# Patient Record
Sex: MALE | Race: WHITE | NOT HISPANIC OR LATINO | Employment: UNEMPLOYED | URBAN - METROPOLITAN AREA
[De-identification: names, ages, dates, MRNs, and addresses within clinical notes are randomized per-mention and may not be internally consistent; named-entity substitution may affect disease eponyms.]

---

## 2017-02-12 ENCOUNTER — GENERIC CONVERSION - ENCOUNTER (OUTPATIENT)
Dept: OTHER | Facility: OTHER | Age: 57
End: 2017-02-12

## 2017-02-27 ENCOUNTER — ALLSCRIPTS OFFICE VISIT (OUTPATIENT)
Dept: OTHER | Facility: OTHER | Age: 57
End: 2017-02-27

## 2017-03-06 ENCOUNTER — ALLSCRIPTS OFFICE VISIT (OUTPATIENT)
Dept: OTHER | Facility: OTHER | Age: 57
End: 2017-03-06

## 2017-03-13 ENCOUNTER — ALLSCRIPTS OFFICE VISIT (OUTPATIENT)
Dept: OTHER | Facility: OTHER | Age: 57
End: 2017-03-13

## 2017-03-27 ENCOUNTER — TRANSCRIBE ORDERS (OUTPATIENT)
Dept: ADMINISTRATIVE | Facility: HOSPITAL | Age: 57
End: 2017-03-27

## 2017-03-27 ENCOUNTER — ALLSCRIPTS OFFICE VISIT (OUTPATIENT)
Dept: OTHER | Facility: OTHER | Age: 57
End: 2017-03-27

## 2017-03-27 DIAGNOSIS — I25.10 ATHEROSCLEROTIC HEART DISEASE OF NATIVE CORONARY ARTERY WITHOUT ANGINA PECTORIS: ICD-10-CM

## 2017-03-27 DIAGNOSIS — R09.89 OTHER SPECIFIED SYMPTOMS AND SIGNS INVOLVING THE CIRCULATORY AND RESPIRATORY SYSTEMS: ICD-10-CM

## 2017-03-27 DIAGNOSIS — I50.9 HEART FAILURE (HCC): ICD-10-CM

## 2017-03-27 DIAGNOSIS — I50.9 HEART FAILURE, UNSPECIFIED (HCC): Primary | ICD-10-CM

## 2017-03-27 DIAGNOSIS — I77.9 DISORDER OF ARTERY OR ARTERIOLE (HCC): ICD-10-CM

## 2017-03-27 DIAGNOSIS — Z95.2 HEART VALVE REPLACED BY TRANSPLANT: ICD-10-CM

## 2017-03-27 DIAGNOSIS — I25.10 ASHD (ARTERIOSCLEROTIC HEART DISEASE): ICD-10-CM

## 2017-03-27 DIAGNOSIS — Z95.2 PRESENCE OF PROSTHETIC HEART VALVE: ICD-10-CM

## 2017-03-29 ENCOUNTER — ALLSCRIPTS OFFICE VISIT (OUTPATIENT)
Dept: OTHER | Facility: OTHER | Age: 57
End: 2017-03-29

## 2017-03-30 ENCOUNTER — GENERIC CONVERSION - ENCOUNTER (OUTPATIENT)
Dept: OTHER | Facility: OTHER | Age: 57
End: 2017-03-30

## 2017-04-02 ENCOUNTER — APPOINTMENT (EMERGENCY)
Dept: CT IMAGING | Facility: HOSPITAL | Age: 57
DRG: 242 | End: 2017-04-02
Payer: COMMERCIAL

## 2017-04-02 ENCOUNTER — APPOINTMENT (EMERGENCY)
Dept: RADIOLOGY | Facility: HOSPITAL | Age: 57
DRG: 242 | End: 2017-04-02
Payer: COMMERCIAL

## 2017-04-02 ENCOUNTER — HOSPITAL ENCOUNTER (INPATIENT)
Facility: HOSPITAL | Age: 57
LOS: 3 days | Discharge: LEFT BEFORE DISCHARGE | DRG: 242 | End: 2017-04-05
Attending: EMERGENCY MEDICINE | Admitting: INTERNAL MEDICINE
Payer: COMMERCIAL

## 2017-04-02 ENCOUNTER — HOSPITAL ENCOUNTER (EMERGENCY)
Facility: HOSPITAL | Age: 57
Discharge: HOME | DRG: 242 | End: 2017-04-02
Attending: EMERGENCY MEDICINE
Payer: COMMERCIAL

## 2017-04-02 VITALS
RESPIRATION RATE: 16 BRPM | WEIGHT: 189 LBS | OXYGEN SATURATION: 99 % | BODY MASS INDEX: 23.5 KG/M2 | HEART RATE: 89 BPM | HEIGHT: 75 IN | SYSTOLIC BLOOD PRESSURE: 154 MMHG | TEMPERATURE: 96.9 F | DIASTOLIC BLOOD PRESSURE: 94 MMHG

## 2017-04-02 DIAGNOSIS — K52.9 COLITIS: Primary | ICD-10-CM

## 2017-04-02 DIAGNOSIS — F41.9 ANXIETY: ICD-10-CM

## 2017-04-02 DIAGNOSIS — A41.9 SEPSIS, UNSPECIFIED: ICD-10-CM

## 2017-04-02 PROBLEM — E11.9 DIABETES MELLITUS (HCC): Chronic | Status: ACTIVE | Noted: 2017-04-02

## 2017-04-02 PROBLEM — K44.9 HIATAL HERNIA: Chronic | Status: ACTIVE | Noted: 2017-04-02

## 2017-04-02 PROBLEM — F31.9 BIPOLAR 1 DISORDER (HCC): Chronic | Status: ACTIVE | Noted: 2017-04-02

## 2017-04-02 PROBLEM — I10 HYPERTENSION: Chronic | Status: ACTIVE | Noted: 2017-04-02

## 2017-04-02 LAB
ALBUMIN SERPL BCP-MCNC: 3.6 G/DL (ref 3.5–5)
ALBUMIN SERPL BCP-MCNC: 3.9 G/DL (ref 3.5–5)
ALP SERPL-CCNC: 154 U/L (ref 46–116)
ALP SERPL-CCNC: 166 U/L (ref 46–116)
ALT SERPL W P-5'-P-CCNC: 19 U/L (ref 12–78)
ALT SERPL W P-5'-P-CCNC: 23 U/L (ref 12–78)
AMPHETAMINES SERPL QL SCN: NEGATIVE
ANION GAP SERPL CALCULATED.3IONS-SCNC: 10 MMOL/L (ref 4–13)
ANION GAP SERPL CALCULATED.3IONS-SCNC: 11 MMOL/L (ref 4–13)
APTT PPP: 29 SECONDS (ref 24–36)
AST SERPL W P-5'-P-CCNC: 19 U/L (ref 5–45)
AST SERPL W P-5'-P-CCNC: 22 U/L (ref 5–45)
BARBITURATES UR QL: NEGATIVE
BASOPHILS # BLD MANUAL: 0 THOUSAND/UL (ref 0–0.1)
BASOPHILS # BLD MANUAL: 0 THOUSAND/UL (ref 0–0.1)
BASOPHILS NFR MAR MANUAL: 0 % (ref 0–1)
BASOPHILS NFR MAR MANUAL: 0 % (ref 0–1)
BENZODIAZ UR QL: NEGATIVE
BILIRUB SERPL-MCNC: 0.9 MG/DL (ref 0.2–1)
BILIRUB SERPL-MCNC: 1.4 MG/DL (ref 0.2–1)
BUN SERPL-MCNC: 19 MG/DL (ref 5–25)
BUN SERPL-MCNC: 21 MG/DL (ref 5–25)
CALCIUM SERPL-MCNC: 8.7 MG/DL (ref 8.3–10.1)
CALCIUM SERPL-MCNC: 9.4 MG/DL (ref 8.3–10.1)
CHLORIDE SERPL-SCNC: 102 MMOL/L (ref 100–108)
CHLORIDE SERPL-SCNC: 103 MMOL/L (ref 100–108)
CO2 SERPL-SCNC: 27 MMOL/L (ref 21–32)
CO2 SERPL-SCNC: 28 MMOL/L (ref 21–32)
COCAINE UR QL: NEGATIVE
CREAT SERPL-MCNC: 1.1 MG/DL (ref 0.6–1.3)
CREAT SERPL-MCNC: 1.19 MG/DL (ref 0.6–1.3)
EOSINOPHIL # BLD MANUAL: 0 THOUSAND/UL (ref 0–0.4)
EOSINOPHIL # BLD MANUAL: 0 THOUSAND/UL (ref 0–0.4)
EOSINOPHIL NFR BLD MANUAL: 0 % (ref 0–6)
EOSINOPHIL NFR BLD MANUAL: 0 % (ref 0–6)
ERYTHROCYTE [DISTWIDTH] IN BLOOD BY AUTOMATED COUNT: 14.6 % (ref 11.6–15.1)
ERYTHROCYTE [DISTWIDTH] IN BLOOD BY AUTOMATED COUNT: 14.7 % (ref 11.6–15.1)
ETHANOL SERPL-MCNC: <3 MG/DL (ref 0–3)
GFR SERPL CREATININE-BSD FRML MDRD: >60 ML/MIN/1.73SQ M
GFR SERPL CREATININE-BSD FRML MDRD: >60 ML/MIN/1.73SQ M
GLUCOSE SERPL-MCNC: 243 MG/DL (ref 65–140)
GLUCOSE SERPL-MCNC: 253 MG/DL (ref 65–140)
GLUCOSE SERPL-MCNC: 296 MG/DL (ref 65–140)
HCT VFR BLD AUTO: 38 % (ref 36.5–49.3)
HCT VFR BLD AUTO: 41.1 % (ref 36.5–49.3)
HGB BLD-MCNC: 12.5 G/DL (ref 12–17)
HGB BLD-MCNC: 13.5 G/DL (ref 12–17)
INR PPP: 1.4 (ref 0.86–1.16)
LACTATE SERPL-SCNC: 1.9 MMOL/L (ref 0.5–2)
LACTATE SERPL-SCNC: 2.7 MMOL/L (ref 0.5–2)
LACTATE SERPL-SCNC: 3.5 MMOL/L (ref 0.5–2)
LIPASE SERPL-CCNC: 85 U/L (ref 73–393)
LYMPHOCYTES # BLD AUTO: 0.74 THOUSAND/UL (ref 0.6–4.47)
LYMPHOCYTES # BLD AUTO: 0.96 THOUSAND/UL (ref 0.6–4.47)
LYMPHOCYTES # BLD AUTO: 4 % (ref 14–44)
LYMPHOCYTES # BLD AUTO: 5 % (ref 14–44)
MAGNESIUM SERPL-MCNC: 1.8 MG/DL (ref 1.6–2.6)
MCH RBC QN AUTO: 29.8 PG (ref 26.8–34.3)
MCH RBC QN AUTO: 30 PG (ref 26.8–34.3)
MCHC RBC AUTO-ENTMCNC: 32.8 G/DL (ref 31.4–37.4)
MCHC RBC AUTO-ENTMCNC: 32.9 G/DL (ref 31.4–37.4)
MCV RBC AUTO: 91 FL (ref 82–98)
MCV RBC AUTO: 91 FL (ref 82–98)
METHADONE UR QL: NEGATIVE
MONOCYTES # BLD AUTO: 0.3 THOUSAND/UL (ref 0–1.22)
MONOCYTES # BLD AUTO: 1.68 THOUSAND/UL (ref 0–1.22)
MONOCYTES NFR BLD: 2 % (ref 4–12)
MONOCYTES NFR BLD: 7 % (ref 4–12)
NEUTROPHILS # BLD MANUAL: 13.84 THOUSAND/UL (ref 1.85–7.62)
NEUTROPHILS # BLD MANUAL: 21.37 THOUSAND/UL (ref 1.85–7.62)
NEUTS BAND NFR BLD MANUAL: 1 % (ref 0–8)
NEUTS SEG NFR BLD AUTO: 88 % (ref 43–75)
NEUTS SEG NFR BLD AUTO: 93 % (ref 43–75)
OPIATES UR QL SCN: NEGATIVE
PCP UR QL: NEGATIVE
PLATELET # BLD AUTO: 201 THOUSANDS/UL (ref 149–390)
PLATELET # BLD AUTO: 224 THOUSANDS/UL (ref 149–390)
PLATELET BLD QL SMEAR: ADEQUATE
PLATELET BLD QL SMEAR: ADEQUATE
PMV BLD AUTO: 12.5 FL (ref 8.9–12.7)
PMV BLD AUTO: 12.7 FL (ref 8.9–12.7)
POTASSIUM SERPL-SCNC: 3.6 MMOL/L (ref 3.5–5.3)
POTASSIUM SERPL-SCNC: 4.4 MMOL/L (ref 3.5–5.3)
PROT SERPL-MCNC: 7.4 G/DL (ref 6.4–8.2)
PROT SERPL-MCNC: 8 G/DL (ref 6.4–8.2)
PROTHROMBIN TIME: 16.9 SECONDS (ref 12–14.3)
RBC # BLD AUTO: 4.16 MILLION/UL (ref 3.88–5.62)
RBC # BLD AUTO: 4.53 MILLION/UL (ref 3.88–5.62)
RBC MORPH BLD: NORMAL
RBC MORPH BLD: NORMAL
SODIUM SERPL-SCNC: 140 MMOL/L (ref 136–145)
SODIUM SERPL-SCNC: 141 MMOL/L (ref 136–145)
THC UR QL: POSITIVE
TOTAL CELLS COUNTED SPEC: 100
TOTAL CELLS COUNTED SPEC: 100
TROPONIN I SERPL-MCNC: <0.02 NG/ML
TROPONIN I SERPL-MCNC: <0.02 NG/ML
WBC # BLD AUTO: 14.88 THOUSAND/UL (ref 4.31–10.16)
WBC # BLD AUTO: 24.01 THOUSAND/UL (ref 4.31–10.16)

## 2017-04-02 PROCEDURE — 85610 PROTHROMBIN TIME: CPT | Performed by: EMERGENCY MEDICINE

## 2017-04-02 PROCEDURE — 36415 COLL VENOUS BLD VENIPUNCTURE: CPT | Performed by: EMERGENCY MEDICINE

## 2017-04-02 PROCEDURE — 85027 COMPLETE CBC AUTOMATED: CPT | Performed by: EMERGENCY MEDICINE

## 2017-04-02 PROCEDURE — 82948 REAGENT STRIP/BLOOD GLUCOSE: CPT

## 2017-04-02 PROCEDURE — 85730 THROMBOPLASTIN TIME PARTIAL: CPT | Performed by: EMERGENCY MEDICINE

## 2017-04-02 PROCEDURE — 80320 DRUG SCREEN QUANTALCOHOLS: CPT | Performed by: EMERGENCY MEDICINE

## 2017-04-02 PROCEDURE — 96375 TX/PRO/DX INJ NEW DRUG ADDON: CPT

## 2017-04-02 PROCEDURE — 74177 CT ABD & PELVIS W/CONTRAST: CPT

## 2017-04-02 PROCEDURE — 83605 ASSAY OF LACTIC ACID: CPT | Performed by: EMERGENCY MEDICINE

## 2017-04-02 PROCEDURE — 96374 THER/PROPH/DIAG INJ IV PUSH: CPT

## 2017-04-02 PROCEDURE — 83735 ASSAY OF MAGNESIUM: CPT | Performed by: EMERGENCY MEDICINE

## 2017-04-02 PROCEDURE — 99285 EMERGENCY DEPT VISIT HI MDM: CPT

## 2017-04-02 PROCEDURE — 96376 TX/PRO/DX INJ SAME DRUG ADON: CPT

## 2017-04-02 PROCEDURE — 96361 HYDRATE IV INFUSION ADD-ON: CPT

## 2017-04-02 PROCEDURE — 80053 COMPREHEN METABOLIC PANEL: CPT | Performed by: EMERGENCY MEDICINE

## 2017-04-02 PROCEDURE — 87040 BLOOD CULTURE FOR BACTERIA: CPT | Performed by: EMERGENCY MEDICINE

## 2017-04-02 PROCEDURE — 96360 HYDRATION IV INFUSION INIT: CPT

## 2017-04-02 PROCEDURE — 84484 ASSAY OF TROPONIN QUANT: CPT | Performed by: EMERGENCY MEDICINE

## 2017-04-02 PROCEDURE — 85007 BL SMEAR W/DIFF WBC COUNT: CPT | Performed by: EMERGENCY MEDICINE

## 2017-04-02 PROCEDURE — 94762 N-INVAS EAR/PLS OXIMTRY CONT: CPT

## 2017-04-02 PROCEDURE — 93005 ELECTROCARDIOGRAM TRACING: CPT | Performed by: EMERGENCY MEDICINE

## 2017-04-02 PROCEDURE — 83690 ASSAY OF LIPASE: CPT | Performed by: EMERGENCY MEDICINE

## 2017-04-02 PROCEDURE — 80307 DRUG TEST PRSMV CHEM ANLYZR: CPT | Performed by: EMERGENCY MEDICINE

## 2017-04-02 PROCEDURE — 71010 HB CHEST X-RAY 1 VIEW FRONTAL (PORTABLE): CPT

## 2017-04-02 RX ORDER — PIPERACILLIN SODIUM, TAZOBACTAM SODIUM 3; .375 G/15ML; G/15ML
INJECTION, POWDER, LYOPHILIZED, FOR SOLUTION INTRAVENOUS
Status: COMPLETED
Start: 2017-04-02 | End: 2017-04-03

## 2017-04-02 RX ORDER — ONDANSETRON 2 MG/ML
4 INJECTION INTRAMUSCULAR; INTRAVENOUS ONCE
Status: COMPLETED | OUTPATIENT
Start: 2017-04-02 | End: 2017-04-02

## 2017-04-02 RX ORDER — FAMOTIDINE 20 MG/1
20 TABLET, FILM COATED ORAL 2 TIMES DAILY
Status: DISCONTINUED | OUTPATIENT
Start: 2017-04-02 | End: 2017-04-05 | Stop reason: HOSPADM

## 2017-04-02 RX ORDER — ONDANSETRON 2 MG/ML
INJECTION INTRAMUSCULAR; INTRAVENOUS
Status: DISCONTINUED
Start: 2017-04-02 | End: 2017-04-02 | Stop reason: HOSPADM

## 2017-04-02 RX ORDER — HEPARIN SODIUM 5000 [USP'U]/ML
5000 INJECTION, SOLUTION INTRAVENOUS; SUBCUTANEOUS EVERY 8 HOURS SCHEDULED
Status: DISCONTINUED | OUTPATIENT
Start: 2017-04-02 | End: 2017-04-04

## 2017-04-02 RX ORDER — LISINOPRIL 20 MG/1
20 TABLET ORAL DAILY
Status: ON HOLD | COMMUNITY
End: 2017-09-06

## 2017-04-02 RX ORDER — WARFARIN SODIUM 5 MG/1
5 TABLET ORAL
COMMUNITY
End: 2017-04-05 | Stop reason: HOSPADM

## 2017-04-02 RX ORDER — QUETIAPINE FUMARATE 200 MG/1
400 TABLET, FILM COATED ORAL DAILY
COMMUNITY
End: 2017-05-28 | Stop reason: HOSPADM

## 2017-04-02 RX ORDER — CIPROFLOXACIN 500 MG/1
500 TABLET, FILM COATED ORAL DAILY
Qty: 9 TABLET | Refills: 0 | Status: SHIPPED | OUTPATIENT
Start: 2017-04-03 | End: 2017-04-05 | Stop reason: HOSPADM

## 2017-04-02 RX ORDER — LORAZEPAM 2 MG/ML
1 INJECTION INTRAMUSCULAR ONCE
Status: DISCONTINUED | OUTPATIENT
Start: 2017-04-02 | End: 2017-04-02

## 2017-04-02 RX ORDER — ONDANSETRON 4 MG/1
4 TABLET, ORALLY DISINTEGRATING ORAL EVERY 8 HOURS PRN
COMMUNITY
End: 2017-04-22 | Stop reason: HOSPADM

## 2017-04-02 RX ORDER — ONDANSETRON 2 MG/ML
4 INJECTION INTRAMUSCULAR; INTRAVENOUS EVERY 6 HOURS PRN
Status: DISCONTINUED | OUTPATIENT
Start: 2017-04-02 | End: 2017-04-05 | Stop reason: HOSPADM

## 2017-04-02 RX ORDER — QUETIAPINE FUMARATE 300 MG/1
600 TABLET, FILM COATED ORAL
Status: ON HOLD | COMMUNITY
End: 2017-05-28

## 2017-04-02 RX ORDER — CIPROFLOXACIN 500 MG/1
500 TABLET, FILM COATED ORAL ONCE
Qty: 9 TABLET | Refills: 0 | Status: SHIPPED | OUTPATIENT
Start: 2017-04-03 | End: 2017-04-02

## 2017-04-02 RX ORDER — LORAZEPAM 2 MG/ML
2 INJECTION INTRAMUSCULAR ONCE
Status: COMPLETED | OUTPATIENT
Start: 2017-04-02 | End: 2017-04-02

## 2017-04-02 RX ORDER — TORSEMIDE 20 MG/1
40 TABLET ORAL DAILY
Status: ON HOLD | COMMUNITY
End: 2017-05-28

## 2017-04-02 RX ORDER — DEXTROSE, SODIUM CHLORIDE, AND POTASSIUM CHLORIDE 5; .45; .15 G/100ML; G/100ML; G/100ML
125 INJECTION INTRAVENOUS CONTINUOUS
Status: DISCONTINUED | OUTPATIENT
Start: 2017-04-03 | End: 2017-04-04

## 2017-04-02 RX ORDER — LORAZEPAM 2 MG/ML
1 INJECTION INTRAMUSCULAR ONCE
Status: COMPLETED | OUTPATIENT
Start: 2017-04-02 | End: 2017-04-02

## 2017-04-02 RX ORDER — INSULIN GLARGINE 100 [IU]/ML
10 INJECTION, SOLUTION SUBCUTANEOUS
COMMUNITY
End: 2017-05-28 | Stop reason: HOSPADM

## 2017-04-02 RX ORDER — LORAZEPAM 2 MG/ML
INJECTION INTRAMUSCULAR
Status: DISCONTINUED
Start: 2017-04-02 | End: 2017-04-02 | Stop reason: HOSPADM

## 2017-04-02 RX ORDER — CIPROFLOXACIN 250 MG/1
500 TABLET, FILM COATED ORAL ONCE
Status: COMPLETED | OUTPATIENT
Start: 2017-04-02 | End: 2017-04-02

## 2017-04-02 RX ORDER — LORAZEPAM 1 MG/1
1 TABLET ORAL 2 TIMES DAILY PRN
COMMUNITY
End: 2017-09-06 | Stop reason: HOSPADM

## 2017-04-02 RX ORDER — POTASSIUM CHLORIDE 20 MEQ/1
20 TABLET, EXTENDED RELEASE ORAL DAILY
Status: ON HOLD | COMMUNITY
End: 2017-08-09 | Stop reason: ALTCHOICE

## 2017-04-02 RX ORDER — NICOTINE 21 MG/24HR
1 PATCH, TRANSDERMAL 24 HOURS TRANSDERMAL DAILY
Status: DISCONTINUED | OUTPATIENT
Start: 2017-04-03 | End: 2017-04-05 | Stop reason: HOSPADM

## 2017-04-02 RX ADMIN — HYDROMORPHONE HYDROCHLORIDE 1 MG: 1 INJECTION, SOLUTION INTRAMUSCULAR; INTRAVENOUS; SUBCUTANEOUS at 23:06

## 2017-04-02 RX ADMIN — CIPROFLOXACIN HYDROCHLORIDE 500 MG: 250 TABLET, FILM COATED ORAL at 12:00

## 2017-04-02 RX ADMIN — SODIUM CHLORIDE 500 ML: 0.9 INJECTION, SOLUTION INTRAVENOUS at 10:07

## 2017-04-02 RX ADMIN — ONDANSETRON 4 MG: 2 INJECTION INTRAMUSCULAR; INTRAVENOUS at 20:14

## 2017-04-02 RX ADMIN — LORAZEPAM 2 MG: 2 INJECTION, SOLUTION INTRAMUSCULAR; INTRAVENOUS at 09:35

## 2017-04-02 RX ADMIN — SODIUM CHLORIDE 1000 ML: 0.9 INJECTION, SOLUTION INTRAVENOUS at 20:11

## 2017-04-02 RX ADMIN — IOHEXOL 100 ML: 350 INJECTION, SOLUTION INTRAVENOUS at 09:34

## 2017-04-02 RX ADMIN — SODIUM CHLORIDE 1000 ML: 0.9 INJECTION, SOLUTION INTRAVENOUS at 08:05

## 2017-04-02 RX ADMIN — LORAZEPAM 1 MG: 2 INJECTION, SOLUTION INTRAMUSCULAR; INTRAVENOUS at 07:50

## 2017-04-02 RX ADMIN — SODIUM CHLORIDE 1000 ML: 900 INJECTION, SOLUTION INTRAVENOUS at 07:00

## 2017-04-02 RX ADMIN — SODIUM CHLORIDE 1000 ML: 0.9 INJECTION, SOLUTION INTRAVENOUS at 21:56

## 2017-04-02 RX ADMIN — ONDANSETRON 4 MG: 2 INJECTION INTRAMUSCULAR; INTRAVENOUS at 07:49

## 2017-04-02 RX ADMIN — LORAZEPAM 2 MG: 2 INJECTION, SOLUTION INTRAMUSCULAR; INTRAVENOUS at 20:13

## 2017-04-02 RX ADMIN — PIPERACILLIN AND TAZOBACTAM 3.38 G: 3; .375 INJECTION, POWDER, LYOPHILIZED, FOR SOLUTION INTRAVENOUS; PARENTERAL at 22:03

## 2017-04-02 RX ADMIN — LORAZEPAM 1 MG: 2 INJECTION, SOLUTION INTRAMUSCULAR; INTRAVENOUS at 22:02

## 2017-04-03 LAB
ALBUMIN SERPL BCP-MCNC: 3.3 G/DL (ref 3.5–5)
ALP SERPL-CCNC: 140 U/L (ref 46–116)
ALT SERPL W P-5'-P-CCNC: 21 U/L (ref 12–78)
ANION GAP SERPL CALCULATED.3IONS-SCNC: 9 MMOL/L (ref 4–13)
AST SERPL W P-5'-P-CCNC: 20 U/L (ref 5–45)
ATRIAL RATE: 94 BPM
ATRIAL RATE: 98 BPM
BILIRUB SERPL-MCNC: 1 MG/DL (ref 0.2–1)
BUN SERPL-MCNC: 17 MG/DL (ref 5–25)
CALCIUM SERPL-MCNC: 8.1 MG/DL (ref 8.3–10.1)
CHLORIDE SERPL-SCNC: 104 MMOL/L (ref 100–108)
CO2 SERPL-SCNC: 28 MMOL/L (ref 21–32)
CREAT SERPL-MCNC: 1.13 MG/DL (ref 0.6–1.3)
ERYTHROCYTE [DISTWIDTH] IN BLOOD BY AUTOMATED COUNT: 14.9 % (ref 11.6–15.1)
EST. AVERAGE GLUCOSE BLD GHB EST-MCNC: 206 MG/DL
GFR SERPL CREATININE-BSD FRML MDRD: >60 ML/MIN/1.73SQ M
GLUCOSE SERPL-MCNC: 168 MG/DL (ref 65–140)
GLUCOSE SERPL-MCNC: 182 MG/DL (ref 65–140)
GLUCOSE SERPL-MCNC: 203 MG/DL (ref 65–140)
GLUCOSE SERPL-MCNC: 250 MG/DL (ref 65–140)
GLUCOSE SERPL-MCNC: 278 MG/DL (ref 65–140)
GLUCOSE SERPL-MCNC: 327 MG/DL (ref 65–140)
HBA1C MFR BLD: 8.8 % (ref 4.2–6.3)
HCT VFR BLD AUTO: 34.8 % (ref 36.5–49.3)
HGB BLD-MCNC: 11.4 G/DL (ref 12–17)
LACTATE SERPL-SCNC: 1.1 MMOL/L (ref 0.5–2)
LACTATE SERPL-SCNC: 2.5 MMOL/L (ref 0.5–2)
MAGNESIUM SERPL-MCNC: 1.9 MG/DL (ref 1.6–2.6)
MCH RBC QN AUTO: 30.5 PG (ref 26.8–34.3)
MCHC RBC AUTO-ENTMCNC: 32.8 G/DL (ref 31.4–37.4)
MCV RBC AUTO: 93 FL (ref 82–98)
P AXIS: 64 DEGREES
P AXIS: 72 DEGREES
PLATELET # BLD AUTO: 157 THOUSANDS/UL (ref 149–390)
PMV BLD AUTO: 12.5 FL (ref 8.9–12.7)
POTASSIUM SERPL-SCNC: 3.6 MMOL/L (ref 3.5–5.3)
PR INTERVAL: 194 MS
PR INTERVAL: 194 MS
PROT SERPL-MCNC: 6.9 G/DL (ref 6.4–8.2)
QRS AXIS: 85 DEGREES
QRS AXIS: 86 DEGREES
QRSD INTERVAL: 114 MS
QRSD INTERVAL: 118 MS
QT INTERVAL: 372 MS
QT INTERVAL: 372 MS
QTC INTERVAL: 465 MS
QTC INTERVAL: 474 MS
RBC # BLD AUTO: 3.74 MILLION/UL (ref 3.88–5.62)
SODIUM SERPL-SCNC: 141 MMOL/L (ref 136–145)
T WAVE AXIS: -70 DEGREES
T WAVE AXIS: 241 DEGREES
VENTRICULAR RATE: 94 BPM
VENTRICULAR RATE: 98 BPM
WBC # BLD AUTO: 22.08 THOUSAND/UL (ref 4.31–10.16)

## 2017-04-03 PROCEDURE — 94762 N-INVAS EAR/PLS OXIMTRY CONT: CPT

## 2017-04-03 PROCEDURE — 83735 ASSAY OF MAGNESIUM: CPT | Performed by: INTERNAL MEDICINE

## 2017-04-03 PROCEDURE — 80053 COMPREHEN METABOLIC PANEL: CPT | Performed by: INTERNAL MEDICINE

## 2017-04-03 PROCEDURE — 83605 ASSAY OF LACTIC ACID: CPT | Performed by: NURSE PRACTITIONER

## 2017-04-03 PROCEDURE — 83605 ASSAY OF LACTIC ACID: CPT | Performed by: INTERNAL MEDICINE

## 2017-04-03 PROCEDURE — 83036 HEMOGLOBIN GLYCOSYLATED A1C: CPT | Performed by: INTERNAL MEDICINE

## 2017-04-03 PROCEDURE — 82948 REAGENT STRIP/BLOOD GLUCOSE: CPT

## 2017-04-03 PROCEDURE — 85027 COMPLETE CBC AUTOMATED: CPT | Performed by: INTERNAL MEDICINE

## 2017-04-03 RX ORDER — WARFARIN SODIUM 5 MG/1
5 TABLET ORAL
Status: DISCONTINUED | OUTPATIENT
Start: 2017-04-03 | End: 2017-04-04

## 2017-04-03 RX ORDER — PIPERACILLIN SODIUM, TAZOBACTAM SODIUM 3; .375 G/15ML; G/15ML
INJECTION, POWDER, LYOPHILIZED, FOR SOLUTION INTRAVENOUS
Status: COMPLETED
Start: 2017-04-03 | End: 2017-04-03

## 2017-04-03 RX ORDER — QUETIAPINE FUMARATE 300 MG/1
600 TABLET, FILM COATED ORAL
Status: DISCONTINUED | OUTPATIENT
Start: 2017-04-03 | End: 2017-04-05 | Stop reason: HOSPADM

## 2017-04-03 RX ORDER — QUETIAPINE FUMARATE 200 MG/1
400 TABLET, FILM COATED ORAL DAILY
Status: DISCONTINUED | OUTPATIENT
Start: 2017-04-03 | End: 2017-04-05 | Stop reason: HOSPADM

## 2017-04-03 RX ORDER — LISINOPRIL 20 MG/1
20 TABLET ORAL DAILY
Status: DISCONTINUED | OUTPATIENT
Start: 2017-04-03 | End: 2017-04-05 | Stop reason: HOSPADM

## 2017-04-03 RX ORDER — WARFARIN SODIUM 5 MG/1
5 TABLET ORAL
Status: DISCONTINUED | OUTPATIENT
Start: 2017-04-03 | End: 2017-04-03

## 2017-04-03 RX ADMIN — METRONIDAZOLE 500 MG: 500 INJECTION, SOLUTION INTRAVENOUS at 00:06

## 2017-04-03 RX ADMIN — SODIUM CHLORIDE 1000 ML: 0.9 INJECTION, SOLUTION INTRAVENOUS at 03:25

## 2017-04-03 RX ADMIN — QUETIAPINE FUMARATE 400 MG: 200 TABLET ORAL at 08:05

## 2017-04-03 RX ADMIN — DEXTROSE, SODIUM CHLORIDE, AND POTASSIUM CHLORIDE 125 ML/HR: 5; .45; .15 INJECTION INTRAVENOUS at 11:00

## 2017-04-03 RX ADMIN — FAMOTIDINE 20 MG: 10 INJECTION, SOLUTION INTRAVENOUS at 08:06

## 2017-04-03 RX ADMIN — PIPERACILLIN SODIUM,TAZOBACTAM SODIUM 3.38 G: 3; .375 INJECTION, POWDER, FOR SOLUTION INTRAVENOUS at 21:01

## 2017-04-03 RX ADMIN — METRONIDAZOLE 500 MG: 500 INJECTION, SOLUTION INTRAVENOUS at 07:45

## 2017-04-03 RX ADMIN — FAMOTIDINE 20 MG: 10 INJECTION, SOLUTION INTRAVENOUS at 17:18

## 2017-04-03 RX ADMIN — INSULIN LISPRO 1 UNITS: 100 INJECTION, SOLUTION INTRAVENOUS; SUBCUTANEOUS at 16:46

## 2017-04-03 RX ADMIN — WARFARIN SODIUM 5 MG: 5 TABLET ORAL at 17:18

## 2017-04-03 RX ADMIN — HEPARIN SODIUM 5000 UNITS: 5000 INJECTION, SOLUTION INTRAVENOUS; SUBCUTANEOUS at 05:41

## 2017-04-03 RX ADMIN — LISINOPRIL 20 MG: 20 TABLET ORAL at 08:48

## 2017-04-03 RX ADMIN — NICOTINE 1 PATCH: 21 PATCH, EXTENDED RELEASE TRANSDERMAL at 08:06

## 2017-04-03 RX ADMIN — PIPERACILLIN SODIUM,TAZOBACTAM SODIUM 3.38 G: 3; .375 INJECTION, POWDER, FOR SOLUTION INTRAVENOUS at 10:17

## 2017-04-03 RX ADMIN — SODIUM CHLORIDE 1000 ML: 0.9 INJECTION, SOLUTION INTRAVENOUS at 00:07

## 2017-04-03 RX ADMIN — QUETIAPINE FUMARATE 600 MG: 300 TABLET, FILM COATED ORAL at 21:01

## 2017-04-03 RX ADMIN — FAMOTIDINE 20 MG: 10 INJECTION, SOLUTION INTRAVENOUS at 00:06

## 2017-04-03 RX ADMIN — PIPERACILLIN SODIUM,TAZOBACTAM SODIUM 3375 MG: 3; .375 INJECTION, POWDER, FOR SOLUTION INTRAVENOUS at 03:25

## 2017-04-03 RX ADMIN — INSULIN LISPRO 5 UNITS: 100 INJECTION, SOLUTION INTRAVENOUS; SUBCUTANEOUS at 21:05

## 2017-04-03 RX ADMIN — PIPERACILLIN AND TAZOBACTAM: 3; .375 INJECTION, POWDER, LYOPHILIZED, FOR SOLUTION INTRAVENOUS; PARENTERAL at 00:49

## 2017-04-03 RX ADMIN — HEPARIN SODIUM 5000 UNITS: 5000 INJECTION, SOLUTION INTRAVENOUS; SUBCUTANEOUS at 00:06

## 2017-04-03 RX ADMIN — DEXTROSE, SODIUM CHLORIDE, AND POTASSIUM CHLORIDE 125 ML/HR: 5; .45; .15 INJECTION INTRAVENOUS at 00:06

## 2017-04-03 RX ADMIN — HEPARIN SODIUM 5000 UNITS: 5000 INJECTION, SOLUTION INTRAVENOUS; SUBCUTANEOUS at 21:01

## 2017-04-03 RX ADMIN — PIPERACILLIN SODIUM,TAZOBACTAM SODIUM 3.38 G: 3; .375 INJECTION, POWDER, FOR SOLUTION INTRAVENOUS at 16:45

## 2017-04-03 RX ADMIN — INSULIN LISPRO 4 UNITS: 100 INJECTION, SOLUTION INTRAVENOUS; SUBCUTANEOUS at 00:22

## 2017-04-03 RX ADMIN — HEPARIN SODIUM 5000 UNITS: 5000 INJECTION, SOLUTION INTRAVENOUS; SUBCUTANEOUS at 14:00

## 2017-04-03 RX ADMIN — QUETIAPINE FUMARATE 600 MG: 300 TABLET, FILM COATED ORAL at 00:49

## 2017-04-03 RX ADMIN — INSULIN LISPRO 3 UNITS: 100 INJECTION, SOLUTION INTRAVENOUS; SUBCUTANEOUS at 11:01

## 2017-04-03 RX ADMIN — INSULIN LISPRO 2 UNITS: 100 INJECTION, SOLUTION INTRAVENOUS; SUBCUTANEOUS at 07:49

## 2017-04-03 RX ADMIN — DEXTROSE, SODIUM CHLORIDE, AND POTASSIUM CHLORIDE 125 ML/HR: 5; .45; .15 INJECTION INTRAVENOUS at 21:01

## 2017-04-04 LAB
ALBUMIN SERPL BCP-MCNC: 2.6 G/DL (ref 3.5–5)
ALP SERPL-CCNC: 109 U/L (ref 46–116)
ALT SERPL W P-5'-P-CCNC: 16 U/L (ref 12–78)
ANION GAP SERPL CALCULATED.3IONS-SCNC: 7 MMOL/L (ref 4–13)
AST SERPL W P-5'-P-CCNC: 14 U/L (ref 5–45)
BASOPHILS # BLD AUTO: 0.02 THOUSANDS/ΜL (ref 0–0.1)
BASOPHILS NFR BLD AUTO: 0 % (ref 0–1)
BILIRUB SERPL-MCNC: 0.5 MG/DL (ref 0.2–1)
BUN SERPL-MCNC: 8 MG/DL (ref 5–25)
CALCIUM SERPL-MCNC: 7.8 MG/DL (ref 8.3–10.1)
CHLORIDE SERPL-SCNC: 108 MMOL/L (ref 100–108)
CO2 SERPL-SCNC: 26 MMOL/L (ref 21–32)
CREAT SERPL-MCNC: 0.92 MG/DL (ref 0.6–1.3)
EOSINOPHIL # BLD AUTO: 0.17 THOUSAND/ΜL (ref 0–0.61)
EOSINOPHIL NFR BLD AUTO: 2 % (ref 0–6)
ERYTHROCYTE [DISTWIDTH] IN BLOOD BY AUTOMATED COUNT: 14.7 % (ref 11.6–15.1)
GFR SERPL CREATININE-BSD FRML MDRD: >60 ML/MIN/1.73SQ M
GLUCOSE SERPL-MCNC: 145 MG/DL (ref 65–140)
GLUCOSE SERPL-MCNC: 167 MG/DL (ref 65–140)
GLUCOSE SERPL-MCNC: 191 MG/DL (ref 65–140)
GLUCOSE SERPL-MCNC: 213 MG/DL (ref 65–140)
GLUCOSE SERPL-MCNC: 318 MG/DL (ref 65–140)
HCT VFR BLD AUTO: 36.1 % (ref 36.5–49.3)
HGB BLD-MCNC: 11.6 G/DL (ref 12–17)
INR PPP: 1.59 (ref 0.86–1.16)
LYMPHOCYTES # BLD AUTO: 1.36 THOUSANDS/ΜL (ref 0.6–4.47)
LYMPHOCYTES NFR BLD AUTO: 18 % (ref 14–44)
MCH RBC QN AUTO: 29.7 PG (ref 26.8–34.3)
MCHC RBC AUTO-ENTMCNC: 32.1 G/DL (ref 31.4–37.4)
MCV RBC AUTO: 93 FL (ref 82–98)
MONOCYTES # BLD AUTO: 0.66 THOUSAND/ΜL (ref 0.17–1.22)
MONOCYTES NFR BLD AUTO: 9 % (ref 4–12)
NEUTROPHILS # BLD AUTO: 5.4 THOUSANDS/ΜL (ref 1.85–7.62)
NEUTS SEG NFR BLD AUTO: 71 % (ref 43–75)
PLATELET # BLD AUTO: 139 THOUSANDS/UL (ref 149–390)
PMV BLD AUTO: 12.5 FL (ref 8.9–12.7)
POTASSIUM SERPL-SCNC: 3.4 MMOL/L (ref 3.5–5.3)
PROT SERPL-MCNC: 5.8 G/DL (ref 6.4–8.2)
PROTHROMBIN TIME: 18.6 SECONDS (ref 12–14.3)
RBC # BLD AUTO: 3.9 MILLION/UL (ref 3.88–5.62)
SODIUM SERPL-SCNC: 141 MMOL/L (ref 136–145)
WBC # BLD AUTO: 7.61 THOUSAND/UL (ref 4.31–10.16)

## 2017-04-04 PROCEDURE — 94760 N-INVAS EAR/PLS OXIMETRY 1: CPT

## 2017-04-04 PROCEDURE — 80053 COMPREHEN METABOLIC PANEL: CPT | Performed by: FAMILY MEDICINE

## 2017-04-04 PROCEDURE — 85610 PROTHROMBIN TIME: CPT | Performed by: FAMILY MEDICINE

## 2017-04-04 PROCEDURE — 82948 REAGENT STRIP/BLOOD GLUCOSE: CPT

## 2017-04-04 PROCEDURE — 85025 COMPLETE CBC W/AUTO DIFF WBC: CPT | Performed by: FAMILY MEDICINE

## 2017-04-04 RX ORDER — WARFARIN SODIUM 7.5 MG/1
7.5 TABLET ORAL
Status: DISCONTINUED | OUTPATIENT
Start: 2017-04-04 | End: 2017-04-05 | Stop reason: HOSPADM

## 2017-04-04 RX ORDER — POTASSIUM CHLORIDE 20 MEQ/1
20 TABLET, EXTENDED RELEASE ORAL
Status: COMPLETED | OUTPATIENT
Start: 2017-04-04 | End: 2017-04-04

## 2017-04-04 RX ADMIN — LISINOPRIL 20 MG: 20 TABLET ORAL at 08:55

## 2017-04-04 RX ADMIN — POTASSIUM CHLORIDE 20 MEQ: 1500 TABLET, EXTENDED RELEASE ORAL at 11:31

## 2017-04-04 RX ADMIN — NICOTINE 1 PATCH: 21 PATCH, EXTENDED RELEASE TRANSDERMAL at 08:57

## 2017-04-04 RX ADMIN — FAMOTIDINE 20 MG: 20 TABLET ORAL at 17:04

## 2017-04-04 RX ADMIN — PIPERACILLIN SODIUM,TAZOBACTAM SODIUM 3.38 G: 3; .375 INJECTION, POWDER, FOR SOLUTION INTRAVENOUS at 04:08

## 2017-04-04 RX ADMIN — QUETIAPINE FUMARATE 600 MG: 300 TABLET, FILM COATED ORAL at 21:36

## 2017-04-04 RX ADMIN — FAMOTIDINE 20 MG: 20 TABLET ORAL at 08:55

## 2017-04-04 RX ADMIN — QUETIAPINE FUMARATE 400 MG: 200 TABLET ORAL at 08:55

## 2017-04-04 RX ADMIN — HEPARIN SODIUM 5000 UNITS: 5000 INJECTION, SOLUTION INTRAVENOUS; SUBCUTANEOUS at 05:11

## 2017-04-04 RX ADMIN — INSULIN LISPRO 2 UNITS: 100 INJECTION, SOLUTION INTRAVENOUS; SUBCUTANEOUS at 08:56

## 2017-04-04 RX ADMIN — INSULIN LISPRO 5 UNITS: 100 INJECTION, SOLUTION INTRAVENOUS; SUBCUTANEOUS at 11:19

## 2017-04-04 RX ADMIN — PIPERACILLIN SODIUM,TAZOBACTAM SODIUM 3.38 G: 3; .375 INJECTION, POWDER, FOR SOLUTION INTRAVENOUS at 16:56

## 2017-04-04 RX ADMIN — PIPERACILLIN SODIUM,TAZOBACTAM SODIUM 3.38 G: 3; .375 INJECTION, POWDER, FOR SOLUTION INTRAVENOUS at 09:00

## 2017-04-04 RX ADMIN — ENOXAPARIN SODIUM 90 MG: 100 INJECTION SUBCUTANEOUS at 21:36

## 2017-04-04 RX ADMIN — POTASSIUM CHLORIDE 20 MEQ: 1500 TABLET, EXTENDED RELEASE ORAL at 16:56

## 2017-04-04 RX ADMIN — WARFARIN SODIUM 7.5 MG: 7.5 TABLET ORAL at 17:04

## 2017-04-04 RX ADMIN — ENOXAPARIN SODIUM 90 MG: 100 INJECTION SUBCUTANEOUS at 08:55

## 2017-04-04 RX ADMIN — INSULIN LISPRO 2 UNITS: 100 INJECTION, SOLUTION INTRAVENOUS; SUBCUTANEOUS at 21:37

## 2017-04-04 RX ADMIN — PIPERACILLIN SODIUM,TAZOBACTAM SODIUM 3.38 G: 3; .375 INJECTION, POWDER, FOR SOLUTION INTRAVENOUS at 21:36

## 2017-04-04 RX ADMIN — DEXTROSE, SODIUM CHLORIDE, AND POTASSIUM CHLORIDE 125 ML/HR: 5; .45; .15 INJECTION INTRAVENOUS at 04:08

## 2017-04-04 RX ADMIN — POTASSIUM CHLORIDE 20 MEQ: 1500 TABLET, EXTENDED RELEASE ORAL at 08:55

## 2017-04-05 VITALS
TEMPERATURE: 97.7 F | HEIGHT: 75 IN | WEIGHT: 188.71 LBS | HEART RATE: 75 BPM | RESPIRATION RATE: 18 BRPM | SYSTOLIC BLOOD PRESSURE: 134 MMHG | BODY MASS INDEX: 23.46 KG/M2 | DIASTOLIC BLOOD PRESSURE: 66 MMHG | OXYGEN SATURATION: 98 %

## 2017-04-05 LAB
ANION GAP SERPL CALCULATED.3IONS-SCNC: 11 MMOL/L (ref 4–13)
BASOPHILS # BLD AUTO: 0.03 THOUSANDS/ΜL (ref 0–0.1)
BASOPHILS NFR BLD AUTO: 0 % (ref 0–1)
BUN SERPL-MCNC: 5 MG/DL (ref 5–25)
CALCIUM SERPL-MCNC: 8.2 MG/DL (ref 8.3–10.1)
CHLORIDE SERPL-SCNC: 108 MMOL/L (ref 100–108)
CO2 SERPL-SCNC: 24 MMOL/L (ref 21–32)
CREAT SERPL-MCNC: 0.93 MG/DL (ref 0.6–1.3)
EOSINOPHIL # BLD AUTO: 0.22 THOUSAND/ΜL (ref 0–0.61)
EOSINOPHIL NFR BLD AUTO: 3 % (ref 0–6)
ERYTHROCYTE [DISTWIDTH] IN BLOOD BY AUTOMATED COUNT: 14.7 % (ref 11.6–15.1)
GFR SERPL CREATININE-BSD FRML MDRD: >60 ML/MIN/1.73SQ M
GLUCOSE SERPL-MCNC: 132 MG/DL (ref 65–140)
GLUCOSE SERPL-MCNC: 149 MG/DL (ref 65–140)
HCT VFR BLD AUTO: 36.5 % (ref 36.5–49.3)
HGB BLD-MCNC: 11.7 G/DL (ref 12–17)
INR PPP: 1.91 (ref 0.86–1.16)
LYMPHOCYTES # BLD AUTO: 1.79 THOUSANDS/ΜL (ref 0.6–4.47)
LYMPHOCYTES NFR BLD AUTO: 27 % (ref 14–44)
MCH RBC QN AUTO: 29.9 PG (ref 26.8–34.3)
MCHC RBC AUTO-ENTMCNC: 32.1 G/DL (ref 31.4–37.4)
MCV RBC AUTO: 93 FL (ref 82–98)
MONOCYTES # BLD AUTO: 0.62 THOUSAND/ΜL (ref 0.17–1.22)
MONOCYTES NFR BLD AUTO: 9 % (ref 4–12)
NEUTROPHILS # BLD AUTO: 4.02 THOUSANDS/ΜL (ref 1.85–7.62)
NEUTS SEG NFR BLD AUTO: 61 % (ref 43–75)
PLATELET # BLD AUTO: 142 THOUSANDS/UL (ref 149–390)
PMV BLD AUTO: 13.2 FL (ref 8.9–12.7)
POTASSIUM SERPL-SCNC: 3.4 MMOL/L (ref 3.5–5.3)
PROTHROMBIN TIME: 21.4 SECONDS (ref 12–14.3)
RBC # BLD AUTO: 3.91 MILLION/UL (ref 3.88–5.62)
SODIUM SERPL-SCNC: 143 MMOL/L (ref 136–145)
WBC # BLD AUTO: 6.68 THOUSAND/UL (ref 4.31–10.16)

## 2017-04-05 PROCEDURE — 82948 REAGENT STRIP/BLOOD GLUCOSE: CPT

## 2017-04-05 PROCEDURE — 80048 BASIC METABOLIC PNL TOTAL CA: CPT | Performed by: FAMILY MEDICINE

## 2017-04-05 PROCEDURE — 85610 PROTHROMBIN TIME: CPT | Performed by: FAMILY MEDICINE

## 2017-04-05 PROCEDURE — 85025 COMPLETE CBC W/AUTO DIFF WBC: CPT | Performed by: FAMILY MEDICINE

## 2017-04-05 PROCEDURE — 94760 N-INVAS EAR/PLS OXIMETRY 1: CPT

## 2017-04-05 RX ORDER — WARFARIN SODIUM 7.5 MG/1
7.5 TABLET ORAL
Qty: 30 TABLET | Refills: 0 | Status: ON HOLD | OUTPATIENT
Start: 2017-04-05 | End: 2017-04-22

## 2017-04-05 RX ORDER — POTASSIUM CHLORIDE 20 MEQ/1
40 TABLET, EXTENDED RELEASE ORAL ONCE
Status: DISCONTINUED | OUTPATIENT
Start: 2017-04-05 | End: 2017-04-05 | Stop reason: HOSPADM

## 2017-04-05 RX ORDER — FAMOTIDINE 20 MG/1
20 TABLET, FILM COATED ORAL 2 TIMES DAILY
Qty: 60 TABLET | Refills: 0 | Status: SHIPPED | OUTPATIENT
Start: 2017-04-05 | End: 2017-04-05 | Stop reason: HOSPADM

## 2017-04-05 RX ORDER — PANTOPRAZOLE SODIUM 40 MG/1
40 TABLET, DELAYED RELEASE ORAL DAILY
Qty: 30 TABLET | Refills: 0 | Status: SHIPPED | OUTPATIENT
Start: 2017-04-05 | End: 2017-04-22 | Stop reason: HOSPADM

## 2017-04-05 RX ORDER — LEVOFLOXACIN 500 MG/1
500 TABLET, FILM COATED ORAL DAILY
Qty: 7 TABLET | Refills: 0 | Status: SHIPPED | OUTPATIENT
Start: 2017-04-05 | End: 2017-04-12

## 2017-04-05 RX ADMIN — ENOXAPARIN SODIUM 90 MG: 100 INJECTION SUBCUTANEOUS at 08:40

## 2017-04-05 RX ADMIN — LISINOPRIL 20 MG: 20 TABLET ORAL at 08:40

## 2017-04-05 RX ADMIN — FAMOTIDINE 20 MG: 20 TABLET ORAL at 08:40

## 2017-04-05 RX ADMIN — QUETIAPINE FUMARATE 400 MG: 200 TABLET ORAL at 08:40

## 2017-04-05 RX ADMIN — NICOTINE 1 PATCH: 21 PATCH, EXTENDED RELEASE TRANSDERMAL at 08:40

## 2017-04-05 RX ADMIN — PIPERACILLIN SODIUM,TAZOBACTAM SODIUM 3.38 G: 3; .375 INJECTION, POWDER, FOR SOLUTION INTRAVENOUS at 04:37

## 2017-04-07 LAB
BACTERIA BLD CULT: NORMAL
BACTERIA BLD CULT: NORMAL

## 2017-04-10 ENCOUNTER — HOSPITAL ENCOUNTER (OUTPATIENT)
Dept: NON INVASIVE DIAGNOSTICS | Facility: HOSPITAL | Age: 57
Discharge: HOME | End: 2017-04-10
Payer: COMMERCIAL

## 2017-04-10 ENCOUNTER — ALLSCRIPTS OFFICE VISIT (OUTPATIENT)
Dept: OTHER | Facility: OTHER | Age: 57
End: 2017-04-10

## 2017-04-10 DIAGNOSIS — I77.9 DISORDER OF ARTERY OR ARTERIOLE (HCC): ICD-10-CM

## 2017-04-10 DIAGNOSIS — R09.89 OTHER SPECIFIED SYMPTOMS AND SIGNS INVOLVING THE CIRCULATORY AND RESPIRATORY SYSTEMS: ICD-10-CM

## 2017-04-10 DIAGNOSIS — I25.10 ATHEROSCLEROTIC HEART DISEASE OF NATIVE CORONARY ARTERY WITHOUT ANGINA PECTORIS: ICD-10-CM

## 2017-04-10 PROCEDURE — 93923 UPR/LXTR ART STDY 3+ LVLS: CPT

## 2017-04-10 PROCEDURE — 93880 EXTRACRANIAL BILAT STUDY: CPT

## 2017-04-10 PROCEDURE — 93925 LOWER EXTREMITY STUDY: CPT

## 2017-04-17 ENCOUNTER — HOSPITAL ENCOUNTER (OUTPATIENT)
Dept: NON INVASIVE DIAGNOSTICS | Facility: CLINIC | Age: 57
Discharge: HOME | End: 2017-04-17
Payer: COMMERCIAL

## 2017-04-17 DIAGNOSIS — Z95.2 HEART VALVE REPLACED BY TRANSPLANT: ICD-10-CM

## 2017-04-17 DIAGNOSIS — I25.10 ASHD (ARTERIOSCLEROTIC HEART DISEASE): ICD-10-CM

## 2017-04-17 DIAGNOSIS — I50.9 HEART FAILURE, UNSPECIFIED (HCC): ICD-10-CM

## 2017-04-17 PROCEDURE — 93306 TTE W/DOPPLER COMPLETE: CPT

## 2017-04-18 ENCOUNTER — HOSPITAL ENCOUNTER (INPATIENT)
Facility: HOSPITAL | Age: 57
LOS: 4 days | Discharge: LEFT BEFORE DISCHARGE | DRG: 243 | End: 2017-04-22
Attending: EMERGENCY MEDICINE | Admitting: INTERNAL MEDICINE
Payer: COMMERCIAL

## 2017-04-18 ENCOUNTER — APPOINTMENT (INPATIENT)
Dept: RADIOLOGY | Facility: HOSPITAL | Age: 57
DRG: 243 | End: 2017-04-18
Payer: COMMERCIAL

## 2017-04-18 DIAGNOSIS — K92.2 UPPER GI BLEED: Primary | ICD-10-CM

## 2017-04-18 DIAGNOSIS — K52.9 COLITIS: ICD-10-CM

## 2017-04-18 DIAGNOSIS — I20.0 UNSTABLE ANGINA (HCC): ICD-10-CM

## 2017-04-18 PROBLEM — I50.22 CHRONIC SYSTOLIC CONGESTIVE HEART FAILURE (HCC): Chronic | Status: ACTIVE | Noted: 2017-04-18

## 2017-04-18 LAB
ABO GROUP BLD: NORMAL
ALBUMIN SERPL BCP-MCNC: 3.6 G/DL (ref 3.5–5)
ALP SERPL-CCNC: 160 U/L (ref 46–116)
ALT SERPL W P-5'-P-CCNC: 15 U/L (ref 12–78)
ANION GAP BLD CALC-SCNC: 21 MMOL/L (ref 4–13)
ANION GAP SERPL CALCULATED.3IONS-SCNC: 13 MMOL/L (ref 4–13)
APTT PPP: 27 SECONDS (ref 24–36)
AST SERPL W P-5'-P-CCNC: 48 U/L (ref 5–45)
BASOPHILS # BLD MANUAL: 0 THOUSAND/UL (ref 0–0.1)
BASOPHILS NFR MAR MANUAL: 0 % (ref 0–1)
BILIRUB SERPL-MCNC: 1 MG/DL (ref 0.2–1)
BLD GP AB SCN SERPL QL: POSITIVE
BUN BLD-MCNC: 22 MG/DL (ref 5–25)
BUN SERPL-MCNC: 22 MG/DL (ref 5–25)
CA-I BLD-SCNC: 1.04 MMOL/L (ref 1.12–1.32)
CALCIUM SERPL-MCNC: 9.6 MG/DL (ref 8.3–10.1)
CHLORIDE BLD-SCNC: 104 MMOL/L (ref 100–108)
CHLORIDE SERPL-SCNC: 99 MMOL/L (ref 100–108)
CO2 SERPL-SCNC: 18 MMOL/L (ref 21–32)
CREAT BLD-MCNC: 0.9 MG/DL (ref 0.6–1.3)
CREAT SERPL-MCNC: 1.1 MG/DL (ref 0.6–1.3)
EOSINOPHIL # BLD MANUAL: 0 THOUSAND/UL (ref 0–0.4)
EOSINOPHIL NFR BLD MANUAL: 0 % (ref 0–6)
ERYTHROCYTE [DISTWIDTH] IN BLOOD BY AUTOMATED COUNT: 14.3 % (ref 11.6–15.1)
GFR SERPL CREATININE-BSD FRML MDRD: >60 ML/MIN/1.73SQ M
GFR SERPL CREATININE-BSD FRML MDRD: >60 ML/MIN/1.73SQ M
GLUCOSE SERPL-MCNC: 270 MG/DL (ref 65–140)
GLUCOSE SERPL-MCNC: 273 MG/DL (ref 65–140)
GLUCOSE SERPL-MCNC: 282 MG/DL (ref 65–140)
HCT VFR BLD AUTO: 50.9 % (ref 36.5–49.3)
HCT VFR BLD CALC: 43 % (ref 36.5–49.3)
HGB BLD-MCNC: 13.1 G/DL (ref 12–17)
HGB BLD-MCNC: 16.8 G/DL (ref 12–17)
HGB BLDA-MCNC: 14.6 G/DL (ref 12–17)
INR PPP: 1.3 (ref 0.86–1.16)
LYMPHOCYTES # BLD AUTO: 1.17 THOUSAND/UL (ref 0.6–4.47)
LYMPHOCYTES # BLD AUTO: 5 % (ref 14–44)
MCH RBC QN AUTO: 30.2 PG (ref 26.8–34.3)
MCHC RBC AUTO-ENTMCNC: 33 G/DL (ref 31.4–37.4)
MCV RBC AUTO: 91 FL (ref 82–98)
MONOCYTES # BLD AUTO: 1.4 THOUSAND/UL (ref 0–1.22)
MONOCYTES NFR BLD: 6 % (ref 4–12)
NEUTROPHILS # BLD MANUAL: 20.82 THOUSAND/UL (ref 1.85–7.62)
NEUTS BAND NFR BLD MANUAL: 5 % (ref 0–8)
NEUTS SEG NFR BLD AUTO: 84 % (ref 43–75)
PCO2 BLD: 20 MMOL/L (ref 21–32)
PLATELET # BLD AUTO: 218 THOUSANDS/UL (ref 149–390)
PLATELET BLD QL SMEAR: ADEQUATE
PMV BLD AUTO: 12.1 FL (ref 8.9–12.7)
POTASSIUM BLD-SCNC: 4.9 MMOL/L (ref 3.5–5.3)
POTASSIUM SERPL-SCNC: 5.3 MMOL/L (ref 3.5–5.3)
PROT SERPL-MCNC: 8.9 G/DL (ref 6.4–8.2)
PROTHROMBIN TIME: 16 SECONDS (ref 12–14.3)
RBC # BLD AUTO: 5.57 MILLION/UL (ref 3.88–5.62)
RBC MORPH BLD: NORMAL
RH BLD: POSITIVE
SODIUM BLD-SCNC: 138 MMOL/L (ref 136–145)
SODIUM SERPL-SCNC: 130 MMOL/L (ref 136–145)
SPECIMEN EXPIRATION DATE: NORMAL
SPECIMEN SOURCE: ABNORMAL
TOTAL CELLS COUNTED SPEC: 100
WBC # BLD AUTO: 23.39 THOUSAND/UL (ref 4.31–10.16)

## 2017-04-18 PROCEDURE — 85730 THROMBOPLASTIN TIME PARTIAL: CPT | Performed by: EMERGENCY MEDICINE

## 2017-04-18 PROCEDURE — 96375 TX/PRO/DX INJ NEW DRUG ADDON: CPT

## 2017-04-18 PROCEDURE — 85014 HEMATOCRIT: CPT

## 2017-04-18 PROCEDURE — 36415 COLL VENOUS BLD VENIPUNCTURE: CPT | Performed by: EMERGENCY MEDICINE

## 2017-04-18 PROCEDURE — 99285 EMERGENCY DEPT VISIT HI MDM: CPT

## 2017-04-18 PROCEDURE — 96372 THER/PROPH/DIAG INJ SC/IM: CPT

## 2017-04-18 PROCEDURE — 86850 RBC ANTIBODY SCREEN: CPT | Performed by: EMERGENCY MEDICINE

## 2017-04-18 PROCEDURE — 85027 COMPLETE CBC AUTOMATED: CPT | Performed by: EMERGENCY MEDICINE

## 2017-04-18 PROCEDURE — C9113 INJ PANTOPRAZOLE SODIUM, VIA: HCPCS | Performed by: EMERGENCY MEDICINE

## 2017-04-18 PROCEDURE — 80047 BASIC METABLC PNL IONIZED CA: CPT

## 2017-04-18 PROCEDURE — 85007 BL SMEAR W/DIFF WBC COUNT: CPT | Performed by: EMERGENCY MEDICINE

## 2017-04-18 PROCEDURE — C9132 KCENTRA, PER I.U.: HCPCS | Performed by: EMERGENCY MEDICINE

## 2017-04-18 PROCEDURE — 93005 ELECTROCARDIOGRAM TRACING: CPT | Performed by: EMERGENCY MEDICINE

## 2017-04-18 PROCEDURE — 82948 REAGENT STRIP/BLOOD GLUCOSE: CPT

## 2017-04-18 PROCEDURE — 85018 HEMOGLOBIN: CPT | Performed by: NURSE PRACTITIONER

## 2017-04-18 PROCEDURE — 86921 COMPATIBILITY TEST INCUBATE: CPT

## 2017-04-18 PROCEDURE — 86900 BLOOD TYPING SEROLOGIC ABO: CPT | Performed by: EMERGENCY MEDICINE

## 2017-04-18 PROCEDURE — 71010 HB CHEST X-RAY 1 VIEW FRONTAL (PORTABLE): CPT

## 2017-04-18 PROCEDURE — 96365 THER/PROPH/DIAG IV INF INIT: CPT

## 2017-04-18 PROCEDURE — 86922 COMPATIBILITY TEST ANTIGLOB: CPT

## 2017-04-18 PROCEDURE — 85610 PROTHROMBIN TIME: CPT | Performed by: EMERGENCY MEDICINE

## 2017-04-18 PROCEDURE — 86870 RBC ANTIBODY IDENTIFICATION: CPT | Performed by: INTERNAL MEDICINE

## 2017-04-18 PROCEDURE — 80053 COMPREHEN METABOLIC PANEL: CPT | Performed by: EMERGENCY MEDICINE

## 2017-04-18 PROCEDURE — 86901 BLOOD TYPING SEROLOGIC RH(D): CPT | Performed by: EMERGENCY MEDICINE

## 2017-04-18 PROCEDURE — 96361 HYDRATE IV INFUSION ADD-ON: CPT

## 2017-04-18 RX ORDER — PANTOPRAZOLE SODIUM 40 MG/1
40 INJECTION, POWDER, FOR SOLUTION INTRAVENOUS ONCE
Status: COMPLETED | OUTPATIENT
Start: 2017-04-18 | End: 2017-04-18

## 2017-04-18 RX ORDER — LORAZEPAM 2 MG/ML
1 INJECTION INTRAMUSCULAR ONCE
Status: DISCONTINUED | OUTPATIENT
Start: 2017-04-18 | End: 2017-04-18

## 2017-04-18 RX ORDER — LISINOPRIL 20 MG/1
20 TABLET ORAL DAILY
Status: DISCONTINUED | OUTPATIENT
Start: 2017-04-19 | End: 2017-04-18

## 2017-04-18 RX ORDER — QUETIAPINE FUMARATE 300 MG/1
600 TABLET, FILM COATED ORAL
Status: DISCONTINUED | OUTPATIENT
Start: 2017-04-18 | End: 2017-04-22 | Stop reason: HOSPADM

## 2017-04-18 RX ORDER — POTASSIUM CHLORIDE 20 MEQ/1
20 TABLET, EXTENDED RELEASE ORAL DAILY
Status: DISCONTINUED | OUTPATIENT
Start: 2017-04-19 | End: 2017-04-18

## 2017-04-18 RX ORDER — QUETIAPINE FUMARATE 200 MG/1
400 TABLET, FILM COATED ORAL DAILY
Status: DISCONTINUED | OUTPATIENT
Start: 2017-04-19 | End: 2017-04-22 | Stop reason: HOSPADM

## 2017-04-18 RX ORDER — SODIUM CHLORIDE 9 MG/ML
100 INJECTION, SOLUTION INTRAVENOUS CONTINUOUS
Status: DISCONTINUED | OUTPATIENT
Start: 2017-04-18 | End: 2017-04-20

## 2017-04-18 RX ORDER — ONDANSETRON 2 MG/ML
4 INJECTION INTRAMUSCULAR; INTRAVENOUS ONCE
Status: COMPLETED | OUTPATIENT
Start: 2017-04-18 | End: 2017-04-18

## 2017-04-18 RX ORDER — LORAZEPAM 2 MG/ML
0.5 INJECTION INTRAMUSCULAR EVERY 4 HOURS PRN
Status: DISCONTINUED | OUTPATIENT
Start: 2017-04-18 | End: 2017-04-22

## 2017-04-18 RX ORDER — LORAZEPAM 1 MG/1
1 TABLET ORAL 2 TIMES DAILY PRN
Status: DISCONTINUED | OUTPATIENT
Start: 2017-04-18 | End: 2017-04-18

## 2017-04-18 RX ORDER — NICOTINE 21 MG/24HR
1 PATCH, TRANSDERMAL 24 HOURS TRANSDERMAL DAILY
Status: DISCONTINUED | OUTPATIENT
Start: 2017-04-18 | End: 2017-04-22 | Stop reason: HOSPADM

## 2017-04-18 RX ORDER — LORAZEPAM 2 MG/ML
1 INJECTION INTRAMUSCULAR ONCE
Status: COMPLETED | OUTPATIENT
Start: 2017-04-18 | End: 2017-04-18

## 2017-04-18 RX ORDER — TORSEMIDE 20 MG/1
40 TABLET ORAL DAILY
Status: DISCONTINUED | OUTPATIENT
Start: 2017-04-19 | End: 2017-04-18

## 2017-04-18 RX ORDER — ONDANSETRON 2 MG/ML
4 INJECTION INTRAMUSCULAR; INTRAVENOUS EVERY 6 HOURS PRN
Status: DISCONTINUED | OUTPATIENT
Start: 2017-04-18 | End: 2017-04-22 | Stop reason: HOSPADM

## 2017-04-18 RX ADMIN — ONDANSETRON 4 MG: 2 INJECTION INTRAMUSCULAR; INTRAVENOUS at 19:08

## 2017-04-18 RX ADMIN — Medication 1 MG: at 19:25

## 2017-04-18 RX ADMIN — PROTHROMBIN, COAGULATION FACTOR VII HUMAN, COAGULATION FACTOR IX HUMAN, COAGULATION FACTOR X HUMAN, PROTEIN C, PROTEIN S HUMAN, AND WATER 3850 UNITS: KIT at 17:44

## 2017-04-18 RX ADMIN — PHYTONADIONE 10 MG: 10 INJECTION, EMULSION INTRAMUSCULAR; INTRAVENOUS; SUBCUTANEOUS at 16:58

## 2017-04-18 RX ADMIN — LORAZEPAM 1 MG: 2 INJECTION, SOLUTION INTRAMUSCULAR; INTRAVENOUS at 15:47

## 2017-04-18 RX ADMIN — SODIUM CHLORIDE 1000 ML: 0.9 INJECTION, SOLUTION INTRAVENOUS at 16:35

## 2017-04-18 RX ADMIN — SODIUM CHLORIDE 100 ML/HR: 0.9 INJECTION, SOLUTION INTRAVENOUS at 22:00

## 2017-04-18 RX ADMIN — ONDANSETRON 4 MG: 2 INJECTION INTRAMUSCULAR; INTRAVENOUS at 15:47

## 2017-04-18 RX ADMIN — SODIUM CHLORIDE 8 MG/HR: 9 INJECTION, SOLUTION INTRAVENOUS at 18:42

## 2017-04-18 RX ADMIN — LORAZEPAM 1 MG: 2 INJECTION, SOLUTION INTRAMUSCULAR; INTRAVENOUS at 19:09

## 2017-04-18 RX ADMIN — HYDROMORPHONE HYDROCHLORIDE 1 MG: 1 INJECTION, SOLUTION INTRAMUSCULAR; INTRAVENOUS; SUBCUTANEOUS at 19:25

## 2017-04-18 RX ADMIN — PANTOPRAZOLE SODIUM 40 MG: 40 INJECTION, POWDER, FOR SOLUTION INTRAVENOUS at 16:58

## 2017-04-18 RX ADMIN — SODIUM CHLORIDE 1000 ML: 0.9 INJECTION, SOLUTION INTRAVENOUS at 18:49

## 2017-04-18 NOTE — IP AVS SNAPSHOT
[address] [facility]  [place]  Philomena Valadez [facility] [address]  [phone]                    After Visit Summary for:             Mishel Fatima   61 yrs Male (1960)    MRN:  [id number]           About your hospitalization     You were admitted on:  April 18, 2017 You last received care in the:  [address] [place] [address] Intensive Care Unit    You were discharged on:  April 22, 2017 Unit phone number:  [phone]         Medications     It is important that you maintain an up-to-date list of medications (prescribed and over-the-counter, as well as vitamins and mineral supplements) that you are taking  Bring your list of current medications whenever you seek treatment at a hospital or other healthcare setting  If you have any questions or concerns, contact your primary care physician's office             Your Medications      STOP taking these medications     ondansetron 4 mg disintegrating tablet   Commonly known as:  ZOFRAN-ODT           pantoprazole 40 mg tablet   Commonly known as:  PROTONIX             TAKE these medications     insulin glargine 100 units/mL subcutaneous injection   Inject 10 Units under the skin daily at bedtime   Last time this was given:  10 Units on 4/21/2017  9:28 PM   Refills:  0   Commonly known as:  LANTUS           lisinopril 20 mg tablet   Take 20 mg by mouth daily   Refills:  0   Commonly known as:  ZESTRIL           LORazepam 1 mg tablet   Take 1 mg by mouth 2 (two) times a day as needed for anxiety   Last time this was given:  0 5 mg on 4/22/2017  9:22 AM   Refills:  0   Commonly known as:  ATIVAN           metoprolol tartrate 25 mg tablet   Take 25 mg by mouth daily   Last time this was given:  25 mg on 4/22/2017  8:30 AM   Refills:  0   Commonly known as:  LOPRESSOR           omeprazole 40 MG capsule   Take 1 capsule by mouth daily   Refills:  1   Commonly known as:  PriLOSEC           potassium chloride 20 mEq tablet   Take 20 mEq by mouth daily   Refills:  0   Commonly known as:  TANISHA-DUR,KLOR-CON           QUEtiapine 200 mg tablet   Take 400 mg by mouth daily   Last time this was given:  400 mg on 4/22/2017  8:30 AM   Refills:  0   Commonly known as:  SEROquel           QUEtiapine 300 mg tablet   Take 600 mg by mouth daily at bedtime   Last time this was given:  400 mg on 4/22/2017  8:30 AM   Refills:  0   Commonly known as:  SEROquel           torsemide 20 mg tablet   Take 40 mg by mouth daily   Last time this was given:  20 mg on 4/22/2017  8:30 AM   Refills:  0   Commonly known as:  DEMADEX           warfarin 5 mg tablet   Take 5 mg by mouth daily   Last time this was given:  7 5 mg on 4/21/2017  5:29 PM   Refills:  0   Commonly known as:  COUMADIN                Where to Get Your Medications      You can get these medications from any pharmacy     Bring a paper prescription for each of these medications     omeprazole 40 MG capsule                  Your Medications      Your Medication List           Morning Noon Evening Bedtime As Needed    insulin glargine 100 units/mL subcutaneous injection   Inject 10 Units under the skin daily at bedtime   Last time this was given:  10 Units on 4/21/2017  9:28 PM   Commonly known as:  LANTUS                                lisinopril 20 mg tablet   Take 20 mg by mouth daily   Commonly known as:  ZESTRIL                                LORazepam 1 mg tablet   Take 1 mg by mouth 2 (two) times a day as needed for anxiety   Last time this was given:  0 5 mg on 4/22/2017  9:22 AM   Commonly known as:  ATIVAN                                metoprolol tartrate 25 mg tablet   Take 25 mg by mouth daily   Last time this was given:  25 mg on 4/22/2017  8:30 AM   Commonly known as:  LOPRESSOR                                omeprazole 40 MG capsule   Take 1 capsule by mouth daily   Commonly known as:  PriLOSEC                                potassium chloride 20 mEq tablet   Take 20 mEq by mouth daily   Commonly known as:  K-ELIECER,KLOR-CON                                * QUEtiapine 200 mg tablet   Take 400 mg by mouth daily   Last time this was given:  400 mg on 4/22/2017  8:30 AM   Commonly known as:  SEROquel                                * QUEtiapine 300 mg tablet   Take 600 mg by mouth daily at bedtime   Last time this was given:  400 mg on 4/22/2017  8:30 AM   Commonly known as:  SEROquel                                torsemide 20 mg tablet   Take 40 mg by mouth daily   Last time this was given:  20 mg on 4/22/2017  8:30 AM   Commonly known as:  DEMADEX                                warfarin 5 mg tablet   Take 5 mg by mouth daily   Last time this was given:  7 5 mg on 4/21/2017  5:29 PM   Commonly known as:  COUMADIN                                * Notice: This list has 2 medication(s) that are the same as other medications prescribed for you  Read the directions carefully, and ask your doctor or other care provider to review them with you  Follow-ups for After Discharge        Follow-up Information     Follow up with Stefany Alvarez [address] [place] [address]/Hospice   Contact information:    [address] [place] [address]  [phone]          Follow up with Nichelle Santiago DO Follow up in 1 week(s)  Specialty:  Family Medicine    Contact information:    [facility] [address]  [phone]          Follow up with Humberto Floyd DO       Specialty:  Gastroenterology    Why:  1-2 weeks    Contact information:    [place]  [address] [place] [address] Drive [id number] [phone]        Pending Labs     Order Current Status    Tissue Exam In process      Your Allergies  Date Reviewed: 4/20/2017    Allergen Reactions    Aspirin Other (See Comments)         Morphine Not Noted         Omeprazole Diarrhea      POLST/Adv Directive          Most Recent Value    Healthcare Agent Name  [address] wife          Instructions for After Discharge           Summary of Your Hospitalization        Reason for Hospitalization     Your primary diagnosis was:  Erosive Esophagitis    Your diagnoses also included:  Colitis, Bipolar 1 Disorder, Hypertension, Diabetes Mellitus, Cad (Coronary Artery Disease), Pacemaker, Chronic Systolic Congestive Heart Failure      Chief Complaint     Vomiting Blood      Attending/Consulting Providers     Provider Service Role Specialty Primary office phone    Tin Vargas MD Gastroenterology Consulting Physician  Gastroenterology [phone]    Silvia Aaron MD Surgery-General Consulting Physician  General Surgery [phone]    Malik Kwong MD Cardiology Consulting Physician  Cardiology [phone]    Tin Vargas MD Gastroenterology Surgeon  Gastroenterology [phone]      Surgery Information     ID Date/Time Status All Surgeons All Procedures Location          [id number] 4/20/2017 1400 Posted Tin Vargas MD ESOPHAGOGASTRODUODENOSCOPY (EGD) [facility] MAIN OR        Last Resulted Labs     Date/Time Component Value Reference Range Units Lab Status    04/21/17 0508 WBC 7 90 4 31 - 10 16 Thousand/uL Thousand/uL Final result    04/22/17 0610 HGB 12 8 12 0 - 17 0 g/dL g/dL Final result    04/22/17 0610 HCT 37 7 36 5 - 49 3 % % Final result    04/21/17 0508  149 - 390 Thousands/uL Thousands/uL Final result    04/18/17 1814 BANDSPCT 5 0 - 8 % % Final result    04/20/17 0540  136 - 145 mmol/L mmol/L Final result    04/20/17 0540 K 3 4 3 5 - 5 3 mmol/L mmol/L Final result    04/20/17 0540  100 - 108 mmol/L mmol/L Final result    04/20/17 0540 CO2 26 21 - 32 mmol/L mmol/L Final result    04/20/17 0540 BUN 13 5 - 25 mg/dL mg/dL Final result    04/20/17 0540 CREATININE 1 01 0 60 - 1 30 mg/dL mg/dL Final result    04/20/17 0540 GLUCOSE 153 65 - 140 mg/dL mg/dL Final result    04/18/17 1717 ALKPHOS 160 46 - 116 U/L U/L Final result    04/18/17 1717 ALT 15 12 - 78 U/L U/L Final result    04/18/17 1717 AST 48 5 - 45 U/L U/L Final result 04/18/17 1717 ALBUMIN 3 6 3 5 - 5 0 g/dL g/dL Final result    04/18/17 1717 BILITOT 1 00 0 20 - 1 00 mg/dL mg/dL Final result    04/02/17 2046 LIPASE 85 73 - 393 u/L u/L Final result    04/03/17 0236 HGBA1C 8 8 4 2 - 6 3 % % Final result    04/19/17 1738 TROPONINI 0 03 <0 04 ng/mL ng/mL Final result    04/22/17 0610 INR 1 12 0 86 - 1 16 - Final result      [facility]     To access this document and other health information online, please visit www  Slice to login or create a patient portal account  For questions about any pending test results, you may contact the ordering physician or your primary care provider  Discharge Weight          Most Recent Value    Weight  82 kg (180 lb 12 4 oz) filed at 04/21/2017 0963      Information on Heart Failure     You have been diagnosed with - or have a history of - heart failure during your hospitalization  Review to the following to reduce your risk of heart failure:  Discharge Medications  Taking heart failure medications as prescribed is one of the most vital aspects of managing heart failure  It is important to know the names of your medications, how they work, how much to take, and when to take them  You should take your medications at the same time every day  Do not stop your prescribed medications or begin taking over-the-counter or herbal medications without first speaking with your physician  Activity Level  Exercise will help control your weight, blood pressure, and stress  Controlling these things will help keep your heart healthy  Try to do activities that raise your heart rate  Aim for at least 2½ hours of moderate exercise a week  One way to do this is to be active at least 10 minutes 3 times a day, 5 days a week  Talk to your doctor before starting an exercise program   Diet  Lower your cholesterol  If you have high cholesterol, follow your doctor's advice for lowering it   Eating a heart-healthy diet-such as the TLC diet -exercising, and quitting smoking will help keep your cholesterol low  Weight Monitoring  Through proper diet and moderate exercise, patients should maintain a weight determined by their physician  For heart failure patients, daily weight monitoring is crucial  Your record should be shared with your health care provider at your appointments  Call your doctor if you experience a weight gain of 2-3 pounds or more per day over a 2-day period or 5 pounds in 1 week  Symptoms  Common heart failure symptoms include:  Chest pain  Fatigue and weakness  Rapid or irregular heartbeat  Shortness of breath when you exert yourself or when you lie down  Reduced ability to exercise  Persistent cough or wheezing with white or pink blood-tinged phlegm  Swelling in your abdomen, legs, ankles and feet  Difficulty concentrating or decreased alertness  If any of these symptoms suddenly become worse or you develop a new sign or symptom, it may mean that existing heart failure is getting worse or not responding to treatment  Contact your doctor promptly  Information on Diabetes     You have been diagnosed with - or have a history of  diabetes during your hospitalization  Review the following to help you manage your diabetes  Discharge Medications:  Taking your medications as prescribed is one of the most important aspects of maintaining your blood sugar in the target range established by your physician  It is important to know the names of your medication, how they work, how much to take, and when to take them  Do not stop your prescribed medications or begin taking over-the counter or herbal medications without first speaking with your physician  Hypoglycemia (Low Blood Sugar)  Too little glucose (sugar) in your blood is called hypoglycemia or low blood sugar  Diabetes itself does not cause low blood sugar  But some of the treatments for diabetes, such as pills or insulin, may increase your risk for it    In severe cases, low blood sugar may cause you to lose consciousness or have a seizure  · Low blood sugar is typically defined as a level below 70 mg/dL, (below 60 mg/dL if pregnant)   Signs of low blood sugar (you may have one or more of these symptoms): shakiness or dizziness; cold/clammy skin or sweating; hunger;  headache; nervousness; hard/heavy heartbeat; weakness; confusion/irritability; blurred vision   Always treat low blood sugar right away, but do not overeat   What you should do if blood sugar too low:    First, check your blood sugar  If it is not possible to check your blood sugar, treat for low blood sugar anyway  ? If blood sugar is below 70 mg/dL (60 mg/dL if pregnant) OR  If unable to check blood sugar level and you have signs of low blood sugar,  eat or drink 15 to 20 grams of fast-acting sugar  This may be 3-4 glucose tablets or 4 oz  (half a cup) fruit juice or 4 oz  (half a cup) regular (non-diet) soda or 8 oz  (one cup) fat free milk, or 1 tablespoon of honey  Do not take more than this, or your blood sugar may go too high  ? Wait 15 minutes  Then recheck your blood sugar if you can   ? If your blood sugar is still too low, repeat the steps above and check your blood sugar again  If your blood sugar still has not returned to your target range, contact your health care provider or seek emergency care  ? Once your blood sugar returns to target range, eat a snack or meal    Preventing low blood sugar  ? Follow diabetic diet  Do not skip meals or snacks  ? Take your medications at the prescribed times  ? Always carry a source of fast-acting sugar and a snack when you are away from home  ? Seek further medical help if you have repeated  low or high blood sugars    Keep/call your physician for follow-up appointments for the diabetes  Diabetes Education Classes:    You are encouraged to learn how to best manage your diabetes by attending classes at one of the following locations:  [facility] ([phone])  [facility] ([phone])  [address] [facility] ([phone])          Warfarin Education     Warfarin (By mouth)   Warfarin (WAR-far-in)  Prevents and treats blood clots  May lower the risk of serious complications after a heart attack  This medicine is a blood thinner  Brand Name(s): Coumadin, Jantoven   There may be other brand names for this medicine  When This Medicine Should Not Be Used: This medicine is not right for everyone  Do not use it if you had an allergic reaction to warfarin, if you are pregnant, or if you have health problems that could cause bleeding  How to Use This Medicine:   Tablet  · Take your medicine as directed  Your dose may need to be changed several times to find what works best for you  · This medicine should come with a Medication Guide  Ask your pharmacist for a copy if you do not have one  · Missed dose: Take a dose as soon as you remember  If it is almost time for your next dose, wait until then and take a regular dose  Do not take extra medicine to make up for a missed dose  · Store the medicine in a closed container at room temperature, away from heat, moisture, and direct light  Drugs and Foods to Avoid:   Ask your doctor or pharmacist before using any other medicine, including over-the-counter medicines, vitamins, and herbal products  · Many medicines and foods can affect how warfarin works and may affect the PT/INR test results   Tell your doctor before you start or stop any medicine, especially the following:   ¨ Ginkgo, echinacea, goldenseal, or Damaris's wort  ¨ Another blood thinner, including apixaban, argatroban, bivalirudin, cilostazol, clopidogrel, dabigatran, desirudin, dipyridamole, heparin, lepirudin, prasugrel, rivaroxaban, ticlopidine  ¨ Medicine to treat depression or anxiety, including citalopram, desvenlafaxine, duloxetine, escitalopram, fluoxetine, fluvoxamine, milnacipran, paroxetine, sertraline, venlafaxine, vilazodone  ¨ Medicine to treat an infection  ¨ NSAID pain or arthritis medicine, including aspirin, celecoxib, diclofenac, diflunisal, fenoprofen, ibuprofen, indomethacin, ketoprofen, ketorolac, mefenamic acid, naproxen, oxaprozin, piroxicam, sulindac  Check labels for over-the-counter medicines to find out if they contain an NSAID  ¨ Steroid medicine, including dexamethasone, hydrocortisone, methylprednisolone, prednisolone, prednisone  · Warfarin works best if you eat about the same amount of vitamin K every day  Foods high in vitamin K include asparagus, broccoli, brussels sprouts, cabbage, green leafy vegetables, plums, rhubarb, and canola oil  Talk to your doctor before you make changes to your normal diet  · Do not eat grapefruit or drink grapefruit juice while you are using this medicine  Warnings While Using This Medicine:   · It is not safe to take this medicine during pregnancy  It could harm an unborn baby  Tell your doctor right away if you become pregnant  Use an effective form of birth control to keep from getting pregnant during treatment and for at least 1 month after your last dose  · Tell your doctor if you are breastfeeding, or if you have kidney disease, liver disease, diabetes, heart disease, heart failure, high blood pressure, an infection, a stomach ulcer, or protein C deficiency  Also tell your doctor if you had recent surgery or an injury, or a history of stroke, anemia, severe bleeding or bruising, or problems caused by heparin use  · This medicine may cause the following problems:   ¨ Bleeding, which may be life-threatening  ¨ Gangrene (skin or tissue damage)  ¨ Calciphylaxis or calcium uremic arteriolopathy  ¨ Purple toes syndrome  · You must have a PT/INR blood test while you use this medicine to check how well your blood is clotting   Your doctor will use the test results to make sure the medicine is working properly  Keep all appointments for the PT/INR blood tests  · You may bleed or bruise more easily with warfarin  To prevent injury or cuts, do not play rough sports, be careful with sharp objects, and brush and floss your teeth gently  Khoa Snellen your nose gently, and do not pick your nose  · Carry an ID card or wear a medical alert bracelet to let emergency caregivers know that you use warfarin  · Tell any doctor or dentist who treats you that you are using this medicine  You may need to stop using this medicine several days before you have surgery or medical tests  · Keep all medicine out of the reach of children  Never share your medicine with anyone  Possible Side Effects While Using This Medicine:   Call your doctor right away if you notice any of these side effects:  · Allergic reaction: Itching or hives, swelling in your face or hands, swelling or tingling in your mouth or throat, chest tightness, trouble breathing  · Bleeding from your gums or nose, coughing up blood, heavy monthly periods  · Bleeding that does not stop, bruising, or weakness  · Dizziness, fainting, lightheadedness  · Pain, brown or black skin, or skin that is cool to the touch  · Purple toes or feet, or new pain in your leg, foot, or toes  · Purplish red, net-like, blotchy spots on the skin  · Red or dark brown urine, or red or black, tarry stools  · Vomiting blood or material that looks like coffee grounds  If you notice other side effects that you think are caused by this medicine, tell your doctor  Call your doctor for medical advice about side effects  You may report side effects to FDA at 8-168-FDA-5240  © 2017 [address] is for End User's use only and may not be sold, redistributed or otherwise used for commercial purposes  The above information is an  only  It is not intended as medical advice for individual conditions or treatments   Talk to your doctor, nurse or pharmacist before following any medical regimen to see if it is safe and effective for you  Educational Information     Venous Thromboembolism (VTE) / Deep Vein Thrombosis (DVT) Prevention   VTE/DVT is a disease caused by blood clots that form in veins  Blood clots can be serious and common in patients with risk factors  VTE/DVT may occur after discharge  To decrease risks, take anticoagulation medicine if ordered by your doctor  Report any calf pain, swelling or tenderness and/or shortness of breath to your doctor immediately  Smoking Cessation   If you smoke, use tobacco or nicotine, and/or are exposed to second hand smoke, you are encouraged to stop to improve your health  If you need help quitting, please talk to your health care provider or call:  · [place] [address] ([phone])  · [facility] ([phone])   · [address] [place] [address] ([phone])      If your symptoms return or if your condition unexpectedly worsens from the time of discharge, notify your doctor or go to the nearest emergency room  If you think you are experiencing a medical emergency, call [phone]  Readmission Risk Score     Manoj Mccray is committed to ensuring a safe discharge plan that will allow you to continue your recovery at home  Your risk for readmission has been determined to be HIGH      To prevent readmission, please be sure to:   See your health care provider within 1 week of discharge   Follow your discharge instructions    Take your medication as prescribed   Call your health care provider with any questions or concerns

## 2017-04-18 NOTE — ED PROVIDER NOTES
History  Chief Complaint   Patient presents with    Vomiting Blood     High INR, started vomiting dark coffee ground emesis while at daughters house today  HPI Comments: This is a 68-year-old male presenting with abdominal pain nausea and vomiting that started earlier today he is on Coumadin for a mechanical valve and he has coffee ground and bright red emesis he is shaky and diaphoretic he is refusing to have IV started secondary to prior poor veins  Denies alcohol use  Denies prior episodes of bleeding  Patient is a 61 y o  male presenting with vomiting  History provided by:  Patient  Vomiting   Severity:  Severe  Timing:  Constant  Quality:  Coffee grounds and bright red blood  Chronicity:  New  Relieved by:  Nothing  Associated symptoms: abdominal pain        Prior to Admission Medications   Prescriptions Last Dose Informant Patient Reported? Taking?    LORazepam (ATIVAN) 1 mg tablet   Yes Yes   Sig: Take 1 mg by mouth 2 (two) times a day as needed for anxiety   QUEtiapine (SEROquel) 200 mg tablet   Yes Yes   Sig: Take 400 mg by mouth daily   QUEtiapine (SEROquel) 300 mg tablet   Yes Yes   Sig: Take 600 mg by mouth daily at bedtime   insulin glargine (LANTUS) 100 units/mL subcutaneous injection   Yes Yes   Sig: Inject 10 Units under the skin daily at bedtime   lisinopril (ZESTRIL) 20 mg tablet   Yes Yes   Sig: Take 20 mg by mouth daily   metoprolol tartrate (LOPRESSOR) 25 mg tablet   Yes Yes   Sig: Take 25 mg by mouth daily   ondansetron (ZOFRAN-ODT) 4 mg disintegrating tablet   Yes Yes   Sig: Take 4 mg by mouth every 8 (eight) hours as needed for nausea or vomiting   pantoprazole (PROTONIX) 40 mg tablet   No Yes   Sig: Take 1 tablet by mouth daily for 30 days   potassium chloride (K-DUR,KLOR-CON) 20 mEq tablet   Yes Yes   Sig: Take 20 mEq by mouth daily   torsemide (DEMADEX) 20 mg tablet   Yes Yes   Sig: Take 40 mg by mouth daily   warfarin (COUMADIN) 7 5 mg tablet   No Yes   Sig: Take 1 tablet by mouth daily for 30 days      Facility-Administered Medications: None       Past Medical History:   Diagnosis Date    Anxiety     Bipolar 1 disorder     Colitis 4/2/2017    Diabetes mellitus     Hiatal hernia     Hypertension     Pacemaker        Past Surgical History:   Procedure Laterality Date    MITRAL VALVE REPAIR         History reviewed  No pertinent family history  I have reviewed and agree with the history as documented  Social History   Substance Use Topics    Smoking status: Current Some Day Smoker     Packs/day: 0 50     Types: Cigarettes    Smokeless tobacco: None    Alcohol use No        Review of Systems   Gastrointestinal: Positive for abdominal pain and vomiting  All other systems reviewed and are negative  Physical Exam    ED Triage Vitals   Temperature Pulse Respirations Blood Pressure SpO2   04/18/17 1516 04/18/17 1522 04/18/17 1516 04/18/17 1516 04/18/17 1522   96 °F (35 6 °C) 100 24 141/99 100 %      Temp Source Heart Rate Source Patient Position BP Location FiO2 (%)   04/18/17 1516 04/18/17 1516 04/18/17 1516 04/18/17 1516 --   Tympanic Monitor Lying Left arm       Pain Score       04/18/17 1516       8           Physical Exam   Constitutional: He is oriented to person, place, and time  He appears well-developed  He appears distressed  HENT:   Head: Normocephalic and atraumatic  Eyes: EOM are normal  Pupils are equal, round, and reactive to light  Neck: Normal range of motion  Neck supple  No tracheal deviation present  Cardiovascular: Normal rate and intact distal pulses  Murmur (mechanical) heard  Pulmonary/Chest: Effort normal and breath sounds normal  No respiratory distress  Abdominal: He exhibits no distension  There is tenderness  Musculoskeletal: Normal range of motion  He exhibits no edema, tenderness or deformity  Neurological: He is alert and oriented to person, place, and time  No cranial nerve deficit  Skin: No rash noted   He is diaphoretic  Diaphoretic   Psychiatric:   Anxious vomiting   Nursing note and vitals reviewed  ED Medications  Medications   sodium chloride 0 9 % bolus 1,000 mL (1,000 mL Intravenous New Bag 4/18/17 1849)   pantoprazole (PROTONIX) 80 mg in sodium chloride 0 9 % 100 mL infusion (8 mg/hr Intravenous New Bag 4/18/17 1842)   ondansetron (ZOFRAN) injection 4 mg (4 mg Intramuscular Given 4/18/17 1547)   sodium chloride 0 9 % bolus 1,000 mL (0 mL Intravenous Stopped 4/18/17 1848)   LORazepam (ATIVAN) 2 mg/mL injection 1 mg (1 mg Intramuscular Given 4/18/17 1547)   phytonadione (AQUA-MEPHYTON) 10 mg/mL 10 mg in sodium chloride 0 9 % 50 mL IVPB (0 mg Intravenous Stopped 4/18/17 1730)   prothrombin complex conc human (KCENTRA) IV 3,850 Units (3,850 Units Intravenous Given 4/18/17 1744)   pantoprazole (PROTONIX) injection 40 mg (40 mg Intravenous Given 4/18/17 1658)       Diagnostic Studies  Labs Reviewed   CBC AND DIFFERENTIAL - Abnormal        Result Value Ref Range Status    WBC 23 39 (*) 4 31 - 10 16 Thousand/uL Final    Hematocrit 50 9 (*) 36 5 - 49 3 % Final    RBC 5 57  3 88 - 5 62 Million/uL Final    Hemoglobin 16 8  12 0 - 17 0 g/dL Final    MCV 91  82 - 98 fL Final    MCH 30 2  26 8 - 34 3 pg Final    MCHC 33 0  31 4 - 37 4 g/dL Final    RDW 14 3  11 6 - 15 1 % Final    MPV 12 1  8 9 - 12 7 fL Final    Platelets 982  365 - 390 Thousands/uL Final    Narrative: This is an appended report  These results have been appended to a previously verified report  COMPREHENSIVE METABOLIC PANEL - Abnormal     Sodium 130 (*) 136 - 145 mmol/L Final    Chloride 99 (*) 100 - 108 mmol/L Final    CO2 18 (*) 21 - 32 mmol/L Final    Glucose 282 (*) 65 - 140 mg/dL Final    Comment: If the patient is fasting, the ADA then defines impaired fasting glucose as > 100 mg/dL and diabetes as > or equal to 123 mg/dL  AST 48 (*) 5 - 45 U/L Final    Comment: 18Slightly Hemolyzed;  Results May be Affected    Alkaline Phosphatase 160 (*) 46 - 116 U/L Final    Total Protein 8 9 (*) 6 4 - 8 2 g/dL Final    Potassium 5 3  3 5 - 5 3 mmol/L Final    Comment: 18Slightly Hemolyzed; Results May be Affected    Anion Gap 13  4 - 13 mmol/L Final    BUN 22  5 - 25 mg/dL Final    Creatinine 1 10  0 60 - 1 30 mg/dL Final    Comment: Standardized to IDMS reference method    Calcium 9 6  8 3 - 10 1 mg/dL Final    ALT 15  12 - 78 U/L Final    Albumin 3 6  3 5 - 5 0 g/dL Final    Total Bilirubin 1 00  0 20 - 1 00 mg/dL Final    eGFR >60 0  ml/min/1 73sq m Final    Narrative:     National Kidney Disease Education Program recommendations are as follows:  GFR calculation is accurate only with a steady state creatinine  Chronic Kidney disease less than 60 ml/min/1 73 sq  meters  Kidney failure less than 15 ml/min/1 73 sq  meters     MANUAL DIFFERENTIAL(PHLEBS DO NOT ORDER) - Abnormal     Segmented % 84 (*) 43 - 75 % Final    Lymphocytes % 5 (*) 14 - 44 % Final    Absolute Neutrophils 20 82 (*) 1 85 - 7 62 Thousand/uL Final    Monocytes Absolute 1 40 (*) 0 00 - 1 22 Thousand/uL Final    Bands % 5  0 - 8 % Final    Monocytes % 6  4 - 12 % Final    Eosinophils % 0  0 - 6 % Final    Basophils % 0  0 - 1 % Final    Lymphocytes Absolute 1 17  0 60 - 4 47 Thousand/uL Final    Eosinophils Absolute 0 00  0 00 - 0 40 Thousand/uL Final    Basophils Absolute 0 00  0 00 - 0 10 Thousand/uL Final    Total Counted 100   Final    RBC Morphology Normal   Final    Platelet Estimate Adequate  Adequate Final   PROTIME-INR   APTT   TYPE AND SCREEN       XR chest 1 view portable    (Results Pending)       Procedures  ECG 12 Lead Documentation  Date/Time: 4/18/2017 3:54 PM  Performed by: Natan Reyes  Authorized by: Kisha BOLAÑOS     ECG reviewed by me, the ED Provider: yes    Patient location:  ED  Rhythm:     Rhythm: sinus rhythm    ST segments:     ST segments:  Non-specific          Phone Contacts  ED Phone Contact    ED Course  ED Course                             MDM  Number of Diagnoses or Management Options  Diagnosis management comments: Upper GI bleed resuscitation in progress currently anticoagulant we'll give her versus just       Amount and/or Complexity of Data Reviewed  Clinical lab tests: ordered    Patient Progress  Patient progress: (I did notify GI Dr Figueroa Rooney of patient at [phone], resuscitation is in progress)    CritCare Time    Disposition  Final diagnoses:   Upper GI bleed     ED Disposition     ED Disposition Condition Comment    Admit  Case was discussed with *Dr Arlette Gonsales** and the patient's admission status was agreed to be Admission Status: inpatient status to the service of Dr Arlette Gonsales   Follow-up Information     None        Patient's Medications   Discharge Prescriptions    No medications on file     No discharge procedures on file      ED Provider  Electronically Signed by       Viviana Pastrana DO  04/18/17 8446

## 2017-04-19 ENCOUNTER — APPOINTMENT (INPATIENT)
Dept: CT IMAGING | Facility: HOSPITAL | Age: 57
DRG: 243 | End: 2017-04-19
Payer: COMMERCIAL

## 2017-04-19 LAB
ANION GAP SERPL CALCULATED.3IONS-SCNC: 9 MMOL/L (ref 4–13)
APTT PPP: 61 SECONDS (ref 24–36)
ATRIAL RATE: 75 BPM
ATRIAL RATE: 97 BPM
BACTERIA UR QL AUTO: ABNORMAL /HPF
BILIRUB UR QL STRIP: NEGATIVE
BLOOD GROUP ANTIBODIES SERPL: NORMAL
BLOOD GROUP ANTIBODIES SERPL: NORMAL
BUN SERPL-MCNC: 18 MG/DL (ref 5–25)
CALCIUM SERPL-MCNC: 8.1 MG/DL (ref 8.3–10.1)
CHLORIDE SERPL-SCNC: 105 MMOL/L (ref 100–108)
CLARITY UR: CLEAR
CO2 SERPL-SCNC: 26 MMOL/L (ref 21–32)
COLOR UR: YELLOW
CREAT SERPL-MCNC: 1.05 MG/DL (ref 0.6–1.3)
ERYTHROCYTE [DISTWIDTH] IN BLOOD BY AUTOMATED COUNT: 13.9 % (ref 11.6–15.1)
GFR SERPL CREATININE-BSD FRML MDRD: >60 ML/MIN/1.73SQ M
GLUCOSE SERPL-MCNC: 119 MG/DL (ref 65–140)
GLUCOSE SERPL-MCNC: 197 MG/DL (ref 65–140)
GLUCOSE SERPL-MCNC: 205 MG/DL (ref 65–140)
GLUCOSE SERPL-MCNC: 226 MG/DL (ref 65–140)
GLUCOSE UR STRIP-MCNC: ABNORMAL MG/DL
HCT VFR BLD AUTO: 37.7 % (ref 36.5–49.3)
HGB BLD-MCNC: 11.2 G/DL (ref 12–17)
HGB BLD-MCNC: 11.3 G/DL (ref 12–17)
HGB BLD-MCNC: 11.8 G/DL (ref 12–17)
HGB BLD-MCNC: 12.1 G/DL (ref 12–17)
HGB BLD-MCNC: 12.1 G/DL (ref 12–17)
HGB UR QL STRIP.AUTO: ABNORMAL
HYALINE CASTS #/AREA URNS LPF: ABNORMAL /LPF
KETONES UR STRIP-MCNC: ABNORMAL MG/DL
LACTATE SERPL-SCNC: 1.5 MMOL/L (ref 0.5–2)
LEUKOCYTE ESTERASE UR QL STRIP: NEGATIVE
MCH RBC QN AUTO: 28.7 PG (ref 26.8–34.3)
MCHC RBC AUTO-ENTMCNC: 32.1 G/DL (ref 31.4–37.4)
MCV RBC AUTO: 90 FL (ref 82–98)
MUCOUS THREADS UR QL AUTO: ABNORMAL
NITRITE UR QL STRIP: NEGATIVE
NON-SQ EPI CELLS URNS QL MICRO: ABNORMAL /HPF
NT-PROBNP SERPL-MCNC: 3598 PG/ML
P AXIS: 79 DEGREES
P AXIS: 80 DEGREES
PH UR STRIP.AUTO: 6 [PH] (ref 4.5–8)
PLATELET # BLD AUTO: 183 THOUSANDS/UL (ref 149–390)
PMV BLD AUTO: 12.6 FL (ref 8.9–12.7)
POTASSIUM SERPL-SCNC: 4.1 MMOL/L (ref 3.5–5.3)
PR INTERVAL: 194 MS
PR INTERVAL: 214 MS
PROT UR STRIP-MCNC: ABNORMAL MG/DL
QRS AXIS: 83 DEGREES
QRS AXIS: 84 DEGREES
QRSD INTERVAL: 114 MS
QRSD INTERVAL: 120 MS
QT INTERVAL: 384 MS
QT INTERVAL: 398 MS
QTC INTERVAL: 444 MS
QTC INTERVAL: 487 MS
RBC # BLD AUTO: 4.21 MILLION/UL (ref 3.88–5.62)
RBC #/AREA URNS AUTO: ABNORMAL /HPF
SODIUM SERPL-SCNC: 140 MMOL/L (ref 136–145)
SP GR UR STRIP.AUTO: 1.02 (ref 1–1.03)
T WAVE AXIS: -75 DEGREES
T WAVE AXIS: 122 DEGREES
TROPONIN I SERPL-MCNC: 0.03 NG/ML
TROPONIN I SERPL-MCNC: 0.04 NG/ML
TROPONIN I SERPL-MCNC: 0.04 NG/ML
UROBILINOGEN UR QL STRIP.AUTO: 0.2 E.U./DL
VENTRICULAR RATE: 75 BPM
VENTRICULAR RATE: 97 BPM
WBC # BLD AUTO: 16.51 THOUSAND/UL (ref 4.31–10.16)
WBC #/AREA URNS AUTO: ABNORMAL /HPF

## 2017-04-19 PROCEDURE — 82948 REAGENT STRIP/BLOOD GLUCOSE: CPT

## 2017-04-19 PROCEDURE — 83605 ASSAY OF LACTIC ACID: CPT | Performed by: HOSPITALIST

## 2017-04-19 PROCEDURE — 85018 HEMOGLOBIN: CPT | Performed by: NURSE PRACTITIONER

## 2017-04-19 PROCEDURE — 93005 ELECTROCARDIOGRAM TRACING: CPT

## 2017-04-19 PROCEDURE — C9113 INJ PANTOPRAZOLE SODIUM, VIA: HCPCS | Performed by: EMERGENCY MEDICINE

## 2017-04-19 PROCEDURE — 80048 BASIC METABOLIC PNL TOTAL CA: CPT | Performed by: NURSE PRACTITIONER

## 2017-04-19 PROCEDURE — 84484 ASSAY OF TROPONIN QUANT: CPT | Performed by: HOSPITALIST

## 2017-04-19 PROCEDURE — 81001 URINALYSIS AUTO W/SCOPE: CPT | Performed by: HOSPITALIST

## 2017-04-19 PROCEDURE — 85730 THROMBOPLASTIN TIME PARTIAL: CPT | Performed by: HOSPITALIST

## 2017-04-19 PROCEDURE — 74177 CT ABD & PELVIS W/CONTRAST: CPT

## 2017-04-19 PROCEDURE — 83880 ASSAY OF NATRIURETIC PEPTIDE: CPT | Performed by: NURSE PRACTITIONER

## 2017-04-19 PROCEDURE — 85027 COMPLETE CBC AUTOMATED: CPT | Performed by: NURSE PRACTITIONER

## 2017-04-19 PROCEDURE — 94760 N-INVAS EAR/PLS OXIMETRY 1: CPT

## 2017-04-19 PROCEDURE — 85730 THROMBOPLASTIN TIME PARTIAL: CPT | Performed by: NURSE PRACTITIONER

## 2017-04-19 RX ORDER — TORSEMIDE 10 MG/1
10 TABLET ORAL DAILY
Status: DISCONTINUED | OUTPATIENT
Start: 2017-04-19 | End: 2017-04-21

## 2017-04-19 RX ORDER — ATORVASTATIN CALCIUM 40 MG/1
80 TABLET, FILM COATED ORAL
Status: DISCONTINUED | OUTPATIENT
Start: 2017-04-19 | End: 2017-04-22 | Stop reason: HOSPADM

## 2017-04-19 RX ORDER — HEPARIN SODIUM 1000 [USP'U]/ML
4000 INJECTION, SOLUTION INTRAVENOUS; SUBCUTANEOUS AS NEEDED
Status: DISCONTINUED | OUTPATIENT
Start: 2017-04-19 | End: 2017-04-22 | Stop reason: HOSPADM

## 2017-04-19 RX ORDER — HYDRALAZINE HYDROCHLORIDE 20 MG/ML
10 INJECTION INTRAMUSCULAR; INTRAVENOUS EVERY 6 HOURS PRN
Status: DISCONTINUED | OUTPATIENT
Start: 2017-04-19 | End: 2017-04-22 | Stop reason: HOSPADM

## 2017-04-19 RX ORDER — HEPARIN SODIUM 1000 [USP'U]/ML
2000 INJECTION, SOLUTION INTRAVENOUS; SUBCUTANEOUS AS NEEDED
Status: DISCONTINUED | OUTPATIENT
Start: 2017-04-19 | End: 2017-04-22 | Stop reason: HOSPADM

## 2017-04-19 RX ORDER — NITROGLYCERIN 0.4 MG/1
0.4 TABLET SUBLINGUAL
Status: DISCONTINUED | OUTPATIENT
Start: 2017-04-19 | End: 2017-04-22 | Stop reason: HOSPADM

## 2017-04-19 RX ORDER — ASPIRIN 325 MG
325 TABLET ORAL DAILY
Status: DISCONTINUED | OUTPATIENT
Start: 2017-04-19 | End: 2017-04-22 | Stop reason: HOSPADM

## 2017-04-19 RX ORDER — HEPARIN SODIUM 10000 [USP'U]/100ML
3-20 INJECTION, SOLUTION INTRAVENOUS
Status: DISCONTINUED | OUTPATIENT
Start: 2017-04-19 | End: 2017-04-22 | Stop reason: HOSPADM

## 2017-04-19 RX ORDER — HEPARIN SODIUM 1000 [USP'U]/ML
4000 INJECTION, SOLUTION INTRAVENOUS; SUBCUTANEOUS ONCE
Status: COMPLETED | OUTPATIENT
Start: 2017-04-19 | End: 2017-04-19

## 2017-04-19 RX ORDER — ENALAPRILAT 2.5 MG/2ML
0.62 INJECTION INTRAVENOUS EVERY 6 HOURS
Status: DISCONTINUED | OUTPATIENT
Start: 2017-04-19 | End: 2017-04-19

## 2017-04-19 RX ADMIN — ATORVASTATIN CALCIUM 80 MG: 40 TABLET, FILM COATED ORAL at 17:01

## 2017-04-19 RX ADMIN — ONDANSETRON 4 MG: 2 INJECTION INTRAMUSCULAR; INTRAVENOUS at 02:14

## 2017-04-19 RX ADMIN — NICOTINE 1 PATCH: 14 PATCH, EXTENDED RELEASE TRANSDERMAL at 00:14

## 2017-04-19 RX ADMIN — ASPIRIN 325 MG ORAL TABLET 325 MG: 325 PILL ORAL at 11:17

## 2017-04-19 RX ADMIN — SODIUM CHLORIDE 8 MG/HR: 9 INJECTION, SOLUTION INTRAVENOUS at 13:10

## 2017-04-19 RX ADMIN — METOPROLOL TARTRATE 5 MG: 5 INJECTION INTRAVENOUS at 05:24

## 2017-04-19 RX ADMIN — PIPERACILLIN SODIUM,TAZOBACTAM SODIUM 3.38 G: 3; .375 INJECTION, POWDER, FOR SOLUTION INTRAVENOUS at 05:29

## 2017-04-19 RX ADMIN — SODIUM CHLORIDE 100 ML/HR: 0.9 INJECTION, SOLUTION INTRAVENOUS at 09:42

## 2017-04-19 RX ADMIN — IOHEXOL 50 ML: 240 INJECTION, SOLUTION INTRATHECAL; INTRAVASCULAR; INTRAVENOUS; ORAL at 12:47

## 2017-04-19 RX ADMIN — INSULIN LISPRO 2 UNITS: 100 INJECTION, SOLUTION INTRAVENOUS; SUBCUTANEOUS at 00:15

## 2017-04-19 RX ADMIN — PIPERACILLIN SODIUM,TAZOBACTAM SODIUM 3.38 G: 3; .375 INJECTION, POWDER, FOR SOLUTION INTRAVENOUS at 13:09

## 2017-04-19 RX ADMIN — METOPROLOL TARTRATE 5 MG: 5 INJECTION INTRAVENOUS at 00:14

## 2017-04-19 RX ADMIN — IOHEXOL 50 ML: 350 INJECTION, SOLUTION INTRAVENOUS at 12:47

## 2017-04-19 RX ADMIN — QUETIAPINE FUMARATE 600 MG: 300 TABLET, FILM COATED ORAL at 00:14

## 2017-04-19 RX ADMIN — ENALAPRILAT 0.62 MG: 1.25 INJECTION INTRAVENOUS at 05:24

## 2017-04-19 RX ADMIN — QUETIAPINE FUMARATE 600 MG: 300 TABLET, FILM COATED ORAL at 21:41

## 2017-04-19 RX ADMIN — NITROGLYCERIN 0.4 MG: 0.4 TABLET SUBLINGUAL at 11:26

## 2017-04-19 RX ADMIN — HYDROMORPHONE HYDROCHLORIDE 1 MG: 1 INJECTION, SOLUTION INTRAMUSCULAR; INTRAVENOUS; SUBCUTANEOUS at 09:32

## 2017-04-19 RX ADMIN — HEPARIN SODIUM 4000 UNITS: 1000 INJECTION, SOLUTION INTRAVENOUS; SUBCUTANEOUS at 11:28

## 2017-04-19 RX ADMIN — HEPARIN SODIUM AND DEXTROSE 12 UNITS/KG/HR: 10000; 5 INJECTION INTRAVENOUS at 11:28

## 2017-04-19 RX ADMIN — NICOTINE 1 PATCH: 14 PATCH, EXTENDED RELEASE TRANSDERMAL at 09:29

## 2017-04-19 RX ADMIN — NITROGLYCERIN 0.4 MG: 0.4 TABLET SUBLINGUAL at 11:17

## 2017-04-19 RX ADMIN — LORAZEPAM 0.5 MG: 2 INJECTION, SOLUTION INTRAMUSCULAR; INTRAVENOUS at 21:42

## 2017-04-19 RX ADMIN — INSULIN LISPRO 2 UNITS: 100 INJECTION, SOLUTION INTRAVENOUS; SUBCUTANEOUS at 13:10

## 2017-04-19 RX ADMIN — PIPERACILLIN SODIUM,TAZOBACTAM SODIUM 3.38 G: 3; .375 INJECTION, POWDER, FOR SOLUTION INTRAVENOUS at 00:39

## 2017-04-19 RX ADMIN — TORSEMIDE 10 MG: 10 TABLET ORAL at 13:09

## 2017-04-19 RX ADMIN — SODIUM CHLORIDE 8 MG/HR: 9 INJECTION, SOLUTION INTRAVENOUS at 04:52

## 2017-04-19 RX ADMIN — PIPERACILLIN SODIUM,TAZOBACTAM SODIUM 3.38 G: 3; .375 INJECTION, POWDER, FOR SOLUTION INTRAVENOUS at 23:11

## 2017-04-19 RX ADMIN — QUETIAPINE FUMARATE 400 MG: 200 TABLET ORAL at 09:28

## 2017-04-19 RX ADMIN — METOPROLOL TARTRATE 25 MG: 25 TABLET ORAL at 11:17

## 2017-04-19 RX ADMIN — METOPROLOL TARTRATE 25 MG: 25 TABLET ORAL at 21:41

## 2017-04-19 NOTE — CONSULTS
Consultation - Cardiology   Bear Ritter 61 y o  male MRN: [id number]  Unit/Bed#: -02 Encounter: [id number]      Assessment:  Principal Problem:    Upper GI bleed  Active Problems:    Colitis    Hypertension    Bipolar 1 disorder    Diabetes mellitus    CAD (coronary artery disease)    Pacemaker    Chronic systolic congestive heart failure    ICD    CAD    Mechanical mitral valve replacement    Plan:    1  CP - His chest pain seems atypical   He was telling me about the same exact chest pain office, that sounds GI related  It went away on its own, and was not affected by nitroglycerin  I we will defer to gastroenterology and suggest an ongoing gastrointestinal workup  2  GI bleeding - He presented with nausea vomiting and coffee-ground emesis  He also has a recent history of colitis  I will defer to gastroenterology  For now continue IV heparin leave him off Coumadin  3   Mechanical mitral valve replacement - This is functioning normally by recent echo from last week  As stated continue IV heparin  3  CAD and ischemic cardiomyopathy - He has a report of an ejection fraction as low as 25%  However, by echo last week it appeared to be 40%  Continue all medical therapy as he is on his outpatient  No changes    History of Present Illness   Physician Requesting Consult: Shannan Gonzalez MD  Reason for Consult / Principal Problem: CP/MVR    HPI: Bear Ritter is a 61y o  year old male who presents with GI bleeding after recent admission for colitis  He presented with severe abdominal pain and nausea and vomiting  There was a report of coffee-ground emesis  He was also having dark stool  He was just in the hospital earlier this month for abdominal pain and was diagnosed with colitis  He is currently off Coumadin, on IV heparin  I saw Ziggy Drew is a new consultation last month  Ziggy Drew has a history of a mechanical mitral valve for what sounded like mitral valve prolapse   He also was found to have coronary artery disease at that time and had coronary bypass grafting as well  He told me in subsequent years he is received stenting to his coronary arteries  He also has a history of defibrillator, placed a little over a year ago  He tells me that he thinks his ejection fraction is around 25%  He had all of his care in [address] [place] [address], as he lived there all his life, but relocated to this area to live with his daughter  He states to me that his mitral valve replacement and CABG was in 2001  Unfortunately, we have no records of his prior history  His prior cardiologist, he tells me, closed his practice due to insurance fraud  He also carries a history of peripheral arterial disease  At our visit I ordered an echocardiogram which was done last week  His ejection fraction is 40% by this echo  Ryne Yoo told me When I met him in the office and he was having some atypical chest pains  One of these along with abdominal pain took him to the [facility] emergency room  He had a similar episode of chest pain this morning  He describes a upper abdominal to lower chest pain that is sharp and intense  They gave him a nitroglycerin, and he told me it was "relieved after 30 minutes" which was likely not related to the nitroglycerin  He is denying any exertional chest pain  No shortness of breath  He denies any anginal   Currently he is chest pain-free  Consults    Review of Systems:  Please refer to history of present illness  Cardiac and pulmonary review of systems  All other 10 systems were reviewed and are negative with the exception of his GI issues and presentation of current illness      Historical Information   Past Medical History:   Diagnosis Date    Anxiety     Bipolar 1 disorder     CAD (coronary artery disease)     Chronic systolic congestive heart failure 4/18/2017    Colitis 4/2/2017    Diabetes mellitus     Hiatal hernia     Hypertension     Pacemaker      Past Surgical History: Procedure Laterality Date    MITRAL VALVE REPAIR       History   Alcohol Use No     History   Drug Use No     History   Smoking Status    Current Some Day Smoker    Packs/day: 0 50    Types: Cigarettes   Smokeless Tobacco    Not on file     Family History: non-contributory    Meds/Allergies   all current active meds have been reviewed  Allergies   Allergen Reactions    Aspirin Other (See Comments)    Morphine     Omeprazole Diarrhea       Objective   Vitals: Blood pressure 128/63, pulse 61, temperature 97 6 °F (36 4 °C), resp  rate (!) 39, weight 81 6 kg (180 lb), SpO2 100 %  , Body mass index is 22 5 kg/(m^2)  , Orthostatic Blood Pressures         Most Recent Value    Blood Pressure  128/63 filed at 04/19/2017 1500    Patient Position  Lying filed at 04/19/2017 0555            Intake/Output Summary (Last 24 hours) at 04/19/17 1547  Last data filed at 04/19/17 0201   Gross per 24 hour   Intake                0 ml   Output              300 ml   Net             -300 ml       Invasive Devices     Central Venous Catheter Line            CVC Central Lines 04/18/17 Triple Left Femoral less than 1 day                    Physical Exam:  GEN: Danyel Swanson appears well, alert and oriented x 3, pleasant and cooperative   HEENT: pupils equal, round, and reactive to light; extraocular muscles intact  NECK: supple, no carotid bruits   No JVD  HEART: regular rhythm, mechanical S1 and normal S2, no sig murmurs, clicks, gallops or rubs   LUNGS: clear to auscultation bilaterally; no wheezes, rales, or rhonchi   ABDOMEN: normal bowel sounds, soft, no tenderness, no distention  EXTREMITIES: peripheral pulses normal; no clubbing, cyanosis, or edema  NEURO: no focal findings   SKIN: normal without suspicious lesions on exposed skin    Lab Results:   Admission on 04/18/2017   Component Date Value    WBC 04/18/2017 23 39*    RBC 04/18/2017 5 57     Hemoglobin 04/18/2017 16 8     Hematocrit 04/18/2017 50 9*    MCV 04/18/2017 91     MCH 04/18/2017 30 2     MCHC 04/18/2017 33 0     RDW 04/18/2017 14 3     MPV 04/18/2017 12 1     Platelets 96/80/5011 218     Sodium 04/18/2017 130*    Potassium 04/18/2017 5 3     Chloride 04/18/2017 99*    CO2 04/18/2017 18*    Anion Gap 04/18/2017 13     BUN 04/18/2017 22     Creatinine 04/18/2017 1 10     Glucose 04/18/2017 282*    Calcium 04/18/2017 9 6     AST 04/18/2017 48*    ALT 04/18/2017 15     Alkaline Phosphatase 04/18/2017 160*    Total Protein 04/18/2017 8 9*    Albumin 04/18/2017 3 6     Total Bilirubin 04/18/2017 1 00     eGFR 04/18/2017 >60 0     Protime 04/18/2017 16 0*    INR 04/18/2017 1 30*    PTT 04/18/2017 27     Ventricular Rate 04/18/2017 97     Atrial Rate 04/18/2017 97     NJ Interval 04/18/2017 214     QRSD Interval 04/18/2017 114     QT Interval 04/18/2017 384     QTC Interval 04/18/2017 487     P Axis 04/18/2017 80     QRS Axis 04/18/2017 83     T Wave Axis 04/18/2017 -75     ABO Grouping 04/18/2017 A     Rh Factor 04/18/2017 Positive     Antibody Screen 04/18/2017 Positive     Specimen Expiration Date 04/18/2017 [id number]     Segmented % 04/18/2017 84*    Bands % 04/18/2017 5     Lymphocytes % 04/18/2017 5*    Monocytes % 04/18/2017 6     Eosinophils % 04/18/2017 0     Basophils % 04/18/2017 0     Absolute Neutrophils 04/18/2017 20 82*    Lymphocytes Absolute 04/18/2017 1 17     Monocytes Absolute 04/18/2017 1 40*    Eosinophils Absolute 04/18/2017 0 00     Basophils Absolute 04/18/2017 0 00     Total Counted 04/18/2017 100     RBC Morphology 04/18/2017 Normal     Platelet Estimate 86/85/2902 Adequate     SODIUM, I-STAT 04/18/2017 138     Potassium, i-STAT 04/18/2017 4 9     Chloride, istat 04/18/2017 104     CO2, i-STAT 04/18/2017 20*    Anion Gap, Istat 04/18/2017 21*    Calcium, Ionized i-STAT 04/18/2017 1 04*    BUN, I-STAT 04/18/2017 22     Creatinine, i-STAT 04/18/2017 0 9     eGFR 04/18/2017 >60 0     Glucose, i-STAT 04/18/2017 273*    Hct, i-STAT 04/18/2017 43     Hgb, i-STAT 04/18/2017 14 6     Specimen Type 04/18/2017 VENOUS     POC Glucose 04/18/2017 270*    Hemoglobin 04/18/2017 13 1     ANTIBODY ID  #1 04/18/2017 Cold Auto Antibody     ANTIBODY ID   #2 04/18/2017 Anti-Cw     NT-proBNP 04/19/2017 3598*    Sodium 04/19/2017 140     Potassium 04/19/2017 4 1     Chloride 04/19/2017 105     CO2 04/19/2017 26     Anion Gap 04/19/2017 9     BUN 04/19/2017 18     Creatinine 04/19/2017 1 05     Glucose 04/19/2017 205*    Calcium 04/19/2017 8 1*    eGFR 04/19/2017 >60 0     WBC 04/19/2017 16 51*    RBC 04/19/2017 4 21     Hemoglobin 04/19/2017 12 1     Hematocrit 04/19/2017 37 7     MCV 04/19/2017 90     MCH 04/19/2017 28 7     MCHC 04/19/2017 32 1     RDW 04/19/2017 13 9     Platelets 57/65/9439 183     MPV 04/19/2017 12 6     POC Glucose 04/19/2017 197*    Hemoglobin 04/19/2017 12 1     LACTIC ACID 04/19/2017 1 5     Clarity, UA 04/19/2017 Clear     Color, UA 04/19/2017 Yellow     Specific Gravity, UA 04/19/2017 1 025     pH, UA 04/19/2017 6 0     Glucose, UA 04/19/2017 250 (1/4%)*    Ketones, UA 04/19/2017 15 (1+)*    Blood, UA 04/19/2017 Trace-Intact*    Protein, UA 04/19/2017 100 (2+)*    Nitrite, UA 04/19/2017 Negative     Bilirubin, UA 04/19/2017 Negative     Urobilinogen, UA 04/19/2017 0 2     Leukocytes, UA 04/19/2017 Negative     WBC, UA 04/19/2017 None Seen     RBC, UA 04/19/2017 None Seen     Hyaline Casts, UA 04/19/2017 0-1*    Bacteria, UA 04/19/2017 Occasional     Epithelial Cells 04/19/2017 None Seen     MUCOUS THREADS 04/19/2017 Occasional     Troponin I 04/19/2017 0 04*    Troponin I 04/19/2017 0 04*    Unit Product Code 04/19/2017 Q4464C79     Unit Number 04/19/2017 [id number]     Unit ABO 04/19/2017 A     Unit [facility] 04/19/2017 POS     Crossmatch 04/19/2017 Compatible     Unit Dispense Status 04/19/2017 Crossmatched     Unit Product Code 04/19/2017 G2006Y12     Unit Number 04/19/2017 [id number]     Unit ABO 04/19/2017 A     Unit RH 04/19/2017 POS     Crossmatch 04/19/2017 Compatible     Unit Dispense Status 04/19/2017 Crossmatched     POC Glucose 04/19/2017 226*    Hemoglobin 04/19/2017 11 3*     Labs & Results:      Results from last 7 days  Lab Units 04/19/17  1454 04/19/17  1019   TROPONIN I ng/mL 0 04* 0 04*       Results from last 7 days  Lab Units 04/19/17  1454 04/19/17  1002 04/19/17  0452  04/18/17  1814   WBC Thousand/uL  --   --  16 51*  --  23 39*   HEMOGLOBIN g/dL 11 3* 12 1 12 1  < > 16 8   I STAT HEMOGLOBIN   --   --   --   < >  --    HEMATOCRIT %  --   --  37 7  --  50 9*   PLATELETS Thousands/uL  --   --  183  --  218   < > = values in this interval not displayed  Results from last 7 days  Lab Units 04/19/17  0603 04/18/17 2010 04/18/17  1717   SODIUM mmol/L 140  --  130*   POTASSIUM mmol/L 4 1  --  5 3   CHLORIDE mmol/L 105  --  99*   CO2 mmol/L 26  --  18*   BUN mg/dL 18  --  22   CREATININE mg/dL 1 05  --  1 10   CALCIUM mg/dL 8 1*  --  9 6   TOTAL PROTEIN g/dL  --   --  8 9*   BILIRUBIN TOTAL mg/dL  --   --  1 00   ALK PHOS U/L  --   --  160*   ALT U/L  --   --  15   AST U/L  --   --  48*   GLUCOSE RANDOM mg/dL 205*  --  282*   GLUCOSE, ISTAT mg/dl  --  273*  --        Results from last 7 days  Lab Units 04/18/17 2002   INR  1 30*   PTT seconds 27             Imaging: I have personally reviewed pertinent reports  Xr Chest 1 View Portable    Result Date: 4/19/2017  Narrative: CHEST  INDICATION: Pain  GI bleeding  COMPARISON:  4/2/2017 VIEWS:   AP frontal; 1 image FINDINGS: Left chest wall related noted  Median sternotomy wires are present  Cardiac prosthesis is noted  The cardiomediastinal silhouette is stable  The lungs are clear  No pleural effusion  Osseous structures are age appropriate  Impression: No active disease   Workstation performed: [id number]     Xr Chest Portable    Result Date: 4/2/2017  Narrative: CHEST INDICATION:  Vomiting COMPARISON:  None VIEWS:   AP frontal IMAGES:  1 FINDINGS:  Left chest wall cardiac device  Valve prosthesis  Cardiomediastinal silhouette appears unremarkable  Bullous changes in the right lung apex  Minimal interstitial prominence  No consolidation  No pneumothorax or pleural effusion  Visualized osseous structures appear within normal limits for the patient's age  Impression: Bullous changes in the right lung apex  Minimal interstitial prominence  No consolidation  As per comments in the PACS workstation, findings are concordant with preliminary interpretation provided by the emergency room physician  Workstation performed: [id number]     Vas Carotid Complete Study    Result Date: 4/11/2017  Narrative:  [facility] REPORT [facility]INICAL: Indications:  Bruit  (R09 89) Patient presents for a general health evaluation secondary to other cardiovascular symptoms  Patient is asymptomatic from a cerebral vascular standpoint  Risk Factors The patient has history of HTN, Diabetes (IDDM adult) and hyperlipidemia  Clinical Right Brachial Pressure:  110/60 mmHg, Left Brachial Pressure:  110/60 mmHg  FINDINGS:  Right        Impression  PSV  EDV (cm/s)  Ratio  Dist  ICA                 78          23   0 92  Mid  ICA                 102          34   1 19  Prox  ICA    1 - 49%      79          16   0 93  Dist CCA                  81          25         Mid CCA                   85          23   1 13  Prox CCA                  76          17         Ext Carotid              105          13   1 23  Prox Vert                 75          23         Subclavian               134          10          Left         Impression  PSV  EDV (cm/s)  Ratio  Dist  ICA                 84          31   0 87  Mid  ICA                  82          28   0 84  Prox   ICA    1 - 49%     113          34   1 17  Dist CCA                  87          23         Mid CCA                   97 29   0 90  Prox CCA                 108          23         Ext Carotid              113          17   1 16  Prox Vert                 66          18         Subclavian                68           8            CONCLUSION:  Impression RIGHT: There is <50% stenosis noted in the internal carotid artery  Plaque is heterogenous and irregular  Vertebral artery flow is antegrade  There is no significant subclavian artery disease  LEFT: There is <50% stenosis noted in the internal carotid artery  Plaque is heterogenous and irregular  Vertebral artery flow is antegrade  There is no significant subclavian artery disease  There is no previous study for comparison  Internal carotid artery stenosis determination by consensus criteria from: Puja Devries et al  Carotid Artery Stenosis: Gray-Scale and Doppler US Diagnosis - Society of Radiologists in [address], Radiology 2003; [phone]  SIGNATURE: Electronically Signed by: Delfino Schulz on 2017-04-11 09:01:52 AM    Vas Lower Limb Arterial Duplex, Complete Bilateral    Result Date: 4/11/2017  Narrative:  [facility] REPORT CLINICAL: Indications:  Bruit, Pulse Abnormality [R09 89]  Patient complains of no feeling in his left leg from the knee to the foot with dark discoloration of the ankle and foot  He also complains of leg "heaviness" when he walks and lies down  Risk Factors:  Hypertension, Diabetes (IDDM adult) and Hyperlipidemia  Current smoker  Right Brachial Pressure:  98/ mmHg, Left Brachial Pressure:  106/ mmHg     FINDINGS:  Segment                Right                 Left                                          Impression  PSV  EDV  Impression  PSV  EDV  Common Femoral Artery               85   11              109   17  Prox Profunda                       62    8               91   12  Prox SFA                            88   13  <50%        217   27  Mid SFA                <50%        185   14              107   11 Dist SFA                            83   10              183   19  Proximal Pop                        38    9               42   10  Distal Pop                          51    9               66   10  Prox Post Tibial                    43    8               48   12  Dist Post Tibial                    39    9  50-75%      144   65  Prox  Ant  Tibial                   83   18               64   10  Dist  Ant  Tibial                   68   20               61   16     CONCLUSION: Impression: RIGHT LOWER LIMB: This resting evaluation shows <50 % stenosis within the mid superficial femoral artery  Ankle/Brachial index: 1 04(normal range) PVR/ PPG tracings are normal  Metatarsal pressure of 260mmHg Great toe pressure of 44mmHg, below the healing range for a diabetic LEFT LOWER LIMB: This resting evaluation shows 50 -75 % stenosis within the proximal superficial femoral artery  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  Ankle/Brachial index: 0 60(severe range) PVR/ PPG tracings are normal  Metatarsal pressure of 100mmHg Great toe pressure of 38mmHg, below the healing range for a diabetic  There is no previous study for comparison  Recommend repeat testing in 1year as per protocol unless otherwise indicated  SIGNATURE: Electronically Signed by: Sherri Mcqueen on 2017-04-11 08:48:49 AM    Vascular Results    Result Date: 4/10/2017  Narrative: Ordered by an unspecified provider  Ct Abdomen Pelvis W Contrast    Result Date: 4/19/2017  Narrative: CT ABDOMEN AND PELVIS WITH IV CONTRAST INDICATION:  Abdominal pain  Possible ischemic bowel  GI bleed  COMPARISON: 4/2/2017 TECHNIQUE:  CT examination of the abdomen and pelvis was performed  Reformatted images were created in axial, sagittal, and coronal planes  Radiation dose length product (DLP) for this visit:  489 13 mGy-cm     This examination, like all CT scans performed in the [facility], was performed utilizing techniques to minimize radiation dose exposure, including the use of iterative  reconstruction and automated exposure control  IV Contrast:  50 mL of iohexol (OMNIPAQUE)  power injector failed during the injection so only half of the normal volume was administered  Enteric Contrast:  Enteric contrast was not administered  FINDINGS: ABDOMEN LOWER CHEST:  Stable emphysematous changes within the lung bases  No pericardial or pleural effusion  LIVER/BILIARY TREE:  Unremarkable  GALLBLADDER:  No calcified gallstones  No pericholecystic inflammatory change  SPLEEN:  Unremarkable  PANCREAS:  Unremarkable  ADRENAL GLANDS:  Unremarkable  KIDNEYS/URETERS:  Stable scarring in the right lower pole  No hydronephrosis  STOMACH AND BOWEL:  Previously seen colonic wall thickening has resolved  APPENDIX:  No findings to suggest appendicitis  ABDOMINOPELVIC CAVITY:  No ascites or free intraperitoneal air  No lymphadenopathy  VESSELS:  Unremarkable for patient's age  PELVIS REPRODUCTIVE ORGANS:  Unremarkable for patient's age  URINARY BLADDER:  Unremarkable  ABDOMINAL WALL/INGUINAL REGIONS:  Unremarkable  OSSEOUS STRUCTURES:  No acute fracture or destructive osseous lesion  Impression: Slight limitation of exam due to power injector failure during the administration of contrast  Given these limitations, the colonic wall thickening seen previously has resolved  No evidence of ischemic bowel on the current study  Stable emphysema lung bases  Workstation performed: [id number]     Ct Abdomen Pelvis With Contrast    Result Date: 4/2/2017  Narrative: CT ABDOMEN AND PELVIS WITH IV CONTRAST INDICATION:  Vomiting with diarrhea  Periumbilical pain  Head cold/persistent coughing  COMPARISON: None TECHNIQUE:  CT examination of the abdomen and pelvis was performed  In addition to portal venous phase postcontrast scanning through the abdomen and pelvis, delayed phase postcontrast scanning was performed through the upper abdominal viscera   Reformatted images were created in axial, sagittal, and coronal planes  Radiation dose length product (DLP) for this visit:  922 27 mGy-cm   This examination, like all CT scans performed in the [facility], was performed utilizing techniques to minimize radiation dose exposure, including the use of iterative  reconstruction and automated exposure control  IV Contrast:  100 mL of iohexol (OMNIPAQUE)          Enteric Contrast:  Enteric contrast was not administered  FINDINGS: ABDOMEN LOWER CHEST:  Hypoventilatory changes  LIVER/BILIARY TREE:  Unremarkable  GALLBLADDER:  No calcified gallstones  No pericholecystic inflammatory change  SPLEEN:  Unremarkable  PANCREAS:  Unremarkable  ADRENAL GLANDS:  Unremarkable  KIDNEYS/URETERS:  Scarring right kidney  No nephrolithiasis or hydronephrosis  STOMACH AND BOWEL:  Wall thickening ascending colon potentially represents colitis  APPENDIX:  No findings to suggest appendicitis  ABDOMINOPELVIC CAVITY:  No ascites or free intraperitoneal air  No lymphadenopathy  VESSELS:  Unremarkable for patient's age  PELVIS REPRODUCTIVE ORGANS:  Unremarkable for patient's age  URINARY BLADDER:  Unremarkable  ABDOMINAL WALL/INGUINAL REGIONS:  Unremarkable  OSSEOUS STRUCTURES:  No acute fracture or destructive osseous lesion  Impression: Ascending colon wall thickening suspicious for colitis  Normal appearance of the appendix  Workstation performed: [id number]       EKG: Sinus rhythm with first-degree AV block, nonspecific ST changes, PVCs - not significantly changed from prior ECG  ECHO:  LEFT VENTRICLE:  The ventricle was mildly dilated  Systolic function was moderately reduced  Ejection fraction was estimated to be 40 %  There was severe hypokinesis to akinesis of the basal-mid inferoseptal, entire inferior, and basal-mid inferolateral wall(s)    Wall thickness was at the upper limits of normal      RIGHT VENTRICLE:  The size was normal   Systolic function was low normal      LEFT ATRIUM:  The atrium was mildly dilated      MITRAL VALVE:  A mechanical prosthesis was present  It exhibited normal function  There was trace regurgitation  There was no significant perivalvular regurgitation  Mean transmitral gradient was 2 5 mmHg      AORTIC VALVE:  There was trace regurgitation  No significant arrhythmias seen on telemetry review  Counseling / Coordination of Care  Total floor / unit time spent today 40 minutes  Greater than 50% of total time was spent with the patient and / or family counseling and / or coordination of care

## 2017-04-19 NOTE — ED PROCEDURE NOTE
PROCEDURE  Central Line  Date/Time: 4/18/2017 8:05 PM  Performed by: Hayde Acuña by: Ela Berrios     Patient location:  ED  Consent:     Consent obtained:  Verbal    Consent given by:  Patient    Risks discussed:  Arterial puncture, infection and bleeding    Alternatives discussed:  No treatment  Universal protocol:     Procedure explained and questions answered to patient or proxy's satisfaction: yes      Patient identity confirmed:  Verbally with patient  Pre-procedure details:     Hand hygiene: Hand hygiene performed prior to insertion      Sterile barrier technique: All elements of maximal sterile technique followed      Skin preparation:  ChloraPrep    Skin preparation agent: Skin preparation agent completely dried prior to procedure    Indications:     Central line indications: hemodynamic monitoring, vascular access and treatment therapy    Sedation:     Sedation type: Anxiolysis  Anesthesia (see MAR for exact dosages): Anesthesia method:  Local infiltration    Local anesthetic:  Lidocaine 1% w/o epi  Procedure details:     Location:  Left femoral    Vessel type: vein      Laterality:  Left    Approach: percutaneous technique used      Patient position:  Flat    Catheter type:  Triple lumen 20cm    Catheter size:  7 Fr    Landmarks identified: yes      Ultrasound guidance: yes      Sterile ultrasound techniques: Sterile gel and sterile probe covers were used      Number of attempts:  1    Successful placement: yes      Vessel of catheter tip end:  20 cm  Post-procedure details:     Post-procedure:  Dressing applied and line sutured    Assessment:  Blood return through all ports and free fluid flow    Post-procedure complications: none      Patient tolerance of procedure:   Tolerated well, no immediate complications

## 2017-04-19 NOTE — CASE MANAGEMENT
Initial Clinical Review    Admission: Date/Time/Statement: 4/18/17 @ 1836     Orders Placed This Encounter   Procedures    Inpatient Admission (expected length of stay for this patient is greater than two midnights)     Standing Status:   Standing     Number of Occurrences:   1     Order Specific Question:   Admitting Physician     Answer:   Mejia Chavez [[id number]]     Order Specific Question:   Level of Care     Answer:   Critical Care [15]     Order Specific Question:   Estimated length of stay     Answer:   More than 2 Midnights     Order Specific Question:   Certification     Answer:   I certify that inpatient services are medically necessary for this patient for a duration of greater than two midnights  See H&P and MD Progress Notes for additional information about the patient's course of treatment  ED: Date/Time/Mode of Arrival:   ED Arrival Information     Expected Arrival Acuity Means of Arrival Escorted By Service Admission Type    - 4/18/2017 15:14 Emergent Ambulance [facility] 173 EMS General Medicine Emergency    Arrival Complaint    vomiting          Chief Complaint:   Chief Complaint   Patient presents with    Vomiting Blood     High INR, started vomiting dark coffee ground emesis while at daughters house today  History of Illness: 61 y o  male who presents with abdominal pain, nausea and vomiting  Patient is a poor historian  Patient was recently discharged from [facility] after being admitted for acute colitis  He actually left AMA but stated that he was discharged without any instruction  He states that he has been having these symptoms on/off since discharged  He has had blood BMs with diarrhea and vomiting coffee ground emesis  He has been taking a 30-day supply of antibiotic which he does know the name  He had an episode of coffee ground and bright red emesis in ED combined with diaphoresis and shaky  He has history of CABG with mechanical MVR and is on chronic coumadin   Denies alcohol use    IN ED active vomiting - coffee ground and bright red  ED Vital Signs:   ED Triage Vitals   Temperature Pulse Respirations Blood Pressure SpO2   04/18/17 1516 04/18/17 1522 04/18/17 1516 04/18/17 1516 04/18/17 1522   96 °F (35 6 °C) 100 24 141/99 100 %      Temp Source Heart Rate Source Patient Position BP Location FiO2 (%)   04/18/17 1516 04/18/17 1516 04/18/17 1516 04/18/17 1516 --   Tympanic Monitor Lying Left arm       Pain Score       04/18/17 1516       8        Wt Readings from Last 1 Encounters:   04/18/17 81 6 kg (180 lb)       Vital Signs (abnormal): pulse at 2017 - 93  Respiratory rate 24  Abnormal Labs/Diagnostic Test Results:   INR 1 30  Wbc 23 39, hgb 16 8, hct 50 9  Na 130, cl 99, CO2-18  Glucose 282   ast 48  Alkaline phosphatase 160  Total protein 8 9  Labs 4/19/2017 @ 0603- glucose 197    Calcium 8 1   @ 0432 - Wbc 16 51, hgb 12 1, hct 37 7     ED Treatment: central line insertion  Medication Administration from 04/18/2017 1514 to 04/18/2017 2054       Date/Time Order Dose Route Action Action by Comments     04/18/2017 1547 ondansetron (ZOFRAN) injection 4 mg 4 mg Intramuscular Given Leigh Segal RN      04/18/2017 1848 sodium chloride 0 9 % bolus 1,000 mL 0 mL Intravenous Stopped Lilly Camejo RN      04/18/2017 1635 sodium chloride 0 9 % bolus 1,000 mL 1,000 mL Intravenous [id number] [facility], RN      04/18/2017 1547 LORazepam (ATIVAN) 2 mg/mL injection 1 mg 1 mg Intramuscular Given Leigh Segal RN      04/18/2017 1849 sodium chloride 0 9 % bolus 1,000 mL 1,000 mL Intravenous [address] Lilly Camejo RN      04/18/2017 1730 phytonadione (AQUA-MEPHYTON) 10 mg/mL 10 mg in sodium chloride 0 9 % 50 mL IVPB 0 mg Intravenous Stopped Leigh Segal RN      04/18/2017 1658 phytonadione (AQUA-MEPHYTON) 10 mg/mL 10 mg in sodium chloride 0 9 % 50 mL IVPB 10 mg Intravenous [id number] [facility] Abdirizak Vasquez RN      04/18/2017 3541 prothrombin complex conc human (KCENTRA) IV 3,850 Units 3,850 Units Intravenous Given Srikanth Mansfield RN      04/18/2017 1658 pantoprazole (PROTONIX) injection 40 mg 40 mg Intravenous Given Srikanth Mansfield RN      04/18/2017 1842 pantoprazole (PROTONIX) 80 mg in sodium chloride 0 9 % 100 mL infusion 8 mg/hr Intravenous [address] Dante García RN      04/18/2017 1908 ondansetron (ZOFRAN) injection 4 mg 4 mg Intravenous Given Srikanth Mansfield RN      04/18/2017 1909 LORazepam (ATIVAN) 2 mg/mL injection 1 mg 1 mg Intravenous Given Srikanth Mansfield RN      04/18/2017 1925 HYDROmorphone (DILAUDID) 1 mg/mL injection 1 mg 1 mg Intravenous Given Srikanth Mansfield RN           Past Medical/Surgical History: Active Ambulatory Problems     Diagnosis Date Noted    Colitis 04/02/2017    Hypertension 04/02/2017    Bipolar 1 disorder 04/02/2017    Diabetes mellitus 04/02/2017    Hiatal hernia 04/02/2017     Resolved Ambulatory Problems     Diagnosis Date Noted    No Resolved Ambulatory Problems     Past Medical History:   Diagnosis Date    Anxiety     Bipolar 1 disorder     CAD (coronary artery disease)     Chronic systolic congestive heart failure 4/18/2017    Colitis 4/2/2017    Diabetes mellitus     Hiatal hernia     Hypertension     Pacemaker        Admitting Diagnosis: Vomiting [R11 10]  Upper GI bleed [K92 2]    Age/Sex: 61 y o  male  Assessment/Plan: Upper GI bleed  Active Problems:  Colitis  Chronic systolic congestive heart failure  Hypertension  Bipolar 1 disorder  Diabetes mellitus  CAD (coronary artery disease)  Pacemaker     Plan:  · Will admit patient to ICU   · UGIB- with evidence of coffee ground emesis in ED but had brown material vomitus on the unit  Hgb was 16s on admission which is likely 2/2 to dehydration  Repeated Hbg was 13s  BUN is 22  I am not quite sure if the patient is having an on-going UGIB   However, will need to rule out any gastric/duodenal ulcer bleed, Shyanne-Perez tear as he is on Coumadin  Will trend hgb q4hr  Type and screen  IVF  Pantoprazole IV  Will consult GI  Keep NPO except ice chips as tolerated  Repeat INR in AM  Patient received KCentra and Vitamin K in ED  Monitor closely for any on-going bleed  Check stool for occult blood  · Recent history of colitis- patient left AMA during the last admission 4/2/17  Continue to have abdominal pain and subjective diarrhea will check CT a/p with contrast in AM  Start on Zosyn  If diarrhea will obtain stool for C diff and culture  · Chronic systolic HF- appears dehydrated  Will give gentle IVF  Strict I&Os  Daily weight  Monitor for fluid overload  · CAD with pacemaker and mitral valve replacement with mechanical valve; on chronic coumadin- will hold lisinopril and torsemide for now  Will start Vasotec while NPO  · DM 2- hold lantus for now  Continue with SSI  · Bipolar 1 disorder- continue with seroquel  INPATIENT   >2 Midnights    Admission Orders:   4/18/2017  1836 INPATIENT   Scheduled Meds:   enalaprilat 0 625 mg Intravenous Q6H   insulin lispro 1-5 Units Subcutaneous TID AC   insulin lispro 1-5 Units Subcutaneous HS   metoprolol 5 mg Intravenous Q8H   nicotine 1 patch Transdermal Daily   piperacillin-tazobactam 3 375 g Intravenous Q6H   QUEtiapine 400 mg Oral Daily   QUEtiapine 600 mg Oral HS     Continuous Infusions:   pantoprozole (PROTONIX) infusion (Continuous) 8 mg/hr Last Rate: 8 mg/hr (04/19/17 0452)   sodium chloride 100 mL/hr Last Rate: 100 mL/hr (04/18/17 2200)     PRN Meds: LORazepam    Ondansetron  4 iv prn      OTHER ORDERS: level one code  Fingerstick glucose qid ( glucose 273 at 2010)  scds  Ct abdomen  Stool :  Culture; clostridium difficile  hgb q 4h    - hgb 12 1 at [address] Utilization Review Support Staff  Main Number:  [phone] Main Fax: [phone] Eder connors fax: [phone]  Phone UR Staff Title Assignments Facilities   Opt  1 Renetta ROBERTS [facility] Necessity Denials All Facilities   Opt  2 Diadina F  UR Asst Mom/Baby Admits & Denials [facility]/[facility]ith   Opt  3 Opt  1 Diadina F  UR Asst PT  Last Name A-M & OBS Admits Griffith/ALL   Opt  3 Opt  [address] [place] Asst PT  Last Name N-[facility]  4 Amisha H  UR Asst Medical Patients [place]/[facility]   Opt  5 Laverna Second  UR Asst Medical Patients Pedro/Shin   Opt  [address] [facility] Asst Addt  Clinicals/ Medical Admin  Denials All Facilities   [phone] Arthur Pierre UR Asst Medical Patients Bert/Corey      Please call with any questions or concerns [phone] - Confidential Voicemail

## 2017-04-19 NOTE — PROGRESS NOTES
Progress Note - Carlos Lipoma 61 y o  male MRN: [id number]  Unit/Bed#: -02 Encounter: [id number]    Subjective:   Patient seen and evaluated  Patient complains of intense abdominal pain diffusely  All other ROS are negative  Objective:   Vitals: Blood pressure 124/74, pulse 76, temperature 99 °F (37 2 °C), temperature source Oral, resp  rate 20, weight 81 6 kg (180 lb), SpO2 99 %  ,Body mass index is 22 5 kg/(m^2)  SPO2 RA Rest         ED to Hosp-Admission (Current) from 4/18/2017 in [address] [place] [address] Intensive Care Unit    SpO2  99 %    SpO2 Activity  At Rest    O2 Device  None (Room air)    O2 Flow Rate          I&O:   Intake/Output Summary (Last 24 hours) at 04/19/17 0902  Last data filed at 04/19/17 0201   Gross per 24 hour   Intake                0 ml   Output              300 ml   Net             -300 ml         Physical Exam:    General Appearance:    Alert, cooperative, no distress   Head:    Normocephalic, without obvious abnormality, atraumatic   Eyes:    PERRL, conjunctiva/corneas clear, EOM's intact       Neck:   Supple, symmetrical, trachea midline, no JVD   Lungs:     Clear to auscultation bilaterally, respirations unlabored   Heart:    Regular rate and rhythm, S1 and S2 normal, no murmur, rub   or gallop   Abdomen:  deficiency tender to palpation positive rebound positive guarding    Extremities:  No pedal edema, calf tenderness  Normal ROM in all  extremities  Neurologic:     No gross focal deficits     Invasive Devices     Central Venous Catheter Line            CVC Central Lines 04/18/17 Triple Left Femoral less than 1 day                      Social History  reviewed  Family History   Problem Relation Age of Onset    Family history unknown:  Yes    reviewed    Meds:  Current Facility-Administered Medications   Medication Dose Route Frequency Provider Last Rate Last Dose    enalaprilat (VASOTEC) injection 0 625 mg  0 625 mg Intravenous Q6H Allie Freitas CRNP   0 625 mg at 04/19/17 0524    HYDROmorphone (DILAUDID) 1 mg/mL injection 1 mg  1 mg Intravenous Q6H PRN Anice Sauger, DO        insulin lispro (HumaLOG) 100 units/mL subcutaneous injection 1-5 Units  1-5 Units Subcutaneous TID AC Allie Phadungchevite, CRNP        insulin lispro (HumaLOG) 100 units/mL subcutaneous injection 1-5 Units  1-5 Units Subcutaneous HS Allie Phadungchevite, CRNP   2 Units at 04/19/17 0015    LORazepam (ATIVAN) 2 mg/mL injection 0 5 mg  0 5 mg Intravenous Q4H PRN Allie Phadungchevite, CRNP        metoprolol (LOPRESSOR) injection 5 mg  5 mg Intravenous Q8H Allie Phadungchevite, CRNP   5 mg at 04/19/17 0524    nicotine (NICODERM CQ) 14 mg/24hr TD 24 hr patch 1 patch  1 patch Transdermal Daily Allie Phadungchevite, CRNP   1 patch at 04/19/17 0014    ondansetron (ZOFRAN) injection 4 mg  4 mg Intravenous Q6H PRN Allie Phadungchevite, CRNP   4 mg at 04/19/17 0214    pantoprazole (PROTONIX) 80 mg in sodium chloride 0 9 % 100 mL infusion  8 mg/hr Intravenous Continuous Rosaline Alken, DO 10 mL/hr at 04/19/17 0452 8 mg/hr at 04/19/17 0452    piperacillin-tazobactam (ZOSYN) 3 375 g in sodium chloride 0 9 % 50 mL IVPB  3 375 g Intravenous Q6H Allie Phadungchevite, CRNP 100 mL/hr at 04/19/17 0529 3 375 g at 04/19/17 0529    QUEtiapine (SEROquel) tablet 400 mg  400 mg Oral Daily Allie Phadungchevite, CRNP        QUEtiapine (SEROquel) tablet 600 mg  600 mg Oral HS Allie Phadungchevite, CRNP   600 mg at 04/19/17 0014    sodium chloride 0 9 % infusion  100 mL/hr Intravenous Continuous Allie Phadungchevite, CRNP 100 mL/hr at 04/18/17 2200 100 mL/hr at 04/18/17 2200      Prescriptions Prior to Admission   Medication    insulin glargine (LANTUS) 100 units/mL subcutaneous injection    lisinopril (ZESTRIL) 20 mg tablet    LORazepam (ATIVAN) 1 mg tablet    metoprolol tartrate (LOPRESSOR) 25 mg tablet    ondansetron (ZOFRAN-ODT) 4 mg disintegrating tablet    pantoprazole (PROTONIX) 40 mg tablet    potassium chloride (K-DUR,KLOR-CON) 20 mEq tablet    QUEtiapine (SEROquel) 200 mg tablet    QUEtiapine (SEROquel) 300 mg tablet    torsemide (DEMADEX) 20 mg tablet    warfarin (COUMADIN) 7 5 mg tablet       Labs:    Results from last 7 days  Lab Units 04/19/17  0452 04/18/17  2233 04/18/17 2010 04/18/17  1814   WBC Thousand/uL 16 51*  --   --  23 39*   HEMOGLOBIN g/dL 12 1 13 1  --  16 8   I STAT HEMOGLOBIN g/dl  --   --  14 6  --    HEMATOCRIT % 37 7  --   --  50 9*   PLATELETS Thousands/uL 183  --   --  218   LYMPHO PCT %  --   --   --  5*   MONO PCT MAN %  --   --   --  6   EOSINO PCT MANUAL %  --   --   --  0       Results from last 7 days  Lab Units 04/19/17  0603 04/18/17 2010 04/18/17  1717   SODIUM mmol/L 140  --  130*   POTASSIUM mmol/L 4 1  --  5 3   CHLORIDE mmol/L 105  --  99*   CO2 mmol/L 26  --  18*   BUN mg/dL 18  --  22   CREATININE mg/dL 1 05  --  1 10   CALCIUM mg/dL 8 1*  --  9 6   TOTAL PROTEIN g/dL  --   --  8 9*   BILIRUBIN TOTAL mg/dL  --   --  1 00   ALK PHOS U/L  --   --  160*   ALT U/L  --   --  15   AST U/L  --   --  48*   GLUCOSE RANDOM mg/dL 205*  --  282*   GLUCOSE, ISTAT mg/dl  --  273*  --      Lab Results   Component Value Date    TROPONINI <0 02 04/02/2017    TROPONINI <0 02 04/02/2017       Results from last 7 days  Lab Units 04/18/17 2002   INR  1 30*     Lab Results   Component Value Date    BLOODCX No Growth After 5 Days  04/02/2017    BLOODCX No Growth After 5 Days  04/02/2017       Imaging:  Results for orders placed during the hospital encounter of 04/18/17   XR chest 1 view portable    Narrative CHEST      INDICATION: Pain  GI bleeding  COMPARISON:  4/2/2017    VIEWS:   AP frontal; 1 image    FINDINGS:    Left chest wall related noted  Median sternotomy wires are present  Cardiac prosthesis is noted  The cardiomediastinal silhouette is stable  The lungs are clear  No pleural effusion      Osseous structures are age appropriate  Impression No active disease  Workstation performed: [id number]       No results found for this or any previous visit  VTE Pharmacologic Prophylaxis:none due to coffee ground emesis  VTE Mechanical Prophylaxis: scd    Code Status:   Level 1 - Full Code     Assessment:  Principal Problem:    Upper GI bleed  Active Problems:    Colitis    Hypertension    Bipolar 1 disorder    Diabetes mellitus    CAD (coronary artery disease)    Pacemaker    Chronic systolic congestive heart failure  Resolved Problems:    * No resolved hospital problems  *      Plan:  · Abdominal painpatient had colitis on last image  On exam he appears to have rebound or guarding  I called surgery and made him aware  Stat CT abdomen pelvis  Check lactic acid  GI following  Nothing by mouth  Concern for ischemic colitis, or perf given degree of pain  · -coffe ground emesis- f/u h/h   protonix gtt  Ivf  Zofran  No furthur episodes  Will eventually need egd  But more pressing issue is ab pain  - Patient has sepsis and with a history of colitis patient was started on Zosyn  Check lactic acid IV fluid hydration checks blood cultures and C  Difficile  -Chronic systolic CHF no signs of overload continue gentle fluid  -CAD  -Status post mitral valve replacement-  and cycle regulation was reversed yesterday  Patient is at high risk with the mitral valve   We will do CT there is nothing that she'll need for emergent surgical treatment we will bridge the patient with heparin   - Bipolar cervical  Diabetes insulin sliding scale  Ania Bateman, DO  4/19/2017,9:02 AM

## 2017-04-19 NOTE — CONSULTS
Yuan Person  [id number]    61 y o   male      ASSESSMENT  1  Diffuse, acute abdominal pain, severe,  with associated nausea,  vomiting, ? coffee-ground emesis, diarrhea and leukocytosis  Concern with ischemic colitis vs acute abdomen  Ascending colitis noted on CAT scan performed in the beginning of this month  Exclude C  difficile colitis  Exclude perforation or an acute abdomen  2  History of hematemesis and question of melena  Rectal exam revealed light brown stool  I do not think he is actively bleeding at the present time  Hemoglobin is stable and BUN is not elevated; further supporting that he is not actively bleeding  PLAN  1  Stat CAT scan of the abdomen and pelvis  2  Surgical consult  3  Lactic acid stat  4  Continue Zosyn  5  NPO    Chief Complaint   Patient presents with    Vomiting Blood     High INR, started vomiting dark coffee ground emesis while at daughters house today  HPI   50-year-old male, with a history of DM, CAD, bipolar disorder, CHF, status post mechanical mitral valve, CABG and pacer, who is a poor historian, admitted with recurrent issues with diffuse abdominal pain, nausea, vomiting and diarrhea  He reports coffee-ground emesis and melena that has been on and off for the past few weeks  He was admitted in the beginning of the month for the same  A CAT scan at that time showed ascending colon colitis and he was placed on antibiotics  He left AGAINST MEDICAL ADVICE  He recently moved to this area from [place], and reports that he underwent an upper and lower endoscopy several weeks ago and [place]  Findings are unknown  He denies any history of peptic ulcer disease  On chronic anticoagulation with Coumadin  He reports a weight loss but the amount is unclear  He denies dysphagia, odynophagia, heartburn or bright red blood per rectum      Past Medical History:   Diagnosis Date    Anxiety     Bipolar 1 disorder     CAD (coronary artery disease)     Chronic systolic congestive heart failure 4/18/2017    Colitis 4/2/2017    Diabetes mellitus     Hiatal hernia     Hypertension     Pacemaker        Past Surgical History:   Procedure Laterality Date    MITRAL VALVE REPAIR         Prescriptions Prior to Admission   Medication    insulin glargine (LANTUS) 100 units/mL subcutaneous injection    lisinopril (ZESTRIL) 20 mg tablet    LORazepam (ATIVAN) 1 mg tablet    metoprolol tartrate (LOPRESSOR) 25 mg tablet    ondansetron (ZOFRAN-ODT) 4 mg disintegrating tablet    pantoprazole (PROTONIX) 40 mg tablet    potassium chloride (K-DUR,KLOR-CON) 20 mEq tablet    QUEtiapine (SEROquel) 200 mg tablet    QUEtiapine (SEROquel) 300 mg tablet    torsemide (DEMADEX) 20 mg tablet    warfarin (COUMADIN) 7 5 mg tablet       Allergies   Allergen Reactions    Aspirin Other (See Comments)    Morphine     Omeprazole Diarrhea       Social History   Substance Use Topics    Smoking status: Current Some Day Smoker     Packs/day: 0 50     Types: Cigarettes    Smokeless tobacco: Not on file    Alcohol use No       Family History   Problem Relation Age of Onset    Family history unknown: Yes       Review of Systems  General ROS: +weight loss, fever, night sweats  Psychological ROS: negative for depression or anxiety  Ophthalmic ROS: negative for any eye issues  ENT ROS: no scleral icterus  Hematological and Lymphatic: no issues with bleeding or adenopathy  Respiratory ROS: no chest pain, shortness of breath or cough  Cardiovascular ROS: no issues with chest pain, heart palpitations  Gastrointestinal ROS: no issues with nausea, +vomiting, +diarrhea +abdominal pain  Genito-Urinary ROS: no issues with urinary burning, urinary frequency or hematuria  Neurological ROS:  no asterixis  Dermatological ROS: no skin rashes or lesions        /74  Pulse 76  Temp 99 °F (37 2 °C) (Oral)   Resp 20  Wt 81 6 kg (180 lb)  SpO2 99%  BMI 22 5 kg/m2      Physical Exam     Constitutional: Well developed, well nourished, appears to be very uncomfortable, non-toxic appearance   Eyes:  conjunctiva normal   HENT:  Atraumatic, external ears normal, nose normal, oropharynx moist  Respiratory:  No respiratory distress   Cardiovascular:  Normal rate, normal rhythm  GI:  Soft, nondistended, normal bowel sounds, diffuse abdominal tenderness with rebound and guarding  Musculoskeletal:  No edema  Integument:   no rash   Lymphatic:  No lymphadenopathy noted   Neurologic:  Alert & oriented x 3  Psychiatric:  Speech and behavior appropriate               Lab Results   Component Value Date    GLUCOSE 205 (H) 04/19/2017    INR 1 30 (H) 04/18/2017    CALCIUM 8 1 (L) 04/19/2017     04/19/2017    K 4 1 04/19/2017    CO2 26 04/19/2017     04/19/2017    BUN 18 04/19/2017    CREATININE 1 05 04/19/2017     Lab Results   Component Value Date    WBC 16 51 (H) 04/19/2017    HGB 12 1 04/19/2017    HCT 37 7 04/19/2017    MCV 90 04/19/2017     04/19/2017     Lab Results   Component Value Date    ALT 15 04/18/2017    AST 48 (H) 04/18/2017    ALKPHOS 160 (H) 04/18/2017    BILITOT 1 00 04/18/2017     No results found for: AMYLASE  Lab Results   Component Value Date    LIPASE 85 04/02/2017     No results found for: IRON, TIBC, FERRITIN  Lab Results   Component Value Date    INR 1 30 (H) 04/18/2017           Xr Chest 1 View Portable    Result Date: 4/19/2017  Narrative: CHEST  INDICATION: Pain  GI bleeding  COMPARISON:  4/2/2017 VIEWS:   AP frontal; 1 image FINDINGS: Left chest wall related noted  Median sternotomy wires are present  Cardiac prosthesis is noted  The cardiomediastinal silhouette is stable  The lungs are clear  No pleural effusion  Osseous structures are age appropriate  Impression: No active disease   Workstation performed: [id number]     Xr Chest Portable    Result Date: 4/2/2017  Narrative: CHEST INDICATION:  Vomiting COMPARISON:  None VIEWS:   AP frontal IMAGES:  1 FINDINGS:  Left chest wall cardiac device  Valve prosthesis  Cardiomediastinal silhouette appears unremarkable  Bullous changes in the right lung apex  Minimal interstitial prominence  No consolidation  No pneumothorax or pleural effusion  Visualized osseous structures appear within normal limits for the patient's age  Impression: Bullous changes in the right lung apex  Minimal interstitial prominence  No consolidation  As per comments in the PACS workstation, findings are concordant with preliminary interpretation provided by the emergency room physician  Workstation performed: [id number]     Vas Carotid Complete Study    Result Date: 4/11/2017  Narrative:  [facility] REPORT CLINICAL: Indications:  Bruit  (R09 89) Patient presents for a general health evaluation secondary to other cardiovascular symptoms  Patient is asymptomatic from a cerebral vascular standpoint  Risk Factors The patient has history of HTN, Diabetes (IDDM adult) and hyperlipidemia  Clinical Right Brachial Pressure:  110/60 mmHg, Left Brachial Pressure:  110/60 mmHg  FINDINGS:  Right        Impression  PSV  EDV (cm/s)  Ratio  Dist  ICA                 78          23   0 92  Mid  ICA                 102          34   1 19  Prox  ICA    1 - 49%      79          16   0 93  Dist CCA                  81          25         Mid CCA                   85          23   1 13  Prox CCA                  76          17         Ext Carotid              105          13   1 23  Prox Vert                 75          23         Subclavian               134          10          Left         Impression  PSV  EDV (cm/s)  Ratio  Dist  ICA                 84          31   0 87  Mid  ICA                  82          28   0 84  Prox   ICA    1 - 49%     113          34   1 17  Dist CCA                  87          23         Mid CCA                   97          29   0 90  Prox CCA                 108          23         Ext Carotid              113          17   1 16 Prox Vert                 66          18         Subclavian                68           8            CONCLUSION:  Impression RIGHT: There is <50% stenosis noted in the internal carotid artery  Plaque is heterogenous and irregular  Vertebral artery flow is antegrade  There is no significant subclavian artery disease  LEFT: There is <50% stenosis noted in the internal carotid artery  Plaque is heterogenous and irregular  Vertebral artery flow is antegrade  There is no significant subclavian artery disease  There is no previous study for comparison  Internal carotid artery stenosis determination by consensus criteria from: Lory Rowe et al  Carotid Artery Stenosis: Gray-Scale and Doppler US Diagnosis - Society of Radiologists in [address] [place] [address] [facility], Radiology 2003; [phone]  SIGNATURE: Electronically Signed by: Alex Liu on 2017-04-11 09:01:52 AM    Vas Lower Limb Arterial Duplex, Complete Bilateral    Result Date: 4/11/2017  Narrative:  [facility] REPORT CLINICAL: Indications:  Bruit, Pulse Abnormality [R09 89]  Patient complains of no feeling in his left leg from the knee to the foot with dark discoloration of the ankle and foot  He also complains of leg "heaviness" when he walks and lies down  Risk Factors:  Hypertension, Diabetes (IDDM adult) and Hyperlipidemia  Current smoker  Right Brachial Pressure:  98/ mmHg, Left Brachial Pressure:  106/ mmHg     FINDINGS:  Segment                Right                 Left                                          Impression  PSV  EDV  Impression  PSV  EDV  Common Femoral Artery               85   11              109   17  Prox Profunda                       62    8               91   12  Prox SFA                            88   13  <50%        217   27  Mid SFA                <50%        185   14              107   11  Dist SFA                            83   10              183   19  Proximal Pop                        38    9 42   10  Distal Pop                          51    9               66   10  Prox Post Tibial                    43    8               48   12  Dist Post Tibial                    39    9  50-75%      144   65  Prox  Ant  Tibial                   83   18               64   10  Dist  Ant  Tibial                   68   20               61   16     CONCLUSION: Impression: RIGHT LOWER LIMB: This resting evaluation shows <50 % stenosis within the mid superficial femoral artery  Ankle/Brachial index: 1 04(normal range) PVR/ PPG tracings are normal  Metatarsal pressure of 260mmHg Great toe pressure of 44mmHg, below the healing range for a diabetic LEFT LOWER LIMB: This resting evaluation shows 50 -75 % stenosis within the proximal superficial femoral artery  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  Ankle/Brachial index: 0 60(severe range) PVR/ PPG tracings are normal  Metatarsal pressure of 100mmHg Great toe pressure of 38mmHg, below the healing range for a diabetic  There is no previous study for comparison  Recommend repeat testing in 1year as per protocol unless otherwise indicated  SIGNATURE: Electronically Signed by: Rodrigo Pedroza on 2017-04-11 08:48:49 AM    Vascular Results    Result Date: 4/10/2017  Narrative: Ordered by an unspecified provider  Ct Abdomen Pelvis With Contrast    Result Date: 4/2/2017  Narrative: CT ABDOMEN AND PELVIS WITH IV CONTRAST INDICATION:  Vomiting with diarrhea  Periumbilical pain  Head cold/persistent coughing  COMPARISON: None TECHNIQUE:  CT examination of the abdomen and pelvis was performed  In addition to portal venous phase postcontrast scanning through the abdomen and pelvis, delayed phase postcontrast scanning was performed through the upper abdominal viscera  Reformatted images were created in axial, sagittal, and coronal planes  Radiation dose length product (DLP) for this visit:  922 27 mGy-cm     This examination, like all CT scans performed in the [facility], was performed utilizing techniques to minimize radiation dose exposure, including the use of iterative  reconstruction and automated exposure control  IV Contrast:  100 mL of iohexol (OMNIPAQUE)          Enteric Contrast:  Enteric contrast was not administered  FINDINGS: ABDOMEN LOWER CHEST:  Hypoventilatory changes  LIVER/BILIARY TREE:  Unremarkable  GALLBLADDER:  No calcified gallstones  No pericholecystic inflammatory change  SPLEEN:  Unremarkable  PANCREAS:  Unremarkable  ADRENAL GLANDS:  Unremarkable  KIDNEYS/URETERS:  Scarring right kidney  No nephrolithiasis or hydronephrosis  STOMACH AND BOWEL:  Wall thickening ascending colon potentially represents colitis  APPENDIX:  No findings to suggest appendicitis  ABDOMINOPELVIC CAVITY:  No ascites or free intraperitoneal air  No lymphadenopathy  VESSELS:  Unremarkable for patient's age  PELVIS REPRODUCTIVE ORGANS:  Unremarkable for patient's age  URINARY BLADDER:  Unremarkable  ABDOMINAL WALL/INGUINAL REGIONS:  Unremarkable  OSSEOUS STRUCTURES:  No acute fracture or destructive osseous lesion  Impression: Ascending colon wall thickening suspicious for colitis  Normal appearance of the appendix  Workstation performed: [id number]     Counseling / Coordination of Care  Total floor / unit time spent today 25 minutes  Greater than 50% of total time was spent with the patient and / or family counseling and / or coordination of care  A description of the counseling / coordination of care: 15      Leonard Arreola

## 2017-04-19 NOTE — SOCIAL WORK
Spoke with patient  He was here at [facility] recently  He moved here to [place] from [place] in February 2017  He lives in a camper/trailer on his daughters property  He is independent adl's and ambulation, drives  He is on SSI disability  DME - denies  Past services - [facility] in [place]  Patient plans on returning home at discharge  Not sure if he will need VNA  Will follow

## 2017-04-19 NOTE — CONSULTS
Consultation - General Surgery   Blease Sample 61 y o  male MRN: [id number]  Unit/Bed#: -02 Encounter: [id number]    Assessment/Plan     Assessment:  GI bleed  Colitis  Abdominal pain  Chest pain  Hx MVR  HTN  DM  CHF    Plan:  CT scan  EGD    History of Present Illness   CC: abdominal pain  HPI:  Blease Sample is a 61 y o  male who presents with nausea, vomiting and abdominal pain for 3-4 days  Patient started vomiting cofee grain emesis so he presented to ED  Patient with previous history of GI bleeding   Patient on coumadin for mitral valve repair  Denies fevers, chills, SOB, palpitations, constipation, change in bowel habits  Inpatient consult to Acute Care Surgery  Consult performed by: Dieter Hoff ordered by: Julieth Santo          Review of Systems   Constitutional: Negative for activity change, appetite change, chills, fatigue and fever  HENT: Negative for trouble swallowing and voice change  Respiratory: Negative for cough, choking and chest tightness  Cardiovascular: Positive for chest pain  Negative for palpitations and leg swelling  Gastrointestinal: Positive for abdominal pain, blood in stool, nausea and vomiting  Genitourinary: Negative for dysuria and flank pain  Musculoskeletal: Positive for back pain and joint swelling  Negative for arthralgias  Neurological: Negative for dizziness, syncope, light-headedness and headaches  Psychiatric/Behavioral: Positive for agitation  Negative for behavioral problems         Historical Information   Past Medical History:   Diagnosis Date    Anxiety     Bipolar 1 disorder     CAD (coronary artery disease)     Chronic systolic congestive heart failure 4/18/2017    Colitis 4/2/2017    Diabetes mellitus     Hiatal hernia     Hypertension     Pacemaker      Past Surgical History:   Procedure Laterality Date    MITRAL VALVE REPAIR       Social History   History   Alcohol Use No     History   Drug Use No History   Smoking Status    Current Some Day Smoker    Packs/day: 0 50    Types: Cigarettes   Smokeless Tobacco    Not on file     Family History: non-contributory    Meds/Allergies   all current active meds have been reviewed  Allergies   Allergen Reactions    Aspirin Other (See Comments)    Morphine     Omeprazole Diarrhea       Objective   First Vitals:   Blood Pressure: 141/99 (04/18/17 1516)  Pulse: 100 (04/18/17 1522)  Temperature: (!) 96 °F (35 6 °C) (04/18/17 1516)  Temp Source: Tympanic (04/18/17 1516)  Respirations: (!) 24 (04/18/17 1516)  Weight - Scale: 81 6 kg (180 lb) (04/18/17 1516)  SpO2: 100 % (04/18/17 1522)    Current Vitals:   Blood Pressure: 123/69 (04/19/17 1135)  Pulse: 83 (04/19/17 1135)  Temperature: 97 6 °F (36 4 °C) (04/19/17 1038)  Temp Source: Oral (04/19/17 0555)  Respirations: (!) 39 (04/19/17 1135)  Weight - Scale: 81 6 kg (180 lb) (04/18/17 1516)  SpO2: 100 % (04/19/17 1038)      Intake/Output Summary (Last 24 hours) at 04/19/17 1221  Last data filed at 04/19/17 0201   Gross per 24 hour   Intake                0 ml   Output              300 ml   Net             -300 ml       Invasive Devices     Central Venous Catheter Line            CVC Central Lines 04/18/17 Triple Left Femoral less than 1 day                Physical Exam   Constitutional: He is oriented to person, place, and time  He appears well-developed and well-nourished  HENT:   Head: Normocephalic and atraumatic  Eyes: Conjunctivae are normal    Neck: Normal range of motion  Neck supple  Cardiovascular: Normal rate and regular rhythm  Pulmonary/Chest: Effort normal and breath sounds normal    Abdominal: Soft  He exhibits no distension  There is tenderness  There is no guarding  Musculoskeletal: Normal range of motion  Neurological: He is alert and oriented to person, place, and time  Skin: Skin is warm and dry  Lab Results: I have personally reviewed pertinent lab results      Imaging: I have personally reviewed pertinent reports  EKG, Pathology, and Other Studies: I have personally reviewed pertinent reports  Counseling / Coordination of Care  Total floor / unit time spent today 5 minutes

## 2017-04-19 NOTE — H&P
H&P Exam - Jerrod Doctor 61 y o  male MRN: [id number]    Unit/Bed#: -02 Encounter: [id number]      Assessment/Plan     Assessment:    Principal Problem:    Upper GI bleed  Active Problems:    Colitis    Chronic systolic congestive heart failure    Hypertension    Bipolar 1 disorder    Diabetes mellitus    CAD (coronary artery disease)    Pacemaker      Plan:  · Will admit patient to ICU   · UGIB- with evidence of coffee ground emesis in ED but had brown material vomitus on the unit  Hgb was 16s on admission which is likely 2/2 to dehydration  Repeated Hbg was 13s  BUN is 22  I am not quite sure if the patient is having an on-going UGIB  However, will need to rule out any gastric/duodenal ulcer bleed, Shyanne-Perez tear as he is on Coumadin  Will trend hgb q4hr  Type and screen  IVF  Pantoprazole IV  Will consult GI  Keep NPO except ice chips as tolerated  Repeat INR in AM  Patient received KCentra and Vitamin K in ED  Monitor closely for any on-going bleed  Check stool for occult blood  · Recent history of colitis- patient left AMA during the last admission 4/2/17  Continue to have abdominal pain and subjective diarrhea will check CT a/p with contrast in AM  Start on Zosyn  If diarrhea will obtain stool for C diff and culture  · Chronic systolic HF- appears dehydrated  Will give gentle IVF  Strict I&Os  Daily weight  Monitor for fluid overload  · CAD with pacemaker and mitral valve replacement with mechanical valve; on chronic coumadin- will hold lisinopril and torsemide for now  Will start Vasotec while NPO  · DM 2- hold lantus for now  Continue with SSI  · Bipolar 1 disorder- continue with seroquel  INPATIENT   >2 Midnights    History of Present Illness     HPI:  Jerrod Doctor is a 61 y o  male who presents with abdominal pain, nausea and vomiting  Patient is a poor historian  Patient was recently discharged from [facility] after being admitted for acute colitis   He actually left AMA but stated that he was discharged without any instruction  He states that he has been having these symptoms on/off since discharged  He has had blood BMs with diarrhea and vomiting coffee ground emesis  He has been taking a 30-day supply of antibiotic which he does know the name  He had an episode of coffee ground and bright red emesis in ED combined with diaphoresis and shaky  He has history of CABG with mechanical MVR and is on chronic coumadin  Denies alcohol use  Review of Systems   Constitutional: Positive for chills  HENT: Negative  Eyes: Negative  Respiratory: Negative  Gastrointestinal: Positive for abdominal pain, blood in stool, diarrhea (with blood red blood in stools), nausea and vomiting (dark/coffee ground emesis )  Endocrine: Negative  Genitourinary: Negative  Musculoskeletal: Positive for myalgias  Skin: Negative  Allergic/Immunologic: Negative  Neurological: Positive for weakness  Hematological: Negative  Psychiatric/Behavioral: Negative          Historical Information   Past Medical History:   Diagnosis Date    Anxiety     Bipolar 1 disorder     CAD (coronary artery disease)     Chronic systolic congestive heart failure 4/18/2017    Colitis 4/2/2017    Diabetes mellitus     Hiatal hernia     Hypertension     Pacemaker      Past Surgical History:   Procedure Laterality Date    MITRAL VALVE REPAIR       Social History   History   Alcohol Use No     History   Drug Use No     History   Smoking Status    Current Some Day Smoker    Packs/day: 0 50    Types: Cigarettes   Smokeless Tobacco    Not on file     Family History: non-contributory    Meds/Allergies   Prescriptions Prior to Admission   Medication    insulin glargine (LANTUS) 100 units/mL subcutaneous injection    lisinopril (ZESTRIL) 20 mg tablet    LORazepam (ATIVAN) 1 mg tablet    metoprolol tartrate (LOPRESSOR) 25 mg tablet    ondansetron (ZOFRAN-ODT) 4 mg disintegrating tablet    pantoprazole (PROTONIX) 40 mg tablet    potassium chloride (K-DUR,KLOR-CON) 20 mEq tablet    QUEtiapine (SEROquel) 200 mg tablet    QUEtiapine (SEROquel) 300 mg tablet    torsemide (DEMADEX) 20 mg tablet    warfarin (COUMADIN) 7 5 mg tablet     Allergies   Allergen Reactions    Aspirin Other (See Comments)    Morphine     Omeprazole Diarrhea       Objective   Vitals: Blood pressure 155/74, pulse 86, temperature 98 6 °F (37 °C), temperature source Tympanic, resp  rate 18, weight 81 6 kg (180 lb), SpO2 98 %  Intake/Output Summary (Last 24 hours) at 04/19/17 0413  Last data filed at 04/19/17 0201   Gross per 24 hour   Intake                0 ml   Output              300 ml   Net             -300 ml       Invasive Devices     Central Venous Catheter Line            CVC Central Lines 04/18/17 Triple Left Femoral less than 1 day                Physical Exam   Constitutional: He is oriented to person, place, and time  He appears well-developed  No distress  Frail appearing    HENT:   Head: Normocephalic and atraumatic  Mucosa dry      Eyes: Conjunctivae and EOM are normal  Pupils are equal, round, and reactive to light  Neck: Normal range of motion  Neck supple  No JVD present  Cardiovascular: Normal rate, regular rhythm and intact distal pulses  Mechanical click appreciated     Pulmonary/Chest: Effort normal and breath sounds normal  He has no wheezes  He has no rales  Abdominal: Soft  Bowel sounds are normal  He exhibits no distension and no mass  There is tenderness (generalized )  There is no rebound and no guarding  Musculoskeletal: Normal range of motion  He exhibits no edema or deformity  Neurological: He is alert and oriented to person, place, and time  He has normal reflexes  Skin: Skin is warm and dry     Discoloration BLEs     Psychiatric: His speech is normal  Thought content normal  Cognition and memory are normal          Results from last 7 days  Lab Units 04/18/17  2233  04/18/17  1815 WBC Thousand/uL  --   --  23 39*   HEMOGLOBIN g/dL 13 1  --  16 8   I STAT HEMOGLOBIN   --   < >  --    HEMATOCRIT %  --   --  50 9*   PLATELETS Thousands/uL  --   --  218   < > = values in this interval not displayed  Results from last 7 days  Lab Units 04/18/17 2010 04/18/17  1717   SODIUM mmol/L  --  130*   POTASSIUM mmol/L  --  5 3   CHLORIDE mmol/L  --  99*   CO2 mmol/L  --  18*   BUN mg/dL  --  22   CREATININE mg/dL  --  1 10   CALCIUM mg/dL  --  9 6   TOTAL PROTEIN g/dL  --  8 9*   BILIRUBIN TOTAL mg/dL  --  1 00   ALK PHOS U/L  --  160*   ALT U/L  --  15   AST U/L  --  48*   GLUCOSE RANDOM mg/dL  --  282*   GLUCOSE, ISTAT mg/dl 273*  --      No results found for: TROPONINT      BNP: No results found for: BNP    0  Lab Value Date/Time   LIPASE 85 04/02/2017 2046       Xr Chest Portable    Result Date: 4/2/2017  Narrative: CHEST INDICATION:  Vomiting COMPARISON:  None VIEWS:   AP frontal IMAGES:  1 FINDINGS:  Left chest wall cardiac device  Valve prosthesis  Cardiomediastinal silhouette appears unremarkable  Bullous changes in the right lung apex  Minimal interstitial prominence  No consolidation  No pneumothorax or pleural effusion  Visualized osseous structures appear within normal limits for the patient's age  Impression: Bullous changes in the right lung apex  Minimal interstitial prominence  No consolidation  As per comments in the PACS workstation, findings are concordant with preliminary interpretation provided by the emergency room physician  Workstation performed: [id number]     Vas Carotid Complete Study    Result Date: 4/11/2017  Narrative:  [facility] REPORT CLINICAL: Indications:  Bruit  (R09 89) Patient presents for a general health evaluation secondary to other cardiovascular symptoms  Patient is asymptomatic from a cerebral vascular standpoint  Risk Factors The patient has history of HTN, Diabetes (IDDM adult) and hyperlipidemia   Clinical Right Brachial Pressure: 110/60 mmHg, Left Brachial Pressure:  110/60 mmHg  FINDINGS:  Right        Impression  PSV  EDV (cm/s)  Ratio  Dist  ICA                 78          23   0 92  Mid  ICA                 102          34   1 19  Prox  ICA    1 - 49%      79          16   0 93  Dist CCA                  81          25         Mid CCA                   85          23   1 13  Prox CCA                  76          17         Ext Carotid              105          13   1 23  Prox Vert                 75          23         Subclavian               134          10          Left         Impression  PSV  EDV (cm/s)  Ratio  Dist  ICA                 84          31   0 87  Mid  ICA                  82          28   0 84  Prox  ICA    1 - 49%     113          34   1 17  Dist CCA                  87          23         Mid CCA                   97          29   0 90  Prox CCA                 108          23         Ext Carotid              113          17   1 16  Prox Vert                 66          18         Subclavian                68           8            CONCLUSION:  Impression RIGHT: There is <50% stenosis noted in the internal carotid artery  Plaque is heterogenous and irregular  Vertebral artery flow is antegrade  There is no significant subclavian artery disease  LEFT: There is <50% stenosis noted in the internal carotid artery  Plaque is heterogenous and irregular  Vertebral artery flow is antegrade  There is no significant subclavian artery disease  There is no previous study for comparison  Internal carotid artery stenosis determination by consensus criteria from: Kalyan Pathak et al  Carotid Artery Stenosis: Gray-Scale and Doppler US Diagnosis - Society of Radiologists in [address] [place] [address], Radiology 2003; [phone]    SIGNATURE: Electronically Signed by: Charlene Cuevas on 2017-04-11 09:01:52 AM    Vas Lower Limb Arterial Duplex, Complete Bilateral    Result Date: 4/11/2017  Narrative:  [address] REPORT CLINICAL: Indications:  Bruit, Pulse Abnormality [R09 89]  Patient complains of no feeling in his left leg from the knee to the foot with dark discoloration of the ankle and foot  He also complains of leg "heaviness" when he walks and lies down  Risk Factors:  Hypertension, Diabetes (IDDM adult) and Hyperlipidemia  Current smoker  Right Brachial Pressure:  98/ mmHg, Left Brachial Pressure:  106/ mmHg  FINDINGS:  Segment                Right                 Left                                          Impression  PSV  EDV  Impression  PSV  EDV  Common Femoral Artery               85   11              109   17  Prox Profunda                       62    8               91   12  Prox SFA                            88   13  <50%        217   27  Mid SFA                <50%        185   14              107   11  Dist SFA                            83   10              183   19  Proximal Pop                        38    9               42   10  Distal Pop                          51    9               66   10  Prox Post Tibial                    43    8               48   12  Dist Post Tibial                    39    9  50-75%      144   65  Prox  Ant  Tibial                   83   18               64   10  Dist  Ant  Tibial                   68   20               61   16     CONCLUSION: Impression: RIGHT LOWER LIMB: This resting evaluation shows <50 % stenosis within the mid superficial femoral artery  Ankle/Brachial index: 1 04(normal range) PVR/ PPG tracings are normal  Metatarsal pressure of 260mmHg Great toe pressure of 44mmHg, below the healing range for a diabetic LEFT LOWER LIMB: This resting evaluation shows 50 -75 % stenosis within the proximal superficial femoral artery  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   Ankle/Brachial index: 0 60(severe range) PVR/ PPG tracings are normal  Metatarsal pressure of 100mmHg Great toe pressure of 38mmHg, below the healing range for a diabetic  There is no previous study for comparison  Recommend repeat testing in 1year as per protocol unless otherwise indicated  SIGNATURE: Electronically Signed by: Nabor Irizarry on 2017-04-11 08:48:49 AM    Vascular Results    Result Date: 4/10/2017  Narrative: Ordered by an unspecified provider  Ct Abdomen Pelvis With Contrast    Result Date: 4/2/2017  Narrative: CT ABDOMEN AND PELVIS WITH IV CONTRAST INDICATION:  Vomiting with diarrhea  Periumbilical pain  Head cold/persistent coughing  COMPARISON: None TECHNIQUE:  CT examination of the abdomen and pelvis was performed  In addition to portal venous phase postcontrast scanning through the abdomen and pelvis, delayed phase postcontrast scanning was performed through the upper abdominal viscera  Reformatted images were created in axial, sagittal, and coronal planes  Radiation dose length product (DLP) for this visit:  922 27 mGy-cm   This examination, like all CT scans performed in the [facility], was performed utilizing techniques to minimize radiation dose exposure, including the use of iterative  reconstruction and automated exposure control  IV Contrast:  100 mL of iohexol (OMNIPAQUE)          Enteric Contrast:  Enteric contrast was not administered  FINDINGS: ABDOMEN LOWER CHEST:  Hypoventilatory changes  LIVER/BILIARY TREE:  Unremarkable  GALLBLADDER:  No calcified gallstones  No pericholecystic inflammatory change  SPLEEN:  Unremarkable  PANCREAS:  Unremarkable  ADRENAL GLANDS:  Unremarkable  KIDNEYS/URETERS:  Scarring right kidney  No nephrolithiasis or hydronephrosis  STOMACH AND BOWEL:  Wall thickening ascending colon potentially represents colitis  APPENDIX:  No findings to suggest appendicitis  ABDOMINOPELVIC CAVITY:  No ascites or free intraperitoneal air  No lymphadenopathy  VESSELS:  Unremarkable for patient's age  PELVIS REPRODUCTIVE ORGANS:  Unremarkable for patient's age  URINARY BLADDER:  Unremarkable   ABDOMINAL WALL/INGUINAL REGIONS:  Unremarkable  OSSEOUS STRUCTURES:  No acute fracture or destructive osseous lesion  Impression: Ascending colon wall thickening suspicious for colitis  Normal appearance of the appendix  Workstation performed: [id number]       Lab Results: I have personally reviewed pertinent reports  Imaging: I have personally reviewed pertinent films in PACS  EKG, Pathology, and Other Studies: I have personally reviewed pertinent reports  VTE Prophylaxis: Sequential compression device (Venodyne)     Code Status: Level 1 - Full Code    Counseling / Coordination of Care  Total floor / unit time spent today 45 minutes  Greater than 50% of total time was spent with the patient and / or family counseling and / or coordination of care

## 2017-04-19 NOTE — PROGRESS NOTES
Called to evaluate the patient for chest pain  He is having substernal chest pain  EKG shows T-wave inversions in lateral leads but these are unchanged from prior  The patient has mitral valve replacement and should be bridged with heparin anyway so we will cover for possible ACS and also bridge for the mitral valve with heparin anti-coagulation  Treatment with aspirin and beta blocker statin  Nitroglycerin when necessary O2 and when necessary morphine serial troponins monitored on telemetry  Resume diuresis  Check echo

## 2017-04-19 NOTE — PLAN OF CARE

## 2017-04-20 ENCOUNTER — ANESTHESIA (INPATIENT)
Dept: PERIOP | Facility: HOSPITAL | Age: 57
DRG: 243 | End: 2017-04-20
Payer: COMMERCIAL

## 2017-04-20 ENCOUNTER — ANESTHESIA EVENT (INPATIENT)
Dept: PERIOP | Facility: HOSPITAL | Age: 57
DRG: 243 | End: 2017-04-20
Payer: COMMERCIAL

## 2017-04-20 ENCOUNTER — GENERIC CONVERSION - ENCOUNTER (OUTPATIENT)
Dept: OTHER | Facility: OTHER | Age: 57
End: 2017-04-20

## 2017-04-20 PROBLEM — K22.10 EROSIVE ESOPHAGITIS: Status: ACTIVE | Noted: 2017-04-18

## 2017-04-20 LAB
ANION GAP SERPL CALCULATED.3IONS-SCNC: 9 MMOL/L (ref 4–13)
APTT PPP: 26 SECONDS (ref 24–36)
APTT PPP: 47 SECONDS (ref 24–36)
APTT PPP: 79 SECONDS (ref 24–36)
BUN SERPL-MCNC: 13 MG/DL (ref 5–25)
CALCIUM SERPL-MCNC: 8.4 MG/DL (ref 8.3–10.1)
CHLORIDE SERPL-SCNC: 105 MMOL/L (ref 100–108)
CO2 SERPL-SCNC: 26 MMOL/L (ref 21–32)
CREAT SERPL-MCNC: 1.01 MG/DL (ref 0.6–1.3)
ERYTHROCYTE [DISTWIDTH] IN BLOOD BY AUTOMATED COUNT: 13.8 % (ref 11.6–15.1)
GFR SERPL CREATININE-BSD FRML MDRD: >60 ML/MIN/1.73SQ M
GLUCOSE SERPL-MCNC: 128 MG/DL (ref 65–140)
GLUCOSE SERPL-MCNC: 153 MG/DL (ref 65–140)
GLUCOSE SERPL-MCNC: 159 MG/DL (ref 65–140)
GLUCOSE SERPL-MCNC: 164 MG/DL (ref 65–140)
GLUCOSE SERPL-MCNC: 168 MG/DL (ref 65–140)
GLUCOSE SERPL-MCNC: 174 MG/DL (ref 65–140)
GLUCOSE SERPL-MCNC: 216 MG/DL (ref 65–140)
HCT VFR BLD AUTO: 35 % (ref 36.5–49.3)
HGB BLD-MCNC: 11.4 G/DL (ref 12–17)
MCH RBC QN AUTO: 29.5 PG (ref 26.8–34.3)
MCHC RBC AUTO-ENTMCNC: 32.6 G/DL (ref 31.4–37.4)
MCV RBC AUTO: 91 FL (ref 82–98)
PLATELET # BLD AUTO: 149 THOUSANDS/UL (ref 149–390)
PMV BLD AUTO: 12.8 FL (ref 8.9–12.7)
POTASSIUM SERPL-SCNC: 3.4 MMOL/L (ref 3.5–5.3)
RBC # BLD AUTO: 3.86 MILLION/UL (ref 3.88–5.62)
SODIUM SERPL-SCNC: 140 MMOL/L (ref 136–145)
WBC # BLD AUTO: 10.05 THOUSAND/UL (ref 4.31–10.16)

## 2017-04-20 PROCEDURE — 0DB58ZX EXCISION OF ESOPHAGUS, VIA NATURAL OR ARTIFICIAL OPENING ENDOSCOPIC, DIAGNOSTIC: ICD-10-PCS | Performed by: INTERNAL MEDICINE

## 2017-04-20 PROCEDURE — 0DB68ZX EXCISION OF STOMACH, VIA NATURAL OR ARTIFICIAL OPENING ENDOSCOPIC, DIAGNOSTIC: ICD-10-PCS | Performed by: INTERNAL MEDICINE

## 2017-04-20 PROCEDURE — 87077 CULTURE AEROBIC IDENTIFY: CPT | Performed by: INTERNAL MEDICINE

## 2017-04-20 PROCEDURE — 85027 COMPLETE CBC AUTOMATED: CPT | Performed by: HOSPITALIST

## 2017-04-20 PROCEDURE — 85730 THROMBOPLASTIN TIME PARTIAL: CPT | Performed by: NURSE PRACTITIONER

## 2017-04-20 PROCEDURE — 88305 TISSUE EXAM BY PATHOLOGIST: CPT | Performed by: NURSE PRACTITIONER

## 2017-04-20 PROCEDURE — 85730 THROMBOPLASTIN TIME PARTIAL: CPT | Performed by: HOSPITALIST

## 2017-04-20 PROCEDURE — 88312 SPECIAL STAINS GROUP 1: CPT | Performed by: NURSE PRACTITIONER

## 2017-04-20 PROCEDURE — 80048 BASIC METABOLIC PNL TOTAL CA: CPT | Performed by: HOSPITALIST

## 2017-04-20 PROCEDURE — 82948 REAGENT STRIP/BLOOD GLUCOSE: CPT

## 2017-04-20 PROCEDURE — C9113 INJ PANTOPRAZOLE SODIUM, VIA: HCPCS | Performed by: INTERNAL MEDICINE

## 2017-04-20 PROCEDURE — C9113 INJ PANTOPRAZOLE SODIUM, VIA: HCPCS | Performed by: EMERGENCY MEDICINE

## 2017-04-20 RX ORDER — PANTOPRAZOLE SODIUM 40 MG/1
40 INJECTION, POWDER, FOR SOLUTION INTRAVENOUS EVERY 12 HOURS SCHEDULED
Status: DISCONTINUED | OUTPATIENT
Start: 2017-04-20 | End: 2017-04-21

## 2017-04-20 RX ORDER — PROPOFOL 10 MG/ML
INJECTION, EMULSION INTRAVENOUS AS NEEDED
Status: DISCONTINUED | OUTPATIENT
Start: 2017-04-20 | End: 2017-04-20 | Stop reason: SURG

## 2017-04-20 RX ORDER — WARFARIN SODIUM 5 MG/1
5 TABLET ORAL
Status: COMPLETED | OUTPATIENT
Start: 2017-04-20 | End: 2017-04-20

## 2017-04-20 RX ADMIN — PROPOFOL 20 MG: 10 INJECTION, EMULSION INTRAVENOUS at 14:28

## 2017-04-20 RX ADMIN — PANTOPRAZOLE SODIUM 40 MG: 40 INJECTION, POWDER, FOR SOLUTION INTRAVENOUS at 21:36

## 2017-04-20 RX ADMIN — HEPARIN SODIUM AND DEXTROSE 14 UNITS/KG/HR: 10000; 5 INJECTION INTRAVENOUS at 17:44

## 2017-04-20 RX ADMIN — PIPERACILLIN SODIUM,TAZOBACTAM SODIUM 3.38 G: 3; .375 INJECTION, POWDER, FOR SOLUTION INTRAVENOUS at 16:40

## 2017-04-20 RX ADMIN — PROPOFOL 80 MG: 10 INJECTION, EMULSION INTRAVENOUS at 14:26

## 2017-04-20 RX ADMIN — INSULIN LISPRO 1 UNITS: 100 INJECTION, SOLUTION INTRAVENOUS; SUBCUTANEOUS at 16:17

## 2017-04-20 RX ADMIN — HEPARIN SODIUM 2000 UNITS: 1000 INJECTION, SOLUTION INTRAVENOUS; SUBCUTANEOUS at 00:48

## 2017-04-20 RX ADMIN — ASPIRIN 325 MG ORAL TABLET 325 MG: 325 PILL ORAL at 08:38

## 2017-04-20 RX ADMIN — QUETIAPINE FUMARATE 400 MG: 200 TABLET ORAL at 08:39

## 2017-04-20 RX ADMIN — METOPROLOL TARTRATE 25 MG: 25 TABLET ORAL at 21:36

## 2017-04-20 RX ADMIN — WARFARIN SODIUM 5 MG: 5 TABLET ORAL at 17:44

## 2017-04-20 RX ADMIN — METOPROLOL TARTRATE 25 MG: 25 TABLET ORAL at 08:39

## 2017-04-20 RX ADMIN — PROPOFOL 30 MG: 10 INJECTION, EMULSION INTRAVENOUS at 14:27

## 2017-04-20 RX ADMIN — QUETIAPINE FUMARATE 600 MG: 300 TABLET, FILM COATED ORAL at 21:36

## 2017-04-20 RX ADMIN — PROPOFOL 100 MG: 10 INJECTION, EMULSION INTRAVENOUS at 14:25

## 2017-04-20 RX ADMIN — SODIUM CHLORIDE 100 ML/HR: 0.9 INJECTION, SOLUTION INTRAVENOUS at 08:46

## 2017-04-20 RX ADMIN — PIPERACILLIN SODIUM,TAZOBACTAM SODIUM 3.38 G: 3; .375 INJECTION, POWDER, FOR SOLUTION INTRAVENOUS at 05:34

## 2017-04-20 RX ADMIN — SODIUM CHLORIDE 8 MG/HR: 9 INJECTION, SOLUTION INTRAVENOUS at 00:52

## 2017-04-20 RX ADMIN — ATORVASTATIN CALCIUM 80 MG: 40 TABLET, FILM COATED ORAL at 16:18

## 2017-04-20 RX ADMIN — TORSEMIDE 10 MG: 10 TABLET ORAL at 08:39

## 2017-04-20 RX ADMIN — NICOTINE 1 PATCH: 14 PATCH, EXTENDED RELEASE TRANSDERMAL at 08:41

## 2017-04-20 NOTE — PROGRESS NOTES
Progress Note - General Surgery   Renata Helton 61 y o  male MRN: [id number]  Unit/Bed#: -02 Encounter: [id number]    Assessment:  GI bleed  Chest pain  Abdominal pain, resolved  HTN  DM  CHF    Plan:  Surgically stable , will follow as needed  For EGD today    Subjective/Objective   Chief Complaint: abdominal pain, N/V    Subjective: denies pain, no further vomiting or nausea  Objective:     Blood pressure 135/78, pulse 67, temperature 98 3 °F (36 8 °C), resp  rate 16, weight 81 6 kg (180 lb), SpO2 100 %  ,Body mass index is 22 5 kg/(m^2)  Intake/Output Summary (Last 24 hours) at 04/20/17 1032  Last data filed at 04/20/17 6485   Gross per 24 hour   Intake                0 ml   Output             1300 ml   Net            -1300 ml       Invasive Devices     Central Venous Catheter Line            CVC Central Lines 04/18/17 Triple Left Femoral 1 day                Physical Exam: General appearance: alert, appears stated age and cooperative  Lungs: clear to auscultation bilaterally  Heart: regular rate and rhythm, S1, S2 normal, no murmur, click, rub or gallop  Abdomen: soft, non-tender; bowel sounds normal; no masses,  no organomegaly  Extremities: extremities normal, atraumatic, no cyanosis or edema    Lab, Imaging and other studies:I have personally reviewed pertinent lab results      VTE Pharmacologic Prophylaxis: Heparin  VTE Mechanical Prophylaxis: sequential compression device

## 2017-04-20 NOTE — ASSESSMENT & PLAN NOTE
Plan: Case discussed with GI  Okay to start anticoagulation   We'll need twice a day PPI once discharged as well as GI follow-up on outpatient basis

## 2017-04-20 NOTE — SOCIAL WORK
Received call from patients daughter Tasia Rubio  She has many concerns with her father returning home  She states that he is bipolar and has mood swings  She has arranged for an appointment at [facility] on Tuesday 4/25  She has applied for BCT, as patient has no car  She feels overwhelmed with caring for patient since he arrived on her doorstep two months ago from [place]  Discussed assisted living, low income housing and homeless shelters  She states that he only receives $450/month from SSI disability  She is agreeable to him returning to his camper/trailer on her property at discharge and would like [facility]NA  Given freedom of choice  Referral to [facility]/home PT FLORES jimenez  She has concerns about the diet that patient follows at home  Will ask dietician to speak with patient

## 2017-04-20 NOTE — ASSESSMENT & PLAN NOTE
Plan: Currently compensated  Case discussed with cardiology due to history of poor ejection fraction mechanical mitral valve patient will need heparin bridging to Coumadin

## 2017-04-20 NOTE — PROGRESS NOTES
Progress Note - Kisha Eastman 61 y o  male MRN: [id number]    Unit/Bed#: [facility] 217-02 Encounter: [id number]        * Erosive esophagitis  Assessment & Plan   Plan: Case discussed with GI  Okay to start anticoagulation  We'll need twice a day PPI once discharged as well as GI follow-up on outpatient basis    Colitis  Assessment & Plan   Plan: Clinically resolved  No further workup will DC antibiotics    Diabetes mellitus  Assessment & Plan   Plan: Continue treatment  Fingersticks reviewed  Chronic systolic congestive heart failure  Assessment & Plan   Plan: Currently compensated  Case discussed with cardiology due to history of poor ejection fraction mechanical mitral valve patient will need heparin bridging to Coumadin  VTE Pharmacologic Prophylaxis:   Pharmacologic: Heparin Drip  Mechanical VTE Prophylaxis in Place: Yes    Patient Centered Rounds: I have performed bedside rounds with nursing staff today  Discussions with Specialists or Other Care Team Provider:  case discussed with cardiology and GI    Education and Discussions with Family / Patient:     Time Spent for Care: 30 minutes  More than 50% of total time spent on counseling and coordination of care as described above  Current Length of Stay: 2 day(s)    Current Patient Status: Inpatient   Certification Statement: The patient will continue to require additional inpatient hospital stay due to need for IV heparin    Discharge Ppatient requires heparin bridging  Code Status: Level 1 - Full Code      Subjective:   I feel fine    Objective:     Vitals:   Temp (24hrs), Av 1 °F (36 7 °C), Min:96 9 °F (36 1 °C), Max:98 6 °F (37 °C)    HR:  [58-74] 69  Resp:  [16-20] 20  BP: ()/(49-91) 155/91  SpO2:  [97 %-100 %] 100 %  Body mass index is 22 5 kg/(m^2)  Input and Output Summary (last 24 hours):        Intake/Output Summary (Last 24 hours) at 17 1721  Last data filed at 17 1435   Gross per 24 hour   Intake 100 ml   Output             1300 ml   Net            -1200 ml       Physical Exam:     Physical Exam   Constitutional: He is oriented to person, place, and time  No distress  Cardiovascular: Normal rate  Exam reveals no gallop and no friction rub  No murmur heard  Pulmonary/Chest: No respiratory distress  He has no wheezes  He has no rales  Abdominal: Soft  He exhibits no distension  There is no tenderness  There is no rebound and no guarding  Neurological: He is alert and oriented to person, place, and time  Skin: He is not diaphoretic  Additional Data:     Labs:      Results from last 7 days  Lab Units 04/20/17  0540  04/18/17  1814   WBC Thousand/uL 10 05  < > 23 39*   HEMOGLOBIN g/dL 11 4*  < > 16 8   I STAT HEMOGLOBIN   --   < >  --    HEMATOCRIT % 35 0*  < > 50 9*   PLATELETS Thousands/uL 149  < > 218   LYMPHO PCT %  --   --  5*   MONO PCT MAN %  --   --  6   EOSINO PCT MANUAL %  --   --  0   < > = values in this interval not displayed  Results from last 7 days  Lab Units 04/20/17  0540  04/18/17  1717   SODIUM mmol/L 140  < > 130*   POTASSIUM mmol/L 3 4*  < > 5 3   CHLORIDE mmol/L 105  < > 99*   CO2 mmol/L 26  < > 18*   BUN mg/dL 13  < > 22   CREATININE mg/dL 1 01  < > 1 10   CALCIUM mg/dL 8 4  < > 9 6   TOTAL PROTEIN g/dL  --   --  8 9*   BILIRUBIN TOTAL mg/dL  --   --  1 00   ALK PHOS U/L  --   --  160*   ALT U/L  --   --  15   AST U/L  --   --  48*   GLUCOSE RANDOM mg/dL 153*  < > 282*   GLUCOSE, ISTAT   --   < >  --    < > = values in this interval not displayed  Results from last 7 days  Lab Units 04/18/17 2002   INR  1 30*       * I Have Reviewed All Lab Data Listed Above  * Additional Pertinent Lab Tests Reviewed:  All Labs Within Last 24 Hours Reviewed    Imaging:    Imaging Reports Reviewed Today Include:   Imaging Personally Reviewed by Myself Includes:      Recent Cultures (last 7 days):           Last 24 Hours Medication List:     aspirin 325 mg Oral Daily   atorvastatin 80 mg Oral Daily With Dinner   insulin lispro 1-5 Units Subcutaneous TID AC   insulin lispro 1-5 Units Subcutaneous Q6H   metoprolol tartrate 25 mg Oral Q12H LUKE   nicotine 1 patch Transdermal Daily   pantoprazole 40 mg Intravenous Q12H LUKE   QUEtiapine 400 mg Oral Daily   QUEtiapine 600 mg Oral HS   torsemide 10 mg Oral Daily   warfarin 5 mg Oral Once (warfarin)        Today, Patient Was Seen By: Carmita Tma MD    ** Please Note: Dragon 360 Dictation voice to text software may have been used in the creation of this document   **

## 2017-04-20 NOTE — OP NOTE
**** GI/ENDOSCOPY REPORT ****     PATIENT NAME: MAYCO PRINCE - VISIT ID:  Patient ID: [id number]   YOB: 1960     INTRODUCTION: Esophagogastroduodenoscopy - A 61 male patient presents for   an inpatient Esophagogastroduodenoscopy at [address] [place] [address]  INDICATIONS: Nausea  Recent hematemesis  Abdominal pain  CONSENT: The benefits, risks, and alternatives to the procedure were   discussed and informed consent was obtained from the patient  PREPARATION:  EKG, pulse, pulse oximetry and blood pressure were monitored   throughout the procedure  MEDICATIONS:     PROCEDURE:  The endoscope was passed without difficulty through the mouth   under direct visualization and advanced to the 2nd portion of the   duodenum  The scope was withdrawn and the mucosa was carefully examined  FINDINGS:   Duodenum: The duodenum appeared to be normal   Stomach: Mild   gastritis was found in the antrum  Two biopsies was taken  The specimens   were collected for barbi test   There was a diminutive hiatus hernia visible   in the stomach  Esophagus: Grade III, erosive, reflux-induced esophagitis   was found in the distal third of the esophagus  Four forceps biopsies   was taken  COMPLICATIONS: There were no complications  IMPRESSIONS: Normal duodenum  Mild gastritis found in the antrum  Two   biopsies taken  A hiatus hernia found  Esophagitis seen in the distal   third of the esophagus  Four biopsies taken  RECOMMENDATIONS: Resume regular diet as tolerated  Resume anticoagulation  Begin medication as prescribed  Begin taking Protonix 40 mg IV twice a   day  Follow-up on the results of the biopsy specimens in 2 weeks  ESTIMATED BLOOD LOSS:     PATHOLOGY SPECIMENS: Two biopsies taken for barbi test  Associated finding:   Gastritis  Four forceps biopsies taken  Associated finding: Esophagitis       PROCEDURE CODES: 17635 - EGD flexible; with biopsy     ICD-9 Codes: 787 01 Nausea with vomiting 578 0 Hematemesis 789 07   Abdominal pain, generalized 535 50 Unspecified gastritides and   gastroduodenitis, without mention of hemorrhage 553 3 Diaphragmatic hernia   without mention of obstruction or gangrene     ICD-10 Codes: R11 2 Nausea with vomiting, unspecified K92 0 Hematemesis   R10 84 Generalized abdominal pain K29 Gastritis and duodenitis K44   Diaphragmatic hernia K20 9 Esophagitis, unspecified     PERFORMED BY: RAMONITA Adams  on 04/20/2017  Version 1, electronically signed by RAMONITA Boss  on   04/20/2017 at 14:46

## 2017-04-20 NOTE — SUBJECTIVE & OBJECTIVE
VTE Pharmacologic Prophylaxis:   Pharmacologic: Heparin Drip  Mechanical VTE Prophylaxis in Place: Yes    Patient Centered Rounds: I have performed bedside rounds with nursing staff today  Discussions with Specialists or Other Care Team Provider:  case discussed with cardiology and GI    Education and Discussions with Family / Patient:     Time Spent for Care: 30 minutes  More than 50% of total time spent on counseling and coordination of care as described above  Current Length of Stay: 2 day(s)    Current Patient Status: Inpatient   Certification Statement: The patient will continue to require additional inpatient hospital stay due to need for IV heparin    Discharge Ppatient requires heparin bridging  Code Status: Level 1 - Full Code      Subjective:   I feel fine    Objective:     Vitals:   Temp (24hrs), Av 1 °F (36 7 °C), Min:96 9 °F (36 1 °C), Max:98 6 °F (37 °C)    HR:  [58-74] 69  Resp:  [16-20] 20  BP: ()/(49-91) 155/91  SpO2:  [97 %-100 %] 100 %  Body mass index is 22 5 kg/(m^2)  Input and Output Summary (last 24 hours): Intake/Output Summary (Last 24 hours) at 17 1721  Last data filed at 17 1435   Gross per 24 hour   Intake              100 ml   Output             1300 ml   Net            -1200 ml       Physical Exam:     Physical Exam   Constitutional: He is oriented to person, place, and time  No distress  Cardiovascular: Normal rate  Exam reveals no gallop and no friction rub  No murmur heard  Pulmonary/Chest: No respiratory distress  He has no wheezes  He has no rales  Abdominal: Soft  He exhibits no distension  There is no tenderness  There is no rebound and no guarding  Neurological: He is alert and oriented to person, place, and time  Skin: He is not diaphoretic         Additional Data:     Labs:      Results from last 7 days  Lab Units 17  0540  17  1814   WBC Thousand/uL 10 05  < > 23 39*   HEMOGLOBIN g/dL 11 4*  < > 16 8   I STAT HEMOGLOBIN   --   < >  --    HEMATOCRIT % 35 0*  < > 50 9*   PLATELETS Thousands/uL 149  < > 218   LYMPHO PCT %  --   --  5*   MONO PCT MAN %  --   --  6   EOSINO PCT MANUAL %  --   --  0   < > = values in this interval not displayed  Results from last 7 days  Lab Units 04/20/17  0540  04/18/17  1717   SODIUM mmol/L 140  < > 130*   POTASSIUM mmol/L 3 4*  < > 5 3   CHLORIDE mmol/L 105  < > 99*   CO2 mmol/L 26  < > 18*   BUN mg/dL 13  < > 22   CREATININE mg/dL 1 01  < > 1 10   CALCIUM mg/dL 8 4  < > 9 6   TOTAL PROTEIN g/dL  --   --  8 9*   BILIRUBIN TOTAL mg/dL  --   --  1 00   ALK PHOS U/L  --   --  160*   ALT U/L  --   --  15   AST U/L  --   --  48*   GLUCOSE RANDOM mg/dL 153*  < > 282*   GLUCOSE, ISTAT   --   < >  --    < > = values in this interval not displayed  Results from last 7 days  Lab Units 04/18/17 2002   INR  1 30*       * I Have Reviewed All Lab Data Listed Above  * Additional Pertinent Lab Tests Reviewed: All Labs Within Last 24 Hours Reviewed    Imaging:    Imaging Reports Reviewed Today Include:   Imaging Personally Reviewed by Myself Includes:      Recent Cultures (last 7 days):           Last 24 Hours Medication List:     aspirin 325 mg Oral Daily   atorvastatin 80 mg Oral Daily With Dinner   insulin lispro 1-5 Units Subcutaneous TID AC   insulin lispro 1-5 Units Subcutaneous Q6H   metoprolol tartrate 25 mg Oral Q12H [address]   nicotine 1 patch Transdermal Daily   pantoprazole 40 mg Intravenous Q12H [address]   QUEtiapine 400 mg Oral Daily   QUEtiapine 600 mg Oral HS   torsemide 10 mg Oral Daily   warfarin 5 mg Oral Once (warfarin)        Today, Patient Was Seen By: Mesfin Avendaño MD    ** Please Note: Dragon 360 Dictation voice to text software may have been used in the creation of this document   **

## 2017-04-21 LAB
APTT PPP: 110 SECONDS (ref 24–36)
APTT PPP: 34 SECONDS (ref 24–36)
APTT PPP: 63 SECONDS (ref 24–36)
BASOPHILS # BLD AUTO: 0.04 THOUSANDS/ΜL (ref 0–0.1)
BASOPHILS NFR BLD AUTO: 1 % (ref 0–1)
EOSINOPHIL # BLD AUTO: 0.26 THOUSAND/ΜL (ref 0–0.61)
EOSINOPHIL NFR BLD AUTO: 3 % (ref 0–6)
ERYTHROCYTE [DISTWIDTH] IN BLOOD BY AUTOMATED COUNT: 13.7 % (ref 11.6–15.1)
GLUCOSE SERPL-MCNC: 164 MG/DL (ref 65–140)
GLUCOSE SERPL-MCNC: 199 MG/DL (ref 65–140)
GLUCOSE SERPL-MCNC: 211 MG/DL (ref 65–140)
GLUCOSE SERPL-MCNC: 266 MG/DL (ref 65–140)
HCT VFR BLD AUTO: 35.3 % (ref 36.5–49.3)
HGB BLD-MCNC: 11.6 G/DL (ref 12–17)
INR PPP: 1.09 (ref 0.86–1.16)
LYMPHOCYTES # BLD AUTO: 1.81 THOUSANDS/ΜL (ref 0.6–4.47)
LYMPHOCYTES NFR BLD AUTO: 23 % (ref 14–44)
MCH RBC QN AUTO: 29.4 PG (ref 26.8–34.3)
MCHC RBC AUTO-ENTMCNC: 32.9 G/DL (ref 31.4–37.4)
MCV RBC AUTO: 89 FL (ref 82–98)
MONOCYTES # BLD AUTO: 0.59 THOUSAND/ΜL (ref 0.17–1.22)
MONOCYTES NFR BLD AUTO: 8 % (ref 4–12)
NEUTROPHILS # BLD AUTO: 5.2 THOUSANDS/ΜL (ref 1.85–7.62)
NEUTS SEG NFR BLD AUTO: 65 % (ref 43–75)
PLATELET # BLD AUTO: 146 THOUSANDS/UL (ref 149–390)
PMV BLD AUTO: 12.3 FL (ref 8.9–12.7)
PROTHROMBIN TIME: 14 SECONDS (ref 12–14.3)
RBC # BLD AUTO: 3.95 MILLION/UL (ref 3.88–5.62)
UREASE TISS QL: NEGATIVE
WBC # BLD AUTO: 7.9 THOUSAND/UL (ref 4.31–10.16)

## 2017-04-21 PROCEDURE — 85730 THROMBOPLASTIN TIME PARTIAL: CPT | Performed by: INTERNAL MEDICINE

## 2017-04-21 PROCEDURE — 85025 COMPLETE CBC W/AUTO DIFF WBC: CPT | Performed by: INTERNAL MEDICINE

## 2017-04-21 PROCEDURE — 85730 THROMBOPLASTIN TIME PARTIAL: CPT | Performed by: NURSE PRACTITIONER

## 2017-04-21 PROCEDURE — 82948 REAGENT STRIP/BLOOD GLUCOSE: CPT

## 2017-04-21 PROCEDURE — 86880 COOMBS TEST DIRECT: CPT

## 2017-04-21 PROCEDURE — 86901 BLOOD TYPING SEROLOGIC RH(D): CPT

## 2017-04-21 PROCEDURE — 85610 PROTHROMBIN TIME: CPT | Performed by: NURSE PRACTITIONER

## 2017-04-21 PROCEDURE — 86900 BLOOD TYPING SEROLOGIC ABO: CPT

## 2017-04-21 PROCEDURE — 94760 N-INVAS EAR/PLS OXIMETRY 1: CPT

## 2017-04-21 PROCEDURE — 86870 RBC ANTIBODY IDENTIFICATION: CPT

## 2017-04-21 RX ORDER — WARFARIN SODIUM 7.5 MG/1
7.5 TABLET ORAL
Status: DISCONTINUED | OUTPATIENT
Start: 2017-04-21 | End: 2017-04-22 | Stop reason: HOSPADM

## 2017-04-21 RX ORDER — PANTOPRAZOLE SODIUM 40 MG/1
40 TABLET, DELAYED RELEASE ORAL
Status: DISCONTINUED | OUTPATIENT
Start: 2017-04-21 | End: 2017-04-22 | Stop reason: HOSPADM

## 2017-04-21 RX ORDER — INSULIN GLARGINE 100 [IU]/ML
10 INJECTION, SOLUTION SUBCUTANEOUS
Status: DISCONTINUED | OUTPATIENT
Start: 2017-04-21 | End: 2017-04-22 | Stop reason: HOSPADM

## 2017-04-21 RX ORDER — ACETAMINOPHEN 325 MG/1
650 TABLET ORAL EVERY 6 HOURS PRN
Status: DISCONTINUED | OUTPATIENT
Start: 2017-04-21 | End: 2017-04-22 | Stop reason: HOSPADM

## 2017-04-21 RX ORDER — TORSEMIDE 20 MG/1
20 TABLET ORAL DAILY
Status: DISCONTINUED | OUTPATIENT
Start: 2017-04-22 | End: 2017-04-22 | Stop reason: HOSPADM

## 2017-04-21 RX ADMIN — NICOTINE 1 PATCH: 14 PATCH, EXTENDED RELEASE TRANSDERMAL at 08:44

## 2017-04-21 RX ADMIN — INSULIN GLARGINE 10 UNITS: 100 INJECTION, SOLUTION SUBCUTANEOUS at 21:28

## 2017-04-21 RX ADMIN — PANTOPRAZOLE SODIUM 40 MG: 40 TABLET, DELAYED RELEASE ORAL at 08:48

## 2017-04-21 RX ADMIN — TORSEMIDE 10 MG: 10 TABLET ORAL at 08:45

## 2017-04-21 RX ADMIN — ASPIRIN 325 MG ORAL TABLET 325 MG: 325 PILL ORAL at 08:45

## 2017-04-21 RX ADMIN — INSULIN LISPRO 2 UNITS: 100 INJECTION, SOLUTION INTRAVENOUS; SUBCUTANEOUS at 11:35

## 2017-04-21 RX ADMIN — QUETIAPINE FUMARATE 400 MG: 200 TABLET ORAL at 08:46

## 2017-04-21 RX ADMIN — PANTOPRAZOLE SODIUM 40 MG: 40 TABLET, DELAYED RELEASE ORAL at 17:29

## 2017-04-21 RX ADMIN — INSULIN LISPRO 1 UNITS: 100 INJECTION, SOLUTION INTRAVENOUS; SUBCUTANEOUS at 17:30

## 2017-04-21 RX ADMIN — LORAZEPAM 0.5 MG: 2 INJECTION, SOLUTION INTRAMUSCULAR; INTRAVENOUS at 20:07

## 2017-04-21 RX ADMIN — HEPARIN SODIUM 4000 UNITS: 1000 INJECTION, SOLUTION INTRAVENOUS; SUBCUTANEOUS at 03:06

## 2017-04-21 RX ADMIN — WARFARIN SODIUM 7.5 MG: 7.5 TABLET ORAL at 17:29

## 2017-04-21 RX ADMIN — METOPROLOL TARTRATE 25 MG: 25 TABLET ORAL at 21:27

## 2017-04-21 RX ADMIN — ATORVASTATIN CALCIUM 80 MG: 40 TABLET, FILM COATED ORAL at 17:29

## 2017-04-21 RX ADMIN — METOPROLOL TARTRATE 25 MG: 25 TABLET ORAL at 08:45

## 2017-04-21 RX ADMIN — HEPARIN SODIUM AND DEXTROSE 16 UNITS/KG/HR: 10000; 5 INJECTION INTRAVENOUS at 12:50

## 2017-04-21 RX ADMIN — QUETIAPINE FUMARATE 600 MG: 300 TABLET, FILM COATED ORAL at 21:28

## 2017-04-21 NOTE — PROGRESS NOTES
[facility] Progress Note  Patient: Kelsy Serrano 61 y o  male   MRN: [id number]    Unit/Bed#: -02 Encounter: [id number]  Date Of Visit: 17    Assessment/ Plan  Principal Problem:  1  Erosive esophagitis  With GI bleed  Seen by Gastroenterology, Currently on Protonix B I D  Hemoglobin Stable  Active Problems:  2  Colitis  Resolved, no further diarrhea, discontinue C  difficile rule out  3  Hypertension  Blood pressure stable metoprolol, holding lisinopril until blood pressures higher  4  Bipolar 1 disorder  Quetiapine  5  Diabetes mellitus  Restart glargine tonight  Continue sliding coverage  6    CAD (coronary artery disease)  Status post CABG    Aspirin  7  Pacemaker  8  Chronic systolic congestive heart failure  Currently on torsemide however at home he is not sure if he is on furosemide  Resolved Problems:    * No resolved hospital problems  *        VTE Prophylaxis: Heparin Drip  Education and Discussions: Discussed with nursing, discontinue C  difficile rule out and remove femoral line  Discharge Plan:  When INR therapeutic given mechanical mitral valve  Time Spent for Care:  26 Mins  More than 50% of total time spent on counseling and coordination of care as described above    ______________________________________________________________________________    Subjective:   Patient seen and examined  No further diarrhea  No new complaints  Objective:   Vitals:   Temp (24hrs), Av 9 °F (36 6 °C), Min:96 9 °F (36 1 °C), Max:98 3 °F (36 8 °C)  HR:  [57-80] 57  Resp:  [16-20] 16  BP: (115-155)/(56-91) 116/70  SpO2:  [98 %-100 %] 99 %  Body mass index is 22 6 kg/(m^2)       Physical Exam:   General appearance: alert, appears stated age and cooperative  Head: atraumatic  Lungs: clear to auscultation bilaterally  Heart: regular rate and rhythm  Abdomen: soft, non-tender; bowel sounds normal; no masses,  no organomegaly  Back: negative  Extremities: extremities normal, atraumatic, no cyanosis or edema  Neurologic: Grossly normal    Additional Data:   Labs:    Results from last 7 days  Lab Units 04/21/17  0508 04/20/17  0540 04/19/17  2316 04/19/17  1738 04/19/17  1454  04/19/17  0452  04/18/17 2002 04/18/17  1814   WBC Thousand/uL 7 90 10 05  --   --   --   --  16 51*  --   --  23 39*   HEMOGLOBIN g/dL 11 6* 11 4* 11 2* 11 8* 11 3*  < > 12 1  < >  --  16 8   I STAT HEMOGLOBIN   --   --   --   --   --   --   --   < >  --   --    HEMATOCRIT % 35 3* 35 0*  --   --   --   --  37 7  --   --  50 9*   MCV fL 89 91  --   --   --   --  90  --   --  91   PLATELETS Thousands/uL 146* 149  --   --   --   --  183  --   --  218   INR   --   --   --   --   --   --   --   --  1 30*  --    < > = values in this interval not displayed      Results from last 7 days  Lab Units 04/20/17  0540 04/19/17  0603 04/18/17 2010 04/18/17  1717   SODIUM mmol/L 140 140  --  130*   POTASSIUM mmol/L 3 4* 4 1  --  5 3   CHLORIDE mmol/L 105 105  --  99*   CO2 mmol/L 26 26  --  18*   ANION GAP mmol/L 9 9  --  13   BUN mg/dL 13 18  --  22   CREATININE mg/dL 1 01 1 05  --  1 10   CALCIUM mg/dL 8 4 8 1*  --  9 6   ALBUMIN g/dL  --   --   --  3 6   BILIRUBIN TOTAL mg/dL  --   --   --  1 00   ALK PHOS U/L  --   --   --  160*   ALT U/L  --   --   --  15   AST U/L  --   --   --  48*   EGFR ml/min/1 73sq m >60 0 >60 0 >60 0 >60 0   GLUCOSE RANDOM mg/dL 153* 205*  --  282*   GLUCOSE, ISTAT mg/dl  --   --  273*  --            Results from last 7 days  Lab Units 04/19/17 1738 04/19/17  1454 04/19/17  1019   TROPONIN I ng/mL 0 03 0 04* 0 04*       Results from last 7 days  Lab Units 04/19/17  0603   NT-PRO BNP pg/mL 3598*        Results from last 7 days  Lab Units 04/19/17  1002   LACTIC ACID mmol/L 1 5       Results from last 7 days  Lab Units 04/21/17  0524 04/20/17  2135 04/20/17  1610 04/20/17  1134 04/20/17  0629   POC GLUCOSE mg/dl 164* 216* 164* 168* 159*             * I Have Reviewed All Lab Data Listed Above     Cultures:           Imaging:  Imaging Reports Reviewed Today Include:       Xr Chest 1 View Portable  Result Date: 4/19/2017  Impression: No active disease  Workstation performed: [id number]     Ct Abdomen Pelvis W Contrast  Result Date: 4/19/2017  Impression: Slight limitation of exam due to power injector failure during the administration of contrast  Given these limitations, the colonic wall thickening seen previously has resolved  No evidence of ischemic bowel on the current study  Stable emphysema lung bases  Workstation performed: [id number]     Ct Abdomen Pelvis With Contrast  Result Date: 4/2/2017  Impression: Ascending colon wall thickening suspicious for colitis  Normal appearance of the appendix  Workstation performed: [id number]       Scheduled Meds:  aspirin 325 mg Oral Daily   atorvastatin 80 mg Oral Daily With Dinner   insulin lispro 1-5 Units Subcutaneous TID AC   metoprolol tartrate 25 mg Oral Q12H [facility]   nicotine 1 patch Transdermal Daily   pantoprazole 40 mg Intravenous Q12H [facility]   QUEtiapine 400 mg Oral Daily   QUEtiapine 600 mg Oral HS   torsemide 10 mg Oral Daily     Continuous Infusions:  heparin (porcine) 3-20 Units/kg/hr (Order-Specific) Last Rate: 18 Units/kg/hr (04/21/17 0300)     PRN Meds:  heparin (porcine)    heparin (porcine)    hydrALAZINE    HYDROmorphone    LORazepam    nitroglycerin    ondansetron     Nial Sheikh, DO  NOTE: This note has been constructed using a voice recognition system

## 2017-04-21 NOTE — PROGRESS NOTES
Sol Stanford  MR # [id number]  Unit/Bed#: -02  61 y o   male      ASSESSMENT  50-year-old male admitted with abdominal pain and coffee-ground emesis  Recent history of colitis, but this has resolved on CT scan  Upper endoscopy on April 20 showed gastritis and severe erosive esophagitis  Currently symptom free on a twice daily PPI    PLAN  Await gastric and esophageal biopsies  Continue twice daily PPI; will transition to PO  Continue diet as tolerated  Follow hemoglobin while Coumadin is reintroduced  We will arrange office follow-up to taper PPI in 1-2 months          Chief Complaint   Patient presents with    Vomiting Blood     High INR, started vomiting dark coffee ground emesis while at daughters house today  SUBJECTIVE/HPI   Denies abdominal pain  Tolerating cardiac diet without difficulty  No further nausea or vomiting        /70  Pulse 57  Temp 98 3 °F (36 8 °C)  Resp 16  Wt 82 kg (180 lb 12 4 oz)  SpO2 99%  BMI 22 6 kg/m2    PHYSICALEXAM  General appearance: alert, appears stated age and cooperative  Head: Normocephalic, without obvious abnormality, atraumatic  Lungs: clear to auscultation bilaterally  Heart: regular rate and rhythm, S1, S2 normal, no murmur, click, rub or gallop  Abdomen: soft, non-tender; bowel sounds normal; no masses,  no organomegaly  Extremities: extremities normal, atraumatic, no cyanosis or edema  Neurologic: Grossly normal    Lab Results   Component Value Date    GLUCOSE 153 (H) 04/20/2017    CALCIUM 8 4 04/20/2017     04/20/2017    K 3 4 (L) 04/20/2017    CO2 26 04/20/2017     04/20/2017    BUN 13 04/20/2017    CREATININE 1 01 04/20/2017     Lab Results   Component Value Date    WBC 7 90 04/21/2017    HGB 11 6 (L) 04/21/2017    HCT 35 3 (L) 04/21/2017    MCV 89 04/21/2017     (L) 04/21/2017     Lab Results   Component Value Date    ALT 15 04/18/2017    AST 48 (H) 04/18/2017    ALKPHOS 160 (H) 04/18/2017    BILITOT 1 00 04/18/2017 No results found for: AMYLASE  Lab Results   Component Value Date    LIPASE 85 04/02/2017     No results found for: IRON, TIBC, FERRITIN  Lab Results   Component Value Date    INR 1 30 (H) 04/18/2017

## 2017-04-21 NOTE — PROGRESS NOTES
Patient and his daughter educated on the risks of continuing smoking as well as on different ways to help control his blood sugar  Daughter is interested in having nutrition come speak with her dad and help educate him on his diabetes as well as healthier options for his heart  Patient seems receptive and willing  Will monitor

## 2017-04-22 VITALS
OXYGEN SATURATION: 99 % | BODY MASS INDEX: 22.6 KG/M2 | HEART RATE: 73 BPM | WEIGHT: 180.78 LBS | RESPIRATION RATE: 18 BRPM | SYSTOLIC BLOOD PRESSURE: 99 MMHG | TEMPERATURE: 97.5 F | DIASTOLIC BLOOD PRESSURE: 57 MMHG

## 2017-04-22 LAB
APTT PPP: 57 SECONDS (ref 24–36)
APTT PPP: 82 SECONDS (ref 24–36)
GLUCOSE SERPL-MCNC: 240 MG/DL (ref 65–140)
HCT VFR BLD AUTO: 37.7 % (ref 36.5–49.3)
HGB BLD-MCNC: 12.8 G/DL (ref 12–17)
INR PPP: 1.12 (ref 0.86–1.16)
PROTHROMBIN TIME: 14.3 SECONDS (ref 12–14.3)

## 2017-04-22 PROCEDURE — 85018 HEMOGLOBIN: CPT | Performed by: INTERNAL MEDICINE

## 2017-04-22 PROCEDURE — 85610 PROTHROMBIN TIME: CPT | Performed by: INTERNAL MEDICINE

## 2017-04-22 PROCEDURE — 85014 HEMATOCRIT: CPT | Performed by: INTERNAL MEDICINE

## 2017-04-22 PROCEDURE — 82948 REAGENT STRIP/BLOOD GLUCOSE: CPT

## 2017-04-22 PROCEDURE — 85730 THROMBOPLASTIN TIME PARTIAL: CPT | Performed by: INTERNAL MEDICINE

## 2017-04-22 RX ORDER — OMEPRAZOLE 40 MG/1
40 CAPSULE, DELAYED RELEASE ORAL DAILY
Qty: 90 CAPSULE | Refills: 1 | Status: ON HOLD | OUTPATIENT
Start: 2017-04-22 | End: 2017-08-09

## 2017-04-22 RX ORDER — WARFARIN SODIUM 5 MG/1
5 TABLET ORAL
Status: ON HOLD | COMMUNITY
End: 2017-09-06

## 2017-04-22 RX ORDER — LORAZEPAM 0.5 MG/1
0.5 TABLET ORAL EVERY 6 HOURS PRN
Status: DISCONTINUED | OUTPATIENT
Start: 2017-04-22 | End: 2017-04-22 | Stop reason: HOSPADM

## 2017-04-22 RX ADMIN — HEPARIN SODIUM 2000 UNITS: 1000 INJECTION, SOLUTION INTRAVENOUS; SUBCUTANEOUS at 00:14

## 2017-04-22 RX ADMIN — METOPROLOL TARTRATE 25 MG: 25 TABLET ORAL at 08:30

## 2017-04-22 RX ADMIN — QUETIAPINE FUMARATE 400 MG: 200 TABLET ORAL at 08:30

## 2017-04-22 RX ADMIN — NICOTINE 1 PATCH: 14 PATCH, EXTENDED RELEASE TRANSDERMAL at 08:29

## 2017-04-22 RX ADMIN — ASPIRIN 325 MG ORAL TABLET 325 MG: 325 PILL ORAL at 08:30

## 2017-04-22 RX ADMIN — TORSEMIDE 20 MG: 20 TABLET ORAL at 08:30

## 2017-04-22 RX ADMIN — PANTOPRAZOLE SODIUM 40 MG: 40 TABLET, DELAYED RELEASE ORAL at 06:16

## 2017-04-22 RX ADMIN — INSULIN LISPRO 2 UNITS: 100 INJECTION, SOLUTION INTRAVENOUS; SUBCUTANEOUS at 08:30

## 2017-04-22 RX ADMIN — LORAZEPAM 0.5 MG: 0.5 TABLET ORAL at 09:22

## 2017-04-22 NOTE — PLAN OF CARE
CARDIOVASCULAR - ADULT     Maintains optimal cardiac output and hemodynamic stability Progressing     Absence of cardiac dysrhythmias or at baseline rhythm Progressing        DISCHARGE PLANNING     Discharge to home or other facility with appropriate resources Progressing        DISCHARGE PLANNING - CARE MANAGEMENT     Discharge to post-acute care or home with appropriate resources Progressing        GASTROINTESTINAL - ADULT     Minimal or absence of nausea and/or vomiting Progressing     Maintains or returns to baseline bowel function Progressing     Maintains adequate nutritional intake Progressing        INFECTION - ADULT     Absence or prevention of progression during hospitalization Progressing     Absence of fever/infection during neutropenic period Progressing        Knowledge Deficit     Patient/family/caregiver demonstrates understanding of disease process, treatment plan, medications, and discharge instructions Progressing        PAIN - ADULT     Verbalizes/displays adequate comfort level or baseline comfort level Progressing        Potential for Falls     Patient will remain free of falls Progressing        SAFETY ADULT     Maintain or return to baseline ADL function Progressing     Maintain or return mobility status to optimal level Progressing
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
## 2017-04-23 LAB
ABO GROUP BLD BPU: NORMAL
ABO GROUP BLD BPU: NORMAL
BPU ID: NORMAL
BPU ID: NORMAL
CROSSMATCH: NORMAL
CROSSMATCH: NORMAL
UNIT DISPENSE STATUS: NORMAL
UNIT DISPENSE STATUS: NORMAL
UNIT PRODUCT CODE: NORMAL
UNIT PRODUCT CODE: NORMAL
UNIT RH: NORMAL
UNIT RH: NORMAL

## 2017-04-26 ENCOUNTER — ALLSCRIPTS OFFICE VISIT (OUTPATIENT)
Dept: OTHER | Facility: OTHER | Age: 57
End: 2017-04-26

## 2017-05-02 ENCOUNTER — GENERIC CONVERSION - ENCOUNTER (OUTPATIENT)
Dept: OTHER | Facility: OTHER | Age: 57
End: 2017-05-02

## 2017-05-02 ENCOUNTER — ALLSCRIPTS OFFICE VISIT (OUTPATIENT)
Dept: OTHER | Facility: OTHER | Age: 57
End: 2017-05-02

## 2017-05-03 ENCOUNTER — GENERIC CONVERSION - ENCOUNTER (OUTPATIENT)
Dept: OTHER | Facility: OTHER | Age: 57
End: 2017-05-03

## 2017-05-03 LAB
INR PPP: 2.1 (ref 0.8–1.2)
PROTHROMBIN TIME: 21.9 SEC (ref 9.1–12)

## 2017-05-08 ENCOUNTER — ALLSCRIPTS OFFICE VISIT (OUTPATIENT)
Dept: OTHER | Facility: OTHER | Age: 57
End: 2017-05-08

## 2017-05-12 ENCOUNTER — GENERIC CONVERSION - ENCOUNTER (OUTPATIENT)
Dept: OTHER | Facility: OTHER | Age: 57
End: 2017-05-12

## 2017-05-16 ENCOUNTER — ALLSCRIPTS OFFICE VISIT (OUTPATIENT)
Dept: OTHER | Facility: OTHER | Age: 57
End: 2017-05-16

## 2017-05-16 ENCOUNTER — GENERIC CONVERSION - ENCOUNTER (OUTPATIENT)
Dept: OTHER | Facility: OTHER | Age: 57
End: 2017-05-16

## 2017-05-17 ENCOUNTER — GENERIC CONVERSION - ENCOUNTER (OUTPATIENT)
Dept: OTHER | Facility: OTHER | Age: 57
End: 2017-05-17

## 2017-05-23 ENCOUNTER — ALLSCRIPTS OFFICE VISIT (OUTPATIENT)
Dept: OTHER | Facility: OTHER | Age: 57
End: 2017-05-23

## 2017-05-24 ENCOUNTER — HOSPITAL ENCOUNTER (INPATIENT)
Facility: HOSPITAL | Age: 57
LOS: 4 days | Discharge: HOME HEALTH | DRG: 242 | End: 2017-05-28
Attending: EMERGENCY MEDICINE | Admitting: INTERNAL MEDICINE
Payer: COMMERCIAL

## 2017-05-24 ENCOUNTER — APPOINTMENT (INPATIENT)
Dept: CT IMAGING | Facility: HOSPITAL | Age: 57
DRG: 242 | End: 2017-05-24
Payer: COMMERCIAL

## 2017-05-24 DIAGNOSIS — R11.10 INTRACTABLE VOMITING: ICD-10-CM

## 2017-05-24 DIAGNOSIS — Z95.2 HISTORY OF MITRAL VALVE REPLACEMENT WITH MECHANICAL VALVE: ICD-10-CM

## 2017-05-24 DIAGNOSIS — L13.9 BULLOUS DERMATOSIS: ICD-10-CM

## 2017-05-24 DIAGNOSIS — R94.31 PROLONGED QT INTERVAL: ICD-10-CM

## 2017-05-24 DIAGNOSIS — F31.9 BIPOLAR 1 DISORDER (HCC): Chronic | ICD-10-CM

## 2017-05-24 DIAGNOSIS — R23.8 BLISTERS OF MULTIPLE SITES: ICD-10-CM

## 2017-05-24 DIAGNOSIS — T45.515A WARFARIN-INDUCED COAGULOPATHY (HCC): ICD-10-CM

## 2017-05-24 DIAGNOSIS — I73.9 PERIPHERAL ARTERY DISEASE (HCC): ICD-10-CM

## 2017-05-24 DIAGNOSIS — K92.2 UPPER GASTROINTESTINAL BLEED: Primary | ICD-10-CM

## 2017-05-24 DIAGNOSIS — K92.2 UGI BLEED: ICD-10-CM

## 2017-05-24 DIAGNOSIS — D68.32 WARFARIN-INDUCED COAGULOPATHY (HCC): ICD-10-CM

## 2017-05-24 PROBLEM — E11.65 TYPE 2 DIABETES MELLITUS WITH HYPERGLYCEMIA (HCC): Status: ACTIVE | Noted: 2017-05-24

## 2017-05-24 PROBLEM — E11.49 TYPE 2 DIABETES MELLITUS WITH NEUROLOGIC COMPLICATION (HCC): Status: ACTIVE | Noted: 2017-05-24

## 2017-05-24 LAB
ABO GROUP BLD: NORMAL
ACETONE SERPL-MCNC: ABNORMAL MG/DL
ALBUMIN SERPL BCP-MCNC: 4.3 G/DL (ref 3.5–5)
ALP SERPL-CCNC: 189 U/L (ref 46–116)
ALT SERPL W P-5'-P-CCNC: 23 U/L (ref 12–78)
ANION GAP SERPL CALCULATED.3IONS-SCNC: 12 MMOL/L (ref 4–13)
ARTERIAL PATENCY WRIST A: ABNORMAL
AST SERPL W P-5'-P-CCNC: 27 U/L (ref 5–45)
BASE EXCESS BLDA CALC-SCNC: 5 MMOL/L (ref -2–3)
BASOPHILS # BLD MANUAL: 0 THOUSAND/UL (ref 0–0.1)
BASOPHILS NFR MAR MANUAL: 0 % (ref 0–1)
BILIRUB SERPL-MCNC: 1 MG/DL (ref 0.2–1)
BLD GP AB SCN SERPL QL: POSITIVE
BUN SERPL-MCNC: 17 MG/DL (ref 5–25)
CALCIUM SERPL-MCNC: 9.5 MG/DL (ref 8.3–10.1)
CHLORIDE SERPL-SCNC: 97 MMOL/L (ref 100–108)
CO2 SERPL-SCNC: 26 MMOL/L (ref 21–32)
CREAT SERPL-MCNC: 1.27 MG/DL (ref 0.6–1.3)
EOSINOPHIL # BLD MANUAL: 0 THOUSAND/UL (ref 0–0.4)
EOSINOPHIL NFR BLD MANUAL: 0 % (ref 0–6)
ERYTHROCYTE [DISTWIDTH] IN BLOOD BY AUTOMATED COUNT: 14.4 % (ref 11.6–15.1)
FIO2 GAS DIL.REBREATH: 21 L
GFR SERPL CREATININE-BSD FRML MDRD: 58.5 ML/MIN/1.73SQ M
GLUCOSE SERPL-MCNC: 312 MG/DL (ref 65–140)
GLUCOSE SERPL-MCNC: 341 MG/DL (ref 65–140)
GLUCOSE SERPL-MCNC: 347 MG/DL (ref 65–140)
GLUCOSE SERPL-MCNC: 364 MG/DL (ref 65–140)
HCO3 BLDA-SCNC: 26.6 MMOL/L (ref 24–30)
HCT VFR BLD AUTO: 47.7 % (ref 36.5–49.3)
HGB BLD-MCNC: 15.8 G/DL (ref 12–17)
INR PPP: 2.2 (ref 0.86–1.16)
LACTATE SERPL-SCNC: 2.2 MMOL/L (ref 0.5–2)
LG PLATELETS BLD QL SMEAR: PRESENT
LYMPHOCYTES # BLD AUTO: 0 % (ref 14–44)
LYMPHOCYTES # BLD AUTO: 0 THOUSAND/UL (ref 0.6–4.47)
MCH RBC QN AUTO: 28.7 PG (ref 26.8–34.3)
MCHC RBC AUTO-ENTMCNC: 33.1 G/DL (ref 31.4–37.4)
MCV RBC AUTO: 87 FL (ref 82–98)
MONOCYTES # BLD AUTO: 0.64 THOUSAND/UL (ref 0–1.22)
MONOCYTES NFR BLD: 4 % (ref 4–12)
NEUTROPHILS # BLD MANUAL: 15.13 THOUSAND/UL (ref 1.85–7.62)
NEUTS SEG NFR BLD AUTO: 94 % (ref 43–75)
OB PNL GAST: POSITIVE
PCO2 BLD: 28 MMOL/L (ref 21–32)
PCO2 BLD: 31.1 MM HG (ref 42–50)
PH BLD: 7.54 [PH] (ref 7.3–7.4)
PLATELET # BLD AUTO: 226 THOUSANDS/UL (ref 149–390)
PLATELET BLD QL SMEAR: ADEQUATE
PMV BLD AUTO: 13.2 FL (ref 8.9–12.7)
PO2 BLD: 32 MM HG (ref 35–45)
POTASSIUM SERPL-SCNC: 5.1 MMOL/L (ref 3.5–5.3)
PROT SERPL-MCNC: 9 G/DL (ref 6.4–8.2)
PROTHROMBIN TIME: 24.3 SECONDS (ref 12.1–14.4)
RBC # BLD AUTO: 5.5 MILLION/UL (ref 3.88–5.62)
RBC MORPH BLD: NORMAL
RH BLD: POSITIVE
SAO2 % BLD FROM PO2: 71 % (ref 95–98)
SODIUM SERPL-SCNC: 135 MMOL/L (ref 136–145)
SPECIMEN EXPIRATION DATE: NORMAL
SPECIMEN SOURCE: ABNORMAL
TOTAL CELLS COUNTED SPEC: 100
VARIANT LYMPHS # BLD AUTO: 2 %
WBC # BLD AUTO: 16.1 THOUSAND/UL (ref 4.31–10.16)

## 2017-05-24 PROCEDURE — 82948 REAGENT STRIP/BLOOD GLUCOSE: CPT

## 2017-05-24 PROCEDURE — P9017 PLASMA 1 DONOR FRZ W/IN 8 HR: HCPCS

## 2017-05-24 PROCEDURE — 96360 HYDRATION IV INFUSION INIT: CPT

## 2017-05-24 PROCEDURE — 82271 OCCULT BLOOD OTHER SOURCES: CPT | Performed by: EMERGENCY MEDICINE

## 2017-05-24 PROCEDURE — 85027 COMPLETE CBC AUTOMATED: CPT | Performed by: EMERGENCY MEDICINE

## 2017-05-24 PROCEDURE — 80053 COMPREHEN METABOLIC PANEL: CPT | Performed by: EMERGENCY MEDICINE

## 2017-05-24 PROCEDURE — 30233N1 TRANSFUSION OF NONAUTOLOGOUS RED BLOOD CELLS INTO PERIPHERAL VEIN, PERCUTANEOUS APPROACH: ICD-10-PCS | Performed by: INTERNAL MEDICINE

## 2017-05-24 PROCEDURE — 82009 KETONE BODYS QUAL: CPT | Performed by: EMERGENCY MEDICINE

## 2017-05-24 PROCEDURE — 85007 BL SMEAR W/DIFF WBC COUNT: CPT | Performed by: EMERGENCY MEDICINE

## 2017-05-24 PROCEDURE — 83605 ASSAY OF LACTIC ACID: CPT | Performed by: INTERNAL MEDICINE

## 2017-05-24 PROCEDURE — 86922 COMPATIBILITY TEST ANTIGLOB: CPT

## 2017-05-24 PROCEDURE — 86850 RBC ANTIBODY SCREEN: CPT | Performed by: EMERGENCY MEDICINE

## 2017-05-24 PROCEDURE — 82803 BLOOD GASES ANY COMBINATION: CPT

## 2017-05-24 PROCEDURE — 85610 PROTHROMBIN TIME: CPT | Performed by: EMERGENCY MEDICINE

## 2017-05-24 PROCEDURE — 86901 BLOOD TYPING SEROLOGIC RH(D): CPT | Performed by: EMERGENCY MEDICINE

## 2017-05-24 PROCEDURE — 36415 COLL VENOUS BLD VENIPUNCTURE: CPT | Performed by: EMERGENCY MEDICINE

## 2017-05-24 PROCEDURE — 36430 TRANSFUSION BLD/BLD COMPNT: CPT

## 2017-05-24 PROCEDURE — 99285 EMERGENCY DEPT VISIT HI MDM: CPT

## 2017-05-24 PROCEDURE — 86927 PLASMA FRESH FROZEN: CPT

## 2017-05-24 PROCEDURE — 86870 RBC ANTIBODY IDENTIFICATION: CPT | Performed by: EMERGENCY MEDICINE

## 2017-05-24 PROCEDURE — 86921 COMPATIBILITY TEST INCUBATE: CPT

## 2017-05-24 PROCEDURE — 30233K1 TRANSFUSION OF NONAUTOLOGOUS FROZEN PLASMA INTO PERIPHERAL VEIN, PERCUTANEOUS APPROACH: ICD-10-PCS | Performed by: INTERNAL MEDICINE

## 2017-05-24 PROCEDURE — C9113 INJ PANTOPRAZOLE SODIUM, VIA: HCPCS | Performed by: EMERGENCY MEDICINE

## 2017-05-24 PROCEDURE — 86900 BLOOD TYPING SEROLOGIC ABO: CPT | Performed by: EMERGENCY MEDICINE

## 2017-05-24 RX ORDER — SODIUM CHLORIDE 9 MG/ML
50 INJECTION, SOLUTION INTRAVENOUS CONTINUOUS
Status: DISCONTINUED | OUTPATIENT
Start: 2017-05-24 | End: 2017-05-26

## 2017-05-24 RX ORDER — LISINOPRIL 20 MG/1
20 TABLET ORAL DAILY
Status: DISCONTINUED | OUTPATIENT
Start: 2017-05-25 | End: 2017-05-28 | Stop reason: HOSPADM

## 2017-05-24 RX ORDER — INSULIN GLARGINE 100 [IU]/ML
15 INJECTION, SOLUTION SUBCUTANEOUS
Status: DISCONTINUED | OUTPATIENT
Start: 2017-05-24 | End: 2017-05-28 | Stop reason: HOSPADM

## 2017-05-24 RX ORDER — NICOTINE 21 MG/24HR
1 PATCH, TRANSDERMAL 24 HOURS TRANSDERMAL DAILY
Status: DISCONTINUED | OUTPATIENT
Start: 2017-05-25 | End: 2017-05-28 | Stop reason: HOSPADM

## 2017-05-24 RX ORDER — LORAZEPAM 1 MG/1
1 TABLET ORAL 2 TIMES DAILY PRN
Status: DISCONTINUED | OUTPATIENT
Start: 2017-05-24 | End: 2017-05-28 | Stop reason: HOSPADM

## 2017-05-24 RX ORDER — ONDANSETRON 2 MG/ML
4 INJECTION INTRAMUSCULAR; INTRAVENOUS EVERY 6 HOURS PRN
Status: DISCONTINUED | OUTPATIENT
Start: 2017-05-24 | End: 2017-05-24

## 2017-05-24 RX ORDER — QUETIAPINE FUMARATE 300 MG/1
600 TABLET, FILM COATED ORAL
Status: DISCONTINUED | OUTPATIENT
Start: 2017-05-24 | End: 2017-05-28 | Stop reason: HOSPADM

## 2017-05-24 RX ORDER — QUETIAPINE FUMARATE 200 MG/1
400 TABLET, FILM COATED ORAL DAILY
Status: DISCONTINUED | OUTPATIENT
Start: 2017-05-25 | End: 2017-05-27

## 2017-05-24 RX ORDER — ONDANSETRON 2 MG/ML
4 INJECTION INTRAMUSCULAR; INTRAVENOUS EVERY 4 HOURS PRN
Status: DISCONTINUED | OUTPATIENT
Start: 2017-05-24 | End: 2017-05-28 | Stop reason: HOSPADM

## 2017-05-24 RX ORDER — MORPHINE SULFATE 2 MG/ML
1 INJECTION, SOLUTION INTRAMUSCULAR; INTRAVENOUS EVERY 4 HOURS PRN
Status: DISCONTINUED | OUTPATIENT
Start: 2017-05-24 | End: 2017-05-24

## 2017-05-24 RX ORDER — ONDANSETRON 2 MG/ML
4 INJECTION INTRAMUSCULAR; INTRAVENOUS ONCE
Status: COMPLETED | OUTPATIENT
Start: 2017-05-24 | End: 2017-05-24

## 2017-05-24 RX ADMIN — SODIUM CHLORIDE 1000 ML: 0.9 INJECTION, SOLUTION INTRAVENOUS at 17:49

## 2017-05-24 RX ADMIN — INSULIN GLARGINE 15 UNITS: 100 INJECTION, SOLUTION SUBCUTANEOUS at 22:03

## 2017-05-24 RX ADMIN — SODIUM CHLORIDE 125 ML/HR: 0.9 INJECTION, SOLUTION INTRAVENOUS at 22:01

## 2017-05-24 RX ADMIN — QUETIAPINE FUMARATE 600 MG: 300 TABLET, FILM COATED ORAL at 22:11

## 2017-05-24 RX ADMIN — INSULIN LISPRO 4 UNITS: 100 INJECTION, SOLUTION INTRAVENOUS; SUBCUTANEOUS at 22:09

## 2017-05-24 RX ADMIN — ONDANSETRON 4 MG: 2 INJECTION INTRAMUSCULAR; INTRAVENOUS at 22:12

## 2017-05-24 RX ADMIN — ONDANSETRON 4 MG: 2 INJECTION INTRAMUSCULAR; INTRAVENOUS at 18:36

## 2017-05-24 RX ADMIN — SODIUM CHLORIDE 80 MG: 9 INJECTION, SOLUTION INTRAVENOUS at 18:37

## 2017-05-24 RX ADMIN — SODIUM CHLORIDE 8 MG/HR: 9 INJECTION, SOLUTION INTRAVENOUS at 18:37

## 2017-05-25 ENCOUNTER — APPOINTMENT (INPATIENT)
Dept: CT IMAGING | Facility: HOSPITAL | Age: 57
DRG: 242 | End: 2017-05-25
Attending: INTERNAL MEDICINE
Payer: COMMERCIAL

## 2017-05-25 PROBLEM — K92.2 UGI BLEED: Status: ACTIVE | Noted: 2017-05-25

## 2017-05-25 LAB
ANION GAP SERPL CALCULATED.3IONS-SCNC: 9 MMOL/L (ref 4–13)
BLOOD GROUP ANTIBODIES SERPL: NORMAL
BLOOD GROUP ANTIBODIES SERPL: NORMAL
BUN SERPL-MCNC: 17 MG/DL (ref 5–25)
CALCIUM SERPL-MCNC: 8.7 MG/DL (ref 8.3–10.1)
CHLORIDE SERPL-SCNC: 103 MMOL/L (ref 100–108)
CO2 SERPL-SCNC: 27 MMOL/L (ref 21–32)
CREAT SERPL-MCNC: 0.99 MG/DL (ref 0.6–1.3)
ERYTHROCYTE [DISTWIDTH] IN BLOOD BY AUTOMATED COUNT: 14.4 % (ref 11.6–15.1)
GFR SERPL CREATININE-BSD FRML MDRD: >60 ML/MIN/1.73SQ M
GLUCOSE SERPL-MCNC: 150 MG/DL (ref 65–140)
GLUCOSE SERPL-MCNC: 153 MG/DL (ref 65–140)
GLUCOSE SERPL-MCNC: 175 MG/DL (ref 65–140)
GLUCOSE SERPL-MCNC: 211 MG/DL (ref 65–140)
HCT VFR BLD AUTO: 37.3 % (ref 36.5–49.3)
HCT VFR BLD AUTO: 38.1 % (ref 36.5–49.3)
HCT VFR BLD AUTO: 39.3 % (ref 36.5–49.3)
HGB BLD-MCNC: 12.4 G/DL (ref 12–17)
HGB BLD-MCNC: 12.5 G/DL (ref 12–17)
HGB BLD-MCNC: 13.2 G/DL (ref 12–17)
INR PPP: 2.09 (ref 0.86–1.16)
LACTATE SERPL-SCNC: 1.6 MMOL/L (ref 0.5–2)
LACTATE SERPL-SCNC: 2.4 MMOL/L (ref 0.5–2)
MCH RBC QN AUTO: 29.1 PG (ref 26.8–34.3)
MCHC RBC AUTO-ENTMCNC: 33.5 G/DL (ref 31.4–37.4)
MCV RBC AUTO: 87 FL (ref 82–98)
PLATELET # BLD AUTO: 183 THOUSANDS/UL (ref 149–390)
PMV BLD AUTO: 13.1 FL (ref 8.9–12.7)
POTASSIUM SERPL-SCNC: 3.8 MMOL/L (ref 3.5–5.3)
PROTHROMBIN TIME: 23.3 SECONDS (ref 12.1–14.4)
RBC # BLD AUTO: 4.29 MILLION/UL (ref 3.88–5.62)
RESULT 1 (HISTORICAL): NORMAL
SODIUM SERPL-SCNC: 139 MMOL/L (ref 136–145)
STOOL COMPLETE OVA AND PARASITES (HISTORICAL): NORMAL
WBC # BLD AUTO: 17.1 THOUSAND/UL (ref 4.31–10.16)

## 2017-05-25 PROCEDURE — C9113 INJ PANTOPRAZOLE SODIUM, VIA: HCPCS | Performed by: EMERGENCY MEDICINE

## 2017-05-25 PROCEDURE — 74176 CT ABD & PELVIS W/O CONTRAST: CPT

## 2017-05-25 PROCEDURE — 80048 BASIC METABOLIC PNL TOTAL CA: CPT | Performed by: INTERNAL MEDICINE

## 2017-05-25 PROCEDURE — 85018 HEMOGLOBIN: CPT | Performed by: INTERNAL MEDICINE

## 2017-05-25 PROCEDURE — 85014 HEMATOCRIT: CPT | Performed by: INTERNAL MEDICINE

## 2017-05-25 PROCEDURE — 85610 PROTHROMBIN TIME: CPT | Performed by: INTERNAL MEDICINE

## 2017-05-25 PROCEDURE — 82948 REAGENT STRIP/BLOOD GLUCOSE: CPT

## 2017-05-25 PROCEDURE — 86870 RBC ANTIBODY IDENTIFICATION: CPT

## 2017-05-25 PROCEDURE — 86901 BLOOD TYPING SEROLOGIC RH(D): CPT

## 2017-05-25 PROCEDURE — 85027 COMPLETE CBC AUTOMATED: CPT | Performed by: INTERNAL MEDICINE

## 2017-05-25 PROCEDURE — 83605 ASSAY OF LACTIC ACID: CPT | Performed by: NURSE PRACTITIONER

## 2017-05-25 PROCEDURE — 86880 COOMBS TEST DIRECT: CPT

## 2017-05-25 PROCEDURE — 86900 BLOOD TYPING SEROLOGIC ABO: CPT

## 2017-05-25 RX ADMIN — INSULIN LISPRO 2 UNITS: 100 INJECTION, SOLUTION INTRAVENOUS; SUBCUTANEOUS at 12:10

## 2017-05-25 RX ADMIN — QUETIAPINE FUMARATE 600 MG: 300 TABLET, FILM COATED ORAL at 21:53

## 2017-05-25 RX ADMIN — METOPROLOL TARTRATE 25 MG: 25 TABLET ORAL at 09:19

## 2017-05-25 RX ADMIN — INSULIN LISPRO 6 UNITS: 100 INJECTION, SOLUTION INTRAVENOUS; SUBCUTANEOUS at 01:30

## 2017-05-25 RX ADMIN — INSULIN GLARGINE 15 UNITS: 100 INJECTION, SOLUTION SUBCUTANEOUS at 21:53

## 2017-05-25 RX ADMIN — LORAZEPAM 1 MG: 1 TABLET ORAL at 09:43

## 2017-05-25 RX ADMIN — HYDROMORPHONE HYDROCHLORIDE 0.5 MG: 1 INJECTION, SOLUTION INTRAMUSCULAR; INTRAVENOUS; SUBCUTANEOUS at 16:20

## 2017-05-25 RX ADMIN — SODIUM CHLORIDE 125 ML/HR: 0.9 INJECTION, SOLUTION INTRAVENOUS at 16:19

## 2017-05-25 RX ADMIN — CEFAZOLIN SODIUM 1000 MG: 1 SOLUTION INTRAVENOUS at 16:58

## 2017-05-25 RX ADMIN — INSULIN LISPRO 1 UNITS: 100 INJECTION, SOLUTION INTRAVENOUS; SUBCUTANEOUS at 18:55

## 2017-05-25 RX ADMIN — SODIUM CHLORIDE 8 MG/HR: 9 INJECTION, SOLUTION INTRAVENOUS at 05:25

## 2017-05-25 RX ADMIN — HYDROMORPHONE HYDROCHLORIDE 0.5 MG: 1 INJECTION, SOLUTION INTRAMUSCULAR; INTRAVENOUS; SUBCUTANEOUS at 09:44

## 2017-05-25 RX ADMIN — LISINOPRIL 20 MG: 20 TABLET ORAL at 09:19

## 2017-05-25 RX ADMIN — LORAZEPAM 1 MG: 1 TABLET ORAL at 20:36

## 2017-05-25 RX ADMIN — QUETIAPINE FUMARATE 400 MG: 200 TABLET ORAL at 09:19

## 2017-05-25 RX ADMIN — INSULIN LISPRO 1 UNITS: 100 INJECTION, SOLUTION INTRAVENOUS; SUBCUTANEOUS at 06:45

## 2017-05-25 RX ADMIN — CEFAZOLIN SODIUM 1000 MG: 1 SOLUTION INTRAVENOUS at 12:05

## 2017-05-25 RX ADMIN — NICOTINE 1 PATCH: 14 PATCH, EXTENDED RELEASE TRANSDERMAL at 09:19

## 2017-05-25 RX ADMIN — SODIUM CHLORIDE 8 MG/HR: 9 INJECTION, SOLUTION INTRAVENOUS at 16:56

## 2017-05-26 ENCOUNTER — ANESTHESIA EVENT (INPATIENT)
Dept: PERIOP | Facility: HOSPITAL | Age: 57
DRG: 242 | End: 2017-05-26
Payer: COMMERCIAL

## 2017-05-26 ENCOUNTER — GENERIC CONVERSION - ENCOUNTER (OUTPATIENT)
Dept: OTHER | Facility: OTHER | Age: 57
End: 2017-05-26

## 2017-05-26 ENCOUNTER — ANESTHESIA (INPATIENT)
Dept: PERIOP | Facility: HOSPITAL | Age: 57
DRG: 242 | End: 2017-05-26
Payer: COMMERCIAL

## 2017-05-26 PROBLEM — B37.81 CANDIDA ESOPHAGITIS (HCC): Status: ACTIVE | Noted: 2017-05-26

## 2017-05-26 LAB
ABO GROUP BLD BPU: NORMAL
ABO GROUP BLD BPU: NORMAL
ALBUMIN SERPL BCP-MCNC: 3.4 G/DL (ref 3.5–5)
ALP SERPL-CCNC: 136 U/L (ref 46–116)
ALT SERPL W P-5'-P-CCNC: 18 U/L (ref 12–78)
ANION GAP SERPL CALCULATED.3IONS-SCNC: 9 MMOL/L (ref 4–13)
AST SERPL W P-5'-P-CCNC: 17 U/L (ref 5–45)
ATRIAL RATE: 69 BPM
ATRIAL RATE: 71 BPM
BILIRUB SERPL-MCNC: 0.6 MG/DL (ref 0.2–1)
BPU ID: NORMAL
BPU ID: NORMAL
BUN SERPL-MCNC: 10 MG/DL (ref 5–25)
CALCIUM SERPL-MCNC: 8.3 MG/DL (ref 8.3–10.1)
CHLORIDE SERPL-SCNC: 105 MMOL/L (ref 100–108)
CO2 SERPL-SCNC: 26 MMOL/L (ref 21–32)
CREAT SERPL-MCNC: 0.98 MG/DL (ref 0.6–1.3)
ERYTHROCYTE [DISTWIDTH] IN BLOOD BY AUTOMATED COUNT: 14.3 % (ref 11.6–15.1)
GFR SERPL CREATININE-BSD FRML MDRD: >60 ML/MIN/1.73SQ M
GLUCOSE SERPL-MCNC: 146 MG/DL (ref 65–140)
GLUCOSE SERPL-MCNC: 147 MG/DL (ref 65–140)
GLUCOSE SERPL-MCNC: 150 MG/DL (ref 65–140)
GLUCOSE SERPL-MCNC: 162 MG/DL (ref 65–140)
GLUCOSE SERPL-MCNC: 172 MG/DL (ref 65–140)
GLUCOSE SERPL-MCNC: 173 MG/DL (ref 65–140)
HCT VFR BLD AUTO: 40.5 % (ref 36.5–49.3)
HGB BLD-MCNC: 13.1 G/DL (ref 12–17)
INR PPP: 2 (ref 0.86–1.16)
MCH RBC QN AUTO: 28.5 PG (ref 26.8–34.3)
MCHC RBC AUTO-ENTMCNC: 32.3 G/DL (ref 31.4–37.4)
MCV RBC AUTO: 88 FL (ref 82–98)
P AXIS: 73 DEGREES
P AXIS: 82 DEGREES
PLATELET # BLD AUTO: 190 THOUSANDS/UL (ref 149–390)
PMV BLD AUTO: 13 FL (ref 8.9–12.7)
POTASSIUM SERPL-SCNC: 3.7 MMOL/L (ref 3.5–5.3)
PR INTERVAL: 188 MS
PR INTERVAL: 192 MS
PROT SERPL-MCNC: 7.3 G/DL (ref 6.4–8.2)
PROTHROMBIN TIME: 22.5 SECONDS (ref 12.1–14.4)
QRS AXIS: 83 DEGREES
QRS AXIS: 84 DEGREES
QRSD INTERVAL: 112 MS
QRSD INTERVAL: 114 MS
QT INTERVAL: 510 MS
QT INTERVAL: 516 MS
QTC INTERVAL: 546 MS
QTC INTERVAL: 560 MS
RBC # BLD AUTO: 4.6 MILLION/UL (ref 3.88–5.62)
RESULT 1 (HISTORICAL): NORMAL
RESULT 1 (HISTORICAL): NORMAL
SHIGA TOXIN TEST (HISTORICAL): NEGATIVE
SODIUM SERPL-SCNC: 140 MMOL/L (ref 136–145)
STOOL FOR CAMPYLOBACTER (HISTORICAL): NORMAL
STOOL FOR SALMONELLA OR SHIGELLA (HISTORICAL): NORMAL
T WAVE AXIS: 115 DEGREES
T WAVE AXIS: 120 DEGREES
UNIT DISPENSE STATUS: NORMAL
UNIT DISPENSE STATUS: NORMAL
UNIT PRODUCT CODE: NORMAL
UNIT PRODUCT CODE: NORMAL
UNIT RH: NORMAL
UNIT RH: NORMAL
VENTRICULAR RATE: 69 BPM
VENTRICULAR RATE: 71 BPM
WBC # BLD AUTO: 12.46 THOUSAND/UL (ref 4.31–10.16)

## 2017-05-26 PROCEDURE — 88112 CYTOPATH CELL ENHANCE TECH: CPT | Performed by: INTERNAL MEDICINE

## 2017-05-26 PROCEDURE — C9113 INJ PANTOPRAZOLE SODIUM, VIA: HCPCS | Performed by: EMERGENCY MEDICINE

## 2017-05-26 PROCEDURE — 80053 COMPREHEN METABOLIC PANEL: CPT | Performed by: INTERNAL MEDICINE

## 2017-05-26 PROCEDURE — 82948 REAGENT STRIP/BLOOD GLUCOSE: CPT

## 2017-05-26 PROCEDURE — 85610 PROTHROMBIN TIME: CPT | Performed by: INTERNAL MEDICINE

## 2017-05-26 PROCEDURE — 85027 COMPLETE CBC AUTOMATED: CPT | Performed by: INTERNAL MEDICINE

## 2017-05-26 PROCEDURE — 93005 ELECTROCARDIOGRAM TRACING: CPT | Performed by: INTERNAL MEDICINE

## 2017-05-26 PROCEDURE — 0DB58ZX EXCISION OF ESOPHAGUS, VIA NATURAL OR ARTIFICIAL OPENING ENDOSCOPIC, DIAGNOSTIC: ICD-10-PCS | Performed by: INTERNAL MEDICINE

## 2017-05-26 RX ORDER — WARFARIN SODIUM 5 MG/1
5 TABLET ORAL
Status: COMPLETED | OUTPATIENT
Start: 2017-05-26 | End: 2017-05-26

## 2017-05-26 RX ORDER — FLUCONAZOLE 100 MG/1
100 TABLET ORAL DAILY
Status: DISCONTINUED | OUTPATIENT
Start: 2017-05-26 | End: 2017-05-27

## 2017-05-26 RX ORDER — DIVALPROEX SODIUM 500 MG/1
1000 TABLET, EXTENDED RELEASE ORAL
Status: DISCONTINUED | OUTPATIENT
Start: 2017-05-26 | End: 2017-05-28 | Stop reason: HOSPADM

## 2017-05-26 RX ORDER — PANTOPRAZOLE SODIUM 40 MG/1
40 TABLET, DELAYED RELEASE ORAL
Status: DISCONTINUED | OUTPATIENT
Start: 2017-05-26 | End: 2017-05-28 | Stop reason: HOSPADM

## 2017-05-26 RX ORDER — TORSEMIDE 10 MG/1
10 TABLET ORAL DAILY
Status: DISCONTINUED | OUTPATIENT
Start: 2017-05-26 | End: 2017-05-28 | Stop reason: HOSPADM

## 2017-05-26 RX ORDER — PROPOFOL 10 MG/ML
INJECTION, EMULSION INTRAVENOUS AS NEEDED
Status: DISCONTINUED | OUTPATIENT
Start: 2017-05-26 | End: 2017-05-26 | Stop reason: SURG

## 2017-05-26 RX ADMIN — PROPOFOL 200 MG: 10 INJECTION, EMULSION INTRAVENOUS at 11:10

## 2017-05-26 RX ADMIN — SODIUM CHLORIDE: 0.9 INJECTION, SOLUTION INTRAVENOUS at 11:06

## 2017-05-26 RX ADMIN — QUETIAPINE FUMARATE 400 MG: 200 TABLET ORAL at 08:08

## 2017-05-26 RX ADMIN — SODIUM CHLORIDE 125 ML/HR: 0.9 INJECTION, SOLUTION INTRAVENOUS at 00:30

## 2017-05-26 RX ADMIN — PANTOPRAZOLE SODIUM 40 MG: 40 TABLET, DELAYED RELEASE ORAL at 17:08

## 2017-05-26 RX ADMIN — QUETIAPINE FUMARATE 600 MG: 300 TABLET, FILM COATED ORAL at 22:30

## 2017-05-26 RX ADMIN — FLUCONAZOLE 100 MG: 100 TABLET ORAL at 13:08

## 2017-05-26 RX ADMIN — HYDROMORPHONE HYDROCHLORIDE 0.5 MG: 1 INJECTION, SOLUTION INTRAMUSCULAR; INTRAVENOUS; SUBCUTANEOUS at 18:02

## 2017-05-26 RX ADMIN — INSULIN LISPRO 1 UNITS: 100 INJECTION, SOLUTION INTRAVENOUS; SUBCUTANEOUS at 06:09

## 2017-05-26 RX ADMIN — INSULIN LISPRO 1 UNITS: 100 INJECTION, SOLUTION INTRAVENOUS; SUBCUTANEOUS at 00:26

## 2017-05-26 RX ADMIN — NICOTINE 1 PATCH: 14 PATCH, EXTENDED RELEASE TRANSDERMAL at 08:08

## 2017-05-26 RX ADMIN — DIVALPROEX SODIUM 1000 MG: 500 TABLET, EXTENDED RELEASE ORAL at 22:37

## 2017-05-26 RX ADMIN — CEFAZOLIN SODIUM 1000 MG: 1 SOLUTION INTRAVENOUS at 00:55

## 2017-05-26 RX ADMIN — NYSTATIN 500000 UNITS: 500000 SUSPENSION ORAL at 17:06

## 2017-05-26 RX ADMIN — NYSTATIN 500000 UNITS: 500000 SUSPENSION ORAL at 22:38

## 2017-05-26 RX ADMIN — WARFARIN SODIUM 5 MG: 5 TABLET ORAL at 17:08

## 2017-05-26 RX ADMIN — SODIUM CHLORIDE 8 MG/HR: 9 INJECTION, SOLUTION INTRAVENOUS at 02:10

## 2017-05-26 RX ADMIN — METOPROLOL TARTRATE 25 MG: 25 TABLET ORAL at 08:08

## 2017-05-26 RX ADMIN — LISINOPRIL 20 MG: 20 TABLET ORAL at 08:08

## 2017-05-26 RX ADMIN — HYDROMORPHONE HYDROCHLORIDE 0.5 MG: 1 INJECTION, SOLUTION INTRAMUSCULAR; INTRAVENOUS; SUBCUTANEOUS at 08:08

## 2017-05-26 RX ADMIN — INSULIN GLARGINE 15 UNITS: 100 INJECTION, SOLUTION SUBCUTANEOUS at 22:30

## 2017-05-26 RX ADMIN — CEFAZOLIN SODIUM 1000 MG: 1 SOLUTION INTRAVENOUS at 12:20

## 2017-05-27 LAB
ANION GAP SERPL CALCULATED.3IONS-SCNC: 6 MMOL/L (ref 4–13)
BUN SERPL-MCNC: 8 MG/DL (ref 5–25)
CALCIUM SERPL-MCNC: 8.8 MG/DL (ref 8.3–10.1)
CHLORIDE SERPL-SCNC: 104 MMOL/L (ref 100–108)
CO2 SERPL-SCNC: 30 MMOL/L (ref 21–32)
CREAT SERPL-MCNC: 0.89 MG/DL (ref 0.6–1.3)
ERYTHROCYTE [DISTWIDTH] IN BLOOD BY AUTOMATED COUNT: 14.1 % (ref 11.6–15.1)
GFR SERPL CREATININE-BSD FRML MDRD: >60 ML/MIN/1.73SQ M
GLUCOSE SERPL-MCNC: 120 MG/DL (ref 65–140)
GLUCOSE SERPL-MCNC: 126 MG/DL (ref 65–140)
GLUCOSE SERPL-MCNC: 149 MG/DL (ref 65–140)
GLUCOSE SERPL-MCNC: 232 MG/DL (ref 65–140)
GLUCOSE SERPL-MCNC: 271 MG/DL (ref 65–140)
HCT VFR BLD AUTO: 39.5 % (ref 36.5–49.3)
HGB BLD-MCNC: 13.1 G/DL (ref 12–17)
INR PPP: 1.94 (ref 0.86–1.16)
MCH RBC QN AUTO: 28.9 PG (ref 26.8–34.3)
MCHC RBC AUTO-ENTMCNC: 33.2 G/DL (ref 31.4–37.4)
MCV RBC AUTO: 87 FL (ref 82–98)
PLATELET # BLD AUTO: 168 THOUSANDS/UL (ref 149–390)
PMV BLD AUTO: 13.2 FL (ref 8.9–12.7)
POTASSIUM SERPL-SCNC: 3.3 MMOL/L (ref 3.5–5.3)
PROTHROMBIN TIME: 22 SECONDS (ref 12.1–14.4)
RBC # BLD AUTO: 4.54 MILLION/UL (ref 3.88–5.62)
SODIUM SERPL-SCNC: 140 MMOL/L (ref 136–145)
WBC # BLD AUTO: 9.67 THOUSAND/UL (ref 4.31–10.16)

## 2017-05-27 PROCEDURE — 87045 FECES CULTURE AEROBIC BACT: CPT | Performed by: INTERNAL MEDICINE

## 2017-05-27 PROCEDURE — 87493 C DIFF AMPLIFIED PROBE: CPT | Performed by: INTERNAL MEDICINE

## 2017-05-27 PROCEDURE — G8979 MOBILITY GOAL STATUS: HCPCS

## 2017-05-27 PROCEDURE — 87899 AGENT NOS ASSAY W/OPTIC: CPT | Performed by: INTERNAL MEDICINE

## 2017-05-27 PROCEDURE — 80048 BASIC METABOLIC PNL TOTAL CA: CPT | Performed by: INTERNAL MEDICINE

## 2017-05-27 PROCEDURE — 87015 SPECIMEN INFECT AGNT CONCNTJ: CPT | Performed by: INTERNAL MEDICINE

## 2017-05-27 PROCEDURE — 97163 PT EVAL HIGH COMPLEX 45 MIN: CPT

## 2017-05-27 PROCEDURE — 87046 STOOL CULTR AEROBIC BACT EA: CPT | Performed by: INTERNAL MEDICINE

## 2017-05-27 PROCEDURE — 93005 ELECTROCARDIOGRAM TRACING: CPT | Performed by: INTERNAL MEDICINE

## 2017-05-27 PROCEDURE — 97530 THERAPEUTIC ACTIVITIES: CPT

## 2017-05-27 PROCEDURE — 85610 PROTHROMBIN TIME: CPT | Performed by: INTERNAL MEDICINE

## 2017-05-27 PROCEDURE — 85027 COMPLETE CBC AUTOMATED: CPT | Performed by: INTERNAL MEDICINE

## 2017-05-27 PROCEDURE — G8978 MOBILITY CURRENT STATUS: HCPCS

## 2017-05-27 PROCEDURE — 82948 REAGENT STRIP/BLOOD GLUCOSE: CPT

## 2017-05-27 RX ORDER — ACETAMINOPHEN 325 MG/1
650 TABLET ORAL EVERY 6 HOURS PRN
Status: DISCONTINUED | OUTPATIENT
Start: 2017-05-27 | End: 2017-05-28 | Stop reason: HOSPADM

## 2017-05-27 RX ORDER — GABAPENTIN 100 MG/1
100 CAPSULE ORAL 3 TIMES DAILY
Status: DISCONTINUED | OUTPATIENT
Start: 2017-05-27 | End: 2017-05-28 | Stop reason: HOSPADM

## 2017-05-27 RX ORDER — HYDROCODONE BITARTRATE AND ACETAMINOPHEN 5; 325 MG/1; MG/1
1 TABLET ORAL EVERY 6 HOURS PRN
Status: DISCONTINUED | OUTPATIENT
Start: 2017-05-27 | End: 2017-05-28 | Stop reason: HOSPADM

## 2017-05-27 RX ORDER — QUETIAPINE FUMARATE 200 MG/1
200 TABLET, FILM COATED ORAL DAILY
Status: DISCONTINUED | OUTPATIENT
Start: 2017-05-28 | End: 2017-05-28 | Stop reason: HOSPADM

## 2017-05-27 RX ORDER — POTASSIUM CHLORIDE 750 MG/1
10 TABLET, EXTENDED RELEASE ORAL 2 TIMES DAILY WITH MEALS
Status: DISCONTINUED | OUTPATIENT
Start: 2017-05-27 | End: 2017-05-28 | Stop reason: HOSPADM

## 2017-05-27 RX ADMIN — QUETIAPINE FUMARATE 600 MG: 300 TABLET, FILM COATED ORAL at 21:20

## 2017-05-27 RX ADMIN — NYSTATIN 500000 UNITS: 500000 SUSPENSION ORAL at 09:01

## 2017-05-27 RX ADMIN — GABAPENTIN 100 MG: 100 CAPSULE ORAL at 21:20

## 2017-05-27 RX ADMIN — QUETIAPINE FUMARATE 400 MG: 200 TABLET ORAL at 09:00

## 2017-05-27 RX ADMIN — DIVALPROEX SODIUM 1000 MG: 500 TABLET, EXTENDED RELEASE ORAL at 21:20

## 2017-05-27 RX ADMIN — INSULIN LISPRO 2 UNITS: 100 INJECTION, SOLUTION INTRAVENOUS; SUBCUTANEOUS at 11:47

## 2017-05-27 RX ADMIN — HYDROCODONE BITARTRATE AND ACETAMINOPHEN 1 TABLET: 5; 325 TABLET ORAL at 11:35

## 2017-05-27 RX ADMIN — POTASSIUM CHLORIDE 10 MEQ: 750 TABLET, EXTENDED RELEASE ORAL at 16:06

## 2017-05-27 RX ADMIN — PANTOPRAZOLE SODIUM 40 MG: 40 TABLET, DELAYED RELEASE ORAL at 06:05

## 2017-05-27 RX ADMIN — INSULIN LISPRO 1 UNITS: 100 INJECTION, SOLUTION INTRAVENOUS; SUBCUTANEOUS at 00:21

## 2017-05-27 RX ADMIN — HYDROMORPHONE HYDROCHLORIDE 0.5 MG: 1 INJECTION, SOLUTION INTRAMUSCULAR; INTRAVENOUS; SUBCUTANEOUS at 10:38

## 2017-05-27 RX ADMIN — GABAPENTIN 100 MG: 100 CAPSULE ORAL at 16:06

## 2017-05-27 RX ADMIN — NYSTATIN 500000 UNITS: 500000 SUSPENSION ORAL at 17:07

## 2017-05-27 RX ADMIN — NYSTATIN 500000 UNITS: 500000 SUSPENSION ORAL at 21:21

## 2017-05-27 RX ADMIN — FLUCONAZOLE 100 MG: 100 TABLET ORAL at 09:01

## 2017-05-27 RX ADMIN — INSULIN GLARGINE 15 UNITS: 100 INJECTION, SOLUTION SUBCUTANEOUS at 21:20

## 2017-05-27 RX ADMIN — NICOTINE 1 PATCH: 14 PATCH, EXTENDED RELEASE TRANSDERMAL at 09:00

## 2017-05-27 RX ADMIN — NYSTATIN 500000 UNITS: 500000 SUSPENSION ORAL at 11:47

## 2017-05-27 RX ADMIN — LISINOPRIL 20 MG: 20 TABLET ORAL at 09:01

## 2017-05-27 RX ADMIN — PANTOPRAZOLE SODIUM 40 MG: 40 TABLET, DELAYED RELEASE ORAL at 16:06

## 2017-05-27 RX ADMIN — TORSEMIDE 10 MG: 10 TABLET ORAL at 09:01

## 2017-05-27 RX ADMIN — METOPROLOL TARTRATE 25 MG: 25 TABLET ORAL at 09:01

## 2017-05-27 RX ADMIN — HYDROCODONE BITARTRATE AND ACETAMINOPHEN 1 TABLET: 5; 325 TABLET ORAL at 18:05

## 2017-05-27 RX ADMIN — GABAPENTIN 100 MG: 100 CAPSULE ORAL at 11:35

## 2017-05-28 ENCOUNTER — GENERIC CONVERSION - ENCOUNTER (OUTPATIENT)
Dept: OTHER | Facility: OTHER | Age: 57
End: 2017-05-28

## 2017-05-28 VITALS
TEMPERATURE: 98.3 F | RESPIRATION RATE: 18 BRPM | HEIGHT: 73 IN | OXYGEN SATURATION: 98 % | WEIGHT: 177.03 LBS | DIASTOLIC BLOOD PRESSURE: 76 MMHG | HEART RATE: 77 BPM | BODY MASS INDEX: 23.46 KG/M2 | SYSTOLIC BLOOD PRESSURE: 118 MMHG

## 2017-05-28 LAB
ABO GROUP BLD BPU: NORMAL
ANION GAP SERPL CALCULATED.3IONS-SCNC: 8 MMOL/L (ref 4–13)
BPU ID: NORMAL
BUN SERPL-MCNC: 16 MG/DL (ref 5–25)
C DIFF TOX GENS STL QL NAA+PROBE: NORMAL
CALCIUM SERPL-MCNC: 9 MG/DL (ref 8.3–10.1)
CHLORIDE SERPL-SCNC: 104 MMOL/L (ref 100–108)
CO2 SERPL-SCNC: 28 MMOL/L (ref 21–32)
CREAT SERPL-MCNC: 0.9 MG/DL (ref 0.6–1.3)
CROSSMATCH: NORMAL
GFR SERPL CREATININE-BSD FRML MDRD: >60 ML/MIN/1.73SQ M
GLUCOSE SERPL-MCNC: 109 MG/DL (ref 65–140)
GLUCOSE SERPL-MCNC: 114 MG/DL (ref 65–140)
GLUCOSE SERPL-MCNC: 230 MG/DL (ref 65–140)
GLUCOSE SERPL-MCNC: 252 MG/DL (ref 65–140)
INR PPP: 2.29 (ref 0.86–1.16)
POTASSIUM SERPL-SCNC: 3.4 MMOL/L (ref 3.5–5.3)
PROTHROMBIN TIME: 25.1 SECONDS (ref 12.1–14.4)
SODIUM SERPL-SCNC: 140 MMOL/L (ref 136–145)
UNIT DISPENSE STATUS: NORMAL
UNIT PRODUCT CODE: NORMAL
UNIT RH: NORMAL

## 2017-05-28 PROCEDURE — 93005 ELECTROCARDIOGRAM TRACING: CPT | Performed by: INTERNAL MEDICINE

## 2017-05-28 PROCEDURE — 82948 REAGENT STRIP/BLOOD GLUCOSE: CPT

## 2017-05-28 PROCEDURE — 85610 PROTHROMBIN TIME: CPT | Performed by: INTERNAL MEDICINE

## 2017-05-28 PROCEDURE — 80048 BASIC METABOLIC PNL TOTAL CA: CPT | Performed by: INTERNAL MEDICINE

## 2017-05-28 RX ORDER — QUETIAPINE FUMARATE 200 MG/1
200 TABLET, FILM COATED ORAL DAILY
Qty: 30 TABLET | Refills: 0 | Status: ON HOLD | OUTPATIENT
Start: 2017-05-28 | End: 2017-08-09 | Stop reason: SDUPTHER

## 2017-05-28 RX ORDER — INSULIN GLARGINE 100 [IU]/ML
15 INJECTION, SOLUTION SUBCUTANEOUS
Qty: 10 ML | Refills: 0 | Status: ON HOLD
Start: 2017-05-28 | End: 2017-08-09 | Stop reason: CLARIF

## 2017-05-28 RX ORDER — GABAPENTIN 100 MG/1
100 CAPSULE ORAL 3 TIMES DAILY
Qty: 90 CAPSULE | Refills: 1 | Status: SHIPPED | OUTPATIENT
Start: 2017-05-28 | End: 2017-09-06 | Stop reason: HOSPADM

## 2017-05-28 RX ORDER — NICOTINE 21 MG/24HR
1 PATCH, TRANSDERMAL 24 HOURS TRANSDERMAL DAILY
Qty: 28 PATCH | Refills: 0 | Status: SHIPPED | OUTPATIENT
Start: 2017-05-28 | End: 2017-09-06 | Stop reason: HOSPADM

## 2017-05-28 RX ORDER — TORSEMIDE 20 MG/1
20 TABLET ORAL DAILY
Qty: 30 TABLET | Refills: 0 | Status: SHIPPED | OUTPATIENT
Start: 2017-05-28 | End: 2018-04-16 | Stop reason: SDUPTHER

## 2017-05-28 RX ORDER — QUETIAPINE FUMARATE 300 MG/1
300 TABLET, FILM COATED ORAL
Qty: 30 TABLET | Refills: 0 | Status: SHIPPED | OUTPATIENT
Start: 2017-05-28 | End: 2017-09-06 | Stop reason: HOSPADM

## 2017-05-28 RX ORDER — DIVALPROEX SODIUM 500 MG/1
1000 TABLET, EXTENDED RELEASE ORAL
Qty: 60 TABLET | Refills: 1 | Status: ON HOLD | OUTPATIENT
Start: 2017-05-28 | End: 2017-08-09 | Stop reason: ALTCHOICE

## 2017-05-28 RX ORDER — HYDROCODONE BITARTRATE AND ACETAMINOPHEN 5; 325 MG/1; MG/1
1 TABLET ORAL EVERY 6 HOURS PRN
Qty: 20 TABLET | Refills: 0 | Status: SHIPPED | OUTPATIENT
Start: 2017-05-28 | End: 2017-06-02

## 2017-05-28 RX ADMIN — LISINOPRIL 20 MG: 20 TABLET ORAL at 08:47

## 2017-05-28 RX ADMIN — NYSTATIN 500000 UNITS: 500000 SUSPENSION ORAL at 08:49

## 2017-05-28 RX ADMIN — INSULIN LISPRO 2 UNITS: 100 INJECTION, SOLUTION INTRAVENOUS; SUBCUTANEOUS at 00:30

## 2017-05-28 RX ADMIN — METOPROLOL TARTRATE 25 MG: 25 TABLET ORAL at 08:47

## 2017-05-28 RX ADMIN — HYDROCODONE BITARTRATE AND ACETAMINOPHEN 1 TABLET: 5; 325 TABLET ORAL at 11:28

## 2017-05-28 RX ADMIN — NICOTINE 1 PATCH: 14 PATCH, EXTENDED RELEASE TRANSDERMAL at 08:48

## 2017-05-28 RX ADMIN — PANTOPRAZOLE SODIUM 40 MG: 40 TABLET, DELAYED RELEASE ORAL at 07:47

## 2017-05-28 RX ADMIN — TORSEMIDE 10 MG: 10 TABLET ORAL at 08:47

## 2017-05-28 RX ADMIN — GABAPENTIN 100 MG: 100 CAPSULE ORAL at 08:47

## 2017-05-28 RX ADMIN — INSULIN LISPRO 2 UNITS: 100 INJECTION, SOLUTION INTRAVENOUS; SUBCUTANEOUS at 11:29

## 2017-05-28 RX ADMIN — HYDROCODONE BITARTRATE AND ACETAMINOPHEN 1 TABLET: 5; 325 TABLET ORAL at 05:36

## 2017-05-28 RX ADMIN — NYSTATIN 500000 UNITS: 500000 SUSPENSION ORAL at 11:45

## 2017-05-28 RX ADMIN — QUETIAPINE FUMARATE 200 MG: 200 TABLET ORAL at 08:47

## 2017-05-28 RX ADMIN — POTASSIUM CHLORIDE 10 MEQ: 750 TABLET, EXTENDED RELEASE ORAL at 07:47

## 2017-05-29 LAB
BACTERIA STL CULT: NORMAL
BACTERIA STL CULT: NORMAL

## 2017-05-30 ENCOUNTER — GENERIC CONVERSION - ENCOUNTER (OUTPATIENT)
Dept: OTHER | Facility: OTHER | Age: 57
End: 2017-05-30

## 2017-05-30 LAB
ATRIAL RATE: 79 BPM
ATRIAL RATE: 81 BPM
P AXIS: 72 DEGREES
P AXIS: 77 DEGREES
PR INTERVAL: 188 MS
PR INTERVAL: 198 MS
QRS AXIS: 82 DEGREES
QRS AXIS: 88 DEGREES
QRSD INTERVAL: 120 MS
QRSD INTERVAL: 124 MS
QT INTERVAL: 408 MS
QT INTERVAL: 446 MS
QTC INTERVAL: 467 MS
QTC INTERVAL: 518 MS
T WAVE AXIS: 138 DEGREES
T WAVE AXIS: 87 DEGREES
VENTRICULAR RATE: 79 BPM
VENTRICULAR RATE: 81 BPM

## 2017-05-31 ENCOUNTER — GENERIC CONVERSION - ENCOUNTER (OUTPATIENT)
Dept: OTHER | Facility: OTHER | Age: 57
End: 2017-05-31

## 2017-05-31 LAB
A/G RATIO (HISTORICAL): 1.3 (ref 1.2–2.2)
ALBUMIN SERPL BCP-MCNC: 4.3 G/DL (ref 3.5–5.5)
ALP SERPL-CCNC: 134 IU/L (ref 39–117)
ALT SERPL W P-5'-P-CCNC: 8 IU/L (ref 0–44)
AST SERPL W P-5'-P-CCNC: 15 IU/L (ref 0–40)
BASOPHILS # BLD AUTO: 0 %
BASOPHILS # BLD AUTO: 0 X10E3/UL (ref 0–0.2)
BILIRUB SERPL-MCNC: 0.3 MG/DL (ref 0–1.2)
BUN SERPL-MCNC: 23 MG/DL (ref 6–24)
BUN/CREA RATIO (HISTORICAL): 22 (ref 9–20)
CALCIUM SERPL-MCNC: 9.7 MG/DL (ref 8.7–10.2)
CHLORIDE SERPL-SCNC: 95 MMOL/L (ref 96–106)
CHOLEST SERPL-MCNC: 135 MG/DL (ref 100–199)
CHOLEST/HDLC SERPL: 3.6 RATIO UNITS (ref 0–5)
CO2 SERPL-SCNC: 22 MMOL/L (ref 18–29)
CREAT SERPL-MCNC: 1.06 MG/DL (ref 0.76–1.27)
CREATININE, URINE (HISTORICAL): NORMAL MG/DL
DEPRECATED RDW RBC AUTO: 14.9 % (ref 12.3–15.4)
EGFR AFRICAN AMERICAN (HISTORICAL): 90 ML/MIN/1.73
EGFR-AMERICAN CALC (HISTORICAL): 78 ML/MIN/1.73
EOSINOPHIL # BLD AUTO: 0.5 X10E3/UL (ref 0–0.4)
EOSINOPHIL # BLD AUTO: 3 %
GLUCOSE SERPL-MCNC: 296 MG/DL (ref 65–99)
HBA1C MFR BLD HPLC: 8.5 % (ref 4.8–5.6)
HCT VFR BLD AUTO: 40.4 % (ref 37.5–51)
HDLC SERPL-MCNC: 37 MG/DL
HGB BLD-MCNC: 12.6 G/DL (ref 12.6–17.7)
IMM.GRANULOCYTES (CD4/8) (HISTORICAL): 0 %
IMM.GRANULOCYTES (CD4/8) (HISTORICAL): 0 X10E3/UL (ref 0–0.1)
LDLC SERPL CALC-MCNC: 67 MG/DL (ref 0–99)
LYMPHOCYTES # BLD AUTO: 1.2 X10E3/UL (ref 0.7–3.1)
LYMPHOCYTES # BLD AUTO: 9 %
MCH RBC QN AUTO: 28.6 PG (ref 26.6–33)
MCHC RBC AUTO-ENTMCNC: 31.2 G/DL (ref 31.5–35.7)
MCV RBC AUTO: 92 FL (ref 79–97)
MICROALBUM.,U,RANDOM (HISTORICAL): NORMAL
MONOCYTES # BLD AUTO: 0.6 X10E3/UL (ref 0.1–0.9)
MONOCYTES (HISTORICAL): 4 %
NEUTROPHILS # BLD AUTO: 11.5 X10E3/UL (ref 1.4–7)
NEUTROPHILS # BLD AUTO: 84 %
NO URINE RECEIVED (HISTORICAL): NORMAL
PLATELET # BLD AUTO: 232 X10E3/UL (ref 150–379)
POTASSIUM SERPL-SCNC: 4.5 MMOL/L (ref 3.5–5.2)
PROSTATE SPECIFIC ANTIGEN (HISTORICAL): 2.2 NG/ML (ref 0–4)
RBC (HISTORICAL): 4.41 X10E6/UL (ref 4.14–5.8)
SODIUM SERPL-SCNC: 138 MMOL/L (ref 134–144)
TOT. GLOBULIN, SERUM (HISTORICAL): 3.2 G/DL (ref 1.5–4.5)
TOTAL PROTEIN (HISTORICAL): 7.5 G/DL (ref 6–8.5)
TRIGL SERPL-MCNC: 156 MG/DL (ref 0–149)
TSH SERPL DL<=0.05 MIU/L-ACNC: 0.76 UIU/ML (ref 0.45–4.5)
VLDLC SERPL CALC-MCNC: 31 MG/DL (ref 5–40)
WBC # BLD AUTO: 13.8 X10E3/UL (ref 3.4–10.8)

## 2017-06-04 ENCOUNTER — GENERIC CONVERSION - ENCOUNTER (OUTPATIENT)
Dept: OTHER | Facility: OTHER | Age: 57
End: 2017-06-04

## 2017-06-05 ENCOUNTER — ALLSCRIPTS OFFICE VISIT (OUTPATIENT)
Dept: WOUND CARE | Facility: HOSPITAL | Age: 57
End: 2017-06-05
Attending: PREVENTIVE MEDICINE
Payer: COMMERCIAL

## 2017-06-05 PROCEDURE — 99213 OFFICE O/P EST LOW 20 MIN: CPT | Performed by: PREVENTIVE MEDICINE

## 2017-06-06 ENCOUNTER — ALLSCRIPTS OFFICE VISIT (OUTPATIENT)
Dept: OTHER | Facility: OTHER | Age: 57
End: 2017-06-06

## 2017-06-07 ENCOUNTER — GENERIC CONVERSION - ENCOUNTER (OUTPATIENT)
Dept: OTHER | Facility: OTHER | Age: 57
End: 2017-06-07

## 2017-06-08 ENCOUNTER — GENERIC CONVERSION - ENCOUNTER (OUTPATIENT)
Dept: OTHER | Facility: OTHER | Age: 57
End: 2017-06-08

## 2017-06-12 ENCOUNTER — GENERIC CONVERSION - ENCOUNTER (OUTPATIENT)
Dept: OTHER | Facility: OTHER | Age: 57
End: 2017-06-12

## 2017-06-16 ENCOUNTER — GENERIC CONVERSION - ENCOUNTER (OUTPATIENT)
Dept: OTHER | Facility: OTHER | Age: 57
End: 2017-06-16

## 2017-06-19 ENCOUNTER — ALLSCRIPTS OFFICE VISIT (OUTPATIENT)
Dept: WOUND CARE | Facility: HOSPITAL | Age: 57
End: 2017-06-19
Attending: PREVENTIVE MEDICINE
Payer: COMMERCIAL

## 2017-06-19 PROCEDURE — 97597 DBRDMT OPN WND 1ST 20 CM/<: CPT | Performed by: PREVENTIVE MEDICINE

## 2017-06-20 ENCOUNTER — GENERIC CONVERSION - ENCOUNTER (OUTPATIENT)
Dept: OTHER | Facility: OTHER | Age: 57
End: 2017-06-20

## 2017-06-20 ENCOUNTER — ALLSCRIPTS OFFICE VISIT (OUTPATIENT)
Dept: OTHER | Facility: OTHER | Age: 57
End: 2017-06-20

## 2017-06-21 LAB
INR PPP: 1.2 (ref 0.8–1.2)
PROTHROMBIN TIME: 12.9 SEC (ref 9.1–12)

## 2017-06-26 ENCOUNTER — GENERIC CONVERSION - ENCOUNTER (OUTPATIENT)
Dept: OTHER | Facility: OTHER | Age: 57
End: 2017-06-26

## 2017-06-28 ENCOUNTER — APPOINTMENT (OUTPATIENT)
Dept: WOUND CARE | Facility: HOSPITAL | Age: 57
End: 2017-06-28
Attending: PODIATRIST
Payer: COMMERCIAL

## 2017-06-28 PROCEDURE — 11042 DBRDMT SUBQ TIS 1ST 20SQCM/<: CPT | Performed by: PODIATRIST

## 2017-06-29 ENCOUNTER — ALLSCRIPTS OFFICE VISIT (OUTPATIENT)
Dept: OTHER | Facility: OTHER | Age: 57
End: 2017-06-29

## 2017-07-05 ENCOUNTER — APPOINTMENT (OUTPATIENT)
Dept: WOUND CARE | Facility: HOSPITAL | Age: 57
End: 2017-07-05
Attending: PODIATRIST
Payer: COMMERCIAL

## 2017-07-05 PROCEDURE — 11042 DBRDMT SUBQ TIS 1ST 20SQCM/<: CPT | Performed by: PODIATRIST

## 2017-07-11 ENCOUNTER — GENERIC CONVERSION - ENCOUNTER (OUTPATIENT)
Dept: OTHER | Facility: OTHER | Age: 57
End: 2017-07-11

## 2017-07-12 ENCOUNTER — GENERIC CONVERSION - ENCOUNTER (OUTPATIENT)
Dept: OTHER | Facility: OTHER | Age: 57
End: 2017-07-12

## 2017-07-13 ENCOUNTER — APPOINTMENT (INPATIENT)
Dept: NON INVASIVE DIAGNOSTICS | Facility: HOSPITAL | Age: 57
DRG: 314 | End: 2017-07-13
Payer: COMMERCIAL

## 2017-07-13 ENCOUNTER — HOSPITAL ENCOUNTER (INPATIENT)
Facility: HOSPITAL | Age: 57
LOS: 13 days | Discharge: HOME HEALTH | DRG: 314 | End: 2017-07-26
Attending: PODIATRIST | Admitting: PODIATRIST
Payer: COMMERCIAL

## 2017-07-13 ENCOUNTER — APPOINTMENT (OUTPATIENT)
Dept: WOUND CARE | Facility: HOSPITAL | Age: 57
End: 2017-07-13
Attending: PODIATRIST
Payer: COMMERCIAL

## 2017-07-13 ENCOUNTER — APPOINTMENT (INPATIENT)
Dept: RADIOLOGY | Facility: HOSPITAL | Age: 57
DRG: 314 | End: 2017-07-13
Payer: COMMERCIAL

## 2017-07-13 DIAGNOSIS — Z01.818 PRE-OPERATIVE CLEARANCE: ICD-10-CM

## 2017-07-13 DIAGNOSIS — I50.22 CHRONIC SYSTOLIC CONGESTIVE HEART FAILURE (HCC): Chronic | ICD-10-CM

## 2017-07-13 DIAGNOSIS — E11.65 TYPE 2 DIABETES MELLITUS WITH HYPERGLYCEMIA, WITH LONG-TERM CURRENT USE OF INSULIN (HCC): Primary | ICD-10-CM

## 2017-07-13 DIAGNOSIS — F31.9 BIPOLAR 1 DISORDER (HCC): Chronic | ICD-10-CM

## 2017-07-13 DIAGNOSIS — Z79.4 TYPE 2 DIABETES MELLITUS WITH HYPERGLYCEMIA, WITH LONG-TERM CURRENT USE OF INSULIN (HCC): Primary | ICD-10-CM

## 2017-07-13 DIAGNOSIS — Z95.0 PACEMAKER: ICD-10-CM

## 2017-07-13 DIAGNOSIS — L98.492 NON-HEALING ULCER, WITH FAT LAYER EXPOSED (HCC): ICD-10-CM

## 2017-07-13 DIAGNOSIS — R69 DIAGNOSIS UNKNOWN: ICD-10-CM

## 2017-07-13 DIAGNOSIS — Z95.2 HISTORY OF MITRAL VALVE REPLACEMENT WITH MECHANICAL VALVE: ICD-10-CM

## 2017-07-13 DIAGNOSIS — E11.65 TYPE 2 DIABETES MELLITUS WITH HYPERGLYCEMIA (HCC): ICD-10-CM

## 2017-07-13 DIAGNOSIS — Z09 FOLLOW UP: ICD-10-CM

## 2017-07-13 DIAGNOSIS — I73.9 PERIPHERAL ARTERY DISEASE (HCC): ICD-10-CM

## 2017-07-13 PROBLEM — T14.8XXA NONHEALING NONSURGICAL WOUND: Status: ACTIVE | Noted: 2017-07-13

## 2017-07-13 LAB
ALBUMIN SERPL BCP-MCNC: 3.3 G/DL (ref 3.5–5)
ALP SERPL-CCNC: 168 U/L (ref 46–116)
ALT SERPL W P-5'-P-CCNC: 17 U/L (ref 12–78)
ANION GAP SERPL CALCULATED.3IONS-SCNC: 9 MMOL/L (ref 4–13)
AST SERPL W P-5'-P-CCNC: 14 U/L (ref 5–45)
BASOPHILS # BLD AUTO: 0.03 THOUSANDS/ΜL (ref 0–0.1)
BASOPHILS NFR BLD AUTO: 0 % (ref 0–1)
BILIRUB SERPL-MCNC: 0.33 MG/DL (ref 0.2–1)
BUN SERPL-MCNC: 13 MG/DL (ref 5–25)
CALCIUM SERPL-MCNC: 9.1 MG/DL (ref 8.3–10.1)
CHLORIDE SERPL-SCNC: 102 MMOL/L (ref 100–108)
CO2 SERPL-SCNC: 24 MMOL/L (ref 21–32)
CREAT SERPL-MCNC: 1.14 MG/DL (ref 0.6–1.3)
CRP SERPL QL: 48.5 MG/L
EOSINOPHIL # BLD AUTO: 0.27 THOUSAND/ΜL (ref 0–0.61)
EOSINOPHIL NFR BLD AUTO: 2 % (ref 0–6)
ERYTHROCYTE [DISTWIDTH] IN BLOOD BY AUTOMATED COUNT: 15.1 % (ref 11.6–15.1)
ERYTHROCYTE [SEDIMENTATION RATE] IN BLOOD: 92 MM/HOUR (ref 0–10)
EST. AVERAGE GLUCOSE BLD GHB EST-MCNC: 186 MG/DL
GFR SERPL CREATININE-BSD FRML MDRD: >60 ML/MIN/1.73SQ M
GLUCOSE SERPL-MCNC: 196 MG/DL (ref 65–140)
GLUCOSE SERPL-MCNC: 225 MG/DL (ref 65–140)
GLUCOSE SERPL-MCNC: 230 MG/DL (ref 65–140)
HBA1C MFR BLD: 8.1 % (ref 4.2–6.3)
HCT VFR BLD AUTO: 31.5 % (ref 36.5–49.3)
HGB BLD-MCNC: 10.4 G/DL (ref 12–17)
INR PPP: 1.19 (ref 0.86–1.16)
LYMPHOCYTES # BLD AUTO: 2.07 THOUSANDS/ΜL (ref 0.6–4.47)
LYMPHOCYTES NFR BLD AUTO: 17 % (ref 14–44)
MCH RBC QN AUTO: 28 PG (ref 26.8–34.3)
MCHC RBC AUTO-ENTMCNC: 33 G/DL (ref 31.4–37.4)
MCV RBC AUTO: 85 FL (ref 82–98)
MONOCYTES # BLD AUTO: 0.91 THOUSAND/ΜL (ref 0.17–1.22)
MONOCYTES NFR BLD AUTO: 8 % (ref 4–12)
NEUTROPHILS # BLD AUTO: 8.8 THOUSANDS/ΜL (ref 1.85–7.62)
NEUTS SEG NFR BLD AUTO: 73 % (ref 43–75)
NRBC BLD AUTO-RTO: 0 /100 WBCS
PLATELET # BLD AUTO: 281 THOUSANDS/UL (ref 149–390)
PMV BLD AUTO: 12.3 FL (ref 8.9–12.7)
POTASSIUM SERPL-SCNC: 3.7 MMOL/L (ref 3.5–5.3)
PROT SERPL-MCNC: 8 G/DL (ref 6.4–8.2)
PROTHROMBIN TIME: 15.2 SECONDS (ref 12.1–14.4)
RBC # BLD AUTO: 3.72 MILLION/UL (ref 3.88–5.62)
SODIUM SERPL-SCNC: 135 MMOL/L (ref 136–145)
WBC # BLD AUTO: 12.12 THOUSAND/UL (ref 4.31–10.16)

## 2017-07-13 PROCEDURE — 71010 HB CHEST X-RAY 1 VIEW FRONTAL (PORTABLE): CPT

## 2017-07-13 PROCEDURE — 87040 BLOOD CULTURE FOR BACTERIA: CPT | Performed by: PODIATRIST

## 2017-07-13 PROCEDURE — 99213 OFFICE O/P EST LOW 20 MIN: CPT | Performed by: PODIATRIST

## 2017-07-13 PROCEDURE — 87185 SC STD ENZYME DETCJ PER NZM: CPT | Performed by: PODIATRIST

## 2017-07-13 PROCEDURE — 93005 ELECTROCARDIOGRAM TRACING: CPT | Performed by: PODIATRIST

## 2017-07-13 PROCEDURE — 87075 CULTR BACTERIA EXCEPT BLOOD: CPT | Performed by: PODIATRIST

## 2017-07-13 PROCEDURE — 85610 PROTHROMBIN TIME: CPT | Performed by: PODIATRIST

## 2017-07-13 PROCEDURE — 80053 COMPREHEN METABOLIC PANEL: CPT | Performed by: PODIATRIST

## 2017-07-13 PROCEDURE — 87070 CULTURE OTHR SPECIMN AEROBIC: CPT | Performed by: PODIATRIST

## 2017-07-13 PROCEDURE — 83036 HEMOGLOBIN GLYCOSYLATED A1C: CPT | Performed by: PODIATRIST

## 2017-07-13 PROCEDURE — 82948 REAGENT STRIP/BLOOD GLUCOSE: CPT

## 2017-07-13 PROCEDURE — 86140 C-REACTIVE PROTEIN: CPT | Performed by: PODIATRIST

## 2017-07-13 PROCEDURE — 87186 SC STD MICRODIL/AGAR DIL: CPT | Performed by: PODIATRIST

## 2017-07-13 PROCEDURE — 85652 RBC SED RATE AUTOMATED: CPT | Performed by: PODIATRIST

## 2017-07-13 PROCEDURE — 85025 COMPLETE CBC W/AUTO DIFF WBC: CPT | Performed by: PODIATRIST

## 2017-07-13 PROCEDURE — 73630 X-RAY EXAM OF FOOT: CPT

## 2017-07-13 PROCEDURE — 87077 CULTURE AEROBIC IDENTIFY: CPT | Performed by: PODIATRIST

## 2017-07-13 PROCEDURE — 87205 SMEAR GRAM STAIN: CPT | Performed by: PODIATRIST

## 2017-07-13 RX ORDER — NICOTINE 21 MG/24HR
1 PATCH, TRANSDERMAL 24 HOURS TRANSDERMAL DAILY
Status: DISCONTINUED | OUTPATIENT
Start: 2017-07-14 | End: 2017-07-13

## 2017-07-13 RX ORDER — ONDANSETRON 2 MG/ML
4 INJECTION INTRAMUSCULAR; INTRAVENOUS EVERY 6 HOURS PRN
Status: DISCONTINUED | OUTPATIENT
Start: 2017-07-13 | End: 2017-07-26 | Stop reason: HOSPADM

## 2017-07-13 RX ORDER — GABAPENTIN 300 MG/1
300 CAPSULE ORAL 2 TIMES DAILY
Status: DISCONTINUED | OUTPATIENT
Start: 2017-07-13 | End: 2017-07-13

## 2017-07-13 RX ORDER — QUETIAPINE FUMARATE 100 MG/1
300 TABLET, FILM COATED ORAL
Status: DISCONTINUED | OUTPATIENT
Start: 2017-07-13 | End: 2017-07-14

## 2017-07-13 RX ORDER — METRONIDAZOLE 500 MG/1
500 TABLET ORAL EVERY 8 HOURS SCHEDULED
Status: DISCONTINUED | OUTPATIENT
Start: 2017-07-13 | End: 2017-07-24

## 2017-07-13 RX ORDER — OXYCODONE HYDROCHLORIDE AND ACETAMINOPHEN 5; 325 MG/1; MG/1
1 TABLET ORAL EVERY 4 HOURS PRN
Status: DISCONTINUED | OUTPATIENT
Start: 2017-07-13 | End: 2017-07-26 | Stop reason: HOSPADM

## 2017-07-13 RX ORDER — SODIUM HYPOCHLORITE 2.5 MG/ML
1 SOLUTION TOPICAL DAILY
Status: DISCONTINUED | OUTPATIENT
Start: 2017-07-13 | End: 2017-07-26 | Stop reason: HOSPADM

## 2017-07-13 RX ORDER — ACETAMINOPHEN 325 MG/1
650 TABLET ORAL EVERY 6 HOURS PRN
Status: DISCONTINUED | OUTPATIENT
Start: 2017-07-13 | End: 2017-07-18

## 2017-07-13 RX ORDER — INSULIN GLARGINE 100 [IU]/ML
15 INJECTION, SOLUTION SUBCUTANEOUS
Status: DISCONTINUED | OUTPATIENT
Start: 2017-07-13 | End: 2017-07-14

## 2017-07-13 RX ORDER — GABAPENTIN 100 MG/1
100 CAPSULE ORAL 3 TIMES DAILY
Status: DISCONTINUED | OUTPATIENT
Start: 2017-07-14 | End: 2017-07-14

## 2017-07-13 RX ORDER — NICOTINE 21 MG/24HR
14 PATCH, TRANSDERMAL 24 HOURS TRANSDERMAL DAILY
Status: DISCONTINUED | OUTPATIENT
Start: 2017-07-13 | End: 2017-07-26 | Stop reason: HOSPADM

## 2017-07-13 RX ORDER — POTASSIUM CHLORIDE 20 MEQ/1
20 TABLET, EXTENDED RELEASE ORAL DAILY
Status: DISCONTINUED | OUTPATIENT
Start: 2017-07-14 | End: 2017-07-26 | Stop reason: HOSPADM

## 2017-07-13 RX ORDER — TORSEMIDE 20 MG/1
20 TABLET ORAL DAILY
Status: DISCONTINUED | OUTPATIENT
Start: 2017-07-14 | End: 2017-07-26 | Stop reason: HOSPADM

## 2017-07-13 RX ORDER — QUETIAPINE FUMARATE 100 MG/1
200 TABLET, FILM COATED ORAL EVERY MORNING
Status: DISCONTINUED | OUTPATIENT
Start: 2017-07-14 | End: 2017-07-14

## 2017-07-13 RX ORDER — LORAZEPAM 1 MG/1
1 TABLET ORAL 2 TIMES DAILY PRN
Status: DISCONTINUED | OUTPATIENT
Start: 2017-07-13 | End: 2017-07-26 | Stop reason: HOSPADM

## 2017-07-13 RX ORDER — LISINOPRIL 20 MG/1
20 TABLET ORAL DAILY
Status: DISCONTINUED | OUTPATIENT
Start: 2017-07-14 | End: 2017-07-14

## 2017-07-13 RX ADMIN — INSULIN GLARGINE 15 UNITS: 100 INJECTION, SOLUTION SUBCUTANEOUS at 22:35

## 2017-07-13 RX ADMIN — NICOTINE 14 MG: 14 PATCH, EXTENDED RELEASE TRANSDERMAL at 22:34

## 2017-07-13 RX ADMIN — OXYCODONE HYDROCHLORIDE AND ACETAMINOPHEN 1 TABLET: 5; 325 TABLET ORAL at 19:01

## 2017-07-13 RX ADMIN — CEFAZOLIN SODIUM 1000 MG: 1 SOLUTION INTRAVENOUS at 20:33

## 2017-07-13 RX ADMIN — METRONIDAZOLE 500 MG: 500 TABLET ORAL at 23:33

## 2017-07-13 RX ADMIN — METRONIDAZOLE 500 MG: 500 TABLET ORAL at 19:01

## 2017-07-13 RX ADMIN — HYOSCYAMINE SULFATE 1 APPLICATION: 16 SOLUTION at 19:01

## 2017-07-13 RX ADMIN — INSULIN LISPRO 2 UNITS: 100 INJECTION, SOLUTION INTRAVENOUS; SUBCUTANEOUS at 22:35

## 2017-07-13 RX ADMIN — LORAZEPAM 1 MG: 1 TABLET ORAL at 19:01

## 2017-07-13 RX ADMIN — QUETIAPINE FUMARATE 300 MG: 100 TABLET, FILM COATED ORAL at 23:33

## 2017-07-14 ENCOUNTER — APPOINTMENT (INPATIENT)
Dept: NON INVASIVE DIAGNOSTICS | Facility: HOSPITAL | Age: 57
DRG: 314 | End: 2017-07-14
Payer: COMMERCIAL

## 2017-07-14 LAB
ANION GAP SERPL CALCULATED.3IONS-SCNC: 8 MMOL/L (ref 4–13)
APTT PPP: 37 SECONDS (ref 23–35)
APTT PPP: 38 SECONDS (ref 23–35)
BUN SERPL-MCNC: 12 MG/DL (ref 5–25)
CALCIUM SERPL-MCNC: 8.8 MG/DL (ref 8.3–10.1)
CHLORIDE SERPL-SCNC: 105 MMOL/L (ref 100–108)
CO2 SERPL-SCNC: 25 MMOL/L (ref 21–32)
CREAT SERPL-MCNC: 0.91 MG/DL (ref 0.6–1.3)
ERYTHROCYTE [DISTWIDTH] IN BLOOD BY AUTOMATED COUNT: 15 % (ref 11.6–15.1)
GFR SERPL CREATININE-BSD FRML MDRD: >60 ML/MIN/1.73SQ M
GLUCOSE SERPL-MCNC: 129 MG/DL (ref 65–140)
GLUCOSE SERPL-MCNC: 139 MG/DL (ref 65–140)
GLUCOSE SERPL-MCNC: 151 MG/DL (ref 65–140)
GLUCOSE SERPL-MCNC: 155 MG/DL (ref 65–140)
GLUCOSE SERPL-MCNC: 224 MG/DL (ref 65–140)
HCT VFR BLD AUTO: 31.2 % (ref 36.5–49.3)
HGB BLD-MCNC: 10.3 G/DL (ref 12–17)
INR PPP: 1.28 (ref 0.86–1.16)
MCH RBC QN AUTO: 28.3 PG (ref 26.8–34.3)
MCHC RBC AUTO-ENTMCNC: 33 G/DL (ref 31.4–37.4)
MCV RBC AUTO: 86 FL (ref 82–98)
PLATELET # BLD AUTO: 240 THOUSANDS/UL (ref 149–390)
PMV BLD AUTO: 12.1 FL (ref 8.9–12.7)
POTASSIUM SERPL-SCNC: 3.4 MMOL/L (ref 3.5–5.3)
PROTHROMBIN TIME: 16.1 SECONDS (ref 12.1–14.4)
RBC # BLD AUTO: 3.64 MILLION/UL (ref 3.88–5.62)
SODIUM SERPL-SCNC: 138 MMOL/L (ref 136–145)
WBC # BLD AUTO: 11.09 THOUSAND/UL (ref 4.31–10.16)

## 2017-07-14 PROCEDURE — 82948 REAGENT STRIP/BLOOD GLUCOSE: CPT

## 2017-07-14 PROCEDURE — 93925 LOWER EXTREMITY STUDY: CPT

## 2017-07-14 PROCEDURE — 93923 UPR/LXTR ART STDY 3+ LVLS: CPT

## 2017-07-14 PROCEDURE — 85730 THROMBOPLASTIN TIME PARTIAL: CPT | Performed by: SURGERY

## 2017-07-14 PROCEDURE — 85610 PROTHROMBIN TIME: CPT | Performed by: PHYSICIAN ASSISTANT

## 2017-07-14 PROCEDURE — 85730 THROMBOPLASTIN TIME PARTIAL: CPT | Performed by: PHYSICIAN ASSISTANT

## 2017-07-14 PROCEDURE — 80048 BASIC METABOLIC PNL TOTAL CA: CPT | Performed by: PHYSICIAN ASSISTANT

## 2017-07-14 PROCEDURE — 85027 COMPLETE CBC AUTOMATED: CPT | Performed by: STUDENT IN AN ORGANIZED HEALTH CARE EDUCATION/TRAINING PROGRAM

## 2017-07-14 RX ORDER — INSULIN GLARGINE 100 [IU]/ML
21 INJECTION, SOLUTION SUBCUTANEOUS
Status: DISCONTINUED | OUTPATIENT
Start: 2017-07-14 | End: 2017-07-19

## 2017-07-14 RX ORDER — HEPARIN SODIUM 1000 [USP'U]/ML
2000 INJECTION, SOLUTION INTRAVENOUS; SUBCUTANEOUS AS NEEDED
Status: DISCONTINUED | OUTPATIENT
Start: 2017-07-14 | End: 2017-07-26 | Stop reason: HOSPADM

## 2017-07-14 RX ORDER — HEPARIN SODIUM 1000 [USP'U]/ML
4000 INJECTION, SOLUTION INTRAVENOUS; SUBCUTANEOUS ONCE
Status: COMPLETED | OUTPATIENT
Start: 2017-07-14 | End: 2017-07-14

## 2017-07-14 RX ORDER — HEPARIN SODIUM 10000 [USP'U]/100ML
3-20 INJECTION, SOLUTION INTRAVENOUS
Status: DISCONTINUED | OUTPATIENT
Start: 2017-07-14 | End: 2017-07-26 | Stop reason: HOSPADM

## 2017-07-14 RX ORDER — SODIUM CHLORIDE 9 MG/ML
50 INJECTION, SOLUTION INTRAVENOUS CONTINUOUS
Status: DISCONTINUED | OUTPATIENT
Start: 2017-07-17 | End: 2017-07-18

## 2017-07-14 RX ORDER — HEPARIN SODIUM 1000 [USP'U]/ML
4000 INJECTION, SOLUTION INTRAVENOUS; SUBCUTANEOUS AS NEEDED
Status: DISCONTINUED | OUTPATIENT
Start: 2017-07-14 | End: 2017-07-26 | Stop reason: HOSPADM

## 2017-07-14 RX ORDER — GABAPENTIN 300 MG/1
300 CAPSULE ORAL 2 TIMES DAILY
Status: DISCONTINUED | OUTPATIENT
Start: 2017-07-14 | End: 2017-07-26 | Stop reason: HOSPADM

## 2017-07-14 RX ADMIN — METRONIDAZOLE 500 MG: 500 TABLET ORAL at 06:31

## 2017-07-14 RX ADMIN — CEFAZOLIN SODIUM 1000 MG: 1 SOLUTION INTRAVENOUS at 18:08

## 2017-07-14 RX ADMIN — HEPARIN SODIUM 4000 UNITS: 1000 INJECTION, SOLUTION INTRAVENOUS; SUBCUTANEOUS at 22:06

## 2017-07-14 RX ADMIN — HYOSCYAMINE SULFATE 1 APPLICATION: 16 SOLUTION at 08:41

## 2017-07-14 RX ADMIN — METOPROLOL TARTRATE 25 MG: 25 TABLET ORAL at 08:37

## 2017-07-14 RX ADMIN — TORSEMIDE 20 MG: 20 TABLET ORAL at 08:38

## 2017-07-14 RX ADMIN — INSULIN GLARGINE 21 UNITS: 100 INJECTION, SOLUTION SUBCUTANEOUS at 22:07

## 2017-07-14 RX ADMIN — INSULIN LISPRO 1 UNITS: 100 INJECTION, SOLUTION INTRAVENOUS; SUBCUTANEOUS at 12:35

## 2017-07-14 RX ADMIN — NICOTINE 14 MG: 14 PATCH, EXTENDED RELEASE TRANSDERMAL at 08:40

## 2017-07-14 RX ADMIN — OXYCODONE HYDROCHLORIDE AND ACETAMINOPHEN 1 TABLET: 5; 325 TABLET ORAL at 08:47

## 2017-07-14 RX ADMIN — POTASSIUM CHLORIDE 20 MEQ: 1500 TABLET, EXTENDED RELEASE ORAL at 08:38

## 2017-07-14 RX ADMIN — METRONIDAZOLE 500 MG: 500 TABLET ORAL at 22:06

## 2017-07-14 RX ADMIN — CEFAZOLIN SODIUM 1000 MG: 1 SOLUTION INTRAVENOUS at 03:41

## 2017-07-14 RX ADMIN — QUETIAPINE FUMARATE 800 MG: 200 TABLET ORAL at 22:07

## 2017-07-14 RX ADMIN — ENOXAPARIN SODIUM 40 MG: 40 INJECTION SUBCUTANEOUS at 08:38

## 2017-07-14 RX ADMIN — INSULIN LISPRO 2 UNITS: 100 INJECTION, SOLUTION INTRAVENOUS; SUBCUTANEOUS at 22:05

## 2017-07-14 RX ADMIN — OXYCODONE HYDROCHLORIDE AND ACETAMINOPHEN 1 TABLET: 5; 325 TABLET ORAL at 22:06

## 2017-07-14 RX ADMIN — CEFAZOLIN SODIUM 1000 MG: 1 SOLUTION INTRAVENOUS at 11:20

## 2017-07-14 RX ADMIN — METRONIDAZOLE 500 MG: 500 TABLET ORAL at 14:35

## 2017-07-14 RX ADMIN — GABAPENTIN 300 MG: 300 CAPSULE ORAL at 18:07

## 2017-07-14 RX ADMIN — INSULIN LISPRO 1 UNITS: 100 INJECTION, SOLUTION INTRAVENOUS; SUBCUTANEOUS at 08:38

## 2017-07-14 RX ADMIN — HEPARIN SODIUM 12 UNITS/KG/HR: 10000 INJECTION, SOLUTION INTRAVENOUS at 14:22

## 2017-07-14 RX ADMIN — HEPARIN SODIUM 4000 UNITS: 1000 INJECTION, SOLUTION INTRAVENOUS; SUBCUTANEOUS at 14:23

## 2017-07-14 RX ADMIN — GABAPENTIN 300 MG: 300 CAPSULE ORAL at 08:37

## 2017-07-15 LAB
ANION GAP SERPL CALCULATED.3IONS-SCNC: 6 MMOL/L (ref 4–13)
APTT 1H NP PPP: 39 SECONDS (ref 24–36)
APTT IMM NP PPP: 35 SECONDS (ref 24–36)
APTT PPP: 45 SECONDS (ref 23–35)
APTT PPP: 52 SECONDS (ref 23–35)
APTT PPP: 61 SECONDS (ref 23–35)
ATRIAL RATE: 66 BPM
BUN SERPL-MCNC: 12 MG/DL (ref 5–25)
CALCIUM SERPL-MCNC: 8.5 MG/DL (ref 8.3–10.1)
CHLORIDE SERPL-SCNC: 106 MMOL/L (ref 100–108)
CO2 SERPL-SCNC: 27 MMOL/L (ref 21–32)
CREAT SERPL-MCNC: 0.83 MG/DL (ref 0.6–1.3)
ERYTHROCYTE [DISTWIDTH] IN BLOOD BY AUTOMATED COUNT: 14.8 % (ref 11.6–15.1)
GFR SERPL CREATININE-BSD FRML MDRD: >60 ML/MIN/1.73SQ M
GLUCOSE SERPL-MCNC: 114 MG/DL (ref 65–140)
GLUCOSE SERPL-MCNC: 119 MG/DL (ref 65–140)
GLUCOSE SERPL-MCNC: 148 MG/DL (ref 65–140)
GLUCOSE SERPL-MCNC: 262 MG/DL (ref 65–140)
GLUCOSE SERPL-MCNC: 265 MG/DL (ref 65–140)
HCT VFR BLD AUTO: 32.2 % (ref 36.5–49.3)
HGB BLD-MCNC: 10.8 G/DL (ref 12–17)
MCH RBC QN AUTO: 28.4 PG (ref 26.8–34.3)
MCHC RBC AUTO-ENTMCNC: 33.5 G/DL (ref 31.4–37.4)
MCV RBC AUTO: 85 FL (ref 82–98)
P AXIS: 74 DEGREES
PLATELET # BLD AUTO: 227 THOUSANDS/UL (ref 149–390)
PMV BLD AUTO: 11.2 FL (ref 8.9–12.7)
POTASSIUM SERPL-SCNC: 3.5 MMOL/L (ref 3.5–5.3)
PR INTERVAL: 190 MS
QRS AXIS: 80 DEGREES
QRSD INTERVAL: 126 MS
QT INTERVAL: 422 MS
QTC INTERVAL: 442 MS
RBC # BLD AUTO: 3.8 MILLION/UL (ref 3.88–5.62)
SODIUM SERPL-SCNC: 139 MMOL/L (ref 136–145)
T WAVE AXIS: 165 DEGREES
VENTRICULAR RATE: 66 BPM
WBC # BLD AUTO: 9.67 THOUSAND/UL (ref 4.31–10.16)

## 2017-07-15 PROCEDURE — 82948 REAGENT STRIP/BLOOD GLUCOSE: CPT

## 2017-07-15 PROCEDURE — 85027 COMPLETE CBC AUTOMATED: CPT | Performed by: PODIATRIST

## 2017-07-15 PROCEDURE — 85730 THROMBOPLASTIN TIME PARTIAL: CPT | Performed by: SURGERY

## 2017-07-15 PROCEDURE — 80048 BASIC METABOLIC PNL TOTAL CA: CPT | Performed by: PODIATRIST

## 2017-07-15 PROCEDURE — 85732 THROMBOPLASTIN TIME PARTIAL: CPT | Performed by: PODIATRIST

## 2017-07-15 RX ADMIN — QUETIAPINE FUMARATE 800 MG: 200 TABLET ORAL at 23:08

## 2017-07-15 RX ADMIN — HYOSCYAMINE SULFATE 1 APPLICATION: 16 SOLUTION at 08:54

## 2017-07-15 RX ADMIN — GABAPENTIN 300 MG: 300 CAPSULE ORAL at 17:28

## 2017-07-15 RX ADMIN — OXYCODONE HYDROCHLORIDE AND ACETAMINOPHEN 1 TABLET: 5; 325 TABLET ORAL at 08:53

## 2017-07-15 RX ADMIN — METRONIDAZOLE 500 MG: 500 TABLET ORAL at 23:08

## 2017-07-15 RX ADMIN — CEFAZOLIN SODIUM 1000 MG: 1 SOLUTION INTRAVENOUS at 12:53

## 2017-07-15 RX ADMIN — ACETAMINOPHEN 650 MG: 325 TABLET, FILM COATED ORAL at 17:28

## 2017-07-15 RX ADMIN — CEFAZOLIN SODIUM 1000 MG: 1 SOLUTION INTRAVENOUS at 18:01

## 2017-07-15 RX ADMIN — OXYCODONE HYDROCHLORIDE AND ACETAMINOPHEN 1 TABLET: 5; 325 TABLET ORAL at 04:26

## 2017-07-15 RX ADMIN — TORSEMIDE 20 MG: 20 TABLET ORAL at 08:53

## 2017-07-15 RX ADMIN — GABAPENTIN 300 MG: 300 CAPSULE ORAL at 08:53

## 2017-07-15 RX ADMIN — INSULIN LISPRO 2 UNITS: 100 INJECTION, SOLUTION INTRAVENOUS; SUBCUTANEOUS at 23:08

## 2017-07-15 RX ADMIN — INSULIN GLARGINE 21 UNITS: 100 INJECTION, SOLUTION SUBCUTANEOUS at 23:08

## 2017-07-15 RX ADMIN — METRONIDAZOLE 500 MG: 500 TABLET ORAL at 14:32

## 2017-07-15 RX ADMIN — HEPARIN SODIUM 16 UNITS/KG/HR: 10000 INJECTION, SOLUTION INTRAVENOUS at 08:54

## 2017-07-15 RX ADMIN — INSULIN LISPRO 2 UNITS: 100 INJECTION, SOLUTION INTRAVENOUS; SUBCUTANEOUS at 12:54

## 2017-07-15 RX ADMIN — CEFAZOLIN SODIUM 1000 MG: 1 SOLUTION INTRAVENOUS at 04:24

## 2017-07-15 RX ADMIN — POTASSIUM CHLORIDE 20 MEQ: 1500 TABLET, EXTENDED RELEASE ORAL at 08:53

## 2017-07-15 RX ADMIN — METRONIDAZOLE 500 MG: 500 TABLET ORAL at 05:46

## 2017-07-15 RX ADMIN — NICOTINE 14 MG: 14 PATCH, EXTENDED RELEASE TRANSDERMAL at 08:55

## 2017-07-15 RX ADMIN — OXYCODONE HYDROCHLORIDE AND ACETAMINOPHEN 1 TABLET: 5; 325 TABLET ORAL at 17:28

## 2017-07-15 RX ADMIN — HEPARIN SODIUM 2000 UNITS: 1000 INJECTION, SOLUTION INTRAVENOUS; SUBCUTANEOUS at 23:05

## 2017-07-15 RX ADMIN — METOPROLOL TARTRATE 25 MG: 25 TABLET ORAL at 08:53

## 2017-07-15 RX ADMIN — HEPARIN SODIUM 2000 UNITS: 1000 INJECTION, SOLUTION INTRAVENOUS; SUBCUTANEOUS at 12:53

## 2017-07-15 RX ADMIN — ACETAMINOPHEN 650 MG: 325 TABLET, FILM COATED ORAL at 08:53

## 2017-07-16 LAB
ANION GAP SERPL CALCULATED.3IONS-SCNC: 6 MMOL/L (ref 4–13)
APTT PPP: 55 SECONDS (ref 23–35)
APTT PPP: 76 SECONDS (ref 23–35)
APTT PPP: 84 SECONDS (ref 23–35)
BUN SERPL-MCNC: 12 MG/DL (ref 5–25)
CALCIUM SERPL-MCNC: 8.7 MG/DL (ref 8.3–10.1)
CHLORIDE SERPL-SCNC: 103 MMOL/L (ref 100–108)
CO2 SERPL-SCNC: 27 MMOL/L (ref 21–32)
CREAT SERPL-MCNC: 0.9 MG/DL (ref 0.6–1.3)
ERYTHROCYTE [DISTWIDTH] IN BLOOD BY AUTOMATED COUNT: 15 % (ref 11.6–15.1)
GFR SERPL CREATININE-BSD FRML MDRD: >60 ML/MIN/1.73SQ M
GLUCOSE SERPL-MCNC: 127 MG/DL (ref 65–140)
GLUCOSE SERPL-MCNC: 164 MG/DL (ref 65–140)
GLUCOSE SERPL-MCNC: 177 MG/DL (ref 65–140)
GLUCOSE SERPL-MCNC: 210 MG/DL (ref 65–140)
GLUCOSE SERPL-MCNC: 233 MG/DL (ref 65–140)
HCT VFR BLD AUTO: 32.8 % (ref 36.5–49.3)
HGB BLD-MCNC: 10.8 G/DL (ref 12–17)
INR PPP: 1.23 (ref 0.86–1.16)
MCH RBC QN AUTO: 28 PG (ref 26.8–34.3)
MCHC RBC AUTO-ENTMCNC: 32.9 G/DL (ref 31.4–37.4)
MCV RBC AUTO: 85 FL (ref 82–98)
PLATELET # BLD AUTO: 261 THOUSANDS/UL (ref 149–390)
PMV BLD AUTO: 12 FL (ref 8.9–12.7)
POTASSIUM SERPL-SCNC: 3.5 MMOL/L (ref 3.5–5.3)
PROTHROMBIN TIME: 15.6 SECONDS (ref 12.1–14.4)
RBC # BLD AUTO: 3.86 MILLION/UL (ref 3.88–5.62)
SODIUM SERPL-SCNC: 136 MMOL/L (ref 136–145)
WBC # BLD AUTO: 10.73 THOUSAND/UL (ref 4.31–10.16)

## 2017-07-16 PROCEDURE — 85610 PROTHROMBIN TIME: CPT | Performed by: PODIATRIST

## 2017-07-16 PROCEDURE — 85730 THROMBOPLASTIN TIME PARTIAL: CPT | Performed by: SURGERY

## 2017-07-16 PROCEDURE — 82948 REAGENT STRIP/BLOOD GLUCOSE: CPT

## 2017-07-16 PROCEDURE — 85730 THROMBOPLASTIN TIME PARTIAL: CPT | Performed by: PODIATRIST

## 2017-07-16 PROCEDURE — 80048 BASIC METABOLIC PNL TOTAL CA: CPT | Performed by: STUDENT IN AN ORGANIZED HEALTH CARE EDUCATION/TRAINING PROGRAM

## 2017-07-16 PROCEDURE — 85027 COMPLETE CBC AUTOMATED: CPT | Performed by: STUDENT IN AN ORGANIZED HEALTH CARE EDUCATION/TRAINING PROGRAM

## 2017-07-16 RX ADMIN — CEFAZOLIN SODIUM 1000 MG: 1 SOLUTION INTRAVENOUS at 10:58

## 2017-07-16 RX ADMIN — POTASSIUM CHLORIDE 20 MEQ: 1500 TABLET, EXTENDED RELEASE ORAL at 07:47

## 2017-07-16 RX ADMIN — OXYCODONE HYDROCHLORIDE AND ACETAMINOPHEN 1 TABLET: 5; 325 TABLET ORAL at 07:47

## 2017-07-16 RX ADMIN — INSULIN LISPRO 3 UNITS: 100 INJECTION, SOLUTION INTRAVENOUS; SUBCUTANEOUS at 16:29

## 2017-07-16 RX ADMIN — GABAPENTIN 300 MG: 300 CAPSULE ORAL at 17:52

## 2017-07-16 RX ADMIN — CEFAZOLIN SODIUM 1000 MG: 1 SOLUTION INTRAVENOUS at 17:52

## 2017-07-16 RX ADMIN — OXYCODONE HYDROCHLORIDE AND ACETAMINOPHEN 1 TABLET: 5; 325 TABLET ORAL at 21:36

## 2017-07-16 RX ADMIN — TORSEMIDE 20 MG: 20 TABLET ORAL at 07:47

## 2017-07-16 RX ADMIN — HEPARIN SODIUM 2000 UNITS: 1000 INJECTION, SOLUTION INTRAVENOUS; SUBCUTANEOUS at 15:53

## 2017-07-16 RX ADMIN — SODIUM CHLORIDE 50 ML/HR: 0.9 INJECTION, SOLUTION INTRAVENOUS at 23:05

## 2017-07-16 RX ADMIN — INSULIN LISPRO 2 UNITS: 100 INJECTION, SOLUTION INTRAVENOUS; SUBCUTANEOUS at 21:36

## 2017-07-16 RX ADMIN — METOPROLOL TARTRATE 25 MG: 25 TABLET ORAL at 07:47

## 2017-07-16 RX ADMIN — INSULIN LISPRO 1 UNITS: 100 INJECTION, SOLUTION INTRAVENOUS; SUBCUTANEOUS at 07:56

## 2017-07-16 RX ADMIN — METRONIDAZOLE 500 MG: 500 TABLET ORAL at 21:36

## 2017-07-16 RX ADMIN — METRONIDAZOLE 500 MG: 500 TABLET ORAL at 06:07

## 2017-07-16 RX ADMIN — OXYCODONE HYDROCHLORIDE AND ACETAMINOPHEN 1 TABLET: 5; 325 TABLET ORAL at 12:26

## 2017-07-16 RX ADMIN — QUETIAPINE FUMARATE 800 MG: 200 TABLET ORAL at 21:36

## 2017-07-16 RX ADMIN — ACETAMINOPHEN 650 MG: 325 TABLET, FILM COATED ORAL at 21:36

## 2017-07-16 RX ADMIN — HEPARIN SODIUM 22 UNITS/KG/HR: 10000 INJECTION, SOLUTION INTRAVENOUS at 18:41

## 2017-07-16 RX ADMIN — ACETAMINOPHEN 650 MG: 325 TABLET, FILM COATED ORAL at 12:26

## 2017-07-16 RX ADMIN — INSULIN GLARGINE 21 UNITS: 100 INJECTION, SOLUTION SUBCUTANEOUS at 21:36

## 2017-07-16 RX ADMIN — NICOTINE 14 MG: 14 PATCH, EXTENDED RELEASE TRANSDERMAL at 07:48

## 2017-07-16 RX ADMIN — INSULIN LISPRO 3 UNITS: 100 INJECTION, SOLUTION INTRAVENOUS; SUBCUTANEOUS at 12:26

## 2017-07-16 RX ADMIN — HEPARIN SODIUM 20 UNITS/KG/HR: 10000 INJECTION, SOLUTION INTRAVENOUS at 03:23

## 2017-07-16 RX ADMIN — GABAPENTIN 300 MG: 300 CAPSULE ORAL at 07:47

## 2017-07-16 RX ADMIN — METRONIDAZOLE 500 MG: 500 TABLET ORAL at 12:26

## 2017-07-16 RX ADMIN — CEFAZOLIN SODIUM 1000 MG: 1 SOLUTION INTRAVENOUS at 03:22

## 2017-07-16 RX ADMIN — INSULIN LISPRO 2 UNITS: 100 INJECTION, SOLUTION INTRAVENOUS; SUBCUTANEOUS at 12:26

## 2017-07-17 ENCOUNTER — GENERIC CONVERSION - ENCOUNTER (OUTPATIENT)
Dept: OTHER | Facility: OTHER | Age: 57
End: 2017-07-17

## 2017-07-17 ENCOUNTER — APPOINTMENT (INPATIENT)
Dept: RADIOLOGY | Facility: HOSPITAL | Age: 57
DRG: 314 | End: 2017-07-17
Attending: PODIATRIST
Payer: COMMERCIAL

## 2017-07-17 ENCOUNTER — APPOINTMENT (INPATIENT)
Dept: RADIOLOGY | Facility: HOSPITAL | Age: 57
DRG: 314 | End: 2017-07-17
Attending: SURGERY
Payer: COMMERCIAL

## 2017-07-17 LAB
ANION GAP SERPL CALCULATED.3IONS-SCNC: 12 MMOL/L (ref 4–13)
ANION GAP SERPL CALCULATED.3IONS-SCNC: 6 MMOL/L (ref 4–13)
APTT PPP: 89 SECONDS (ref 23–35)
ATRIAL RATE: 78 BPM
ATRIAL RATE: 84 BPM
BACTERIA SPEC ANAEROBE CULT: NORMAL
BACTERIA SPEC ANAEROBE CULT: NORMAL
BASOPHILS # BLD AUTO: 0.04 THOUSANDS/ΜL (ref 0–0.1)
BASOPHILS NFR BLD AUTO: 0 % (ref 0–1)
BUN SERPL-MCNC: 11 MG/DL (ref 5–25)
BUN SERPL-MCNC: 9 MG/DL (ref 5–25)
CALCIUM SERPL-MCNC: 8.6 MG/DL (ref 8.3–10.1)
CALCIUM SERPL-MCNC: 8.7 MG/DL (ref 8.3–10.1)
CHLORIDE SERPL-SCNC: 103 MMOL/L (ref 100–108)
CHLORIDE SERPL-SCNC: 107 MMOL/L (ref 100–108)
CO2 SERPL-SCNC: 21 MMOL/L (ref 21–32)
CO2 SERPL-SCNC: 25 MMOL/L (ref 21–32)
CREAT SERPL-MCNC: 0.92 MG/DL (ref 0.6–1.3)
CREAT SERPL-MCNC: 1.04 MG/DL (ref 0.6–1.3)
EOSINOPHIL # BLD AUTO: 0.24 THOUSAND/ΜL (ref 0–0.61)
EOSINOPHIL NFR BLD AUTO: 1 % (ref 0–6)
ERYTHROCYTE [DISTWIDTH] IN BLOOD BY AUTOMATED COUNT: 15.3 % (ref 11.6–15.1)
GFR SERPL CREATININE-BSD FRML MDRD: >60 ML/MIN/1.73SQ M
GFR SERPL CREATININE-BSD FRML MDRD: >60 ML/MIN/1.73SQ M
GLUCOSE SERPL-MCNC: 127 MG/DL (ref 65–140)
GLUCOSE SERPL-MCNC: 136 MG/DL (ref 65–140)
GLUCOSE SERPL-MCNC: 141 MG/DL (ref 65–140)
GLUCOSE SERPL-MCNC: 147 MG/DL (ref 65–140)
GLUCOSE SERPL-MCNC: 206 MG/DL (ref 65–140)
GLUCOSE SERPL-MCNC: 244 MG/DL (ref 65–140)
HCT VFR BLD AUTO: 32.2 % (ref 36.5–49.3)
HGB BLD-MCNC: 10.8 G/DL (ref 12–17)
INR PPP: 1.2 (ref 0.86–1.16)
LACTATE SERPL-SCNC: 5.1 MMOL/L (ref 0.5–2)
LYMPHOCYTES # BLD AUTO: 1.71 THOUSANDS/ΜL (ref 0.6–4.47)
LYMPHOCYTES NFR BLD AUTO: 10 % (ref 14–44)
MCH RBC QN AUTO: 28.1 PG (ref 26.8–34.3)
MCHC RBC AUTO-ENTMCNC: 33.5 G/DL (ref 31.4–37.4)
MCV RBC AUTO: 84 FL (ref 82–98)
MONOCYTES # BLD AUTO: 0.73 THOUSAND/ΜL (ref 0.17–1.22)
MONOCYTES NFR BLD AUTO: 4 % (ref 4–12)
NEUTROPHILS # BLD AUTO: 14.74 THOUSANDS/ΜL (ref 1.85–7.62)
NEUTS SEG NFR BLD AUTO: 85 % (ref 43–75)
NRBC BLD AUTO-RTO: 0 /100 WBCS
P AXIS: 64 DEGREES
P AXIS: 73 DEGREES
PLATELET # BLD AUTO: 257 THOUSANDS/UL (ref 149–390)
PMV BLD AUTO: 12.3 FL (ref 8.9–12.7)
POTASSIUM SERPL-SCNC: 3.5 MMOL/L (ref 3.5–5.3)
POTASSIUM SERPL-SCNC: 3.6 MMOL/L (ref 3.5–5.3)
PR INTERVAL: 200 MS
PR INTERVAL: 216 MS
PROTHROMBIN TIME: 15.3 SECONDS (ref 12.1–14.4)
QRS AXIS: 81 DEGREES
QRS AXIS: 84 DEGREES
QRSD INTERVAL: 120 MS
QRSD INTERVAL: 126 MS
QT INTERVAL: 396 MS
QT INTERVAL: 398 MS
QTC INTERVAL: 453 MS
QTC INTERVAL: 467 MS
RBC # BLD AUTO: 3.84 MILLION/UL (ref 3.88–5.62)
SODIUM SERPL-SCNC: 136 MMOL/L (ref 136–145)
SODIUM SERPL-SCNC: 138 MMOL/L (ref 136–145)
T WAVE AXIS: 212 DEGREES
T WAVE AXIS: 266 DEGREES
TROPONIN I SERPL-MCNC: <0.02 NG/ML
TROPONIN I SERPL-MCNC: <0.02 NG/ML
VENTRICULAR RATE: 78 BPM
VENTRICULAR RATE: 84 BPM
WBC # BLD AUTO: 17.51 THOUSAND/UL (ref 4.31–10.16)

## 2017-07-17 PROCEDURE — 71020 HB CHEST X-RAY 2VW FRONTAL&LATL: CPT

## 2017-07-17 PROCEDURE — 84484 ASSAY OF TROPONIN QUANT: CPT | Performed by: PHYSICIAN ASSISTANT

## 2017-07-17 PROCEDURE — C1769 GUIDE WIRE: HCPCS

## 2017-07-17 PROCEDURE — 87040 BLOOD CULTURE FOR BACTERIA: CPT | Performed by: PHYSICIAN ASSISTANT

## 2017-07-17 PROCEDURE — 37232 HB TIB/PER REVASC ADD-ON: CPT

## 2017-07-17 PROCEDURE — 85610 PROTHROMBIN TIME: CPT | Performed by: PODIATRIST

## 2017-07-17 PROCEDURE — C1751 CATH, INF, PER/CENT/MIDLINE: HCPCS

## 2017-07-17 PROCEDURE — 83605 ASSAY OF LACTIC ACID: CPT | Performed by: PHYSICIAN ASSISTANT

## 2017-07-17 PROCEDURE — 99153 MOD SED SAME PHYS/QHP EA: CPT

## 2017-07-17 PROCEDURE — 85730 THROMBOPLASTIN TIME PARTIAL: CPT | Performed by: SURGERY

## 2017-07-17 PROCEDURE — 75625 CONTRAST EXAM ABDOMINL AORTA: CPT

## 2017-07-17 PROCEDURE — C1725 CATH, TRANSLUMIN NON-LASER: HCPCS

## 2017-07-17 PROCEDURE — C1894 INTRO/SHEATH, NON-LASER: HCPCS

## 2017-07-17 PROCEDURE — 85025 COMPLETE CBC W/AUTO DIFF WBC: CPT | Performed by: PHYSICIAN ASSISTANT

## 2017-07-17 PROCEDURE — 80048 BASIC METABOLIC PNL TOTAL CA: CPT | Performed by: PHYSICIAN ASSISTANT

## 2017-07-17 PROCEDURE — 36569 INSJ PICC 5 YR+ W/O IMAGING: CPT

## 2017-07-17 PROCEDURE — 37224 HB FEM/POPL REVAS W/TLA: CPT

## 2017-07-17 PROCEDURE — 75710 ARTERY X-RAYS ARM/LEG: CPT

## 2017-07-17 PROCEDURE — 99152 MOD SED SAME PHYS/QHP 5/>YRS: CPT

## 2017-07-17 PROCEDURE — 37228 HB TIB/PER REVASC W/TLA: CPT

## 2017-07-17 PROCEDURE — 93005 ELECTROCARDIOGRAM TRACING: CPT

## 2017-07-17 PROCEDURE — 02HV33Z INSERTION OF INFUSION DEVICE INTO SUPERIOR VENA CAVA, PERCUTANEOUS APPROACH: ICD-10-PCS | Performed by: PODIATRIST

## 2017-07-17 PROCEDURE — C1760 CLOSURE DEV, VASC: HCPCS

## 2017-07-17 PROCEDURE — 82948 REAGENT STRIP/BLOOD GLUCOSE: CPT

## 2017-07-17 PROCEDURE — B41DYZZ FLUOROSCOPY OF AORTA AND BILATERAL LOWER EXTREMITY ARTERIES USING OTHER CONTRAST: ICD-10-PCS | Performed by: RADIOLOGY

## 2017-07-17 PROCEDURE — 76937 US GUIDE VASCULAR ACCESS: CPT

## 2017-07-17 PROCEDURE — 93005 ELECTROCARDIOGRAM TRACING: CPT | Performed by: PHYSICIAN ASSISTANT

## 2017-07-17 RX ORDER — LEVOFLOXACIN 5 MG/ML
750 INJECTION, SOLUTION INTRAVENOUS EVERY 24 HOURS
Status: DISCONTINUED | OUTPATIENT
Start: 2017-07-17 | End: 2017-07-17

## 2017-07-17 RX ORDER — MIDAZOLAM HYDROCHLORIDE 1 MG/ML
INJECTION INTRAMUSCULAR; INTRAVENOUS CODE/TRAUMA/SEDATION MEDICATION
Status: COMPLETED | OUTPATIENT
Start: 2017-07-17 | End: 2017-07-17

## 2017-07-17 RX ORDER — FENTANYL CITRATE 50 UG/ML
INJECTION, SOLUTION INTRAMUSCULAR; INTRAVENOUS CODE/TRAUMA/SEDATION MEDICATION
Status: COMPLETED | OUTPATIENT
Start: 2017-07-17 | End: 2017-07-17

## 2017-07-17 RX ORDER — LISINOPRIL 20 MG/1
20 TABLET ORAL DAILY
Status: DISCONTINUED | OUTPATIENT
Start: 2017-07-18 | End: 2017-07-26 | Stop reason: HOSPADM

## 2017-07-17 RX ORDER — LORAZEPAM 2 MG/ML
1 INJECTION INTRAMUSCULAR ONCE
Status: COMPLETED | OUTPATIENT
Start: 2017-07-17 | End: 2017-07-17

## 2017-07-17 RX ORDER — NITROGLYCERIN 0.4 MG/1
0.4 TABLET SUBLINGUAL
Status: DISCONTINUED | OUTPATIENT
Start: 2017-07-17 | End: 2017-07-26 | Stop reason: HOSPADM

## 2017-07-17 RX ADMIN — METRONIDAZOLE 500 MG: 500 TABLET ORAL at 05:35

## 2017-07-17 RX ADMIN — FENTANYL CITRATE 25 MCG: 50 INJECTION, SOLUTION INTRAMUSCULAR; INTRAVENOUS at 09:28

## 2017-07-17 RX ADMIN — VANCOMYCIN HYDROCHLORIDE 1250 MG: 1 INJECTION, POWDER, LYOPHILIZED, FOR SOLUTION INTRAVENOUS at 21:52

## 2017-07-17 RX ADMIN — INSULIN LISPRO 1 UNITS: 100 INJECTION, SOLUTION INTRAVENOUS; SUBCUTANEOUS at 21:52

## 2017-07-17 RX ADMIN — CEFAZOLIN SODIUM 1000 MG: 1 SOLUTION INTRAVENOUS at 10:57

## 2017-07-17 RX ADMIN — LORAZEPAM 1 MG: 2 INJECTION INTRAMUSCULAR; INTRAVENOUS at 19:02

## 2017-07-17 RX ADMIN — OXYCODONE HYDROCHLORIDE AND ACETAMINOPHEN 1 TABLET: 5; 325 TABLET ORAL at 17:52

## 2017-07-17 RX ADMIN — FENTANYL CITRATE 50 MCG: 50 INJECTION, SOLUTION INTRAMUSCULAR; INTRAVENOUS at 08:39

## 2017-07-17 RX ADMIN — MIDAZOLAM 1 MG: 1 INJECTION INTRAMUSCULAR; INTRAVENOUS at 08:56

## 2017-07-17 RX ADMIN — CEFAZOLIN SODIUM 1000 MG: 1 SOLUTION INTRAVENOUS at 18:12

## 2017-07-17 RX ADMIN — OXYCODONE HYDROCHLORIDE AND ACETAMINOPHEN 1 TABLET: 5; 325 TABLET ORAL at 05:35

## 2017-07-17 RX ADMIN — FENTANYL CITRATE 50 MCG: 50 INJECTION, SOLUTION INTRAMUSCULAR; INTRAVENOUS at 10:01

## 2017-07-17 RX ADMIN — METRONIDAZOLE 500 MG: 500 TABLET ORAL at 15:06

## 2017-07-17 RX ADMIN — ACETAMINOPHEN 650 MG: 325 TABLET, FILM COATED ORAL at 05:35

## 2017-07-17 RX ADMIN — MIDAZOLAM 1 MG: 1 INJECTION INTRAMUSCULAR; INTRAVENOUS at 08:38

## 2017-07-17 RX ADMIN — POTASSIUM CHLORIDE 20 MEQ: 1500 TABLET, EXTENDED RELEASE ORAL at 10:48

## 2017-07-17 RX ADMIN — CEFAZOLIN SODIUM 1000 MG: 1 SOLUTION INTRAVENOUS at 04:00

## 2017-07-17 RX ADMIN — INSULIN GLARGINE 21 UNITS: 100 INJECTION, SOLUTION SUBCUTANEOUS at 21:52

## 2017-07-17 RX ADMIN — FENTANYL CITRATE 25 MCG: 50 INJECTION, SOLUTION INTRAMUSCULAR; INTRAVENOUS at 08:56

## 2017-07-17 RX ADMIN — INSULIN LISPRO 3 UNITS: 100 INJECTION, SOLUTION INTRAVENOUS; SUBCUTANEOUS at 17:24

## 2017-07-17 RX ADMIN — GABAPENTIN 300 MG: 300 CAPSULE ORAL at 10:47

## 2017-07-17 RX ADMIN — HEPARIN SODIUM 22 UNITS/KG/HR: 10000 INJECTION, SOLUTION INTRAVENOUS at 20:59

## 2017-07-17 RX ADMIN — INSULIN LISPRO 2 UNITS: 100 INJECTION, SOLUTION INTRAVENOUS; SUBCUTANEOUS at 17:24

## 2017-07-17 RX ADMIN — METOPROLOL TARTRATE 25 MG: 25 TABLET ORAL at 10:47

## 2017-07-17 RX ADMIN — NITROGLYCERIN 200 MCG: 20 INJECTION INTRAVENOUS at 09:48

## 2017-07-17 RX ADMIN — SODIUM CHLORIDE 50 ML/HR: 0.9 INJECTION, SOLUTION INTRAVENOUS at 20:51

## 2017-07-17 RX ADMIN — CEFEPIME HYDROCHLORIDE 2000 MG: 2 INJECTION, POWDER, FOR SOLUTION INTRAVENOUS at 20:11

## 2017-07-17 RX ADMIN — GABAPENTIN 300 MG: 300 CAPSULE ORAL at 17:22

## 2017-07-17 RX ADMIN — NICOTINE 14 MG: 14 PATCH, EXTENDED RELEASE TRANSDERMAL at 10:48

## 2017-07-17 RX ADMIN — MIDAZOLAM 1 MG: 1 INJECTION INTRAMUSCULAR; INTRAVENOUS at 09:06

## 2017-07-17 RX ADMIN — METRONIDAZOLE 500 MG: 500 TABLET ORAL at 21:52

## 2017-07-17 RX ADMIN — IODIXANOL 143 ML: 320 INJECTION, SOLUTION INTRAVASCULAR at 10:12

## 2017-07-17 RX ADMIN — MIDAZOLAM 1 MG: 1 INJECTION INTRAMUSCULAR; INTRAVENOUS at 09:28

## 2017-07-17 RX ADMIN — NITROGLYCERIN 150 MCG: 20 INJECTION INTRAVENOUS at 09:56

## 2017-07-17 RX ADMIN — TORSEMIDE 20 MG: 20 TABLET ORAL at 10:48

## 2017-07-17 RX ADMIN — SODIUM CHLORIDE 500 ML: 0.9 INJECTION, SOLUTION INTRAVENOUS at 22:38

## 2017-07-17 RX ADMIN — FENTANYL CITRATE 25 MCG: 50 INJECTION, SOLUTION INTRAMUSCULAR; INTRAVENOUS at 09:05

## 2017-07-17 RX ADMIN — FENTANYL CITRATE 25 MCG: 50 INJECTION, SOLUTION INTRAMUSCULAR; INTRAVENOUS at 09:06

## 2017-07-17 RX ADMIN — QUETIAPINE FUMARATE 800 MG: 200 TABLET ORAL at 21:51

## 2017-07-18 ENCOUNTER — APPOINTMENT (INPATIENT)
Dept: NON INVASIVE DIAGNOSTICS | Facility: HOSPITAL | Age: 57
DRG: 314 | End: 2017-07-18
Payer: COMMERCIAL

## 2017-07-18 ENCOUNTER — APPOINTMENT (INPATIENT)
Dept: RADIOLOGY | Facility: HOSPITAL | Age: 57
DRG: 314 | End: 2017-07-18
Payer: COMMERCIAL

## 2017-07-18 LAB
ALBUMIN SERPL BCP-MCNC: 2.9 G/DL (ref 3.5–5)
ALP SERPL-CCNC: 139 U/L (ref 46–116)
ALT SERPL W P-5'-P-CCNC: 13 U/L (ref 12–78)
ANION GAP SERPL CALCULATED.3IONS-SCNC: 10 MMOL/L (ref 4–13)
APTT PPP: 71 SECONDS (ref 23–35)
AST SERPL W P-5'-P-CCNC: 22 U/L (ref 5–45)
BACTERIA BLD CULT: NORMAL
BACTERIA BLD CULT: NORMAL
BACTERIA WND AEROBE CULT: ABNORMAL
BASOPHILS # BLD AUTO: 0.04 THOUSANDS/ΜL (ref 0–0.1)
BASOPHILS NFR BLD AUTO: 0 % (ref 0–1)
BILIRUB DIRECT SERPL-MCNC: 0.15 MG/DL (ref 0–0.2)
BILIRUB SERPL-MCNC: 0.33 MG/DL (ref 0.2–1)
BUN SERPL-MCNC: 8 MG/DL (ref 5–25)
CALCIUM SERPL-MCNC: 8.6 MG/DL (ref 8.3–10.1)
CHLORIDE SERPL-SCNC: 107 MMOL/L (ref 100–108)
CHOLEST SERPL-MCNC: 75 MG/DL (ref 50–200)
CO2 SERPL-SCNC: 22 MMOL/L (ref 21–32)
CREAT SERPL-MCNC: 0.82 MG/DL (ref 0.6–1.3)
EOSINOPHIL # BLD AUTO: 0.28 THOUSAND/ΜL (ref 0–0.61)
EOSINOPHIL NFR BLD AUTO: 2 % (ref 0–6)
ERYTHROCYTE [DISTWIDTH] IN BLOOD BY AUTOMATED COUNT: 15.5 % (ref 11.6–15.1)
GFR SERPL CREATININE-BSD FRML MDRD: >60 ML/MIN/1.73SQ M
GLUCOSE SERPL-MCNC: 135 MG/DL (ref 65–140)
GLUCOSE SERPL-MCNC: 141 MG/DL (ref 65–140)
GLUCOSE SERPL-MCNC: 198 MG/DL (ref 65–140)
GLUCOSE SERPL-MCNC: 210 MG/DL (ref 65–140)
GLUCOSE SERPL-MCNC: 220 MG/DL (ref 65–140)
GRAM STN SPEC: ABNORMAL
HCT VFR BLD AUTO: 31.9 % (ref 36.5–49.3)
HDLC SERPL-MCNC: 40 MG/DL (ref 40–60)
HGB BLD-MCNC: 10.7 G/DL (ref 12–17)
INR PPP: 1.18 (ref 0.86–1.16)
LACTATE SERPL-SCNC: 1.1 MMOL/L (ref 0.5–2)
LDLC SERPL CALC-MCNC: 20 MG/DL (ref 0–100)
LYMPHOCYTES # BLD AUTO: 2.06 THOUSANDS/ΜL (ref 0.6–4.47)
LYMPHOCYTES NFR BLD AUTO: 15 % (ref 14–44)
MCH RBC QN AUTO: 28.3 PG (ref 26.8–34.3)
MCHC RBC AUTO-ENTMCNC: 33.5 G/DL (ref 31.4–37.4)
MCV RBC AUTO: 84 FL (ref 82–98)
MONOCYTES # BLD AUTO: 0.74 THOUSAND/ΜL (ref 0.17–1.22)
MONOCYTES NFR BLD AUTO: 5 % (ref 4–12)
NEUTROPHILS # BLD AUTO: 10.9 THOUSANDS/ΜL (ref 1.85–7.62)
NEUTS SEG NFR BLD AUTO: 78 % (ref 43–75)
NRBC BLD AUTO-RTO: 0 /100 WBCS
PLATELET # BLD AUTO: 216 THOUSANDS/UL (ref 149–390)
PMV BLD AUTO: 11.8 FL (ref 8.9–12.7)
POTASSIUM SERPL-SCNC: 3.8 MMOL/L (ref 3.5–5.3)
PROT SERPL-MCNC: 7.3 G/DL (ref 6.4–8.2)
PROTHROMBIN TIME: 15.1 SECONDS (ref 12.1–14.4)
RBC # BLD AUTO: 3.78 MILLION/UL (ref 3.88–5.62)
SODIUM SERPL-SCNC: 139 MMOL/L (ref 136–145)
TRIGL SERPL-MCNC: 73 MG/DL
TROPONIN I SERPL-MCNC: <0.02 NG/ML
WBC # BLD AUTO: 14.07 THOUSAND/UL (ref 4.31–10.16)

## 2017-07-18 PROCEDURE — 83605 ASSAY OF LACTIC ACID: CPT | Performed by: STUDENT IN AN ORGANIZED HEALTH CARE EDUCATION/TRAINING PROGRAM

## 2017-07-18 PROCEDURE — 82948 REAGENT STRIP/BLOOD GLUCOSE: CPT

## 2017-07-18 PROCEDURE — 85610 PROTHROMBIN TIME: CPT | Performed by: PHYSICIAN ASSISTANT

## 2017-07-18 PROCEDURE — 85025 COMPLETE CBC W/AUTO DIFF WBC: CPT | Performed by: PODIATRIST

## 2017-07-18 PROCEDURE — 85730 THROMBOPLASTIN TIME PARTIAL: CPT | Performed by: SURGERY

## 2017-07-18 PROCEDURE — 84484 ASSAY OF TROPONIN QUANT: CPT | Performed by: PHYSICIAN ASSISTANT

## 2017-07-18 PROCEDURE — 80076 HEPATIC FUNCTION PANEL: CPT | Performed by: PHYSICIAN ASSISTANT

## 2017-07-18 PROCEDURE — 93922 UPR/L XTREMITY ART 2 LEVELS: CPT

## 2017-07-18 PROCEDURE — 80048 BASIC METABOLIC PNL TOTAL CA: CPT | Performed by: PODIATRIST

## 2017-07-18 PROCEDURE — 80061 LIPID PANEL: CPT | Performed by: PHYSICIAN ASSISTANT

## 2017-07-18 RX ORDER — ACETAMINOPHEN 325 MG/1
650 TABLET ORAL EVERY 6 HOURS PRN
Status: DISCONTINUED | OUTPATIENT
Start: 2017-07-18 | End: 2017-07-26 | Stop reason: HOSPADM

## 2017-07-18 RX ORDER — PRAVASTATIN SODIUM 10 MG
10 TABLET ORAL
Status: DISCONTINUED | OUTPATIENT
Start: 2017-07-18 | End: 2017-07-26 | Stop reason: HOSPADM

## 2017-07-18 RX ORDER — LORAZEPAM 0.5 MG/1
0.5 TABLET ORAL ONCE
Status: COMPLETED | OUTPATIENT
Start: 2017-07-18 | End: 2017-07-18

## 2017-07-18 RX ORDER — OXYCODONE HYDROCHLORIDE 10 MG/1
10 TABLET ORAL EVERY 4 HOURS PRN
Status: DISCONTINUED | OUTPATIENT
Start: 2017-07-18 | End: 2017-07-26 | Stop reason: HOSPADM

## 2017-07-18 RX ORDER — DIAPER,BRIEF,INFANT-TODD,DISP
EACH MISCELLANEOUS 4 TIMES DAILY PRN
Status: DISCONTINUED | OUTPATIENT
Start: 2017-07-18 | End: 2017-07-26 | Stop reason: HOSPADM

## 2017-07-18 RX ADMIN — LORAZEPAM 0.5 MG: 0.5 TABLET ORAL at 01:01

## 2017-07-18 RX ADMIN — CEFEPIME HYDROCHLORIDE 2000 MG: 2 INJECTION, POWDER, FOR SOLUTION INTRAVENOUS at 08:26

## 2017-07-18 RX ADMIN — INSULIN LISPRO 3 UNITS: 100 INJECTION, SOLUTION INTRAVENOUS; SUBCUTANEOUS at 17:12

## 2017-07-18 RX ADMIN — METRONIDAZOLE 500 MG: 500 TABLET ORAL at 14:37

## 2017-07-18 RX ADMIN — METRONIDAZOLE 500 MG: 500 TABLET ORAL at 05:51

## 2017-07-18 RX ADMIN — INSULIN GLARGINE 21 UNITS: 100 INJECTION, SOLUTION SUBCUTANEOUS at 21:37

## 2017-07-18 RX ADMIN — HEPARIN SODIUM 22 UNITS/KG/HR: 10000 INJECTION, SOLUTION INTRAVENOUS at 07:25

## 2017-07-18 RX ADMIN — NICOTINE 14 MG: 14 PATCH, EXTENDED RELEASE TRANSDERMAL at 08:44

## 2017-07-18 RX ADMIN — OXYCODONE HYDROCHLORIDE AND ACETAMINOPHEN 1 TABLET: 5; 325 TABLET ORAL at 17:10

## 2017-07-18 RX ADMIN — PRAVASTATIN SODIUM 10 MG: 10 TABLET ORAL at 17:11

## 2017-07-18 RX ADMIN — INSULIN LISPRO 2 UNITS: 100 INJECTION, SOLUTION INTRAVENOUS; SUBCUTANEOUS at 21:37

## 2017-07-18 RX ADMIN — GABAPENTIN 300 MG: 300 CAPSULE ORAL at 17:10

## 2017-07-18 RX ADMIN — OXYCODONE HYDROCHLORIDE AND ACETAMINOPHEN 1 TABLET: 5; 325 TABLET ORAL at 08:40

## 2017-07-18 RX ADMIN — TORSEMIDE 20 MG: 20 TABLET ORAL at 08:40

## 2017-07-18 RX ADMIN — INSULIN LISPRO 3 UNITS: 100 INJECTION, SOLUTION INTRAVENOUS; SUBCUTANEOUS at 12:08

## 2017-07-18 RX ADMIN — INSULIN LISPRO 3 UNITS: 100 INJECTION, SOLUTION INTRAVENOUS; SUBCUTANEOUS at 08:42

## 2017-07-18 RX ADMIN — INSULIN LISPRO 1 UNITS: 100 INJECTION, SOLUTION INTRAVENOUS; SUBCUTANEOUS at 12:09

## 2017-07-18 RX ADMIN — METRONIDAZOLE 500 MG: 500 TABLET ORAL at 21:25

## 2017-07-18 RX ADMIN — CEFEPIME HYDROCHLORIDE 2000 MG: 2 INJECTION, POWDER, FOR SOLUTION INTRAVENOUS at 20:15

## 2017-07-18 RX ADMIN — OXYCODONE HYDROCHLORIDE AND ACETAMINOPHEN 1 TABLET: 5; 325 TABLET ORAL at 12:54

## 2017-07-18 RX ADMIN — SODIUM CHLORIDE 500 ML: 0.9 INJECTION, SOLUTION INTRAVENOUS at 01:03

## 2017-07-18 RX ADMIN — HEPARIN SODIUM 22 UNITS/KG/HR: 10000 INJECTION, SOLUTION INTRAVENOUS at 20:22

## 2017-07-18 RX ADMIN — ACETAMINOPHEN 650 MG: 325 TABLET, FILM COATED ORAL at 18:36

## 2017-07-18 RX ADMIN — LORAZEPAM 1 MG: 1 TABLET ORAL at 07:22

## 2017-07-18 RX ADMIN — GABAPENTIN 300 MG: 300 CAPSULE ORAL at 08:40

## 2017-07-18 RX ADMIN — VANCOMYCIN HYDROCHLORIDE 1250 MG: 1 INJECTION, POWDER, LYOPHILIZED, FOR SOLUTION INTRAVENOUS at 09:20

## 2017-07-18 RX ADMIN — INSULIN LISPRO 2 UNITS: 100 INJECTION, SOLUTION INTRAVENOUS; SUBCUTANEOUS at 17:13

## 2017-07-18 RX ADMIN — VANCOMYCIN HYDROCHLORIDE 1250 MG: 1 INJECTION, POWDER, LYOPHILIZED, FOR SOLUTION INTRAVENOUS at 21:25

## 2017-07-18 RX ADMIN — QUETIAPINE FUMARATE 800 MG: 200 TABLET ORAL at 21:25

## 2017-07-18 RX ADMIN — LISINOPRIL 20 MG: 20 TABLET ORAL at 08:40

## 2017-07-19 ENCOUNTER — APPOINTMENT (INPATIENT)
Dept: RADIOLOGY | Facility: HOSPITAL | Age: 57
DRG: 314 | End: 2017-07-19
Payer: COMMERCIAL

## 2017-07-19 LAB
APTT PPP: 116 SECONDS (ref 23–35)
APTT PPP: 48 SECONDS (ref 23–35)
APTT PPP: 58 SECONDS (ref 23–35)
ERYTHROCYTE [DISTWIDTH] IN BLOOD BY AUTOMATED COUNT: 15.8 % (ref 11.6–15.1)
GLUCOSE SERPL-MCNC: 136 MG/DL (ref 65–140)
GLUCOSE SERPL-MCNC: 154 MG/DL (ref 65–140)
GLUCOSE SERPL-MCNC: 156 MG/DL (ref 65–140)
GLUCOSE SERPL-MCNC: 217 MG/DL (ref 65–140)
HCT VFR BLD AUTO: 31.9 % (ref 36.5–49.3)
HGB BLD-MCNC: 10.4 G/DL (ref 12–17)
INR PPP: 1.19 (ref 0.86–1.16)
MCH RBC QN AUTO: 27.8 PG (ref 26.8–34.3)
MCHC RBC AUTO-ENTMCNC: 32.6 G/DL (ref 31.4–37.4)
MCV RBC AUTO: 85 FL (ref 82–98)
PLATELET # BLD AUTO: 241 THOUSANDS/UL (ref 149–390)
PMV BLD AUTO: 11.9 FL (ref 8.9–12.7)
PROTHROMBIN TIME: 15.2 SECONDS (ref 12.1–14.4)
RBC # BLD AUTO: 3.74 MILLION/UL (ref 3.88–5.62)
WBC # BLD AUTO: 12.81 THOUSAND/UL (ref 4.31–10.16)

## 2017-07-19 PROCEDURE — 85730 THROMBOPLASTIN TIME PARTIAL: CPT | Performed by: PODIATRIST

## 2017-07-19 PROCEDURE — 78805 HB ABSCESS IMAGING LTD AREA: CPT

## 2017-07-19 PROCEDURE — A9569 TECHNETIUM TC-99M AUTO WBC: HCPCS

## 2017-07-19 PROCEDURE — 85027 COMPLETE CBC AUTOMATED: CPT | Performed by: STUDENT IN AN ORGANIZED HEALTH CARE EDUCATION/TRAINING PROGRAM

## 2017-07-19 PROCEDURE — 85610 PROTHROMBIN TIME: CPT | Performed by: PHYSICIAN ASSISTANT

## 2017-07-19 PROCEDURE — 82948 REAGENT STRIP/BLOOD GLUCOSE: CPT

## 2017-07-19 RX ORDER — INSULIN GLARGINE 100 [IU]/ML
11 INJECTION, SOLUTION SUBCUTANEOUS
Status: DISCONTINUED | OUTPATIENT
Start: 2017-07-19 | End: 2017-07-26

## 2017-07-19 RX ORDER — METOPROLOL SUCCINATE 25 MG/1
25 TABLET, EXTENDED RELEASE ORAL DAILY
Status: DISCONTINUED | OUTPATIENT
Start: 2017-07-20 | End: 2017-07-26 | Stop reason: HOSPADM

## 2017-07-19 RX ADMIN — METRONIDAZOLE 500 MG: 500 TABLET ORAL at 05:19

## 2017-07-19 RX ADMIN — GABAPENTIN 300 MG: 300 CAPSULE ORAL at 10:15

## 2017-07-19 RX ADMIN — ACETAMINOPHEN 650 MG: 325 TABLET, FILM COATED ORAL at 13:21

## 2017-07-19 RX ADMIN — INSULIN GLARGINE 11 UNITS: 100 INJECTION, SOLUTION SUBCUTANEOUS at 23:12

## 2017-07-19 RX ADMIN — HYDROCORTISONE: 1 CREAM TOPICAL at 16:02

## 2017-07-19 RX ADMIN — PRAVASTATIN SODIUM 10 MG: 10 TABLET ORAL at 17:55

## 2017-07-19 RX ADMIN — HEPARIN SODIUM 19 UNITS/KG/HR: 10000 INJECTION, SOLUTION INTRAVENOUS at 10:49

## 2017-07-19 RX ADMIN — OXYCODONE HYDROCHLORIDE 10 MG: 10 TABLET ORAL at 07:46

## 2017-07-19 RX ADMIN — INSULIN LISPRO 3 UNITS: 100 INJECTION, SOLUTION INTRAVENOUS; SUBCUTANEOUS at 18:03

## 2017-07-19 RX ADMIN — METOPROLOL TARTRATE 25 MG: 25 TABLET ORAL at 10:15

## 2017-07-19 RX ADMIN — ONDANSETRON 4 MG: 2 INJECTION INTRAMUSCULAR; INTRAVENOUS at 20:15

## 2017-07-19 RX ADMIN — NICOTINE 14 MG: 14 PATCH, EXTENDED RELEASE TRANSDERMAL at 10:18

## 2017-07-19 RX ADMIN — TORSEMIDE 20 MG: 20 TABLET ORAL at 10:15

## 2017-07-19 RX ADMIN — HEPARIN SODIUM 2000 UNITS: 1000 INJECTION, SOLUTION INTRAVENOUS; SUBCUTANEOUS at 15:23

## 2017-07-19 RX ADMIN — LORAZEPAM 1 MG: 1 TABLET ORAL at 20:15

## 2017-07-19 RX ADMIN — VANCOMYCIN HYDROCHLORIDE 1250 MG: 1 INJECTION, POWDER, LYOPHILIZED, FOR SOLUTION INTRAVENOUS at 10:44

## 2017-07-19 RX ADMIN — CEFEPIME HYDROCHLORIDE 2000 MG: 2 INJECTION, POWDER, FOR SOLUTION INTRAVENOUS at 07:47

## 2017-07-19 RX ADMIN — HYDROCORTISONE 1 APPLICATION: 1 CREAM TOPICAL at 10:18

## 2017-07-19 RX ADMIN — OXYCODONE HYDROCHLORIDE 10 MG: 10 TABLET ORAL at 12:30

## 2017-07-19 RX ADMIN — INSULIN LISPRO 1 UNITS: 100 INJECTION, SOLUTION INTRAVENOUS; SUBCUTANEOUS at 18:04

## 2017-07-19 RX ADMIN — LISINOPRIL 20 MG: 20 TABLET ORAL at 10:14

## 2017-07-19 RX ADMIN — METRONIDAZOLE 500 MG: 500 TABLET ORAL at 13:21

## 2017-07-19 RX ADMIN — INSULIN LISPRO 2 UNITS: 100 INJECTION, SOLUTION INTRAVENOUS; SUBCUTANEOUS at 12:26

## 2017-07-19 RX ADMIN — HEPARIN SODIUM 2000 UNITS: 1000 INJECTION, SOLUTION INTRAVENOUS; SUBCUTANEOUS at 23:12

## 2017-07-19 RX ADMIN — INSULIN LISPRO 3 UNITS: 100 INJECTION, SOLUTION INTRAVENOUS; SUBCUTANEOUS at 12:26

## 2017-07-19 RX ADMIN — OXYCODONE HYDROCHLORIDE 10 MG: 10 TABLET ORAL at 17:55

## 2017-07-19 RX ADMIN — CEFEPIME HYDROCHLORIDE 2000 MG: 2 INJECTION, POWDER, FOR SOLUTION INTRAVENOUS at 20:20

## 2017-07-19 RX ADMIN — GABAPENTIN 300 MG: 300 CAPSULE ORAL at 17:55

## 2017-07-19 RX ADMIN — INSULIN LISPRO 3 UNITS: 100 INJECTION, SOLUTION INTRAVENOUS; SUBCUTANEOUS at 07:49

## 2017-07-20 LAB
ANION GAP SERPL CALCULATED.3IONS-SCNC: 7 MMOL/L (ref 4–13)
APTT PPP: 100 SECONDS (ref 23–35)
APTT PPP: 60 SECONDS (ref 23–35)
APTT PPP: 73 SECONDS (ref 23–35)
BASOPHILS # BLD AUTO: 0.03 THOUSANDS/ΜL (ref 0–0.1)
BASOPHILS NFR BLD AUTO: 0 % (ref 0–1)
BUN SERPL-MCNC: 9 MG/DL (ref 5–25)
CALCIUM SERPL-MCNC: 8.9 MG/DL (ref 8.3–10.1)
CHLORIDE SERPL-SCNC: 104 MMOL/L (ref 100–108)
CO2 SERPL-SCNC: 26 MMOL/L (ref 21–32)
CREAT SERPL-MCNC: 0.9 MG/DL (ref 0.6–1.3)
EOSINOPHIL # BLD AUTO: 0.38 THOUSAND/ΜL (ref 0–0.61)
EOSINOPHIL NFR BLD AUTO: 3 % (ref 0–6)
ERYTHROCYTE [DISTWIDTH] IN BLOOD BY AUTOMATED COUNT: 15.8 % (ref 11.6–15.1)
GFR SERPL CREATININE-BSD FRML MDRD: >60 ML/MIN/1.73SQ M
GLUCOSE SERPL-MCNC: 129 MG/DL (ref 65–140)
GLUCOSE SERPL-MCNC: 183 MG/DL (ref 65–140)
GLUCOSE SERPL-MCNC: 194 MG/DL (ref 65–140)
GLUCOSE SERPL-MCNC: 195 MG/DL (ref 65–140)
GLUCOSE SERPL-MCNC: 202 MG/DL (ref 65–140)
HCT VFR BLD AUTO: 31 % (ref 36.5–49.3)
HGB BLD-MCNC: 10.2 G/DL (ref 12–17)
INR PPP: 1.2 (ref 0.86–1.16)
LYMPHOCYTES # BLD AUTO: 1.96 THOUSANDS/ΜL (ref 0.6–4.47)
LYMPHOCYTES NFR BLD AUTO: 15 % (ref 14–44)
MCH RBC QN AUTO: 27.7 PG (ref 26.8–34.3)
MCHC RBC AUTO-ENTMCNC: 32.9 G/DL (ref 31.4–37.4)
MCV RBC AUTO: 84 FL (ref 82–98)
MONOCYTES # BLD AUTO: 1.05 THOUSAND/ΜL (ref 0.17–1.22)
MONOCYTES NFR BLD AUTO: 8 % (ref 4–12)
NEUTROPHILS # BLD AUTO: 9.38 THOUSANDS/ΜL (ref 1.85–7.62)
NEUTS SEG NFR BLD AUTO: 74 % (ref 43–75)
NRBC BLD AUTO-RTO: 0 /100 WBCS
PLATELET # BLD AUTO: 242 THOUSANDS/UL (ref 149–390)
PMV BLD AUTO: 11.9 FL (ref 8.9–12.7)
POTASSIUM SERPL-SCNC: 3.8 MMOL/L (ref 3.5–5.3)
PROTHROMBIN TIME: 15.3 SECONDS (ref 12.1–14.4)
RBC # BLD AUTO: 3.68 MILLION/UL (ref 3.88–5.62)
SODIUM SERPL-SCNC: 137 MMOL/L (ref 136–145)
WBC # BLD AUTO: 12.87 THOUSAND/UL (ref 4.31–10.16)

## 2017-07-20 PROCEDURE — 82948 REAGENT STRIP/BLOOD GLUCOSE: CPT

## 2017-07-20 PROCEDURE — 85730 THROMBOPLASTIN TIME PARTIAL: CPT | Performed by: PODIATRIST

## 2017-07-20 PROCEDURE — 85025 COMPLETE CBC W/AUTO DIFF WBC: CPT | Performed by: INTERNAL MEDICINE

## 2017-07-20 PROCEDURE — 85610 PROTHROMBIN TIME: CPT | Performed by: PHYSICIAN ASSISTANT

## 2017-07-20 PROCEDURE — 80048 BASIC METABOLIC PNL TOTAL CA: CPT | Performed by: STUDENT IN AN ORGANIZED HEALTH CARE EDUCATION/TRAINING PROGRAM

## 2017-07-20 RX ORDER — MAGNESIUM HYDROXIDE/ALUMINUM HYDROXICE/SIMETHICONE 120; 1200; 1200 MG/30ML; MG/30ML; MG/30ML
30 SUSPENSION ORAL EVERY 4 HOURS PRN
Status: DISCONTINUED | OUTPATIENT
Start: 2017-07-20 | End: 2017-07-26 | Stop reason: HOSPADM

## 2017-07-20 RX ADMIN — INSULIN LISPRO 3 UNITS: 100 INJECTION, SOLUTION INTRAVENOUS; SUBCUTANEOUS at 08:09

## 2017-07-20 RX ADMIN — METRONIDAZOLE 500 MG: 500 TABLET ORAL at 22:15

## 2017-07-20 RX ADMIN — INSULIN LISPRO 2 UNITS: 100 INJECTION, SOLUTION INTRAVENOUS; SUBCUTANEOUS at 17:21

## 2017-07-20 RX ADMIN — GABAPENTIN 300 MG: 300 CAPSULE ORAL at 09:06

## 2017-07-20 RX ADMIN — GABAPENTIN 300 MG: 300 CAPSULE ORAL at 17:20

## 2017-07-20 RX ADMIN — INSULIN LISPRO 2 UNITS: 100 INJECTION, SOLUTION INTRAVENOUS; SUBCUTANEOUS at 12:05

## 2017-07-20 RX ADMIN — NICOTINE 14 MG: 14 PATCH, EXTENDED RELEASE TRANSDERMAL at 09:08

## 2017-07-20 RX ADMIN — LISINOPRIL 20 MG: 20 TABLET ORAL at 09:06

## 2017-07-20 RX ADMIN — HYDROCORTISONE: 1 CREAM TOPICAL at 08:12

## 2017-07-20 RX ADMIN — QUETIAPINE FUMARATE 800 MG: 200 TABLET ORAL at 22:15

## 2017-07-20 RX ADMIN — METRONIDAZOLE 500 MG: 500 TABLET ORAL at 13:40

## 2017-07-20 RX ADMIN — PRAVASTATIN SODIUM 10 MG: 10 TABLET ORAL at 17:20

## 2017-07-20 RX ADMIN — INSULIN LISPRO 3 UNITS: 100 INJECTION, SOLUTION INTRAVENOUS; SUBCUTANEOUS at 12:05

## 2017-07-20 RX ADMIN — HYDROCORTISONE: 1 CREAM TOPICAL at 12:07

## 2017-07-20 RX ADMIN — LORAZEPAM 1 MG: 1 TABLET ORAL at 22:16

## 2017-07-20 RX ADMIN — ACETAMINOPHEN 650 MG: 325 TABLET, FILM COATED ORAL at 17:26

## 2017-07-20 RX ADMIN — TORSEMIDE 20 MG: 20 TABLET ORAL at 09:06

## 2017-07-20 RX ADMIN — ALUMINUM HYDROXIDE, MAGNESIUM HYDROXIDE, AND SIMETHICONE 30 ML: 200; 200; 20 SUSPENSION ORAL at 22:26

## 2017-07-20 RX ADMIN — INSULIN LISPRO 3 UNITS: 100 INJECTION, SOLUTION INTRAVENOUS; SUBCUTANEOUS at 17:20

## 2017-07-20 RX ADMIN — METRONIDAZOLE 500 MG: 500 TABLET ORAL at 01:52

## 2017-07-20 RX ADMIN — INSULIN GLARGINE 11 UNITS: 100 INJECTION, SOLUTION SUBCUTANEOUS at 22:15

## 2017-07-20 RX ADMIN — CEFEPIME HYDROCHLORIDE 2000 MG: 2 INJECTION, POWDER, FOR SOLUTION INTRAVENOUS at 09:10

## 2017-07-20 RX ADMIN — INSULIN LISPRO 2 UNITS: 100 INJECTION, SOLUTION INTRAVENOUS; SUBCUTANEOUS at 08:10

## 2017-07-20 RX ADMIN — HEPARIN SODIUM 21 UNITS/KG/HR: 10000 INJECTION, SOLUTION INTRAVENOUS at 16:24

## 2017-07-20 RX ADMIN — METRONIDAZOLE 500 MG: 500 TABLET ORAL at 07:18

## 2017-07-20 RX ADMIN — OXYCODONE HYDROCHLORIDE 10 MG: 10 TABLET ORAL at 08:09

## 2017-07-20 RX ADMIN — CEFEPIME HYDROCHLORIDE 2000 MG: 2 INJECTION, POWDER, FOR SOLUTION INTRAVENOUS at 20:47

## 2017-07-20 RX ADMIN — HEPARIN SODIUM 23 UNITS/KG/HR: 10000 INJECTION, SOLUTION INTRAVENOUS at 01:51

## 2017-07-20 RX ADMIN — HYDROCORTISONE 1 APPLICATION: 1 CREAM TOPICAL at 17:23

## 2017-07-20 RX ADMIN — METOPROLOL SUCCINATE 25 MG: 25 TABLET, EXTENDED RELEASE ORAL at 09:06

## 2017-07-21 ENCOUNTER — APPOINTMENT (INPATIENT)
Dept: RADIOLOGY | Facility: HOSPITAL | Age: 57
DRG: 314 | End: 2017-07-21
Payer: COMMERCIAL

## 2017-07-21 ENCOUNTER — ANESTHESIA EVENT (INPATIENT)
Dept: PERIOP | Facility: HOSPITAL | Age: 57
DRG: 314 | End: 2017-07-21
Payer: COMMERCIAL

## 2017-07-21 ENCOUNTER — ANESTHESIA (INPATIENT)
Dept: PERIOP | Facility: HOSPITAL | Age: 57
DRG: 314 | End: 2017-07-21
Payer: COMMERCIAL

## 2017-07-21 LAB
ANION GAP SERPL CALCULATED.3IONS-SCNC: 9 MMOL/L (ref 4–13)
APTT PPP: 74 SECONDS (ref 23–35)
BUN SERPL-MCNC: 12 MG/DL (ref 5–25)
CALCIUM SERPL-MCNC: 9.2 MG/DL (ref 8.3–10.1)
CHLORIDE SERPL-SCNC: 100 MMOL/L (ref 100–108)
CO2 SERPL-SCNC: 27 MMOL/L (ref 21–32)
CREAT SERPL-MCNC: 1.11 MG/DL (ref 0.6–1.3)
ERYTHROCYTE [DISTWIDTH] IN BLOOD BY AUTOMATED COUNT: 16.1 % (ref 11.6–15.1)
GFR SERPL CREATININE-BSD FRML MDRD: >60 ML/MIN/1.73SQ M
GLUCOSE SERPL-MCNC: 162 MG/DL (ref 65–140)
GLUCOSE SERPL-MCNC: 186 MG/DL (ref 65–140)
GLUCOSE SERPL-MCNC: 203 MG/DL (ref 65–140)
GLUCOSE SERPL-MCNC: 228 MG/DL (ref 65–140)
GLUCOSE SERPL-MCNC: 255 MG/DL (ref 65–140)
HCT VFR BLD AUTO: 35.1 % (ref 36.5–49.3)
HGB BLD-MCNC: 11.5 G/DL (ref 12–17)
INR PPP: 1.15 (ref 0.86–1.16)
MCH RBC QN AUTO: 28 PG (ref 26.8–34.3)
MCHC RBC AUTO-ENTMCNC: 32.8 G/DL (ref 31.4–37.4)
MCV RBC AUTO: 85 FL (ref 82–98)
PLATELET # BLD AUTO: 291 THOUSANDS/UL (ref 149–390)
PMV BLD AUTO: 12.6 FL (ref 8.9–12.7)
POTASSIUM SERPL-SCNC: 3.1 MMOL/L (ref 3.5–5.3)
PROTHROMBIN TIME: 14.7 SECONDS (ref 12.1–14.4)
RBC # BLD AUTO: 4.11 MILLION/UL (ref 3.88–5.62)
SODIUM SERPL-SCNC: 136 MMOL/L (ref 136–145)
WBC # BLD AUTO: 9.81 THOUSAND/UL (ref 4.31–10.16)

## 2017-07-21 PROCEDURE — 88311 DECALCIFY TISSUE: CPT | Performed by: PODIATRIST

## 2017-07-21 PROCEDURE — 85610 PROTHROMBIN TIME: CPT | Performed by: PHYSICIAN ASSISTANT

## 2017-07-21 PROCEDURE — 88305 TISSUE EXAM BY PATHOLOGIST: CPT | Performed by: PODIATRIST

## 2017-07-21 PROCEDURE — 73630 X-RAY EXAM OF FOOT: CPT

## 2017-07-21 PROCEDURE — 85730 THROMBOPLASTIN TIME PARTIAL: CPT | Performed by: INTERNAL MEDICINE

## 2017-07-21 PROCEDURE — 0Y6Q0Z0 DETACHMENT AT LEFT 1ST TOE, COMPLETE, OPEN APPROACH: ICD-10-PCS | Performed by: PODIATRIST

## 2017-07-21 PROCEDURE — 87075 CULTR BACTERIA EXCEPT BLOOD: CPT | Performed by: PODIATRIST

## 2017-07-21 PROCEDURE — 87186 SC STD MICRODIL/AGAR DIL: CPT | Performed by: PODIATRIST

## 2017-07-21 PROCEDURE — 82948 REAGENT STRIP/BLOOD GLUCOSE: CPT

## 2017-07-21 PROCEDURE — 87176 TISSUE HOMOGENIZATION CULTR: CPT | Performed by: PODIATRIST

## 2017-07-21 PROCEDURE — 85027 COMPLETE CBC AUTOMATED: CPT | Performed by: STUDENT IN AN ORGANIZED HEALTH CARE EDUCATION/TRAINING PROGRAM

## 2017-07-21 PROCEDURE — 80048 BASIC METABOLIC PNL TOTAL CA: CPT | Performed by: STUDENT IN AN ORGANIZED HEALTH CARE EDUCATION/TRAINING PROGRAM

## 2017-07-21 PROCEDURE — 87070 CULTURE OTHR SPECIMN AEROBIC: CPT | Performed by: PODIATRIST

## 2017-07-21 PROCEDURE — 0Y9N30Z DRAINAGE OF LEFT FOOT WITH DRAINAGE DEVICE, PERCUTANEOUS APPROACH: ICD-10-PCS | Performed by: PODIATRIST

## 2017-07-21 PROCEDURE — 85730 THROMBOPLASTIN TIME PARTIAL: CPT | Performed by: PODIATRIST

## 2017-07-21 PROCEDURE — 87205 SMEAR GRAM STAIN: CPT | Performed by: PODIATRIST

## 2017-07-21 PROCEDURE — 87147 CULTURE TYPE IMMUNOLOGIC: CPT | Performed by: PODIATRIST

## 2017-07-21 RX ORDER — SODIUM CHLORIDE, SODIUM LACTATE, POTASSIUM CHLORIDE, CALCIUM CHLORIDE 600; 310; 30; 20 MG/100ML; MG/100ML; MG/100ML; MG/100ML
INJECTION, SOLUTION INTRAVENOUS CONTINUOUS PRN
Status: DISCONTINUED | OUTPATIENT
Start: 2017-07-21 | End: 2017-07-21 | Stop reason: SURG

## 2017-07-21 RX ORDER — MIDAZOLAM HYDROCHLORIDE 1 MG/ML
INJECTION INTRAMUSCULAR; INTRAVENOUS AS NEEDED
Status: DISCONTINUED | OUTPATIENT
Start: 2017-07-21 | End: 2017-07-21 | Stop reason: SURG

## 2017-07-21 RX ORDER — SODIUM CHLORIDE, SODIUM LACTATE, POTASSIUM CHLORIDE, CALCIUM CHLORIDE 600; 310; 30; 20 MG/100ML; MG/100ML; MG/100ML; MG/100ML
75 INJECTION, SOLUTION INTRAVENOUS CONTINUOUS
Status: DISCONTINUED | OUTPATIENT
Start: 2017-07-21 | End: 2017-07-25

## 2017-07-21 RX ORDER — SODIUM CHLORIDE 9 MG/ML
INJECTION, SOLUTION INTRAVENOUS AS NEEDED
Status: DISCONTINUED | OUTPATIENT
Start: 2017-07-21 | End: 2017-07-21 | Stop reason: HOSPADM

## 2017-07-21 RX ORDER — FENTANYL CITRATE 50 UG/ML
INJECTION, SOLUTION INTRAMUSCULAR; INTRAVENOUS AS NEEDED
Status: DISCONTINUED | OUTPATIENT
Start: 2017-07-21 | End: 2017-07-21 | Stop reason: SURG

## 2017-07-21 RX ORDER — LIDOCAINE HYDROCHLORIDE 10 MG/ML
INJECTION, SOLUTION EPIDURAL; INFILTRATION; INTRACAUDAL; PERINEURAL AS NEEDED
Status: DISCONTINUED | OUTPATIENT
Start: 2017-07-21 | End: 2017-07-21 | Stop reason: HOSPADM

## 2017-07-21 RX ORDER — PROPOFOL 10 MG/ML
INJECTION, EMULSION INTRAVENOUS AS NEEDED
Status: DISCONTINUED | OUTPATIENT
Start: 2017-07-21 | End: 2017-07-21 | Stop reason: SURG

## 2017-07-21 RX ORDER — PROPOFOL 10 MG/ML
INJECTION, EMULSION INTRAVENOUS CONTINUOUS PRN
Status: DISCONTINUED | OUTPATIENT
Start: 2017-07-21 | End: 2017-07-21 | Stop reason: SURG

## 2017-07-21 RX ORDER — EPHEDRINE SULFATE 50 MG/ML
INJECTION, SOLUTION INTRAVENOUS AS NEEDED
Status: DISCONTINUED | OUTPATIENT
Start: 2017-07-21 | End: 2017-07-21 | Stop reason: SURG

## 2017-07-21 RX ORDER — FENTANYL CITRATE/PF 50 MCG/ML
25 SYRINGE (ML) INJECTION
Status: COMPLETED | OUTPATIENT
Start: 2017-07-21 | End: 2017-07-21

## 2017-07-21 RX ORDER — BUPIVACAINE HYDROCHLORIDE 5 MG/ML
INJECTION, SOLUTION PERINEURAL AS NEEDED
Status: DISCONTINUED | OUTPATIENT
Start: 2017-07-21 | End: 2017-07-21 | Stop reason: HOSPADM

## 2017-07-21 RX ADMIN — GABAPENTIN 300 MG: 300 CAPSULE ORAL at 08:07

## 2017-07-21 RX ADMIN — PROPOFOL 60 MCG/KG/MIN: 10 INJECTION, EMULSION INTRAVENOUS at 15:57

## 2017-07-21 RX ADMIN — INSULIN LISPRO 1 UNITS: 100 INJECTION, SOLUTION INTRAVENOUS; SUBCUTANEOUS at 08:09

## 2017-07-21 RX ADMIN — EPHEDRINE SULFATE 5 MG: 50 INJECTION, SOLUTION INTRAMUSCULAR; INTRAVENOUS; SUBCUTANEOUS at 16:14

## 2017-07-21 RX ADMIN — OXYCODONE HYDROCHLORIDE AND ACETAMINOPHEN 1 TABLET: 5; 325 TABLET ORAL at 08:30

## 2017-07-21 RX ADMIN — MIDAZOLAM HYDROCHLORIDE 0.5 MG: 1 INJECTION, SOLUTION INTRAMUSCULAR; INTRAVENOUS at 16:43

## 2017-07-21 RX ADMIN — EPHEDRINE SULFATE 5 MG: 50 INJECTION, SOLUTION INTRAMUSCULAR; INTRAVENOUS; SUBCUTANEOUS at 16:23

## 2017-07-21 RX ADMIN — FENTANYL CITRATE 50 MCG: 50 INJECTION, SOLUTION INTRAMUSCULAR; INTRAVENOUS at 15:49

## 2017-07-21 RX ADMIN — LORAZEPAM 1 MG: 1 TABLET ORAL at 12:21

## 2017-07-21 RX ADMIN — INSULIN GLARGINE 11 UNITS: 100 INJECTION, SOLUTION SUBCUTANEOUS at 21:40

## 2017-07-21 RX ADMIN — FENTANYL CITRATE 25 MCG: 50 INJECTION INTRAMUSCULAR; INTRAVENOUS at 17:47

## 2017-07-21 RX ADMIN — INSULIN LISPRO 2 UNITS: 100 INJECTION, SOLUTION INTRAVENOUS; SUBCUTANEOUS at 12:15

## 2017-07-21 RX ADMIN — FENTANYL CITRATE 25 MCG: 50 INJECTION INTRAMUSCULAR; INTRAVENOUS at 17:24

## 2017-07-21 RX ADMIN — SODIUM CHLORIDE, SODIUM LACTATE, POTASSIUM CHLORIDE, AND CALCIUM CHLORIDE: .6; .31; .03; .02 INJECTION, SOLUTION INTRAVENOUS at 15:44

## 2017-07-21 RX ADMIN — METRONIDAZOLE 500 MG: 500 TABLET ORAL at 05:15

## 2017-07-21 RX ADMIN — HEPARIN SODIUM 21 UNITS/KG/HR: 10000 INJECTION, SOLUTION INTRAVENOUS at 05:16

## 2017-07-21 RX ADMIN — PROPOFOL 30 MG: 10 INJECTION, EMULSION INTRAVENOUS at 15:53

## 2017-07-21 RX ADMIN — LISINOPRIL 20 MG: 20 TABLET ORAL at 08:07

## 2017-07-21 RX ADMIN — FENTANYL CITRATE 25 MCG: 50 INJECTION INTRAMUSCULAR; INTRAVENOUS at 17:56

## 2017-07-21 RX ADMIN — MIDAZOLAM HYDROCHLORIDE 2 MG: 1 INJECTION, SOLUTION INTRAMUSCULAR; INTRAVENOUS at 15:44

## 2017-07-21 RX ADMIN — HYDROMORPHONE HYDROCHLORIDE 0.4 MG: 1 INJECTION, SOLUTION INTRAMUSCULAR; INTRAVENOUS; SUBCUTANEOUS at 18:08

## 2017-07-21 RX ADMIN — QUETIAPINE FUMARATE 800 MG: 200 TABLET ORAL at 21:40

## 2017-07-21 RX ADMIN — OXYCODONE HYDROCHLORIDE 10 MG: 10 TABLET ORAL at 23:47

## 2017-07-21 RX ADMIN — EPHEDRINE SULFATE 5 MG: 50 INJECTION, SOLUTION INTRAMUSCULAR; INTRAVENOUS; SUBCUTANEOUS at 16:19

## 2017-07-21 RX ADMIN — OXYCODONE HYDROCHLORIDE 10 MG: 10 TABLET ORAL at 18:51

## 2017-07-21 RX ADMIN — MIDAZOLAM HYDROCHLORIDE 1 MG: 1 INJECTION, SOLUTION INTRAMUSCULAR; INTRAVENOUS at 15:50

## 2017-07-21 RX ADMIN — PROPOFOL 50 MG: 10 INJECTION, EMULSION INTRAVENOUS at 15:50

## 2017-07-21 RX ADMIN — PROPOFOL 20 MG: 10 INJECTION, EMULSION INTRAVENOUS at 16:45

## 2017-07-21 RX ADMIN — FENTANYL CITRATE 50 MCG: 50 INJECTION, SOLUTION INTRAMUSCULAR; INTRAVENOUS at 16:43

## 2017-07-21 RX ADMIN — MIDAZOLAM HYDROCHLORIDE 0.5 MG: 1 INJECTION, SOLUTION INTRAMUSCULAR; INTRAVENOUS at 15:56

## 2017-07-21 RX ADMIN — NICOTINE 14 MG: 14 PATCH, EXTENDED RELEASE TRANSDERMAL at 08:09

## 2017-07-21 RX ADMIN — CEFEPIME HYDROCHLORIDE 2000 MG: 2 INJECTION, POWDER, FOR SOLUTION INTRAVENOUS at 08:31

## 2017-07-21 RX ADMIN — POTASSIUM CHLORIDE 20 MEQ: 1500 TABLET, EXTENDED RELEASE ORAL at 08:07

## 2017-07-21 RX ADMIN — HEPARIN SODIUM 21 UNITS/KG/HR: 10000 INJECTION, SOLUTION INTRAVENOUS at 17:30

## 2017-07-21 RX ADMIN — METOPROLOL SUCCINATE 25 MG: 25 TABLET, EXTENDED RELEASE ORAL at 08:07

## 2017-07-21 RX ADMIN — CEFEPIME HYDROCHLORIDE 2000 MG: 2 INJECTION, POWDER, FOR SOLUTION INTRAVENOUS at 19:41

## 2017-07-21 RX ADMIN — FENTANYL CITRATE 25 MCG: 50 INJECTION INTRAMUSCULAR; INTRAVENOUS at 17:15

## 2017-07-21 RX ADMIN — TORSEMIDE 20 MG: 20 TABLET ORAL at 08:07

## 2017-07-21 RX ADMIN — METRONIDAZOLE 500 MG: 500 TABLET ORAL at 21:40

## 2017-07-21 RX ADMIN — SODIUM CHLORIDE, SODIUM LACTATE, POTASSIUM CHLORIDE, AND CALCIUM CHLORIDE 75 ML/HR: .6; .31; .03; .02 INJECTION, SOLUTION INTRAVENOUS at 18:13

## 2017-07-22 LAB
ANION GAP SERPL CALCULATED.3IONS-SCNC: 7 MMOL/L (ref 4–13)
APTT PPP: 66 SECONDS (ref 23–35)
APTT PPP: 89 SECONDS (ref 23–35)
BACTERIA BLD CULT: NORMAL
BACTERIA BLD CULT: NORMAL
BUN SERPL-MCNC: 15 MG/DL (ref 5–25)
CALCIUM SERPL-MCNC: 8.4 MG/DL (ref 8.3–10.1)
CHLORIDE SERPL-SCNC: 106 MMOL/L (ref 100–108)
CO2 SERPL-SCNC: 25 MMOL/L (ref 21–32)
CREAT SERPL-MCNC: 0.99 MG/DL (ref 0.6–1.3)
ERYTHROCYTE [DISTWIDTH] IN BLOOD BY AUTOMATED COUNT: 16.2 % (ref 11.6–15.1)
GFR SERPL CREATININE-BSD FRML MDRD: >60 ML/MIN/1.73SQ M
GLUCOSE SERPL-MCNC: 154 MG/DL (ref 65–140)
GLUCOSE SERPL-MCNC: 163 MG/DL (ref 65–140)
GLUCOSE SERPL-MCNC: 165 MG/DL (ref 65–140)
GLUCOSE SERPL-MCNC: 205 MG/DL (ref 65–140)
GLUCOSE SERPL-MCNC: 235 MG/DL (ref 65–140)
HCT VFR BLD AUTO: 27.9 % (ref 36.5–49.3)
HGB BLD-MCNC: 9.2 G/DL (ref 12–17)
INR PPP: 1.14 (ref 0.86–1.16)
MCH RBC QN AUTO: 28.4 PG (ref 26.8–34.3)
MCHC RBC AUTO-ENTMCNC: 33 G/DL (ref 31.4–37.4)
MCV RBC AUTO: 86 FL (ref 82–98)
PLATELET # BLD AUTO: 230 THOUSANDS/UL (ref 149–390)
PMV BLD AUTO: 12 FL (ref 8.9–12.7)
POTASSIUM SERPL-SCNC: 3.9 MMOL/L (ref 3.5–5.3)
PROTHROMBIN TIME: 14.6 SECONDS (ref 12.1–14.4)
RBC # BLD AUTO: 3.24 MILLION/UL (ref 3.88–5.62)
SODIUM SERPL-SCNC: 138 MMOL/L (ref 136–145)
WBC # BLD AUTO: 7.08 THOUSAND/UL (ref 4.31–10.16)

## 2017-07-22 PROCEDURE — 82948 REAGENT STRIP/BLOOD GLUCOSE: CPT

## 2017-07-22 PROCEDURE — 85027 COMPLETE CBC AUTOMATED: CPT | Performed by: PODIATRIST

## 2017-07-22 PROCEDURE — 85730 THROMBOPLASTIN TIME PARTIAL: CPT | Performed by: PODIATRIST

## 2017-07-22 PROCEDURE — 80048 BASIC METABOLIC PNL TOTAL CA: CPT | Performed by: STUDENT IN AN ORGANIZED HEALTH CARE EDUCATION/TRAINING PROGRAM

## 2017-07-22 PROCEDURE — 85610 PROTHROMBIN TIME: CPT | Performed by: PHYSICIAN ASSISTANT

## 2017-07-22 RX ORDER — WARFARIN SODIUM 5 MG/1
5 TABLET ORAL
Status: DISCONTINUED | OUTPATIENT
Start: 2017-07-22 | End: 2017-07-26

## 2017-07-22 RX ADMIN — INSULIN LISPRO 3 UNITS: 100 INJECTION, SOLUTION INTRAVENOUS; SUBCUTANEOUS at 08:39

## 2017-07-22 RX ADMIN — LISINOPRIL 20 MG: 20 TABLET ORAL at 08:34

## 2017-07-22 RX ADMIN — SODIUM CHLORIDE, SODIUM LACTATE, POTASSIUM CHLORIDE, AND CALCIUM CHLORIDE 75 ML/HR: .6; .31; .03; .02 INJECTION, SOLUTION INTRAVENOUS at 19:37

## 2017-07-22 RX ADMIN — METRONIDAZOLE 500 MG: 500 TABLET ORAL at 13:20

## 2017-07-22 RX ADMIN — NICOTINE 14 MG: 14 PATCH, EXTENDED RELEASE TRANSDERMAL at 08:39

## 2017-07-22 RX ADMIN — METOPROLOL SUCCINATE 25 MG: 25 TABLET, EXTENDED RELEASE ORAL at 08:35

## 2017-07-22 RX ADMIN — CEFEPIME HYDROCHLORIDE 2000 MG: 2 INJECTION, POWDER, FOR SOLUTION INTRAVENOUS at 08:54

## 2017-07-22 RX ADMIN — SODIUM CHLORIDE 1000 ML: 0.9 INJECTION, SOLUTION INTRAVENOUS at 02:09

## 2017-07-22 RX ADMIN — TORSEMIDE 20 MG: 20 TABLET ORAL at 08:34

## 2017-07-22 RX ADMIN — SODIUM CHLORIDE, SODIUM LACTATE, POTASSIUM CHLORIDE, AND CALCIUM CHLORIDE 75 ML/HR: .6; .31; .03; .02 INJECTION, SOLUTION INTRAVENOUS at 04:14

## 2017-07-22 RX ADMIN — QUETIAPINE FUMARATE 800 MG: 200 TABLET ORAL at 21:14

## 2017-07-22 RX ADMIN — GABAPENTIN 300 MG: 300 CAPSULE ORAL at 17:05

## 2017-07-22 RX ADMIN — METRONIDAZOLE 500 MG: 500 TABLET ORAL at 21:14

## 2017-07-22 RX ADMIN — GABAPENTIN 300 MG: 300 CAPSULE ORAL at 08:34

## 2017-07-22 RX ADMIN — HEPARIN SODIUM 21 UNITS/KG/HR: 10000 INJECTION, SOLUTION INTRAVENOUS at 19:36

## 2017-07-22 RX ADMIN — CEFEPIME HYDROCHLORIDE 2000 MG: 2 INJECTION, POWDER, FOR SOLUTION INTRAVENOUS at 19:36

## 2017-07-22 RX ADMIN — INSULIN LISPRO 3 UNITS: 100 INJECTION, SOLUTION INTRAVENOUS; SUBCUTANEOUS at 17:05

## 2017-07-22 RX ADMIN — INSULIN LISPRO 3 UNITS: 100 INJECTION, SOLUTION INTRAVENOUS; SUBCUTANEOUS at 11:28

## 2017-07-22 RX ADMIN — OXYCODONE HYDROCHLORIDE 10 MG: 10 TABLET ORAL at 08:36

## 2017-07-22 RX ADMIN — ALUMINUM HYDROXIDE, MAGNESIUM HYDROXIDE, AND SIMETHICONE 30 ML: 200; 200; 20 SUSPENSION ORAL at 21:14

## 2017-07-22 RX ADMIN — OXYCODONE HYDROCHLORIDE AND ACETAMINOPHEN 1 TABLET: 5; 325 TABLET ORAL at 15:28

## 2017-07-22 RX ADMIN — WARFARIN SODIUM 5 MG: 5 TABLET ORAL at 17:05

## 2017-07-22 RX ADMIN — METRONIDAZOLE 500 MG: 500 TABLET ORAL at 06:27

## 2017-07-22 RX ADMIN — INSULIN GLARGINE 11 UNITS: 100 INJECTION, SOLUTION SUBCUTANEOUS at 21:14

## 2017-07-22 RX ADMIN — OXYCODONE HYDROCHLORIDE 10 MG: 10 TABLET ORAL at 13:20

## 2017-07-22 RX ADMIN — OXYCODONE HYDROCHLORIDE AND ACETAMINOPHEN 1 TABLET: 5; 325 TABLET ORAL at 11:27

## 2017-07-22 RX ADMIN — PRAVASTATIN SODIUM 10 MG: 10 TABLET ORAL at 17:05

## 2017-07-22 RX ADMIN — HEPARIN SODIUM 21 UNITS/KG/HR: 10000 INJECTION, SOLUTION INTRAVENOUS at 06:26

## 2017-07-22 RX ADMIN — INSULIN LISPRO 3 UNITS: 100 INJECTION, SOLUTION INTRAVENOUS; SUBCUTANEOUS at 11:32

## 2017-07-22 RX ADMIN — OXYCODONE HYDROCHLORIDE 10 MG: 10 TABLET ORAL at 19:47

## 2017-07-22 RX ADMIN — POTASSIUM CHLORIDE 20 MEQ: 1500 TABLET, EXTENDED RELEASE ORAL at 08:35

## 2017-07-22 RX ADMIN — INSULIN LISPRO 1 UNITS: 100 INJECTION, SOLUTION INTRAVENOUS; SUBCUTANEOUS at 08:38

## 2017-07-22 RX ADMIN — INSULIN LISPRO 1 UNITS: 100 INJECTION, SOLUTION INTRAVENOUS; SUBCUTANEOUS at 17:05

## 2017-07-23 LAB
APTT PPP: 71 SECONDS (ref 23–35)
BACTERIA SPEC ANAEROBE CULT: NORMAL
GLUCOSE SERPL-MCNC: 184 MG/DL (ref 65–140)
GLUCOSE SERPL-MCNC: 185 MG/DL (ref 65–140)
GLUCOSE SERPL-MCNC: 201 MG/DL (ref 65–140)
GLUCOSE SERPL-MCNC: 255 MG/DL (ref 65–140)

## 2017-07-23 PROCEDURE — G8979 MOBILITY GOAL STATUS: HCPCS

## 2017-07-23 PROCEDURE — 85730 THROMBOPLASTIN TIME PARTIAL: CPT | Performed by: INTERNAL MEDICINE

## 2017-07-23 PROCEDURE — 82948 REAGENT STRIP/BLOOD GLUCOSE: CPT

## 2017-07-23 PROCEDURE — G8978 MOBILITY CURRENT STATUS: HCPCS

## 2017-07-23 PROCEDURE — 97163 PT EVAL HIGH COMPLEX 45 MIN: CPT

## 2017-07-23 RX ADMIN — OXYCODONE HYDROCHLORIDE 10 MG: 10 TABLET ORAL at 07:24

## 2017-07-23 RX ADMIN — METRONIDAZOLE 500 MG: 500 TABLET ORAL at 21:08

## 2017-07-23 RX ADMIN — INSULIN LISPRO 3 UNITS: 100 INJECTION, SOLUTION INTRAVENOUS; SUBCUTANEOUS at 08:14

## 2017-07-23 RX ADMIN — HEPARIN SODIUM 21 UNITS/KG/HR: 10000 INJECTION, SOLUTION INTRAVENOUS at 11:15

## 2017-07-23 RX ADMIN — POTASSIUM CHLORIDE 20 MEQ: 1500 TABLET, EXTENDED RELEASE ORAL at 08:09

## 2017-07-23 RX ADMIN — OXYCODONE HYDROCHLORIDE AND ACETAMINOPHEN 1 TABLET: 5; 325 TABLET ORAL at 08:08

## 2017-07-23 RX ADMIN — INSULIN GLARGINE 11 UNITS: 100 INJECTION, SOLUTION SUBCUTANEOUS at 21:10

## 2017-07-23 RX ADMIN — METOPROLOL SUCCINATE 25 MG: 25 TABLET, EXTENDED RELEASE ORAL at 08:08

## 2017-07-23 RX ADMIN — INSULIN LISPRO 3 UNITS: 100 INJECTION, SOLUTION INTRAVENOUS; SUBCUTANEOUS at 11:39

## 2017-07-23 RX ADMIN — CEFEPIME HYDROCHLORIDE 2000 MG: 2 INJECTION, POWDER, FOR SOLUTION INTRAVENOUS at 08:08

## 2017-07-23 RX ADMIN — INSULIN LISPRO 2 UNITS: 100 INJECTION, SOLUTION INTRAVENOUS; SUBCUTANEOUS at 08:13

## 2017-07-23 RX ADMIN — SODIUM CHLORIDE, SODIUM LACTATE, POTASSIUM CHLORIDE, AND CALCIUM CHLORIDE 75 ML/HR: .6; .31; .03; .02 INJECTION, SOLUTION INTRAVENOUS at 11:16

## 2017-07-23 RX ADMIN — GABAPENTIN 300 MG: 300 CAPSULE ORAL at 16:26

## 2017-07-23 RX ADMIN — LISINOPRIL 20 MG: 20 TABLET ORAL at 08:09

## 2017-07-23 RX ADMIN — SODIUM CHLORIDE, SODIUM LACTATE, POTASSIUM CHLORIDE, AND CALCIUM CHLORIDE 75 ML/HR: .6; .31; .03; .02 INJECTION, SOLUTION INTRAVENOUS at 23:56

## 2017-07-23 RX ADMIN — INSULIN LISPRO 3 UNITS: 100 INJECTION, SOLUTION INTRAVENOUS; SUBCUTANEOUS at 16:28

## 2017-07-23 RX ADMIN — SODIUM CHLORIDE, SODIUM LACTATE, POTASSIUM CHLORIDE, AND CALCIUM CHLORIDE 75 ML/HR: .6; .31; .03; .02 INJECTION, SOLUTION INTRAVENOUS at 23:55

## 2017-07-23 RX ADMIN — METRONIDAZOLE 500 MG: 500 TABLET ORAL at 13:05

## 2017-07-23 RX ADMIN — CEFEPIME HYDROCHLORIDE 2000 MG: 2 INJECTION, POWDER, FOR SOLUTION INTRAVENOUS at 21:41

## 2017-07-23 RX ADMIN — METRONIDAZOLE 500 MG: 500 TABLET ORAL at 07:09

## 2017-07-23 RX ADMIN — QUETIAPINE FUMARATE 800 MG: 200 TABLET ORAL at 21:08

## 2017-07-23 RX ADMIN — WARFARIN SODIUM 5 MG: 5 TABLET ORAL at 16:26

## 2017-07-23 RX ADMIN — GABAPENTIN 300 MG: 300 CAPSULE ORAL at 08:09

## 2017-07-23 RX ADMIN — TORSEMIDE 20 MG: 20 TABLET ORAL at 08:17

## 2017-07-23 RX ADMIN — OXYCODONE HYDROCHLORIDE 10 MG: 10 TABLET ORAL at 14:50

## 2017-07-23 RX ADMIN — INSULIN LISPRO 1 UNITS: 100 INJECTION, SOLUTION INTRAVENOUS; SUBCUTANEOUS at 16:27

## 2017-07-23 RX ADMIN — NICOTINE 14 MG: 14 PATCH, EXTENDED RELEASE TRANSDERMAL at 08:14

## 2017-07-23 RX ADMIN — PRAVASTATIN SODIUM 10 MG: 10 TABLET ORAL at 16:26

## 2017-07-23 RX ADMIN — INSULIN LISPRO 1 UNITS: 100 INJECTION, SOLUTION INTRAVENOUS; SUBCUTANEOUS at 11:39

## 2017-07-24 ENCOUNTER — APPOINTMENT (INPATIENT)
Dept: PHYSICAL THERAPY | Facility: HOSPITAL | Age: 57
DRG: 314 | End: 2017-07-24
Payer: COMMERCIAL

## 2017-07-24 LAB
ANION GAP SERPL CALCULATED.3IONS-SCNC: 8 MMOL/L (ref 4–13)
APTT PPP: 45 SECONDS (ref 23–35)
APTT PPP: 68 SECONDS (ref 23–35)
APTT PPP: 75 SECONDS (ref 23–35)
BASOPHILS # BLD AUTO: 0.05 THOUSANDS/ΜL (ref 0–0.1)
BASOPHILS NFR BLD AUTO: 1 % (ref 0–1)
BUN SERPL-MCNC: 10 MG/DL (ref 5–25)
CALCIUM SERPL-MCNC: 8.7 MG/DL (ref 8.3–10.1)
CHLORIDE SERPL-SCNC: 106 MMOL/L (ref 100–108)
CO2 SERPL-SCNC: 25 MMOL/L (ref 21–32)
CREAT SERPL-MCNC: 0.89 MG/DL (ref 0.6–1.3)
EOSINOPHIL # BLD AUTO: 0.33 THOUSAND/ΜL (ref 0–0.61)
EOSINOPHIL NFR BLD AUTO: 4 % (ref 0–6)
ERYTHROCYTE [DISTWIDTH] IN BLOOD BY AUTOMATED COUNT: 15.9 % (ref 11.6–15.1)
GFR SERPL CREATININE-BSD FRML MDRD: >60 ML/MIN/1.73SQ M
GLUCOSE SERPL-MCNC: 183 MG/DL (ref 65–140)
GLUCOSE SERPL-MCNC: 187 MG/DL (ref 65–140)
GLUCOSE SERPL-MCNC: 196 MG/DL (ref 65–140)
GLUCOSE SERPL-MCNC: 196 MG/DL (ref 65–140)
GLUCOSE SERPL-MCNC: 205 MG/DL (ref 65–140)
GLUCOSE SERPL-MCNC: 222 MG/DL (ref 65–140)
HCT VFR BLD AUTO: 29.6 % (ref 36.5–49.3)
HGB BLD-MCNC: 9.5 G/DL (ref 12–17)
INR PPP: 1.11 (ref 0.86–1.16)
LYMPHOCYTES # BLD AUTO: 1.79 THOUSANDS/ΜL (ref 0.6–4.47)
LYMPHOCYTES NFR BLD AUTO: 21 % (ref 14–44)
MCH RBC QN AUTO: 27.9 PG (ref 26.8–34.3)
MCHC RBC AUTO-ENTMCNC: 32.1 G/DL (ref 31.4–37.4)
MCV RBC AUTO: 87 FL (ref 82–98)
MONOCYTES # BLD AUTO: 0.9 THOUSAND/ΜL (ref 0.17–1.22)
MONOCYTES NFR BLD AUTO: 11 % (ref 4–12)
NEUTROPHILS # BLD AUTO: 5.37 THOUSANDS/ΜL (ref 1.85–7.62)
NEUTS SEG NFR BLD AUTO: 63 % (ref 43–75)
NRBC BLD AUTO-RTO: 0 /100 WBCS
PLATELET # BLD AUTO: 263 THOUSANDS/UL (ref 149–390)
PMV BLD AUTO: 11.7 FL (ref 8.9–12.7)
POTASSIUM SERPL-SCNC: 4.1 MMOL/L (ref 3.5–5.3)
PROTHROMBIN TIME: 14.3 SECONDS (ref 12.1–14.4)
RBC # BLD AUTO: 3.4 MILLION/UL (ref 3.88–5.62)
SODIUM SERPL-SCNC: 139 MMOL/L (ref 136–145)
WBC # BLD AUTO: 8.46 THOUSAND/UL (ref 4.31–10.16)

## 2017-07-24 PROCEDURE — 97535 SELF CARE MNGMENT TRAINING: CPT

## 2017-07-24 PROCEDURE — 97530 THERAPEUTIC ACTIVITIES: CPT

## 2017-07-24 PROCEDURE — 85730 THROMBOPLASTIN TIME PARTIAL: CPT | Performed by: INTERNAL MEDICINE

## 2017-07-24 PROCEDURE — 80048 BASIC METABOLIC PNL TOTAL CA: CPT | Performed by: PODIATRIST

## 2017-07-24 PROCEDURE — 82948 REAGENT STRIP/BLOOD GLUCOSE: CPT

## 2017-07-24 PROCEDURE — 85610 PROTHROMBIN TIME: CPT | Performed by: PODIATRIST

## 2017-07-24 PROCEDURE — 85025 COMPLETE CBC W/AUTO DIFF WBC: CPT | Performed by: PODIATRIST

## 2017-07-24 PROCEDURE — 85730 THROMBOPLASTIN TIME PARTIAL: CPT | Performed by: SURGERY

## 2017-07-24 RX ORDER — LEVOFLOXACIN 500 MG/1
500 TABLET, FILM COATED ORAL EVERY 24 HOURS
Status: DISCONTINUED | OUTPATIENT
Start: 2017-07-24 | End: 2017-07-26 | Stop reason: HOSPADM

## 2017-07-24 RX ADMIN — QUETIAPINE FUMARATE 800 MG: 200 TABLET ORAL at 21:03

## 2017-07-24 RX ADMIN — HEPARIN SODIUM 23 UNITS/KG/HR: 10000 INJECTION, SOLUTION INTRAVENOUS at 14:52

## 2017-07-24 RX ADMIN — CEFEPIME HYDROCHLORIDE 2000 MG: 2 INJECTION, POWDER, FOR SOLUTION INTRAVENOUS at 08:30

## 2017-07-24 RX ADMIN — TORSEMIDE 20 MG: 20 TABLET ORAL at 08:31

## 2017-07-24 RX ADMIN — INSULIN LISPRO 3 UNITS: 100 INJECTION, SOLUTION INTRAVENOUS; SUBCUTANEOUS at 08:30

## 2017-07-24 RX ADMIN — INSULIN LISPRO 2 UNITS: 100 INJECTION, SOLUTION INTRAVENOUS; SUBCUTANEOUS at 11:48

## 2017-07-24 RX ADMIN — GABAPENTIN 300 MG: 300 CAPSULE ORAL at 08:30

## 2017-07-24 RX ADMIN — METOPROLOL SUCCINATE 25 MG: 25 TABLET, EXTENDED RELEASE ORAL at 08:30

## 2017-07-24 RX ADMIN — INSULIN LISPRO 3 UNITS: 100 INJECTION, SOLUTION INTRAVENOUS; SUBCUTANEOUS at 17:40

## 2017-07-24 RX ADMIN — INSULIN LISPRO 1 UNITS: 100 INJECTION, SOLUTION INTRAVENOUS; SUBCUTANEOUS at 17:39

## 2017-07-24 RX ADMIN — OXYCODONE HYDROCHLORIDE 10 MG: 10 TABLET ORAL at 21:03

## 2017-07-24 RX ADMIN — POTASSIUM CHLORIDE 20 MEQ: 1500 TABLET, EXTENDED RELEASE ORAL at 08:30

## 2017-07-24 RX ADMIN — INSULIN GLARGINE 11 UNITS: 100 INJECTION, SOLUTION SUBCUTANEOUS at 21:02

## 2017-07-24 RX ADMIN — LISINOPRIL 20 MG: 20 TABLET ORAL at 08:31

## 2017-07-24 RX ADMIN — SODIUM CHLORIDE, SODIUM LACTATE, POTASSIUM CHLORIDE, AND CALCIUM CHLORIDE 75 ML/HR: .6; .31; .03; .02 INJECTION, SOLUTION INTRAVENOUS at 14:10

## 2017-07-24 RX ADMIN — NICOTINE 14 MG: 14 PATCH, EXTENDED RELEASE TRANSDERMAL at 08:31

## 2017-07-24 RX ADMIN — LORAZEPAM 1 MG: 1 TABLET ORAL at 18:49

## 2017-07-24 RX ADMIN — INSULIN LISPRO 2 UNITS: 100 INJECTION, SOLUTION INTRAVENOUS; SUBCUTANEOUS at 08:30

## 2017-07-24 RX ADMIN — LEVOFLOXACIN 500 MG: 500 TABLET, FILM COATED ORAL at 14:48

## 2017-07-24 RX ADMIN — INSULIN LISPRO 3 UNITS: 100 INJECTION, SOLUTION INTRAVENOUS; SUBCUTANEOUS at 11:48

## 2017-07-24 RX ADMIN — HEPARIN SODIUM 2000 UNITS: 1000 INJECTION, SOLUTION INTRAVENOUS; SUBCUTANEOUS at 06:53

## 2017-07-24 RX ADMIN — METRONIDAZOLE 500 MG: 500 TABLET ORAL at 05:44

## 2017-07-24 RX ADMIN — WARFARIN SODIUM 5 MG: 5 TABLET ORAL at 17:40

## 2017-07-24 RX ADMIN — PRAVASTATIN SODIUM 10 MG: 10 TABLET ORAL at 17:40

## 2017-07-24 RX ADMIN — OXYCODONE HYDROCHLORIDE AND ACETAMINOPHEN 1 TABLET: 5; 325 TABLET ORAL at 14:51

## 2017-07-24 RX ADMIN — HEPARIN SODIUM 21 UNITS/KG/HR: 10000 INJECTION, SOLUTION INTRAVENOUS at 02:15

## 2017-07-24 RX ADMIN — GABAPENTIN 300 MG: 300 CAPSULE ORAL at 17:40

## 2017-07-25 ENCOUNTER — APPOINTMENT (INPATIENT)
Dept: PHYSICAL THERAPY | Facility: HOSPITAL | Age: 57
DRG: 314 | End: 2017-07-25
Payer: COMMERCIAL

## 2017-07-25 PROBLEM — D64.9 ANEMIA: Status: ACTIVE | Noted: 2017-07-25

## 2017-07-25 LAB
ALBUMIN SERPL BCP-MCNC: 2.6 G/DL (ref 3.5–5)
ALP SERPL-CCNC: 125 U/L (ref 46–116)
ALT SERPL W P-5'-P-CCNC: 20 U/L (ref 12–78)
ANION GAP SERPL CALCULATED.3IONS-SCNC: 5 MMOL/L (ref 4–13)
APTT PPP: 106 SECONDS (ref 23–35)
APTT PPP: 73 SECONDS (ref 23–35)
AST SERPL W P-5'-P-CCNC: 15 U/L (ref 5–45)
BACTERIA TISS AEROBE CULT: ABNORMAL
BACTERIA TISS AEROBE CULT: ABNORMAL
BASOPHILS # BLD AUTO: 0.05 THOUSANDS/ΜL (ref 0–0.1)
BASOPHILS NFR BLD AUTO: 1 % (ref 0–1)
BILIRUB SERPL-MCNC: 0.32 MG/DL (ref 0.2–1)
BUN SERPL-MCNC: 8 MG/DL (ref 5–25)
CALCIUM SERPL-MCNC: 9 MG/DL (ref 8.3–10.1)
CHLORIDE SERPL-SCNC: 106 MMOL/L (ref 100–108)
CO2 SERPL-SCNC: 28 MMOL/L (ref 21–32)
CREAT SERPL-MCNC: 0.91 MG/DL (ref 0.6–1.3)
EOSINOPHIL # BLD AUTO: 0.33 THOUSAND/ΜL (ref 0–0.61)
EOSINOPHIL NFR BLD AUTO: 4 % (ref 0–6)
ERYTHROCYTE [DISTWIDTH] IN BLOOD BY AUTOMATED COUNT: 16.1 % (ref 11.6–15.1)
GFR SERPL CREATININE-BSD FRML MDRD: 93 ML/MIN/1.73SQ M
GLUCOSE SERPL-MCNC: 137 MG/DL (ref 65–140)
GLUCOSE SERPL-MCNC: 165 MG/DL (ref 65–140)
GLUCOSE SERPL-MCNC: 172 MG/DL (ref 65–140)
GLUCOSE SERPL-MCNC: 188 MG/DL (ref 65–140)
GLUCOSE SERPL-MCNC: 216 MG/DL (ref 65–140)
GRAM STN SPEC: ABNORMAL
GRAM STN SPEC: ABNORMAL
HCT VFR BLD AUTO: 31.5 % (ref 36.5–49.3)
HGB BLD-MCNC: 10 G/DL (ref 12–17)
INR PPP: 1.29 (ref 0.86–1.16)
LYMPHOCYTES # BLD AUTO: 2.11 THOUSANDS/ΜL (ref 0.6–4.47)
LYMPHOCYTES NFR BLD AUTO: 24 % (ref 14–44)
MCH RBC QN AUTO: 27.6 PG (ref 26.8–34.3)
MCHC RBC AUTO-ENTMCNC: 31.7 G/DL (ref 31.4–37.4)
MCV RBC AUTO: 87 FL (ref 82–98)
MONOCYTES # BLD AUTO: 0.95 THOUSAND/ΜL (ref 0.17–1.22)
MONOCYTES NFR BLD AUTO: 11 % (ref 4–12)
NEUTROPHILS # BLD AUTO: 5.34 THOUSANDS/ΜL (ref 1.85–7.62)
NEUTS SEG NFR BLD AUTO: 60 % (ref 43–75)
NRBC BLD AUTO-RTO: 0 /100 WBCS
PLATELET # BLD AUTO: 281 THOUSANDS/UL (ref 149–390)
PMV BLD AUTO: 11.7 FL (ref 8.9–12.7)
POTASSIUM SERPL-SCNC: 4 MMOL/L (ref 3.5–5.3)
PROT SERPL-MCNC: 7.1 G/DL (ref 6.4–8.2)
PROTHROMBIN TIME: 16.2 SECONDS (ref 12.1–14.4)
RBC # BLD AUTO: 3.62 MILLION/UL (ref 3.88–5.62)
SODIUM SERPL-SCNC: 139 MMOL/L (ref 136–145)
WBC # BLD AUTO: 8.82 THOUSAND/UL (ref 4.31–10.16)

## 2017-07-25 PROCEDURE — 82948 REAGENT STRIP/BLOOD GLUCOSE: CPT

## 2017-07-25 PROCEDURE — 97535 SELF CARE MNGMENT TRAINING: CPT

## 2017-07-25 PROCEDURE — 85610 PROTHROMBIN TIME: CPT | Performed by: PODIATRIST

## 2017-07-25 PROCEDURE — 80053 COMPREHEN METABOLIC PANEL: CPT | Performed by: PODIATRIST

## 2017-07-25 PROCEDURE — 85025 COMPLETE CBC W/AUTO DIFF WBC: CPT | Performed by: PODIATRIST

## 2017-07-25 PROCEDURE — 97167 OT EVAL HIGH COMPLEX 60 MIN: CPT

## 2017-07-25 PROCEDURE — G8988 SELF CARE GOAL STATUS: HCPCS

## 2017-07-25 PROCEDURE — G8987 SELF CARE CURRENT STATUS: HCPCS

## 2017-07-25 PROCEDURE — 97110 THERAPEUTIC EXERCISES: CPT

## 2017-07-25 PROCEDURE — 97116 GAIT TRAINING THERAPY: CPT

## 2017-07-25 PROCEDURE — 85730 THROMBOPLASTIN TIME PARTIAL: CPT | Performed by: PODIATRIST

## 2017-07-25 PROCEDURE — 85730 THROMBOPLASTIN TIME PARTIAL: CPT | Performed by: INTERNAL MEDICINE

## 2017-07-25 RX ORDER — LORAZEPAM 1 MG/1
1 TABLET ORAL
Status: COMPLETED | OUTPATIENT
Start: 2017-07-25 | End: 2017-07-25

## 2017-07-25 RX ADMIN — GABAPENTIN 300 MG: 300 CAPSULE ORAL at 09:57

## 2017-07-25 RX ADMIN — INSULIN LISPRO 3 UNITS: 100 INJECTION, SOLUTION INTRAVENOUS; SUBCUTANEOUS at 17:09

## 2017-07-25 RX ADMIN — HEPARIN SODIUM 21 UNITS/KG/HR: 10000 INJECTION, SOLUTION INTRAVENOUS at 18:12

## 2017-07-25 RX ADMIN — HEPARIN SODIUM 23 UNITS/KG/HR: 10000 INJECTION, SOLUTION INTRAVENOUS at 03:58

## 2017-07-25 RX ADMIN — INSULIN LISPRO 2 UNITS: 100 INJECTION, SOLUTION INTRAVENOUS; SUBCUTANEOUS at 11:50

## 2017-07-25 RX ADMIN — PRAVASTATIN SODIUM 10 MG: 10 TABLET ORAL at 17:07

## 2017-07-25 RX ADMIN — LEVOFLOXACIN 500 MG: 500 TABLET, FILM COATED ORAL at 14:19

## 2017-07-25 RX ADMIN — OXYCODONE HYDROCHLORIDE 10 MG: 10 TABLET ORAL at 06:36

## 2017-07-25 RX ADMIN — INSULIN LISPRO 3 UNITS: 100 INJECTION, SOLUTION INTRAVENOUS; SUBCUTANEOUS at 10:02

## 2017-07-25 RX ADMIN — INSULIN LISPRO 3 UNITS: 100 INJECTION, SOLUTION INTRAVENOUS; SUBCUTANEOUS at 11:50

## 2017-07-25 RX ADMIN — HYOSCYAMINE SULFATE 1 APPLICATION: 16 SOLUTION at 14:20

## 2017-07-25 RX ADMIN — INSULIN LISPRO 1 UNITS: 100 INJECTION, SOLUTION INTRAVENOUS; SUBCUTANEOUS at 17:10

## 2017-07-25 RX ADMIN — LORAZEPAM 1 MG: 1 TABLET ORAL at 21:12

## 2017-07-25 RX ADMIN — TORSEMIDE 20 MG: 20 TABLET ORAL at 09:57

## 2017-07-25 RX ADMIN — OXYCODONE HYDROCHLORIDE AND ACETAMINOPHEN 1 TABLET: 5; 325 TABLET ORAL at 20:33

## 2017-07-25 RX ADMIN — NICOTINE 14 MG: 14 PATCH, EXTENDED RELEASE TRANSDERMAL at 09:58

## 2017-07-25 RX ADMIN — QUETIAPINE FUMARATE 800 MG: 200 TABLET ORAL at 21:11

## 2017-07-25 RX ADMIN — INSULIN LISPRO 1 UNITS: 100 INJECTION, SOLUTION INTRAVENOUS; SUBCUTANEOUS at 10:02

## 2017-07-25 RX ADMIN — LORAZEPAM 1 MG: 1 TABLET ORAL at 15:53

## 2017-07-25 RX ADMIN — OXYCODONE HYDROCHLORIDE AND ACETAMINOPHEN 1 TABLET: 5; 325 TABLET ORAL at 14:18

## 2017-07-25 RX ADMIN — OXYCODONE HYDROCHLORIDE 10 MG: 10 TABLET ORAL at 10:07

## 2017-07-25 RX ADMIN — ALUMINUM HYDROXIDE, MAGNESIUM HYDROXIDE, AND SIMETHICONE 30 ML: 200; 200; 20 SUSPENSION ORAL at 21:11

## 2017-07-25 RX ADMIN — LISINOPRIL 20 MG: 20 TABLET ORAL at 09:57

## 2017-07-25 RX ADMIN — SODIUM CHLORIDE, SODIUM LACTATE, POTASSIUM CHLORIDE, AND CALCIUM CHLORIDE 75 ML/HR: .6; .31; .03; .02 INJECTION, SOLUTION INTRAVENOUS at 03:42

## 2017-07-25 RX ADMIN — POTASSIUM CHLORIDE 20 MEQ: 1500 TABLET, EXTENDED RELEASE ORAL at 09:57

## 2017-07-25 RX ADMIN — OXYCODONE HYDROCHLORIDE 10 MG: 10 TABLET ORAL at 17:13

## 2017-07-25 RX ADMIN — INSULIN GLARGINE 11 UNITS: 100 INJECTION, SOLUTION SUBCUTANEOUS at 21:11

## 2017-07-25 RX ADMIN — GABAPENTIN 300 MG: 300 CAPSULE ORAL at 17:07

## 2017-07-25 RX ADMIN — LORAZEPAM 1 MG: 1 TABLET ORAL at 06:36

## 2017-07-25 RX ADMIN — WARFARIN SODIUM 5 MG: 5 TABLET ORAL at 17:07

## 2017-07-25 RX ADMIN — METOPROLOL SUCCINATE 25 MG: 25 TABLET, EXTENDED RELEASE ORAL at 09:57

## 2017-07-26 ENCOUNTER — APPOINTMENT (INPATIENT)
Dept: PHYSICAL THERAPY | Facility: HOSPITAL | Age: 57
DRG: 314 | End: 2017-07-26
Payer: COMMERCIAL

## 2017-07-26 VITALS
HEART RATE: 70 BPM | DIASTOLIC BLOOD PRESSURE: 80 MMHG | WEIGHT: 182.6 LBS | BODY MASS INDEX: 24.2 KG/M2 | RESPIRATION RATE: 18 BRPM | OXYGEN SATURATION: 95 % | TEMPERATURE: 98.2 F | SYSTOLIC BLOOD PRESSURE: 110 MMHG | HEIGHT: 73 IN

## 2017-07-26 LAB
APTT PPP: 72 SECONDS (ref 23–35)
APTT PPP: 76 SECONDS (ref 23–35)
GLUCOSE SERPL-MCNC: 163 MG/DL (ref 65–140)
GLUCOSE SERPL-MCNC: 225 MG/DL (ref 65–140)
INR PPP: 1.34 (ref 0.86–1.16)
PROTHROMBIN TIME: 16.7 SECONDS (ref 12.1–14.4)

## 2017-07-26 PROCEDURE — 85610 PROTHROMBIN TIME: CPT | Performed by: PODIATRIST

## 2017-07-26 PROCEDURE — 82948 REAGENT STRIP/BLOOD GLUCOSE: CPT

## 2017-07-26 PROCEDURE — 97535 SELF CARE MNGMENT TRAINING: CPT

## 2017-07-26 PROCEDURE — 85730 THROMBOPLASTIN TIME PARTIAL: CPT | Performed by: PODIATRIST

## 2017-07-26 PROCEDURE — 97110 THERAPEUTIC EXERCISES: CPT

## 2017-07-26 PROCEDURE — 97116 GAIT TRAINING THERAPY: CPT

## 2017-07-26 RX ORDER — WARFARIN SODIUM 7.5 MG/1
7.5 TABLET ORAL
Status: COMPLETED | OUTPATIENT
Start: 2017-07-26 | End: 2017-07-26

## 2017-07-26 RX ORDER — WARFARIN SODIUM 7.5 MG/1
7.5 TABLET ORAL
Status: DISCONTINUED | OUTPATIENT
Start: 2017-07-26 | End: 2017-07-26

## 2017-07-26 RX ORDER — TRAMADOL HYDROCHLORIDE 50 MG/1
50 TABLET ORAL EVERY 6 HOURS PRN
Qty: 30 TABLET | Refills: 0 | Status: SHIPPED | OUTPATIENT
Start: 2017-07-26 | End: 2017-08-05

## 2017-07-26 RX ORDER — INSULIN GLARGINE 100 [IU]/ML
21 INJECTION, SOLUTION SUBCUTANEOUS
Qty: 10 ML | Refills: 0 | Status: ON HOLD | OUTPATIENT
Start: 2017-07-26 | End: 2017-11-28

## 2017-07-26 RX ORDER — INSULIN GLARGINE 100 [IU]/ML
21 INJECTION, SOLUTION SUBCUTANEOUS
Status: DISCONTINUED | OUTPATIENT
Start: 2017-07-26 | End: 2017-07-26 | Stop reason: HOSPADM

## 2017-07-26 RX ADMIN — HYOSCYAMINE SULFATE 1 APPLICATION: 16 SOLUTION at 09:33

## 2017-07-26 RX ADMIN — TORSEMIDE 20 MG: 20 TABLET ORAL at 09:30

## 2017-07-26 RX ADMIN — METOPROLOL SUCCINATE 25 MG: 25 TABLET, EXTENDED RELEASE ORAL at 09:30

## 2017-07-26 RX ADMIN — GABAPENTIN 300 MG: 300 CAPSULE ORAL at 09:30

## 2017-07-26 RX ADMIN — INSULIN LISPRO 3 UNITS: 100 INJECTION, SOLUTION INTRAVENOUS; SUBCUTANEOUS at 09:30

## 2017-07-26 RX ADMIN — HEPARIN SODIUM 21 UNITS/KG/HR: 10000 INJECTION, SOLUTION INTRAVENOUS at 09:36

## 2017-07-26 RX ADMIN — LISINOPRIL 20 MG: 20 TABLET ORAL at 09:30

## 2017-07-26 RX ADMIN — OXYCODONE HYDROCHLORIDE AND ACETAMINOPHEN 1 TABLET: 5; 325 TABLET ORAL at 09:36

## 2017-07-26 RX ADMIN — INSULIN LISPRO 2 UNITS: 100 INJECTION, SOLUTION INTRAVENOUS; SUBCUTANEOUS at 09:31

## 2017-07-26 RX ADMIN — INSULIN LISPRO 3 UNITS: 100 INJECTION, SOLUTION INTRAVENOUS; SUBCUTANEOUS at 12:20

## 2017-07-26 RX ADMIN — POTASSIUM CHLORIDE 20 MEQ: 1500 TABLET, EXTENDED RELEASE ORAL at 09:30

## 2017-07-26 RX ADMIN — NICOTINE 14 MG: 14 PATCH, EXTENDED RELEASE TRANSDERMAL at 09:32

## 2017-07-26 RX ADMIN — WARFARIN SODIUM 7.5 MG: 7.5 TABLET ORAL at 14:03

## 2017-07-26 RX ADMIN — INSULIN LISPRO 1 UNITS: 100 INJECTION, SOLUTION INTRAVENOUS; SUBCUTANEOUS at 12:20

## 2017-07-28 ENCOUNTER — GENERIC CONVERSION - ENCOUNTER (OUTPATIENT)
Dept: OTHER | Facility: OTHER | Age: 57
End: 2017-07-28

## 2017-08-02 ENCOUNTER — ALLSCRIPTS OFFICE VISIT (OUTPATIENT)
Dept: OTHER | Facility: OTHER | Age: 57
End: 2017-08-02

## 2017-08-03 ENCOUNTER — APPOINTMENT (OUTPATIENT)
Dept: WOUND CARE | Facility: HOSPITAL | Age: 57
End: 2017-08-03
Attending: PODIATRIST
Payer: COMMERCIAL

## 2017-08-03 ENCOUNTER — ALLSCRIPTS OFFICE VISIT (OUTPATIENT)
Dept: OTHER | Facility: OTHER | Age: 57
End: 2017-08-03

## 2017-08-03 ENCOUNTER — GENERIC CONVERSION - ENCOUNTER (OUTPATIENT)
Dept: OTHER | Facility: OTHER | Age: 57
End: 2017-08-03

## 2017-08-03 PROCEDURE — 82948 REAGENT STRIP/BLOOD GLUCOSE: CPT | Performed by: PREVENTIVE MEDICINE

## 2017-08-03 PROCEDURE — 99213 OFFICE O/P EST LOW 20 MIN: CPT

## 2017-08-04 LAB
INR PPP: 1.8 (ref 0.8–1.2)
PROTHROMBIN TIME: 18 SEC (ref 9.1–12)

## 2017-08-09 ENCOUNTER — HOSPITAL ENCOUNTER (INPATIENT)
Facility: HOSPITAL | Age: 57
LOS: 28 days | Discharge: NURSING, REHAB OR LONG-TERM CARE | DRG: 711 | End: 2017-09-06
Attending: PODIATRIST | Admitting: PODIATRIST
Payer: COMMERCIAL

## 2017-08-09 ENCOUNTER — APPOINTMENT (OUTPATIENT)
Dept: WOUND CARE | Facility: HOSPITAL | Age: 57
End: 2017-08-09
Attending: PODIATRIST
Payer: COMMERCIAL

## 2017-08-09 ENCOUNTER — APPOINTMENT (INPATIENT)
Dept: RADIOLOGY | Facility: HOSPITAL | Age: 57
DRG: 711 | End: 2017-08-09
Payer: COMMERCIAL

## 2017-08-09 ENCOUNTER — GENERIC CONVERSION - ENCOUNTER (OUTPATIENT)
Dept: OTHER | Facility: OTHER | Age: 57
End: 2017-08-09

## 2017-08-09 DIAGNOSIS — I73.9 PVD (PERIPHERAL VASCULAR DISEASE) (HCC): ICD-10-CM

## 2017-08-09 DIAGNOSIS — T14.8XXA NONHEALING NONSURGICAL WOUND: Primary | ICD-10-CM

## 2017-08-09 DIAGNOSIS — R45.851 SUICIDAL IDEATIONS: ICD-10-CM

## 2017-08-09 DIAGNOSIS — E11.40 TYPE 2 DIABETES MELLITUS WITH DIABETIC NEUROPATHY, UNSPECIFIED LONG TERM INSULIN USE STATUS: ICD-10-CM

## 2017-08-09 DIAGNOSIS — Z95.2 HISTORY OF MITRAL VALVE REPLACEMENT WITH MECHANICAL VALVE: ICD-10-CM

## 2017-08-09 DIAGNOSIS — R45.850 HOMICIDAL IDEATIONS: ICD-10-CM

## 2017-08-09 DIAGNOSIS — I96 GANGRENE OF FOOT (HCC): ICD-10-CM

## 2017-08-09 DIAGNOSIS — I73.9 PERIPHERAL ARTERY DISEASE (HCC): ICD-10-CM

## 2017-08-09 DIAGNOSIS — Z95.0 PACEMAKER: ICD-10-CM

## 2017-08-09 DIAGNOSIS — Z01.818 PRE-OP EVALUATION: ICD-10-CM

## 2017-08-09 DIAGNOSIS — I73.9 PAD (PERIPHERAL ARTERY DISEASE) (HCC): ICD-10-CM

## 2017-08-09 DIAGNOSIS — F31.9 BIPOLAR 1 DISORDER (HCC): Chronic | ICD-10-CM

## 2017-08-09 DIAGNOSIS — I10 ESSENTIAL HYPERTENSION: Chronic | ICD-10-CM

## 2017-08-09 DIAGNOSIS — I25.10 CORONARY ARTERY DISEASE WITHOUT ANGINA PECTORIS, UNSPECIFIED VESSEL OR LESION TYPE, UNSPECIFIED WHETHER NATIVE OR TRANSPLANTED HEART: ICD-10-CM

## 2017-08-09 DIAGNOSIS — R07.9 CHEST PAIN: ICD-10-CM

## 2017-08-09 LAB
ALBUMIN SERPL BCP-MCNC: 3.1 G/DL (ref 3.5–5)
ALP SERPL-CCNC: 174 U/L (ref 46–116)
ALT SERPL W P-5'-P-CCNC: 17 U/L (ref 12–78)
ANION GAP SERPL CALCULATED.3IONS-SCNC: 9 MMOL/L (ref 4–13)
AST SERPL W P-5'-P-CCNC: 19 U/L (ref 5–45)
BILIRUB SERPL-MCNC: 0.19 MG/DL (ref 0.2–1)
BUN SERPL-MCNC: 31 MG/DL (ref 5–25)
CALCIUM SERPL-MCNC: 9.1 MG/DL (ref 8.3–10.1)
CHLORIDE SERPL-SCNC: 102 MMOL/L (ref 100–108)
CO2 SERPL-SCNC: 24 MMOL/L (ref 21–32)
CREAT SERPL-MCNC: 1.61 MG/DL (ref 0.6–1.3)
ERYTHROCYTE [DISTWIDTH] IN BLOOD BY AUTOMATED COUNT: 15.9 % (ref 11.6–15.1)
GFR SERPL CREATININE-BSD FRML MDRD: 46 ML/MIN/1.73SQ M
GLUCOSE SERPL-MCNC: 106 MG/DL (ref 65–140)
GLUCOSE SERPL-MCNC: 155 MG/DL (ref 65–140)
GLUCOSE SERPL-MCNC: 201 MG/DL (ref 65–140)
HCT VFR BLD AUTO: 31.6 % (ref 36.5–49.3)
HGB BLD-MCNC: 10 G/DL (ref 12–17)
INR PPP: 3.39 (ref 0.86–1.16)
MCH RBC QN AUTO: 27.4 PG (ref 26.8–34.3)
MCHC RBC AUTO-ENTMCNC: 31.6 G/DL (ref 31.4–37.4)
MCV RBC AUTO: 87 FL (ref 82–98)
PLATELET # BLD AUTO: 406 THOUSANDS/UL (ref 149–390)
PLATELET # BLD AUTO: 406 THOUSANDS/UL (ref 149–390)
PMV BLD AUTO: 11.5 FL (ref 8.9–12.7)
PMV BLD AUTO: 11.5 FL (ref 8.9–12.7)
POTASSIUM SERPL-SCNC: 5.4 MMOL/L (ref 3.5–5.3)
PROT SERPL-MCNC: 8.6 G/DL (ref 6.4–8.2)
PROTHROMBIN TIME: 34.8 SECONDS (ref 12.1–14.4)
RBC # BLD AUTO: 3.65 MILLION/UL (ref 3.88–5.62)
SODIUM SERPL-SCNC: 135 MMOL/L (ref 136–145)
WBC # BLD AUTO: 11.85 THOUSAND/UL (ref 4.31–10.16)

## 2017-08-09 PROCEDURE — 82948 REAGENT STRIP/BLOOD GLUCOSE: CPT

## 2017-08-09 PROCEDURE — 99212 OFFICE O/P EST SF 10 MIN: CPT | Performed by: PODIATRIST

## 2017-08-09 PROCEDURE — 87147 CULTURE TYPE IMMUNOLOGIC: CPT | Performed by: STUDENT IN AN ORGANIZED HEALTH CARE EDUCATION/TRAINING PROGRAM

## 2017-08-09 PROCEDURE — 87070 CULTURE OTHR SPECIMN AEROBIC: CPT | Performed by: STUDENT IN AN ORGANIZED HEALTH CARE EDUCATION/TRAINING PROGRAM

## 2017-08-09 PROCEDURE — 85049 AUTOMATED PLATELET COUNT: CPT | Performed by: STUDENT IN AN ORGANIZED HEALTH CARE EDUCATION/TRAINING PROGRAM

## 2017-08-09 PROCEDURE — 71020 HB CHEST X-RAY 2VW FRONTAL&LATL: CPT

## 2017-08-09 PROCEDURE — 93005 ELECTROCARDIOGRAM TRACING: CPT | Performed by: STUDENT IN AN ORGANIZED HEALTH CARE EDUCATION/TRAINING PROGRAM

## 2017-08-09 PROCEDURE — 73630 X-RAY EXAM OF FOOT: CPT

## 2017-08-09 PROCEDURE — 87040 BLOOD CULTURE FOR BACTERIA: CPT | Performed by: STUDENT IN AN ORGANIZED HEALTH CARE EDUCATION/TRAINING PROGRAM

## 2017-08-09 PROCEDURE — 80053 COMPREHEN METABOLIC PANEL: CPT | Performed by: STUDENT IN AN ORGANIZED HEALTH CARE EDUCATION/TRAINING PROGRAM

## 2017-08-09 PROCEDURE — 87205 SMEAR GRAM STAIN: CPT | Performed by: STUDENT IN AN ORGANIZED HEALTH CARE EDUCATION/TRAINING PROGRAM

## 2017-08-09 PROCEDURE — 87186 SC STD MICRODIL/AGAR DIL: CPT | Performed by: STUDENT IN AN ORGANIZED HEALTH CARE EDUCATION/TRAINING PROGRAM

## 2017-08-09 PROCEDURE — 87077 CULTURE AEROBIC IDENTIFY: CPT | Performed by: STUDENT IN AN ORGANIZED HEALTH CARE EDUCATION/TRAINING PROGRAM

## 2017-08-09 PROCEDURE — 85027 COMPLETE CBC AUTOMATED: CPT | Performed by: STUDENT IN AN ORGANIZED HEALTH CARE EDUCATION/TRAINING PROGRAM

## 2017-08-09 PROCEDURE — 85610 PROTHROMBIN TIME: CPT | Performed by: STUDENT IN AN ORGANIZED HEALTH CARE EDUCATION/TRAINING PROGRAM

## 2017-08-09 RX ORDER — NICOTINE 21 MG/24HR
1 PATCH, TRANSDERMAL 24 HOURS TRANSDERMAL DAILY
Status: DISCONTINUED | OUTPATIENT
Start: 2017-08-09 | End: 2017-09-07 | Stop reason: HOSPADM

## 2017-08-09 RX ORDER — HYDROCODONE BITARTRATE AND ACETAMINOPHEN 5; 325 MG/1; MG/1
1 TABLET ORAL EVERY 6 HOURS PRN
COMMUNITY
End: 2017-09-06 | Stop reason: HOSPADM

## 2017-08-09 RX ORDER — INSULIN GLARGINE 100 [IU]/ML
21 INJECTION, SOLUTION SUBCUTANEOUS
Status: DISCONTINUED | OUTPATIENT
Start: 2017-08-09 | End: 2017-09-07 | Stop reason: HOSPADM

## 2017-08-09 RX ORDER — PANTOPRAZOLE SODIUM 40 MG/1
40 TABLET, DELAYED RELEASE ORAL
Status: DISCONTINUED | OUTPATIENT
Start: 2017-08-10 | End: 2017-09-07 | Stop reason: HOSPADM

## 2017-08-09 RX ORDER — QUETIAPINE FUMARATE 100 MG/1
300 TABLET, FILM COATED ORAL
Status: DISCONTINUED | OUTPATIENT
Start: 2017-08-09 | End: 2017-08-13

## 2017-08-09 RX ORDER — INSULIN GLARGINE 100 [IU]/ML
15 INJECTION, SOLUTION SUBCUTANEOUS
Status: DISCONTINUED | OUTPATIENT
Start: 2017-08-09 | End: 2017-08-09

## 2017-08-09 RX ORDER — TORSEMIDE 20 MG/1
20 TABLET ORAL DAILY
Status: DISCONTINUED | OUTPATIENT
Start: 2017-08-10 | End: 2017-09-07 | Stop reason: HOSPADM

## 2017-08-09 RX ORDER — QUETIAPINE FUMARATE 100 MG/1
200 TABLET, FILM COATED ORAL DAILY
Status: DISCONTINUED | OUTPATIENT
Start: 2017-08-10 | End: 2017-08-13

## 2017-08-09 RX ORDER — NICOTINE 21 MG/24HR
1 PATCH, TRANSDERMAL 24 HOURS TRANSDERMAL DAILY
Status: DISCONTINUED | OUTPATIENT
Start: 2017-08-10 | End: 2017-08-09

## 2017-08-09 RX ORDER — SULFAMETHOXAZOLE AND TRIMETHOPRIM 800; 160 MG/1; MG/1
1 TABLET ORAL EVERY 12 HOURS SCHEDULED
COMMUNITY
End: 2017-09-06 | Stop reason: HOSPADM

## 2017-08-09 RX ORDER — HEPARIN SODIUM 5000 [USP'U]/ML
5000 INJECTION, SOLUTION INTRAVENOUS; SUBCUTANEOUS EVERY 8 HOURS SCHEDULED
Status: DISCONTINUED | OUTPATIENT
Start: 2017-08-09 | End: 2017-08-10

## 2017-08-09 RX ORDER — POTASSIUM CHLORIDE 20 MEQ/1
20 TABLET, EXTENDED RELEASE ORAL DAILY
Status: DISCONTINUED | OUTPATIENT
Start: 2017-08-10 | End: 2017-09-07 | Stop reason: HOSPADM

## 2017-08-09 RX ORDER — TRAMADOL HYDROCHLORIDE 50 MG/1
50 TABLET ORAL EVERY 6 HOURS PRN
COMMUNITY
End: 2017-09-06 | Stop reason: HOSPADM

## 2017-08-09 RX ORDER — DIPHENOXYLATE HYDROCHLORIDE AND ATROPINE SULFATE 2.5; .025 MG/1; MG/1
1 TABLET ORAL 4 TIMES DAILY PRN
COMMUNITY
End: 2017-09-06 | Stop reason: HOSPADM

## 2017-08-09 RX ORDER — OXYCODONE HYDROCHLORIDE 5 MG/1
5 TABLET ORAL EVERY 4 HOURS PRN
Status: DISCONTINUED | OUTPATIENT
Start: 2017-08-09 | End: 2017-08-09

## 2017-08-09 RX ORDER — ACETAMINOPHEN 325 MG/1
650 TABLET ORAL EVERY 6 HOURS PRN
Status: DISCONTINUED | OUTPATIENT
Start: 2017-08-09 | End: 2017-08-29

## 2017-08-09 RX ORDER — PANTOPRAZOLE SODIUM 40 MG/1
40 TABLET, DELAYED RELEASE ORAL DAILY
COMMUNITY
End: 2017-09-06 | Stop reason: HOSPADM

## 2017-08-09 RX ORDER — GABAPENTIN 100 MG/1
100 CAPSULE ORAL 3 TIMES DAILY
Status: DISCONTINUED | OUTPATIENT
Start: 2017-08-09 | End: 2017-08-11

## 2017-08-09 RX ORDER — LISINOPRIL 20 MG/1
20 TABLET ORAL DAILY
Status: DISCONTINUED | OUTPATIENT
Start: 2017-08-10 | End: 2017-08-11

## 2017-08-09 RX ORDER — LORAZEPAM 1 MG/1
1 TABLET ORAL 2 TIMES DAILY PRN
Status: DISCONTINUED | OUTPATIENT
Start: 2017-08-09 | End: 2017-08-21

## 2017-08-09 RX ORDER — METRONIDAZOLE 500 MG/1
500 TABLET ORAL EVERY 8 HOURS SCHEDULED
Status: DISCONTINUED | OUTPATIENT
Start: 2017-08-09 | End: 2017-08-11

## 2017-08-09 RX ORDER — METOPROLOL SUCCINATE 25 MG/1
25 TABLET, EXTENDED RELEASE ORAL DAILY
Status: DISCONTINUED | OUTPATIENT
Start: 2017-08-10 | End: 2017-09-07 | Stop reason: HOSPADM

## 2017-08-09 RX ORDER — OXYCODONE HYDROCHLORIDE 10 MG/1
10 TABLET ORAL EVERY 4 HOURS PRN
Status: DISCONTINUED | OUTPATIENT
Start: 2017-08-09 | End: 2017-08-11

## 2017-08-09 RX ORDER — DIVALPROEX SODIUM 500 MG/1
1000 TABLET, EXTENDED RELEASE ORAL
Status: DISCONTINUED | OUTPATIENT
Start: 2017-08-09 | End: 2017-08-10

## 2017-08-09 RX ADMIN — INSULIN LISPRO 1 UNITS: 100 INJECTION, SOLUTION INTRAVENOUS; SUBCUTANEOUS at 21:47

## 2017-08-09 RX ADMIN — OXYCODONE HYDROCHLORIDE 10 MG: 10 TABLET ORAL at 20:35

## 2017-08-09 RX ADMIN — CEFEPIME HYDROCHLORIDE 2000 MG: 2 INJECTION, POWDER, FOR SOLUTION INTRAVENOUS at 18:32

## 2017-08-09 RX ADMIN — HEPARIN SODIUM 5000 UNITS: 5000 INJECTION, SOLUTION INTRAVENOUS; SUBCUTANEOUS at 21:47

## 2017-08-09 RX ADMIN — NICOTINE 1 PATCH: 14 PATCH, EXTENDED RELEASE TRANSDERMAL at 18:32

## 2017-08-09 RX ADMIN — GABAPENTIN 100 MG: 100 CAPSULE ORAL at 21:45

## 2017-08-09 RX ADMIN — HYDROMORPHONE HYDROCHLORIDE 0.5 MG: 1 INJECTION, SOLUTION INTRAMUSCULAR; INTRAVENOUS; SUBCUTANEOUS at 21:51

## 2017-08-09 RX ADMIN — QUETIAPINE FUMARATE 300 MG: 100 TABLET, FILM COATED ORAL at 21:45

## 2017-08-09 RX ADMIN — METRONIDAZOLE 500 MG: 500 TABLET ORAL at 18:29

## 2017-08-09 RX ADMIN — INSULIN GLARGINE 21 UNITS: 100 INJECTION, SOLUTION SUBCUTANEOUS at 21:46

## 2017-08-10 ENCOUNTER — APPOINTMENT (INPATIENT)
Dept: RADIOLOGY | Facility: HOSPITAL | Age: 57
DRG: 711 | End: 2017-08-10
Payer: COMMERCIAL

## 2017-08-10 ENCOUNTER — APPOINTMENT (INPATIENT)
Dept: NON INVASIVE DIAGNOSTICS | Facility: HOSPITAL | Age: 57
DRG: 711 | End: 2017-08-10
Payer: COMMERCIAL

## 2017-08-10 ENCOUNTER — GENERIC CONVERSION - ENCOUNTER (OUTPATIENT)
Dept: OTHER | Facility: OTHER | Age: 57
End: 2017-08-10

## 2017-08-10 PROBLEM — T81.49XA INFECTED SURGICAL WOUND: Status: ACTIVE | Noted: 2017-08-10

## 2017-08-10 LAB
ALBUMIN SERPL BCP-MCNC: 2.8 G/DL (ref 3.5–5)
ALP SERPL-CCNC: 177 U/L (ref 46–116)
ALT SERPL W P-5'-P-CCNC: 14 U/L (ref 12–78)
ANION GAP SERPL CALCULATED.3IONS-SCNC: 6 MMOL/L (ref 4–13)
ANION GAP SERPL CALCULATED.3IONS-SCNC: 9 MMOL/L (ref 4–13)
AST SERPL W P-5'-P-CCNC: 14 U/L (ref 5–45)
ATRIAL RATE: 65 BPM
BASOPHILS # BLD AUTO: 0.02 THOUSANDS/ΜL (ref 0–0.1)
BASOPHILS NFR BLD AUTO: 0 % (ref 0–1)
BILIRUB SERPL-MCNC: 0.47 MG/DL (ref 0.2–1)
BUN SERPL-MCNC: 21 MG/DL (ref 5–25)
BUN SERPL-MCNC: 26 MG/DL (ref 5–25)
CALCIUM SERPL-MCNC: 9.2 MG/DL (ref 8.3–10.1)
CALCIUM SERPL-MCNC: 9.4 MG/DL (ref 8.3–10.1)
CHLORIDE SERPL-SCNC: 100 MMOL/L (ref 100–108)
CHLORIDE SERPL-SCNC: 102 MMOL/L (ref 100–108)
CO2 SERPL-SCNC: 25 MMOL/L (ref 21–32)
CO2 SERPL-SCNC: 27 MMOL/L (ref 21–32)
CREAT SERPL-MCNC: 1.11 MG/DL (ref 0.6–1.3)
CREAT SERPL-MCNC: 1.16 MG/DL (ref 0.6–1.3)
CRP SERPL QL: >90 MG/L
EOSINOPHIL # BLD AUTO: 0.2 THOUSAND/ΜL (ref 0–0.61)
EOSINOPHIL NFR BLD AUTO: 1 % (ref 0–6)
ERYTHROCYTE [DISTWIDTH] IN BLOOD BY AUTOMATED COUNT: 15.3 % (ref 11.6–15.1)
ERYTHROCYTE [DISTWIDTH] IN BLOOD BY AUTOMATED COUNT: 15.7 % (ref 11.6–15.1)
ERYTHROCYTE [SEDIMENTATION RATE] IN BLOOD: 96 MM/HOUR (ref 0–10)
GFR SERPL CREATININE-BSD FRML MDRD: 69 ML/MIN/1.73SQ M
GFR SERPL CREATININE-BSD FRML MDRD: 73 ML/MIN/1.73SQ M
GLUCOSE SERPL-MCNC: 101 MG/DL (ref 65–140)
GLUCOSE SERPL-MCNC: 108 MG/DL (ref 65–140)
GLUCOSE SERPL-MCNC: 135 MG/DL (ref 65–140)
GLUCOSE SERPL-MCNC: 137 MG/DL (ref 65–140)
GLUCOSE SERPL-MCNC: 155 MG/DL (ref 65–140)
GLUCOSE SERPL-MCNC: 163 MG/DL (ref 65–140)
HCT VFR BLD AUTO: 30.7 % (ref 36.5–49.3)
HCT VFR BLD AUTO: 33 % (ref 36.5–49.3)
HGB BLD-MCNC: 10 G/DL (ref 12–17)
HGB BLD-MCNC: 10.3 G/DL (ref 12–17)
INR PPP: 2.15 (ref 0.86–1.16)
LACTATE SERPL-SCNC: 1.1 MMOL/L (ref 0.5–2)
LYMPHOCYTES # BLD AUTO: 1.18 THOUSANDS/ΜL (ref 0.6–4.47)
LYMPHOCYTES NFR BLD AUTO: 8 % (ref 14–44)
MCH RBC QN AUTO: 27 PG (ref 26.8–34.3)
MCH RBC QN AUTO: 27.3 PG (ref 26.8–34.3)
MCHC RBC AUTO-ENTMCNC: 31.2 G/DL (ref 31.4–37.4)
MCHC RBC AUTO-ENTMCNC: 32.6 G/DL (ref 31.4–37.4)
MCV RBC AUTO: 84 FL (ref 82–98)
MCV RBC AUTO: 87 FL (ref 82–98)
MONOCYTES # BLD AUTO: 0.84 THOUSAND/ΜL (ref 0.17–1.22)
MONOCYTES NFR BLD AUTO: 6 % (ref 4–12)
NEUTROPHILS # BLD AUTO: 13 THOUSANDS/ΜL (ref 1.85–7.62)
NEUTS SEG NFR BLD AUTO: 85 % (ref 43–75)
NRBC BLD AUTO-RTO: 0 /100 WBCS
P AXIS: 70 DEGREES
PLATELET # BLD AUTO: 364 THOUSANDS/UL (ref 149–390)
PLATELET # BLD AUTO: 416 THOUSANDS/UL (ref 149–390)
PMV BLD AUTO: 11.3 FL (ref 8.9–12.7)
PMV BLD AUTO: 11.8 FL (ref 8.9–12.7)
POTASSIUM SERPL-SCNC: 5.1 MMOL/L (ref 3.5–5.3)
POTASSIUM SERPL-SCNC: 5.3 MMOL/L (ref 3.5–5.3)
PR INTERVAL: 218 MS
PROT SERPL-MCNC: 8.4 G/DL (ref 6.4–8.2)
PROTHROMBIN TIME: 24.2 SECONDS (ref 12.1–14.4)
QRS AXIS: 62 DEGREES
QRSD INTERVAL: 120 MS
QT INTERVAL: 386 MS
QTC INTERVAL: 401 MS
RBC # BLD AUTO: 3.66 MILLION/UL (ref 3.88–5.62)
RBC # BLD AUTO: 3.81 MILLION/UL (ref 3.88–5.62)
SODIUM SERPL-SCNC: 134 MMOL/L (ref 136–145)
SODIUM SERPL-SCNC: 135 MMOL/L (ref 136–145)
T WAVE AXIS: 208 DEGREES
TROPONIN I SERPL-MCNC: <0.02 NG/ML
TROPONIN I SERPL-MCNC: <0.02 NG/ML
VENTRICULAR RATE: 65 BPM
WBC # BLD AUTO: 12.95 THOUSAND/UL (ref 4.31–10.16)
WBC # BLD AUTO: 15.33 THOUSAND/UL (ref 4.31–10.16)

## 2017-08-10 PROCEDURE — 71010 HB CHEST X-RAY 1 VIEW FRONTAL: CPT

## 2017-08-10 PROCEDURE — 86140 C-REACTIVE PROTEIN: CPT | Performed by: STUDENT IN AN ORGANIZED HEALTH CARE EDUCATION/TRAINING PROGRAM

## 2017-08-10 PROCEDURE — 85652 RBC SED RATE AUTOMATED: CPT | Performed by: STUDENT IN AN ORGANIZED HEALTH CARE EDUCATION/TRAINING PROGRAM

## 2017-08-10 PROCEDURE — 85025 COMPLETE CBC W/AUTO DIFF WBC: CPT | Performed by: PHYSICIAN ASSISTANT

## 2017-08-10 PROCEDURE — 02HV33Z INSERTION OF INFUSION DEVICE INTO SUPERIOR VENA CAVA, PERCUTANEOUS APPROACH: ICD-10-PCS | Performed by: HOSPITALIST

## 2017-08-10 PROCEDURE — 83605 ASSAY OF LACTIC ACID: CPT | Performed by: PHYSICIAN ASSISTANT

## 2017-08-10 PROCEDURE — 82948 REAGENT STRIP/BLOOD GLUCOSE: CPT

## 2017-08-10 PROCEDURE — 80048 BASIC METABOLIC PNL TOTAL CA: CPT | Performed by: STUDENT IN AN ORGANIZED HEALTH CARE EDUCATION/TRAINING PROGRAM

## 2017-08-10 PROCEDURE — 87040 BLOOD CULTURE FOR BACTERIA: CPT | Performed by: PHYSICIAN ASSISTANT

## 2017-08-10 PROCEDURE — 93005 ELECTROCARDIOGRAM TRACING: CPT

## 2017-08-10 PROCEDURE — C1751 CATH, INF, PER/CENT/MIDLINE: HCPCS

## 2017-08-10 PROCEDURE — 80053 COMPREHEN METABOLIC PANEL: CPT | Performed by: PHYSICIAN ASSISTANT

## 2017-08-10 PROCEDURE — 84484 ASSAY OF TROPONIN QUANT: CPT | Performed by: PHYSICIAN ASSISTANT

## 2017-08-10 PROCEDURE — 85027 COMPLETE CBC AUTOMATED: CPT | Performed by: STUDENT IN AN ORGANIZED HEALTH CARE EDUCATION/TRAINING PROGRAM

## 2017-08-10 PROCEDURE — 85610 PROTHROMBIN TIME: CPT | Performed by: PHYSICIAN ASSISTANT

## 2017-08-10 PROCEDURE — 93923 UPR/LXTR ART STDY 3+ LVLS: CPT

## 2017-08-10 PROCEDURE — 36569 INSJ PICC 5 YR+ W/O IMAGING: CPT

## 2017-08-10 RX ORDER — SODIUM CHLORIDE 9 MG/ML
100 INJECTION, SOLUTION INTRAVENOUS ONCE
Status: COMPLETED | OUTPATIENT
Start: 2017-08-10 | End: 2017-08-17

## 2017-08-10 RX ORDER — HEPARIN SODIUM 1000 [USP'U]/ML
6400 INJECTION, SOLUTION INTRAVENOUS; SUBCUTANEOUS AS NEEDED
Status: DISCONTINUED | OUTPATIENT
Start: 2017-08-10 | End: 2017-08-21 | Stop reason: DRUGHIGH

## 2017-08-10 RX ORDER — HEPARIN SODIUM 1000 [USP'U]/ML
3200 INJECTION, SOLUTION INTRAVENOUS; SUBCUTANEOUS AS NEEDED
Status: DISCONTINUED | OUTPATIENT
Start: 2017-08-10 | End: 2017-08-21 | Stop reason: DRUGHIGH

## 2017-08-10 RX ORDER — SODIUM CHLORIDE 9 MG/ML
75 INJECTION, SOLUTION INTRAVENOUS CONTINUOUS
Status: DISCONTINUED | OUTPATIENT
Start: 2017-08-10 | End: 2017-08-10

## 2017-08-10 RX ORDER — HEPARIN SODIUM 10000 [USP'U]/100ML
3-30 INJECTION, SOLUTION INTRAVENOUS
Status: DISCONTINUED | OUTPATIENT
Start: 2017-08-10 | End: 2017-08-21 | Stop reason: DRUGHIGH

## 2017-08-10 RX ORDER — ONDANSETRON 2 MG/ML
4 INJECTION INTRAMUSCULAR; INTRAVENOUS EVERY 4 HOURS PRN
Status: DISCONTINUED | OUTPATIENT
Start: 2017-08-10 | End: 2017-09-07 | Stop reason: HOSPADM

## 2017-08-10 RX ORDER — LORAZEPAM 2 MG/ML
1 INJECTION INTRAMUSCULAR ONCE
Status: COMPLETED | OUTPATIENT
Start: 2017-08-10 | End: 2017-08-10

## 2017-08-10 RX ADMIN — ONDANSETRON 4 MG: 2 INJECTION INTRAMUSCULAR; INTRAVENOUS at 17:41

## 2017-08-10 RX ADMIN — HYDROMORPHONE HYDROCHLORIDE 0.5 MG: 1 INJECTION, SOLUTION INTRAMUSCULAR; INTRAVENOUS; SUBCUTANEOUS at 18:03

## 2017-08-10 RX ADMIN — LORAZEPAM 1 MG: 2 INJECTION INTRAMUSCULAR; INTRAVENOUS at 20:27

## 2017-08-10 RX ADMIN — LORAZEPAM 1 MG: 1 TABLET ORAL at 19:44

## 2017-08-10 RX ADMIN — HYDROMORPHONE HYDROCHLORIDE 0.5 MG: 1 INJECTION, SOLUTION INTRAMUSCULAR; INTRAVENOUS; SUBCUTANEOUS at 12:55

## 2017-08-10 RX ADMIN — HYDROMORPHONE HYDROCHLORIDE 0.5 MG: 1 INJECTION, SOLUTION INTRAMUSCULAR; INTRAVENOUS; SUBCUTANEOUS at 08:32

## 2017-08-10 RX ADMIN — CEFTAROLINE FOSAMIL 600 MG: 600 POWDER, FOR SOLUTION INTRAVENOUS at 12:58

## 2017-08-10 RX ADMIN — SODIUM CHLORIDE 100 ML/HR: 0.9 INJECTION, SOLUTION INTRAVENOUS at 23:00

## 2017-08-10 RX ADMIN — PANTOPRAZOLE SODIUM 40 MG: 40 TABLET, DELAYED RELEASE ORAL at 06:00

## 2017-08-10 RX ADMIN — LISINOPRIL 20 MG: 20 TABLET ORAL at 09:14

## 2017-08-10 RX ADMIN — QUETIAPINE FUMARATE 200 MG: 100 TABLET, FILM COATED ORAL at 09:22

## 2017-08-10 RX ADMIN — QUETIAPINE FUMARATE 300 MG: 100 TABLET, FILM COATED ORAL at 22:37

## 2017-08-10 RX ADMIN — METRONIDAZOLE 500 MG: 500 TABLET ORAL at 14:28

## 2017-08-10 RX ADMIN — METRONIDAZOLE 500 MG: 500 TABLET ORAL at 06:00

## 2017-08-10 RX ADMIN — HYDROMORPHONE HYDROCHLORIDE 0.5 MG: 1 INJECTION, SOLUTION INTRAMUSCULAR; INTRAVENOUS; SUBCUTANEOUS at 02:01

## 2017-08-10 RX ADMIN — INSULIN LISPRO 1 UNITS: 100 INJECTION, SOLUTION INTRAVENOUS; SUBCUTANEOUS at 22:40

## 2017-08-10 RX ADMIN — GABAPENTIN 100 MG: 100 CAPSULE ORAL at 09:14

## 2017-08-10 RX ADMIN — SODIUM CHLORIDE 75 ML/HR: 0.9 INJECTION, SOLUTION INTRAVENOUS at 09:22

## 2017-08-10 RX ADMIN — GABAPENTIN 100 MG: 100 CAPSULE ORAL at 19:44

## 2017-08-10 RX ADMIN — METRONIDAZOLE 500 MG: 500 TABLET ORAL at 22:37

## 2017-08-10 RX ADMIN — OXYCODONE HYDROCHLORIDE 10 MG: 10 TABLET ORAL at 05:08

## 2017-08-10 RX ADMIN — HEPARIN SODIUM 18 UNITS/KG/HR: 10000 INJECTION, SOLUTION INTRAVENOUS at 19:30

## 2017-08-10 RX ADMIN — HEPARIN SODIUM 5000 UNITS: 5000 INJECTION, SOLUTION INTRAVENOUS; SUBCUTANEOUS at 06:00

## 2017-08-10 RX ADMIN — OXYCODONE HYDROCHLORIDE 10 MG: 10 TABLET ORAL at 22:39

## 2017-08-10 RX ADMIN — METOPROLOL SUCCINATE 25 MG: 25 TABLET, EXTENDED RELEASE ORAL at 09:14

## 2017-08-10 RX ADMIN — CEFEPIME HYDROCHLORIDE 2000 MG: 2 INJECTION, POWDER, FOR SOLUTION INTRAVENOUS at 06:00

## 2017-08-10 RX ADMIN — NICOTINE 1 PATCH: 14 PATCH, EXTENDED RELEASE TRANSDERMAL at 09:17

## 2017-08-10 RX ADMIN — TORSEMIDE 20 MG: 20 TABLET ORAL at 09:14

## 2017-08-10 RX ADMIN — INSULIN GLARGINE 21 UNITS: 100 INJECTION, SOLUTION SUBCUTANEOUS at 22:38

## 2017-08-10 RX ADMIN — POTASSIUM CHLORIDE 20 MEQ: 1500 TABLET, EXTENDED RELEASE ORAL at 09:14

## 2017-08-11 LAB
ANION GAP SERPL CALCULATED.3IONS-SCNC: 9 MMOL/L (ref 4–13)
APTT PPP: 59 SECONDS (ref 23–35)
APTT PPP: 65 SECONDS (ref 23–35)
APTT PPP: 76 SECONDS (ref 23–35)
ATRIAL RATE: 80 BPM
ATRIAL RATE: 85 BPM
BUN SERPL-MCNC: 19 MG/DL (ref 5–25)
CALCIUM SERPL-MCNC: 9.1 MG/DL (ref 8.3–10.1)
CHLORIDE SERPL-SCNC: 102 MMOL/L (ref 100–108)
CHOLEST SERPL-MCNC: 83 MG/DL (ref 50–200)
CO2 SERPL-SCNC: 24 MMOL/L (ref 21–32)
CREAT SERPL-MCNC: 1.06 MG/DL (ref 0.6–1.3)
ERYTHROCYTE [DISTWIDTH] IN BLOOD BY AUTOMATED COUNT: 15.3 % (ref 11.6–15.1)
GFR SERPL CREATININE-BSD FRML MDRD: 77 ML/MIN/1.73SQ M
GLUCOSE SERPL-MCNC: 129 MG/DL (ref 65–140)
GLUCOSE SERPL-MCNC: 137 MG/DL (ref 65–140)
GLUCOSE SERPL-MCNC: 153 MG/DL (ref 65–140)
GLUCOSE SERPL-MCNC: 156 MG/DL (ref 65–140)
GLUCOSE SERPL-MCNC: 169 MG/DL (ref 65–140)
HCT VFR BLD AUTO: 28.8 % (ref 36.5–49.3)
HDLC SERPL-MCNC: 36 MG/DL (ref 40–60)
HGB BLD-MCNC: 9.5 G/DL (ref 12–17)
INR PPP: 1.73 (ref 0.86–1.16)
LDLC SERPL CALC-MCNC: 30 MG/DL (ref 0–100)
MCH RBC QN AUTO: 27.5 PG (ref 26.8–34.3)
MCHC RBC AUTO-ENTMCNC: 33 G/DL (ref 31.4–37.4)
MCV RBC AUTO: 84 FL (ref 82–98)
P AXIS: 107 DEGREES
P AXIS: 70 DEGREES
PLATELET # BLD AUTO: 352 THOUSANDS/UL (ref 149–390)
PMV BLD AUTO: 11.3 FL (ref 8.9–12.7)
POTASSIUM SERPL-SCNC: 4.6 MMOL/L (ref 3.5–5.3)
PR INTERVAL: 182 MS
PR INTERVAL: 200 MS
PROTHROMBIN TIME: 20.4 SECONDS (ref 12.1–14.4)
QRS AXIS: 81 DEGREES
QRS AXIS: 82 DEGREES
QRSD INTERVAL: 104 MS
QRSD INTERVAL: 112 MS
QT INTERVAL: 378 MS
QT INTERVAL: 382 MS
QTC INTERVAL: 440 MS
QTC INTERVAL: 449 MS
RBC # BLD AUTO: 3.45 MILLION/UL (ref 3.88–5.62)
SODIUM SERPL-SCNC: 135 MMOL/L (ref 136–145)
T WAVE AXIS: -84 DEGREES
T WAVE AXIS: 73 DEGREES
TRIGL SERPL-MCNC: 86 MG/DL
TROPONIN I SERPL-MCNC: <0.02 NG/ML
VENTRICULAR RATE: 80 BPM
VENTRICULAR RATE: 85 BPM
WBC # BLD AUTO: 13.16 THOUSAND/UL (ref 4.31–10.16)

## 2017-08-11 PROCEDURE — 80048 BASIC METABOLIC PNL TOTAL CA: CPT | Performed by: STUDENT IN AN ORGANIZED HEALTH CARE EDUCATION/TRAINING PROGRAM

## 2017-08-11 PROCEDURE — 85610 PROTHROMBIN TIME: CPT | Performed by: PHYSICIAN ASSISTANT

## 2017-08-11 PROCEDURE — 85730 THROMBOPLASTIN TIME PARTIAL: CPT | Performed by: PODIATRIST

## 2017-08-11 PROCEDURE — 82948 REAGENT STRIP/BLOOD GLUCOSE: CPT

## 2017-08-11 PROCEDURE — 85730 THROMBOPLASTIN TIME PARTIAL: CPT | Performed by: PHYSICIAN ASSISTANT

## 2017-08-11 PROCEDURE — 80061 LIPID PANEL: CPT | Performed by: NURSE PRACTITIONER

## 2017-08-11 PROCEDURE — 84484 ASSAY OF TROPONIN QUANT: CPT | Performed by: PHYSICIAN ASSISTANT

## 2017-08-11 PROCEDURE — 85027 COMPLETE CBC AUTOMATED: CPT | Performed by: STUDENT IN AN ORGANIZED HEALTH CARE EDUCATION/TRAINING PROGRAM

## 2017-08-11 RX ORDER — OXYCODONE HYDROCHLORIDE 10 MG/1
10 TABLET ORAL EVERY 4 HOURS PRN
Status: DISCONTINUED | OUTPATIENT
Start: 2017-08-11 | End: 2017-08-26

## 2017-08-11 RX ORDER — SODIUM CHLORIDE 9 MG/ML
75 INJECTION, SOLUTION INTRAVENOUS CONTINUOUS
Status: DISCONTINUED | OUTPATIENT
Start: 2017-08-14 | End: 2017-08-15

## 2017-08-11 RX ORDER — ACETYLCYSTEINE 200 MG/ML
1200 SOLUTION ORAL; RESPIRATORY (INHALATION) 2 TIMES DAILY
Status: DISPENSED | OUTPATIENT
Start: 2017-08-13 | End: 2017-08-15

## 2017-08-11 RX ORDER — OXYCODONE HYDROCHLORIDE 5 MG/1
5 TABLET ORAL EVERY 4 HOURS PRN
Status: DISCONTINUED | OUTPATIENT
Start: 2017-08-11 | End: 2017-08-26

## 2017-08-11 RX ORDER — GABAPENTIN 100 MG/1
200 CAPSULE ORAL 3 TIMES DAILY
Status: DISCONTINUED | OUTPATIENT
Start: 2017-08-11 | End: 2017-08-22

## 2017-08-11 RX ADMIN — LISINOPRIL 20 MG: 20 TABLET ORAL at 10:04

## 2017-08-11 RX ADMIN — POTASSIUM CHLORIDE 20 MEQ: 1500 TABLET, EXTENDED RELEASE ORAL at 10:04

## 2017-08-11 RX ADMIN — PANTOPRAZOLE SODIUM 40 MG: 40 TABLET, DELAYED RELEASE ORAL at 05:44

## 2017-08-11 RX ADMIN — GABAPENTIN 200 MG: 100 CAPSULE ORAL at 17:28

## 2017-08-11 RX ADMIN — TORSEMIDE 20 MG: 20 TABLET ORAL at 10:05

## 2017-08-11 RX ADMIN — PIPERACILLIN SODIUM,TAZOBACTAM SODIUM 4.5 G: 4; .5 INJECTION, POWDER, FOR SOLUTION INTRAVENOUS at 12:29

## 2017-08-11 RX ADMIN — NICOTINE 1 PATCH: 14 PATCH, EXTENDED RELEASE TRANSDERMAL at 10:04

## 2017-08-11 RX ADMIN — METOPROLOL SUCCINATE 25 MG: 25 TABLET, EXTENDED RELEASE ORAL at 10:05

## 2017-08-11 RX ADMIN — LORAZEPAM 1 MG: 1 TABLET ORAL at 12:10

## 2017-08-11 RX ADMIN — HEPARIN SODIUM 20 UNITS/KG/HR: 10000 INJECTION, SOLUTION INTRAVENOUS at 12:35

## 2017-08-11 RX ADMIN — INSULIN LISPRO 1 UNITS: 100 INJECTION, SOLUTION INTRAVENOUS; SUBCUTANEOUS at 21:59

## 2017-08-11 RX ADMIN — HYDROMORPHONE HYDROCHLORIDE 0.5 MG: 1 INJECTION, SOLUTION INTRAMUSCULAR; INTRAVENOUS; SUBCUTANEOUS at 21:57

## 2017-08-11 RX ADMIN — PIPERACILLIN SODIUM,TAZOBACTAM SODIUM 4.5 G: 4; .5 INJECTION, POWDER, FOR SOLUTION INTRAVENOUS at 17:29

## 2017-08-11 RX ADMIN — INSULIN LISPRO 1 UNITS: 100 INJECTION, SOLUTION INTRAVENOUS; SUBCUTANEOUS at 17:28

## 2017-08-11 RX ADMIN — OXYCODONE HYDROCHLORIDE 10 MG: 10 TABLET ORAL at 15:29

## 2017-08-11 RX ADMIN — OXYCODONE HYDROCHLORIDE 10 MG: 10 TABLET ORAL at 19:51

## 2017-08-11 RX ADMIN — QUETIAPINE FUMARATE 200 MG: 100 TABLET, FILM COATED ORAL at 10:04

## 2017-08-11 RX ADMIN — METRONIDAZOLE 500 MG: 500 TABLET ORAL at 05:44

## 2017-08-11 RX ADMIN — INSULIN GLARGINE 21 UNITS: 100 INJECTION, SOLUTION SUBCUTANEOUS at 21:59

## 2017-08-11 RX ADMIN — INSULIN LISPRO 1 UNITS: 100 INJECTION, SOLUTION INTRAVENOUS; SUBCUTANEOUS at 12:37

## 2017-08-11 RX ADMIN — GABAPENTIN 200 MG: 100 CAPSULE ORAL at 21:57

## 2017-08-11 RX ADMIN — HEPARIN SODIUM 3200 UNITS: 1000 INJECTION, SOLUTION INTRAVENOUS; SUBCUTANEOUS at 12:28

## 2017-08-11 RX ADMIN — QUETIAPINE FUMARATE 300 MG: 100 TABLET, FILM COATED ORAL at 21:57

## 2017-08-11 RX ADMIN — GABAPENTIN 100 MG: 100 CAPSULE ORAL at 10:05

## 2017-08-11 RX ADMIN — CEFTAROLINE FOSAMIL 600 MG: 600 POWDER, FOR SOLUTION INTRAVENOUS at 00:28

## 2017-08-12 LAB
ANION GAP SERPL CALCULATED.3IONS-SCNC: 7 MMOL/L (ref 4–13)
APTT PPP: 71 SECONDS (ref 23–35)
BUN SERPL-MCNC: 17 MG/DL (ref 5–25)
CALCIUM SERPL-MCNC: 8.8 MG/DL (ref 8.3–10.1)
CHLORIDE SERPL-SCNC: 106 MMOL/L (ref 100–108)
CO2 SERPL-SCNC: 25 MMOL/L (ref 21–32)
CREAT SERPL-MCNC: 0.95 MG/DL (ref 0.6–1.3)
ERYTHROCYTE [DISTWIDTH] IN BLOOD BY AUTOMATED COUNT: 15.5 % (ref 11.6–15.1)
GFR SERPL CREATININE-BSD FRML MDRD: 88 ML/MIN/1.73SQ M
GLUCOSE SERPL-MCNC: 125 MG/DL (ref 65–140)
GLUCOSE SERPL-MCNC: 129 MG/DL (ref 65–140)
GLUCOSE SERPL-MCNC: 160 MG/DL (ref 65–140)
GLUCOSE SERPL-MCNC: 210 MG/DL (ref 65–140)
GLUCOSE SERPL-MCNC: 212 MG/DL (ref 65–140)
HCT VFR BLD AUTO: 25.1 % (ref 36.5–49.3)
HGB BLD-MCNC: 8.2 G/DL (ref 12–17)
INR PPP: 1.48 (ref 0.86–1.16)
MCH RBC QN AUTO: 27.2 PG (ref 26.8–34.3)
MCHC RBC AUTO-ENTMCNC: 32.7 G/DL (ref 31.4–37.4)
MCV RBC AUTO: 83 FL (ref 82–98)
PLATELET # BLD AUTO: 296 THOUSANDS/UL (ref 149–390)
PMV BLD AUTO: 10.6 FL (ref 8.9–12.7)
POTASSIUM SERPL-SCNC: 3.9 MMOL/L (ref 3.5–5.3)
PROTHROMBIN TIME: 18 SECONDS (ref 12.1–14.4)
RBC # BLD AUTO: 3.01 MILLION/UL (ref 3.88–5.62)
SODIUM SERPL-SCNC: 138 MMOL/L (ref 136–145)
WBC # BLD AUTO: 11.43 THOUSAND/UL (ref 4.31–10.16)

## 2017-08-12 PROCEDURE — 82948 REAGENT STRIP/BLOOD GLUCOSE: CPT

## 2017-08-12 PROCEDURE — 85027 COMPLETE CBC AUTOMATED: CPT | Performed by: STUDENT IN AN ORGANIZED HEALTH CARE EDUCATION/TRAINING PROGRAM

## 2017-08-12 PROCEDURE — 80048 BASIC METABOLIC PNL TOTAL CA: CPT | Performed by: STUDENT IN AN ORGANIZED HEALTH CARE EDUCATION/TRAINING PROGRAM

## 2017-08-12 PROCEDURE — 85610 PROTHROMBIN TIME: CPT | Performed by: PHYSICIAN ASSISTANT

## 2017-08-12 PROCEDURE — 85730 THROMBOPLASTIN TIME PARTIAL: CPT | Performed by: PODIATRIST

## 2017-08-12 RX ORDER — CIPROFLOXACIN 500 MG/1
500 TABLET, FILM COATED ORAL EVERY 12 HOURS SCHEDULED
Status: DISCONTINUED | OUTPATIENT
Start: 2017-08-12 | End: 2017-08-14

## 2017-08-12 RX ADMIN — OXYCODONE HYDROCHLORIDE 10 MG: 10 TABLET ORAL at 20:15

## 2017-08-12 RX ADMIN — INSULIN GLARGINE 21 UNITS: 100 INJECTION, SOLUTION SUBCUTANEOUS at 22:04

## 2017-08-12 RX ADMIN — GABAPENTIN 200 MG: 100 CAPSULE ORAL at 17:18

## 2017-08-12 RX ADMIN — PIPERACILLIN SODIUM,TAZOBACTAM SODIUM 4.5 G: 4; .5 INJECTION, POWDER, FOR SOLUTION INTRAVENOUS at 00:22

## 2017-08-12 RX ADMIN — OXYCODONE HYDROCHLORIDE 10 MG: 10 TABLET ORAL at 00:27

## 2017-08-12 RX ADMIN — HEPARIN SODIUM 20 UNITS/KG/HR: 10000 INJECTION, SOLUTION INTRAVENOUS at 05:57

## 2017-08-12 RX ADMIN — INSULIN LISPRO 1 UNITS: 100 INJECTION, SOLUTION INTRAVENOUS; SUBCUTANEOUS at 13:25

## 2017-08-12 RX ADMIN — INSULIN LISPRO 2 UNITS: 100 INJECTION, SOLUTION INTRAVENOUS; SUBCUTANEOUS at 22:04

## 2017-08-12 RX ADMIN — GABAPENTIN 200 MG: 100 CAPSULE ORAL at 22:03

## 2017-08-12 RX ADMIN — CEFAZOLIN SODIUM 1000 MG: 1 SOLUTION INTRAVENOUS at 10:56

## 2017-08-12 RX ADMIN — HEPARIN SODIUM 20 UNITS/KG/HR: 10000 INJECTION, SOLUTION INTRAVENOUS at 20:16

## 2017-08-12 RX ADMIN — NICOTINE 1 PATCH: 14 PATCH, EXTENDED RELEASE TRANSDERMAL at 09:47

## 2017-08-12 RX ADMIN — TORSEMIDE 20 MG: 20 TABLET ORAL at 09:56

## 2017-08-12 RX ADMIN — OXYCODONE HYDROCHLORIDE 10 MG: 10 TABLET ORAL at 13:27

## 2017-08-12 RX ADMIN — METOPROLOL SUCCINATE 25 MG: 25 TABLET, EXTENDED RELEASE ORAL at 09:46

## 2017-08-12 RX ADMIN — QUETIAPINE FUMARATE 200 MG: 100 TABLET, FILM COATED ORAL at 09:46

## 2017-08-12 RX ADMIN — HYDROMORPHONE HYDROCHLORIDE 0.5 MG: 1 INJECTION, SOLUTION INTRAMUSCULAR; INTRAVENOUS; SUBCUTANEOUS at 01:49

## 2017-08-12 RX ADMIN — CIPROFLOXACIN 500 MG: 500 TABLET, FILM COATED ORAL at 22:03

## 2017-08-12 RX ADMIN — POTASSIUM CHLORIDE 20 MEQ: 1500 TABLET, EXTENDED RELEASE ORAL at 09:45

## 2017-08-12 RX ADMIN — CIPROFLOXACIN 500 MG: 500 TABLET, FILM COATED ORAL at 09:46

## 2017-08-12 RX ADMIN — GABAPENTIN 200 MG: 100 CAPSULE ORAL at 09:46

## 2017-08-12 RX ADMIN — CEFAZOLIN SODIUM 1000 MG: 1 SOLUTION INTRAVENOUS at 17:18

## 2017-08-12 RX ADMIN — QUETIAPINE FUMARATE 300 MG: 100 TABLET, FILM COATED ORAL at 22:03

## 2017-08-12 RX ADMIN — PIPERACILLIN SODIUM,TAZOBACTAM SODIUM 4.5 G: 4; .5 INJECTION, POWDER, FOR SOLUTION INTRAVENOUS at 05:28

## 2017-08-12 RX ADMIN — PANTOPRAZOLE SODIUM 40 MG: 40 TABLET, DELAYED RELEASE ORAL at 05:28

## 2017-08-12 RX ADMIN — INSULIN LISPRO 2 UNITS: 100 INJECTION, SOLUTION INTRAVENOUS; SUBCUTANEOUS at 17:18

## 2017-08-12 RX ADMIN — LORAZEPAM 1 MG: 1 TABLET ORAL at 22:14

## 2017-08-13 LAB
APTT PPP: 163 SECONDS (ref 23–35)
APTT PPP: 39 SECONDS (ref 23–35)
APTT PPP: 49 SECONDS (ref 23–35)
BASOPHILS # BLD AUTO: 0.03 THOUSANDS/ΜL (ref 0–0.1)
BASOPHILS NFR BLD AUTO: 0 % (ref 0–1)
EOSINOPHIL # BLD AUTO: 0.42 THOUSAND/ΜL (ref 0–0.61)
EOSINOPHIL NFR BLD AUTO: 3 % (ref 0–6)
ERYTHROCYTE [DISTWIDTH] IN BLOOD BY AUTOMATED COUNT: 15.5 % (ref 11.6–15.1)
GLUCOSE SERPL-MCNC: 137 MG/DL (ref 65–140)
GLUCOSE SERPL-MCNC: 194 MG/DL (ref 65–140)
GLUCOSE SERPL-MCNC: 220 MG/DL (ref 65–140)
GLUCOSE SERPL-MCNC: 261 MG/DL (ref 65–140)
HCT VFR BLD AUTO: 29 % (ref 36.5–49.3)
HGB BLD-MCNC: 9.6 G/DL (ref 12–17)
INR PPP: 1.38 (ref 0.86–1.16)
LYMPHOCYTES # BLD AUTO: 2.08 THOUSANDS/ΜL (ref 0.6–4.47)
LYMPHOCYTES NFR BLD AUTO: 16 % (ref 14–44)
MCH RBC QN AUTO: 27.5 PG (ref 26.8–34.3)
MCHC RBC AUTO-ENTMCNC: 33.1 G/DL (ref 31.4–37.4)
MCV RBC AUTO: 83 FL (ref 82–98)
MONOCYTES # BLD AUTO: 1.21 THOUSAND/ΜL (ref 0.17–1.22)
MONOCYTES NFR BLD AUTO: 9 % (ref 4–12)
NEUTROPHILS # BLD AUTO: 9.56 THOUSANDS/ΜL (ref 1.85–7.62)
NEUTS SEG NFR BLD AUTO: 72 % (ref 43–75)
NRBC BLD AUTO-RTO: 0 /100 WBCS
PLATELET # BLD AUTO: 367 THOUSANDS/UL (ref 149–390)
PMV BLD AUTO: 10.6 FL (ref 8.9–12.7)
PROTHROMBIN TIME: 17 SECONDS (ref 12.1–14.4)
RBC # BLD AUTO: 3.49 MILLION/UL (ref 3.88–5.62)
WBC # BLD AUTO: 13.46 THOUSAND/UL (ref 4.31–10.16)

## 2017-08-13 PROCEDURE — 85610 PROTHROMBIN TIME: CPT | Performed by: PHYSICIAN ASSISTANT

## 2017-08-13 PROCEDURE — 82948 REAGENT STRIP/BLOOD GLUCOSE: CPT

## 2017-08-13 PROCEDURE — 85025 COMPLETE CBC W/AUTO DIFF WBC: CPT | Performed by: PODIATRIST

## 2017-08-13 PROCEDURE — 85730 THROMBOPLASTIN TIME PARTIAL: CPT | Performed by: PODIATRIST

## 2017-08-13 RX ADMIN — LORAZEPAM 1 MG: 1 TABLET ORAL at 17:04

## 2017-08-13 RX ADMIN — INSULIN LISPRO 1 UNITS: 100 INJECTION, SOLUTION INTRAVENOUS; SUBCUTANEOUS at 12:50

## 2017-08-13 RX ADMIN — OXYCODONE HYDROCHLORIDE 10 MG: 10 TABLET ORAL at 01:13

## 2017-08-13 RX ADMIN — ACETYLCYSTEINE 1200 MG: 200 SOLUTION ORAL; RESPIRATORY (INHALATION) at 08:59

## 2017-08-13 RX ADMIN — POTASSIUM CHLORIDE 20 MEQ: 1500 TABLET, EXTENDED RELEASE ORAL at 08:36

## 2017-08-13 RX ADMIN — INSULIN LISPRO 2 UNITS: 100 INJECTION, SOLUTION INTRAVENOUS; SUBCUTANEOUS at 21:11

## 2017-08-13 RX ADMIN — TORSEMIDE 20 MG: 20 TABLET ORAL at 08:36

## 2017-08-13 RX ADMIN — CEFAZOLIN SODIUM 1000 MG: 1 SOLUTION INTRAVENOUS at 17:29

## 2017-08-13 RX ADMIN — NICOTINE 1 PATCH: 14 PATCH, EXTENDED RELEASE TRANSDERMAL at 08:36

## 2017-08-13 RX ADMIN — GABAPENTIN 200 MG: 100 CAPSULE ORAL at 21:12

## 2017-08-13 RX ADMIN — OXYCODONE HYDROCHLORIDE 10 MG: 10 TABLET ORAL at 20:50

## 2017-08-13 RX ADMIN — ACETYLCYSTEINE 1200 MG: 200 SOLUTION ORAL; RESPIRATORY (INHALATION) at 17:26

## 2017-08-13 RX ADMIN — CIPROFLOXACIN 500 MG: 500 TABLET, FILM COATED ORAL at 08:36

## 2017-08-13 RX ADMIN — METOPROLOL SUCCINATE 25 MG: 25 TABLET, EXTENDED RELEASE ORAL at 09:00

## 2017-08-13 RX ADMIN — HEPARIN SODIUM 22 UNITS/KG/HR: 10000 INJECTION, SOLUTION INTRAVENOUS at 11:03

## 2017-08-13 RX ADMIN — CEFAZOLIN SODIUM 1000 MG: 1 SOLUTION INTRAVENOUS at 01:13

## 2017-08-13 RX ADMIN — PANTOPRAZOLE SODIUM 40 MG: 40 TABLET, DELAYED RELEASE ORAL at 05:32

## 2017-08-13 RX ADMIN — OXYCODONE HYDROCHLORIDE 10 MG: 10 TABLET ORAL at 16:52

## 2017-08-13 RX ADMIN — GABAPENTIN 200 MG: 100 CAPSULE ORAL at 08:36

## 2017-08-13 RX ADMIN — CEFAZOLIN SODIUM 1000 MG: 1 SOLUTION INTRAVENOUS at 11:03

## 2017-08-13 RX ADMIN — CIPROFLOXACIN 500 MG: 500 TABLET, FILM COATED ORAL at 21:12

## 2017-08-13 RX ADMIN — HEPARIN SODIUM 6400 UNITS: 1000 INJECTION, SOLUTION INTRAVENOUS; SUBCUTANEOUS at 17:04

## 2017-08-13 RX ADMIN — INSULIN LISPRO 2 UNITS: 100 INJECTION, SOLUTION INTRAVENOUS; SUBCUTANEOUS at 17:29

## 2017-08-13 RX ADMIN — OXYCODONE HYDROCHLORIDE 10 MG: 10 TABLET ORAL at 08:36

## 2017-08-13 RX ADMIN — QUETIAPINE FUMARATE 800 MG: 200 TABLET, FILM COATED ORAL at 21:12

## 2017-08-13 RX ADMIN — HEPARIN SODIUM 23 UNITS/KG/HR: 10000 INJECTION, SOLUTION INTRAVENOUS at 22:08

## 2017-08-13 RX ADMIN — LORAZEPAM 1 MG: 1 TABLET ORAL at 09:55

## 2017-08-13 RX ADMIN — INSULIN GLARGINE 21 UNITS: 100 INJECTION, SOLUTION SUBCUTANEOUS at 21:11

## 2017-08-13 RX ADMIN — GABAPENTIN 200 MG: 100 CAPSULE ORAL at 16:52

## 2017-08-13 RX ADMIN — SODIUM CHLORIDE 75 ML/HR: 0.9 INJECTION, SOLUTION INTRAVENOUS at 22:10

## 2017-08-13 RX ADMIN — HEPARIN SODIUM 3200 UNITS: 1000 INJECTION, SOLUTION INTRAVENOUS; SUBCUTANEOUS at 06:56

## 2017-08-14 ENCOUNTER — APPOINTMENT (INPATIENT)
Dept: RADIOLOGY | Facility: HOSPITAL | Age: 57
DRG: 711 | End: 2017-08-14
Attending: SURGERY
Payer: COMMERCIAL

## 2017-08-14 LAB
ANION GAP SERPL CALCULATED.3IONS-SCNC: 9 MMOL/L (ref 4–13)
APTT PPP: 56 SECONDS (ref 23–35)
APTT PPP: 65 SECONDS (ref 23–35)
BACTERIA BLD CULT: NORMAL
BACTERIA WND AEROBE CULT: ABNORMAL
BUN SERPL-MCNC: 16 MG/DL (ref 5–25)
CALCIUM SERPL-MCNC: 9.1 MG/DL (ref 8.3–10.1)
CHLORIDE SERPL-SCNC: 104 MMOL/L (ref 100–108)
CO2 SERPL-SCNC: 25 MMOL/L (ref 21–32)
CREAT SERPL-MCNC: 1 MG/DL (ref 0.6–1.3)
ERYTHROCYTE [DISTWIDTH] IN BLOOD BY AUTOMATED COUNT: 15.4 % (ref 11.6–15.1)
GFR SERPL CREATININE-BSD FRML MDRD: 83 ML/MIN/1.73SQ M
GLUCOSE SERPL-MCNC: 121 MG/DL (ref 65–140)
GLUCOSE SERPL-MCNC: 132 MG/DL (ref 65–140)
GLUCOSE SERPL-MCNC: 146 MG/DL (ref 65–140)
GLUCOSE SERPL-MCNC: 173 MG/DL (ref 65–140)
GLUCOSE SERPL-MCNC: 218 MG/DL (ref 65–140)
GLUCOSE SERPL-MCNC: 237 MG/DL (ref 65–140)
GRAM STN SPEC: ABNORMAL
HCT VFR BLD AUTO: 29.1 % (ref 36.5–49.3)
HGB BLD-MCNC: 9.4 G/DL (ref 12–17)
INR PPP: 1.31 (ref 0.86–1.16)
MCH RBC QN AUTO: 26.9 PG (ref 26.8–34.3)
MCHC RBC AUTO-ENTMCNC: 32.3 G/DL (ref 31.4–37.4)
MCV RBC AUTO: 83 FL (ref 82–98)
PLATELET # BLD AUTO: 357 THOUSANDS/UL (ref 149–390)
PMV BLD AUTO: 10.7 FL (ref 8.9–12.7)
POTASSIUM SERPL-SCNC: 4 MMOL/L (ref 3.5–5.3)
PROTHROMBIN TIME: 16.4 SECONDS (ref 12.1–14.4)
RBC # BLD AUTO: 3.49 MILLION/UL (ref 3.88–5.62)
SODIUM SERPL-SCNC: 138 MMOL/L (ref 136–145)
WBC # BLD AUTO: 13.23 THOUSAND/UL (ref 4.31–10.16)

## 2017-08-14 PROCEDURE — 04CU3ZZ EXTIRPATION OF MATTER FROM LEFT PERONEAL ARTERY, PERCUTANEOUS APPROACH: ICD-10-PCS | Performed by: RADIOLOGY

## 2017-08-14 PROCEDURE — 85730 THROMBOPLASTIN TIME PARTIAL: CPT | Performed by: PHYSICIAN ASSISTANT

## 2017-08-14 PROCEDURE — C1760 CLOSURE DEV, VASC: HCPCS

## 2017-08-14 PROCEDURE — C1894 INTRO/SHEATH, NON-LASER: HCPCS

## 2017-08-14 PROCEDURE — C1725 CATH, TRANSLUMIN NON-LASER: HCPCS

## 2017-08-14 PROCEDURE — 04CS3ZZ EXTIRPATION OF MATTER FROM LEFT POSTERIOR TIBIAL ARTERY, PERCUTANEOUS APPROACH: ICD-10-PCS | Performed by: RADIOLOGY

## 2017-08-14 PROCEDURE — 3E05317 INTRODUCTION OF OTHER THROMBOLYTIC INTO PERIPHERAL ARTERY, PERCUTANEOUS APPROACH: ICD-10-PCS | Performed by: RADIOLOGY

## 2017-08-14 PROCEDURE — 37232 HB TIB/PER REVASC ADD-ON: CPT

## 2017-08-14 PROCEDURE — C1769 GUIDE WIRE: HCPCS

## 2017-08-14 PROCEDURE — 80048 BASIC METABOLIC PNL TOTAL CA: CPT | Performed by: STUDENT IN AN ORGANIZED HEALTH CARE EDUCATION/TRAINING PROGRAM

## 2017-08-14 PROCEDURE — C1757 CATH, THROMBECTOMY/EMBOLECT: HCPCS

## 2017-08-14 PROCEDURE — 99152 MOD SED SAME PHYS/QHP 5/>YRS: CPT

## 2017-08-14 PROCEDURE — 76937 US GUIDE VASCULAR ACCESS: CPT

## 2017-08-14 PROCEDURE — 82948 REAGENT STRIP/BLOOD GLUCOSE: CPT

## 2017-08-14 PROCEDURE — 047S3ZZ DILATION OF LEFT POSTERIOR TIBIAL ARTERY, PERCUTANEOUS APPROACH: ICD-10-PCS | Performed by: RADIOLOGY

## 2017-08-14 PROCEDURE — 99153 MOD SED SAME PHYS/QHP EA: CPT

## 2017-08-14 PROCEDURE — C1876 STENT, NON-COA/NON-COV W/DEL: HCPCS

## 2017-08-14 PROCEDURE — 37226 HB FEM/POPL REVASC W/STENT: CPT

## 2017-08-14 PROCEDURE — 85027 COMPLETE CBC AUTOMATED: CPT | Performed by: STUDENT IN AN ORGANIZED HEALTH CARE EDUCATION/TRAINING PROGRAM

## 2017-08-14 PROCEDURE — 85610 PROTHROMBIN TIME: CPT | Performed by: PHYSICIAN ASSISTANT

## 2017-08-14 PROCEDURE — 37228 HB TIB/PER REVASC W/TLA: CPT

## 2017-08-14 PROCEDURE — 047L3DZ DILATION OF LEFT FEMORAL ARTERY WITH INTRALUMINAL DEVICE, PERCUTANEOUS APPROACH: ICD-10-PCS | Performed by: RADIOLOGY

## 2017-08-14 PROCEDURE — 85730 THROMBOPLASTIN TIME PARTIAL: CPT | Performed by: FAMILY MEDICINE

## 2017-08-14 PROCEDURE — B41GYZZ FLUOROSCOPY OF LEFT LOWER EXTREMITY ARTERIES USING OTHER CONTRAST: ICD-10-PCS | Performed by: RADIOLOGY

## 2017-08-14 RX ORDER — MIDAZOLAM HYDROCHLORIDE 1 MG/ML
INJECTION INTRAMUSCULAR; INTRAVENOUS CODE/TRAUMA/SEDATION MEDICATION
Status: COMPLETED | OUTPATIENT
Start: 2017-08-14 | End: 2017-08-14

## 2017-08-14 RX ORDER — FENTANYL CITRATE 50 UG/ML
INJECTION, SOLUTION INTRAMUSCULAR; INTRAVENOUS CODE/TRAUMA/SEDATION MEDICATION
Status: COMPLETED | OUTPATIENT
Start: 2017-08-14 | End: 2017-08-14

## 2017-08-14 RX ORDER — HEPARIN SODIUM 1000 [USP'U]/ML
INJECTION, SOLUTION INTRAVENOUS; SUBCUTANEOUS CODE/TRAUMA/SEDATION MEDICATION
Status: COMPLETED | OUTPATIENT
Start: 2017-08-14 | End: 2017-08-14

## 2017-08-14 RX ORDER — HYDROMORPHONE HYDROCHLORIDE 4 MG/ML
INJECTION, SOLUTION INTRAMUSCULAR; INTRAVENOUS; SUBCUTANEOUS CODE/TRAUMA/SEDATION MEDICATION
Status: COMPLETED | OUTPATIENT
Start: 2017-08-14 | End: 2017-08-14

## 2017-08-14 RX ORDER — DIPHENHYDRAMINE HYDROCHLORIDE 50 MG/ML
INJECTION INTRAMUSCULAR; INTRAVENOUS CODE/TRAUMA/SEDATION MEDICATION
Status: COMPLETED | OUTPATIENT
Start: 2017-08-14 | End: 2017-08-14

## 2017-08-14 RX ORDER — CIPROFLOXACIN 750 MG/1
750 TABLET, FILM COATED ORAL
Status: DISCONTINUED | OUTPATIENT
Start: 2017-08-14 | End: 2017-08-22

## 2017-08-14 RX ORDER — DIAZEPAM 5 MG/ML
INJECTION, SOLUTION INTRAMUSCULAR; INTRAVENOUS CODE/TRAUMA/SEDATION MEDICATION
Status: COMPLETED | OUTPATIENT
Start: 2017-08-14 | End: 2017-08-14

## 2017-08-14 RX ADMIN — HYDROMORPHONE HYDROCHLORIDE 1 MG: 4 INJECTION, SOLUTION INTRAMUSCULAR; INTRAVENOUS; SUBCUTANEOUS at 19:01

## 2017-08-14 RX ADMIN — SODIUM CHLORIDE 75 ML/HR: 0.9 INJECTION, SOLUTION INTRAVENOUS at 14:15

## 2017-08-14 RX ADMIN — HYDROMORPHONE HYDROCHLORIDE 1 MG: 4 INJECTION, SOLUTION INTRAMUSCULAR; INTRAVENOUS; SUBCUTANEOUS at 21:28

## 2017-08-14 RX ADMIN — DIAZEPAM 2.5 MG: 5 INJECTION, SOLUTION INTRAMUSCULAR; INTRAVENOUS at 19:30

## 2017-08-14 RX ADMIN — FENTANYL CITRATE 50 MCG: 50 INJECTION INTRAMUSCULAR; INTRAVENOUS at 20:27

## 2017-08-14 RX ADMIN — POTASSIUM CHLORIDE 20 MEQ: 1500 TABLET, EXTENDED RELEASE ORAL at 08:26

## 2017-08-14 RX ADMIN — DIAZEPAM 2.5 MG: 5 INJECTION, SOLUTION INTRAMUSCULAR; INTRAVENOUS at 19:53

## 2017-08-14 RX ADMIN — FENTANYL CITRATE 50 MCG: 50 INJECTION INTRAMUSCULAR; INTRAVENOUS at 18:33

## 2017-08-14 RX ADMIN — VANCOMYCIN HYDROCHLORIDE 1250 MG: 1 INJECTION, POWDER, LYOPHILIZED, FOR SOLUTION INTRAVENOUS at 23:59

## 2017-08-14 RX ADMIN — FENTANYL CITRATE 50 MCG: 50 INJECTION INTRAMUSCULAR; INTRAVENOUS at 19:52

## 2017-08-14 RX ADMIN — NITROGLYCERIN 50 MCG: 20 INJECTION INTRAVENOUS at 21:21

## 2017-08-14 RX ADMIN — ALTEPLASE: 2.2 INJECTION, POWDER, LYOPHILIZED, FOR SOLUTION INTRAVENOUS at 19:58

## 2017-08-14 RX ADMIN — IODIXANOL 225 ML: 320 INJECTION, SOLUTION INTRAVASCULAR at 22:11

## 2017-08-14 RX ADMIN — HEPARIN SODIUM 3000 UNITS: 1000 INJECTION INTRAVENOUS; SUBCUTANEOUS at 21:20

## 2017-08-14 RX ADMIN — CIPROFLOXACIN 500 MG: 500 TABLET, FILM COATED ORAL at 08:26

## 2017-08-14 RX ADMIN — HEPARIN SODIUM 3200 UNITS: 1000 INJECTION, SOLUTION INTRAVENOUS; SUBCUTANEOUS at 13:54

## 2017-08-14 RX ADMIN — FENTANYL CITRATE 50 MCG: 50 INJECTION INTRAMUSCULAR; INTRAVENOUS at 18:15

## 2017-08-14 RX ADMIN — DIPHENHYDRAMINE HYDROCHLORIDE 50 MG: 50 INJECTION, SOLUTION INTRAMUSCULAR; INTRAVENOUS at 18:46

## 2017-08-14 RX ADMIN — LORAZEPAM 1 MG: 1 TABLET ORAL at 12:28

## 2017-08-14 RX ADMIN — NITROGLYCERIN 75 MCG: 20 INJECTION INTRAVENOUS at 20:59

## 2017-08-14 RX ADMIN — FENTANYL CITRATE 50 MCG: 50 INJECTION INTRAMUSCULAR; INTRAVENOUS at 19:36

## 2017-08-14 RX ADMIN — MIDAZOLAM 1 MG: 1 INJECTION INTRAMUSCULAR; INTRAVENOUS at 21:00

## 2017-08-14 RX ADMIN — MIDAZOLAM 1 MG: 1 INJECTION INTRAMUSCULAR; INTRAVENOUS at 21:06

## 2017-08-14 RX ADMIN — HYDROMORPHONE HYDROCHLORIDE 1 MG: 4 INJECTION, SOLUTION INTRAMUSCULAR; INTRAVENOUS; SUBCUTANEOUS at 20:57

## 2017-08-14 RX ADMIN — MIDAZOLAM 1 MG: 1 INJECTION INTRAMUSCULAR; INTRAVENOUS at 18:13

## 2017-08-14 RX ADMIN — MIDAZOLAM 1 MG: 1 INJECTION INTRAMUSCULAR; INTRAVENOUS at 20:23

## 2017-08-14 RX ADMIN — VANCOMYCIN HYDROCHLORIDE 1250 MG: 1 INJECTION, POWDER, LYOPHILIZED, FOR SOLUTION INTRAVENOUS at 10:42

## 2017-08-14 RX ADMIN — QUETIAPINE FUMARATE 800 MG: 200 TABLET, FILM COATED ORAL at 23:55

## 2017-08-14 RX ADMIN — MIDAZOLAM 1 MG: 1 INJECTION INTRAMUSCULAR; INTRAVENOUS at 18:21

## 2017-08-14 RX ADMIN — NITROGLYCERIN 75 MCG: 20 INJECTION INTRAVENOUS at 20:52

## 2017-08-14 RX ADMIN — NITROGLYCERIN 75 MCG: 20 INJECTION INTRAVENOUS at 21:40

## 2017-08-14 RX ADMIN — MIDAZOLAM 1 MG: 1 INJECTION INTRAMUSCULAR; INTRAVENOUS at 18:08

## 2017-08-14 RX ADMIN — HYDROMORPHONE HYDROCHLORIDE 1 MG: 4 INJECTION, SOLUTION INTRAMUSCULAR; INTRAVENOUS; SUBCUTANEOUS at 19:20

## 2017-08-14 RX ADMIN — INSULIN LISPRO 2 UNITS: 100 INJECTION, SOLUTION INTRAVENOUS; SUBCUTANEOUS at 12:24

## 2017-08-14 RX ADMIN — FENTANYL CITRATE 50 MCG: 50 INJECTION INTRAMUSCULAR; INTRAVENOUS at 18:08

## 2017-08-14 RX ADMIN — DIAZEPAM 5 MG: 5 INJECTION, SOLUTION INTRAMUSCULAR; INTRAVENOUS at 19:09

## 2017-08-14 RX ADMIN — OXYCODONE HYDROCHLORIDE 10 MG: 10 TABLET ORAL at 05:54

## 2017-08-14 RX ADMIN — HEPARIN SODIUM 3000 UNITS: 1000 INJECTION INTRAVENOUS; SUBCUTANEOUS at 19:01

## 2017-08-14 RX ADMIN — SODIUM CHLORIDE 75 ML/HR: 0.9 INJECTION, SOLUTION INTRAVENOUS at 21:55

## 2017-08-14 RX ADMIN — NITROGLYCERIN 75 MCG: 20 INJECTION INTRAVENOUS at 20:44

## 2017-08-14 RX ADMIN — METOPROLOL SUCCINATE 25 MG: 25 TABLET, EXTENDED RELEASE ORAL at 08:27

## 2017-08-14 RX ADMIN — NITROGLYCERIN 50 MCG: 20 INJECTION INTRAVENOUS at 20:03

## 2017-08-14 RX ADMIN — NICOTINE 1 PATCH: 14 PATCH, EXTENDED RELEASE TRANSDERMAL at 08:26

## 2017-08-14 RX ADMIN — MIDAZOLAM 1 MG: 1 INJECTION INTRAMUSCULAR; INTRAVENOUS at 18:33

## 2017-08-14 RX ADMIN — PANTOPRAZOLE SODIUM 40 MG: 40 TABLET, DELAYED RELEASE ORAL at 05:54

## 2017-08-14 RX ADMIN — NITROGLYCERIN 75 MCG: 20 INJECTION INTRAVENOUS at 20:21

## 2017-08-14 RX ADMIN — TORSEMIDE 20 MG: 20 TABLET ORAL at 08:26

## 2017-08-14 RX ADMIN — CEFAZOLIN SODIUM 1000 MG: 1 SOLUTION INTRAVENOUS at 01:11

## 2017-08-14 RX ADMIN — GABAPENTIN 200 MG: 100 CAPSULE ORAL at 08:26

## 2017-08-14 RX ADMIN — NITROGLYCERIN 75 MCG: 20 INJECTION INTRAVENOUS at 20:17

## 2017-08-14 RX ADMIN — NITROGLYCERIN 75 MCG: 20 INJECTION INTRAVENOUS at 20:42

## 2017-08-14 RX ADMIN — HEPARIN SODIUM 25 UNITS/KG/HR: 10000 INJECTION, SOLUTION INTRAVENOUS at 13:55

## 2017-08-15 LAB
ANION GAP SERPL CALCULATED.3IONS-SCNC: 10 MMOL/L (ref 4–13)
APTT PPP: 44 SECONDS (ref 23–35)
APTT PPP: 53 SECONDS (ref 23–35)
APTT PPP: 63 SECONDS (ref 23–35)
BUN SERPL-MCNC: 11 MG/DL (ref 5–25)
CALCIUM SERPL-MCNC: 8.4 MG/DL (ref 8.3–10.1)
CHLORIDE SERPL-SCNC: 107 MMOL/L (ref 100–108)
CO2 SERPL-SCNC: 23 MMOL/L (ref 21–32)
CREAT SERPL-MCNC: 0.97 MG/DL (ref 0.6–1.3)
ERYTHROCYTE [DISTWIDTH] IN BLOOD BY AUTOMATED COUNT: 15.5 % (ref 11.6–15.1)
GFR SERPL CREATININE-BSD FRML MDRD: 86 ML/MIN/1.73SQ M
GLUCOSE SERPL-MCNC: 161 MG/DL (ref 65–140)
GLUCOSE SERPL-MCNC: 172 MG/DL (ref 65–140)
GLUCOSE SERPL-MCNC: 215 MG/DL (ref 65–140)
GLUCOSE SERPL-MCNC: 217 MG/DL (ref 65–140)
GLUCOSE SERPL-MCNC: 222 MG/DL (ref 65–140)
HCT VFR BLD AUTO: 26.4 % (ref 36.5–49.3)
HGB BLD-MCNC: 8.3 G/DL (ref 12–17)
MCH RBC QN AUTO: 26.6 PG (ref 26.8–34.3)
MCHC RBC AUTO-ENTMCNC: 31.4 G/DL (ref 31.4–37.4)
MCV RBC AUTO: 85 FL (ref 82–98)
PLATELET # BLD AUTO: 311 THOUSANDS/UL (ref 149–390)
PMV BLD AUTO: 11 FL (ref 8.9–12.7)
POTASSIUM SERPL-SCNC: 3.6 MMOL/L (ref 3.5–5.3)
RBC # BLD AUTO: 3.12 MILLION/UL (ref 3.88–5.62)
SODIUM SERPL-SCNC: 140 MMOL/L (ref 136–145)
WBC # BLD AUTO: 10.67 THOUSAND/UL (ref 4.31–10.16)

## 2017-08-15 PROCEDURE — 82948 REAGENT STRIP/BLOOD GLUCOSE: CPT

## 2017-08-15 PROCEDURE — 80048 BASIC METABOLIC PNL TOTAL CA: CPT | Performed by: PHYSICIAN ASSISTANT

## 2017-08-15 PROCEDURE — 85730 THROMBOPLASTIN TIME PARTIAL: CPT | Performed by: PODIATRIST

## 2017-08-15 PROCEDURE — 85027 COMPLETE CBC AUTOMATED: CPT | Performed by: STUDENT IN AN ORGANIZED HEALTH CARE EDUCATION/TRAINING PROGRAM

## 2017-08-15 RX ADMIN — CIPROFLOXACIN HYDROCHLORIDE 750 MG: 750 TABLET, FILM COATED ORAL at 06:22

## 2017-08-15 RX ADMIN — OXYCODONE HYDROCHLORIDE 10 MG: 10 TABLET ORAL at 10:49

## 2017-08-15 RX ADMIN — LORAZEPAM 1 MG: 1 TABLET ORAL at 11:57

## 2017-08-15 RX ADMIN — OXYCODONE HYDROCHLORIDE 10 MG: 10 TABLET ORAL at 14:22

## 2017-08-15 RX ADMIN — INSULIN GLARGINE 21 UNITS: 100 INJECTION, SOLUTION SUBCUTANEOUS at 21:41

## 2017-08-15 RX ADMIN — NICOTINE 1 PATCH: 14 PATCH, EXTENDED RELEASE TRANSDERMAL at 08:36

## 2017-08-15 RX ADMIN — INSULIN LISPRO 1 UNITS: 100 INJECTION, SOLUTION INTRAVENOUS; SUBCUTANEOUS at 18:16

## 2017-08-15 RX ADMIN — INSULIN GLARGINE 21 UNITS: 100 INJECTION, SOLUTION SUBCUTANEOUS at 00:04

## 2017-08-15 RX ADMIN — HYDROMORPHONE HYDROCHLORIDE 1 MG: 1 INJECTION, SOLUTION INTRAMUSCULAR; INTRAVENOUS; SUBCUTANEOUS at 18:03

## 2017-08-15 RX ADMIN — HYDROMORPHONE HYDROCHLORIDE 1 MG: 1 INJECTION, SOLUTION INTRAMUSCULAR; INTRAVENOUS; SUBCUTANEOUS at 14:57

## 2017-08-15 RX ADMIN — HEPARIN SODIUM 3200 UNITS: 1000 INJECTION, SOLUTION INTRAVENOUS; SUBCUTANEOUS at 21:57

## 2017-08-15 RX ADMIN — GABAPENTIN 200 MG: 100 CAPSULE ORAL at 16:53

## 2017-08-15 RX ADMIN — POTASSIUM CHLORIDE 20 MEQ: 1500 TABLET, EXTENDED RELEASE ORAL at 08:35

## 2017-08-15 RX ADMIN — VANCOMYCIN HYDROCHLORIDE 1250 MG: 1 INJECTION, POWDER, LYOPHILIZED, FOR SOLUTION INTRAVENOUS at 10:49

## 2017-08-15 RX ADMIN — INSULIN LISPRO 2 UNITS: 100 INJECTION, SOLUTION INTRAVENOUS; SUBCUTANEOUS at 21:40

## 2017-08-15 RX ADMIN — VANCOMYCIN HYDROCHLORIDE 1250 MG: 1 INJECTION, POWDER, LYOPHILIZED, FOR SOLUTION INTRAVENOUS at 21:41

## 2017-08-15 RX ADMIN — HYDROMORPHONE HYDROCHLORIDE 0.5 MG: 1 INJECTION, SOLUTION INTRAMUSCULAR; INTRAVENOUS; SUBCUTANEOUS at 12:00

## 2017-08-15 RX ADMIN — METOPROLOL SUCCINATE 25 MG: 25 TABLET, EXTENDED RELEASE ORAL at 08:35

## 2017-08-15 RX ADMIN — GABAPENTIN 200 MG: 100 CAPSULE ORAL at 08:35

## 2017-08-15 RX ADMIN — HYDROMORPHONE HYDROCHLORIDE 1 MG: 1 INJECTION, SOLUTION INTRAMUSCULAR; INTRAVENOUS; SUBCUTANEOUS at 21:41

## 2017-08-15 RX ADMIN — HEPARIN SODIUM 20 UNITS/KG/HR: 10000 INJECTION, SOLUTION INTRAVENOUS at 18:02

## 2017-08-15 RX ADMIN — HEPARIN SODIUM 18 UNITS/KG/HR: 10000 INJECTION, SOLUTION INTRAVENOUS at 00:10

## 2017-08-15 RX ADMIN — QUETIAPINE FUMARATE 800 MG: 200 TABLET, FILM COATED ORAL at 21:41

## 2017-08-15 RX ADMIN — ACETAMINOPHEN 650 MG: 325 TABLET, FILM COATED ORAL at 14:21

## 2017-08-15 RX ADMIN — OXYCODONE HYDROCHLORIDE 10 MG: 10 TABLET ORAL at 19:51

## 2017-08-15 RX ADMIN — CIPROFLOXACIN HYDROCHLORIDE 750 MG: 750 TABLET, FILM COATED ORAL at 16:53

## 2017-08-15 RX ADMIN — GABAPENTIN 200 MG: 100 CAPSULE ORAL at 21:41

## 2017-08-15 RX ADMIN — PANTOPRAZOLE SODIUM 40 MG: 40 TABLET, DELAYED RELEASE ORAL at 05:37

## 2017-08-15 RX ADMIN — GABAPENTIN 200 MG: 100 CAPSULE ORAL at 00:05

## 2017-08-15 RX ADMIN — LORAZEPAM 1 MG: 1 TABLET ORAL at 18:23

## 2017-08-15 RX ADMIN — HEPARIN SODIUM 3200 UNITS: 1000 INJECTION, SOLUTION INTRAVENOUS; SUBCUTANEOUS at 13:53

## 2017-08-15 RX ADMIN — INSULIN LISPRO 2 UNITS: 100 INJECTION, SOLUTION INTRAVENOUS; SUBCUTANEOUS at 11:57

## 2017-08-15 RX ADMIN — INSULIN LISPRO 1 UNITS: 100 INJECTION, SOLUTION INTRAVENOUS; SUBCUTANEOUS at 08:39

## 2017-08-15 RX ADMIN — TORSEMIDE 20 MG: 20 TABLET ORAL at 08:35

## 2017-08-16 ENCOUNTER — APPOINTMENT (INPATIENT)
Dept: NON INVASIVE DIAGNOSTICS | Facility: HOSPITAL | Age: 57
DRG: 711 | End: 2017-08-16
Payer: COMMERCIAL

## 2017-08-16 LAB
APTT PPP: 49 SECONDS (ref 23–35)
APTT PPP: 49 SECONDS (ref 23–35)
APTT PPP: 89 SECONDS (ref 23–35)
BACTERIA BLD CULT: NORMAL
ERYTHROCYTE [DISTWIDTH] IN BLOOD BY AUTOMATED COUNT: 15.7 % (ref 11.6–15.1)
FERRITIN SERPL-MCNC: 62 NG/ML (ref 8–388)
GLUCOSE SERPL-MCNC: 141 MG/DL (ref 65–140)
GLUCOSE SERPL-MCNC: 165 MG/DL (ref 65–140)
GLUCOSE SERPL-MCNC: 208 MG/DL (ref 65–140)
GLUCOSE SERPL-MCNC: 226 MG/DL (ref 65–140)
HCT VFR BLD AUTO: 23.4 % (ref 36.5–49.3)
HGB BLD-MCNC: 7.6 G/DL (ref 12–17)
INR PPP: 1.29 (ref 0.86–1.16)
IRON SATN MFR SERPL: 6 %
IRON SERPL-MCNC: 14 UG/DL (ref 65–175)
MCH RBC QN AUTO: 27 PG (ref 26.8–34.3)
MCHC RBC AUTO-ENTMCNC: 32.5 G/DL (ref 31.4–37.4)
MCV RBC AUTO: 83 FL (ref 82–98)
PLATELET # BLD AUTO: 301 THOUSANDS/UL (ref 149–390)
PMV BLD AUTO: 11.3 FL (ref 8.9–12.7)
PROTHROMBIN TIME: 16.2 SECONDS (ref 12.1–14.4)
RBC # BLD AUTO: 2.81 MILLION/UL (ref 3.88–5.62)
TIBC SERPL-MCNC: 225 UG/DL (ref 250–450)
VANCOMYCIN TROUGH SERPL-MCNC: 14.5 UG/ML (ref 10–20)
WBC # BLD AUTO: 12.51 THOUSAND/UL (ref 4.31–10.16)

## 2017-08-16 PROCEDURE — 83550 IRON BINDING TEST: CPT | Performed by: INTERNAL MEDICINE

## 2017-08-16 PROCEDURE — 93926 LOWER EXTREMITY STUDY: CPT

## 2017-08-16 PROCEDURE — 82728 ASSAY OF FERRITIN: CPT | Performed by: INTERNAL MEDICINE

## 2017-08-16 PROCEDURE — 85730 THROMBOPLASTIN TIME PARTIAL: CPT | Performed by: PODIATRIST

## 2017-08-16 PROCEDURE — 80202 ASSAY OF VANCOMYCIN: CPT | Performed by: INTERNAL MEDICINE

## 2017-08-16 PROCEDURE — 85027 COMPLETE CBC AUTOMATED: CPT | Performed by: STUDENT IN AN ORGANIZED HEALTH CARE EDUCATION/TRAINING PROGRAM

## 2017-08-16 PROCEDURE — 85610 PROTHROMBIN TIME: CPT | Performed by: PHYSICIAN ASSISTANT

## 2017-08-16 PROCEDURE — 83540 ASSAY OF IRON: CPT | Performed by: INTERNAL MEDICINE

## 2017-08-16 PROCEDURE — 82948 REAGENT STRIP/BLOOD GLUCOSE: CPT

## 2017-08-16 RX ADMIN — HEPARIN SODIUM 24 UNITS/KG/HR: 10000 INJECTION, SOLUTION INTRAVENOUS at 21:40

## 2017-08-16 RX ADMIN — OXYCODONE HYDROCHLORIDE 10 MG: 10 TABLET ORAL at 00:06

## 2017-08-16 RX ADMIN — CIPROFLOXACIN HYDROCHLORIDE 750 MG: 750 TABLET, FILM COATED ORAL at 06:33

## 2017-08-16 RX ADMIN — GABAPENTIN 200 MG: 100 CAPSULE ORAL at 21:38

## 2017-08-16 RX ADMIN — HEPARIN SODIUM 24 UNITS/KG/HR: 10000 INJECTION, SOLUTION INTRAVENOUS at 08:29

## 2017-08-16 RX ADMIN — OXYCODONE HYDROCHLORIDE 10 MG: 10 TABLET ORAL at 06:33

## 2017-08-16 RX ADMIN — METOPROLOL SUCCINATE 25 MG: 25 TABLET, EXTENDED RELEASE ORAL at 08:58

## 2017-08-16 RX ADMIN — OXYCODONE HYDROCHLORIDE 10 MG: 10 TABLET ORAL at 21:38

## 2017-08-16 RX ADMIN — HYDROMORPHONE HYDROCHLORIDE 1 MG: 1 INJECTION, SOLUTION INTRAMUSCULAR; INTRAVENOUS; SUBCUTANEOUS at 23:17

## 2017-08-16 RX ADMIN — LORAZEPAM 1 MG: 1 TABLET ORAL at 22:00

## 2017-08-16 RX ADMIN — HYDROMORPHONE HYDROCHLORIDE 1 MG: 1 INJECTION, SOLUTION INTRAMUSCULAR; INTRAVENOUS; SUBCUTANEOUS at 19:52

## 2017-08-16 RX ADMIN — HEPARIN SODIUM 3200 UNITS: 1000 INJECTION, SOLUTION INTRAVENOUS; SUBCUTANEOUS at 08:22

## 2017-08-16 RX ADMIN — INSULIN LISPRO 2 UNITS: 100 INJECTION, SOLUTION INTRAVENOUS; SUBCUTANEOUS at 22:02

## 2017-08-16 RX ADMIN — LORAZEPAM 1 MG: 1 TABLET ORAL at 11:16

## 2017-08-16 RX ADMIN — HEPARIN SODIUM 3200 UNITS: 1000 INJECTION, SOLUTION INTRAVENOUS; SUBCUTANEOUS at 23:08

## 2017-08-16 RX ADMIN — INSULIN GLARGINE 21 UNITS: 100 INJECTION, SOLUTION SUBCUTANEOUS at 22:01

## 2017-08-16 RX ADMIN — GABAPENTIN 200 MG: 100 CAPSULE ORAL at 16:58

## 2017-08-16 RX ADMIN — VANCOMYCIN HYDROCHLORIDE 1250 MG: 1 INJECTION, POWDER, LYOPHILIZED, FOR SOLUTION INTRAVENOUS at 22:06

## 2017-08-16 RX ADMIN — INSULIN LISPRO 2 UNITS: 100 INJECTION, SOLUTION INTRAVENOUS; SUBCUTANEOUS at 11:17

## 2017-08-16 RX ADMIN — OXYCODONE HYDROCHLORIDE 10 MG: 10 TABLET ORAL at 16:58

## 2017-08-16 RX ADMIN — ACETAMINOPHEN 650 MG: 325 TABLET, FILM COATED ORAL at 08:59

## 2017-08-16 RX ADMIN — OXYCODONE HYDROCHLORIDE 10 MG: 10 TABLET ORAL at 11:16

## 2017-08-16 RX ADMIN — VANCOMYCIN HYDROCHLORIDE 1250 MG: 1 INJECTION, POWDER, LYOPHILIZED, FOR SOLUTION INTRAVENOUS at 12:06

## 2017-08-16 RX ADMIN — PANTOPRAZOLE SODIUM 40 MG: 40 TABLET, DELAYED RELEASE ORAL at 06:33

## 2017-08-16 RX ADMIN — CIPROFLOXACIN HYDROCHLORIDE 750 MG: 750 TABLET, FILM COATED ORAL at 16:58

## 2017-08-16 RX ADMIN — INSULIN LISPRO 1 UNITS: 100 INJECTION, SOLUTION INTRAVENOUS; SUBCUTANEOUS at 17:08

## 2017-08-16 RX ADMIN — NICOTINE 1 PATCH: 14 PATCH, EXTENDED RELEASE TRANSDERMAL at 09:00

## 2017-08-16 RX ADMIN — TORSEMIDE 20 MG: 20 TABLET ORAL at 08:59

## 2017-08-16 RX ADMIN — QUETIAPINE FUMARATE 800 MG: 200 TABLET, FILM COATED ORAL at 22:00

## 2017-08-16 RX ADMIN — POTASSIUM CHLORIDE 20 MEQ: 1500 TABLET, EXTENDED RELEASE ORAL at 08:58

## 2017-08-16 RX ADMIN — GABAPENTIN 200 MG: 100 CAPSULE ORAL at 08:58

## 2017-08-16 RX ADMIN — HYDROMORPHONE HYDROCHLORIDE 1 MG: 1 INJECTION, SOLUTION INTRAMUSCULAR; INTRAVENOUS; SUBCUTANEOUS at 14:37

## 2017-08-17 LAB
ANION GAP SERPL CALCULATED.3IONS-SCNC: 8 MMOL/L (ref 4–13)
APTT PPP: 62 SECONDS (ref 23–35)
APTT PPP: 77 SECONDS (ref 23–35)
BASOPHILS # BLD AUTO: 0.03 THOUSANDS/ΜL (ref 0–0.1)
BASOPHILS NFR BLD AUTO: 0 % (ref 0–1)
BUN SERPL-MCNC: 11 MG/DL (ref 5–25)
CALCIUM SERPL-MCNC: 8.7 MG/DL (ref 8.3–10.1)
CHLORIDE SERPL-SCNC: 104 MMOL/L (ref 100–108)
CO2 SERPL-SCNC: 25 MMOL/L (ref 21–32)
CREAT SERPL-MCNC: 1.04 MG/DL (ref 0.6–1.3)
EOSINOPHIL # BLD AUTO: 0.52 THOUSAND/ΜL (ref 0–0.61)
EOSINOPHIL NFR BLD AUTO: 4 % (ref 0–6)
ERYTHROCYTE [DISTWIDTH] IN BLOOD BY AUTOMATED COUNT: 15.8 % (ref 11.6–15.1)
ERYTHROCYTE [DISTWIDTH] IN BLOOD BY AUTOMATED COUNT: 15.8 % (ref 11.6–15.1)
FOLATE SERPL-MCNC: 12.3 NG/ML (ref 3.1–17.5)
GFR SERPL CREATININE-BSD FRML MDRD: 79 ML/MIN/1.73SQ M
GLUCOSE SERPL-MCNC: 148 MG/DL (ref 65–140)
GLUCOSE SERPL-MCNC: 161 MG/DL (ref 65–140)
GLUCOSE SERPL-MCNC: 171 MG/DL (ref 65–140)
GLUCOSE SERPL-MCNC: 242 MG/DL (ref 65–140)
GLUCOSE SERPL-MCNC: 269 MG/DL (ref 65–140)
HCT VFR BLD AUTO: 25.4 % (ref 36.5–49.3)
HCT VFR BLD AUTO: 25.4 % (ref 36.5–49.3)
HGB BLD-MCNC: 8.4 G/DL (ref 12–17)
HGB BLD-MCNC: 8.4 G/DL (ref 12–17)
LYMPHOCYTES # BLD AUTO: 2.05 THOUSANDS/ΜL (ref 0.6–4.47)
LYMPHOCYTES NFR BLD AUTO: 14 % (ref 14–44)
MCH RBC QN AUTO: 27.5 PG (ref 26.8–34.3)
MCH RBC QN AUTO: 27.5 PG (ref 26.8–34.3)
MCHC RBC AUTO-ENTMCNC: 33.1 G/DL (ref 31.4–37.4)
MCHC RBC AUTO-ENTMCNC: 33.1 G/DL (ref 31.4–37.4)
MCV RBC AUTO: 83 FL (ref 82–98)
MCV RBC AUTO: 83 FL (ref 82–98)
MONOCYTES # BLD AUTO: 1.19 THOUSAND/ΜL (ref 0.17–1.22)
MONOCYTES NFR BLD AUTO: 8 % (ref 4–12)
NEUTROPHILS # BLD AUTO: 10.57 THOUSANDS/ΜL (ref 1.85–7.62)
NEUTS SEG NFR BLD AUTO: 74 % (ref 43–75)
NRBC BLD AUTO-RTO: 0 /100 WBCS
PLATELET # BLD AUTO: 316 THOUSANDS/UL (ref 149–390)
PLATELET # BLD AUTO: 316 THOUSANDS/UL (ref 149–390)
PMV BLD AUTO: 11.6 FL (ref 8.9–12.7)
PMV BLD AUTO: 11.6 FL (ref 8.9–12.7)
POTASSIUM SERPL-SCNC: 3.7 MMOL/L (ref 3.5–5.3)
RBC # BLD AUTO: 3.05 MILLION/UL (ref 3.88–5.62)
RBC # BLD AUTO: 3.05 MILLION/UL (ref 3.88–5.62)
SODIUM SERPL-SCNC: 137 MMOL/L (ref 136–145)
VIT B12 SERPL-MCNC: 286 PG/ML (ref 100–900)
WBC # BLD AUTO: 14.45 THOUSAND/UL (ref 4.31–10.16)
WBC # BLD AUTO: 14.45 THOUSAND/UL (ref 4.31–10.16)

## 2017-08-17 PROCEDURE — 85027 COMPLETE CBC AUTOMATED: CPT | Performed by: PODIATRIST

## 2017-08-17 PROCEDURE — 85730 THROMBOPLASTIN TIME PARTIAL: CPT | Performed by: SURGERY

## 2017-08-17 PROCEDURE — 85025 COMPLETE CBC W/AUTO DIFF WBC: CPT | Performed by: SURGERY

## 2017-08-17 PROCEDURE — 85730 THROMBOPLASTIN TIME PARTIAL: CPT | Performed by: PODIATRIST

## 2017-08-17 PROCEDURE — 82746 ASSAY OF FOLIC ACID SERUM: CPT | Performed by: SURGERY

## 2017-08-17 PROCEDURE — 82607 VITAMIN B-12: CPT | Performed by: SURGERY

## 2017-08-17 PROCEDURE — 80048 BASIC METABOLIC PNL TOTAL CA: CPT | Performed by: PODIATRIST

## 2017-08-17 PROCEDURE — 82948 REAGENT STRIP/BLOOD GLUCOSE: CPT

## 2017-08-17 RX ADMIN — HEPARIN SODIUM 26 UNITS/KG/HR: 10000 INJECTION, SOLUTION INTRAVENOUS at 23:40

## 2017-08-17 RX ADMIN — CIPROFLOXACIN HYDROCHLORIDE 750 MG: 750 TABLET, FILM COATED ORAL at 17:37

## 2017-08-17 RX ADMIN — OXYCODONE HYDROCHLORIDE 10 MG: 10 TABLET ORAL at 09:19

## 2017-08-17 RX ADMIN — NICOTINE 1 PATCH: 14 PATCH, EXTENDED RELEASE TRANSDERMAL at 09:21

## 2017-08-17 RX ADMIN — OXYCODONE HYDROCHLORIDE 10 MG: 10 TABLET ORAL at 05:11

## 2017-08-17 RX ADMIN — QUETIAPINE FUMARATE 800 MG: 200 TABLET, FILM COATED ORAL at 21:48

## 2017-08-17 RX ADMIN — GABAPENTIN 200 MG: 100 CAPSULE ORAL at 21:47

## 2017-08-17 RX ADMIN — OXYCODONE HYDROCHLORIDE 10 MG: 10 TABLET ORAL at 21:47

## 2017-08-17 RX ADMIN — HYDROMORPHONE HYDROCHLORIDE 1 MG: 1 INJECTION, SOLUTION INTRAMUSCULAR; INTRAVENOUS; SUBCUTANEOUS at 12:36

## 2017-08-17 RX ADMIN — GABAPENTIN 200 MG: 100 CAPSULE ORAL at 17:37

## 2017-08-17 RX ADMIN — METOPROLOL SUCCINATE 25 MG: 25 TABLET, EXTENDED RELEASE ORAL at 09:18

## 2017-08-17 RX ADMIN — PANTOPRAZOLE SODIUM 40 MG: 40 TABLET, DELAYED RELEASE ORAL at 05:11

## 2017-08-17 RX ADMIN — INSULIN LISPRO 2 UNITS: 100 INJECTION, SOLUTION INTRAVENOUS; SUBCUTANEOUS at 21:50

## 2017-08-17 RX ADMIN — GABAPENTIN 200 MG: 100 CAPSULE ORAL at 09:19

## 2017-08-17 RX ADMIN — INSULIN LISPRO 1 UNITS: 100 INJECTION, SOLUTION INTRAVENOUS; SUBCUTANEOUS at 12:29

## 2017-08-17 RX ADMIN — POTASSIUM CHLORIDE 20 MEQ: 1500 TABLET, EXTENDED RELEASE ORAL at 09:18

## 2017-08-17 RX ADMIN — HEPARIN SODIUM 26 UNITS/KG/HR: 10000 INJECTION, SOLUTION INTRAVENOUS at 10:58

## 2017-08-17 RX ADMIN — VANCOMYCIN HYDROCHLORIDE 1250 MG: 1 INJECTION, POWDER, LYOPHILIZED, FOR SOLUTION INTRAVENOUS at 21:47

## 2017-08-17 RX ADMIN — CIPROFLOXACIN HYDROCHLORIDE 750 MG: 750 TABLET, FILM COATED ORAL at 06:34

## 2017-08-17 RX ADMIN — INSULIN LISPRO 1 UNITS: 100 INJECTION, SOLUTION INTRAVENOUS; SUBCUTANEOUS at 09:20

## 2017-08-17 RX ADMIN — VANCOMYCIN HYDROCHLORIDE 1250 MG: 1 INJECTION, POWDER, LYOPHILIZED, FOR SOLUTION INTRAVENOUS at 09:25

## 2017-08-17 RX ADMIN — TORSEMIDE 20 MG: 20 TABLET ORAL at 09:18

## 2017-08-17 RX ADMIN — INSULIN GLARGINE 21 UNITS: 100 INJECTION, SOLUTION SUBCUTANEOUS at 21:47

## 2017-08-17 RX ADMIN — OXYCODONE HYDROCHLORIDE 10 MG: 10 TABLET ORAL at 17:37

## 2017-08-17 RX ADMIN — INSULIN LISPRO 2 UNITS: 100 INJECTION, SOLUTION INTRAVENOUS; SUBCUTANEOUS at 17:38

## 2017-08-18 PROBLEM — I96 GANGRENE OF FOOT (HCC): Status: ACTIVE | Noted: 2017-08-09

## 2017-08-18 LAB
ANION GAP SERPL CALCULATED.3IONS-SCNC: 9 MMOL/L (ref 4–13)
APTT PPP: 104 SECONDS (ref 23–35)
APTT PPP: 39 SECONDS (ref 23–35)
APTT PPP: 89 SECONDS (ref 23–35)
BUN SERPL-MCNC: 13 MG/DL (ref 5–25)
CALCIUM SERPL-MCNC: 8.9 MG/DL (ref 8.3–10.1)
CHLORIDE SERPL-SCNC: 106 MMOL/L (ref 100–108)
CO2 SERPL-SCNC: 24 MMOL/L (ref 21–32)
CREAT SERPL-MCNC: 1.02 MG/DL (ref 0.6–1.3)
ERYTHROCYTE [DISTWIDTH] IN BLOOD BY AUTOMATED COUNT: 15.9 % (ref 11.6–15.1)
GFR SERPL CREATININE-BSD FRML MDRD: 81 ML/MIN/1.73SQ M
GLUCOSE SERPL-MCNC: 134 MG/DL (ref 65–140)
GLUCOSE SERPL-MCNC: 155 MG/DL (ref 65–140)
GLUCOSE SERPL-MCNC: 197 MG/DL (ref 65–140)
GLUCOSE SERPL-MCNC: 198 MG/DL (ref 65–140)
GLUCOSE SERPL-MCNC: 203 MG/DL (ref 65–140)
HCT VFR BLD AUTO: 22.7 % (ref 36.5–49.3)
HGB BLD-MCNC: 7.5 G/DL (ref 12–17)
MCH RBC QN AUTO: 27.4 PG (ref 26.8–34.3)
MCHC RBC AUTO-ENTMCNC: 33 G/DL (ref 31.4–37.4)
MCV RBC AUTO: 83 FL (ref 82–98)
PLATELET # BLD AUTO: 311 THOUSANDS/UL (ref 149–390)
PMV BLD AUTO: 11 FL (ref 8.9–12.7)
POTASSIUM SERPL-SCNC: 4 MMOL/L (ref 3.5–5.3)
RBC # BLD AUTO: 2.74 MILLION/UL (ref 3.88–5.62)
SODIUM SERPL-SCNC: 139 MMOL/L (ref 136–145)
WBC # BLD AUTO: 13.46 THOUSAND/UL (ref 4.31–10.16)

## 2017-08-18 PROCEDURE — 82948 REAGENT STRIP/BLOOD GLUCOSE: CPT

## 2017-08-18 PROCEDURE — 85730 THROMBOPLASTIN TIME PARTIAL: CPT | Performed by: PODIATRIST

## 2017-08-18 PROCEDURE — 80048 BASIC METABOLIC PNL TOTAL CA: CPT | Performed by: PODIATRIST

## 2017-08-18 PROCEDURE — 85027 COMPLETE CBC AUTOMATED: CPT | Performed by: PODIATRIST

## 2017-08-18 RX ORDER — LISINOPRIL 10 MG/1
10 TABLET ORAL DAILY
Status: DISCONTINUED | OUTPATIENT
Start: 2017-08-19 | End: 2017-08-21

## 2017-08-18 RX ADMIN — INSULIN LISPRO 4 UNITS: 100 INJECTION, SOLUTION INTRAVENOUS; SUBCUTANEOUS at 18:03

## 2017-08-18 RX ADMIN — INSULIN GLARGINE 21 UNITS: 100 INJECTION, SOLUTION SUBCUTANEOUS at 21:29

## 2017-08-18 RX ADMIN — OXYCODONE HYDROCHLORIDE 10 MG: 10 TABLET ORAL at 05:48

## 2017-08-18 RX ADMIN — INSULIN LISPRO 1 UNITS: 100 INJECTION, SOLUTION INTRAVENOUS; SUBCUTANEOUS at 12:44

## 2017-08-18 RX ADMIN — HEPARIN SODIUM 24 UNITS/KG/HR: 10000 INJECTION, SOLUTION INTRAVENOUS at 12:54

## 2017-08-18 RX ADMIN — INSULIN LISPRO 3 UNITS: 100 INJECTION, SOLUTION INTRAVENOUS; SUBCUTANEOUS at 12:45

## 2017-08-18 RX ADMIN — INSULIN LISPRO 3 UNITS: 100 INJECTION, SOLUTION INTRAVENOUS; SUBCUTANEOUS at 18:03

## 2017-08-18 RX ADMIN — HYDROMORPHONE HYDROCHLORIDE 1 MG: 1 INJECTION, SOLUTION INTRAMUSCULAR; INTRAVENOUS; SUBCUTANEOUS at 19:38

## 2017-08-18 RX ADMIN — METOPROLOL SUCCINATE 25 MG: 25 TABLET, EXTENDED RELEASE ORAL at 08:12

## 2017-08-18 RX ADMIN — POTASSIUM CHLORIDE 20 MEQ: 1500 TABLET, EXTENDED RELEASE ORAL at 08:10

## 2017-08-18 RX ADMIN — QUETIAPINE FUMARATE 800 MG: 200 TABLET, FILM COATED ORAL at 21:29

## 2017-08-18 RX ADMIN — LORAZEPAM 1 MG: 1 TABLET ORAL at 12:53

## 2017-08-18 RX ADMIN — VANCOMYCIN HYDROCHLORIDE 1250 MG: 1 INJECTION, POWDER, LYOPHILIZED, FOR SOLUTION INTRAVENOUS at 10:19

## 2017-08-18 RX ADMIN — GABAPENTIN 200 MG: 100 CAPSULE ORAL at 21:29

## 2017-08-18 RX ADMIN — OXYCODONE HYDROCHLORIDE 10 MG: 10 TABLET ORAL at 22:03

## 2017-08-18 RX ADMIN — LORAZEPAM 1 MG: 1 TABLET ORAL at 21:31

## 2017-08-18 RX ADMIN — CIPROFLOXACIN HYDROCHLORIDE 750 MG: 750 TABLET, FILM COATED ORAL at 05:50

## 2017-08-18 RX ADMIN — GABAPENTIN 200 MG: 100 CAPSULE ORAL at 16:00

## 2017-08-18 RX ADMIN — CIPROFLOXACIN HYDROCHLORIDE 750 MG: 750 TABLET, FILM COATED ORAL at 16:03

## 2017-08-18 RX ADMIN — HEPARIN SODIUM 6400 UNITS: 1000 INJECTION, SOLUTION INTRAVENOUS; SUBCUTANEOUS at 16:01

## 2017-08-18 RX ADMIN — HYDROMORPHONE HYDROCHLORIDE 1 MG: 1 INJECTION, SOLUTION INTRAMUSCULAR; INTRAVENOUS; SUBCUTANEOUS at 16:01

## 2017-08-18 RX ADMIN — OXYCODONE HYDROCHLORIDE 10 MG: 10 TABLET ORAL at 18:02

## 2017-08-18 RX ADMIN — OXYCODONE HYDROCHLORIDE 10 MG: 10 TABLET ORAL at 13:49

## 2017-08-18 RX ADMIN — INSULIN LISPRO 1 UNITS: 100 INJECTION, SOLUTION INTRAVENOUS; SUBCUTANEOUS at 08:16

## 2017-08-18 RX ADMIN — GABAPENTIN 200 MG: 100 CAPSULE ORAL at 08:10

## 2017-08-18 RX ADMIN — PANTOPRAZOLE SODIUM 40 MG: 40 TABLET, DELAYED RELEASE ORAL at 05:48

## 2017-08-18 RX ADMIN — VANCOMYCIN HYDROCHLORIDE 1250 MG: 1 INJECTION, POWDER, LYOPHILIZED, FOR SOLUTION INTRAVENOUS at 21:32

## 2017-08-18 RX ADMIN — NICOTINE 1 PATCH: 14 PATCH, EXTENDED RELEASE TRANSDERMAL at 08:10

## 2017-08-18 RX ADMIN — TORSEMIDE 20 MG: 20 TABLET ORAL at 08:10

## 2017-08-18 RX ADMIN — INSULIN LISPRO 1 UNITS: 100 INJECTION, SOLUTION INTRAVENOUS; SUBCUTANEOUS at 21:30

## 2017-08-19 ENCOUNTER — ANESTHESIA EVENT (INPATIENT)
Dept: PERIOP | Facility: HOSPITAL | Age: 57
DRG: 711 | End: 2017-08-19
Payer: COMMERCIAL

## 2017-08-19 ENCOUNTER — APPOINTMENT (INPATIENT)
Dept: RADIOLOGY | Facility: HOSPITAL | Age: 57
DRG: 711 | End: 2017-08-19
Payer: COMMERCIAL

## 2017-08-19 ENCOUNTER — ANESTHESIA (INPATIENT)
Dept: PERIOP | Facility: HOSPITAL | Age: 57
DRG: 711 | End: 2017-08-19
Payer: COMMERCIAL

## 2017-08-19 LAB
ABO GROUP BLD: NORMAL
BLD GP AB SCN SERPL QL: POSITIVE
DAT POLY-SP REAG RBC QL: NEGATIVE
ERYTHROCYTE [DISTWIDTH] IN BLOOD BY AUTOMATED COUNT: 15.6 % (ref 11.6–15.1)
GLUCOSE SERPL-MCNC: 147 MG/DL (ref 65–140)
GLUCOSE SERPL-MCNC: 150 MG/DL (ref 65–140)
GLUCOSE SERPL-MCNC: 190 MG/DL (ref 65–140)
GLUCOSE SERPL-MCNC: 223 MG/DL (ref 65–140)
GLUCOSE SERPL-MCNC: 285 MG/DL (ref 65–140)
HCT VFR BLD AUTO: 23.5 % (ref 36.5–49.3)
HGB BLD-MCNC: 7.7 G/DL (ref 12–17)
INR PPP: 1.2 (ref 0.86–1.16)
MCH RBC QN AUTO: 27.1 PG (ref 26.8–34.3)
MCHC RBC AUTO-ENTMCNC: 32.8 G/DL (ref 31.4–37.4)
MCV RBC AUTO: 83 FL (ref 82–98)
PLATELET # BLD AUTO: 344 THOUSANDS/UL (ref 149–390)
PMV BLD AUTO: 11.1 FL (ref 8.9–12.7)
PROTHROMBIN TIME: 15.3 SECONDS (ref 12.1–14.4)
RBC # BLD AUTO: 2.84 MILLION/UL (ref 3.88–5.62)
RH BLD: POSITIVE
SPECIMEN EXPIRATION DATE: NORMAL
WBC # BLD AUTO: 13.62 THOUSAND/UL (ref 4.31–10.16)

## 2017-08-19 PROCEDURE — 82948 REAGENT STRIP/BLOOD GLUCOSE: CPT

## 2017-08-19 PROCEDURE — 86901 BLOOD TYPING SEROLOGIC RH(D): CPT | Performed by: INTERNAL MEDICINE

## 2017-08-19 PROCEDURE — 86922 COMPATIBILITY TEST ANTIGLOB: CPT

## 2017-08-19 PROCEDURE — 88311 DECALCIFY TISSUE: CPT | Performed by: STUDENT IN AN ORGANIZED HEALTH CARE EDUCATION/TRAINING PROGRAM

## 2017-08-19 PROCEDURE — 86870 RBC ANTIBODY IDENTIFICATION: CPT | Performed by: INTERNAL MEDICINE

## 2017-08-19 PROCEDURE — 86900 BLOOD TYPING SEROLOGIC ABO: CPT | Performed by: INTERNAL MEDICINE

## 2017-08-19 PROCEDURE — 85610 PROTHROMBIN TIME: CPT | Performed by: PODIATRIST

## 2017-08-19 PROCEDURE — 85027 COMPLETE CBC AUTOMATED: CPT | Performed by: PODIATRIST

## 2017-08-19 PROCEDURE — 86880 COOMBS TEST DIRECT: CPT | Performed by: INTERNAL MEDICINE

## 2017-08-19 PROCEDURE — 88304 TISSUE EXAM BY PATHOLOGIST: CPT | Performed by: STUDENT IN AN ORGANIZED HEALTH CARE EDUCATION/TRAINING PROGRAM

## 2017-08-19 PROCEDURE — 86850 RBC ANTIBODY SCREEN: CPT | Performed by: INTERNAL MEDICINE

## 2017-08-19 PROCEDURE — 73630 X-RAY EXAM OF FOOT: CPT

## 2017-08-19 PROCEDURE — 86921 COMPATIBILITY TEST INCUBATE: CPT

## 2017-08-19 PROCEDURE — 0QBP0ZZ EXCISION OF LEFT METATARSAL, OPEN APPROACH: ICD-10-PCS | Performed by: PODIATRIST

## 2017-08-19 RX ORDER — SODIUM CHLORIDE, SODIUM LACTATE, POTASSIUM CHLORIDE, CALCIUM CHLORIDE 600; 310; 30; 20 MG/100ML; MG/100ML; MG/100ML; MG/100ML
INJECTION, SOLUTION INTRAVENOUS CONTINUOUS PRN
Status: DISCONTINUED | OUTPATIENT
Start: 2017-08-19 | End: 2017-08-19 | Stop reason: SURG

## 2017-08-19 RX ORDER — PROPOFOL 10 MG/ML
INJECTION, EMULSION INTRAVENOUS AS NEEDED
Status: DISCONTINUED | OUTPATIENT
Start: 2017-08-19 | End: 2017-08-19 | Stop reason: SURG

## 2017-08-19 RX ORDER — METOPROLOL TARTRATE 5 MG/5ML
INJECTION INTRAVENOUS AS NEEDED
Status: DISCONTINUED | OUTPATIENT
Start: 2017-08-19 | End: 2017-08-19 | Stop reason: SURG

## 2017-08-19 RX ORDER — SODIUM CHLORIDE, SODIUM LACTATE, POTASSIUM CHLORIDE, CALCIUM CHLORIDE 600; 310; 30; 20 MG/100ML; MG/100ML; MG/100ML; MG/100ML
50 INJECTION, SOLUTION INTRAVENOUS CONTINUOUS
Status: DISCONTINUED | OUTPATIENT
Start: 2017-08-19 | End: 2017-08-20

## 2017-08-19 RX ORDER — FENTANYL CITRATE/PF 50 MCG/ML
25 SYRINGE (ML) INJECTION
Status: DISCONTINUED | OUTPATIENT
Start: 2017-08-19 | End: 2017-08-19

## 2017-08-19 RX ORDER — FENTANYL CITRATE 50 UG/ML
INJECTION, SOLUTION INTRAMUSCULAR; INTRAVENOUS AS NEEDED
Status: DISCONTINUED | OUTPATIENT
Start: 2017-08-19 | End: 2017-08-19 | Stop reason: SURG

## 2017-08-19 RX ORDER — MIDAZOLAM HYDROCHLORIDE 1 MG/ML
INJECTION INTRAMUSCULAR; INTRAVENOUS AS NEEDED
Status: DISCONTINUED | OUTPATIENT
Start: 2017-08-19 | End: 2017-08-19 | Stop reason: SURG

## 2017-08-19 RX ORDER — ONDANSETRON 2 MG/ML
4 INJECTION INTRAMUSCULAR; INTRAVENOUS EVERY 6 HOURS PRN
Status: DISCONTINUED | OUTPATIENT
Start: 2017-08-19 | End: 2017-08-19

## 2017-08-19 RX ORDER — PROPOFOL 10 MG/ML
INJECTION, EMULSION INTRAVENOUS CONTINUOUS PRN
Status: DISCONTINUED | OUTPATIENT
Start: 2017-08-19 | End: 2017-08-19 | Stop reason: SURG

## 2017-08-19 RX ADMIN — LISINOPRIL 10 MG: 10 TABLET ORAL at 16:13

## 2017-08-19 RX ADMIN — FENTANYL CITRATE 50 MCG: 50 INJECTION, SOLUTION INTRAMUSCULAR; INTRAVENOUS at 10:12

## 2017-08-19 RX ADMIN — FENTANYL CITRATE 25 MCG: 50 INJECTION INTRAMUSCULAR; INTRAVENOUS at 11:36

## 2017-08-19 RX ADMIN — GABAPENTIN 200 MG: 100 CAPSULE ORAL at 22:35

## 2017-08-19 RX ADMIN — QUETIAPINE FUMARATE 800 MG: 200 TABLET, FILM COATED ORAL at 22:22

## 2017-08-19 RX ADMIN — VANCOMYCIN HYDROCHLORIDE 1250 MG: 1 INJECTION, POWDER, LYOPHILIZED, FOR SOLUTION INTRAVENOUS at 22:26

## 2017-08-19 RX ADMIN — FENTANYL CITRATE 25 MCG: 50 INJECTION INTRAMUSCULAR; INTRAVENOUS at 11:40

## 2017-08-19 RX ADMIN — GABAPENTIN 200 MG: 100 CAPSULE ORAL at 16:08

## 2017-08-19 RX ADMIN — MIDAZOLAM HYDROCHLORIDE 2 MG: 1 INJECTION, SOLUTION INTRAMUSCULAR; INTRAVENOUS at 10:06

## 2017-08-19 RX ADMIN — HYDROMORPHONE HYDROCHLORIDE 1 MG: 1 INJECTION, SOLUTION INTRAMUSCULAR; INTRAVENOUS; SUBCUTANEOUS at 06:42

## 2017-08-19 RX ADMIN — HYDROMORPHONE HYDROCHLORIDE 1 MG: 1 INJECTION, SOLUTION INTRAMUSCULAR; INTRAVENOUS; SUBCUTANEOUS at 22:18

## 2017-08-19 RX ADMIN — HEPARIN SODIUM 28 UNITS/KG/HR: 10000 INJECTION, SOLUTION INTRAVENOUS at 22:37

## 2017-08-19 RX ADMIN — FENTANYL CITRATE 50 MCG: 50 INJECTION, SOLUTION INTRAMUSCULAR; INTRAVENOUS at 10:06

## 2017-08-19 RX ADMIN — POTASSIUM CHLORIDE 20 MEQ: 1500 TABLET, EXTENDED RELEASE ORAL at 16:18

## 2017-08-19 RX ADMIN — SODIUM CHLORIDE, SODIUM LACTATE, POTASSIUM CHLORIDE, AND CALCIUM CHLORIDE: .6; .31; .03; .02 INJECTION, SOLUTION INTRAVENOUS at 10:04

## 2017-08-19 RX ADMIN — PROPOFOL 80 MCG/KG/MIN: 10 INJECTION, EMULSION INTRAVENOUS at 10:13

## 2017-08-19 RX ADMIN — PANTOPRAZOLE SODIUM 40 MG: 40 TABLET, DELAYED RELEASE ORAL at 05:12

## 2017-08-19 RX ADMIN — FENTANYL CITRATE 25 MCG: 50 INJECTION INTRAMUSCULAR; INTRAVENOUS at 11:48

## 2017-08-19 RX ADMIN — FENTANYL CITRATE 25 MCG: 50 INJECTION INTRAMUSCULAR; INTRAVENOUS at 11:53

## 2017-08-19 RX ADMIN — METOPROLOL TARTRATE 2.5 MG: 1 INJECTION, SOLUTION INTRAVENOUS at 10:19

## 2017-08-19 RX ADMIN — PROPOFOL 40 MG: 10 INJECTION, EMULSION INTRAVENOUS at 10:13

## 2017-08-19 RX ADMIN — OXYCODONE HYDROCHLORIDE 10 MG: 10 TABLET ORAL at 05:13

## 2017-08-19 RX ADMIN — METOPROLOL TARTRATE 2.5 MG: 1 INJECTION, SOLUTION INTRAVENOUS at 10:14

## 2017-08-19 RX ADMIN — TORSEMIDE 20 MG: 20 TABLET ORAL at 16:08

## 2017-08-19 RX ADMIN — FENTANYL CITRATE 25 MCG: 50 INJECTION INTRAMUSCULAR; INTRAVENOUS at 12:00

## 2017-08-19 RX ADMIN — NICOTINE 1 PATCH: 14 PATCH, EXTENDED RELEASE TRANSDERMAL at 16:17

## 2017-08-19 RX ADMIN — INSULIN GLARGINE 21 UNITS: 100 INJECTION, SOLUTION SUBCUTANEOUS at 22:33

## 2017-08-19 RX ADMIN — FENTANYL CITRATE 25 MCG: 50 INJECTION INTRAMUSCULAR; INTRAVENOUS at 11:43

## 2017-08-19 RX ADMIN — METOPROLOL SUCCINATE 25 MG: 25 TABLET, EXTENDED RELEASE ORAL at 16:14

## 2017-08-19 RX ADMIN — CIPROFLOXACIN HYDROCHLORIDE 750 MG: 750 TABLET, FILM COATED ORAL at 18:34

## 2017-08-19 RX ADMIN — CIPROFLOXACIN HYDROCHLORIDE 750 MG: 750 TABLET, FILM COATED ORAL at 05:13

## 2017-08-19 RX ADMIN — OXYCODONE HYDROCHLORIDE 10 MG: 10 TABLET ORAL at 16:10

## 2017-08-19 RX ADMIN — INSULIN LISPRO 3 UNITS: 100 INJECTION, SOLUTION INTRAVENOUS; SUBCUTANEOUS at 22:31

## 2017-08-19 RX ADMIN — HEPARIN SODIUM 28 UNITS/KG/HR: 10000 INJECTION, SOLUTION INTRAVENOUS at 13:57

## 2017-08-19 RX ADMIN — LORAZEPAM 1 MG: 1 TABLET ORAL at 18:20

## 2017-08-19 RX ADMIN — FENTANYL CITRATE 25 MCG: 50 INJECTION INTRAMUSCULAR; INTRAVENOUS at 11:31

## 2017-08-19 RX ADMIN — INSULIN LISPRO 2 UNITS: 100 INJECTION, SOLUTION INTRAVENOUS; SUBCUTANEOUS at 18:36

## 2017-08-20 LAB
ANION GAP SERPL CALCULATED.3IONS-SCNC: 9 MMOL/L (ref 4–13)
APTT PPP: 73 SECONDS (ref 23–35)
BASOPHILS # BLD AUTO: 0.04 THOUSANDS/ΜL (ref 0–0.1)
BASOPHILS NFR BLD AUTO: 0 % (ref 0–1)
BUN SERPL-MCNC: 16 MG/DL (ref 5–25)
CALCIUM SERPL-MCNC: 8.6 MG/DL (ref 8.3–10.1)
CHLORIDE SERPL-SCNC: 105 MMOL/L (ref 100–108)
CO2 SERPL-SCNC: 25 MMOL/L (ref 21–32)
CREAT SERPL-MCNC: 1.07 MG/DL (ref 0.6–1.3)
EOSINOPHIL # BLD AUTO: 0.42 THOUSAND/ΜL (ref 0–0.61)
EOSINOPHIL NFR BLD AUTO: 4 % (ref 0–6)
ERYTHROCYTE [DISTWIDTH] IN BLOOD BY AUTOMATED COUNT: 15.6 % (ref 11.6–15.1)
GFR SERPL CREATININE-BSD FRML MDRD: 76 ML/MIN/1.73SQ M
GLUCOSE SERPL-MCNC: 151 MG/DL (ref 65–140)
GLUCOSE SERPL-MCNC: 173 MG/DL (ref 65–140)
GLUCOSE SERPL-MCNC: 182 MG/DL (ref 65–140)
GLUCOSE SERPL-MCNC: 206 MG/DL (ref 65–140)
GLUCOSE SERPL-MCNC: 234 MG/DL (ref 65–140)
HCT VFR BLD AUTO: 22.6 % (ref 36.5–49.3)
HGB BLD-MCNC: 7.3 G/DL (ref 12–17)
INR PPP: 1.19 (ref 0.86–1.16)
LYMPHOCYTES # BLD AUTO: 1.79 THOUSANDS/ΜL (ref 0.6–4.47)
LYMPHOCYTES NFR BLD AUTO: 18 % (ref 14–44)
MCH RBC QN AUTO: 26.8 PG (ref 26.8–34.3)
MCHC RBC AUTO-ENTMCNC: 32.3 G/DL (ref 31.4–37.4)
MCV RBC AUTO: 83 FL (ref 82–98)
MONOCYTES # BLD AUTO: 0.86 THOUSAND/ΜL (ref 0.17–1.22)
MONOCYTES NFR BLD AUTO: 9 % (ref 4–12)
NEUTROPHILS # BLD AUTO: 6.73 THOUSANDS/ΜL (ref 1.85–7.62)
NEUTS SEG NFR BLD AUTO: 69 % (ref 43–75)
NRBC BLD AUTO-RTO: 0 /100 WBCS
PLATELET # BLD AUTO: 304 THOUSANDS/UL (ref 149–390)
PMV BLD AUTO: 11.5 FL (ref 8.9–12.7)
POTASSIUM SERPL-SCNC: 3.6 MMOL/L (ref 3.5–5.3)
PROTHROMBIN TIME: 15.2 SECONDS (ref 12.1–14.4)
RBC # BLD AUTO: 2.72 MILLION/UL (ref 3.88–5.62)
SODIUM SERPL-SCNC: 139 MMOL/L (ref 136–145)
WBC # BLD AUTO: 9.88 THOUSAND/UL (ref 4.31–10.16)

## 2017-08-20 PROCEDURE — P9016 RBC LEUKOCYTES REDUCED: HCPCS

## 2017-08-20 PROCEDURE — 85610 PROTHROMBIN TIME: CPT | Performed by: PODIATRIST

## 2017-08-20 PROCEDURE — 30243N1 TRANSFUSION OF NONAUTOLOGOUS RED BLOOD CELLS INTO CENTRAL VEIN, PERCUTANEOUS APPROACH: ICD-10-PCS | Performed by: INTERNAL MEDICINE

## 2017-08-20 PROCEDURE — 80048 BASIC METABOLIC PNL TOTAL CA: CPT | Performed by: INTERNAL MEDICINE

## 2017-08-20 PROCEDURE — 86902 BLOOD TYPE ANTIGEN DONOR EA: CPT

## 2017-08-20 PROCEDURE — 85025 COMPLETE CBC W/AUTO DIFF WBC: CPT | Performed by: INTERNAL MEDICINE

## 2017-08-20 PROCEDURE — 82948 REAGENT STRIP/BLOOD GLUCOSE: CPT

## 2017-08-20 PROCEDURE — 85730 THROMBOPLASTIN TIME PARTIAL: CPT | Performed by: PODIATRIST

## 2017-08-20 RX ADMIN — GABAPENTIN 200 MG: 100 CAPSULE ORAL at 10:03

## 2017-08-20 RX ADMIN — INSULIN GLARGINE 21 UNITS: 100 INJECTION, SOLUTION SUBCUTANEOUS at 21:25

## 2017-08-20 RX ADMIN — POTASSIUM CHLORIDE 20 MEQ: 1500 TABLET, EXTENDED RELEASE ORAL at 10:02

## 2017-08-20 RX ADMIN — NICOTINE 1 PATCH: 14 PATCH, EXTENDED RELEASE TRANSDERMAL at 10:06

## 2017-08-20 RX ADMIN — INSULIN LISPRO 2 UNITS: 100 INJECTION, SOLUTION INTRAVENOUS; SUBCUTANEOUS at 21:27

## 2017-08-20 RX ADMIN — HEPARIN SODIUM 28 UNITS/KG/HR: 10000 INJECTION, SOLUTION INTRAVENOUS at 21:31

## 2017-08-20 RX ADMIN — HEPARIN SODIUM 28 UNITS/KG/HR: 10000 INJECTION, SOLUTION INTRAVENOUS at 10:48

## 2017-08-20 RX ADMIN — CIPROFLOXACIN HYDROCHLORIDE 750 MG: 750 TABLET, FILM COATED ORAL at 18:35

## 2017-08-20 RX ADMIN — INSULIN LISPRO 2 UNITS: 100 INJECTION, SOLUTION INTRAVENOUS; SUBCUTANEOUS at 18:36

## 2017-08-20 RX ADMIN — PANTOPRAZOLE SODIUM 40 MG: 40 TABLET, DELAYED RELEASE ORAL at 05:08

## 2017-08-20 RX ADMIN — GABAPENTIN 200 MG: 100 CAPSULE ORAL at 20:41

## 2017-08-20 RX ADMIN — HYDROMORPHONE HYDROCHLORIDE 1 MG: 1 INJECTION, SOLUTION INTRAMUSCULAR; INTRAVENOUS; SUBCUTANEOUS at 14:35

## 2017-08-20 RX ADMIN — METOPROLOL SUCCINATE 25 MG: 25 TABLET, EXTENDED RELEASE ORAL at 10:12

## 2017-08-20 RX ADMIN — HYDROMORPHONE HYDROCHLORIDE 1 MG: 1 INJECTION, SOLUTION INTRAMUSCULAR; INTRAVENOUS; SUBCUTANEOUS at 20:41

## 2017-08-20 RX ADMIN — INSULIN LISPRO 3 UNITS: 100 INJECTION, SOLUTION INTRAVENOUS; SUBCUTANEOUS at 12:01

## 2017-08-20 RX ADMIN — INSULIN LISPRO 3 UNITS: 100 INJECTION, SOLUTION INTRAVENOUS; SUBCUTANEOUS at 18:36

## 2017-08-20 RX ADMIN — VANCOMYCIN HYDROCHLORIDE 1250 MG: 1 INJECTION, POWDER, LYOPHILIZED, FOR SOLUTION INTRAVENOUS at 11:51

## 2017-08-20 RX ADMIN — VANCOMYCIN HYDROCHLORIDE 1250 MG: 1 INJECTION, POWDER, LYOPHILIZED, FOR SOLUTION INTRAVENOUS at 21:25

## 2017-08-20 RX ADMIN — OXYCODONE HYDROCHLORIDE 10 MG: 10 TABLET ORAL at 10:03

## 2017-08-20 RX ADMIN — CIPROFLOXACIN HYDROCHLORIDE 750 MG: 750 TABLET, FILM COATED ORAL at 06:15

## 2017-08-20 RX ADMIN — QUETIAPINE FUMARATE 800 MG: 200 TABLET, FILM COATED ORAL at 21:15

## 2017-08-20 RX ADMIN — GABAPENTIN 200 MG: 100 CAPSULE ORAL at 18:33

## 2017-08-20 RX ADMIN — OXYCODONE HYDROCHLORIDE 10 MG: 10 TABLET ORAL at 05:09

## 2017-08-20 RX ADMIN — TORSEMIDE 20 MG: 20 TABLET ORAL at 10:02

## 2017-08-20 RX ADMIN — LISINOPRIL 10 MG: 10 TABLET ORAL at 10:12

## 2017-08-20 RX ADMIN — INSULIN LISPRO 1 UNITS: 100 INJECTION, SOLUTION INTRAVENOUS; SUBCUTANEOUS at 12:00

## 2017-08-21 ENCOUNTER — APPOINTMENT (INPATIENT)
Dept: RADIOLOGY | Facility: HOSPITAL | Age: 57
DRG: 711 | End: 2017-08-21
Payer: COMMERCIAL

## 2017-08-21 ENCOUNTER — APPOINTMENT (INPATIENT)
Dept: RADIOLOGY | Facility: HOSPITAL | Age: 57
DRG: 711 | End: 2017-08-21
Attending: SURGERY
Payer: COMMERCIAL

## 2017-08-21 LAB
ABO GROUP BLD BPU: NORMAL
ALBUMIN SERPL BCP-MCNC: 2.3 G/DL (ref 3.5–5)
ALP SERPL-CCNC: 182 U/L (ref 46–116)
ALT SERPL W P-5'-P-CCNC: 14 U/L (ref 12–78)
ANION GAP SERPL CALCULATED.3IONS-SCNC: 9 MMOL/L (ref 4–13)
APTT PPP: 32 SECONDS (ref 23–35)
APTT PPP: 39 SECONDS (ref 23–35)
APTT PPP: 53 SECONDS (ref 23–35)
APTT PPP: >210 SECONDS (ref 23–35)
AST SERPL W P-5'-P-CCNC: 14 U/L (ref 5–45)
BASOPHILS # BLD AUTO: 0.03 THOUSANDS/ΜL (ref 0–0.1)
BASOPHILS NFR BLD AUTO: 0 % (ref 0–1)
BILIRUB DIRECT SERPL-MCNC: 0.17 MG/DL (ref 0–0.2)
BILIRUB SERPL-MCNC: 0.45 MG/DL (ref 0.2–1)
BPU ID: NORMAL
BUN SERPL-MCNC: 11 MG/DL (ref 5–25)
CALCIUM SERPL-MCNC: 8.8 MG/DL (ref 8.3–10.1)
CHLORIDE SERPL-SCNC: 108 MMOL/L (ref 100–108)
CO2 SERPL-SCNC: 24 MMOL/L (ref 21–32)
CREAT SERPL-MCNC: 0.92 MG/DL (ref 0.6–1.3)
CROSSMATCH: NORMAL
EOSINOPHIL # BLD AUTO: 0.46 THOUSAND/ΜL (ref 0–0.61)
EOSINOPHIL NFR BLD AUTO: 4 % (ref 0–6)
ERYTHROCYTE [DISTWIDTH] IN BLOOD BY AUTOMATED COUNT: 15.9 % (ref 11.6–15.1)
ERYTHROCYTE [DISTWIDTH] IN BLOOD BY AUTOMATED COUNT: 16.1 % (ref 11.6–15.1)
ERYTHROCYTE [DISTWIDTH] IN BLOOD BY AUTOMATED COUNT: 16.2 % (ref 11.6–15.1)
FIBRINOGEN PPP-MCNC: 790 MG/DL (ref 227–495)
FIBRINOGEN PPP-MCNC: 836 MG/DL (ref 227–495)
GFR SERPL CREATININE-BSD FRML MDRD: 91 ML/MIN/1.73SQ M
GLUCOSE SERPL-MCNC: 129 MG/DL (ref 65–140)
GLUCOSE SERPL-MCNC: 130 MG/DL (ref 65–140)
GLUCOSE SERPL-MCNC: 166 MG/DL (ref 65–140)
GLUCOSE SERPL-MCNC: 170 MG/DL (ref 65–140)
HCT VFR BLD AUTO: 24 % (ref 36.5–49.3)
HCT VFR BLD AUTO: 24.5 % (ref 36.5–49.3)
HCT VFR BLD AUTO: 24.5 % (ref 36.5–49.3)
HGB BLD-MCNC: 7.8 G/DL (ref 12–17)
HGB BLD-MCNC: 7.8 G/DL (ref 12–17)
HGB BLD-MCNC: 7.9 G/DL (ref 12–17)
INR PPP: 1.29 (ref 0.86–1.16)
LYMPHOCYTES # BLD AUTO: 1.4 THOUSANDS/ΜL (ref 0.6–4.47)
LYMPHOCYTES NFR BLD AUTO: 13 % (ref 14–44)
MAGNESIUM SERPL-MCNC: 2 MG/DL (ref 1.6–2.6)
MCH RBC QN AUTO: 26.5 PG (ref 26.8–34.3)
MCH RBC QN AUTO: 26.6 PG (ref 26.8–34.3)
MCH RBC QN AUTO: 26.7 PG (ref 26.8–34.3)
MCHC RBC AUTO-ENTMCNC: 31.8 G/DL (ref 31.4–37.4)
MCHC RBC AUTO-ENTMCNC: 32.2 G/DL (ref 31.4–37.4)
MCHC RBC AUTO-ENTMCNC: 32.5 G/DL (ref 31.4–37.4)
MCV RBC AUTO: 82 FL (ref 82–98)
MCV RBC AUTO: 83 FL (ref 82–98)
MCV RBC AUTO: 83 FL (ref 82–98)
MONOCYTES # BLD AUTO: 0.89 THOUSAND/ΜL (ref 0.17–1.22)
MONOCYTES NFR BLD AUTO: 8 % (ref 4–12)
NEUTROPHILS # BLD AUTO: 7.72 THOUSANDS/ΜL (ref 1.85–7.62)
NEUTS SEG NFR BLD AUTO: 75 % (ref 43–75)
NRBC BLD AUTO-RTO: 0 /100 WBCS
PLATELET # BLD AUTO: 288 THOUSANDS/UL (ref 149–390)
PLATELET # BLD AUTO: 293 THOUSANDS/UL (ref 149–390)
PLATELET # BLD AUTO: 325 THOUSANDS/UL (ref 149–390)
PMV BLD AUTO: 10.8 FL (ref 8.9–12.7)
PMV BLD AUTO: 11 FL (ref 8.9–12.7)
PMV BLD AUTO: 11.2 FL (ref 8.9–12.7)
POTASSIUM SERPL-SCNC: 3.9 MMOL/L (ref 3.5–5.3)
PROT SERPL-MCNC: 7.2 G/DL (ref 6.4–8.2)
PROTHROMBIN TIME: 16.2 SECONDS (ref 12.1–14.4)
RBC # BLD AUTO: 2.93 MILLION/UL (ref 3.88–5.62)
RBC # BLD AUTO: 2.94 MILLION/UL (ref 3.88–5.62)
RBC # BLD AUTO: 2.96 MILLION/UL (ref 3.88–5.62)
SODIUM SERPL-SCNC: 141 MMOL/L (ref 136–145)
UNIT DISPENSE STATUS: NORMAL
UNIT PRODUCT CODE: NORMAL
UNIT RH: NORMAL
WBC # BLD AUTO: 10.21 THOUSAND/UL (ref 4.31–10.16)
WBC # BLD AUTO: 10.54 THOUSAND/UL (ref 4.31–10.16)
WBC # BLD AUTO: 12.26 THOUSAND/UL (ref 4.31–10.16)

## 2017-08-21 PROCEDURE — 83735 ASSAY OF MAGNESIUM: CPT | Performed by: NURSE PRACTITIONER

## 2017-08-21 PROCEDURE — 85027 COMPLETE CBC AUTOMATED: CPT | Performed by: SURGERY

## 2017-08-21 PROCEDURE — 85610 PROTHROMBIN TIME: CPT | Performed by: SURGERY

## 2017-08-21 PROCEDURE — 37226 HB FEM/POPL REVASC W/STENT: CPT

## 2017-08-21 PROCEDURE — C1757 CATH, THROMBECTOMY/EMBOLECT: HCPCS

## 2017-08-21 PROCEDURE — 99152 MOD SED SAME PHYS/QHP 5/>YRS: CPT

## 2017-08-21 PROCEDURE — C1894 INTRO/SHEATH, NON-LASER: HCPCS

## 2017-08-21 PROCEDURE — 85027 COMPLETE CBC AUTOMATED: CPT | Performed by: RADIOLOGY

## 2017-08-21 PROCEDURE — 85384 FIBRINOGEN ACTIVITY: CPT | Performed by: RADIOLOGY

## 2017-08-21 PROCEDURE — C1884 EMBOLIZATION PROTECT SYST: HCPCS

## 2017-08-21 PROCEDURE — 75710 ARTERY X-RAYS ARM/LEG: CPT

## 2017-08-21 PROCEDURE — 70450 CT HEAD/BRAIN W/O DYE: CPT

## 2017-08-21 PROCEDURE — 82948 REAGENT STRIP/BLOOD GLUCOSE: CPT

## 2017-08-21 PROCEDURE — 04CL3ZZ EXTIRPATION OF MATTER FROM LEFT FEMORAL ARTERY, PERCUTANEOUS APPROACH: ICD-10-PCS | Performed by: RADIOLOGY

## 2017-08-21 PROCEDURE — 80076 HEPATIC FUNCTION PANEL: CPT | Performed by: SURGERY

## 2017-08-21 PROCEDURE — B41GYZZ FLUOROSCOPY OF LEFT LOWER EXTREMITY ARTERIES USING OTHER CONTRAST: ICD-10-PCS | Performed by: RADIOLOGY

## 2017-08-21 PROCEDURE — 85025 COMPLETE CBC W/AUTO DIFF WBC: CPT | Performed by: INTERNAL MEDICINE

## 2017-08-21 PROCEDURE — C1725 CATH, TRANSLUMIN NON-LASER: HCPCS

## 2017-08-21 PROCEDURE — 85384 FIBRINOGEN ACTIVITY: CPT | Performed by: PHYSICIAN ASSISTANT

## 2017-08-21 PROCEDURE — C1760 CLOSURE DEV, VASC: HCPCS

## 2017-08-21 PROCEDURE — 047L3DZ DILATION OF LEFT FEMORAL ARTERY WITH INTRALUMINAL DEVICE, PERCUTANEOUS APPROACH: ICD-10-PCS | Performed by: RADIOLOGY

## 2017-08-21 PROCEDURE — 37184 PRIM ART M-THRMBC 1ST VSL: CPT

## 2017-08-21 PROCEDURE — C1769 GUIDE WIRE: HCPCS

## 2017-08-21 PROCEDURE — 99153 MOD SED SAME PHYS/QHP EA: CPT

## 2017-08-21 PROCEDURE — C1874 STENT, COATED/COV W/DEL SYS: HCPCS

## 2017-08-21 PROCEDURE — 80048 BASIC METABOLIC PNL TOTAL CA: CPT | Performed by: NURSE PRACTITIONER

## 2017-08-21 PROCEDURE — 3E05317 INTRODUCTION OF OTHER THROMBOLYTIC INTO PERIPHERAL ARTERY, PERCUTANEOUS APPROACH: ICD-10-PCS | Performed by: RADIOLOGY

## 2017-08-21 PROCEDURE — 85730 THROMBOPLASTIN TIME PARTIAL: CPT | Performed by: RADIOLOGY

## 2017-08-21 PROCEDURE — 85730 THROMBOPLASTIN TIME PARTIAL: CPT | Performed by: PODIATRIST

## 2017-08-21 PROCEDURE — 85730 THROMBOPLASTIN TIME PARTIAL: CPT | Performed by: EMERGENCY MEDICINE

## 2017-08-21 RX ORDER — HYDROMORPHONE HYDROCHLORIDE 4 MG/ML
INJECTION, SOLUTION INTRAMUSCULAR; INTRAVENOUS; SUBCUTANEOUS CODE/TRAUMA/SEDATION MEDICATION
Status: COMPLETED | OUTPATIENT
Start: 2017-08-21 | End: 2017-08-21

## 2017-08-21 RX ORDER — HEPARIN SODIUM 10000 [USP'U]/100ML
3-30 INJECTION, SOLUTION INTRAVENOUS
Status: DISCONTINUED | OUTPATIENT
Start: 2017-08-21 | End: 2017-08-21

## 2017-08-21 RX ORDER — HEPARIN SODIUM 1000 [USP'U]/ML
6800 INJECTION, SOLUTION INTRAVENOUS; SUBCUTANEOUS ONCE
Status: COMPLETED | OUTPATIENT
Start: 2017-08-21 | End: 2017-08-21

## 2017-08-21 RX ORDER — MIDAZOLAM HYDROCHLORIDE 1 MG/ML
INJECTION INTRAMUSCULAR; INTRAVENOUS CODE/TRAUMA/SEDATION MEDICATION
Status: COMPLETED | OUTPATIENT
Start: 2017-08-21 | End: 2017-08-21

## 2017-08-21 RX ORDER — HEPARIN SODIUM 1000 [USP'U]/ML
6800 INJECTION, SOLUTION INTRAVENOUS; SUBCUTANEOUS AS NEEDED
Status: DISCONTINUED | OUTPATIENT
Start: 2017-08-21 | End: 2017-08-21

## 2017-08-21 RX ORDER — HEPARIN SODIUM 1000 [USP'U]/ML
3400 INJECTION, SOLUTION INTRAVENOUS; SUBCUTANEOUS AS NEEDED
Status: DISCONTINUED | OUTPATIENT
Start: 2017-08-21 | End: 2017-08-21

## 2017-08-21 RX ORDER — LORAZEPAM 2 MG/ML
1 INJECTION INTRAMUSCULAR EVERY 4 HOURS PRN
Status: DISCONTINUED | OUTPATIENT
Start: 2017-08-21 | End: 2017-08-25

## 2017-08-21 RX ORDER — LORAZEPAM 2 MG/ML
INJECTION INTRAMUSCULAR
Status: COMPLETED
Start: 2017-08-21 | End: 2017-08-21

## 2017-08-21 RX ORDER — FENTANYL CITRATE 50 UG/ML
INJECTION, SOLUTION INTRAMUSCULAR; INTRAVENOUS CODE/TRAUMA/SEDATION MEDICATION
Status: COMPLETED | OUTPATIENT
Start: 2017-08-21 | End: 2017-08-21

## 2017-08-21 RX ORDER — HEPARIN SODIUM 10000 [USP'U]/100ML
400 INJECTION, SOLUTION INTRAVENOUS
Status: DISCONTINUED | OUTPATIENT
Start: 2017-08-21 | End: 2017-08-21

## 2017-08-21 RX ORDER — SODIUM CHLORIDE 9 MG/ML
100 INJECTION, SOLUTION INTRAVENOUS CONTINUOUS
Status: DISCONTINUED | OUTPATIENT
Start: 2017-08-21 | End: 2017-08-25

## 2017-08-21 RX ORDER — ARGATROBAN 1 MG/ML
2 INJECTION, SOLUTION INTRAVENOUS CONTINUOUS
Status: DISCONTINUED | OUTPATIENT
Start: 2017-08-21 | End: 2017-08-29

## 2017-08-21 RX ORDER — HEPARIN SODIUM 1000 [USP'U]/ML
INJECTION, SOLUTION INTRAVENOUS; SUBCUTANEOUS CODE/TRAUMA/SEDATION MEDICATION
Status: COMPLETED | OUTPATIENT
Start: 2017-08-21 | End: 2017-08-21

## 2017-08-21 RX ADMIN — HYDROMORPHONE HYDROCHLORIDE 1 MG: 1 INJECTION, SOLUTION INTRAMUSCULAR; INTRAVENOUS; SUBCUTANEOUS at 20:15

## 2017-08-21 RX ADMIN — FENTANYL CITRATE 50 MCG: 50 INJECTION INTRAMUSCULAR; INTRAVENOUS at 10:54

## 2017-08-21 RX ADMIN — HYDROMORPHONE HYDROCHLORIDE 1 MG: 4 INJECTION, SOLUTION INTRAMUSCULAR; INTRAVENOUS; SUBCUTANEOUS at 12:41

## 2017-08-21 RX ADMIN — HYDROMORPHONE HYDROCHLORIDE 1 MG: 4 INJECTION, SOLUTION INTRAMUSCULAR; INTRAVENOUS; SUBCUTANEOUS at 10:14

## 2017-08-21 RX ADMIN — HYDROMORPHONE HYDROCHLORIDE 1 MG: 4 INJECTION, SOLUTION INTRAMUSCULAR; INTRAVENOUS; SUBCUTANEOUS at 10:02

## 2017-08-21 RX ADMIN — FENTANYL CITRATE 50 MCG: 50 INJECTION INTRAMUSCULAR; INTRAVENOUS at 10:08

## 2017-08-21 RX ADMIN — INSULIN LISPRO 1 UNITS: 100 INJECTION, SOLUTION INTRAVENOUS; SUBCUTANEOUS at 21:07

## 2017-08-21 RX ADMIN — QUETIAPINE FUMARATE 800 MG: 200 TABLET, FILM COATED ORAL at 22:40

## 2017-08-21 RX ADMIN — SODIUM CHLORIDE 100 ML/HR: 0.9 INJECTION, SOLUTION INTRAVENOUS at 22:31

## 2017-08-21 RX ADMIN — IODIXANOL 44 ML: 320 INJECTION, SOLUTION INTRAVASCULAR at 20:22

## 2017-08-21 RX ADMIN — HEPARIN SODIUM 28 UNITS/KG/HR: 10000 INJECTION, SOLUTION INTRAVENOUS at 08:03

## 2017-08-21 RX ADMIN — HYDROMORPHONE HYDROCHLORIDE 1 MG: 4 INJECTION, SOLUTION INTRAMUSCULAR; INTRAVENOUS; SUBCUTANEOUS at 19:34

## 2017-08-21 RX ADMIN — HYDROMORPHONE HYDROCHLORIDE 1 MG: 4 INJECTION, SOLUTION INTRAMUSCULAR; INTRAVENOUS; SUBCUTANEOUS at 19:21

## 2017-08-21 RX ADMIN — LORAZEPAM 1 MG: 2 INJECTION INTRAMUSCULAR; INTRAVENOUS at 21:15

## 2017-08-21 RX ADMIN — CIPROFLOXACIN HYDROCHLORIDE 750 MG: 750 TABLET, FILM COATED ORAL at 20:18

## 2017-08-21 RX ADMIN — FENTANYL CITRATE 50 MCG: 50 INJECTION INTRAMUSCULAR; INTRAVENOUS at 12:15

## 2017-08-21 RX ADMIN — HYDROMORPHONE HYDROCHLORIDE 1 MG: 4 INJECTION, SOLUTION INTRAMUSCULAR; INTRAVENOUS; SUBCUTANEOUS at 13:24

## 2017-08-21 RX ADMIN — IODIXANOL 36 ML: 320 INJECTION, SOLUTION INTRAVASCULAR at 20:20

## 2017-08-21 RX ADMIN — HEPARIN SODIUM 400 UNITS/HR: 10000 INJECTION, SOLUTION INTRAVENOUS at 12:38

## 2017-08-21 RX ADMIN — HEPARIN SODIUM AND DEXTROSE 22 UNITS/KG/HR: 10000; 5 INJECTION INTRAVENOUS at 20:44

## 2017-08-21 RX ADMIN — HEPARIN SODIUM 6800 UNITS: 1000 INJECTION, SOLUTION INTRAVENOUS; SUBCUTANEOUS at 20:47

## 2017-08-21 RX ADMIN — CIPROFLOXACIN HYDROCHLORIDE 750 MG: 750 TABLET, FILM COATED ORAL at 06:14

## 2017-08-21 RX ADMIN — HYDROMORPHONE HYDROCHLORIDE 1 MG: 1 INJECTION, SOLUTION INTRAMUSCULAR; INTRAVENOUS; SUBCUTANEOUS at 16:51

## 2017-08-21 RX ADMIN — ALTEPLASE 1 MG/HR: 2.2 INJECTION, POWDER, LYOPHILIZED, FOR SOLUTION INTRAVENOUS at 12:28

## 2017-08-21 RX ADMIN — POTASSIUM CHLORIDE 20 MEQ: 1500 TABLET, EXTENDED RELEASE ORAL at 16:16

## 2017-08-21 RX ADMIN — GABAPENTIN 200 MG: 100 CAPSULE ORAL at 20:55

## 2017-08-21 RX ADMIN — HYDROMORPHONE HYDROCHLORIDE 1 MG: 4 INJECTION, SOLUTION INTRAMUSCULAR; INTRAVENOUS; SUBCUTANEOUS at 15:11

## 2017-08-21 RX ADMIN — FENTANYL CITRATE 50 MCG: 50 INJECTION INTRAMUSCULAR; INTRAVENOUS at 11:15

## 2017-08-21 RX ADMIN — METOPROLOL SUCCINATE 25 MG: 25 TABLET, EXTENDED RELEASE ORAL at 16:16

## 2017-08-21 RX ADMIN — VANCOMYCIN HYDROCHLORIDE 1250 MG: 1 INJECTION, POWDER, LYOPHILIZED, FOR SOLUTION INTRAVENOUS at 23:56

## 2017-08-21 RX ADMIN — FENTANYL CITRATE 50 MCG: 50 INJECTION, SOLUTION INTRAMUSCULAR; INTRAVENOUS at 19:32

## 2017-08-21 RX ADMIN — PANTOPRAZOLE SODIUM 40 MG: 40 TABLET, DELAYED RELEASE ORAL at 05:30

## 2017-08-21 RX ADMIN — HYDROMORPHONE HYDROCHLORIDE 1 MG: 1 INJECTION, SOLUTION INTRAMUSCULAR; INTRAVENOUS; SUBCUTANEOUS at 05:32

## 2017-08-21 RX ADMIN — MIDAZOLAM 1 MG: 1 INJECTION INTRAMUSCULAR; INTRAVENOUS at 19:31

## 2017-08-21 RX ADMIN — NICOTINE 1 PATCH: 14 PATCH, EXTENDED RELEASE TRANSDERMAL at 16:16

## 2017-08-21 RX ADMIN — ARGATROBAN 2 MCG/KG/MIN: 1 INJECTION INTRAVENOUS at 21:40

## 2017-08-21 RX ADMIN — OXYCODONE HYDROCHLORIDE 10 MG: 10 TABLET ORAL at 08:00

## 2017-08-21 RX ADMIN — MIDAZOLAM 1 MG: 1 INJECTION INTRAMUSCULAR; INTRAVENOUS at 10:54

## 2017-08-21 RX ADMIN — TORSEMIDE 20 MG: 20 TABLET ORAL at 16:17

## 2017-08-21 RX ADMIN — HEPARIN SODIUM 2000 UNITS: 1000 INJECTION INTRAVENOUS; SUBCUTANEOUS at 10:34

## 2017-08-21 RX ADMIN — VANCOMYCIN HYDROCHLORIDE 1250 MG: 1 INJECTION, POWDER, LYOPHILIZED, FOR SOLUTION INTRAVENOUS at 11:23

## 2017-08-21 RX ADMIN — GABAPENTIN 200 MG: 100 CAPSULE ORAL at 16:16

## 2017-08-21 RX ADMIN — MIDAZOLAM 1 MG: 1 INJECTION INTRAMUSCULAR; INTRAVENOUS at 10:08

## 2017-08-21 RX ADMIN — MIDAZOLAM 1 MG: 1 INJECTION INTRAMUSCULAR; INTRAVENOUS at 11:15

## 2017-08-21 RX ADMIN — HYDROMORPHONE HYDROCHLORIDE 1 MG: 1 INJECTION, SOLUTION INTRAMUSCULAR; INTRAVENOUS; SUBCUTANEOUS at 18:36

## 2017-08-21 RX ADMIN — SODIUM CHLORIDE 100 ML/HR: 0.9 INJECTION, SOLUTION INTRAVENOUS at 08:49

## 2017-08-22 LAB
ANION GAP SERPL CALCULATED.3IONS-SCNC: 10 MMOL/L (ref 4–13)
APTT PPP: 53 SECONDS (ref 23–35)
APTT PPP: 70 SECONDS (ref 23–35)
APTT PPP: 73 SECONDS (ref 23–35)
APTT PPP: 75 SECONDS (ref 23–35)
BLOOD GROUP ANTIBODIES SERPL: NORMAL
BUN SERPL-MCNC: 12 MG/DL (ref 5–25)
CALCIUM SERPL-MCNC: 8.9 MG/DL (ref 8.3–10.1)
CHLORIDE SERPL-SCNC: 106 MMOL/L (ref 100–108)
CO2 SERPL-SCNC: 24 MMOL/L (ref 21–32)
CREAT SERPL-MCNC: 0.96 MG/DL (ref 0.6–1.3)
ERYTHROCYTE [DISTWIDTH] IN BLOOD BY AUTOMATED COUNT: 16 % (ref 11.6–15.1)
FIBRINOGEN PPP-MCNC: 754 MG/DL (ref 227–495)
GFR SERPL CREATININE-BSD FRML MDRD: 87 ML/MIN/1.73SQ M
GLUCOSE SERPL-MCNC: 140 MG/DL (ref 65–140)
GLUCOSE SERPL-MCNC: 142 MG/DL (ref 65–140)
GLUCOSE SERPL-MCNC: 180 MG/DL (ref 65–140)
GLUCOSE SERPL-MCNC: 181 MG/DL (ref 65–140)
GLUCOSE SERPL-MCNC: 185 MG/DL (ref 65–140)
GLUCOSE SERPL-MCNC: 213 MG/DL (ref 65–140)
HCT VFR BLD AUTO: 23.2 % (ref 36.5–49.3)
HGB BLD-MCNC: 7.4 G/DL (ref 12–17)
INR PPP: 1.74 (ref 0.86–1.16)
MCH RBC QN AUTO: 26.1 PG (ref 26.8–34.3)
MCHC RBC AUTO-ENTMCNC: 31.9 G/DL (ref 31.4–37.4)
MCV RBC AUTO: 82 FL (ref 82–98)
PF4 HEPARIN CMPLX AB SER QL: NEGATIVE
PLATELET # BLD AUTO: 288 THOUSANDS/UL (ref 149–390)
PMV BLD AUTO: 10.8 FL (ref 8.9–12.7)
POTASSIUM SERPL-SCNC: 3.7 MMOL/L (ref 3.5–5.3)
PROTHROMBIN TIME: 20.5 SECONDS (ref 12.1–14.4)
RBC # BLD AUTO: 2.83 MILLION/UL (ref 3.88–5.62)
SODIUM SERPL-SCNC: 140 MMOL/L (ref 136–145)
WBC # BLD AUTO: 10.1 THOUSAND/UL (ref 4.31–10.16)

## 2017-08-22 PROCEDURE — 85730 THROMBOPLASTIN TIME PARTIAL: CPT | Performed by: PHYSICIAN ASSISTANT

## 2017-08-22 PROCEDURE — 85610 PROTHROMBIN TIME: CPT | Performed by: PHYSICIAN ASSISTANT

## 2017-08-22 PROCEDURE — 93005 ELECTROCARDIOGRAM TRACING: CPT

## 2017-08-22 PROCEDURE — 85730 THROMBOPLASTIN TIME PARTIAL: CPT | Performed by: EMERGENCY MEDICINE

## 2017-08-22 PROCEDURE — 80048 BASIC METABOLIC PNL TOTAL CA: CPT | Performed by: PODIATRIST

## 2017-08-22 PROCEDURE — 85384 FIBRINOGEN ACTIVITY: CPT | Performed by: PHYSICIAN ASSISTANT

## 2017-08-22 PROCEDURE — 85730 THROMBOPLASTIN TIME PARTIAL: CPT | Performed by: PODIATRIST

## 2017-08-22 PROCEDURE — 82948 REAGENT STRIP/BLOOD GLUCOSE: CPT

## 2017-08-22 PROCEDURE — 86022 PLATELET ANTIBODIES: CPT | Performed by: PHYSICIAN ASSISTANT

## 2017-08-22 PROCEDURE — 85027 COMPLETE CBC AUTOMATED: CPT | Performed by: PODIATRIST

## 2017-08-22 PROCEDURE — 86147 CARDIOLIPIN ANTIBODY EA IG: CPT | Performed by: PHYSICIAN ASSISTANT

## 2017-08-22 RX ORDER — POLYETHYLENE GLYCOL 3350 17 G/17G
17 POWDER, FOR SOLUTION ORAL DAILY
Status: DISCONTINUED | OUTPATIENT
Start: 2017-08-22 | End: 2017-08-31

## 2017-08-22 RX ORDER — GLYCOPYRROLATE 0.2 MG/ML
0.2 INJECTION INTRAMUSCULAR; INTRAVENOUS EVERY 4 HOURS PRN
Status: DISCONTINUED | OUTPATIENT
Start: 2017-08-22 | End: 2017-08-25

## 2017-08-22 RX ORDER — DOXYCYCLINE HYCLATE 50 MG/1
324 CAPSULE, GELATIN COATED ORAL 2 TIMES DAILY
Status: DISCONTINUED | OUTPATIENT
Start: 2017-08-22 | End: 2017-09-07 | Stop reason: HOSPADM

## 2017-08-22 RX ORDER — POTASSIUM CHLORIDE 20 MEQ/1
40 TABLET, EXTENDED RELEASE ORAL ONCE
Status: COMPLETED | OUTPATIENT
Start: 2017-08-22 | End: 2017-08-22

## 2017-08-22 RX ORDER — AMOXICILLIN 250 MG
1 CAPSULE ORAL
Status: DISCONTINUED | OUTPATIENT
Start: 2017-08-22 | End: 2017-09-07 | Stop reason: HOSPADM

## 2017-08-22 RX ORDER — GABAPENTIN 400 MG/1
400 CAPSULE ORAL 3 TIMES DAILY
Status: DISCONTINUED | OUTPATIENT
Start: 2017-08-22 | End: 2017-08-27

## 2017-08-22 RX ORDER — ASCORBIC ACID 500 MG
500 TABLET ORAL 2 TIMES DAILY
Status: DISCONTINUED | OUTPATIENT
Start: 2017-08-22 | End: 2017-09-07 | Stop reason: HOSPADM

## 2017-08-22 RX ORDER — BISACODYL 10 MG
10 SUPPOSITORY, RECTAL RECTAL DAILY PRN
Status: DISCONTINUED | OUTPATIENT
Start: 2017-08-22 | End: 2017-09-07 | Stop reason: HOSPADM

## 2017-08-22 RX ORDER — NITROGLYCERIN 0.4 MG/1
0.4 TABLET SUBLINGUAL
Status: COMPLETED | OUTPATIENT
Start: 2017-08-22 | End: 2017-08-31

## 2017-08-22 RX ORDER — HALOPERIDOL 5 MG/ML
2 INJECTION INTRAMUSCULAR
Status: DISCONTINUED | OUTPATIENT
Start: 2017-08-22 | End: 2017-08-28

## 2017-08-22 RX ADMIN — PANTOPRAZOLE SODIUM 40 MG: 40 TABLET, DELAYED RELEASE ORAL at 06:36

## 2017-08-22 RX ADMIN — INSULIN GLARGINE 21 UNITS: 100 INJECTION, SOLUTION SUBCUTANEOUS at 22:11

## 2017-08-22 RX ADMIN — SODIUM CHLORIDE 100 ML/HR: 0.9 INJECTION, SOLUTION INTRAVENOUS at 19:20

## 2017-08-22 RX ADMIN — INSULIN LISPRO 3 UNITS: 100 INJECTION, SOLUTION INTRAVENOUS; SUBCUTANEOUS at 17:12

## 2017-08-22 RX ADMIN — ARGATROBAN 1 MCG/KG/MIN: 1 INJECTION INTRAVENOUS at 23:46

## 2017-08-22 RX ADMIN — INSULIN LISPRO 3 UNITS: 100 INJECTION, SOLUTION INTRAVENOUS; SUBCUTANEOUS at 09:05

## 2017-08-22 RX ADMIN — LORAZEPAM 1 MG: 2 INJECTION INTRAMUSCULAR; INTRAVENOUS at 23:08

## 2017-08-22 RX ADMIN — POTASSIUM CHLORIDE 20 MEQ: 1500 TABLET, EXTENDED RELEASE ORAL at 09:03

## 2017-08-22 RX ADMIN — ONDANSETRON 4 MG: 2 INJECTION INTRAMUSCULAR; INTRAVENOUS at 09:06

## 2017-08-22 RX ADMIN — GABAPENTIN 400 MG: 400 CAPSULE ORAL at 17:10

## 2017-08-22 RX ADMIN — ONDANSETRON 4 MG: 2 INJECTION INTRAMUSCULAR; INTRAVENOUS at 18:42

## 2017-08-22 RX ADMIN — LORAZEPAM 1 MG: 2 INJECTION INTRAMUSCULAR; INTRAVENOUS at 13:37

## 2017-08-22 RX ADMIN — CIPROFLOXACIN HYDROCHLORIDE 750 MG: 750 TABLET, FILM COATED ORAL at 09:04

## 2017-08-22 RX ADMIN — TORSEMIDE 20 MG: 20 TABLET ORAL at 09:03

## 2017-08-22 RX ADMIN — OXYCODONE HYDROCHLORIDE 10 MG: 10 TABLET ORAL at 18:10

## 2017-08-22 RX ADMIN — ARGATROBAN 1 MCG/KG/MIN: 1 INJECTION INTRAVENOUS at 13:37

## 2017-08-22 RX ADMIN — QUETIAPINE FUMARATE 800 MG: 200 TABLET, FILM COATED ORAL at 22:11

## 2017-08-22 RX ADMIN — LORAZEPAM 1 MG: 2 INJECTION INTRAMUSCULAR; INTRAVENOUS at 09:03

## 2017-08-22 RX ADMIN — Medication 0.1 MG/KG/HR: at 13:39

## 2017-08-22 RX ADMIN — NICOTINE 1 PATCH: 14 PATCH, EXTENDED RELEASE TRANSDERMAL at 09:06

## 2017-08-22 RX ADMIN — HYDROMORPHONE HYDROCHLORIDE 1 MG: 1 INJECTION, SOLUTION INTRAMUSCULAR; INTRAVENOUS; SUBCUTANEOUS at 17:21

## 2017-08-22 RX ADMIN — GABAPENTIN 400 MG: 400 CAPSULE ORAL at 09:05

## 2017-08-22 RX ADMIN — OXYCODONE HYDROCHLORIDE AND ACETAMINOPHEN 500 MG: 500 TABLET ORAL at 09:03

## 2017-08-22 RX ADMIN — ARGATROBAN 1 MCG/KG/MIN: 1 INJECTION INTRAVENOUS at 04:31

## 2017-08-22 RX ADMIN — HYDROMORPHONE HYDROCHLORIDE 1 MG: 1 INJECTION, SOLUTION INTRAMUSCULAR; INTRAVENOUS; SUBCUTANEOUS at 13:18

## 2017-08-22 RX ADMIN — INSULIN LISPRO 1 UNITS: 100 INJECTION, SOLUTION INTRAVENOUS; SUBCUTANEOUS at 09:05

## 2017-08-22 RX ADMIN — HYDROMORPHONE HYDROCHLORIDE 1 MG: 1 INJECTION, SOLUTION INTRAMUSCULAR; INTRAVENOUS; SUBCUTANEOUS at 22:02

## 2017-08-22 RX ADMIN — INSULIN LISPRO 2 UNITS: 100 INJECTION, SOLUTION INTRAVENOUS; SUBCUTANEOUS at 17:12

## 2017-08-22 RX ADMIN — HYDROMORPHONE HYDROCHLORIDE 1 MG: 1 INJECTION, SOLUTION INTRAMUSCULAR; INTRAVENOUS; SUBCUTANEOUS at 09:49

## 2017-08-22 RX ADMIN — OXYCODONE HYDROCHLORIDE AND ACETAMINOPHEN 500 MG: 500 TABLET ORAL at 17:10

## 2017-08-22 RX ADMIN — POLYETHYLENE GLYCOL 3350 17 G: 17 POWDER, FOR SOLUTION ORAL at 18:04

## 2017-08-22 RX ADMIN — FERROUS GLUCONATE 324 MG: 324 TABLET ORAL at 17:14

## 2017-08-22 RX ADMIN — INSULIN LISPRO 3 UNITS: 100 INJECTION, SOLUTION INTRAVENOUS; SUBCUTANEOUS at 12:07

## 2017-08-22 RX ADMIN — HYDROMORPHONE HYDROCHLORIDE 1 MG: 1 INJECTION, SOLUTION INTRAMUSCULAR; INTRAVENOUS; SUBCUTANEOUS at 12:54

## 2017-08-22 RX ADMIN — GABAPENTIN 400 MG: 400 CAPSULE ORAL at 22:10

## 2017-08-22 RX ADMIN — NITROGLYCERIN 0.4 MG: 0.4 TABLET SUBLINGUAL at 22:54

## 2017-08-22 RX ADMIN — METOPROLOL SUCCINATE 25 MG: 25 TABLET, EXTENDED RELEASE ORAL at 09:06

## 2017-08-22 RX ADMIN — Medication 1 TABLET: at 22:10

## 2017-08-22 RX ADMIN — FERROUS GLUCONATE 324 MG: 324 TABLET ORAL at 10:49

## 2017-08-22 RX ADMIN — HYDROMORPHONE HYDROCHLORIDE 1 MG: 1 INJECTION, SOLUTION INTRAMUSCULAR; INTRAVENOUS; SUBCUTANEOUS at 05:49

## 2017-08-22 RX ADMIN — NITROGLYCERIN 0.4 MG: 0.4 TABLET SUBLINGUAL at 23:08

## 2017-08-22 RX ADMIN — INSULIN LISPRO 1 UNITS: 100 INJECTION, SOLUTION INTRAVENOUS; SUBCUTANEOUS at 22:13

## 2017-08-22 RX ADMIN — POTASSIUM CHLORIDE 40 MEQ: 1500 TABLET, EXTENDED RELEASE ORAL at 12:05

## 2017-08-22 RX ADMIN — SODIUM CHLORIDE 100 ML/HR: 0.9 INJECTION, SOLUTION INTRAVENOUS at 08:54

## 2017-08-23 LAB
ABO GROUP BLD: NORMAL
ANION GAP SERPL CALCULATED.3IONS-SCNC: 8 MMOL/L (ref 4–13)
APTT PPP: 134 SECONDS (ref 23–35)
APTT PPP: 52 SECONDS (ref 23–35)
APTT PPP: 76 SECONDS (ref 23–35)
APTT PPP: 81 SECONDS (ref 23–35)
APTT PPP: 86 SECONDS (ref 23–35)
ATRIAL RATE: 76 BPM
BLD GP AB SCN SERPL QL: POSITIVE
BUN SERPL-MCNC: 10 MG/DL (ref 5–25)
CALCIUM SERPL-MCNC: 8.7 MG/DL (ref 8.3–10.1)
CARDIOLIPIN IGA SER IA-ACNC: 23 APL U/ML (ref 0–11)
CARDIOLIPIN IGG SER IA-ACNC: <9 GPL U/ML (ref 0–14)
CARDIOLIPIN IGM SER IA-ACNC: 10 MPL U/ML (ref 0–12)
CHLORIDE SERPL-SCNC: 105 MMOL/L (ref 100–108)
CO2 SERPL-SCNC: 24 MMOL/L (ref 21–32)
CREAT SERPL-MCNC: 0.94 MG/DL (ref 0.6–1.3)
ERYTHROCYTE [DISTWIDTH] IN BLOOD BY AUTOMATED COUNT: 15.8 % (ref 11.6–15.1)
GFR SERPL CREATININE-BSD FRML MDRD: 89 ML/MIN/1.73SQ M
GLUCOSE SERPL-MCNC: 151 MG/DL (ref 65–140)
GLUCOSE SERPL-MCNC: 163 MG/DL (ref 65–140)
GLUCOSE SERPL-MCNC: 164 MG/DL (ref 65–140)
GLUCOSE SERPL-MCNC: 176 MG/DL (ref 65–140)
GLUCOSE SERPL-MCNC: 282 MG/DL (ref 65–140)
HCT VFR BLD AUTO: 22.9 % (ref 36.5–49.3)
HGB BLD-MCNC: 7.3 G/DL (ref 12–17)
MCH RBC QN AUTO: 26 PG (ref 26.8–34.3)
MCHC RBC AUTO-ENTMCNC: 31.9 G/DL (ref 31.4–37.4)
MCV RBC AUTO: 82 FL (ref 82–98)
P AXIS: 82 DEGREES
PF4 HEPARIN CMPLX AB SER-ACNC: 0.33 OD (ref 0–0.4)
PLATELET # BLD AUTO: 271 THOUSANDS/UL (ref 149–390)
PMV BLD AUTO: 10.6 FL (ref 8.9–12.7)
POTASSIUM SERPL-SCNC: 3.9 MMOL/L (ref 3.5–5.3)
PR INTERVAL: 190 MS
QRS AXIS: 73 DEGREES
QRSD INTERVAL: 120 MS
QT INTERVAL: 424 MS
QTC INTERVAL: 477 MS
RBC # BLD AUTO: 2.81 MILLION/UL (ref 3.88–5.62)
RH BLD: POSITIVE
SODIUM SERPL-SCNC: 137 MMOL/L (ref 136–145)
SPECIMEN EXPIRATION DATE: NORMAL
T WAVE AXIS: 150 DEGREES
VENTRICULAR RATE: 76 BPM
WBC # BLD AUTO: 12.02 THOUSAND/UL (ref 4.31–10.16)

## 2017-08-23 PROCEDURE — 80048 BASIC METABOLIC PNL TOTAL CA: CPT | Performed by: PODIATRIST

## 2017-08-23 PROCEDURE — 86850 RBC ANTIBODY SCREEN: CPT | Performed by: PODIATRIST

## 2017-08-23 PROCEDURE — 85730 THROMBOPLASTIN TIME PARTIAL: CPT | Performed by: SURGERY

## 2017-08-23 PROCEDURE — 86921 COMPATIBILITY TEST INCUBATE: CPT

## 2017-08-23 PROCEDURE — 85730 THROMBOPLASTIN TIME PARTIAL: CPT | Performed by: PODIATRIST

## 2017-08-23 PROCEDURE — 86922 COMPATIBILITY TEST ANTIGLOB: CPT

## 2017-08-23 PROCEDURE — 85027 COMPLETE CBC AUTOMATED: CPT | Performed by: PODIATRIST

## 2017-08-23 PROCEDURE — 86900 BLOOD TYPING SEROLOGIC ABO: CPT | Performed by: PODIATRIST

## 2017-08-23 PROCEDURE — 82948 REAGENT STRIP/BLOOD GLUCOSE: CPT

## 2017-08-23 PROCEDURE — 86901 BLOOD TYPING SEROLOGIC RH(D): CPT | Performed by: PODIATRIST

## 2017-08-23 RX ORDER — SODIUM CHLORIDE 9 MG/ML
100 INJECTION, SOLUTION INTRAVENOUS CONTINUOUS
Status: DISCONTINUED | OUTPATIENT
Start: 2017-08-24 | End: 2017-08-25

## 2017-08-23 RX ORDER — ATORVASTATIN CALCIUM 10 MG/1
10 TABLET, FILM COATED ORAL
Status: DISCONTINUED | OUTPATIENT
Start: 2017-08-23 | End: 2017-09-07 | Stop reason: HOSPADM

## 2017-08-23 RX ADMIN — SODIUM CHLORIDE 100 ML/HR: 0.9 INJECTION, SOLUTION INTRAVENOUS at 15:59

## 2017-08-23 RX ADMIN — OXYCODONE HYDROCHLORIDE AND ACETAMINOPHEN 500 MG: 500 TABLET ORAL at 08:05

## 2017-08-23 RX ADMIN — OXYCODONE HYDROCHLORIDE AND ACETAMINOPHEN 500 MG: 500 TABLET ORAL at 17:00

## 2017-08-23 RX ADMIN — HYDROMORPHONE HYDROCHLORIDE 1 MG: 1 INJECTION, SOLUTION INTRAMUSCULAR; INTRAVENOUS; SUBCUTANEOUS at 16:48

## 2017-08-23 RX ADMIN — GABAPENTIN 400 MG: 400 CAPSULE ORAL at 21:47

## 2017-08-23 RX ADMIN — ATORVASTATIN CALCIUM 10 MG: 20 TABLET, FILM COATED ORAL at 16:47

## 2017-08-23 RX ADMIN — OXYCODONE HYDROCHLORIDE 10 MG: 10 TABLET ORAL at 13:16

## 2017-08-23 RX ADMIN — METOPROLOL SUCCINATE 25 MG: 25 TABLET, EXTENDED RELEASE ORAL at 08:04

## 2017-08-23 RX ADMIN — HYDROMORPHONE HYDROCHLORIDE 1 MG: 1 INJECTION, SOLUTION INTRAMUSCULAR; INTRAVENOUS; SUBCUTANEOUS at 10:34

## 2017-08-23 RX ADMIN — HYDROMORPHONE HYDROCHLORIDE 1 MG: 1 INJECTION, SOLUTION INTRAMUSCULAR; INTRAVENOUS; SUBCUTANEOUS at 19:43

## 2017-08-23 RX ADMIN — HYDROMORPHONE HYDROCHLORIDE 1 MG: 1 INJECTION, SOLUTION INTRAMUSCULAR; INTRAVENOUS; SUBCUTANEOUS at 12:50

## 2017-08-23 RX ADMIN — INSULIN LISPRO 1 UNITS: 100 INJECTION, SOLUTION INTRAVENOUS; SUBCUTANEOUS at 07:55

## 2017-08-23 RX ADMIN — POTASSIUM CHLORIDE 20 MEQ: 1500 TABLET, EXTENDED RELEASE ORAL at 08:06

## 2017-08-23 RX ADMIN — PANTOPRAZOLE SODIUM 40 MG: 40 TABLET, DELAYED RELEASE ORAL at 05:53

## 2017-08-23 RX ADMIN — FERROUS GLUCONATE 324 MG: 324 TABLET ORAL at 17:00

## 2017-08-23 RX ADMIN — ACETAMINOPHEN 650 MG: 325 TABLET, FILM COATED ORAL at 23:05

## 2017-08-23 RX ADMIN — LORAZEPAM 1 MG: 2 INJECTION INTRAMUSCULAR; INTRAVENOUS at 13:31

## 2017-08-23 RX ADMIN — Medication 1 TABLET: at 21:47

## 2017-08-23 RX ADMIN — ARGATROBAN 1 MCG/KG/MIN: 1 INJECTION INTRAVENOUS at 16:57

## 2017-08-23 RX ADMIN — INSULIN LISPRO 3 UNITS: 100 INJECTION, SOLUTION INTRAVENOUS; SUBCUTANEOUS at 12:03

## 2017-08-23 RX ADMIN — NICOTINE 1 PATCH: 14 PATCH, EXTENDED RELEASE TRANSDERMAL at 08:07

## 2017-08-23 RX ADMIN — SODIUM CHLORIDE 100 ML/HR: 0.9 INJECTION, SOLUTION INTRAVENOUS at 05:39

## 2017-08-23 RX ADMIN — HALOPERIDOL LACTATE 2 MG: 5 INJECTION, SOLUTION INTRAMUSCULAR at 14:32

## 2017-08-23 RX ADMIN — INSULIN LISPRO 3 UNITS: 100 INJECTION, SOLUTION INTRAVENOUS; SUBCUTANEOUS at 16:47

## 2017-08-23 RX ADMIN — QUETIAPINE FUMARATE 400 MG: 200 TABLET, FILM COATED ORAL at 21:48

## 2017-08-23 RX ADMIN — INSULIN LISPRO 3 UNITS: 100 INJECTION, SOLUTION INTRAVENOUS; SUBCUTANEOUS at 07:56

## 2017-08-23 RX ADMIN — Medication 0.1 MG/KG/HR: at 17:49

## 2017-08-23 RX ADMIN — INSULIN LISPRO 3 UNITS: 100 INJECTION, SOLUTION INTRAVENOUS; SUBCUTANEOUS at 16:48

## 2017-08-23 RX ADMIN — GABAPENTIN 400 MG: 400 CAPSULE ORAL at 08:04

## 2017-08-23 RX ADMIN — TORSEMIDE 20 MG: 20 TABLET ORAL at 08:04

## 2017-08-23 RX ADMIN — FERROUS GLUCONATE 324 MG: 324 TABLET ORAL at 08:06

## 2017-08-23 RX ADMIN — INSULIN LISPRO 1 UNITS: 100 INJECTION, SOLUTION INTRAVENOUS; SUBCUTANEOUS at 12:03

## 2017-08-23 RX ADMIN — INSULIN GLARGINE 10 UNITS: 100 INJECTION, SOLUTION SUBCUTANEOUS at 21:47

## 2017-08-23 RX ADMIN — GABAPENTIN 400 MG: 400 CAPSULE ORAL at 16:47

## 2017-08-23 RX ADMIN — INSULIN LISPRO 1 UNITS: 100 INJECTION, SOLUTION INTRAVENOUS; SUBCUTANEOUS at 21:50

## 2017-08-24 ENCOUNTER — ANESTHESIA EVENT (INPATIENT)
Dept: PERIOP | Facility: HOSPITAL | Age: 57
DRG: 711 | End: 2017-08-24
Payer: COMMERCIAL

## 2017-08-24 ENCOUNTER — GENERIC CONVERSION - ENCOUNTER (OUTPATIENT)
Dept: OTHER | Facility: OTHER | Age: 57
End: 2017-08-24

## 2017-08-24 ENCOUNTER — ANESTHESIA (INPATIENT)
Dept: PERIOP | Facility: HOSPITAL | Age: 57
DRG: 711 | End: 2017-08-24
Payer: COMMERCIAL

## 2017-08-24 ENCOUNTER — APPOINTMENT (INPATIENT)
Dept: NON INVASIVE DIAGNOSTICS | Facility: HOSPITAL | Age: 57
DRG: 711 | End: 2017-08-24
Payer: COMMERCIAL

## 2017-08-24 LAB
ANION GAP SERPL CALCULATED.3IONS-SCNC: 9 MMOL/L (ref 4–13)
APTT PPP: 87 SECONDS (ref 23–35)
BUN SERPL-MCNC: 11 MG/DL (ref 5–25)
CALCIUM SERPL-MCNC: 8.5 MG/DL (ref 8.3–10.1)
CHLORIDE SERPL-SCNC: 104 MMOL/L (ref 100–108)
CO2 SERPL-SCNC: 25 MMOL/L (ref 21–32)
CREAT SERPL-MCNC: 0.98 MG/DL (ref 0.6–1.3)
ERYTHROCYTE [DISTWIDTH] IN BLOOD BY AUTOMATED COUNT: 15.8 % (ref 11.6–15.1)
GFR SERPL CREATININE-BSD FRML MDRD: 85 ML/MIN/1.73SQ M
GLUCOSE SERPL-MCNC: 179 MG/DL (ref 65–140)
GLUCOSE SERPL-MCNC: 186 MG/DL (ref 65–140)
GLUCOSE SERPL-MCNC: 193 MG/DL (ref 65–140)
GLUCOSE SERPL-MCNC: 229 MG/DL (ref 65–140)
GLUCOSE SERPL-MCNC: 233 MG/DL (ref 65–140)
HCT VFR BLD AUTO: 23.4 % (ref 36.5–49.3)
HGB BLD-MCNC: 7.5 G/DL (ref 12–17)
INR PPP: 2.6 (ref 0.86–1.16)
MCH RBC QN AUTO: 26.5 PG (ref 26.8–34.3)
MCHC RBC AUTO-ENTMCNC: 32.1 G/DL (ref 31.4–37.4)
MCV RBC AUTO: 83 FL (ref 82–98)
PLATELET # BLD AUTO: 293 THOUSANDS/UL (ref 149–390)
PMV BLD AUTO: 11.3 FL (ref 8.9–12.7)
POTASSIUM SERPL-SCNC: 3.8 MMOL/L (ref 3.5–5.3)
PROTHROMBIN TIME: 28.2 SECONDS (ref 12.1–14.4)
RBC # BLD AUTO: 2.83 MILLION/UL (ref 3.88–5.62)
SODIUM SERPL-SCNC: 138 MMOL/L (ref 136–145)
WBC # BLD AUTO: 10.6 THOUSAND/UL (ref 4.31–10.16)

## 2017-08-24 PROCEDURE — 85027 COMPLETE CBC AUTOMATED: CPT | Performed by: PODIATRIST

## 2017-08-24 PROCEDURE — 85730 THROMBOPLASTIN TIME PARTIAL: CPT | Performed by: PODIATRIST

## 2017-08-24 PROCEDURE — 88307 TISSUE EXAM BY PATHOLOGIST: CPT | Performed by: SURGERY

## 2017-08-24 PROCEDURE — 86902 BLOOD TYPE ANTIGEN DONOR EA: CPT

## 2017-08-24 PROCEDURE — 93922 UPR/L XTREMITY ART 2 LEVELS: CPT

## 2017-08-24 PROCEDURE — 88311 DECALCIFY TISSUE: CPT | Performed by: SURGERY

## 2017-08-24 PROCEDURE — 0Y6D0Z3 DETACHMENT AT LEFT UPPER LEG, LOW, OPEN APPROACH: ICD-10-PCS | Performed by: SURGERY

## 2017-08-24 PROCEDURE — 82948 REAGENT STRIP/BLOOD GLUCOSE: CPT

## 2017-08-24 PROCEDURE — 80048 BASIC METABOLIC PNL TOTAL CA: CPT | Performed by: PODIATRIST

## 2017-08-24 PROCEDURE — 85610 PROTHROMBIN TIME: CPT | Performed by: PHYSICIAN ASSISTANT

## 2017-08-24 PROCEDURE — P9021 RED BLOOD CELLS UNIT: HCPCS

## 2017-08-24 RX ORDER — ROCURONIUM BROMIDE 10 MG/ML
INJECTION, SOLUTION INTRAVENOUS AS NEEDED
Status: DISCONTINUED | OUTPATIENT
Start: 2017-08-24 | End: 2017-08-24 | Stop reason: SURG

## 2017-08-24 RX ORDER — BUPIVACAINE HYDROCHLORIDE 5 MG/ML
INJECTION, SOLUTION PERINEURAL AS NEEDED
Status: DISCONTINUED | OUTPATIENT
Start: 2017-08-24 | End: 2017-08-24 | Stop reason: HOSPADM

## 2017-08-24 RX ORDER — LORAZEPAM 2 MG/ML
0.5 INJECTION INTRAMUSCULAR ONCE AS NEEDED
Status: DISCONTINUED | OUTPATIENT
Start: 2017-08-24 | End: 2017-08-24 | Stop reason: HOSPADM

## 2017-08-24 RX ORDER — PROPOFOL 10 MG/ML
INJECTION, EMULSION INTRAVENOUS AS NEEDED
Status: DISCONTINUED | OUTPATIENT
Start: 2017-08-24 | End: 2017-08-24 | Stop reason: SURG

## 2017-08-24 RX ORDER — GLYCOPYRROLATE 0.2 MG/ML
INJECTION INTRAMUSCULAR; INTRAVENOUS AS NEEDED
Status: DISCONTINUED | OUTPATIENT
Start: 2017-08-24 | End: 2017-08-24 | Stop reason: SURG

## 2017-08-24 RX ORDER — FENTANYL CITRATE/PF 50 MCG/ML
25 SYRINGE (ML) INJECTION
Status: COMPLETED | OUTPATIENT
Start: 2017-08-24 | End: 2017-08-24

## 2017-08-24 RX ORDER — SUCCINYLCHOLINE CHLORIDE 20 MG/ML
INJECTION INTRAMUSCULAR; INTRAVENOUS AS NEEDED
Status: DISCONTINUED | OUTPATIENT
Start: 2017-08-24 | End: 2017-08-24 | Stop reason: SURG

## 2017-08-24 RX ORDER — FENTANYL CITRATE 50 UG/ML
INJECTION, SOLUTION INTRAMUSCULAR; INTRAVENOUS AS NEEDED
Status: DISCONTINUED | OUTPATIENT
Start: 2017-08-24 | End: 2017-08-24 | Stop reason: SURG

## 2017-08-24 RX ORDER — EPHEDRINE SULFATE 50 MG/ML
INJECTION, SOLUTION INTRAVENOUS AS NEEDED
Status: DISCONTINUED | OUTPATIENT
Start: 2017-08-24 | End: 2017-08-24 | Stop reason: SURG

## 2017-08-24 RX ORDER — MAGNESIUM HYDROXIDE 1200 MG/15ML
LIQUID ORAL AS NEEDED
Status: DISCONTINUED | OUTPATIENT
Start: 2017-08-24 | End: 2017-08-24 | Stop reason: HOSPADM

## 2017-08-24 RX ORDER — SODIUM CHLORIDE 9 MG/ML
INJECTION, SOLUTION INTRAVENOUS CONTINUOUS PRN
Status: DISCONTINUED | OUTPATIENT
Start: 2017-08-24 | End: 2017-08-24 | Stop reason: SURG

## 2017-08-24 RX ORDER — ONDANSETRON 2 MG/ML
INJECTION INTRAMUSCULAR; INTRAVENOUS AS NEEDED
Status: DISCONTINUED | OUTPATIENT
Start: 2017-08-24 | End: 2017-08-24 | Stop reason: SURG

## 2017-08-24 RX ORDER — ONDANSETRON 2 MG/ML
4 INJECTION INTRAMUSCULAR; INTRAVENOUS ONCE AS NEEDED
Status: DISCONTINUED | OUTPATIENT
Start: 2017-08-24 | End: 2017-08-24 | Stop reason: HOSPADM

## 2017-08-24 RX ADMIN — HYDROMORPHONE HYDROCHLORIDE 0.5 MG: 1 INJECTION, SOLUTION INTRAMUSCULAR; INTRAVENOUS; SUBCUTANEOUS at 19:05

## 2017-08-24 RX ADMIN — CEFAZOLIN SODIUM 2000 MG: 2 SOLUTION INTRAVENOUS at 15:41

## 2017-08-24 RX ADMIN — FENTANYL CITRATE 25 MCG: 50 INJECTION INTRAMUSCULAR; INTRAVENOUS at 18:20

## 2017-08-24 RX ADMIN — HYDROMORPHONE HYDROCHLORIDE 0.5 MG: 1 INJECTION, SOLUTION INTRAMUSCULAR; INTRAVENOUS; SUBCUTANEOUS at 18:57

## 2017-08-24 RX ADMIN — HYDROMORPHONE HYDROCHLORIDE 0.5 MG: 1 INJECTION, SOLUTION INTRAMUSCULAR; INTRAVENOUS; SUBCUTANEOUS at 17:28

## 2017-08-24 RX ADMIN — EPHEDRINE SULFATE 10 MG: 50 INJECTION, SOLUTION INTRAMUSCULAR; INTRAVENOUS; SUBCUTANEOUS at 15:43

## 2017-08-24 RX ADMIN — HYDROMORPHONE HYDROCHLORIDE 0.5 MG: 1 INJECTION, SOLUTION INTRAMUSCULAR; INTRAVENOUS; SUBCUTANEOUS at 18:19

## 2017-08-24 RX ADMIN — INSULIN GLARGINE 21 UNITS: 100 INJECTION, SOLUTION SUBCUTANEOUS at 21:12

## 2017-08-24 RX ADMIN — HYDROMORPHONE HYDROCHLORIDE 1 MG: 1 INJECTION, SOLUTION INTRAMUSCULAR; INTRAVENOUS; SUBCUTANEOUS at 22:45

## 2017-08-24 RX ADMIN — GABAPENTIN 400 MG: 400 CAPSULE ORAL at 09:21

## 2017-08-24 RX ADMIN — HYDROMORPHONE HYDROCHLORIDE 1 MG: 1 INJECTION, SOLUTION INTRAMUSCULAR; INTRAVENOUS; SUBCUTANEOUS at 09:21

## 2017-08-24 RX ADMIN — FENTANYL CITRATE 50 MCG: 50 INJECTION, SOLUTION INTRAMUSCULAR; INTRAVENOUS at 15:53

## 2017-08-24 RX ADMIN — PANTOPRAZOLE SODIUM 40 MG: 40 TABLET, DELAYED RELEASE ORAL at 05:45

## 2017-08-24 RX ADMIN — HYDROMORPHONE HYDROCHLORIDE 0.5 MG: 1 INJECTION, SOLUTION INTRAMUSCULAR; INTRAVENOUS; SUBCUTANEOUS at 16:28

## 2017-08-24 RX ADMIN — HYDROMORPHONE HYDROCHLORIDE 1 MG: 1 INJECTION, SOLUTION INTRAMUSCULAR; INTRAVENOUS; SUBCUTANEOUS at 00:21

## 2017-08-24 RX ADMIN — SODIUM CHLORIDE 100 ML/HR: 0.9 INJECTION, SOLUTION INTRAVENOUS at 22:13

## 2017-08-24 RX ADMIN — SODIUM CHLORIDE 100 ML/HR: 0.9 INJECTION, SOLUTION INTRAVENOUS at 02:13

## 2017-08-24 RX ADMIN — FENTANYL CITRATE 50 MCG: 50 INJECTION, SOLUTION INTRAMUSCULAR; INTRAVENOUS at 15:24

## 2017-08-24 RX ADMIN — HYDROMORPHONE HYDROCHLORIDE 1 MG: 1 INJECTION, SOLUTION INTRAMUSCULAR; INTRAVENOUS; SUBCUTANEOUS at 02:26

## 2017-08-24 RX ADMIN — OXYCODONE HYDROCHLORIDE AND ACETAMINOPHEN 500 MG: 500 TABLET ORAL at 09:21

## 2017-08-24 RX ADMIN — POTASSIUM CHLORIDE 20 MEQ: 1500 TABLET, EXTENDED RELEASE ORAL at 09:21

## 2017-08-24 RX ADMIN — SUCCINYLCHOLINE CHLORIDE 60 MG: 20 INJECTION, SOLUTION INTRAMUSCULAR; INTRAVENOUS at 15:24

## 2017-08-24 RX ADMIN — ARGATROBAN 1 MCG/KG/MIN: 1 INJECTION INTRAVENOUS at 04:05

## 2017-08-24 RX ADMIN — EPHEDRINE SULFATE 10 MG: 50 INJECTION, SOLUTION INTRAMUSCULAR; INTRAVENOUS; SUBCUTANEOUS at 15:53

## 2017-08-24 RX ADMIN — HYDROMORPHONE HYDROCHLORIDE 1 MG: 1 INJECTION, SOLUTION INTRAMUSCULAR; INTRAVENOUS; SUBCUTANEOUS at 11:57

## 2017-08-24 RX ADMIN — NICOTINE 1 PATCH: 14 PATCH, EXTENDED RELEASE TRANSDERMAL at 09:21

## 2017-08-24 RX ADMIN — FERROUS GLUCONATE 324 MG: 324 TABLET ORAL at 09:30

## 2017-08-24 RX ADMIN — SODIUM CHLORIDE: 0.9 INJECTION, SOLUTION INTRAVENOUS at 15:20

## 2017-08-24 RX ADMIN — FENTANYL CITRATE 25 MCG: 50 INJECTION INTRAMUSCULAR; INTRAVENOUS at 18:02

## 2017-08-24 RX ADMIN — PROPOFOL 100 MG: 10 INJECTION, EMULSION INTRAVENOUS at 15:24

## 2017-08-24 RX ADMIN — QUETIAPINE FUMARATE 800 MG: 200 TABLET, FILM COATED ORAL at 21:12

## 2017-08-24 RX ADMIN — HYDROMORPHONE HYDROCHLORIDE 0.5 MG: 1 INJECTION, SOLUTION INTRAMUSCULAR; INTRAVENOUS; SUBCUTANEOUS at 16:18

## 2017-08-24 RX ADMIN — FENTANYL CITRATE 25 MCG: 50 INJECTION INTRAMUSCULAR; INTRAVENOUS at 18:07

## 2017-08-24 RX ADMIN — ACETAMINOPHEN 650 MG: 325 TABLET, FILM COATED ORAL at 11:04

## 2017-08-24 RX ADMIN — METOPROLOL SUCCINATE 25 MG: 25 TABLET, EXTENDED RELEASE ORAL at 09:21

## 2017-08-24 RX ADMIN — TORSEMIDE 20 MG: 20 TABLET ORAL at 09:20

## 2017-08-24 RX ADMIN — OXYCODONE HYDROCHLORIDE 10 MG: 10 TABLET ORAL at 10:31

## 2017-08-24 RX ADMIN — HYDROMORPHONE HYDROCHLORIDE 1 MG: 1 INJECTION, SOLUTION INTRAMUSCULAR; INTRAVENOUS; SUBCUTANEOUS at 20:15

## 2017-08-24 RX ADMIN — HYDROMORPHONE HYDROCHLORIDE 0.5 MG: 1 INJECTION, SOLUTION INTRAMUSCULAR; INTRAVENOUS; SUBCUTANEOUS at 18:30

## 2017-08-24 RX ADMIN — ONDANSETRON 4 MG: 2 INJECTION INTRAMUSCULAR; INTRAVENOUS at 17:05

## 2017-08-24 RX ADMIN — GABAPENTIN 400 MG: 400 CAPSULE ORAL at 20:22

## 2017-08-24 RX ADMIN — GLYCOPYRROLATE 0.4 MG: 0.2 INJECTION, SOLUTION INTRAMUSCULAR; INTRAVENOUS at 17:05

## 2017-08-24 RX ADMIN — HYDROMORPHONE HYDROCHLORIDE 1 MG: 1 INJECTION, SOLUTION INTRAMUSCULAR; INTRAVENOUS; SUBCUTANEOUS at 05:45

## 2017-08-24 RX ADMIN — HYDROMORPHONE HYDROCHLORIDE 1 MG: 1 INJECTION, SOLUTION INTRAMUSCULAR; INTRAVENOUS; SUBCUTANEOUS at 13:59

## 2017-08-24 RX ADMIN — VANCOMYCIN HYDROCHLORIDE 1.5 G: 1 INJECTION, POWDER, LYOPHILIZED, FOR SOLUTION INTRAVENOUS at 15:42

## 2017-08-24 RX ADMIN — ARGATROBAN 1 MCG/KG/MIN: 1 INJECTION INTRAVENOUS at 21:15

## 2017-08-24 RX ADMIN — FENTANYL CITRATE 25 MCG: 50 INJECTION INTRAMUSCULAR; INTRAVENOUS at 18:15

## 2017-08-24 RX ADMIN — ROCURONIUM BROMIDE 15 MG: 10 INJECTION, SOLUTION INTRAVENOUS at 15:58

## 2017-08-24 RX ADMIN — INSULIN LISPRO 2 UNITS: 100 INJECTION, SOLUTION INTRAVENOUS; SUBCUTANEOUS at 21:13

## 2017-08-24 RX ADMIN — NEOSTIGMINE METHYLSULFATE 2 MG: 1 INJECTION, SOLUTION INTRAMUSCULAR; INTRAVENOUS; SUBCUTANEOUS at 17:05

## 2017-08-25 LAB
ANION GAP SERPL CALCULATED.3IONS-SCNC: 7 MMOL/L (ref 4–13)
APTT PPP: 129 SECONDS (ref 23–35)
APTT PPP: 76 SECONDS (ref 23–35)
APTT PPP: 81 SECONDS (ref 23–35)
APTT PPP: 82 SECONDS (ref 23–35)
BUN SERPL-MCNC: 9 MG/DL (ref 5–25)
C AG RBC QL: NORMAL
CALCIUM SERPL-MCNC: 7.9 MG/DL (ref 8.3–10.1)
CHLORIDE SERPL-SCNC: 107 MMOL/L (ref 100–108)
CO2 SERPL-SCNC: 26 MMOL/L (ref 21–32)
CREAT SERPL-MCNC: 0.76 MG/DL (ref 0.6–1.3)
ERYTHROCYTE [DISTWIDTH] IN BLOOD BY AUTOMATED COUNT: 15.5 % (ref 11.6–15.1)
GFR SERPL CREATININE-BSD FRML MDRD: 101 ML/MIN/1.73SQ M
GLUCOSE SERPL-MCNC: 128 MG/DL (ref 65–140)
GLUCOSE SERPL-MCNC: 179 MG/DL (ref 65–140)
GLUCOSE SERPL-MCNC: 183 MG/DL (ref 65–140)
GLUCOSE SERPL-MCNC: 183 MG/DL (ref 65–140)
GLUCOSE SERPL-MCNC: 195 MG/DL (ref 65–140)
HCT VFR BLD AUTO: 22.8 % (ref 36.5–49.3)
HGB BLD-MCNC: 7.5 G/DL (ref 12–17)
INR PPP: 2.37 (ref 0.86–1.16)
MCH RBC QN AUTO: 27.2 PG (ref 26.8–34.3)
MCHC RBC AUTO-ENTMCNC: 32.9 G/DL (ref 31.4–37.4)
MCV RBC AUTO: 83 FL (ref 82–98)
PLATELET # BLD AUTO: 290 THOUSANDS/UL (ref 149–390)
PMV BLD AUTO: 11.2 FL (ref 8.9–12.7)
POTASSIUM SERPL-SCNC: 3.4 MMOL/L (ref 3.5–5.3)
PROTHROMBIN TIME: 26.2 SECONDS (ref 12.1–14.4)
RBC # BLD AUTO: 2.76 MILLION/UL (ref 3.88–5.62)
SODIUM SERPL-SCNC: 140 MMOL/L (ref 136–145)
WBC # BLD AUTO: 14.23 THOUSAND/UL (ref 4.31–10.16)

## 2017-08-25 PROCEDURE — 85730 THROMBOPLASTIN TIME PARTIAL: CPT | Performed by: SURGERY

## 2017-08-25 PROCEDURE — 82948 REAGENT STRIP/BLOOD GLUCOSE: CPT

## 2017-08-25 PROCEDURE — 85730 THROMBOPLASTIN TIME PARTIAL: CPT | Performed by: HOSPITALIST

## 2017-08-25 PROCEDURE — 80048 BASIC METABOLIC PNL TOTAL CA: CPT | Performed by: PHYSICIAN ASSISTANT

## 2017-08-25 PROCEDURE — 85027 COMPLETE CBC AUTOMATED: CPT | Performed by: PHYSICIAN ASSISTANT

## 2017-08-25 PROCEDURE — 85610 PROTHROMBIN TIME: CPT | Performed by: PHYSICIAN ASSISTANT

## 2017-08-25 RX ORDER — WARFARIN SODIUM 5 MG/1
5 TABLET ORAL
Status: DISCONTINUED | OUTPATIENT
Start: 2017-08-25 | End: 2017-08-27

## 2017-08-25 RX ORDER — LISINOPRIL 10 MG/1
10 TABLET ORAL DAILY
Status: DISCONTINUED | OUTPATIENT
Start: 2017-08-26 | End: 2017-09-07 | Stop reason: HOSPADM

## 2017-08-25 RX ORDER — LORAZEPAM 1 MG/1
1 TABLET ORAL EVERY 8 HOURS PRN
Status: DISCONTINUED | OUTPATIENT
Start: 2017-08-25 | End: 2017-08-27

## 2017-08-25 RX ORDER — LANOLIN ALCOHOL/MO/W.PET/CERES
6 CREAM (GRAM) TOPICAL
Status: DISCONTINUED | OUTPATIENT
Start: 2017-08-25 | End: 2017-09-07 | Stop reason: HOSPADM

## 2017-08-25 RX ADMIN — INSULIN LISPRO 1 UNITS: 100 INJECTION, SOLUTION INTRAVENOUS; SUBCUTANEOUS at 21:37

## 2017-08-25 RX ADMIN — PANTOPRAZOLE SODIUM 40 MG: 40 TABLET, DELAYED RELEASE ORAL at 05:54

## 2017-08-25 RX ADMIN — Medication 0.1 MG/KG/HR: at 02:17

## 2017-08-25 RX ADMIN — MELATONIN TAB 3 MG 6 MG: 3 TAB at 21:32

## 2017-08-25 RX ADMIN — INSULIN GLARGINE 21 UNITS: 100 INJECTION, SOLUTION SUBCUTANEOUS at 21:32

## 2017-08-25 RX ADMIN — POLYETHYLENE GLYCOL 3350 17 G: 17 POWDER, FOR SOLUTION ORAL at 08:17

## 2017-08-25 RX ADMIN — FERROUS GLUCONATE 324 MG: 324 TABLET ORAL at 17:25

## 2017-08-25 RX ADMIN — CEFAZOLIN SODIUM 500 MG: 1 INJECTION, POWDER, FOR SOLUTION INTRAMUSCULAR; INTRAVENOUS at 16:35

## 2017-08-25 RX ADMIN — INSULIN LISPRO 1 UNITS: 100 INJECTION, SOLUTION INTRAVENOUS; SUBCUTANEOUS at 11:59

## 2017-08-25 RX ADMIN — CEFAZOLIN SODIUM 500 MG: 1 INJECTION, POWDER, FOR SOLUTION INTRAMUSCULAR; INTRAVENOUS at 04:00

## 2017-08-25 RX ADMIN — SODIUM CHLORIDE 100 ML/HR: 0.9 INJECTION, SOLUTION INTRAVENOUS at 07:35

## 2017-08-25 RX ADMIN — INSULIN LISPRO 3 UNITS: 100 INJECTION, SOLUTION INTRAVENOUS; SUBCUTANEOUS at 16:37

## 2017-08-25 RX ADMIN — OXYCODONE HYDROCHLORIDE 10 MG: 10 TABLET ORAL at 19:52

## 2017-08-25 RX ADMIN — INSULIN LISPRO 1 UNITS: 100 INJECTION, SOLUTION INTRAVENOUS; SUBCUTANEOUS at 07:36

## 2017-08-25 RX ADMIN — ARGATROBAN 1 MCG/KG/MIN: 1 INJECTION INTRAVENOUS at 22:05

## 2017-08-25 RX ADMIN — NICOTINE 1 PATCH: 14 PATCH, EXTENDED RELEASE TRANSDERMAL at 08:17

## 2017-08-25 RX ADMIN — HYDROMORPHONE HYDROCHLORIDE 1 MG: 1 INJECTION, SOLUTION INTRAMUSCULAR; INTRAVENOUS; SUBCUTANEOUS at 08:54

## 2017-08-25 RX ADMIN — INSULIN LISPRO 3 UNITS: 100 INJECTION, SOLUTION INTRAVENOUS; SUBCUTANEOUS at 12:00

## 2017-08-25 RX ADMIN — OXYCODONE HYDROCHLORIDE AND ACETAMINOPHEN 500 MG: 500 TABLET ORAL at 08:16

## 2017-08-25 RX ADMIN — ATORVASTATIN CALCIUM 10 MG: 20 TABLET, FILM COATED ORAL at 17:24

## 2017-08-25 RX ADMIN — Medication 1 TABLET: at 21:32

## 2017-08-25 RX ADMIN — FERROUS GLUCONATE 324 MG: 324 TABLET ORAL at 08:18

## 2017-08-25 RX ADMIN — QUETIAPINE FUMARATE 800 MG: 200 TABLET, FILM COATED ORAL at 21:34

## 2017-08-25 RX ADMIN — TORSEMIDE 20 MG: 20 TABLET ORAL at 08:19

## 2017-08-25 RX ADMIN — HYDROMORPHONE HYDROCHLORIDE 1 MG: 1 INJECTION, SOLUTION INTRAMUSCULAR; INTRAVENOUS; SUBCUTANEOUS at 14:01

## 2017-08-25 RX ADMIN — INSULIN LISPRO 3 UNITS: 100 INJECTION, SOLUTION INTRAVENOUS; SUBCUTANEOUS at 07:36

## 2017-08-25 RX ADMIN — GABAPENTIN 400 MG: 400 CAPSULE ORAL at 21:32

## 2017-08-25 RX ADMIN — HYDROMORPHONE HYDROCHLORIDE 1 MG: 1 INJECTION, SOLUTION INTRAMUSCULAR; INTRAVENOUS; SUBCUTANEOUS at 06:19

## 2017-08-25 RX ADMIN — METOPROLOL SUCCINATE 25 MG: 25 TABLET, EXTENDED RELEASE ORAL at 08:16

## 2017-08-25 RX ADMIN — HYDROMORPHONE HYDROCHLORIDE 1 MG: 1 INJECTION, SOLUTION INTRAMUSCULAR; INTRAVENOUS; SUBCUTANEOUS at 02:31

## 2017-08-25 RX ADMIN — LORAZEPAM 1 MG: 1 TABLET ORAL at 21:33

## 2017-08-25 RX ADMIN — GABAPENTIN 400 MG: 400 CAPSULE ORAL at 17:24

## 2017-08-25 RX ADMIN — HYDROMORPHONE HYDROCHLORIDE 0.5 MG: 1 INJECTION, SOLUTION INTRAMUSCULAR; INTRAVENOUS; SUBCUTANEOUS at 21:32

## 2017-08-25 RX ADMIN — INSULIN LISPRO 1 UNITS: 100 INJECTION, SOLUTION INTRAVENOUS; SUBCUTANEOUS at 16:37

## 2017-08-25 RX ADMIN — LORAZEPAM 1 MG: 2 INJECTION INTRAMUSCULAR; INTRAVENOUS at 00:29

## 2017-08-25 RX ADMIN — HYDROMORPHONE HYDROCHLORIDE 1 MG: 1 INJECTION, SOLUTION INTRAMUSCULAR; INTRAVENOUS; SUBCUTANEOUS at 11:54

## 2017-08-25 RX ADMIN — WARFARIN SODIUM 5 MG: 5 TABLET ORAL at 17:30

## 2017-08-25 RX ADMIN — LORAZEPAM 1 MG: 2 INJECTION INTRAMUSCULAR; INTRAVENOUS at 15:29

## 2017-08-25 RX ADMIN — OXYCODONE HYDROCHLORIDE AND ACETAMINOPHEN 500 MG: 500 TABLET ORAL at 17:24

## 2017-08-25 RX ADMIN — ARGATROBAN 1 MCG/KG/MIN: 1 INJECTION INTRAVENOUS at 07:53

## 2017-08-25 RX ADMIN — GABAPENTIN 400 MG: 400 CAPSULE ORAL at 08:17

## 2017-08-25 RX ADMIN — POTASSIUM CHLORIDE 20 MEQ: 1500 TABLET, EXTENDED RELEASE ORAL at 08:16

## 2017-08-25 RX ADMIN — LORAZEPAM 1 MG: 2 INJECTION INTRAMUSCULAR; INTRAVENOUS at 07:48

## 2017-08-26 LAB
ABO GROUP BLD BPU: NORMAL
ABO GROUP BLD BPU: NORMAL
ANION GAP SERPL CALCULATED.3IONS-SCNC: 8 MMOL/L (ref 4–13)
APTT PPP: 75 SECONDS (ref 23–35)
APTT PPP: 83 SECONDS (ref 23–35)
APTT PPP: 84 SECONDS (ref 23–35)
APTT PPP: 84 SECONDS (ref 23–35)
BASOPHILS # BLD AUTO: 0.02 THOUSANDS/ΜL (ref 0–0.1)
BASOPHILS NFR BLD AUTO: 0 % (ref 0–1)
BPU ID: NORMAL
BPU ID: NORMAL
BUN SERPL-MCNC: 8 MG/DL (ref 5–25)
CALCIUM SERPL-MCNC: 8.3 MG/DL (ref 8.3–10.1)
CHLORIDE SERPL-SCNC: 105 MMOL/L (ref 100–108)
CO2 SERPL-SCNC: 26 MMOL/L (ref 21–32)
CREAT SERPL-MCNC: 0.78 MG/DL (ref 0.6–1.3)
CROSSMATCH: NORMAL
CROSSMATCH: NORMAL
EOSINOPHIL # BLD AUTO: 0.42 THOUSAND/ΜL (ref 0–0.61)
EOSINOPHIL NFR BLD AUTO: 4 % (ref 0–6)
ERYTHROCYTE [DISTWIDTH] IN BLOOD BY AUTOMATED COUNT: 15.6 % (ref 11.6–15.1)
GFR SERPL CREATININE-BSD FRML MDRD: 99 ML/MIN/1.73SQ M
GLUCOSE SERPL-MCNC: 139 MG/DL (ref 65–140)
GLUCOSE SERPL-MCNC: 151 MG/DL (ref 65–140)
GLUCOSE SERPL-MCNC: 167 MG/DL (ref 65–140)
GLUCOSE SERPL-MCNC: 193 MG/DL (ref 65–140)
GLUCOSE SERPL-MCNC: 256 MG/DL (ref 65–140)
HCT VFR BLD AUTO: 23.9 % (ref 36.5–49.3)
HGB BLD-MCNC: 7.8 G/DL (ref 12–17)
INR PPP: 2.33 (ref 0.86–1.16)
LYMPHOCYTES # BLD AUTO: 1.01 THOUSANDS/ΜL (ref 0.6–4.47)
LYMPHOCYTES NFR BLD AUTO: 9 % (ref 14–44)
MCH RBC QN AUTO: 26.9 PG (ref 26.8–34.3)
MCHC RBC AUTO-ENTMCNC: 32.6 G/DL (ref 31.4–37.4)
MCV RBC AUTO: 82 FL (ref 82–98)
MONOCYTES # BLD AUTO: 1.03 THOUSAND/ΜL (ref 0.17–1.22)
MONOCYTES NFR BLD AUTO: 9 % (ref 4–12)
NEUTROPHILS # BLD AUTO: 9.13 THOUSANDS/ΜL (ref 1.85–7.62)
NEUTS SEG NFR BLD AUTO: 78 % (ref 43–75)
NRBC BLD AUTO-RTO: 0 /100 WBCS
PLATELET # BLD AUTO: 307 THOUSANDS/UL (ref 149–390)
PMV BLD AUTO: 11.1 FL (ref 8.9–12.7)
POTASSIUM SERPL-SCNC: 3.4 MMOL/L (ref 3.5–5.3)
PROTHROMBIN TIME: 25.8 SECONDS (ref 12.1–14.4)
RBC # BLD AUTO: 2.9 MILLION/UL (ref 3.88–5.62)
SODIUM SERPL-SCNC: 139 MMOL/L (ref 136–145)
UNIT DISPENSE STATUS: NORMAL
UNIT DISPENSE STATUS: NORMAL
UNIT PRODUCT CODE: NORMAL
UNIT PRODUCT CODE: NORMAL
UNIT RH: NORMAL
UNIT RH: NORMAL
WBC # BLD AUTO: 11.64 THOUSAND/UL (ref 4.31–10.16)

## 2017-08-26 PROCEDURE — 85025 COMPLETE CBC W/AUTO DIFF WBC: CPT | Performed by: HOSPITALIST

## 2017-08-26 PROCEDURE — 85730 THROMBOPLASTIN TIME PARTIAL: CPT | Performed by: FAMILY MEDICINE

## 2017-08-26 PROCEDURE — 80048 BASIC METABOLIC PNL TOTAL CA: CPT | Performed by: HOSPITALIST

## 2017-08-26 PROCEDURE — 85610 PROTHROMBIN TIME: CPT | Performed by: PHYSICIAN ASSISTANT

## 2017-08-26 PROCEDURE — 85730 THROMBOPLASTIN TIME PARTIAL: CPT | Performed by: SURGERY

## 2017-08-26 PROCEDURE — 82948 REAGENT STRIP/BLOOD GLUCOSE: CPT

## 2017-08-26 RX ORDER — OXYCODONE HYDROCHLORIDE 10 MG/1
10 TABLET ORAL EVERY 4 HOURS PRN
Status: DISCONTINUED | OUTPATIENT
Start: 2017-08-26 | End: 2017-08-28

## 2017-08-26 RX ADMIN — INSULIN LISPRO 1 UNITS: 100 INJECTION, SOLUTION INTRAVENOUS; SUBCUTANEOUS at 08:30

## 2017-08-26 RX ADMIN — GABAPENTIN 400 MG: 400 CAPSULE ORAL at 18:08

## 2017-08-26 RX ADMIN — ARGATROBAN 1 MCG/KG/MIN: 1 INJECTION INTRAVENOUS at 08:01

## 2017-08-26 RX ADMIN — OXYCODONE HYDROCHLORIDE AND ACETAMINOPHEN 500 MG: 500 TABLET ORAL at 08:29

## 2017-08-26 RX ADMIN — INSULIN LISPRO 1 UNITS: 100 INJECTION, SOLUTION INTRAVENOUS; SUBCUTANEOUS at 12:17

## 2017-08-26 RX ADMIN — GABAPENTIN 400 MG: 400 CAPSULE ORAL at 21:00

## 2017-08-26 RX ADMIN — OXYCODONE HYDROCHLORIDE 10 MG: 10 TABLET ORAL at 16:23

## 2017-08-26 RX ADMIN — ARGATROBAN 0.99 MCG/KG/MIN: 1 INJECTION INTRAVENOUS at 21:02

## 2017-08-26 RX ADMIN — TORSEMIDE 20 MG: 20 TABLET ORAL at 08:33

## 2017-08-26 RX ADMIN — KETAMINE HYDROCHLORIDE 0.1 MG/KG/HR: 50 INJECTION, SOLUTION INTRAMUSCULAR; INTRAVENOUS at 21:25

## 2017-08-26 RX ADMIN — ATORVASTATIN CALCIUM 10 MG: 20 TABLET, FILM COATED ORAL at 18:08

## 2017-08-26 RX ADMIN — LISINOPRIL 10 MG: 10 TABLET ORAL at 08:29

## 2017-08-26 RX ADMIN — HYDROMORPHONE HYDROCHLORIDE 0.5 MG: 1 INJECTION, SOLUTION INTRAMUSCULAR; INTRAVENOUS; SUBCUTANEOUS at 04:00

## 2017-08-26 RX ADMIN — POTASSIUM CHLORIDE 20 MEQ: 1500 TABLET, EXTENDED RELEASE ORAL at 08:29

## 2017-08-26 RX ADMIN — INSULIN GLARGINE 21 UNITS: 100 INJECTION, SOLUTION SUBCUTANEOUS at 21:06

## 2017-08-26 RX ADMIN — OXYCODONE HYDROCHLORIDE 10 MG: 10 TABLET ORAL at 12:17

## 2017-08-26 RX ADMIN — WARFARIN SODIUM 5 MG: 5 TABLET ORAL at 18:08

## 2017-08-26 RX ADMIN — GABAPENTIN 400 MG: 400 CAPSULE ORAL at 08:29

## 2017-08-26 RX ADMIN — MELATONIN TAB 3 MG 6 MG: 3 TAB at 21:04

## 2017-08-26 RX ADMIN — FERROUS GLUCONATE 324 MG: 324 TABLET ORAL at 08:31

## 2017-08-26 RX ADMIN — METOPROLOL SUCCINATE 25 MG: 25 TABLET, EXTENDED RELEASE ORAL at 08:29

## 2017-08-26 RX ADMIN — Medication 1 TABLET: at 21:05

## 2017-08-26 RX ADMIN — LORAZEPAM 1 MG: 1 TABLET ORAL at 13:17

## 2017-08-26 RX ADMIN — FERROUS GLUCONATE 324 MG: 324 TABLET ORAL at 18:08

## 2017-08-26 RX ADMIN — KETAMINE HYDROCHLORIDE 0.2 MG/KG/HR: 50 INJECTION, SOLUTION INTRAMUSCULAR; INTRAVENOUS at 04:00

## 2017-08-26 RX ADMIN — OXYCODONE HYDROCHLORIDE 15 MG: 5 TABLET ORAL at 21:00

## 2017-08-26 RX ADMIN — INSULIN LISPRO 1 UNITS: 100 INJECTION, SOLUTION INTRAVENOUS; SUBCUTANEOUS at 21:06

## 2017-08-26 RX ADMIN — OXYCODONE HYDROCHLORIDE AND ACETAMINOPHEN 500 MG: 500 TABLET ORAL at 18:08

## 2017-08-26 RX ADMIN — QUETIAPINE FUMARATE 800 MG: 200 TABLET, FILM COATED ORAL at 21:05

## 2017-08-26 RX ADMIN — INSULIN LISPRO 3 UNITS: 100 INJECTION, SOLUTION INTRAVENOUS; SUBCUTANEOUS at 08:30

## 2017-08-26 RX ADMIN — HYDROMORPHONE HYDROCHLORIDE 0.5 MG: 1 INJECTION, SOLUTION INTRAMUSCULAR; INTRAVENOUS; SUBCUTANEOUS at 08:29

## 2017-08-26 RX ADMIN — PANTOPRAZOLE SODIUM 40 MG: 40 TABLET, DELAYED RELEASE ORAL at 04:41

## 2017-08-26 RX ADMIN — NICOTINE 1 PATCH: 14 PATCH, EXTENDED RELEASE TRANSDERMAL at 08:31

## 2017-08-26 RX ADMIN — INSULIN LISPRO 2 UNITS: 100 INJECTION, SOLUTION INTRAVENOUS; SUBCUTANEOUS at 18:09

## 2017-08-26 RX ADMIN — HYDROMORPHONE HYDROCHLORIDE 0.5 MG: 1 INJECTION, SOLUTION INTRAMUSCULAR; INTRAVENOUS; SUBCUTANEOUS at 13:32

## 2017-08-27 LAB
ANION GAP SERPL CALCULATED.3IONS-SCNC: 7 MMOL/L (ref 4–13)
APTT PPP: 85 SECONDS (ref 23–35)
APTT PPP: 86 SECONDS (ref 23–35)
BASOPHILS # BLD AUTO: 0.02 THOUSANDS/ΜL (ref 0–0.1)
BASOPHILS NFR BLD AUTO: 0 % (ref 0–1)
BUN SERPL-MCNC: 9 MG/DL (ref 5–25)
CALCIUM SERPL-MCNC: 8.3 MG/DL (ref 8.3–10.1)
CHLORIDE SERPL-SCNC: 106 MMOL/L (ref 100–108)
CO2 SERPL-SCNC: 25 MMOL/L (ref 21–32)
CREAT SERPL-MCNC: 0.8 MG/DL (ref 0.6–1.3)
EOSINOPHIL # BLD AUTO: 0.63 THOUSAND/ΜL (ref 0–0.61)
EOSINOPHIL NFR BLD AUTO: 6 % (ref 0–6)
ERYTHROCYTE [DISTWIDTH] IN BLOOD BY AUTOMATED COUNT: 15.6 % (ref 11.6–15.1)
GFR SERPL CREATININE-BSD FRML MDRD: 98 ML/MIN/1.73SQ M
GLUCOSE SERPL-MCNC: 152 MG/DL (ref 65–140)
GLUCOSE SERPL-MCNC: 153 MG/DL (ref 65–140)
GLUCOSE SERPL-MCNC: 154 MG/DL (ref 65–140)
GLUCOSE SERPL-MCNC: 190 MG/DL (ref 65–140)
GLUCOSE SERPL-MCNC: 209 MG/DL (ref 65–140)
HCT VFR BLD AUTO: 24.2 % (ref 36.5–49.3)
HGB BLD-MCNC: 7.8 G/DL (ref 12–17)
INR PPP: 2.3 (ref 0.86–1.16)
LYMPHOCYTES # BLD AUTO: 1.25 THOUSANDS/ΜL (ref 0.6–4.47)
LYMPHOCYTES NFR BLD AUTO: 12 % (ref 14–44)
MCH RBC QN AUTO: 26.6 PG (ref 26.8–34.3)
MCHC RBC AUTO-ENTMCNC: 32.2 G/DL (ref 31.4–37.4)
MCV RBC AUTO: 83 FL (ref 82–98)
MONOCYTES # BLD AUTO: 0.79 THOUSAND/ΜL (ref 0.17–1.22)
MONOCYTES NFR BLD AUTO: 8 % (ref 4–12)
NEUTROPHILS # BLD AUTO: 7.43 THOUSANDS/ΜL (ref 1.85–7.62)
NEUTS SEG NFR BLD AUTO: 74 % (ref 43–75)
NRBC BLD AUTO-RTO: 0 /100 WBCS
PLATELET # BLD AUTO: 336 THOUSANDS/UL (ref 149–390)
PMV BLD AUTO: 10.9 FL (ref 8.9–12.7)
POTASSIUM SERPL-SCNC: 3.2 MMOL/L (ref 3.5–5.3)
PROTHROMBIN TIME: 25.6 SECONDS (ref 12.1–14.4)
RBC # BLD AUTO: 2.93 MILLION/UL (ref 3.88–5.62)
SODIUM SERPL-SCNC: 138 MMOL/L (ref 136–145)
WBC # BLD AUTO: 10.15 THOUSAND/UL (ref 4.31–10.16)

## 2017-08-27 PROCEDURE — 85730 THROMBOPLASTIN TIME PARTIAL: CPT | Performed by: HOSPITALIST

## 2017-08-27 PROCEDURE — 85610 PROTHROMBIN TIME: CPT | Performed by: PHYSICIAN ASSISTANT

## 2017-08-27 PROCEDURE — 85730 THROMBOPLASTIN TIME PARTIAL: CPT | Performed by: FAMILY MEDICINE

## 2017-08-27 PROCEDURE — 82948 REAGENT STRIP/BLOOD GLUCOSE: CPT

## 2017-08-27 PROCEDURE — 85025 COMPLETE CBC W/AUTO DIFF WBC: CPT | Performed by: HOSPITALIST

## 2017-08-27 PROCEDURE — 80048 BASIC METABOLIC PNL TOTAL CA: CPT | Performed by: HOSPITALIST

## 2017-08-27 RX ORDER — WARFARIN SODIUM 7.5 MG/1
7.5 TABLET ORAL
Status: DISCONTINUED | OUTPATIENT
Start: 2017-08-27 | End: 2017-08-29

## 2017-08-27 RX ORDER — QUETIAPINE FUMARATE 100 MG/1
200 TABLET, FILM COATED ORAL 2 TIMES DAILY
Status: DISCONTINUED | OUTPATIENT
Start: 2017-08-27 | End: 2017-09-07 | Stop reason: HOSPADM

## 2017-08-27 RX ORDER — LORAZEPAM 1 MG/1
1 TABLET ORAL EVERY 6 HOURS PRN
Status: DISCONTINUED | OUTPATIENT
Start: 2017-08-27 | End: 2017-09-07 | Stop reason: HOSPADM

## 2017-08-27 RX ORDER — POTASSIUM CHLORIDE 20 MEQ/1
40 TABLET, EXTENDED RELEASE ORAL ONCE
Status: COMPLETED | OUTPATIENT
Start: 2017-08-27 | End: 2017-08-27

## 2017-08-27 RX ORDER — OXYCODONE HCL 10 MG/1
10 TABLET, FILM COATED, EXTENDED RELEASE ORAL EVERY 12 HOURS SCHEDULED
Status: DISCONTINUED | OUTPATIENT
Start: 2017-08-27 | End: 2017-08-29

## 2017-08-27 RX ORDER — GABAPENTIN 300 MG/1
600 CAPSULE ORAL 3 TIMES DAILY
Status: DISCONTINUED | OUTPATIENT
Start: 2017-08-27 | End: 2017-08-28

## 2017-08-27 RX ORDER — QUETIAPINE FUMARATE 100 MG/1
400 TABLET, FILM COATED ORAL
Status: DISCONTINUED | OUTPATIENT
Start: 2017-08-27 | End: 2017-09-07 | Stop reason: HOSPADM

## 2017-08-27 RX ADMIN — POLYETHYLENE GLYCOL 3350 17 G: 17 POWDER, FOR SOLUTION ORAL at 08:50

## 2017-08-27 RX ADMIN — INSULIN LISPRO 3 UNITS: 100 INJECTION, SOLUTION INTRAVENOUS; SUBCUTANEOUS at 17:46

## 2017-08-27 RX ADMIN — FERROUS GLUCONATE 324 MG: 324 TABLET ORAL at 17:46

## 2017-08-27 RX ADMIN — LORAZEPAM 1 MG: 1 TABLET ORAL at 12:27

## 2017-08-27 RX ADMIN — Medication 1 TABLET: at 22:14

## 2017-08-27 RX ADMIN — GABAPENTIN 400 MG: 400 CAPSULE ORAL at 08:44

## 2017-08-27 RX ADMIN — OXYCODONE HYDROCHLORIDE 15 MG: 5 TABLET ORAL at 08:45

## 2017-08-27 RX ADMIN — POTASSIUM CHLORIDE 20 MEQ: 1500 TABLET, EXTENDED RELEASE ORAL at 08:45

## 2017-08-27 RX ADMIN — WARFARIN SODIUM 7.5 MG: 7.5 TABLET ORAL at 17:45

## 2017-08-27 RX ADMIN — INSULIN GLARGINE 21 UNITS: 100 INJECTION, SOLUTION SUBCUTANEOUS at 22:13

## 2017-08-27 RX ADMIN — TORSEMIDE 20 MG: 20 TABLET ORAL at 08:45

## 2017-08-27 RX ADMIN — INSULIN LISPRO 1 UNITS: 100 INJECTION, SOLUTION INTRAVENOUS; SUBCUTANEOUS at 22:14

## 2017-08-27 RX ADMIN — INSULIN LISPRO 1 UNITS: 100 INJECTION, SOLUTION INTRAVENOUS; SUBCUTANEOUS at 08:47

## 2017-08-27 RX ADMIN — METOPROLOL SUCCINATE 25 MG: 25 TABLET, EXTENDED RELEASE ORAL at 08:45

## 2017-08-27 RX ADMIN — INSULIN LISPRO 3 UNITS: 100 INJECTION, SOLUTION INTRAVENOUS; SUBCUTANEOUS at 12:56

## 2017-08-27 RX ADMIN — NICOTINE 1 PATCH: 14 PATCH, EXTENDED RELEASE TRANSDERMAL at 09:00

## 2017-08-27 RX ADMIN — ARGATROBAN 2 MCG/KG/MIN: 1 INJECTION INTRAVENOUS at 03:46

## 2017-08-27 RX ADMIN — INSULIN LISPRO 3 UNITS: 100 INJECTION, SOLUTION INTRAVENOUS; SUBCUTANEOUS at 08:48

## 2017-08-27 RX ADMIN — OXYCODONE HYDROCHLORIDE 15 MG: 5 TABLET ORAL at 20:00

## 2017-08-27 RX ADMIN — OXYCODONE HYDROCHLORIDE 10 MG: 10 TABLET, FILM COATED, EXTENDED RELEASE ORAL at 22:13

## 2017-08-27 RX ADMIN — INSULIN LISPRO 1 UNITS: 100 INJECTION, SOLUTION INTRAVENOUS; SUBCUTANEOUS at 17:46

## 2017-08-27 RX ADMIN — OXYCODONE HYDROCHLORIDE AND ACETAMINOPHEN 500 MG: 500 TABLET ORAL at 17:45

## 2017-08-27 RX ADMIN — POTASSIUM CHLORIDE 40 MEQ: 1500 TABLET, EXTENDED RELEASE ORAL at 08:44

## 2017-08-27 RX ADMIN — QUETIAPINE FUMARATE 200 MG: 100 TABLET ORAL at 17:45

## 2017-08-27 RX ADMIN — OXYCODONE HYDROCHLORIDE 10 MG: 10 TABLET, FILM COATED, EXTENDED RELEASE ORAL at 13:10

## 2017-08-27 RX ADMIN — ARGATROBAN 2 MCG/KG/MIN: 1 INJECTION INTRAVENOUS at 22:27

## 2017-08-27 RX ADMIN — MELATONIN TAB 3 MG 6 MG: 3 TAB at 22:13

## 2017-08-27 RX ADMIN — GABAPENTIN 600 MG: 300 CAPSULE ORAL at 17:44

## 2017-08-27 RX ADMIN — OXYCODONE HYDROCHLORIDE AND ACETAMINOPHEN 500 MG: 500 TABLET ORAL at 08:45

## 2017-08-27 RX ADMIN — INSULIN LISPRO 1 UNITS: 100 INJECTION, SOLUTION INTRAVENOUS; SUBCUTANEOUS at 12:56

## 2017-08-27 RX ADMIN — ARGATROBAN 2 MCG/KG/MIN: 1 INJECTION INTRAVENOUS at 12:53

## 2017-08-27 RX ADMIN — HYDROMORPHONE HYDROCHLORIDE 0.5 MG: 1 INJECTION, SOLUTION INTRAMUSCULAR; INTRAVENOUS; SUBCUTANEOUS at 20:33

## 2017-08-27 RX ADMIN — PANTOPRAZOLE SODIUM 40 MG: 40 TABLET, DELAYED RELEASE ORAL at 05:22

## 2017-08-27 RX ADMIN — FERROUS GLUCONATE 324 MG: 324 TABLET ORAL at 08:50

## 2017-08-27 RX ADMIN — LISINOPRIL 10 MG: 10 TABLET ORAL at 08:45

## 2017-08-27 RX ADMIN — ATORVASTATIN CALCIUM 10 MG: 20 TABLET, FILM COATED ORAL at 17:45

## 2017-08-27 RX ADMIN — QUETIAPINE FUMARATE 400 MG: 100 TABLET ORAL at 22:13

## 2017-08-27 RX ADMIN — KETAMINE HYDROCHLORIDE 0.2 MG/KG/HR: 50 INJECTION, SOLUTION INTRAMUSCULAR; INTRAVENOUS at 21:45

## 2017-08-28 LAB
ANION GAP SERPL CALCULATED.3IONS-SCNC: 6 MMOL/L (ref 4–13)
APTT PPP: 87 SECONDS (ref 23–35)
APTT PPP: 88 SECONDS (ref 23–35)
BASOPHILS # BLD AUTO: 0.05 THOUSANDS/ΜL (ref 0–0.1)
BASOPHILS NFR BLD AUTO: 1 % (ref 0–1)
BUN SERPL-MCNC: 12 MG/DL (ref 5–25)
CALCIUM SERPL-MCNC: 8.1 MG/DL (ref 8.3–10.1)
CHLORIDE SERPL-SCNC: 108 MMOL/L (ref 100–108)
CO2 SERPL-SCNC: 26 MMOL/L (ref 21–32)
CREAT SERPL-MCNC: 0.84 MG/DL (ref 0.6–1.3)
EOSINOPHIL # BLD AUTO: 0.85 THOUSAND/ΜL (ref 0–0.61)
EOSINOPHIL NFR BLD AUTO: 8 % (ref 0–6)
ERYTHROCYTE [DISTWIDTH] IN BLOOD BY AUTOMATED COUNT: 15.7 % (ref 11.6–15.1)
GFR SERPL CREATININE-BSD FRML MDRD: 97 ML/MIN/1.73SQ M
GLUCOSE SERPL-MCNC: 120 MG/DL (ref 65–140)
GLUCOSE SERPL-MCNC: 153 MG/DL (ref 65–140)
GLUCOSE SERPL-MCNC: 187 MG/DL (ref 65–140)
GLUCOSE SERPL-MCNC: 188 MG/DL (ref 65–140)
GLUCOSE SERPL-MCNC: 201 MG/DL (ref 65–140)
HCT VFR BLD AUTO: 24.1 % (ref 36.5–49.3)
HGB BLD-MCNC: 7.8 G/DL (ref 12–17)
INR PPP: 2.91 (ref 0.86–1.16)
LYMPHOCYTES # BLD AUTO: 1.33 THOUSANDS/ΜL (ref 0.6–4.47)
LYMPHOCYTES NFR BLD AUTO: 12 % (ref 14–44)
MCH RBC QN AUTO: 26.9 PG (ref 26.8–34.3)
MCHC RBC AUTO-ENTMCNC: 32.4 G/DL (ref 31.4–37.4)
MCV RBC AUTO: 83 FL (ref 82–98)
MONOCYTES # BLD AUTO: 0.73 THOUSAND/ΜL (ref 0.17–1.22)
MONOCYTES NFR BLD AUTO: 7 % (ref 4–12)
NEUTROPHILS # BLD AUTO: 7.94 THOUSANDS/ΜL (ref 1.85–7.62)
NEUTS SEG NFR BLD AUTO: 72 % (ref 43–75)
NRBC BLD AUTO-RTO: 0 /100 WBCS
PLATELET # BLD AUTO: 335 THOUSANDS/UL (ref 149–390)
PMV BLD AUTO: 10.9 FL (ref 8.9–12.7)
POTASSIUM SERPL-SCNC: 3.5 MMOL/L (ref 3.5–5.3)
PROTHROMBIN TIME: 30.8 SECONDS (ref 12.1–14.4)
RBC # BLD AUTO: 2.9 MILLION/UL (ref 3.88–5.62)
SODIUM SERPL-SCNC: 140 MMOL/L (ref 136–145)
WBC # BLD AUTO: 10.93 THOUSAND/UL (ref 4.31–10.16)

## 2017-08-28 PROCEDURE — 80048 BASIC METABOLIC PNL TOTAL CA: CPT | Performed by: HOSPITALIST

## 2017-08-28 PROCEDURE — G8981 BODY POS CURRENT STATUS: HCPCS

## 2017-08-28 PROCEDURE — G8987 SELF CARE CURRENT STATUS: HCPCS

## 2017-08-28 PROCEDURE — 82948 REAGENT STRIP/BLOOD GLUCOSE: CPT

## 2017-08-28 PROCEDURE — 97163 PT EVAL HIGH COMPLEX 45 MIN: CPT

## 2017-08-28 PROCEDURE — 85025 COMPLETE CBC W/AUTO DIFF WBC: CPT | Performed by: HOSPITALIST

## 2017-08-28 PROCEDURE — 85610 PROTHROMBIN TIME: CPT | Performed by: HOSPITALIST

## 2017-08-28 PROCEDURE — G8988 SELF CARE GOAL STATUS: HCPCS

## 2017-08-28 PROCEDURE — 85730 THROMBOPLASTIN TIME PARTIAL: CPT | Performed by: HOSPITALIST

## 2017-08-28 PROCEDURE — G8982 BODY POS GOAL STATUS: HCPCS

## 2017-08-28 PROCEDURE — 85730 THROMBOPLASTIN TIME PARTIAL: CPT | Performed by: FAMILY MEDICINE

## 2017-08-28 PROCEDURE — 97167 OT EVAL HIGH COMPLEX 60 MIN: CPT

## 2017-08-28 RX ORDER — OXYCODONE HYDROCHLORIDE 10 MG/1
10 TABLET ORAL
Status: DISCONTINUED | OUTPATIENT
Start: 2017-08-28 | End: 2017-08-29

## 2017-08-28 RX ORDER — GABAPENTIN 300 MG/1
900 CAPSULE ORAL 3 TIMES DAILY
Status: DISCONTINUED | OUTPATIENT
Start: 2017-08-28 | End: 2017-09-07 | Stop reason: HOSPADM

## 2017-08-28 RX ADMIN — LORAZEPAM 1 MG: 1 TABLET ORAL at 08:34

## 2017-08-28 RX ADMIN — ARGATROBAN 2 MCG/KG/MIN: 1 INJECTION INTRAVENOUS at 16:21

## 2017-08-28 RX ADMIN — INSULIN GLARGINE 21 UNITS: 100 INJECTION, SOLUTION SUBCUTANEOUS at 22:28

## 2017-08-28 RX ADMIN — OXYCODONE HYDROCHLORIDE AND ACETAMINOPHEN 500 MG: 500 TABLET ORAL at 08:09

## 2017-08-28 RX ADMIN — INSULIN LISPRO 1 UNITS: 100 INJECTION, SOLUTION INTRAVENOUS; SUBCUTANEOUS at 17:35

## 2017-08-28 RX ADMIN — FERROUS GLUCONATE 324 MG: 324 TABLET ORAL at 17:35

## 2017-08-28 RX ADMIN — METOPROLOL SUCCINATE 25 MG: 25 TABLET, EXTENDED RELEASE ORAL at 08:09

## 2017-08-28 RX ADMIN — MELATONIN TAB 3 MG 6 MG: 3 TAB at 22:26

## 2017-08-28 RX ADMIN — GABAPENTIN 600 MG: 300 CAPSULE ORAL at 03:44

## 2017-08-28 RX ADMIN — Medication 1 TABLET: at 22:25

## 2017-08-28 RX ADMIN — ARGATROBAN 2 MCG/KG/MIN: 1 INJECTION INTRAVENOUS at 21:57

## 2017-08-28 RX ADMIN — HYDROMORPHONE HYDROCHLORIDE 0.5 MG: 1 INJECTION, SOLUTION INTRAMUSCULAR; INTRAVENOUS; SUBCUTANEOUS at 15:54

## 2017-08-28 RX ADMIN — HYDROMORPHONE HYDROCHLORIDE 0.5 MG: 1 INJECTION, SOLUTION INTRAMUSCULAR; INTRAVENOUS; SUBCUTANEOUS at 06:58

## 2017-08-28 RX ADMIN — LISINOPRIL 10 MG: 10 TABLET ORAL at 08:09

## 2017-08-28 RX ADMIN — QUETIAPINE FUMARATE 200 MG: 100 TABLET ORAL at 17:34

## 2017-08-28 RX ADMIN — INSULIN LISPRO 1 UNITS: 100 INJECTION, SOLUTION INTRAVENOUS; SUBCUTANEOUS at 22:28

## 2017-08-28 RX ADMIN — INSULIN LISPRO 3 UNITS: 100 INJECTION, SOLUTION INTRAVENOUS; SUBCUTANEOUS at 08:12

## 2017-08-28 RX ADMIN — ARGATROBAN 2 MCG/KG/MIN: 1 INJECTION INTRAVENOUS at 10:41

## 2017-08-28 RX ADMIN — HYDROMORPHONE HYDROCHLORIDE 0.5 MG: 1 INJECTION, SOLUTION INTRAMUSCULAR; INTRAVENOUS; SUBCUTANEOUS at 10:58

## 2017-08-28 RX ADMIN — INSULIN LISPRO 3 UNITS: 100 INJECTION, SOLUTION INTRAVENOUS; SUBCUTANEOUS at 17:35

## 2017-08-28 RX ADMIN — GABAPENTIN 900 MG: 300 CAPSULE ORAL at 17:32

## 2017-08-28 RX ADMIN — HYDROMORPHONE HYDROCHLORIDE 0.5 MG: 1 INJECTION, SOLUTION INTRAMUSCULAR; INTRAVENOUS; SUBCUTANEOUS at 19:57

## 2017-08-28 RX ADMIN — OXYCODONE HYDROCHLORIDE 10 MG: 10 TABLET, FILM COATED, EXTENDED RELEASE ORAL at 08:09

## 2017-08-28 RX ADMIN — INSULIN LISPRO 3 UNITS: 100 INJECTION, SOLUTION INTRAVENOUS; SUBCUTANEOUS at 11:57

## 2017-08-28 RX ADMIN — POTASSIUM CHLORIDE 20 MEQ: 1500 TABLET, EXTENDED RELEASE ORAL at 08:09

## 2017-08-28 RX ADMIN — QUETIAPINE FUMARATE 200 MG: 100 TABLET ORAL at 08:09

## 2017-08-28 RX ADMIN — OXYCODONE HYDROCHLORIDE 15 MG: 5 TABLET ORAL at 14:35

## 2017-08-28 RX ADMIN — OXYCODONE HYDROCHLORIDE AND ACETAMINOPHEN 500 MG: 500 TABLET ORAL at 17:33

## 2017-08-28 RX ADMIN — GABAPENTIN 600 MG: 300 CAPSULE ORAL at 10:58

## 2017-08-28 RX ADMIN — OXYCODONE HYDROCHLORIDE 15 MG: 5 TABLET ORAL at 08:37

## 2017-08-28 RX ADMIN — NICOTINE 1 PATCH: 14 PATCH, EXTENDED RELEASE TRANSDERMAL at 08:07

## 2017-08-28 RX ADMIN — OXYCODONE HYDROCHLORIDE 10 MG: 10 TABLET, FILM COATED, EXTENDED RELEASE ORAL at 22:26

## 2017-08-28 RX ADMIN — WARFARIN SODIUM 7.5 MG: 7.5 TABLET ORAL at 17:34

## 2017-08-28 RX ADMIN — PANTOPRAZOLE SODIUM 40 MG: 40 TABLET, DELAYED RELEASE ORAL at 06:57

## 2017-08-28 RX ADMIN — OXYCODONE HYDROCHLORIDE 15 MG: 5 TABLET ORAL at 17:33

## 2017-08-28 RX ADMIN — TORSEMIDE 20 MG: 20 TABLET ORAL at 08:09

## 2017-08-28 RX ADMIN — ATORVASTATIN CALCIUM 10 MG: 20 TABLET, FILM COATED ORAL at 17:34

## 2017-08-28 RX ADMIN — INSULIN LISPRO 1 UNITS: 100 INJECTION, SOLUTION INTRAVENOUS; SUBCUTANEOUS at 11:57

## 2017-08-28 RX ADMIN — ARGATROBAN 2 MCG/KG/MIN: 1 INJECTION INTRAVENOUS at 03:59

## 2017-08-28 RX ADMIN — OXYCODONE HYDROCHLORIDE 15 MG: 5 TABLET ORAL at 03:45

## 2017-08-28 RX ADMIN — LORAZEPAM 1 MG: 1 TABLET ORAL at 18:30

## 2017-08-28 RX ADMIN — QUETIAPINE FUMARATE 400 MG: 100 TABLET ORAL at 22:26

## 2017-08-28 RX ADMIN — FERROUS GLUCONATE 324 MG: 324 TABLET ORAL at 08:10

## 2017-08-29 LAB
APTT PPP: 101 SECONDS (ref 23–35)
GLUCOSE SERPL-MCNC: 165 MG/DL (ref 65–140)
GLUCOSE SERPL-MCNC: 181 MG/DL (ref 65–140)
GLUCOSE SERPL-MCNC: 210 MG/DL (ref 65–140)
GLUCOSE SERPL-MCNC: 253 MG/DL (ref 65–140)
INR PPP: 2.61 (ref 0.86–1.16)
INR PPP: 4.2 (ref 0.86–1.16)
INR PPP: 4.35 (ref 0.86–1.16)
PROTHROMBIN TIME: 28.3 SECONDS (ref 12.1–14.4)
PROTHROMBIN TIME: 41.2 SECONDS (ref 12.1–14.4)
PROTHROMBIN TIME: 42.4 SECONDS (ref 12.1–14.4)

## 2017-08-29 PROCEDURE — 85730 THROMBOPLASTIN TIME PARTIAL: CPT | Performed by: HOSPITALIST

## 2017-08-29 PROCEDURE — 85610 PROTHROMBIN TIME: CPT | Performed by: PHYSICIAN ASSISTANT

## 2017-08-29 PROCEDURE — 82948 REAGENT STRIP/BLOOD GLUCOSE: CPT

## 2017-08-29 PROCEDURE — 85610 PROTHROMBIN TIME: CPT | Performed by: HOSPITALIST

## 2017-08-29 RX ORDER — HYDROMORPHONE HYDROCHLORIDE 2 MG/1
4 TABLET ORAL EVERY 4 HOURS PRN
Status: DISCONTINUED | OUTPATIENT
Start: 2017-08-29 | End: 2017-09-06

## 2017-08-29 RX ORDER — WARFARIN SODIUM 7.5 MG/1
7.5 TABLET ORAL
Status: DISCONTINUED | OUTPATIENT
Start: 2017-08-30 | End: 2017-08-29

## 2017-08-29 RX ORDER — WARFARIN SODIUM 7.5 MG/1
7.5 TABLET ORAL
Status: DISCONTINUED | OUTPATIENT
Start: 2017-08-29 | End: 2017-09-06

## 2017-08-29 RX ORDER — WARFARIN SODIUM 5 MG/1
5 TABLET ORAL
Status: DISCONTINUED | OUTPATIENT
Start: 2017-08-30 | End: 2017-08-29

## 2017-08-29 RX ORDER — HYDROMORPHONE HYDROCHLORIDE 2 MG/1
2 TABLET ORAL EVERY 4 HOURS PRN
Status: DISCONTINUED | OUTPATIENT
Start: 2017-08-29 | End: 2017-09-07 | Stop reason: HOSPADM

## 2017-08-29 RX ORDER — ACETAMINOPHEN 325 MG/1
975 TABLET ORAL EVERY 8 HOURS SCHEDULED
Status: DISCONTINUED | OUTPATIENT
Start: 2017-08-29 | End: 2017-09-07 | Stop reason: HOSPADM

## 2017-08-29 RX ADMIN — ARGATROBAN 2 MCG/KG/MIN: 1 INJECTION INTRAVENOUS at 05:02

## 2017-08-29 RX ADMIN — HYDROMORPHONE HYDROCHLORIDE 4 MG: 2 TABLET ORAL at 22:27

## 2017-08-29 RX ADMIN — HYDROMORPHONE HYDROCHLORIDE 0.5 MG: 1 INJECTION, SOLUTION INTRAMUSCULAR; INTRAVENOUS; SUBCUTANEOUS at 09:03

## 2017-08-29 RX ADMIN — NICOTINE 1 PATCH: 14 PATCH, EXTENDED RELEASE TRANSDERMAL at 08:42

## 2017-08-29 RX ADMIN — HYDROMORPHONE HYDROCHLORIDE 4 MG: 2 TABLET ORAL at 18:16

## 2017-08-29 RX ADMIN — OXYCODONE HYDROCHLORIDE AND ACETAMINOPHEN 500 MG: 500 TABLET ORAL at 18:15

## 2017-08-29 RX ADMIN — GABAPENTIN 900 MG: 300 CAPSULE ORAL at 18:15

## 2017-08-29 RX ADMIN — INSULIN LISPRO 3 UNITS: 100 INJECTION, SOLUTION INTRAVENOUS; SUBCUTANEOUS at 18:17

## 2017-08-29 RX ADMIN — OXYCODONE HYDROCHLORIDE 15 MG: 5 TABLET ORAL at 05:10

## 2017-08-29 RX ADMIN — LORAZEPAM 1 MG: 1 TABLET ORAL at 22:28

## 2017-08-29 RX ADMIN — FERROUS GLUCONATE 324 MG: 324 TABLET ORAL at 18:17

## 2017-08-29 RX ADMIN — INSULIN LISPRO 3 UNITS: 100 INJECTION, SOLUTION INTRAVENOUS; SUBCUTANEOUS at 08:41

## 2017-08-29 RX ADMIN — OXYCODONE HYDROCHLORIDE 15 MG: 5 TABLET ORAL at 08:43

## 2017-08-29 RX ADMIN — OXYCODONE HYDROCHLORIDE AND ACETAMINOPHEN 500 MG: 500 TABLET ORAL at 08:40

## 2017-08-29 RX ADMIN — LISINOPRIL 10 MG: 10 TABLET ORAL at 08:39

## 2017-08-29 RX ADMIN — ATORVASTATIN CALCIUM 10 MG: 20 TABLET, FILM COATED ORAL at 18:15

## 2017-08-29 RX ADMIN — POTASSIUM CHLORIDE 20 MEQ: 1500 TABLET, EXTENDED RELEASE ORAL at 08:39

## 2017-08-29 RX ADMIN — ACETAMINOPHEN 975 MG: 325 TABLET, FILM COATED ORAL at 14:37

## 2017-08-29 RX ADMIN — INSULIN LISPRO 3 UNITS: 100 INJECTION, SOLUTION INTRAVENOUS; SUBCUTANEOUS at 12:05

## 2017-08-29 RX ADMIN — TORSEMIDE 20 MG: 20 TABLET ORAL at 08:40

## 2017-08-29 RX ADMIN — GABAPENTIN 900 MG: 300 CAPSULE ORAL at 12:05

## 2017-08-29 RX ADMIN — FERROUS GLUCONATE 324 MG: 324 TABLET ORAL at 08:40

## 2017-08-29 RX ADMIN — HYDROMORPHONE HYDROCHLORIDE 0.5 MG: 1 INJECTION, SOLUTION INTRAMUSCULAR; INTRAVENOUS; SUBCUTANEOUS at 19:22

## 2017-08-29 RX ADMIN — ARGATROBAN 2 MCG/KG/MIN: 1 INJECTION INTRAVENOUS at 03:00

## 2017-08-29 RX ADMIN — INSULIN LISPRO 1 UNITS: 100 INJECTION, SOLUTION INTRAVENOUS; SUBCUTANEOUS at 08:40

## 2017-08-29 RX ADMIN — INSULIN LISPRO 2 UNITS: 100 INJECTION, SOLUTION INTRAVENOUS; SUBCUTANEOUS at 18:17

## 2017-08-29 RX ADMIN — QUETIAPINE FUMARATE 200 MG: 100 TABLET ORAL at 08:40

## 2017-08-29 RX ADMIN — HYDROMORPHONE HYDROCHLORIDE 0.5 MG: 1 INJECTION, SOLUTION INTRAMUSCULAR; INTRAVENOUS; SUBCUTANEOUS at 15:17

## 2017-08-29 RX ADMIN — INSULIN LISPRO 2 UNITS: 100 INJECTION, SOLUTION INTRAVENOUS; SUBCUTANEOUS at 21:52

## 2017-08-29 RX ADMIN — Medication 1 TABLET: at 21:52

## 2017-08-29 RX ADMIN — HYDROMORPHONE HYDROCHLORIDE 4 MG: 2 TABLET ORAL at 12:21

## 2017-08-29 RX ADMIN — INSULIN LISPRO 1 UNITS: 100 INJECTION, SOLUTION INTRAVENOUS; SUBCUTANEOUS at 12:05

## 2017-08-29 RX ADMIN — METOPROLOL SUCCINATE 25 MG: 25 TABLET, EXTENDED RELEASE ORAL at 08:40

## 2017-08-29 RX ADMIN — MELATONIN TAB 3 MG 6 MG: 3 TAB at 21:52

## 2017-08-29 RX ADMIN — QUETIAPINE FUMARATE 200 MG: 100 TABLET ORAL at 18:16

## 2017-08-29 RX ADMIN — WARFARIN SODIUM 7.5 MG: 7.5 TABLET ORAL at 18:20

## 2017-08-29 RX ADMIN — QUETIAPINE FUMARATE 400 MG: 100 TABLET ORAL at 21:52

## 2017-08-29 RX ADMIN — INSULIN GLARGINE 21 UNITS: 100 INJECTION, SOLUTION SUBCUTANEOUS at 21:53

## 2017-08-29 RX ADMIN — ACETAMINOPHEN 975 MG: 325 TABLET, FILM COATED ORAL at 21:52

## 2017-08-29 RX ADMIN — PANTOPRAZOLE SODIUM 40 MG: 40 TABLET, DELAYED RELEASE ORAL at 05:02

## 2017-08-30 LAB
ANION GAP SERPL CALCULATED.3IONS-SCNC: 6 MMOL/L (ref 4–13)
BASOPHILS # BLD AUTO: 0.04 THOUSANDS/ΜL (ref 0–0.1)
BASOPHILS NFR BLD AUTO: 0 % (ref 0–1)
BUN SERPL-MCNC: 14 MG/DL (ref 5–25)
CALCIUM SERPL-MCNC: 8.7 MG/DL (ref 8.3–10.1)
CHLORIDE SERPL-SCNC: 106 MMOL/L (ref 100–108)
CO2 SERPL-SCNC: 28 MMOL/L (ref 21–32)
CREAT SERPL-MCNC: 0.98 MG/DL (ref 0.6–1.3)
EOSINOPHIL # BLD AUTO: 1.1 THOUSAND/ΜL (ref 0–0.61)
EOSINOPHIL NFR BLD AUTO: 10 % (ref 0–6)
ERYTHROCYTE [DISTWIDTH] IN BLOOD BY AUTOMATED COUNT: 15.8 % (ref 11.6–15.1)
GFR SERPL CREATININE-BSD FRML MDRD: 85 ML/MIN/1.73SQ M
GLUCOSE SERPL-MCNC: 158 MG/DL (ref 65–140)
GLUCOSE SERPL-MCNC: 175 MG/DL (ref 65–140)
GLUCOSE SERPL-MCNC: 183 MG/DL (ref 65–140)
GLUCOSE SERPL-MCNC: 212 MG/DL (ref 65–140)
GLUCOSE SERPL-MCNC: 223 MG/DL (ref 65–140)
HCT VFR BLD AUTO: 28.5 % (ref 36.5–49.3)
HGB BLD-MCNC: 9 G/DL (ref 12–17)
INR PPP: 2.34 (ref 0.86–1.16)
LYMPHOCYTES # BLD AUTO: 1.66 THOUSANDS/ΜL (ref 0.6–4.47)
LYMPHOCYTES NFR BLD AUTO: 15 % (ref 14–44)
MCH RBC QN AUTO: 26.4 PG (ref 26.8–34.3)
MCHC RBC AUTO-ENTMCNC: 31.6 G/DL (ref 31.4–37.4)
MCV RBC AUTO: 84 FL (ref 82–98)
MONOCYTES # BLD AUTO: 0.71 THOUSAND/ΜL (ref 0.17–1.22)
MONOCYTES NFR BLD AUTO: 7 % (ref 4–12)
NEUTROPHILS # BLD AUTO: 7.3 THOUSANDS/ΜL (ref 1.85–7.62)
NEUTS SEG NFR BLD AUTO: 68 % (ref 43–75)
NRBC BLD AUTO-RTO: 0 /100 WBCS
PLATELET # BLD AUTO: 412 THOUSANDS/UL (ref 149–390)
PMV BLD AUTO: 11.2 FL (ref 8.9–12.7)
POTASSIUM SERPL-SCNC: 4.5 MMOL/L (ref 3.5–5.3)
PROTHROMBIN TIME: 25.9 SECONDS (ref 12.1–14.4)
RBC # BLD AUTO: 3.41 MILLION/UL (ref 3.88–5.62)
SODIUM SERPL-SCNC: 140 MMOL/L (ref 136–145)
WBC # BLD AUTO: 10.85 THOUSAND/UL (ref 4.31–10.16)

## 2017-08-30 PROCEDURE — 97530 THERAPEUTIC ACTIVITIES: CPT

## 2017-08-30 PROCEDURE — 97535 SELF CARE MNGMENT TRAINING: CPT

## 2017-08-30 PROCEDURE — 97112 NEUROMUSCULAR REEDUCATION: CPT

## 2017-08-30 PROCEDURE — 85025 COMPLETE CBC W/AUTO DIFF WBC: CPT | Performed by: GENERAL PRACTICE

## 2017-08-30 PROCEDURE — 82948 REAGENT STRIP/BLOOD GLUCOSE: CPT

## 2017-08-30 PROCEDURE — 80048 BASIC METABOLIC PNL TOTAL CA: CPT | Performed by: GENERAL PRACTICE

## 2017-08-30 PROCEDURE — 85610 PROTHROMBIN TIME: CPT | Performed by: PHYSICIAN ASSISTANT

## 2017-08-30 RX ADMIN — INSULIN LISPRO 2 UNITS: 100 INJECTION, SOLUTION INTRAVENOUS; SUBCUTANEOUS at 17:28

## 2017-08-30 RX ADMIN — QUETIAPINE FUMARATE 200 MG: 100 TABLET ORAL at 09:02

## 2017-08-30 RX ADMIN — ACETAMINOPHEN 975 MG: 325 TABLET, FILM COATED ORAL at 05:31

## 2017-08-30 RX ADMIN — HYDROMORPHONE HYDROCHLORIDE 4 MG: 2 TABLET ORAL at 13:43

## 2017-08-30 RX ADMIN — HYDROMORPHONE HYDROCHLORIDE 4 MG: 2 TABLET ORAL at 09:43

## 2017-08-30 RX ADMIN — POTASSIUM CHLORIDE 20 MEQ: 1500 TABLET, EXTENDED RELEASE ORAL at 09:01

## 2017-08-30 RX ADMIN — WARFARIN SODIUM 7.5 MG: 7.5 TABLET ORAL at 17:26

## 2017-08-30 RX ADMIN — FERROUS GLUCONATE 324 MG: 324 TABLET ORAL at 09:04

## 2017-08-30 RX ADMIN — HYDROMORPHONE HYDROCHLORIDE 4 MG: 2 TABLET ORAL at 05:31

## 2017-08-30 RX ADMIN — OXYCODONE HYDROCHLORIDE AND ACETAMINOPHEN 500 MG: 500 TABLET ORAL at 09:01

## 2017-08-30 RX ADMIN — INSULIN LISPRO 3 UNITS: 100 INJECTION, SOLUTION INTRAVENOUS; SUBCUTANEOUS at 11:48

## 2017-08-30 RX ADMIN — QUETIAPINE FUMARATE 200 MG: 100 TABLET ORAL at 17:26

## 2017-08-30 RX ADMIN — GABAPENTIN 900 MG: 300 CAPSULE ORAL at 17:26

## 2017-08-30 RX ADMIN — INSULIN LISPRO 2 UNITS: 100 INJECTION, SOLUTION INTRAVENOUS; SUBCUTANEOUS at 21:52

## 2017-08-30 RX ADMIN — Medication 1 TABLET: at 21:50

## 2017-08-30 RX ADMIN — INSULIN LISPRO 3 UNITS: 100 INJECTION, SOLUTION INTRAVENOUS; SUBCUTANEOUS at 09:04

## 2017-08-30 RX ADMIN — GABAPENTIN 900 MG: 300 CAPSULE ORAL at 11:47

## 2017-08-30 RX ADMIN — HYDROMORPHONE HYDROCHLORIDE 4 MG: 2 TABLET ORAL at 21:49

## 2017-08-30 RX ADMIN — OXYCODONE HYDROCHLORIDE AND ACETAMINOPHEN 500 MG: 500 TABLET ORAL at 17:26

## 2017-08-30 RX ADMIN — MELATONIN TAB 3 MG 6 MG: 3 TAB at 21:50

## 2017-08-30 RX ADMIN — GABAPENTIN 900 MG: 300 CAPSULE ORAL at 05:31

## 2017-08-30 RX ADMIN — QUETIAPINE FUMARATE 400 MG: 100 TABLET ORAL at 21:50

## 2017-08-30 RX ADMIN — HYDROMORPHONE HYDROCHLORIDE 0.5 MG: 1 INJECTION, SOLUTION INTRAMUSCULAR; INTRAVENOUS; SUBCUTANEOUS at 23:07

## 2017-08-30 RX ADMIN — ACETAMINOPHEN 975 MG: 325 TABLET, FILM COATED ORAL at 21:50

## 2017-08-30 RX ADMIN — METOPROLOL SUCCINATE 25 MG: 25 TABLET, EXTENDED RELEASE ORAL at 09:01

## 2017-08-30 RX ADMIN — INSULIN GLARGINE 21 UNITS: 100 INJECTION, SOLUTION SUBCUTANEOUS at 21:50

## 2017-08-30 RX ADMIN — INSULIN LISPRO 3 UNITS: 100 INJECTION, SOLUTION INTRAVENOUS; SUBCUTANEOUS at 17:27

## 2017-08-30 RX ADMIN — LISINOPRIL 10 MG: 10 TABLET ORAL at 09:02

## 2017-08-30 RX ADMIN — TORSEMIDE 20 MG: 20 TABLET ORAL at 09:11

## 2017-08-30 RX ADMIN — NICOTINE 1 PATCH: 14 PATCH, EXTENDED RELEASE TRANSDERMAL at 09:06

## 2017-08-30 RX ADMIN — HYDROMORPHONE HYDROCHLORIDE 0.5 MG: 1 INJECTION, SOLUTION INTRAMUSCULAR; INTRAVENOUS; SUBCUTANEOUS at 17:28

## 2017-08-30 RX ADMIN — ACETAMINOPHEN 975 MG: 325 TABLET, FILM COATED ORAL at 13:58

## 2017-08-30 RX ADMIN — PANTOPRAZOLE SODIUM 40 MG: 40 TABLET, DELAYED RELEASE ORAL at 05:31

## 2017-08-30 RX ADMIN — INSULIN LISPRO 1 UNITS: 100 INJECTION, SOLUTION INTRAVENOUS; SUBCUTANEOUS at 09:05

## 2017-08-30 RX ADMIN — INSULIN LISPRO 1 UNITS: 100 INJECTION, SOLUTION INTRAVENOUS; SUBCUTANEOUS at 11:48

## 2017-08-30 RX ADMIN — FERROUS GLUCONATE 324 MG: 324 TABLET ORAL at 21:53

## 2017-08-30 RX ADMIN — ATORVASTATIN CALCIUM 10 MG: 20 TABLET, FILM COATED ORAL at 17:26

## 2017-08-30 RX ADMIN — HYDROMORPHONE HYDROCHLORIDE 0.5 MG: 1 INJECTION, SOLUTION INTRAMUSCULAR; INTRAVENOUS; SUBCUTANEOUS at 10:58

## 2017-08-31 ENCOUNTER — APPOINTMENT (INPATIENT)
Dept: RADIOLOGY | Facility: HOSPITAL | Age: 57
DRG: 711 | End: 2017-08-31
Payer: COMMERCIAL

## 2017-08-31 LAB
ANION GAP SERPL CALCULATED.3IONS-SCNC: 7 MMOL/L (ref 4–13)
BASOPHILS # BLD AUTO: 0.05 THOUSANDS/ΜL (ref 0–0.1)
BASOPHILS NFR BLD AUTO: 1 % (ref 0–1)
BUN SERPL-MCNC: 19 MG/DL (ref 5–25)
CALCIUM SERPL-MCNC: 8.4 MG/DL (ref 8.3–10.1)
CHLORIDE SERPL-SCNC: 104 MMOL/L (ref 100–108)
CO2 SERPL-SCNC: 27 MMOL/L (ref 21–32)
CREAT SERPL-MCNC: 1.32 MG/DL (ref 0.6–1.3)
EOSINOPHIL # BLD AUTO: 0.98 THOUSAND/ΜL (ref 0–0.61)
EOSINOPHIL NFR BLD AUTO: 10 % (ref 0–6)
ERYTHROCYTE [DISTWIDTH] IN BLOOD BY AUTOMATED COUNT: 15.9 % (ref 11.6–15.1)
GFR SERPL CREATININE-BSD FRML MDRD: 59 ML/MIN/1.73SQ M
GLUCOSE SERPL-MCNC: 144 MG/DL (ref 65–140)
GLUCOSE SERPL-MCNC: 146 MG/DL (ref 65–140)
GLUCOSE SERPL-MCNC: 149 MG/DL (ref 65–140)
GLUCOSE SERPL-MCNC: 165 MG/DL (ref 65–140)
GLUCOSE SERPL-MCNC: 201 MG/DL (ref 65–140)
HCT VFR BLD AUTO: 28.2 % (ref 36.5–49.3)
HGB BLD-MCNC: 8.9 G/DL (ref 12–17)
INR PPP: 2.88 (ref 0.86–1.16)
LYMPHOCYTES # BLD AUTO: 1.79 THOUSANDS/ΜL (ref 0.6–4.47)
LYMPHOCYTES NFR BLD AUTO: 18 % (ref 14–44)
MCH RBC QN AUTO: 26.3 PG (ref 26.8–34.3)
MCHC RBC AUTO-ENTMCNC: 31.6 G/DL (ref 31.4–37.4)
MCV RBC AUTO: 83 FL (ref 82–98)
MONOCYTES # BLD AUTO: 0.56 THOUSAND/ΜL (ref 0.17–1.22)
MONOCYTES NFR BLD AUTO: 6 % (ref 4–12)
NEUTROPHILS # BLD AUTO: 6.77 THOUSANDS/ΜL (ref 1.85–7.62)
NEUTS SEG NFR BLD AUTO: 65 % (ref 43–75)
NRBC BLD AUTO-RTO: 0 /100 WBCS
PLATELET # BLD AUTO: 424 THOUSANDS/UL (ref 149–390)
PMV BLD AUTO: 11 FL (ref 8.9–12.7)
POTASSIUM SERPL-SCNC: 5.1 MMOL/L (ref 3.5–5.3)
PROTHROMBIN TIME: 30.6 SECONDS (ref 12.1–14.4)
RBC # BLD AUTO: 3.39 MILLION/UL (ref 3.88–5.62)
SODIUM SERPL-SCNC: 138 MMOL/L (ref 136–145)
TROPONIN I SERPL-MCNC: <0.02 NG/ML
TROPONIN I SERPL-MCNC: <0.02 NG/ML
WBC # BLD AUTO: 10.19 THOUSAND/UL (ref 4.31–10.16)

## 2017-08-31 PROCEDURE — 82948 REAGENT STRIP/BLOOD GLUCOSE: CPT

## 2017-08-31 PROCEDURE — 97530 THERAPEUTIC ACTIVITIES: CPT

## 2017-08-31 PROCEDURE — 93005 ELECTROCARDIOGRAM TRACING: CPT | Performed by: FAMILY MEDICINE

## 2017-08-31 PROCEDURE — 93005 ELECTROCARDIOGRAM TRACING: CPT

## 2017-08-31 PROCEDURE — 71010 HB CHEST X-RAY 1 VIEW FRONTAL (PORTABLE): CPT

## 2017-08-31 PROCEDURE — 97535 SELF CARE MNGMENT TRAINING: CPT

## 2017-08-31 PROCEDURE — 85025 COMPLETE CBC W/AUTO DIFF WBC: CPT | Performed by: GENERAL PRACTICE

## 2017-08-31 PROCEDURE — 97112 NEUROMUSCULAR REEDUCATION: CPT

## 2017-08-31 PROCEDURE — 85610 PROTHROMBIN TIME: CPT | Performed by: PHYSICIAN ASSISTANT

## 2017-08-31 PROCEDURE — 84484 ASSAY OF TROPONIN QUANT: CPT | Performed by: FAMILY MEDICINE

## 2017-08-31 PROCEDURE — 80048 BASIC METABOLIC PNL TOTAL CA: CPT | Performed by: GENERAL PRACTICE

## 2017-08-31 RX ORDER — POLYETHYLENE GLYCOL 3350 17 G/17G
17 POWDER, FOR SOLUTION ORAL DAILY PRN
Status: DISCONTINUED | OUTPATIENT
Start: 2017-08-31 | End: 2017-09-07 | Stop reason: HOSPADM

## 2017-08-31 RX ADMIN — NICOTINE 1 PATCH: 14 PATCH, EXTENDED RELEASE TRANSDERMAL at 09:12

## 2017-08-31 RX ADMIN — HYDROMORPHONE HYDROCHLORIDE 4 MG: 2 TABLET ORAL at 06:05

## 2017-08-31 RX ADMIN — INSULIN LISPRO 3 UNITS: 100 INJECTION, SOLUTION INTRAVENOUS; SUBCUTANEOUS at 17:31

## 2017-08-31 RX ADMIN — GABAPENTIN 900 MG: 300 CAPSULE ORAL at 14:01

## 2017-08-31 RX ADMIN — INSULIN GLARGINE 21 UNITS: 100 INJECTION, SOLUTION SUBCUTANEOUS at 21:48

## 2017-08-31 RX ADMIN — NITROGLYCERIN 0.4 MG: 0.4 TABLET SUBLINGUAL at 17:46

## 2017-08-31 RX ADMIN — GABAPENTIN 900 MG: 300 CAPSULE ORAL at 17:29

## 2017-08-31 RX ADMIN — ATORVASTATIN CALCIUM 10 MG: 20 TABLET, FILM COATED ORAL at 17:29

## 2017-08-31 RX ADMIN — INSULIN LISPRO 3 UNITS: 100 INJECTION, SOLUTION INTRAVENOUS; SUBCUTANEOUS at 12:11

## 2017-08-31 RX ADMIN — POTASSIUM CHLORIDE 20 MEQ: 1500 TABLET, EXTENDED RELEASE ORAL at 09:12

## 2017-08-31 RX ADMIN — OXYCODONE HYDROCHLORIDE AND ACETAMINOPHEN 500 MG: 500 TABLET ORAL at 09:12

## 2017-08-31 RX ADMIN — INSULIN LISPRO 1 UNITS: 100 INJECTION, SOLUTION INTRAVENOUS; SUBCUTANEOUS at 12:12

## 2017-08-31 RX ADMIN — HYDROMORPHONE HYDROCHLORIDE 4 MG: 2 TABLET ORAL at 02:04

## 2017-08-31 RX ADMIN — GABAPENTIN 900 MG: 300 CAPSULE ORAL at 05:51

## 2017-08-31 RX ADMIN — LISINOPRIL 10 MG: 10 TABLET ORAL at 09:12

## 2017-08-31 RX ADMIN — PANTOPRAZOLE SODIUM 40 MG: 40 TABLET, DELAYED RELEASE ORAL at 05:51

## 2017-08-31 RX ADMIN — MELATONIN TAB 3 MG 6 MG: 3 TAB at 21:43

## 2017-08-31 RX ADMIN — Medication 1 TABLET: at 21:43

## 2017-08-31 RX ADMIN — HYDROMORPHONE HYDROCHLORIDE 2 MG: 2 TABLET ORAL at 14:03

## 2017-08-31 RX ADMIN — ACETAMINOPHEN 975 MG: 325 TABLET, FILM COATED ORAL at 05:51

## 2017-08-31 RX ADMIN — HYDROMORPHONE HYDROCHLORIDE 4 MG: 2 TABLET ORAL at 21:57

## 2017-08-31 RX ADMIN — METOPROLOL SUCCINATE 25 MG: 25 TABLET, EXTENDED RELEASE ORAL at 09:12

## 2017-08-31 RX ADMIN — INSULIN LISPRO 3 UNITS: 100 INJECTION, SOLUTION INTRAVENOUS; SUBCUTANEOUS at 09:12

## 2017-08-31 RX ADMIN — QUETIAPINE FUMARATE 200 MG: 100 TABLET ORAL at 09:12

## 2017-08-31 RX ADMIN — NITROGLYCERIN 0.4 MG: 0.4 TABLET SUBLINGUAL at 17:41

## 2017-08-31 RX ADMIN — QUETIAPINE FUMARATE 400 MG: 100 TABLET ORAL at 21:43

## 2017-08-31 RX ADMIN — QUETIAPINE FUMARATE 200 MG: 100 TABLET ORAL at 17:29

## 2017-08-31 RX ADMIN — ACETAMINOPHEN 975 MG: 325 TABLET, FILM COATED ORAL at 21:43

## 2017-08-31 RX ADMIN — HYDROMORPHONE HYDROCHLORIDE 0.2 MG: 1 INJECTION, SOLUTION INTRAMUSCULAR; INTRAVENOUS; SUBCUTANEOUS at 17:59

## 2017-08-31 RX ADMIN — ACETAMINOPHEN 975 MG: 325 TABLET, FILM COATED ORAL at 14:00

## 2017-08-31 RX ADMIN — FERROUS GLUCONATE 324 MG: 324 TABLET ORAL at 17:30

## 2017-08-31 RX ADMIN — HYDROMORPHONE HYDROCHLORIDE 4 MG: 2 TABLET ORAL at 11:00

## 2017-08-31 RX ADMIN — TORSEMIDE 20 MG: 20 TABLET ORAL at 09:12

## 2017-08-31 RX ADMIN — OXYCODONE HYDROCHLORIDE AND ACETAMINOPHEN 500 MG: 500 TABLET ORAL at 17:29

## 2017-08-31 RX ADMIN — FERROUS GLUCONATE 324 MG: 324 TABLET ORAL at 12:10

## 2017-08-31 RX ADMIN — HYDROMORPHONE HYDROCHLORIDE 0.2 MG: 1 INJECTION, SOLUTION INTRAMUSCULAR; INTRAVENOUS; SUBCUTANEOUS at 23:59

## 2017-08-31 RX ADMIN — WARFARIN SODIUM 7.5 MG: 7.5 TABLET ORAL at 17:29

## 2017-09-01 LAB
ATRIAL RATE: 74 BPM
ATRIAL RATE: 74 BPM
ATRIAL RATE: 75 BPM
ATRIAL RATE: 76 BPM
GLUCOSE SERPL-MCNC: 147 MG/DL (ref 65–140)
GLUCOSE SERPL-MCNC: 155 MG/DL (ref 65–140)
GLUCOSE SERPL-MCNC: 189 MG/DL (ref 65–140)
GLUCOSE SERPL-MCNC: 192 MG/DL (ref 65–140)
GLUCOSE SERPL-MCNC: 211 MG/DL (ref 65–140)
INR PPP: 2.65 (ref 0.86–1.16)
P AXIS: 214 DEGREES
P AXIS: 53 DEGREES
P AXIS: 78 DEGREES
P AXIS: 96 DEGREES
PR INTERVAL: 174 MS
PR INTERVAL: 198 MS
PR INTERVAL: 198 MS
PR INTERVAL: 206 MS
PROTHROMBIN TIME: 28.6 SECONDS (ref 12.1–14.4)
QRS AXIS: 76 DEGREES
QRS AXIS: 78 DEGREES
QRS AXIS: 78 DEGREES
QRS AXIS: 80 DEGREES
QRSD INTERVAL: 108 MS
QRSD INTERVAL: 110 MS
QRSD INTERVAL: 112 MS
QRSD INTERVAL: 94 MS
QT INTERVAL: 414 MS
QT INTERVAL: 414 MS
QT INTERVAL: 430 MS
QT INTERVAL: 434 MS
QTC INTERVAL: 459 MS
QTC INTERVAL: 462 MS
QTC INTERVAL: 480 MS
QTC INTERVAL: 481 MS
T WAVE AXIS: 118 DEGREES
T WAVE AXIS: 128 DEGREES
T WAVE AXIS: 155 DEGREES
T WAVE AXIS: 89 DEGREES
TROPONIN I SERPL-MCNC: <0.02 NG/ML
VENTRICULAR RATE: 74 BPM
VENTRICULAR RATE: 74 BPM
VENTRICULAR RATE: 75 BPM
VENTRICULAR RATE: 75 BPM

## 2017-09-01 PROCEDURE — 97535 SELF CARE MNGMENT TRAINING: CPT

## 2017-09-01 PROCEDURE — 82948 REAGENT STRIP/BLOOD GLUCOSE: CPT

## 2017-09-01 PROCEDURE — 85610 PROTHROMBIN TIME: CPT | Performed by: PHYSICIAN ASSISTANT

## 2017-09-01 RX ADMIN — HYDROMORPHONE HYDROCHLORIDE 0.2 MG: 1 INJECTION, SOLUTION INTRAMUSCULAR; INTRAVENOUS; SUBCUTANEOUS at 16:43

## 2017-09-01 RX ADMIN — WARFARIN SODIUM 7.5 MG: 7.5 TABLET ORAL at 17:28

## 2017-09-01 RX ADMIN — INSULIN LISPRO 3 UNITS: 100 INJECTION, SOLUTION INTRAVENOUS; SUBCUTANEOUS at 17:29

## 2017-09-01 RX ADMIN — ATORVASTATIN CALCIUM 10 MG: 20 TABLET, FILM COATED ORAL at 16:43

## 2017-09-01 RX ADMIN — INSULIN LISPRO 3 UNITS: 100 INJECTION, SOLUTION INTRAVENOUS; SUBCUTANEOUS at 08:30

## 2017-09-01 RX ADMIN — QUETIAPINE FUMARATE 200 MG: 100 TABLET ORAL at 09:28

## 2017-09-01 RX ADMIN — QUETIAPINE FUMARATE 200 MG: 100 TABLET ORAL at 16:43

## 2017-09-01 RX ADMIN — GABAPENTIN 900 MG: 300 CAPSULE ORAL at 17:28

## 2017-09-01 RX ADMIN — INSULIN GLARGINE 21 UNITS: 100 INJECTION, SOLUTION SUBCUTANEOUS at 22:17

## 2017-09-01 RX ADMIN — NICOTINE 1 PATCH: 14 PATCH, EXTENDED RELEASE TRANSDERMAL at 09:30

## 2017-09-01 RX ADMIN — QUETIAPINE FUMARATE 400 MG: 100 TABLET ORAL at 22:15

## 2017-09-01 RX ADMIN — MELATONIN TAB 3 MG 6 MG: 3 TAB at 22:16

## 2017-09-01 RX ADMIN — INSULIN LISPRO 2 UNITS: 100 INJECTION, SOLUTION INTRAVENOUS; SUBCUTANEOUS at 17:29

## 2017-09-01 RX ADMIN — PANTOPRAZOLE SODIUM 40 MG: 40 TABLET, DELAYED RELEASE ORAL at 05:34

## 2017-09-01 RX ADMIN — ACETAMINOPHEN 975 MG: 325 TABLET, FILM COATED ORAL at 05:34

## 2017-09-01 RX ADMIN — HYDROMORPHONE HYDROCHLORIDE 2 MG: 2 TABLET ORAL at 09:31

## 2017-09-01 RX ADMIN — INSULIN LISPRO 1 UNITS: 100 INJECTION, SOLUTION INTRAVENOUS; SUBCUTANEOUS at 22:18

## 2017-09-01 RX ADMIN — ACETAMINOPHEN 975 MG: 325 TABLET, FILM COATED ORAL at 13:02

## 2017-09-01 RX ADMIN — TORSEMIDE 20 MG: 20 TABLET ORAL at 09:28

## 2017-09-01 RX ADMIN — METOPROLOL SUCCINATE 25 MG: 25 TABLET, EXTENDED RELEASE ORAL at 09:28

## 2017-09-01 RX ADMIN — ACETAMINOPHEN 975 MG: 325 TABLET, FILM COATED ORAL at 22:16

## 2017-09-01 RX ADMIN — HYDROMORPHONE HYDROCHLORIDE 4 MG: 2 TABLET ORAL at 02:57

## 2017-09-01 RX ADMIN — FERROUS GLUCONATE 324 MG: 324 TABLET ORAL at 10:00

## 2017-09-01 RX ADMIN — INSULIN LISPRO 3 UNITS: 100 INJECTION, SOLUTION INTRAVENOUS; SUBCUTANEOUS at 12:30

## 2017-09-01 RX ADMIN — Medication 1 TABLET: at 22:16

## 2017-09-01 RX ADMIN — LISINOPRIL 10 MG: 10 TABLET ORAL at 09:28

## 2017-09-01 RX ADMIN — GABAPENTIN 900 MG: 300 CAPSULE ORAL at 12:59

## 2017-09-01 RX ADMIN — FERROUS GLUCONATE 324 MG: 324 TABLET ORAL at 17:28

## 2017-09-01 RX ADMIN — OXYCODONE HYDROCHLORIDE AND ACETAMINOPHEN 500 MG: 500 TABLET ORAL at 17:28

## 2017-09-01 RX ADMIN — POTASSIUM CHLORIDE 20 MEQ: 1500 TABLET, EXTENDED RELEASE ORAL at 09:28

## 2017-09-01 RX ADMIN — GABAPENTIN 900 MG: 300 CAPSULE ORAL at 05:34

## 2017-09-01 RX ADMIN — INSULIN LISPRO 1 UNITS: 100 INJECTION, SOLUTION INTRAVENOUS; SUBCUTANEOUS at 12:30

## 2017-09-01 RX ADMIN — OXYCODONE HYDROCHLORIDE AND ACETAMINOPHEN 500 MG: 500 TABLET ORAL at 09:28

## 2017-09-01 RX ADMIN — HYDROMORPHONE HYDROCHLORIDE 4 MG: 2 TABLET ORAL at 13:59

## 2017-09-02 LAB
GLUCOSE SERPL-MCNC: 141 MG/DL (ref 65–140)
GLUCOSE SERPL-MCNC: 164 MG/DL (ref 65–140)
GLUCOSE SERPL-MCNC: 226 MG/DL (ref 65–140)
GLUCOSE SERPL-MCNC: 235 MG/DL (ref 65–140)
INR PPP: 2.78 (ref 0.86–1.16)
PROTHROMBIN TIME: 29.7 SECONDS (ref 12.1–14.4)

## 2017-09-02 PROCEDURE — 82948 REAGENT STRIP/BLOOD GLUCOSE: CPT

## 2017-09-02 PROCEDURE — 97112 NEUROMUSCULAR REEDUCATION: CPT

## 2017-09-02 PROCEDURE — 85610 PROTHROMBIN TIME: CPT | Performed by: GENERAL PRACTICE

## 2017-09-02 PROCEDURE — 97530 THERAPEUTIC ACTIVITIES: CPT

## 2017-09-02 RX ADMIN — OXYCODONE HYDROCHLORIDE AND ACETAMINOPHEN 500 MG: 500 TABLET ORAL at 17:20

## 2017-09-02 RX ADMIN — OXYCODONE HYDROCHLORIDE AND ACETAMINOPHEN 500 MG: 500 TABLET ORAL at 08:43

## 2017-09-02 RX ADMIN — GABAPENTIN 900 MG: 300 CAPSULE ORAL at 05:53

## 2017-09-02 RX ADMIN — ALTEPLASE 2 MG: 2.2 INJECTION, POWDER, LYOPHILIZED, FOR SOLUTION INTRAVENOUS at 05:30

## 2017-09-02 RX ADMIN — WARFARIN SODIUM 7.5 MG: 7.5 TABLET ORAL at 17:20

## 2017-09-02 RX ADMIN — METOPROLOL SUCCINATE 25 MG: 25 TABLET, EXTENDED RELEASE ORAL at 08:42

## 2017-09-02 RX ADMIN — HYDROMORPHONE HYDROCHLORIDE 4 MG: 2 TABLET ORAL at 17:22

## 2017-09-02 RX ADMIN — INSULIN LISPRO 3 UNITS: 100 INJECTION, SOLUTION INTRAVENOUS; SUBCUTANEOUS at 17:21

## 2017-09-02 RX ADMIN — GABAPENTIN 900 MG: 300 CAPSULE ORAL at 17:21

## 2017-09-02 RX ADMIN — GABAPENTIN 900 MG: 300 CAPSULE ORAL at 12:14

## 2017-09-02 RX ADMIN — INSULIN LISPRO 1 UNITS: 100 INJECTION, SOLUTION INTRAVENOUS; SUBCUTANEOUS at 22:02

## 2017-09-02 RX ADMIN — QUETIAPINE FUMARATE 400 MG: 100 TABLET ORAL at 21:59

## 2017-09-02 RX ADMIN — Medication 1 TABLET: at 22:00

## 2017-09-02 RX ADMIN — MELATONIN TAB 3 MG 6 MG: 3 TAB at 22:00

## 2017-09-02 RX ADMIN — LISINOPRIL 10 MG: 10 TABLET ORAL at 08:43

## 2017-09-02 RX ADMIN — FERROUS GLUCONATE 324 MG: 324 TABLET ORAL at 17:20

## 2017-09-02 RX ADMIN — INSULIN LISPRO 2 UNITS: 100 INJECTION, SOLUTION INTRAVENOUS; SUBCUTANEOUS at 12:16

## 2017-09-02 RX ADMIN — PANTOPRAZOLE SODIUM 40 MG: 40 TABLET, DELAYED RELEASE ORAL at 05:53

## 2017-09-02 RX ADMIN — QUETIAPINE FUMARATE 200 MG: 100 TABLET ORAL at 17:20

## 2017-09-02 RX ADMIN — ATORVASTATIN CALCIUM 10 MG: 20 TABLET, FILM COATED ORAL at 17:21

## 2017-09-02 RX ADMIN — INSULIN LISPRO 2 UNITS: 100 INJECTION, SOLUTION INTRAVENOUS; SUBCUTANEOUS at 17:21

## 2017-09-02 RX ADMIN — QUETIAPINE FUMARATE 200 MG: 100 TABLET ORAL at 08:43

## 2017-09-02 RX ADMIN — ACETAMINOPHEN 975 MG: 325 TABLET, FILM COATED ORAL at 05:53

## 2017-09-02 RX ADMIN — ACETAMINOPHEN 975 MG: 325 TABLET, FILM COATED ORAL at 21:59

## 2017-09-02 RX ADMIN — NICOTINE 1 PATCH: 14 PATCH, EXTENDED RELEASE TRANSDERMAL at 08:45

## 2017-09-02 RX ADMIN — HYDROMORPHONE HYDROCHLORIDE 2 MG: 2 TABLET ORAL at 22:00

## 2017-09-02 RX ADMIN — FERROUS GLUCONATE 324 MG: 324 TABLET ORAL at 08:46

## 2017-09-02 RX ADMIN — TORSEMIDE 20 MG: 20 TABLET ORAL at 08:43

## 2017-09-02 RX ADMIN — ACETAMINOPHEN 975 MG: 325 TABLET, FILM COATED ORAL at 13:28

## 2017-09-02 RX ADMIN — INSULIN GLARGINE 21 UNITS: 100 INJECTION, SOLUTION SUBCUTANEOUS at 21:59

## 2017-09-02 RX ADMIN — INSULIN LISPRO 3 UNITS: 100 INJECTION, SOLUTION INTRAVENOUS; SUBCUTANEOUS at 12:15

## 2017-09-02 RX ADMIN — HYDROMORPHONE HYDROCHLORIDE 4 MG: 2 TABLET ORAL at 13:29

## 2017-09-02 RX ADMIN — POTASSIUM CHLORIDE 20 MEQ: 1500 TABLET, EXTENDED RELEASE ORAL at 08:43

## 2017-09-02 RX ADMIN — INSULIN LISPRO 3 UNITS: 100 INJECTION, SOLUTION INTRAVENOUS; SUBCUTANEOUS at 08:49

## 2017-09-03 LAB
ANION GAP SERPL CALCULATED.3IONS-SCNC: 8 MMOL/L (ref 4–13)
BUN SERPL-MCNC: 25 MG/DL (ref 5–25)
CALCIUM SERPL-MCNC: 8.8 MG/DL (ref 8.3–10.1)
CHLORIDE SERPL-SCNC: 104 MMOL/L (ref 100–108)
CO2 SERPL-SCNC: 26 MMOL/L (ref 21–32)
CREAT SERPL-MCNC: 1.05 MG/DL (ref 0.6–1.3)
ERYTHROCYTE [DISTWIDTH] IN BLOOD BY AUTOMATED COUNT: 16 % (ref 11.6–15.1)
GFR SERPL CREATININE-BSD FRML MDRD: 78 ML/MIN/1.73SQ M
GLUCOSE SERPL-MCNC: 125 MG/DL (ref 65–140)
GLUCOSE SERPL-MCNC: 140 MG/DL (ref 65–140)
GLUCOSE SERPL-MCNC: 159 MG/DL (ref 65–140)
GLUCOSE SERPL-MCNC: 209 MG/DL (ref 65–140)
GLUCOSE SERPL-MCNC: 244 MG/DL (ref 65–140)
HCT VFR BLD AUTO: 27.8 % (ref 36.5–49.3)
HGB BLD-MCNC: 8.6 G/DL (ref 12–17)
INR PPP: 3.18 (ref 0.86–1.16)
MAGNESIUM SERPL-MCNC: 2.6 MG/DL (ref 1.6–2.6)
MCH RBC QN AUTO: 26.1 PG (ref 26.8–34.3)
MCHC RBC AUTO-ENTMCNC: 30.9 G/DL (ref 31.4–37.4)
MCV RBC AUTO: 85 FL (ref 82–98)
PLATELET # BLD AUTO: 401 THOUSANDS/UL (ref 149–390)
PMV BLD AUTO: 11.4 FL (ref 8.9–12.7)
POTASSIUM SERPL-SCNC: 4.4 MMOL/L (ref 3.5–5.3)
PROTHROMBIN TIME: 33.1 SECONDS (ref 12.1–14.4)
RBC # BLD AUTO: 3.29 MILLION/UL (ref 3.88–5.62)
SODIUM SERPL-SCNC: 138 MMOL/L (ref 136–145)
WBC # BLD AUTO: 6.58 THOUSAND/UL (ref 4.31–10.16)

## 2017-09-03 PROCEDURE — 83735 ASSAY OF MAGNESIUM: CPT | Performed by: GENERAL PRACTICE

## 2017-09-03 PROCEDURE — 85027 COMPLETE CBC AUTOMATED: CPT | Performed by: GENERAL PRACTICE

## 2017-09-03 PROCEDURE — 85610 PROTHROMBIN TIME: CPT | Performed by: GENERAL PRACTICE

## 2017-09-03 PROCEDURE — 82948 REAGENT STRIP/BLOOD GLUCOSE: CPT

## 2017-09-03 PROCEDURE — 80048 BASIC METABOLIC PNL TOTAL CA: CPT | Performed by: GENERAL PRACTICE

## 2017-09-03 RX ADMIN — HYDROMORPHONE HYDROCHLORIDE 4 MG: 2 TABLET ORAL at 19:34

## 2017-09-03 RX ADMIN — ATORVASTATIN CALCIUM 10 MG: 20 TABLET, FILM COATED ORAL at 16:58

## 2017-09-03 RX ADMIN — GABAPENTIN 900 MG: 300 CAPSULE ORAL at 04:52

## 2017-09-03 RX ADMIN — TORSEMIDE 20 MG: 20 TABLET ORAL at 08:54

## 2017-09-03 RX ADMIN — OXYCODONE HYDROCHLORIDE AND ACETAMINOPHEN 500 MG: 500 TABLET ORAL at 17:05

## 2017-09-03 RX ADMIN — INSULIN LISPRO 1 UNITS: 100 INJECTION, SOLUTION INTRAVENOUS; SUBCUTANEOUS at 21:47

## 2017-09-03 RX ADMIN — ACETAMINOPHEN 975 MG: 325 TABLET, FILM COATED ORAL at 21:46

## 2017-09-03 RX ADMIN — HYDROMORPHONE HYDROCHLORIDE 4 MG: 2 TABLET ORAL at 14:15

## 2017-09-03 RX ADMIN — HYDROMORPHONE HYDROCHLORIDE 0.2 MG: 1 INJECTION, SOLUTION INTRAMUSCULAR; INTRAVENOUS; SUBCUTANEOUS at 10:39

## 2017-09-03 RX ADMIN — HYDROMORPHONE HYDROCHLORIDE 4 MG: 2 TABLET ORAL at 08:54

## 2017-09-03 RX ADMIN — HYDROMORPHONE HYDROCHLORIDE 0.2 MG: 1 INJECTION, SOLUTION INTRAMUSCULAR; INTRAVENOUS; SUBCUTANEOUS at 17:10

## 2017-09-03 RX ADMIN — QUETIAPINE FUMARATE 200 MG: 100 TABLET ORAL at 16:59

## 2017-09-03 RX ADMIN — OXYCODONE HYDROCHLORIDE AND ACETAMINOPHEN 500 MG: 500 TABLET ORAL at 08:54

## 2017-09-03 RX ADMIN — WARFARIN SODIUM 7.5 MG: 7.5 TABLET ORAL at 17:01

## 2017-09-03 RX ADMIN — LORAZEPAM 1 MG: 1 TABLET ORAL at 11:39

## 2017-09-03 RX ADMIN — GABAPENTIN 900 MG: 300 CAPSULE ORAL at 11:33

## 2017-09-03 RX ADMIN — MELATONIN TAB 3 MG 6 MG: 3 TAB at 21:47

## 2017-09-03 RX ADMIN — PANTOPRAZOLE SODIUM 40 MG: 40 TABLET, DELAYED RELEASE ORAL at 04:53

## 2017-09-03 RX ADMIN — QUETIAPINE FUMARATE 400 MG: 100 TABLET ORAL at 21:46

## 2017-09-03 RX ADMIN — INSULIN LISPRO 3 UNITS: 100 INJECTION, SOLUTION INTRAVENOUS; SUBCUTANEOUS at 09:02

## 2017-09-03 RX ADMIN — LISINOPRIL 10 MG: 10 TABLET ORAL at 08:54

## 2017-09-03 RX ADMIN — METOPROLOL SUCCINATE 25 MG: 25 TABLET, EXTENDED RELEASE ORAL at 08:54

## 2017-09-03 RX ADMIN — ACETAMINOPHEN 975 MG: 325 TABLET, FILM COATED ORAL at 14:19

## 2017-09-03 RX ADMIN — GABAPENTIN 900 MG: 300 CAPSULE ORAL at 17:00

## 2017-09-03 RX ADMIN — POTASSIUM CHLORIDE 20 MEQ: 1500 TABLET, EXTENDED RELEASE ORAL at 08:55

## 2017-09-03 RX ADMIN — ACETAMINOPHEN 975 MG: 325 TABLET, FILM COATED ORAL at 04:53

## 2017-09-03 RX ADMIN — INSULIN LISPRO 2 UNITS: 100 INJECTION, SOLUTION INTRAVENOUS; SUBCUTANEOUS at 17:01

## 2017-09-03 RX ADMIN — QUETIAPINE FUMARATE 200 MG: 100 TABLET ORAL at 08:55

## 2017-09-03 RX ADMIN — INSULIN LISPRO 3 UNITS: 100 INJECTION, SOLUTION INTRAVENOUS; SUBCUTANEOUS at 12:06

## 2017-09-03 RX ADMIN — NICOTINE 1 PATCH: 14 PATCH, EXTENDED RELEASE TRANSDERMAL at 08:52

## 2017-09-03 RX ADMIN — INSULIN LISPRO 3 UNITS: 100 INJECTION, SOLUTION INTRAVENOUS; SUBCUTANEOUS at 17:02

## 2017-09-03 RX ADMIN — INSULIN GLARGINE 21 UNITS: 100 INJECTION, SOLUTION SUBCUTANEOUS at 21:46

## 2017-09-03 RX ADMIN — Medication 1 TABLET: at 21:46

## 2017-09-03 RX ADMIN — FERROUS GLUCONATE 324 MG: 324 TABLET ORAL at 08:56

## 2017-09-03 RX ADMIN — FERROUS GLUCONATE 324 MG: 324 TABLET ORAL at 17:05

## 2017-09-04 LAB
GLUCOSE SERPL-MCNC: 173 MG/DL (ref 65–140)
GLUCOSE SERPL-MCNC: 190 MG/DL (ref 65–140)
GLUCOSE SERPL-MCNC: 264 MG/DL (ref 65–140)
GLUCOSE SERPL-MCNC: 91 MG/DL (ref 65–140)
INR PPP: 2.85 (ref 0.86–1.16)
PROTHROMBIN TIME: 30.3 SECONDS (ref 12.1–14.4)

## 2017-09-04 PROCEDURE — 82948 REAGENT STRIP/BLOOD GLUCOSE: CPT

## 2017-09-04 PROCEDURE — 85610 PROTHROMBIN TIME: CPT | Performed by: GENERAL PRACTICE

## 2017-09-04 PROCEDURE — 97530 THERAPEUTIC ACTIVITIES: CPT

## 2017-09-04 RX ADMIN — Medication 1 TABLET: at 21:23

## 2017-09-04 RX ADMIN — HYDROMORPHONE HYDROCHLORIDE 4 MG: 2 TABLET ORAL at 19:38

## 2017-09-04 RX ADMIN — INSULIN LISPRO 3 UNITS: 100 INJECTION, SOLUTION INTRAVENOUS; SUBCUTANEOUS at 09:01

## 2017-09-04 RX ADMIN — ACETAMINOPHEN 975 MG: 325 TABLET, FILM COATED ORAL at 21:23

## 2017-09-04 RX ADMIN — GABAPENTIN 900 MG: 300 CAPSULE ORAL at 06:05

## 2017-09-04 RX ADMIN — OXYCODONE HYDROCHLORIDE AND ACETAMINOPHEN 500 MG: 500 TABLET ORAL at 17:00

## 2017-09-04 RX ADMIN — ACETAMINOPHEN 975 MG: 325 TABLET, FILM COATED ORAL at 06:04

## 2017-09-04 RX ADMIN — QUETIAPINE FUMARATE 200 MG: 100 TABLET ORAL at 09:00

## 2017-09-04 RX ADMIN — FERROUS GLUCONATE 324 MG: 324 TABLET ORAL at 17:00

## 2017-09-04 RX ADMIN — NICOTINE 1 PATCH: 14 PATCH, EXTENDED RELEASE TRANSDERMAL at 08:59

## 2017-09-04 RX ADMIN — GABAPENTIN 900 MG: 300 CAPSULE ORAL at 12:05

## 2017-09-04 RX ADMIN — QUETIAPINE FUMARATE 200 MG: 100 TABLET ORAL at 16:57

## 2017-09-04 RX ADMIN — HYDROMORPHONE HYDROCHLORIDE 4 MG: 2 TABLET ORAL at 02:44

## 2017-09-04 RX ADMIN — MELATONIN TAB 3 MG 6 MG: 3 TAB at 21:23

## 2017-09-04 RX ADMIN — INSULIN LISPRO 2 UNITS: 100 INJECTION, SOLUTION INTRAVENOUS; SUBCUTANEOUS at 09:01

## 2017-09-04 RX ADMIN — LISINOPRIL 10 MG: 10 TABLET ORAL at 08:59

## 2017-09-04 RX ADMIN — PANTOPRAZOLE SODIUM 40 MG: 40 TABLET, DELAYED RELEASE ORAL at 06:05

## 2017-09-04 RX ADMIN — INSULIN GLARGINE 21 UNITS: 100 INJECTION, SOLUTION SUBCUTANEOUS at 21:24

## 2017-09-04 RX ADMIN — HYDROMORPHONE HYDROCHLORIDE 4 MG: 2 TABLET ORAL at 13:44

## 2017-09-04 RX ADMIN — POTASSIUM CHLORIDE 20 MEQ: 1500 TABLET, EXTENDED RELEASE ORAL at 08:59

## 2017-09-04 RX ADMIN — GABAPENTIN 900 MG: 300 CAPSULE ORAL at 17:00

## 2017-09-04 RX ADMIN — INSULIN LISPRO 1 UNITS: 100 INJECTION, SOLUTION INTRAVENOUS; SUBCUTANEOUS at 21:27

## 2017-09-04 RX ADMIN — QUETIAPINE FUMARATE 400 MG: 100 TABLET ORAL at 21:23

## 2017-09-04 RX ADMIN — INSULIN LISPRO 3 UNITS: 100 INJECTION, SOLUTION INTRAVENOUS; SUBCUTANEOUS at 12:05

## 2017-09-04 RX ADMIN — OXYCODONE HYDROCHLORIDE AND ACETAMINOPHEN 500 MG: 500 TABLET ORAL at 08:59

## 2017-09-04 RX ADMIN — INSULIN LISPRO 1 UNITS: 100 INJECTION, SOLUTION INTRAVENOUS; SUBCUTANEOUS at 16:58

## 2017-09-04 RX ADMIN — ACETAMINOPHEN 975 MG: 325 TABLET, FILM COATED ORAL at 13:42

## 2017-09-04 RX ADMIN — WARFARIN SODIUM 7.5 MG: 7.5 TABLET ORAL at 17:00

## 2017-09-04 RX ADMIN — TORSEMIDE 20 MG: 20 TABLET ORAL at 09:00

## 2017-09-04 RX ADMIN — FERROUS GLUCONATE 324 MG: 324 TABLET ORAL at 09:01

## 2017-09-04 RX ADMIN — ATORVASTATIN CALCIUM 10 MG: 20 TABLET, FILM COATED ORAL at 16:57

## 2017-09-04 RX ADMIN — METOPROLOL SUCCINATE 25 MG: 25 TABLET, EXTENDED RELEASE ORAL at 09:00

## 2017-09-04 RX ADMIN — INSULIN LISPRO 3 UNITS: 100 INJECTION, SOLUTION INTRAVENOUS; SUBCUTANEOUS at 16:58

## 2017-09-04 RX ADMIN — HYDROMORPHONE HYDROCHLORIDE 4 MG: 2 TABLET ORAL at 08:59

## 2017-09-05 LAB
GLUCOSE SERPL-MCNC: 102 MG/DL (ref 65–140)
GLUCOSE SERPL-MCNC: 193 MG/DL (ref 65–140)
GLUCOSE SERPL-MCNC: 215 MG/DL (ref 65–140)
GLUCOSE SERPL-MCNC: 93 MG/DL (ref 65–140)
INR PPP: 3.58 (ref 0.86–1.16)
PROTHROMBIN TIME: 36.3 SECONDS (ref 12.1–14.4)

## 2017-09-05 PROCEDURE — 97535 SELF CARE MNGMENT TRAINING: CPT

## 2017-09-05 PROCEDURE — 82948 REAGENT STRIP/BLOOD GLUCOSE: CPT

## 2017-09-05 PROCEDURE — 85610 PROTHROMBIN TIME: CPT | Performed by: GENERAL PRACTICE

## 2017-09-05 PROCEDURE — 97116 GAIT TRAINING THERAPY: CPT

## 2017-09-05 RX ADMIN — MELATONIN TAB 3 MG 6 MG: 3 TAB at 22:04

## 2017-09-05 RX ADMIN — GABAPENTIN 900 MG: 300 CAPSULE ORAL at 17:23

## 2017-09-05 RX ADMIN — TORSEMIDE 20 MG: 20 TABLET ORAL at 08:26

## 2017-09-05 RX ADMIN — GABAPENTIN 900 MG: 300 CAPSULE ORAL at 11:49

## 2017-09-05 RX ADMIN — HYDROMORPHONE HYDROCHLORIDE 2 MG: 2 TABLET ORAL at 16:30

## 2017-09-05 RX ADMIN — METOPROLOL SUCCINATE 25 MG: 25 TABLET, EXTENDED RELEASE ORAL at 08:26

## 2017-09-05 RX ADMIN — INSULIN LISPRO 3 UNITS: 100 INJECTION, SOLUTION INTRAVENOUS; SUBCUTANEOUS at 16:31

## 2017-09-05 RX ADMIN — HYDROMORPHONE HYDROCHLORIDE 4 MG: 2 TABLET ORAL at 13:53

## 2017-09-05 RX ADMIN — ACETAMINOPHEN 975 MG: 325 TABLET, FILM COATED ORAL at 13:54

## 2017-09-05 RX ADMIN — INSULIN LISPRO 2 UNITS: 100 INJECTION, SOLUTION INTRAVENOUS; SUBCUTANEOUS at 16:31

## 2017-09-05 RX ADMIN — FERROUS GLUCONATE 324 MG: 324 TABLET ORAL at 08:26

## 2017-09-05 RX ADMIN — Medication 1 TABLET: at 22:05

## 2017-09-05 RX ADMIN — QUETIAPINE FUMARATE 400 MG: 100 TABLET ORAL at 22:04

## 2017-09-05 RX ADMIN — QUETIAPINE FUMARATE 200 MG: 100 TABLET ORAL at 16:30

## 2017-09-05 RX ADMIN — INSULIN LISPRO 2 UNITS: 100 INJECTION, SOLUTION INTRAVENOUS; SUBCUTANEOUS at 22:04

## 2017-09-05 RX ADMIN — WARFARIN SODIUM 7.5 MG: 7.5 TABLET ORAL at 17:23

## 2017-09-05 RX ADMIN — POTASSIUM CHLORIDE 20 MEQ: 1500 TABLET, EXTENDED RELEASE ORAL at 08:26

## 2017-09-05 RX ADMIN — GABAPENTIN 900 MG: 300 CAPSULE ORAL at 05:00

## 2017-09-05 RX ADMIN — INSULIN LISPRO 3 UNITS: 100 INJECTION, SOLUTION INTRAVENOUS; SUBCUTANEOUS at 11:50

## 2017-09-05 RX ADMIN — ATORVASTATIN CALCIUM 10 MG: 20 TABLET, FILM COATED ORAL at 16:30

## 2017-09-05 RX ADMIN — PANTOPRAZOLE SODIUM 40 MG: 40 TABLET, DELAYED RELEASE ORAL at 05:00

## 2017-09-05 RX ADMIN — QUETIAPINE FUMARATE 200 MG: 100 TABLET ORAL at 08:26

## 2017-09-05 RX ADMIN — OXYCODONE HYDROCHLORIDE AND ACETAMINOPHEN 500 MG: 500 TABLET ORAL at 17:23

## 2017-09-05 RX ADMIN — NICOTINE 1 PATCH: 14 PATCH, EXTENDED RELEASE TRANSDERMAL at 08:28

## 2017-09-05 RX ADMIN — INSULIN LISPRO 3 UNITS: 100 INJECTION, SOLUTION INTRAVENOUS; SUBCUTANEOUS at 07:55

## 2017-09-05 RX ADMIN — HYDROMORPHONE HYDROCHLORIDE 4 MG: 2 TABLET ORAL at 20:37

## 2017-09-05 RX ADMIN — FERROUS GLUCONATE 324 MG: 324 TABLET ORAL at 17:23

## 2017-09-05 RX ADMIN — ACETAMINOPHEN 975 MG: 325 TABLET, FILM COATED ORAL at 22:04

## 2017-09-05 RX ADMIN — LISINOPRIL 10 MG: 10 TABLET ORAL at 08:26

## 2017-09-05 RX ADMIN — OXYCODONE HYDROCHLORIDE AND ACETAMINOPHEN 500 MG: 500 TABLET ORAL at 08:26

## 2017-09-05 RX ADMIN — ACETAMINOPHEN 975 MG: 325 TABLET, FILM COATED ORAL at 05:00

## 2017-09-05 RX ADMIN — INSULIN GLARGINE 21 UNITS: 100 INJECTION, SOLUTION SUBCUTANEOUS at 22:05

## 2017-09-06 VITALS
SYSTOLIC BLOOD PRESSURE: 144 MMHG | WEIGHT: 174.6 LBS | DIASTOLIC BLOOD PRESSURE: 54 MMHG | HEART RATE: 71 BPM | OXYGEN SATURATION: 98 % | BODY MASS INDEX: 23.14 KG/M2 | RESPIRATION RATE: 18 BRPM | HEIGHT: 73 IN | TEMPERATURE: 98.2 F

## 2017-09-06 LAB
ANION GAP SERPL CALCULATED.3IONS-SCNC: 7 MMOL/L (ref 4–13)
BASOPHILS # BLD AUTO: 0.07 THOUSANDS/ΜL (ref 0–0.1)
BASOPHILS NFR BLD AUTO: 1 % (ref 0–1)
BUN SERPL-MCNC: 32 MG/DL (ref 5–25)
CALCIUM SERPL-MCNC: 8.7 MG/DL (ref 8.3–10.1)
CHLORIDE SERPL-SCNC: 105 MMOL/L (ref 100–108)
CO2 SERPL-SCNC: 26 MMOL/L (ref 21–32)
CREAT SERPL-MCNC: 1.06 MG/DL (ref 0.6–1.3)
EOSINOPHIL # BLD AUTO: 0.52 THOUSAND/ΜL (ref 0–0.61)
EOSINOPHIL NFR BLD AUTO: 7 % (ref 0–6)
ERYTHROCYTE [DISTWIDTH] IN BLOOD BY AUTOMATED COUNT: 15.9 % (ref 11.6–15.1)
GFR SERPL CREATININE-BSD FRML MDRD: 77 ML/MIN/1.73SQ M
GLUCOSE SERPL-MCNC: 106 MG/DL (ref 65–140)
GLUCOSE SERPL-MCNC: 145 MG/DL (ref 65–140)
GLUCOSE SERPL-MCNC: 181 MG/DL (ref 65–140)
GLUCOSE SERPL-MCNC: 182 MG/DL (ref 65–140)
GLUCOSE SERPL-MCNC: 85 MG/DL (ref 65–140)
HCT VFR BLD AUTO: 30.2 % (ref 36.5–49.3)
HGB BLD-MCNC: 9.4 G/DL (ref 12–17)
INR PPP: 3.99 (ref 0.86–1.16)
LYMPHOCYTES # BLD AUTO: 1.85 THOUSANDS/ΜL (ref 0.6–4.47)
LYMPHOCYTES NFR BLD AUTO: 24 % (ref 14–44)
MCH RBC QN AUTO: 26.1 PG (ref 26.8–34.3)
MCHC RBC AUTO-ENTMCNC: 31.1 G/DL (ref 31.4–37.4)
MCV RBC AUTO: 84 FL (ref 82–98)
MONOCYTES # BLD AUTO: 0.69 THOUSAND/ΜL (ref 0.17–1.22)
MONOCYTES NFR BLD AUTO: 9 % (ref 4–12)
NEUTROPHILS # BLD AUTO: 4.5 THOUSANDS/ΜL (ref 1.85–7.62)
NEUTS SEG NFR BLD AUTO: 59 % (ref 43–75)
NRBC BLD AUTO-RTO: 0 /100 WBCS
PLATELET # BLD AUTO: 382 THOUSANDS/UL (ref 149–390)
PMV BLD AUTO: 11.6 FL (ref 8.9–12.7)
POTASSIUM SERPL-SCNC: 4.4 MMOL/L (ref 3.5–5.3)
PROTHROMBIN TIME: 39.6 SECONDS (ref 12.1–14.4)
RBC # BLD AUTO: 3.6 MILLION/UL (ref 3.88–5.62)
SODIUM SERPL-SCNC: 138 MMOL/L (ref 136–145)
WBC # BLD AUTO: 7.67 THOUSAND/UL (ref 4.31–10.16)

## 2017-09-06 PROCEDURE — 80048 BASIC METABOLIC PNL TOTAL CA: CPT | Performed by: HOSPITALIST

## 2017-09-06 PROCEDURE — 97532 HB COGNITIVE SKILLS DEVELOPMENT: CPT

## 2017-09-06 PROCEDURE — 82948 REAGENT STRIP/BLOOD GLUCOSE: CPT

## 2017-09-06 PROCEDURE — 85610 PROTHROMBIN TIME: CPT | Performed by: GENERAL PRACTICE

## 2017-09-06 PROCEDURE — 85025 COMPLETE CBC W/AUTO DIFF WBC: CPT | Performed by: HOSPITALIST

## 2017-09-06 PROCEDURE — 97535 SELF CARE MNGMENT TRAINING: CPT

## 2017-09-06 PROCEDURE — 97116 GAIT TRAINING THERAPY: CPT

## 2017-09-06 PROCEDURE — 97112 NEUROMUSCULAR REEDUCATION: CPT

## 2017-09-06 PROCEDURE — 97530 THERAPEUTIC ACTIVITIES: CPT

## 2017-09-06 RX ORDER — ATORVASTATIN CALCIUM 10 MG/1
10 TABLET, FILM COATED ORAL
Refills: 0
Start: 2017-09-06 | End: 2018-04-28 | Stop reason: SDUPTHER

## 2017-09-06 RX ORDER — BISACODYL 10 MG
10 SUPPOSITORY, RECTAL RECTAL DAILY PRN
Qty: 12 SUPPOSITORY | Refills: 0
Start: 2017-09-06 | End: 2018-02-12 | Stop reason: HOSPADM

## 2017-09-06 RX ORDER — ACETAMINOPHEN 325 MG/1
975 TABLET ORAL 3 TIMES DAILY
Qty: 30 TABLET | Refills: 0 | Status: SHIPPED | OUTPATIENT
Start: 2017-09-06 | End: 2018-02-12 | Stop reason: HOSPADM

## 2017-09-06 RX ORDER — WARFARIN SODIUM 5 MG/1
5 TABLET ORAL
Refills: 0
Start: 2017-09-07 | End: 2017-11-29 | Stop reason: HOSPADM

## 2017-09-06 RX ORDER — LORAZEPAM 1 MG/1
1 TABLET ORAL EVERY 6 HOURS PRN
Qty: 30 TABLET | Refills: 0 | Status: SHIPPED | OUTPATIENT
Start: 2017-09-06 | End: 2017-11-29 | Stop reason: HOSPADM

## 2017-09-06 RX ORDER — GABAPENTIN 300 MG/1
900 CAPSULE ORAL 3 TIMES DAILY
Refills: 0
Start: 2017-09-06 | End: 2017-11-29 | Stop reason: HOSPADM

## 2017-09-06 RX ORDER — HYDROMORPHONE HYDROCHLORIDE 4 MG/1
4 TABLET ORAL
Qty: 30 TABLET | Refills: 0 | Status: SHIPPED | OUTPATIENT
Start: 2017-09-06 | End: 2017-09-11

## 2017-09-06 RX ORDER — PANTOPRAZOLE SODIUM 40 MG/1
40 TABLET, DELAYED RELEASE ORAL
Refills: 0
Start: 2017-09-06 | End: 2017-11-29 | Stop reason: HOSPADM

## 2017-09-06 RX ORDER — POLYETHYLENE GLYCOL 3350 17 G/17G
17 POWDER, FOR SOLUTION ORAL DAILY PRN
Qty: 14 EACH | Refills: 0
Start: 2017-09-06 | End: 2017-11-29 | Stop reason: HOSPADM

## 2017-09-06 RX ORDER — ASCORBIC ACID 500 MG
500 TABLET ORAL 2 TIMES DAILY
Refills: 0
Start: 2017-09-06 | End: 2018-02-12 | Stop reason: HOSPADM

## 2017-09-06 RX ORDER — DOXYCYCLINE HYCLATE 50 MG/1
324 CAPSULE, GELATIN COATED ORAL 2 TIMES DAILY
Refills: 0
Start: 2017-09-06 | End: 2017-11-29 | Stop reason: HOSPADM

## 2017-09-06 RX ORDER — ACETAMINOPHEN 325 MG/1
650 TABLET ORAL EVERY 6 HOURS PRN
Status: DISCONTINUED | OUTPATIENT
Start: 2017-09-06 | End: 2017-09-07 | Stop reason: HOSPADM

## 2017-09-06 RX ORDER — LANOLIN ALCOHOL/MO/W.PET/CERES
6 CREAM (GRAM) TOPICAL
Refills: 0
Start: 2017-09-06 | End: 2018-02-12 | Stop reason: HOSPADM

## 2017-09-06 RX ORDER — HYDROMORPHONE HYDROCHLORIDE 2 MG/1
2 TABLET ORAL EVERY 4 HOURS PRN
Qty: 30 TABLET | Refills: 0 | Status: SHIPPED | OUTPATIENT
Start: 2017-09-06 | End: 2017-09-11

## 2017-09-06 RX ORDER — AMOXICILLIN 250 MG
1 CAPSULE ORAL
Refills: 0
Start: 2017-09-06 | End: 2017-11-29 | Stop reason: HOSPADM

## 2017-09-06 RX ORDER — HYDROMORPHONE HYDROCHLORIDE 2 MG/1
4 TABLET ORAL
Status: DISCONTINUED | OUTPATIENT
Start: 2017-09-06 | End: 2017-09-07 | Stop reason: HOSPADM

## 2017-09-06 RX ORDER — WARFARIN SODIUM 5 MG/1
5 TABLET ORAL
Status: DISCONTINUED | OUTPATIENT
Start: 2017-09-06 | End: 2017-09-07 | Stop reason: HOSPADM

## 2017-09-06 RX ORDER — POTASSIUM CHLORIDE 20 MEQ/1
20 TABLET, EXTENDED RELEASE ORAL DAILY
Refills: 0
Start: 2017-09-06 | End: 2017-11-29 | Stop reason: HOSPADM

## 2017-09-06 RX ORDER — QUETIAPINE FUMARATE 400 MG/1
400 TABLET, FILM COATED ORAL
Qty: 10 TABLET | Refills: 0 | Status: SHIPPED | OUTPATIENT
Start: 2017-09-06 | End: 2017-11-29 | Stop reason: HOSPADM

## 2017-09-06 RX ORDER — QUETIAPINE FUMARATE 200 MG/1
200 TABLET, FILM COATED ORAL 2 TIMES DAILY
Qty: 20 TABLET | Refills: 0 | Status: SHIPPED | OUTPATIENT
Start: 2017-09-06 | End: 2018-02-12 | Stop reason: SDUPTHER

## 2017-09-06 RX ORDER — LISINOPRIL 20 MG/1
10 TABLET ORAL DAILY
Refills: 0
Start: 2017-09-06 | End: 2018-02-12 | Stop reason: SDUPTHER

## 2017-09-06 RX ADMIN — OXYCODONE HYDROCHLORIDE AND ACETAMINOPHEN 500 MG: 500 TABLET ORAL at 17:00

## 2017-09-06 RX ADMIN — HYDROMORPHONE HYDROCHLORIDE 4 MG: 2 TABLET ORAL at 09:38

## 2017-09-06 RX ADMIN — HYDROMORPHONE HYDROCHLORIDE 2 MG: 2 TABLET ORAL at 17:07

## 2017-09-06 RX ADMIN — PANTOPRAZOLE SODIUM 40 MG: 40 TABLET, DELAYED RELEASE ORAL at 05:29

## 2017-09-06 RX ADMIN — FERROUS GLUCONATE 324 MG: 324 TABLET ORAL at 08:54

## 2017-09-06 RX ADMIN — OXYCODONE HYDROCHLORIDE AND ACETAMINOPHEN 500 MG: 500 TABLET ORAL at 08:53

## 2017-09-06 RX ADMIN — METOPROLOL SUCCINATE 25 MG: 25 TABLET, EXTENDED RELEASE ORAL at 08:53

## 2017-09-06 RX ADMIN — NICOTINE 1 PATCH: 14 PATCH, EXTENDED RELEASE TRANSDERMAL at 08:55

## 2017-09-06 RX ADMIN — INSULIN LISPRO 3 UNITS: 100 INJECTION, SOLUTION INTRAVENOUS; SUBCUTANEOUS at 12:13

## 2017-09-06 RX ADMIN — GABAPENTIN 900 MG: 300 CAPSULE ORAL at 17:10

## 2017-09-06 RX ADMIN — GABAPENTIN 900 MG: 300 CAPSULE ORAL at 05:29

## 2017-09-06 RX ADMIN — FERROUS GLUCONATE 324 MG: 324 TABLET ORAL at 17:08

## 2017-09-06 RX ADMIN — QUETIAPINE FUMARATE 200 MG: 100 TABLET ORAL at 16:59

## 2017-09-06 RX ADMIN — GABAPENTIN 900 MG: 300 CAPSULE ORAL at 12:13

## 2017-09-06 RX ADMIN — INSULIN LISPRO 3 UNITS: 100 INJECTION, SOLUTION INTRAVENOUS; SUBCUTANEOUS at 17:01

## 2017-09-06 RX ADMIN — INSULIN LISPRO 3 UNITS: 100 INJECTION, SOLUTION INTRAVENOUS; SUBCUTANEOUS at 08:54

## 2017-09-06 RX ADMIN — ACETAMINOPHEN 975 MG: 325 TABLET, FILM COATED ORAL at 13:09

## 2017-09-06 RX ADMIN — QUETIAPINE FUMARATE 200 MG: 100 TABLET ORAL at 08:53

## 2017-09-06 RX ADMIN — ACETAMINOPHEN 975 MG: 325 TABLET, FILM COATED ORAL at 05:29

## 2017-09-06 RX ADMIN — TORSEMIDE 20 MG: 20 TABLET ORAL at 08:53

## 2017-09-06 RX ADMIN — HYDROMORPHONE HYDROCHLORIDE 4 MG: 2 TABLET ORAL at 19:45

## 2017-09-06 RX ADMIN — POTASSIUM CHLORIDE 20 MEQ: 1500 TABLET, EXTENDED RELEASE ORAL at 08:53

## 2017-09-06 RX ADMIN — LISINOPRIL 10 MG: 10 TABLET ORAL at 08:53

## 2017-09-06 RX ADMIN — ATORVASTATIN CALCIUM 10 MG: 20 TABLET, FILM COATED ORAL at 17:00

## 2017-09-06 RX ADMIN — HYDROMORPHONE HYDROCHLORIDE 4 MG: 2 TABLET ORAL at 05:32

## 2017-09-06 RX ADMIN — INSULIN LISPRO 1 UNITS: 100 INJECTION, SOLUTION INTRAVENOUS; SUBCUTANEOUS at 17:00

## 2017-09-06 RX ADMIN — HYDROMORPHONE HYDROCHLORIDE 4 MG: 2 TABLET ORAL at 14:11

## 2017-09-12 ENCOUNTER — GENERIC CONVERSION - ENCOUNTER (OUTPATIENT)
Dept: OTHER | Facility: OTHER | Age: 57
End: 2017-09-12

## 2017-09-20 ENCOUNTER — ALLSCRIPTS OFFICE VISIT (OUTPATIENT)
Dept: OTHER | Facility: OTHER | Age: 57
End: 2017-09-20

## 2017-10-10 ENCOUNTER — GENERIC CONVERSION - ENCOUNTER (OUTPATIENT)
Dept: OTHER | Facility: OTHER | Age: 57
End: 2017-10-10

## 2017-10-16 ENCOUNTER — GENERIC CONVERSION - ENCOUNTER (OUTPATIENT)
Dept: OTHER | Facility: OTHER | Age: 57
End: 2017-10-16

## 2017-10-16 ENCOUNTER — ALLSCRIPTS OFFICE VISIT (OUTPATIENT)
Dept: OTHER | Facility: OTHER | Age: 57
End: 2017-10-16

## 2017-10-18 ENCOUNTER — ALLSCRIPTS OFFICE VISIT (OUTPATIENT)
Dept: OTHER | Facility: OTHER | Age: 57
End: 2017-10-18

## 2017-10-18 DIAGNOSIS — R60.0 LOCALIZED EDEMA: ICD-10-CM

## 2017-10-18 DIAGNOSIS — Z89.612 ACQUIRED ABSENCE OF LEFT LOWER EXTREMITY ABOVE KNEE (HCC): ICD-10-CM

## 2017-10-19 NOTE — PROGRESS NOTES
Assessment  1  Atherosclerosis of arteries of extremities (440 20) (I70 209)   2  Edema of right lower extremity (782 3) (R60 0)    Plan  Edema of right lower extremity    · (1) BASIC METABOLIC PROFILE; Status:Active; Requested [id number];    Perform:[facility]; Due:2018; Ordered;For:Edema of right lower extremity; Ordered By:Chaz Rodriguez;   · VAS LOWER LIMB ARTERIAL DUPLEX, LIMITED UNILATERAL/GRAFT; Unilateral  Side:Right; Status:Hold For - Scheduling; Requested MUY:[id number];    Perform:[facility]; Due:2018; Ordered;For:Edema of right lower extremity; Ordered By:Chaz Rodriguez;    Discussion/Summary  Discussion Summary:   59-year-old male with diabetes, coronary artery disease, mitral valve replacement, congestive heart failure, recent left above-knee amputation secondary to Nonhealing left first ray amputation followed by angiogram and angioplasty stenting which thrombosed in spite of him being on anticoagulation  He has been doing very well all things considered, left above-knee amputation is healed and he is ready for prosthetic fitting which is an obese started soon  He is also taking gabapentin  There was concern about right leg peripheral arterial disease  There is no evidence of tissue loss or rest pain  There is swelling in the leg which is probably multifactorial secondary to his congestive heart failure  I have ordered a basic metabolic panel to check on his creatinine  We'll continue to follow the right leg with lower extremity arterial Dopplers and serial office visits  Chief Complaint  Chief Complaint Free Text Note Form:  My right leg is getting bad        History of Present Illness  HPI: patient complains of Right leg is now starting to show signs that his left leg had  Complains of swelling on the top of his right foot  Foot gets very pink in color  Denies any pain in is foot or leg  Active Problems  1  Anemia (285 9) (D64 9)   2   Anxiety (300 00) (F41 9)   3  Atherosclerosis of arteries of extremities (440 20) (I70 209)   4  Benign essential hypertension (401 1) (I10)   5  Bipolar disorder (296 80) (F31 9)   6  Carotid arterial disease (447 9) (I77 9)   7  Chronic congestive heart failure (428 0) (I50 9)   8  Chronic insomnia (780 52) (F51 04)   9  Chronic systolic congestive heart failure (428 22,428 0) (I50 22)   10  Coronary artery disease (414 00) (I25 10)   11  Decreased pedal pulses (785 9) (R09 89)   12  Diabetic gastroparesis associated with type 2 diabetes mellitus (250 60,536 3)    (K28 42,L70 93)   13  Diabetic ulcer of right foot associated with diabetes mellitus due to underlying condition    (249 80,707 15) (E08 621,L97 519)   14  Edema of right lower extremity (782 3) (R60 0)   15  Environmental allergies (V15 09) (Z91 09)   16  Erosive esophagitis (530 19) (K22 10)   17  Esophageal reflux (530 81) (K21 9)   18  Hiatal hernia (553 3) (K44 9)   19  History of mitral valve replacement with mechanical valve (V43 3) (Z95 2)   20  Hyperlipidemia (272 4) (E78 5)   21  Insulin dependent type 2 diabetes mellitus, uncontrolled (250 02,V58 67) (E11 65,Z79 4)   22  Ischemic cardiomyopathy (414 8) (I25 5)   23  Mitral valve prolapse (424 0) (I34 1)   24  Onychomycosis (110 1) (B35 1)   25  Peripheral edema (782 3) (R60 9)   26  Peripheral vascular disease (443 9) (I73 9)   27  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 6) (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 6)   28  Postoperative state (V45 89) (Z98 890)   29  PVC's (premature ventricular contractions) (427 69) (I49 3)   30  S/P CABG (coronary artery bypass graft) (V45 81) (Z95 1)   31  Status post above knee amputation of left lower extremity (V49 76) (Z89 612)   32  Ulcer On The Feet Dorsal Cesar Scale 1  (0-5)    Past Medical History  1  History of Acute colitis (558 9) (K52 9)   2  History of Amputated great toe of left foot (V49 71) (Z89 412)   3  History of Dyspepsia (536 8) (R10 13)   4   History of acute gastritis (V12 70) (Z87 19)   5  History of diarrhea (V12 79) (Z87 898)   6  History of Rotator cuff tear, right (840 4) (M75 101)  Active Problems And Past Medical History Reviewed: The active problems and past medical history were reviewed and updated today  Surgical History  1  History of Amputation Of Leg Above Knee   2  History of Implantable Cardioverter-Defibrillator   3  History of Mitral Valve Replacement   4  History of Rotator Cuff Repair   5  History of Surgery Left Foot Amputation MTP First Toe   6  History of Tonsillectomy  Surgical History Reviewed: The surgical history was reviewed and updated today  Family History  Mother    1  Family history of diabetes mellitus (V18 0) (Z83 3)   2  Family history of hypertension (V17 49) (Z82 49)   3  Family history of migraine headaches (V17 2) (Z82 0)  Father    4  Family history of Bipolar disorder   5  Family history of hypertension (V17 49) (Z82 49)   6  Family history of malignant melanoma (V16 8) (Z80 8)  Daughter    9  Family history of Bipolar disorder   8  Family history of migraine headaches (V17 2) (Z82 0)  Maternal Grandfather    9  Family history of cardiac disorder (V17 49) (Z82 49)  Family History    10  Family history of arthritis (V17 7) (Z82 61)   11  Family history of cardiac disorder (V17 49) (Z82 49)   12  Family history of depression (V17 0) (Z81 8)  Family History Reviewed: The family history was reviewed and updated today  Social History   · Current every day smoker (305 1) (F17 200)   · Daily caffeine consumption, 1 serving a day   · Exercises daily (V49 89) (Z78 9)   · No alcohol use  Social History Reviewed: The social history was reviewed and updated today  Current Meds   1  Accu-Chek Angelica Device; testing 3 times a day: dx: E11 65; Therapy: 53Wul9384 to (Last Rx:10Krg5085)  Requested for: 63Zcb7450 Ordered   2  Accu-Chek Angelica Plus In Vitro Strip; TEST 3 TIMES A DAY  dx E11 65;    Therapy: 82Mui9112 to (Last :51USY8679)  Requested for: 38YTF7135 Ordered   3  Accu-Chek FastClix Lancets Miscellaneous; test 3-4 times a day dx: e11 65; Therapy: 63Sym8710 to (Last Rx:24Apr2017)  Requested for: 70Xjk0862 Ordered   4  Atorvastatin Calcium 10 MG Oral Tablet; Take 1 tablet daily  Requested for: 95QBU3260;   Last Rx:16Oct2017 Ordered   5  Basaglar KwikPen 100 UNIT/ML Subcutaneous Solution Pen-injector; INJECT 21 UNITS   BEDTIME; Therapy: 57Hfi2830 to (Last Rx:17Oct2017)  Requested for: 17Oct2017 Ordered   6  BD Pen Needle Short U/F 31G X 8 MM Miscellaneous; use 3 daily as directed dx E11 65; Therapy: 36FUX4104 to (Evaluate:08Jan2018)  Requested for: 13QHX6318; Last   Rx:10Oct2017 Ordered   7  Ferrous Gluconate 240 (27 Fe) MG Oral Tablet; TAKE 1 TABLET DAILY AS DIRECTED; Therapy: 17KDI3193 to (Evaluate:15Apr2018)  Requested for: 29YUJ2450; Last   Rx:17Oct2017 Ordered   8  Gabapentin 300 MG Oral Capsule; take 1 capsule three times a day; Therapy: 60ATU0842 to (Evaluate:08Jan2018); Last Rx:10Oct2017 Ordered   9  HumaLOG 100 UNIT/ML Subcutaneous Solution; INJECT 3 UNITS AS DIRECTED 3   TIMES A DAY; Therapy: 09IKJ6750 to (Evaluate:16Nov2017)  Requested for: 17Oct2017; Last   Rx:17Oct2017 Ordered   10  Hydrocodone-Acetaminophen 5-325 MG Oral Tablet; TAKE 1 TABLET EVERY 6 HOURS    AS NEEDED; Therapy: 66QEW7916 to (Evaluate:09Nov2017); Last Rx:10Oct2017 Ordered   11  Lisinopril 10 MG Oral Tablet; TAKE 1 TABLET DAILY  Requested for: 40WSM7190; Last    Rx:16Oct2017 Ordered   12  LORazepam 1 MG Oral Tablet; take 1 tablet twice a day as needed; Therapy: 35CWG0685 to (Evaluate:12Xrg3351)  Requested for: 94XNB6266; Last    Rx:05Mvg7585 Ordered   13  Melatonin 3 MG Oral Capsule; 2 tabs daily at bedtime  Requested for: 65FUF5473; Last    Rx:17Oct2017 Ordered   14  Metoprolol Succinate ER 25 MG Oral Tablet Extended Release 24 Hour; Take 1 tablet    daily;     Therapy: 44ENM3211 to (Evaluate:11Oct2018)  Requested for: 01KTW6514; Last    Rx:63Wqi1613 Ordered   15  QUEtiapine Fumarate 400 MG Oral Tablet; TAKE 1 TABLET AT BEDTIME; Therapy: 51MOH2340 to ([place])  Requested for: 78MKN6879; Last    Rx:03Fob4183 Ordered   16  Torsemide 20 MG Oral Tablet; Take one tablet daily; Therapy: 96ZLA6690 to ([place])  Requested for: 15RXS5924; Last    Rx:58Myz2436 Ordered   17  Warfarin Sodium 5 MG Oral Tablet; take 1-2 tablets daily or as directed  Requested for:    01OYY7959; Last Rx:07Qxl0827 Ordered  Medication List Reviewed: The medication list was reviewed and updated today  Allergies  1  Aspirin TABS   2  [facility] Omeprazole TBEC   3  Morphine Derivatives    Vitals  Vital Signs    Recorded: 39VOW3454 11:37AM   Temperature 98 3 F   Heart Rate 68, R Radial   Pulse Quality Normal, R Radial   Respiration 16   Systolic 044, LUE, Sitting   Diastolic 68, LUE, Sitting   Height 6 ft 1 in   Weight Unobtainable Yes     Physical Exam    Posterior tibialis: right 0  Dorsalis pedis: right 0  Distal Pulse Exam: This patient had dopplerable pulses in these locations: right Triphasic anterior tibial and dorsalis pedis signal  Normal Capillary Refill  Extremities: right ankle pitting edema-- and-- right foot pitting edema  LE Varicose Veins: No Varicose Veins are Present  Psychiatric   Orientation to person, place and time: Normal    Mood and affect: Normal    Eyes   Conjunctiva and lids: No swelling, erythema, or discharge  Ears, Nose, Mouth, and Throat   Hearing: Normal    Musculoskeletal   Gait and station: Gait evaluation demonstrated insufficiencies for exercise testing  Skin   Skin and subcutaneous tissue: Normal without rashes or lesions  Results/Data  Diagnostic Studies Reviewed Vasc: I personally reviewed the films/images/results in the office today  My interpretation follows     Vascular Study Review Last arterial duplex reviewed which demonstrates moderate diffuse disease all along the right lower extremity but preserved VELIA  Signatures   Electronically signed by :  Olive Sanches MD; Oct 18 2017 12:34PM EST                       (Author)

## 2017-10-27 ENCOUNTER — GENERIC CONVERSION - ENCOUNTER (OUTPATIENT)
Dept: OTHER | Facility: OTHER | Age: 57
End: 2017-10-27

## 2017-10-31 ENCOUNTER — GENERIC CONVERSION - ENCOUNTER (OUTPATIENT)
Dept: OTHER | Facility: OTHER | Age: 57
End: 2017-10-31

## 2017-11-01 LAB
A/G RATIO (HISTORICAL): 1.4 (ref 1.2–2.2)
ALBUMIN SERPL BCP-MCNC: 4.4 G/DL (ref 3.5–5.5)
ALP SERPL-CCNC: 150 IU/L (ref 39–117)
ALT SERPL W P-5'-P-CCNC: 30 IU/L (ref 0–44)
AST SERPL W P-5'-P-CCNC: 24 IU/L (ref 0–40)
BILIRUB SERPL-MCNC: 0.3 MG/DL (ref 0–1.2)
BUN SERPL-MCNC: 27 MG/DL (ref 6–24)
BUN/CREA RATIO (HISTORICAL): 24 (ref 9–20)
CALCIUM SERPL-MCNC: 9.8 MG/DL (ref 8.7–10.2)
CHLORIDE SERPL-SCNC: 100 MMOL/L (ref 96–106)
CO2 SERPL-SCNC: 23 MMOL/L (ref 18–29)
CREAT SERPL-MCNC: 1.14 MG/DL (ref 0.76–1.27)
EGFR AFRICAN AMERICAN (HISTORICAL): 81 ML/MIN/1.73
EGFR-AMERICAN CALC (HISTORICAL): 70 ML/MIN/1.73
GLUCOSE SERPL-MCNC: 152 MG/DL (ref 65–99)
POTASSIUM SERPL-SCNC: 5.3 MMOL/L (ref 3.5–5.2)
SODIUM SERPL-SCNC: 139 MMOL/L (ref 134–144)
TOT. GLOBULIN, SERUM (HISTORICAL): 3.2 G/DL (ref 1.5–4.5)
TOTAL PROTEIN (HISTORICAL): 7.6 G/DL (ref 6–8.5)

## 2017-11-01 NOTE — PROGRESS NOTES
Assessment  Assessed    1  History of mitral valve replacement with mechanical valve (V43 3) (Z95 2)   2  Coronary artery disease (414 00) (I25 10)   3  Ischemic cardiomyopathy (414 8) (I25 5)   4  S/P CABG (coronary artery bypass graft) (V45 81) (Z95 1)   5  PVC's (premature ventricular contractions) (427 69) (I49 3)   6  Benign essential hypertension (401 1) (I10)   7  Hyperlipidemia (272 4) (E78 5)   8  Peripheral vascular disease (443 9) (I73 9)   9  History of Amputation Of Leg Above Knee    Plan  Benign essential hypertension    · Lisinopril 10 MG Oral Tablet; TAKE 1 TABLET DAILY   Rx By: Alfonso Haider; Dispense: 90 Days ; #:90 Tablet; Refill: 3;Benign essential hypertension; CAROLYNE = N; [id number]; Last Updated By: System, SureScripts; 10/16/2017 11:09:08 AM  Chronic congestive heart failure, Coronary artery disease, History of mitral valvereplacement with mechanical valve    · Metoprolol Succinate ER 25 MG Oral Tablet Extended Release 24 Hour; Take 1tablet daily   Rx By: Alfonso Haider; Dispense: 90 Days ; #:90 Tablet Extended Release 24 Hour; Refill: 3;For: Chronic congestive heart failure, Coronary artery disease, History of mitral valve replacement with mechanical valve; CAROLYNE = N; [id number]; Last Updated By: System, SureScripts; 10/16/2017 11:09:08 AM  Coronary artery disease    · Atorvastatin Calcium 10 MG Oral Tablet; Take 1 tablet daily   Rx By: Alfonso Haider; Dispense: 90 Days ; #:90 Tablet;  Refill: 3;For: Coronary artery disease; CAROLYNE = N; [id number]; Last Updated By: System, SureScripts; 10/16/2017 11:08:00 AM  Coronary artery disease, History of mitral valve replacement with mechanical valve    · Continue with our present treatment plan ; Status:Complete;   Done: 34YEC5149   Ordered;artery disease, History of mitral valve replacement with mechanical valve; Ordered By:Amy Harper;   · Restrict your sodium (salt) intake to 2 grams per day ; Status:Complete;   Done:67Gbi3379   Ordered; For:Coronary artery disease, History of mitral valve replacement with mechanical valve; Ordered By:Pretty Harper;   · Weigh yourself every day ; Status:Complete;   Done: 73HHF7574   Ordered;artery disease, History of mitral valve replacement with mechanical valve; Ordered By:Brayan Harper;   · Follow-up visit in 4 Months Evaluation and Treatment  Follow-up  Status: Complete Done: [id number]   Ordered; For: Coronary artery disease, History of mitral valve replacement with mechanical valve; Ordered By: Estiven Chacon Performed:  Order Comments: PT ON WAIT LIST Due: 30KUR5686; Last Updated By: Geeta Kang; 10/16/2017 11:10:45 AM  Mitral valve prolapse    · Warfarin Sodium 5 MG Oral Tablet; take 1-2 tablets daily or as directed   Rx By: Estiven Chacon; Dispense: 30 Days ; #:60 Tablet; Refill: 5;Mitral valve prolapse; CAROLYNE = N; Verified Transmission to [facility]# [id number]; Last Updated By: Geeta Kang; 10/16/2017 11:10:45 AM    Discussion/Summary  Cardiology Discussion Summary Free Text Note Form [facility]:   Mechanical mitral valve replacement - Mr Pereira had this performed back in 2001  His recent echocardiogram shows a normally functioning mitral valve replacement  He does take antibiotic prophylaxis for any dental procedures  Any procedures, particularly the ones he will be going through from a GI perspective, will require IV heparin or Lovenox bridging if Coumadin needs to be held  We will see him back in 4 months  systolic congestive heart failure - His ejection fraction by our echo shows a value of 40%  He has an ischemic cardiomyopathy with inferior and inferior lateral wall motion abnormalities  We will continue the same dose of torsemide  I've asked him to adhere to a low sodium diet, follow his weight and we will follow blood work closely   We will see him back in 4 months   - He has had prior CABG, the time of his mitral valve replacement, and prior stenting he tells me  He is still recovering from an above-the-knee amputation and we will continue to let him rehabilitate  We will follow-up in 4 months and at some point get updated ischemic testing  He is without symptoms of angina   - His blood pressure is well-controlled  He will remain on the same doses of lisinopril and Toprol  We will see him back in 4 months   - He is unsure of what brand he received  We will be obtaining records  We will also get him established with our device clinic   - He does a history of frequent PVCs on ECG  We added Toprol-XL last visit, and we will continue this  - He is now status post above-the-knee amputation  He will continue to rehabilitate with physical therapy  He will also continue to follow with vascular surgery  Counseling Documentation With [facility]: The patient, patient's family was counseled regarding diagnostic results,-- instructions for management,-- impressions,-- risks and benefits of treatment options  total time of encounter was 30 minutes  Chief Complaint  Chief Complaint Free Text Note Form: f/u  denies any cardiac issues      History of Present Illness  Cardiology HPI Free Text Note Form [facility]: Mr Maddie Gong given his history of CAD and mitral valve disease  Zonia Rockwell has a history of a mechanical mitral valve for what sounded like mitral valve prolapse  He also was found to have coronary artery disease at that time and had coronary bypass grafting as well  He told me in subsequent years he is received stenting to his coronary arteries  He also has a history of defibrillator, placed a little over a year ago  He had all of his care in [place], as he lived there all his life, but relocated to this area to live with his daughter  He states to me that his mitral valve replacement and CABG was in 2001  Unfortunately, we have no records of his prior history   His prior cardiologist, he tells me, closed his practice due to insurance fraud  He also carries a history of peripheral arterial disease  At our first visit I got an echocardiogram that showed his ejection fraction was 40% with regional wall motion abnormalities in the inferior and inferior lateral walls  has had several issues from a noncardiac standpoint  He has had on and off issues with nausea, vomiting and GI bleeding  He is been worked up for this without any significant findings  He will actually be going through more procedures in the near future, and we will be bridging his Coumadin  He has also had on and off atypical chest pains  Some of the pain sound GI related, and other sound musculoskeletal  We was in the hospital for GI bleeding I assessed this same chest pain  This has for the most part resolved as he has not had any issues with bleeding or chest/abdominal pains  also battles peripheral arterial disease, uncontrolled diabetes and nonhealing diabetic foot ulcers  Since I last saw him had an extensive hospitalization for worsening lower extremity ulcers and gangrene  He eventually required a left-sided above-the-knee amputation  He went through extensive rehabilitation and is now home getting home health and physical therapy  feels about the same from a cardiac standpoint  His chest pains have resolved  He does have chronic exertional shortness of breath, this is unchanged  He has intermittent edema of his right lower extremity, but it is improved  He denies any orthopnea, PND or any other signs of CHF  He denies any palpitations, lightheadedness or syncope  From a cardiac standpoint he appears stable  Coronary Artery Disease (Follow-Up): The patient states he has been stable with his coronary artery disease symptoms since the last visit  Comorbid Illnesses: cardiac failure  He has no known CAD complications  He has no significant interval events     Symptoms: denies chest pain when at rest,-- denies exertional chest pain,-- stable dyspnea,-- stable fatigue-- and-- stable exercise intolerance  Associated symptoms include edema, but-- no syncope,-- no palpitations,-- no PND-- and-- no orthopnea  Medications: the patient is adherent with his medication regimen  -- He denies medication side effects  Hypertension (Follow-Up): The patient presents for follow-up of essential hypertension  The patient states he has been stable with his blood pressure control since the last visit  Comorbid Illnesses: cardiac failure-- and-- coronary artery disease  He has no significant interval events  Symptoms: The patient is currently asymptomatic  Associated symptoms include no headache,-- no focal neurologic deficits-- and-- no memory loss  Medications: the patient is adherent with his medication regimen  -- He denies medication side effects  The patient is due for an ECG  Congestive Heart Failure (Follow-Up): The patient presents for follow-up of chronic heart failure  The patient is NYHA functional Class II  The patient states he has been stable with his heart failure symptoms since the last visit  Symptoms: stable lower extremity edema,-- stable dyspnea on exertion,-- stable fatigue,-- stable exercise intolerance,-- denies orthopnea-- and-- denies paroxysmal nocturnal dyspnea  Associated symptoms include no chest pain,-- no syncope-- and-- no palpitations  Medications: the patient is adherent with his medication regimen  -- He denies medication side effects  Due For: an echocardiogram         Review of Systems  Cardiology Male ROS:    Cardiac: has swelling in the -- and-- palpitations present , but-- as noted in HPI  Skin: No complaints of nonhealing sores or skin rash  Genitourinary: No complaints of recurrent urinary tract infections, frequent urination at night, difficult urination, blood in urine, kidney stones, loss of bladder control, no kidney or prostate problems, no erectile dysfunction    Psychological: No complaints of feeling depressed, anxiety, panic attacks, or difficulty concentrating  General: No complaints of trouble sleeping, lack of energy, fatigue, appetite changes, weight changes, fever, frequent infections, or night sweats  Respiratory: No complaints of shortness of breath, cough with sputum, or wheezing  HEENT: No complaints of serious problems, hearing problems, nose problems, throat problems, or snoring  Gastrointestinal: No complaints of liver problems, nausea, vomiting, heartburn, constipation, bloody stools, diarrhea, problems swallowing, adbominal pain, or rectal bleeding  Hematologic: No complaints of bleeding disorders, anemia, blood clots, or excessive brusing  Neurological: No complaints of numbness, tingling, dizziness, weakness, seizures, headaches, syncope or fainting, AM fatigue, daytime sleepiness, no witnessed apnea episodes  Musculoskeletal: No complaints of arthritis, back pain, or painfull swelling  ROS Reviewed:   ROS reviewed  Active Problems  Problems    1  Anemia (285 9) (D64 9)   2  Anxiety (300 00) (F41 9)   3  Atherosclerosis of arteries of extremities (440 20) (I70 209)   4  Benign essential hypertension (401 1) (I10)   5  Bipolar disorder (296 80) (F31 9)   6  Carotid arterial disease (447 9) (I77 9)   7  Chronic congestive heart failure (428 0) (I50 9)   8  Chronic insomnia (780 52) (F51 04)   9  Chronic systolic congestive heart failure (428 22,428 0) (I50 22)   10  Coronary artery disease (414 00) (I25 10)   11  Decreased pedal pulses (785 9) (R09 89)   12  Diabetic gastroparesis associated with type 2 diabetes mellitus (250 60,536 3)  (U16 55,W16 80)   13  Diabetic ulcer of right foot associated with diabetes mellitus due to underlying condition  (249 80,707 15) (E08 621,L97 519)   14  Edema of right lower extremity (782 3) (R60 0)   15  Environmental allergies (V15 09) (Z91 09)   16  Erosive esophagitis (530 19) (K22 10)   17  Esophageal reflux (530 81) (K21 9)   18  Hiatal hernia (553 3) (K44 9)   19  History of mitral valve replacement with mechanical valve (V43 3) (Z95 2)   20  Hyperlipidemia (272 4) (E78 5)   21  Insulin dependent type 2 diabetes mellitus, uncontrolled (250 02,V58 67) (E11 65,Z79 4)   22  Ischemic cardiomyopathy (414 8) (I25 5)   23  Mitral valve prolapse (424 0) (I34 1)   24  Onychomycosis (110 1) (B35 1)   25  Peripheral edema (782 3) (R60 9)   26  Peripheral vascular disease (443 9) (I73 9)   27  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 6) (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 6)   28  Postoperative state (V45 89) (Z98 890)   29  PVC's (premature ventricular contractions) (427 69) (I49 3)   30  S/P CABG (coronary artery bypass graft) (V45 81) (Z95 1)   31  Status post above knee amputation of left lower extremity (V49 76) (Z89 612)   32  Ulcer On The Feet Dorsal Cesar Scale 1  (0-5)    Past Medical History  Problems    1  History of Acute colitis (558 9) (K52 9)   2  History of Amputated great toe of left foot (V49 71) (Z89 412)   3  History of Dyspepsia (536 8) (R10 13)   4  History of acute gastritis (V12 70) (Z87 19)   5  History of diarrhea (V12 79) (Z87 898)   6  History of Rotator cuff tear, right (840 4) (M75 101)  Active Problems And Past Medical History Reviewed: The active problems and past medical history were reviewed and updated today  Surgical History  Problems    1  History of Amputation Of Leg Above Knee   2  History of Implantable Cardioverter-Defibrillator   3  History of Mitral Valve Replacement   4  History of Rotator Cuff Repair   5  History of Surgery Left Foot Amputation MTP First Toe   6  History of Tonsillectomy  Surgical History Reviewed: The surgical history was reviewed and updated today  Family History  Mother    1  Family history of diabetes mellitus (V18 0) (Z83 3)   2  Family history of hypertension (V17 49) (Z82 49)   3  Family history of migraine headaches (V17 2) (Z82 0)  Father    4  Family history of Bipolar disorder   5   Family history of hypertension (V17 49) (Z82 49)   6  Family history of malignant melanoma (V16 8) (Z80 8)  Daughter    9  Family history of Bipolar disorder   8  Family history of migraine headaches (V17 2) (Z82 0)  Maternal Grandfather    9  Family history of cardiac disorder (V17 49) (Z82 49)  Family History    10  Family history of arthritis (V17 7) (Z82 61)   11  Family history of cardiac disorder (V17 49) (Z82 49)   12  Family history of depression (V17 0) (Z81 8)  Family History Reviewed: The family history was reviewed and updated today  Social History  Problems    · Current every day smoker (305 1) (F17 200)   · Daily caffeine consumption, 1 serving a day   · Exercises daily (V49 89) (Z78 9)   · No alcohol use  Social History Reviewed: The social history was reviewed and updated today  The social history was reviewed and is unchanged  Current Meds   1  Accu-Chek Angelica Device; testing 3 times a day: dx: E11 65; Therapy: 24Apr2017 to (Last Rx:24Apr2017)  Requested for: 24Apr2017 Ordered   2  Accu-Chek Angelica Plus In Vitro Strip; TEST 3 TIMES A DAY  dx E11 65; Therapy: 24Apr2017 to (Last Rx:98Mnu2956)  Requested for: 14AIP2574 Ordered   3  Accu-Chek FastClix Lancets Miscellaneous; test 3-4 times a day dx: e11 65; Therapy: 24Apr2017 to (Last Rx:24Apr2017)  Requested for: 59Xeo7835 Ordered   4  Atorvastatin Calcium 10 MG Oral Tablet; Therapy: (Recorded:10Oct2017) to Recorded   5  Basaglar KwikPen 100 UNIT/ML Subcutaneous Solution Pen-injector; INJECT 21 UNITS BEDTIME; Therapy: 83Dav6009 to (Last Rx:10Oct2017)  Requested for: 96MGS7201 Ordered   6  BD Pen Needle Short U/F 31G X 8 MM Miscellaneous; use 3 daily as directed dx E11 65; Therapy: 65LRP5392 to (Evaluate:08Jan2018)  Requested for: 62DYZ6097; Last Rx:10Oct2017 Ordered   7  Ferrous Gluconate 240 (27 Fe) MG Oral Tablet; TAKE 1 TABLET DAILY AS DIRECTED; Therapy: 08APA3190 to (Evaluate:08Apr2018); Last Rx:10Oct2017 Ordered   8   Gabapentin 300 MG Oral Capsule; take 1 capsule three times a day; Therapy: 99SLZ6749 to (Evaluate:08Jan2018); Last Rx:10Oct2017 Ordered   9  HumaLOG 100 UNIT/ML Subcutaneous Solution; inject 3U subQ 3 times a day; Therapy: 13EGQ5113 to (Last Rx:10Oct2017)  Requested for: 96PKL5256 Ordered   10  Hydrocodone-Acetaminophen 5-325 MG Oral Tablet; TAKE 1 TABLET EVERY 6 HOURS  AS NEEDED; Therapy: 22VUG0567 to (Evaluate:09Nov2017); Last Rx:10Oct2017 Ordered   11  Lisinopril 10 MG Oral Tablet; TAKE 1 TABLET DAILY  Requested for: 52NGM4994; Last  KB:15JDZ9838 Ordered   12  LORazepam 1 MG Oral Tablet; take 1 tablet twice a day as needed; Therapy: 73KYH8910 to (Evaluate:59Xsj8422)  Requested for: 60ELW2975; Last  Rx:12Exq4362 Ordered   13  Melatonin 3 MG Oral Capsule; 2 tabs daily at bedtime; Last Rx:10Oct2017 Ordered   14  Metoprolol Succinate ER 25 MG Oral Tablet Extended Release 24 Hour; Take 1 tablet  daily; Therapy: 62RAD6192 to ()  Requested for: 67KDH5228; Last  Rx:27Mar2017 Ordered   15  QUEtiapine Fumarate 400 MG Oral Tablet; TAKE 1 TABLET AT BEDTIME; Therapy: 62XOY9926 to (Daquan Fair)  Requested for: 23NDQ5990; Last  Rx:10Oct2017 Ordered   16  Torsemide 20 MG Oral Tablet; Take one tablet daily; Therapy: 90XKB3582 to (Gunnison Fair)  Requested for: 90CRU2340; Last  Rx:10Oct2017 Ordered   17  Warfarin Sodium 5 MG Oral Tablet; take 1-2 tablets daily or as directed  Requested for:  64YAZ9205; Last Rx:09Jun2017 Ordered    Allergies  Medication    1  Aspirin TABS   2  [employer] Omeprazole TBEC   3  Morphine Derivatives    Vitals  Vital Signs    Recorded: 40WMK1011 10:43AM   Heart Rate 64   Systolic 817, LUE, Sitting   Diastolic 70, LUE, Sitting   Height 6 ft 1 in   Weight Unobtainable Yes       Physical Exam   Constitutional  General appearance: No acute distress, well appearing and well nourished  Eyes  Conjunctiva and Sclera examination: Conjunctiva pink, sclera anicteric     Ears, Nose, Mouth, and Throat - Oropharynx: Clear, nares are clear, mucous membranes are moist   Neck  Neck and thyroid: Normal, supple, trachea midline, no thyromegaly  Pulmonary  Respiratory effort: No increased work of breathing or signs of respiratory distress  Auscultation of lungs: Abnormal   Auscultation of the lungs revealed decreased breath sounds diffusely  no rales or crackles were heard bilaterally  no rhonchi  no wheezing  Cardiovascular  Auscultation of heart: Abnormal   The heart rate was normal  The rhythm was regularly irregular  Heart sounds: abnormal S2, but-- normal S1  Prosthetic valve: prosthetic mitral valve heard  A grade 2 systolic murmur was heard at the RUSB  Carotid pulses: Normal, 2+ bilaterally  Peripheral vascular exam: Normal pulses throughout, no tenderness, erythema or swelling  Pedal pulses: Normal, 2+ bilaterally  Posterior tibialis pulse was 1+ on the right-- and-- 1+ on the left  Dorsalis pedis pulse was 2+ on the right-- and-- 2+ on the left  Examination of extremities for edema and/or varicosities: Abnormal   nonpitting edema present,-- right ankle 1+ pitting edema,-- left ankle 2+ pitting edema,-- right pretibial 1+ pitting edema-- and-- left pretibial 2+ pretibial pitting edema  varicosities noted bilaterally  Abdomen  Abdomen: Non-tender and no distention  Liver and spleen: No hepatomegaly or splenomegaly  Musculoskeletal Gait and station: Normal gait  -- Digits and nails: Normal without clubbing or cyanosis  -- Inspection/palpation of joints, bones, and muscles: Normal, ROM normal    Skin - Skin and subcutaneous tissue: Normal without rashes or lesions  Skin is warm and well perfused, normal turgor  Neurologic - Cranial nerves: II - XII intact  -- Speech: Normal    Psychiatric - Orientation to person, place, and time: Normal -- Mood and affect: Normal       Health Management  History of Encounter for screening colonoscopy   COLONOSCOPY; every 2 years; Last 88SLX8085; Next Due: 42San8915;  Active    Future Appointments    Date/Time Provider Specialty Site   10/18/2017 11:15 AM Prabhakar Mckinnon MD Vascular Surgery [facility]       Signatures   Electronically signed by : RAMONITA Jones ; Oct 16 2017 11:16AM EST                       (Author)

## 2017-11-08 ENCOUNTER — ALLSCRIPTS OFFICE VISIT (OUTPATIENT)
Dept: OTHER | Facility: OTHER | Age: 57
End: 2017-11-08

## 2017-11-08 LAB — HBA1C MFR BLD HPLC: 6.2 %

## 2017-11-15 ENCOUNTER — APPOINTMENT (INPATIENT)
Dept: RADIOLOGY | Facility: HOSPITAL | Age: 57
DRG: 242 | End: 2017-11-15
Payer: COMMERCIAL

## 2017-11-15 ENCOUNTER — HOSPITAL ENCOUNTER (INPATIENT)
Facility: HOSPITAL | Age: 57
LOS: 5 days | DRG: 242 | End: 2017-11-20
Attending: EMERGENCY MEDICINE | Admitting: INTERNAL MEDICINE
Payer: COMMERCIAL

## 2017-11-15 DIAGNOSIS — K92.2 ACUTE UPPER GI BLEED: Primary | ICD-10-CM

## 2017-11-15 DIAGNOSIS — Z95.2 HISTORY OF MITRAL VALVE REPLACEMENT WITH MECHANICAL VALVE: ICD-10-CM

## 2017-11-15 PROBLEM — T14.8XXA NONHEALING NONSURGICAL WOUND: Status: RESOLVED | Noted: 2017-07-13 | Resolved: 2017-11-15

## 2017-11-15 PROBLEM — E11.49 TYPE 2 DIABETES MELLITUS WITH NEUROLOGIC COMPLICATION (HCC): Status: RESOLVED | Noted: 2017-05-24 | Resolved: 2017-11-15

## 2017-11-15 PROBLEM — E11.65 TYPE 2 DIABETES MELLITUS WITH HYPERGLYCEMIA (HCC): Status: RESOLVED | Noted: 2017-05-24 | Resolved: 2017-11-15

## 2017-11-15 PROBLEM — D64.9 ANEMIA: Status: RESOLVED | Noted: 2017-07-25 | Resolved: 2017-11-15

## 2017-11-15 PROBLEM — T81.49XA INFECTED SURGICAL WOUND: Status: RESOLVED | Noted: 2017-08-10 | Resolved: 2017-11-15

## 2017-11-15 PROBLEM — B37.81 CANDIDA ESOPHAGITIS (HCC): Status: RESOLVED | Noted: 2017-05-26 | Resolved: 2017-11-15

## 2017-11-15 PROBLEM — K52.9 COLITIS: Status: RESOLVED | Noted: 2017-04-02 | Resolved: 2017-11-15

## 2017-11-15 PROBLEM — I96 GANGRENE OF FOOT (HCC): Status: RESOLVED | Noted: 2017-08-09 | Resolved: 2017-11-15

## 2017-11-15 LAB
ABO GROUP BLD: NORMAL
ABO GROUP BLD: NORMAL
ALBUMIN SERPL BCP-MCNC: 4.4 G/DL (ref 3.5–5)
ALP SERPL-CCNC: 205 U/L (ref 46–116)
ALT SERPL W P-5'-P-CCNC: 42 U/L (ref 12–78)
ANION GAP SERPL CALCULATED.3IONS-SCNC: 14 MMOL/L (ref 4–13)
ANISOCYTOSIS BLD QL SMEAR: PRESENT
APTT PPP: 39 SECONDS (ref 23–35)
AST SERPL W P-5'-P-CCNC: 33 U/L (ref 5–45)
ATRIAL RATE: 84 BPM
BASOPHILS # BLD MANUAL: 0 THOUSAND/UL (ref 0–0.1)
BASOPHILS NFR MAR MANUAL: 0 % (ref 0–1)
BILIRUB SERPL-MCNC: 1.09 MG/DL (ref 0.2–1)
BLD GP AB SCN SERPL QL: POSITIVE
BLD GP AB SCN SERPL QL: POSITIVE
BLOOD GROUP ANTIBODIES SERPL: NORMAL
BUN SERPL-MCNC: 27 MG/DL (ref 5–25)
BURR CELLS BLD QL SMEAR: PRESENT
CALCIUM SERPL-MCNC: 10.3 MG/DL (ref 8.3–10.1)
CHLORIDE SERPL-SCNC: 99 MMOL/L (ref 100–108)
CO2 SERPL-SCNC: 22 MMOL/L (ref 21–32)
CREAT SERPL-MCNC: 1.76 MG/DL (ref 0.6–1.3)
EOSINOPHIL # BLD MANUAL: 0 THOUSAND/UL (ref 0–0.4)
EOSINOPHIL NFR BLD MANUAL: 0 % (ref 0–6)
ERYTHROCYTE [DISTWIDTH] IN BLOOD BY AUTOMATED COUNT: 17.4 % (ref 11.6–15.1)
ERYTHROCYTE [DISTWIDTH] IN BLOOD BY AUTOMATED COUNT: 17.6 % (ref 11.6–15.1)
GFR SERPL CREATININE-BSD FRML MDRD: 42 ML/MIN/1.73SQ M
GLUCOSE SERPL-MCNC: 208 MG/DL (ref 65–140)
GLUCOSE SERPL-MCNC: 219 MG/DL (ref 65–140)
GLUCOSE SERPL-MCNC: 273 MG/DL (ref 65–140)
HCT VFR BLD AUTO: 37.7 % (ref 36.5–49.3)
HCT VFR BLD AUTO: 41.9 % (ref 36.5–49.3)
HCT VFR BLD AUTO: 43.6 % (ref 36.5–49.3)
HGB BLD-MCNC: 13 G/DL (ref 12–17)
HGB BLD-MCNC: 14.8 G/DL (ref 12–17)
HGB BLD-MCNC: 14.9 G/DL (ref 12–17)
INR PPP: 2.38 (ref 0.86–1.16)
INR PPP: 2.57 (ref 0.86–1.16)
LACTATE SERPL-SCNC: 2.5 MMOL/L (ref 0.5–2)
LACTATE SERPL-SCNC: 3.5 MMOL/L (ref 0.5–2)
LACTATE SERPL-SCNC: 6.1 MMOL/L (ref 0.5–2)
LIPASE SERPL-CCNC: 95 U/L (ref 73–393)
LYMPHOCYTES # BLD AUTO: 1.05 THOUSAND/UL (ref 0.6–4.47)
LYMPHOCYTES # BLD AUTO: 6 % (ref 14–44)
MCH RBC QN AUTO: 28.4 PG (ref 26.8–34.3)
MCH RBC QN AUTO: 29.2 PG (ref 26.8–34.3)
MCHC RBC AUTO-ENTMCNC: 34.5 G/DL (ref 31.4–37.4)
MCHC RBC AUTO-ENTMCNC: 35.3 G/DL (ref 31.4–37.4)
MCV RBC AUTO: 83 FL (ref 82–98)
MCV RBC AUTO: 83 FL (ref 82–98)
MONOCYTES # BLD AUTO: 0.52 THOUSAND/UL (ref 0–1.22)
MONOCYTES NFR BLD: 3 % (ref 4–12)
NEUTROPHILS # BLD MANUAL: 15.88 THOUSAND/UL (ref 1.85–7.62)
NEUTS SEG NFR BLD AUTO: 91 % (ref 43–75)
NRBC BLD AUTO-RTO: 0 /100 WBCS
OVALOCYTES BLD QL SMEAR: PRESENT
P AXIS: 70 DEGREES
PLATELET # BLD AUTO: 216 THOUSANDS/UL (ref 149–390)
PLATELET # BLD AUTO: 361 THOUSANDS/UL (ref 149–390)
PLATELET BLD QL SMEAR: ADEQUATE
POIKILOCYTOSIS BLD QL SMEAR: PRESENT
POTASSIUM SERPL-SCNC: 4.6 MMOL/L (ref 3.5–5.3)
PR INTERVAL: 196 MS
PROT SERPL-MCNC: 9.7 G/DL (ref 6.4–8.2)
PROTHROMBIN TIME: 26.3 SECONDS (ref 12.1–14.4)
PROTHROMBIN TIME: 27.9 SECONDS (ref 12.1–14.4)
QRS AXIS: 88 DEGREES
QRSD INTERVAL: 128 MS
QT INTERVAL: 376 MS
QTC INTERVAL: 444 MS
RBC # BLD AUTO: 4.57 MILLION/UL (ref 3.88–5.62)
RBC # BLD AUTO: 5.06 MILLION/UL (ref 3.88–5.62)
RBC MORPH BLD: PRESENT
RH BLD: POSITIVE
RH BLD: POSITIVE
SODIUM SERPL-SCNC: 135 MMOL/L (ref 136–145)
SPECIMEN EXPIRATION DATE: NORMAL
SPECIMEN EXPIRATION DATE: NORMAL
SPECIMEN SOURCE: NORMAL
T WAVE AXIS: 260 DEGREES
TROPONIN I BLD-MCNC: 0 NG/ML (ref 0–0.08)
VENTRICULAR RATE: 84 BPM
WBC # BLD AUTO: 16.25 THOUSAND/UL (ref 4.31–10.16)
WBC # BLD AUTO: 17.45 THOUSAND/UL (ref 4.31–10.16)

## 2017-11-15 PROCEDURE — 85610 PROTHROMBIN TIME: CPT | Performed by: EMERGENCY MEDICINE

## 2017-11-15 PROCEDURE — 96376 TX/PRO/DX INJ SAME DRUG ADON: CPT

## 2017-11-15 PROCEDURE — 86922 COMPATIBILITY TEST ANTIGLOB: CPT

## 2017-11-15 PROCEDURE — 85018 HEMOGLOBIN: CPT | Performed by: INTERNAL MEDICINE

## 2017-11-15 PROCEDURE — 74176 CT ABD & PELVIS W/O CONTRAST: CPT

## 2017-11-15 PROCEDURE — 85007 BL SMEAR W/DIFF WBC COUNT: CPT | Performed by: EMERGENCY MEDICINE

## 2017-11-15 PROCEDURE — 96375 TX/PRO/DX INJ NEW DRUG ADDON: CPT

## 2017-11-15 PROCEDURE — 85027 COMPLETE CBC AUTOMATED: CPT | Performed by: EMERGENCY MEDICINE

## 2017-11-15 PROCEDURE — 86900 BLOOD TYPING SEROLOGIC ABO: CPT | Performed by: INTERNAL MEDICINE

## 2017-11-15 PROCEDURE — 83605 ASSAY OF LACTIC ACID: CPT | Performed by: INTERNAL MEDICINE

## 2017-11-15 PROCEDURE — C9113 INJ PANTOPRAZOLE SODIUM, VIA: HCPCS | Performed by: INTERNAL MEDICINE

## 2017-11-15 PROCEDURE — 85027 COMPLETE CBC AUTOMATED: CPT | Performed by: INTERNAL MEDICINE

## 2017-11-15 PROCEDURE — 84484 ASSAY OF TROPONIN QUANT: CPT

## 2017-11-15 PROCEDURE — 86901 BLOOD TYPING SEROLOGIC RH(D): CPT | Performed by: INTERNAL MEDICINE

## 2017-11-15 PROCEDURE — 86870 RBC ANTIBODY IDENTIFICATION: CPT | Performed by: EMERGENCY MEDICINE

## 2017-11-15 PROCEDURE — 83605 ASSAY OF LACTIC ACID: CPT | Performed by: EMERGENCY MEDICINE

## 2017-11-15 PROCEDURE — 86901 BLOOD TYPING SEROLOGIC RH(D): CPT | Performed by: EMERGENCY MEDICINE

## 2017-11-15 PROCEDURE — 96374 THER/PROPH/DIAG INJ IV PUSH: CPT

## 2017-11-15 PROCEDURE — 36415 COLL VENOUS BLD VENIPUNCTURE: CPT

## 2017-11-15 PROCEDURE — 85730 THROMBOPLASTIN TIME PARTIAL: CPT | Performed by: EMERGENCY MEDICINE

## 2017-11-15 PROCEDURE — 80053 COMPREHEN METABOLIC PANEL: CPT | Performed by: EMERGENCY MEDICINE

## 2017-11-15 PROCEDURE — 87040 BLOOD CULTURE FOR BACTERIA: CPT | Performed by: INTERNAL MEDICINE

## 2017-11-15 PROCEDURE — 86850 RBC ANTIBODY SCREEN: CPT | Performed by: INTERNAL MEDICINE

## 2017-11-15 PROCEDURE — 93005 ELECTROCARDIOGRAM TRACING: CPT | Performed by: EMERGENCY MEDICINE

## 2017-11-15 PROCEDURE — 82948 REAGENT STRIP/BLOOD GLUCOSE: CPT

## 2017-11-15 PROCEDURE — 83690 ASSAY OF LIPASE: CPT | Performed by: EMERGENCY MEDICINE

## 2017-11-15 PROCEDURE — 71250 CT THORAX DX C-: CPT

## 2017-11-15 PROCEDURE — C9113 INJ PANTOPRAZOLE SODIUM, VIA: HCPCS | Performed by: EMERGENCY MEDICINE

## 2017-11-15 PROCEDURE — 85014 HEMATOCRIT: CPT | Performed by: INTERNAL MEDICINE

## 2017-11-15 PROCEDURE — 85610 PROTHROMBIN TIME: CPT | Performed by: INTERNAL MEDICINE

## 2017-11-15 PROCEDURE — 86850 RBC ANTIBODY SCREEN: CPT | Performed by: EMERGENCY MEDICINE

## 2017-11-15 PROCEDURE — 86921 COMPATIBILITY TEST INCUBATE: CPT

## 2017-11-15 PROCEDURE — 86900 BLOOD TYPING SEROLOGIC ABO: CPT | Performed by: EMERGENCY MEDICINE

## 2017-11-15 PROCEDURE — 99285 EMERGENCY DEPT VISIT HI MDM: CPT

## 2017-11-15 RX ORDER — PROMETHAZINE HYDROCHLORIDE 25 MG/ML
25 INJECTION, SOLUTION INTRAMUSCULAR; INTRAVENOUS EVERY 6 HOURS PRN
Status: DISCONTINUED | OUTPATIENT
Start: 2017-11-15 | End: 2017-11-20 | Stop reason: HOSPADM

## 2017-11-15 RX ORDER — DIAZEPAM 5 MG/ML
5 INJECTION, SOLUTION INTRAMUSCULAR; INTRAVENOUS ONCE
Status: DISCONTINUED | OUTPATIENT
Start: 2017-11-15 | End: 2017-11-15

## 2017-11-15 RX ORDER — HEPARIN SODIUM 10000 [USP'U]/100ML
3-30 INJECTION, SOLUTION INTRAVENOUS
Status: DISCONTINUED | OUTPATIENT
Start: 2017-11-15 | End: 2017-11-15

## 2017-11-15 RX ORDER — LORAZEPAM 1 MG/1
0.5 TABLET ORAL 2 TIMES DAILY PRN
COMMUNITY
End: 2018-02-12 | Stop reason: SDUPTHER

## 2017-11-15 RX ORDER — MONTELUKAST SODIUM 10 MG/1
10 TABLET ORAL
COMMUNITY
End: 2017-11-29 | Stop reason: HOSPADM

## 2017-11-15 RX ORDER — PROMETHAZINE HYDROCHLORIDE 25 MG/ML
25 INJECTION, SOLUTION INTRAMUSCULAR; INTRAVENOUS EVERY 6 HOURS PRN
Status: DISCONTINUED | OUTPATIENT
Start: 2017-11-15 | End: 2017-11-15

## 2017-11-15 RX ORDER — SODIUM CHLORIDE 9 MG/ML
75 INJECTION, SOLUTION INTRAVENOUS CONTINUOUS
Status: DISCONTINUED | OUTPATIENT
Start: 2017-11-15 | End: 2017-11-16

## 2017-11-15 RX ORDER — METOCLOPRAMIDE HYDROCHLORIDE 5 MG/ML
10 INJECTION INTRAMUSCULAR; INTRAVENOUS EVERY 6 HOURS PRN
Status: DISCONTINUED | OUTPATIENT
Start: 2017-11-15 | End: 2017-11-20 | Stop reason: HOSPADM

## 2017-11-15 RX ORDER — HALOPERIDOL 5 MG/ML
1 INJECTION INTRAMUSCULAR ONCE
Status: COMPLETED | OUTPATIENT
Start: 2017-11-15 | End: 2017-11-15

## 2017-11-15 RX ORDER — PANTOPRAZOLE SODIUM 40 MG/1
40 INJECTION, POWDER, FOR SOLUTION INTRAVENOUS EVERY 12 HOURS SCHEDULED
Status: DISCONTINUED | OUTPATIENT
Start: 2017-11-15 | End: 2017-11-15

## 2017-11-15 RX ORDER — HEPARIN SODIUM 1000 [USP'U]/ML
5600 INJECTION, SOLUTION INTRAVENOUS; SUBCUTANEOUS AS NEEDED
Status: DISCONTINUED | OUTPATIENT
Start: 2017-11-15 | End: 2017-11-15

## 2017-11-15 RX ORDER — DIAZEPAM 5 MG/ML
5 INJECTION, SOLUTION INTRAMUSCULAR; INTRAVENOUS EVERY 6 HOURS PRN
Status: DISCONTINUED | OUTPATIENT
Start: 2017-11-15 | End: 2017-11-16

## 2017-11-15 RX ORDER — ONDANSETRON 2 MG/ML
INJECTION INTRAMUSCULAR; INTRAVENOUS
Status: COMPLETED
Start: 2017-11-15 | End: 2017-11-15

## 2017-11-15 RX ORDER — HYDROCODONE BITARTRATE AND ACETAMINOPHEN 5; 325 MG/1; MG/1
1 TABLET ORAL EVERY 6 HOURS PRN
COMMUNITY
End: 2017-11-29 | Stop reason: HOSPADM

## 2017-11-15 RX ORDER — ONDANSETRON 2 MG/ML
4 INJECTION INTRAMUSCULAR; INTRAVENOUS ONCE
Status: DISCONTINUED | OUTPATIENT
Start: 2017-11-15 | End: 2017-11-15

## 2017-11-15 RX ORDER — ONDANSETRON 2 MG/ML
4 INJECTION INTRAMUSCULAR; INTRAVENOUS ONCE
Status: COMPLETED | OUTPATIENT
Start: 2017-11-15 | End: 2017-11-15

## 2017-11-15 RX ORDER — OCTREOTIDE ACETATE 50 UG/ML
50 INJECTION, SOLUTION INTRAVENOUS; SUBCUTANEOUS ONCE
Status: DISCONTINUED | OUTPATIENT
Start: 2017-11-15 | End: 2017-11-15

## 2017-11-15 RX ORDER — FENTANYL CITRATE 50 UG/ML
50 INJECTION, SOLUTION INTRAMUSCULAR; INTRAVENOUS ONCE
Status: DISCONTINUED | OUTPATIENT
Start: 2017-11-15 | End: 2017-11-15

## 2017-11-15 RX ORDER — HEPARIN SODIUM 1000 [USP'U]/ML
2800 INJECTION, SOLUTION INTRAVENOUS; SUBCUTANEOUS AS NEEDED
Status: DISCONTINUED | OUTPATIENT
Start: 2017-11-15 | End: 2017-11-15

## 2017-11-15 RX ORDER — DIAZEPAM 5 MG/ML
5 INJECTION, SOLUTION INTRAMUSCULAR; INTRAVENOUS ONCE
Status: COMPLETED | OUTPATIENT
Start: 2017-11-15 | End: 2017-11-15

## 2017-11-15 RX ORDER — PANTOPRAZOLE SODIUM 40 MG/1
40 INJECTION, POWDER, FOR SOLUTION INTRAVENOUS ONCE
Status: COMPLETED | OUTPATIENT
Start: 2017-11-15 | End: 2017-11-15

## 2017-11-15 RX ORDER — FENTANYL CITRATE 50 UG/ML
100 INJECTION, SOLUTION INTRAMUSCULAR; INTRAVENOUS ONCE
Status: COMPLETED | OUTPATIENT
Start: 2017-11-15 | End: 2017-11-15

## 2017-11-15 RX ORDER — FENTANYL CITRATE 50 UG/ML
100 INJECTION, SOLUTION INTRAMUSCULAR; INTRAVENOUS ONCE
Status: DISCONTINUED | OUTPATIENT
Start: 2017-11-15 | End: 2017-11-15

## 2017-11-15 RX ORDER — METOPROLOL SUCCINATE 25 MG/1
25 TABLET, EXTENDED RELEASE ORAL DAILY
COMMUNITY

## 2017-11-15 RX ADMIN — SODIUM CHLORIDE 8 MG/HR: 9 INJECTION, SOLUTION INTRAVENOUS at 16:24

## 2017-11-15 RX ADMIN — METRONIDAZOLE 500 MG: 500 INJECTION, SOLUTION INTRAVENOUS at 18:55

## 2017-11-15 RX ADMIN — METRONIDAZOLE 500 MG: 500 INJECTION, SOLUTION INTRAVENOUS at 14:36

## 2017-11-15 RX ADMIN — HALOPERIDOL LACTATE 1 MG: 5 INJECTION, SOLUTION INTRAMUSCULAR at 14:49

## 2017-11-15 RX ADMIN — METOCLOPRAMIDE 10 MG: 5 INJECTION, SOLUTION INTRAMUSCULAR; INTRAVENOUS at 15:47

## 2017-11-15 RX ADMIN — ONDANSETRON 4 MG: 2 INJECTION INTRAMUSCULAR; INTRAVENOUS at 09:54

## 2017-11-15 RX ADMIN — INSULIN LISPRO 1 UNITS: 100 INJECTION, SOLUTION INTRAVENOUS; SUBCUTANEOUS at 18:56

## 2017-11-15 RX ADMIN — HYDROMORPHONE HYDROCHLORIDE 0.5 MG: 1 INJECTION, SOLUTION INTRAMUSCULAR; INTRAVENOUS; SUBCUTANEOUS at 22:22

## 2017-11-15 RX ADMIN — HYDROMORPHONE HYDROCHLORIDE 1 MG: 1 INJECTION, SOLUTION INTRAMUSCULAR; INTRAVENOUS; SUBCUTANEOUS at 11:02

## 2017-11-15 RX ADMIN — FENTANYL CITRATE 100 MCG: 50 INJECTION, SOLUTION INTRAMUSCULAR; INTRAVENOUS at 09:57

## 2017-11-15 RX ADMIN — DIAZEPAM 5 MG: 5 INJECTION, SOLUTION INTRAMUSCULAR; INTRAVENOUS at 13:07

## 2017-11-15 RX ADMIN — NICOTINE 1 PATCH: 7 PATCH, EXTENDED RELEASE TRANSDERMAL at 14:35

## 2017-11-15 RX ADMIN — WATER 10 ML: 1 INJECTION INTRAMUSCULAR; INTRAVENOUS; SUBCUTANEOUS at 10:09

## 2017-11-15 RX ADMIN — INSULIN LISPRO 2 UNITS: 100 INJECTION, SOLUTION INTRAVENOUS; SUBCUTANEOUS at 14:40

## 2017-11-15 RX ADMIN — SODIUM CHLORIDE 75 ML/HR: 0.9 INJECTION, SOLUTION INTRAVENOUS at 13:13

## 2017-11-15 RX ADMIN — CEFTRIAXONE SODIUM 1000 MG: 10 INJECTION, POWDER, FOR SOLUTION INTRAVENOUS at 12:29

## 2017-11-15 RX ADMIN — SODIUM CHLORIDE 1000 ML: 0.9 INJECTION, SOLUTION INTRAVENOUS at 10:35

## 2017-11-15 RX ADMIN — PANTOPRAZOLE SODIUM 40 MG: 40 INJECTION, POWDER, FOR SOLUTION INTRAVENOUS at 12:25

## 2017-11-15 RX ADMIN — PROMETHAZINE HYDROCHLORIDE 25 MG: 25 INJECTION INTRAMUSCULAR; INTRAVENOUS at 10:58

## 2017-11-15 RX ADMIN — SODIUM CHLORIDE 1000 ML: 0.9 INJECTION, SOLUTION INTRAVENOUS at 12:23

## 2017-11-15 RX ADMIN — HYDROMORPHONE HYDROCHLORIDE 0.5 MG: 1 INJECTION, SOLUTION INTRAMUSCULAR; INTRAVENOUS; SUBCUTANEOUS at 18:55

## 2017-11-15 RX ADMIN — ONDANSETRON 8 MG: 2 INJECTION INTRAMUSCULAR; INTRAVENOUS at 17:07

## 2017-11-15 RX ADMIN — DIAZEPAM 5 MG: 5 INJECTION, SOLUTION INTRAMUSCULAR; INTRAVENOUS at 11:08

## 2017-11-15 RX ADMIN — ONDANSETRON 4 MG: 2 INJECTION INTRAMUSCULAR; INTRAVENOUS at 10:38

## 2017-11-15 RX ADMIN — PANTOPRAZOLE SODIUM 40 MG: 40 INJECTION, POWDER, FOR SOLUTION INTRAVENOUS at 10:09

## 2017-11-15 NOTE — CONSULTS
Consultation - Cardiology   Renata Helton 61 y o  male MRN: [id number]  Unit/Bed#: [facility] 604-01 Encounter: [id number]    Assessment/Plan     Coffee ground emesis  Past hx of similar with erosive esophagitis seen, holding coumadin tonight   GI following, cont protonix     Lactic acidosis  Repeat lactic acid   Concern for mesenteric ischemia given hx of vascular disease, DM     MVP s/p mechanical mitral valve replacement   Holding coumadin given gi bleed       CAD s/p CABG in 2001  Cont statin, follows Dr Vivi Wolf for cardiology outpatient     Chronic CHF with systolic dysfunction EF 14%  Last Echo April 2017, not in acute exacerbation   Pt is dehydrated based on hemoconcentration and elevated total protein, would pursue aggressive IVF hydration given lack of PO intake, hold torsemide    Cont lisinopril 20mg daily  and metoprolol 25mg daily when tolerating PO intake     DM type 2 with associated peripheral vascular disease   Sp left AKA  Cont on statin, concern for ischemia given elevated lactate              History of Present Illness   Physician Requesting Consult: Kathe Avila MD  Reason for Consult / Principal Problem: coffee ground emesis   HPI: Renata Helton is a 61y o  year old male with hx of MVP sp mechanical valve replacement and CABG in 2001 on coumadin, who presents with coffee ground emesis and severe  Diffuse abdominal pain with onset yesterday  Pt states he has had abdominal pain for several months and pain suddenly worsened when he started vomiting yesterday  Pain is improved with food, worse with not eating  He states emesis was coffee ground from the beginning of vomiting  He denies any lightheadedness, chest pain, SOB, dizziness, or hematochezia or melena  Pain has improved since arriving in the Er  States he vomiting in the ED which was also coffee ground but no vomiting since arrival to the floor  No fever or chills     Has hx of systolic chf with EF last measured in April 2017 of 40%      Inpatient consult to Cardiology  Date/Time: 11/15/2017 2:13 PM  Performed by: Trevor Hall  Authorized by: Naila Smalls           Review of Systems  Review of Systems  Constitutional: No fever, +fatigue   ENT: epistaxis present sp NGT attempt  Respiratory: denies difficulty breathing, +cough   Cardiovascular: no chest pain  Abdomen: see HPI   MSK: left AKA  Neuro: no focal weakness  Skin: surgical scars   A 10 system review was performed and is otherwise negative except as mentioned above  Historical Information   Past Medical History:   Diagnosis Date    Anticoagulated on Coumadin     Mechanical MVR    Anxiety     Bipolar 1 disorder (HCC)     CAD (coronary artery disease)     Chronic systolic congestive heart failure ([facility] [address] ) 4/18/2017    Colitis 4/2/2017    Diabetes mellitus ([facility] [address] )     DM type 2 with diabetic peripheral neuropathy ([facility] )     Hiatal hernia     Hypertension     Ischemic cardiomyopathy     Pacemaker     PAD [facility]peripheral artery disease) ([facility])      Past Surgical History:   Procedure Laterality Date    AMPUTATION ABOVE KNEE (AKA) Left 8/24/2017    Procedure: AMPUTATION ABOVE KNEE (AKA); Surgeon: Patricia Belcher MD;  [facility];  Service: Vascular    CARDIAC PACEMAKER REMOVAL      CORONARY ARTERY BYPASS GRAFT      ESOPHAGOGASTRODUODENOSCOPY N/A 4/20/2017    Procedure: ESOPHAGOGASTRODUODENOSCOPY (EGD); Surgeon: Sea Hallman MD;  [facility];  Service:     ESOPHAGOGASTRODUODENOSCOPY N/A 5/26/2017    Procedure: ESOPHAGOGASTRODUODENOSCOPY (EGD);   Surgeon: Tana Moran MD;  [facility];  Service:     MITRAL VALVE REPLACEMENT      Mechanical    TOE AMPUTATION Left 7/21/2017    Procedure: PARTIAL FIRST RAY AMPUTATION; EXCISION OF WOUND W/ TOE FLAP CLOSURE;  Surgeon: Stefani Soler DPM;  [facility];  Service: Podiatry    VAC DRESSING APPLICATION Left 0/02/4477    Procedure: VAC application;  Surgeon: Stefani Soler DPM;  [facility];  Service: Podiatry    WOUND DEBRIDEMENT Left 8/19/2017    Procedure: wound debridement with washout; resection of left 1st metatarsal;  Surgeon: Corinna Littlejohn DPM;  [facility];  Service: Podiatry     History   Alcohol Use No     History   Drug Use No     History   Smoking Status    Current Some Day Smoker    Packs/day: 0 50    Types: Cigarettes   Smokeless Tobacco    Never Used     Family History:   Family History   Problem Relation Age of Onset    Hypertension Mother     Diabetes Father        Meds/Allergies   all current active meds have been reviewed and current meds:   Current Facility-Administered Medications   Medication Dose Route Frequency    ceftriaxone (ROCEPHIN) 1 g/50 mL in dextrose IVPB  1,000 mg Intravenous Q24H    diazepam (VALIUM) injection 5 mg  5 mg Intravenous Q6H PRN    HYDROmorphone (DILAUDID) 1 mg/mL injection 0 5 mg  0 5 mg Intravenous Q3H PRN    HYDROmorphone (DILAUDID) 1 mg/mL injection 1 mg  1 mg Intravenous Q3H PRN    insulin lispro (HumaLOG) 100 units/mL subcutaneous injection 1-5 Units  1-5 Units Subcutaneous Q6H [address]    insulin lispro (HumaLOG) 100 units/mL subcutaneous injection 1-5 Units  1-5 Units Subcutaneous HS    metroNIDAZOLE (FLAGYL) IVPB (premix) 500 mg  500 mg Intravenous Q8H    nicotine (NICODERM CQ) 7 mg/24hr TD 24 hr patch 1 patch  1 patch Transdermal Q24H    ondansetron (ZOFRAN) 8 mg in sodium chloride 0 9 % 50 mL IVPB  8 mg Intravenous Q6H PRN    pantoprazole (PROTONIX) injection 40 mg  40 mg Intravenous Q12H [place]    promethazine (PHENERGAN) injection 25 mg  25 mg Intravenous Q6H PRN    sodium chloride 0 9 % infusion  75 mL/hr Intravenous Continuous     Allergies   Allergen Reactions    Aspirin Other (See Comments)     Received ASA & bleed out    Morphine Other (See Comments)     Gets red streak up arm above IV site    Omeprazole Diarrhea       Objective   Vitals: Blood pressure 138/80, pulse 68, temperature 99 3 °F (37 4 °C), temperature source Oral, resp  rate 18, SpO2 98 %  Orthostatic Blood Pressures    Flowsheet Row Most Recent Value   Blood Pressure  138/80 filed at 11/15/2017 1317   Patient Position - Orthostatic VS  Lying filed at 11/15/2017 1317            Intake/Output Summary (Last 24 hours) at 11/15/17 1433  Last data filed at 11/15/17 1355   Gross per 24 hour   Intake               80 ml   Output                0 ml   Net               80 ml       Invasive Devices     Peripheral Intravenous Line            Peripheral IV 11/15/17 less than 1 day                Physical Exam    General Appearance: awake  Alert, uncomfortable though no acute distress  Eyes: normal conjunctivae, sclera anicteric   Neck: supple, external jugular vein visible however no true JVD  Lungs: bilat wheezing diffusely, no resp distress  Heart: regular rate and rhythm, S1, S2 with click noted,  Abdomen: moderate tenderness to palpation without rebound or guarding, +BS  Extremities: no edema, left AKA noted   Neuro: no focal deficits, speech clear    Skin: warm, dry       Lab Results:   CBC with diff:   Results from last 7 days  Lab Units 11/15/17  1231 11/15/17  0946   WBC Thousand/uL  --  17 45*   RBC Million/uL  --  5 06   HEMOGLOBIN g/dL 14 9 14 8   HEMATOCRIT % 43 6 41 9   MCV fL  --  83   MCH pg  --  29 2   MCHC g/dL  --  35 3   RDW %  --  17 6*   PLATELETS Thousands/uL  --  361     CMP:   Results from last 7 days  Lab Units 11/15/17  1047   SODIUM mmol/L 135*   POTASSIUM mmol/L 4 6   CHLORIDE mmol/L 99*   CO2 mmol/L 22   ANION GAP mmol/L 14*   BUN mg/dL 27*   CREATININE mg/dL 1 76*   GLUCOSE RANDOM mg/dL 273*   CALCIUM mg/dL 10 3*   AST U/L 33   ALT U/L 42   ALK PHOS U/L 205*   TOTAL PROTEIN g/dL 9 7*   ALBUMIN g/dL 4 4   BILIRUBIN TOTAL mg/dL 1 09*   EGFR ml/min/1 73sq m 42     Coags:   Results from last 7 days  Lab Units 11/15/17  1047   PTT seconds 39*   INR  2 38*     Imaging: I have personally reviewed pertinent reports        VTE Prophylaxis: Sequential compression device (Venodyne)     Code Status: Level 1 - Full Code  Advance Directive and Living Will: Yes    Power of : Yes    Counseling / Coordination of Care  Total floor / unit time spent today 60 minutes  Greater than 50% of total time was spent with the patient and / or family counseling and / or coordination of care    A description of the counseling / coordination of care: see HPI

## 2017-11-15 NOTE — ED PROVIDER NOTES
History  Chief Complaint   Patient presents with    Vomiting Blood     Pt c/o coffeeground emesis and severe abdominal pain since last night  Pt is on coumadin  59-year-old male who is on Coumadin and with a past medical history of erosive esophagitis, Mechanical heart valve, upper GI bleed, hypertension, pacemaker AICD placement, diabetes, colitis presents emergency department for a 24 hour history of vomiting coffee-ground emesis and bright red blood  Patient states that last night around 2200 hours the pain he started having bright red blood emesis that lasted throughout the night and into this morning  Patient states that his continues been vomiting up blood as well as dark brown and black material   Patient states he also has diffuse abdominal pain is rated 10/10  Patient also complains of severe nausea  Patient denies history varices, gastric/duodenal ulcers, fever, chills, diarrhea, hematochezia, dysuria, hematuria, recent alcohol use, syncope/ near syncope, chest pain, shortness of breath  History provided by:  Patient      Prior to Admission Medications   Prescriptions Last Dose Informant Patient Reported? Taking?    HYDROcodone-acetaminophen (NORCO) 5-325 mg per tablet Unknown at Unknown time  Yes No   Sig: Take 1 tablet by mouth every 6 (six) hours as needed for pain   Insulin Glargine (BASAGLAR KWIKPEN) 100 UNIT/ML SOPN 11/14/2017 at Unknown time  Yes Yes   Sig: Inject 21 Units under the skin daily at bedtime   LORazepam (ATIVAN) 1 mg tablet Past Month at Unknown time  No Yes   Sig: Take 1 tablet by mouth every 6 (six) hours as needed for anxiety for up to 5 days   LORazepam (ATIVAN) 1 mg tablet   Yes Yes   Sig: Take 0 5 mg by mouth 2 (two) times a day as needed for anxiety   QUEtiapine (SEROquel) 200 mg tablet   No No   Sig: Take 1 tablet by mouth 2 (two) times a day for 10 days   QUEtiapine (SEROquel) 400 MG tablet 11/14/2017 at Unknown time  No Yes   Sig: Take 1 tablet by mouth daily at bedtime for 10 days   acetaminophen (TYLENOL) 325 mg tablet Past Month at Unknown time  No Yes   Sig: Take 3 tablets by mouth 3 (three) times a day   ascorbic acid (VITAMIN C) 500 MG tablet Past Week at Unknown time  No Yes   Sig: Take 1 tablet by mouth 2 (two) times a day   atorvastatin (LIPITOR) 10 mg tablet 11/14/2017 at Unknown time  No Yes   Sig: Take 1 tablet by mouth daily with dinner   bisacodyl (DULCOLAX) 10 mg suppository Unknown at Unknown time  No No   Sig: Insert 1 suppository into the rectum daily as needed for constipation   ferrous gluconate (FERGON) 324 mg tablet Unknown at Unknown time  No No   Sig: Take 1 tablet by mouth 2 (two) times a day   gabapentin (NEURONTIN) 300 mg capsule 11/14/2017 at Unknown time  No Yes   Sig: Take 3 capsules by mouth 3 (three) times a day   insulin glargine (LANTUS) 100 units/mL subcutaneous injection Unknown at Unknown time  No No   Sig: Inject 21 Units under the skin daily at bedtime   insulin lispro (HumaLOG) 100 units/mL injection 11/14/2017 at Unknown time  No Yes   Sig: Inject 3 Units under the skin 3 (three) times a day with meals   lisinopril (ZESTRIL) 20 mg tablet 11/14/2017 at Unknown time  No Yes   Sig: Take 0 5 tablets by mouth daily   melatonin 3 mg Past Week at Unknown time  No Yes   Sig: Take 2 tablets by mouth daily at bedtime   metoprolol succinate (TOPROL-XL) 25 mg 24 hr tablet 11/14/2017 at Unknown time  Yes Yes   Sig: Take 25 mg by mouth daily   metoprolol tartrate (LOPRESSOR) 25 mg tablet More than a month at Unknown time  No No   Sig: Take 1 tablet by mouth daily   montelukast (SINGULAIR) 10 mg tablet Unknown at Unknown time  Yes No   Sig: Take 10 mg by mouth daily at bedtime   nicotine (NICODERM CQ) 7 mg/24hr TD 24 hr patch Unknown at Unknown time  No No   Sig: Place 1 patch on the skin every 24 hours   pantoprazole (PROTONIX) 40 mg tablet Unknown at Unknown time  No No   Sig: Take 1 tablet by mouth daily in the early morning   polyethylene glycol (MIRALAX) 17 g packet Unknown at Unknown time  No No   Sig: Take 17 g by mouth daily as needed (constipation)   potassium chloride (K-DUR,KLOR-CON) 20 mEq tablet Unknown at Unknown time  No No   Sig: Take 1 tablet by mouth daily   senna-docusate sodium (SENOKOT S) 8 6-50 mg per tablet Unknown at Unknown time  No No   Sig: Take 1 tablet by mouth daily at bedtime   torsemide (DEMADEX) 20 mg tablet 11/14/2017 at Unknown time  No Yes   Sig: Take 1 tablet by mouth daily   warfarin (COUMADIN) 5 mg tablet Unknown at Unknown time  No No   Sig: Take 1 tablet by mouth daily   Patient taking differently: Take 5 mg by mouth daily 1-2 tab daily as directed       Facility-Administered Medications: None       Past Medical History:   Diagnosis Date    Anticoagulated on Coumadin     Mechanical MVR    Anxiety     Bipolar 1 disorder (HCC)     CAD (coronary artery disease)     Chronic systolic congestive heart failure ([facility] [address] ) 4/18/2017    Colitis 4/2/2017    Diabetes mellitus ([facility] [address] )     DM type 2 with diabetic peripheral neuropathy ([facility] [address] )     Hiatal hernia     Hypertension     Ischemic cardiomyopathy     Pacemaker     PAD (peripheral artery disease) ([facility] [address] )        Past Surgical History:   Procedure Laterality Date    AMPUTATION ABOVE KNEE (AKA) Left 8/24/2017    Procedure: AMPUTATION ABOVE KNEE (AKA); Surgeon: Dragan Goetz MD;  Location:  MAIN OR;  Service: Vascular    CARDIAC PACEMAKER REMOVAL      CORONARY ARTERY BYPASS GRAFT      ESOPHAGOGASTRODUODENOSCOPY N/A 4/20/2017    Procedure: ESOPHAGOGASTRODUODENOSCOPY (EGD); Surgeon: Nayana Ramirez MD;  [facility];  Service:     ESOPHAGOGASTRODUODENOSCOPY N/A 5/26/2017    Procedure: ESOPHAGOGASTRODUODENOSCOPY (EGD);   Surgeon: Chace Colon MD;  [facility];  Service:     MITRAL VALVE REPLACEMENT      Mechanical    TOE AMPUTATION Left 7/21/2017    Procedure: PARTIAL FIRST RAY AMPUTATION; EXCISION OF WOUND W/ TOE FLAP CLOSURE;  Surgeon: Juanita Cornell Rahul White;  [facility];  Service: Podiatry    VAC DRESSING APPLICATION Left 7/71/6791    Procedure: VAC application;  Surgeon: Janette Ordaz DPM;  [facility];  Service: Podiatry    WOUND DEBRIDEMENT Left 8/19/2017    Procedure: wound debridement with washout; resection of left 1st metatarsal;  Surgeon: Janette Ordaz DPM;  [facility];  Service: Podiatry       Family History   Problem Relation Age of Onset    Hypertension Mother     Diabetes Father      I have reviewed and agree with the history as documented  Social History   Substance Use Topics    Smoking status: Current Some Day Smoker     Packs/day: 0 50     Types: Cigarettes    Smokeless tobacco: Never Used    Alcohol use No        Review of Systems   Constitutional: Negative for chills, diaphoresis, fatigue and fever  HENT: Negative for congestion, ear discharge, facial swelling, hearing loss, rhinorrhea, sinus pain, sinus pressure, sneezing, sore throat, tinnitus and trouble swallowing  Respiratory: Negative for cough, choking, chest tightness, shortness of breath, wheezing and stridor  Cardiovascular: Negative for chest pain, palpitations and leg swelling  Gastrointestinal: Positive for abdominal pain, nausea and vomiting  Negative for abdominal distention, blood in stool, constipation and diarrhea  Genitourinary: Negative for difficulty urinating, dysuria, enuresis, flank pain, frequency and hematuria  Musculoskeletal: Negative for arthralgias, back pain, gait problem and neck stiffness  Skin: Negative for rash and wound  Neurological: Negative for dizziness, seizures, syncope, weakness, numbness and headaches  Psychiatric/Behavioral: Negative for agitation, confusion, hallucinations, sleep disturbance and suicidal ideas         Physical Exam  ED Triage Vitals [11/15/17 0929]   Temperature Pulse Respirations Blood Pressure SpO2   (!) 97 3 °F (36 3 °C) 88 20 137/99 99 %      Temp Source Heart Rate Source Patient Position - Orthostatic VS BP Location FiO2 (%)   Tympanic Monitor Lying Right arm --      Pain Score       Worst Possible Pain           Orthostatic Vital Signs  Vitals:    11/15/17 1149 11/15/17 1200 11/15/17 1317 11/15/17 1517   BP: 144/86 144/86 138/80    Pulse: 80 78 68 95   Patient Position - Orthostatic VS: Sitting Sitting Lying Sitting       Physical Exam   Constitutional: He is oriented to person, place, and time  He appears well-developed and well-nourished  He appears distressed  HENT:   Head: Normocephalic and atraumatic  Eyes: Conjunctivae and EOM are normal    Neck: Normal range of motion  Cardiovascular: Normal rate, regular rhythm and normal heart sounds  Exam reveals no gallop and no friction rub  No murmur heard  Pulmonary/Chest: Breath sounds normal  No respiratory distress  He has no wheezes  He has no rales  Abdominal: Normal appearance and bowel sounds are normal  There is generalized tenderness  There is rebound and guarding  There is no rigidity, no CVA tenderness, no tenderness at McBurney's point and negative Turpin's sign  Patient is actively vomiting coffee-ground emesis  Patient is writhing in pain  Neurological: He is alert and oriented to person, place, and time  Skin: Skin is warm and dry  Capillary refill takes 2 to 3 seconds  Psychiatric: He has a normal mood and affect  His behavior is normal  Judgment and thought content normal    Nursing note and vitals reviewed        ED Medications  Medications   promethazine (PHENERGAN) injection 25 mg (25 mg Intravenous Given 11/15/17 1058)   nicotine (NICODERM CQ) 7 mg/24hr TD 24 hr patch 1 patch (1 patch Transdermal Medication Applied 11/15/17 1435)   insulin lispro (HumaLOG) 100 units/mL subcutaneous injection 1-5 Units (2 Units Subcutaneous Given 11/15/17 1440)   insulin lispro (HumaLOG) 100 units/mL subcutaneous injection 1-5 Units (not administered)   ceftriaxone (ROCEPHIN) 1 g/50 mL in dextrose IVPB (0 mg Intravenous Stopped 11/15/17 1340)   metroNIDAZOLE (FLAGYL) IVPB (premix) 500 mg (500 mg Intravenous New Bag 11/15/17 1436)   ondansetron (ZOFRAN) 8 mg in sodium chloride 0 9 % 50 mL IVPB (not administered)   diazepam (VALIUM) injection 5 mg (5 mg Intravenous Given 11/15/17 1307)   HYDROmorphone (DILAUDID) 1 mg/mL injection 1 mg (not administered)   HYDROmorphone (DILAUDID) 1 mg/mL injection 0 5 mg (not administered)   sodium chloride 0 9 % infusion (75 mL/hr Intravenous New Bag 11/15/17 1313)   pantoprazole (PROTONIX) 80 mg in sodium chloride 0 9 % 100 mL infusion (8 mg/hr Intravenous New Bag 11/15/17 1624)   metoclopramide (REGLAN) injection 10 mg (10 mg Intravenous Given 11/15/17 1547)   sterile water injection 10 mL (10 mL Injection Given 11/15/17 1009)   fentanyl citrate (PF) 100 MCG/2ML 100 mcg (100 mcg Intravenous Given 11/15/17 0957)   ondansetron (ZOFRAN) injection 4 mg (4 mg Intravenous Given 11/15/17 0954)   pantoprazole (PROTONIX) injection 40 mg (40 mg Intravenous Given 11/15/17 1009)   ondansetron (ZOFRAN) injection 4 mg (4 mg Intravenous Given 11/15/17 1038)   HYDROmorphone (DILAUDID) 1 mg/mL injection 1 mg (1 mg Intravenous Given 11/15/17 1102)   diazepam (VALIUM) injection 5 mg (5 mg Intravenous Given 11/15/17 1108)   sodium chloride 0 9 % bolus 1,000 mL (1,000 mL Intravenous New Bag 11/15/17 1223)   haloperidol lactate (HALDOL) injection 1 mg (1 mg Intramuscular Given 11/15/17 1449)       Diagnostic Studies  Results Reviewed     Procedure Component Value Units Date/Time    Lactic acid, plasma [[id number]]     Lab Status:  No result Specimen:  Blood     Lactic acid, plasma [[id number]]     Lab Status:  No result Specimen:  Blood     Lactic acid, plasma [[id number]]  (Abnormal) Collected:  11/15/17 1342    Lab Status:  Final result Specimen:  Blood from Arm, Right Updated:  11/15/17 1432     LACTIC ACID 3 5 (HH) mmol/L     Narrative:         Result may be elevated if tourniquet was used during collection  Hemoglobin and hematocrit, blood [[id number]]  (Normal) Collected:  11/15/17 1231    Lab Status:  Final result Specimen:  Blood from Arm, Right Updated:  11/15/17 1244     Hemoglobin 14 9 g/dL      Hematocrit 43 6 %     Blood culture [[id number]] Collected:  11/15/17 1229    Lab Status: In process Specimen:  Blood from Arm, Right Updated:  11/15/17 1237    Blood culture [[id number]] Collected:  11/15/17 1232    Lab Status: In process Specimen:  Blood from Arm, Left Updated:  11/15/17 1237    Lactic acid, plasma [[id number]]     Lab Status:  No result Specimen:  Blood     Lactic acid, plasma [[id number]]     Lab Status:  No result Specimen:  Blood     Lactic acid, plasma [[id number]]  (Abnormal) Collected:  11/15/17 1047    Lab Status:  Final result Specimen:  Blood from Arm, Right Updated:  11/15/17 1133     LACTIC ACID 6 1 (HH) mmol/L     Narrative:         Result may be elevated if tourniquet was used during collection  Protime-INR [[id number]]  (Abnormal) Collected:  11/15/17 1047    Lab Status:  Final result Specimen:  Blood from Arm, Right Updated:  11/15/17 1126     Protime 26 3 (H) seconds      INR 2 38 (H)    APTT [[id number]]  (Abnormal) Collected:  11/15/17 1047    Lab Status:  Final result Specimen:  Blood from Arm, Right Updated:  11/15/17 1126     PTT 39 (H) seconds     Narrative:          Therapeutic Heparin Range = 60-90 seconds    Comprehensive metabolic panel [[id number]]  (Abnormal) Collected:  11/15/17 1047    Lab Status:  Final result Specimen:  Blood from Arm, Right Updated:  11/15/17 1126     Sodium 135 (L) mmol/L      Potassium 4 6 mmol/L      Chloride 99 (L) mmol/L      CO2 22 mmol/L      Anion Gap 14 (H) mmol/L      BUN 27 (H) mg/dL      Creatinine 1 76 (H) mg/dL      Glucose 273 (H) mg/dL      Calcium 10 3 (H) mg/dL      AST 33 U/L      ALT 42 U/L      Alkaline Phosphatase 205 (H) U/L      Total Protein 9 7 (H) g/dL      Albumin 4 4 g/dL      Total Bilirubin 1 09 (H) mg/dL      eGFR 42 ml/min/1 73sq m Narrative:         National Kidney Disease Education Program recommendations are as follows:  GFR calculation is accurate only with a steady state creatinine  Chronic Kidney disease less than 60 ml/min/1 73 sq  meters  Kidney failure less than 15 ml/min/1 73 sq  meters  Lipase [[id number]]  (Normal) Collected:  11/15/17 1047    Lab Status:  Final result Specimen:  Blood from Arm, Right Updated:  11/15/17 1126     Lipase 95 u/L     POCT troponin [[id number]]  (Normal) Collected:  11/15/17 1051    Lab Status:  Final result Updated:  11/15/17 1105     POC Troponin I 0 00 ng/ml      Specimen Type VENOUS    Narrative:         [employer] i-Stat handheld analyzer 99% cutoff is > 0 08ng/mL in network Emergency Departments    o cTnI 99% cutoff is useful only when applied to patients in the clinical setting of myocardial ischemia  o cTnI 99% cutoff should be interpreted in the context of clinical history, ECG findings and possibly cardiac imaging to establish correct diagnosis  o cTnI 99% cutoff may be suggestive but clearly not indicative of a coronary event without the clinical setting of myocardial ischemia  CBC and differential [[id number]]  (Abnormal) Collected:  11/15/17 0946    Lab Status:  Final result Specimen:  Blood from Arm, Right Updated:  11/15/17 1028     WBC 17 45 (H) Thousand/uL      RBC 5 06 Million/uL      Hemoglobin 14 8 g/dL      Hematocrit 41 9 %      MCV 83 fL      MCH 29 2 pg      MCHC 35 3 g/dL      RDW 17 6 (H) %      Platelets 631 Thousands/uL      nRBC 0 /100 WBCs     Narrative: This is an appended report  These results have been appended to a previously verified report  CT chest abdomen pelvis wo contrast   Final Result by Michael Daley MD (11/15 1301)      No acute findings in the chest, abdomen or pelvis  Pulmonary emphysema with right apical bullous disease        No definite esophageal abnormalities as visualized in this patient with a history of coffee ground emesis and esophagitis  No definite bowel abnormalities; enteric contrast is not administered  An area of gastric wall thickening is not apparent  No    bowel obstruction  Workstation performed: [id number]               Procedures  ECG 12 Lead Documentation  Date/Time: 11/15/2017 11:08 AM  Performed by: Judd Coates by: Evelia Griffith     Indications / Diagnosis:   hematemesis  ECG reviewed by me, the ED Provider: yes    Patient location:  ED  Previous ECG:     Previous ECG:  Compared to current    Comparison ECG info:   August 31 2017    Similarity:  Changes noted    Comparison to cardiac monitor: Yes    Interpretation:     Interpretation: abnormal    Rate:     ECG rate:  84    ECG rate assessment: normal    Rhythm:     Rhythm: sinus rhythm    Ectopy:     Ectopy: none    QRS:     QRS axis:  Normal    QRS intervals:  Normal  Conduction:     Conduction: normal    ST segments:     ST segments:  Abnormal    Elevation:  II, III and aVF    Depression:  V4, V6 and V5  T waves:     T waves: inverted      Inverted:  II and III          Phone Consults  ED Phone Contact    ED Course  ED Course                                MDM  Number of Diagnoses or Management Options  Acute upper GI bleed: new and requires workup  Diagnosis management comments:  58-year-old male with a 24 hour history of hematemesis     spoke to Gastroenterology physician on call who will evaluate the patient at bedside  GI consult placed   spoke to [facility] Dr Orr Willow will accept the patient for admission to [facility]    ECG shows ST segment depression the left-sided pericardial leads   V3,V4, V5, V6 as well as ST depression in inferior leads lead 2, 3, AVF  Hemoglobin 14 8, hematocrit 41 9  Leukemoid reaction present    1   Upper GI bleed  - patient given a mg Zofran, 25 mg Phenergan, 100 mcg fentanyl, 1 mg Dilaudid   - patient also given 50 mg octreotide bolus with 50 mg infusion per hour  -  40mg IV Protonix given  - type and screen as well as 2 units of red blood cells on hold      CritCare Time    Disposition  Final diagnoses:   Acute upper GI bleed     Time reflects when diagnosis was documented in both MDM as applicable and the Disposition within this note     Time User Action Codes Description Comment    11/15/2017 10:55 AM Dany Almonte Add [K92 2] Acute upper GI bleed     11/15/2017 11:46 AM Emma Epperson Add [Z95 2] History of mitral valve replacement with mechanical valve     11/15/2017 11:46 AM Emma Epperson Modify [Z95 2] History of mitral valve replacement with mechanical valve       ED Disposition     None      Follow-up Information    None       Current Discharge Medication List      CONTINUE these medications which have NOT CHANGED    Details   acetaminophen (TYLENOL) 325 mg tablet Take 3 tablets by mouth 3 (three) times a day  Qty: 30 tablet, Refills: 0      ascorbic acid (VITAMIN C) 500 MG tablet Take 1 tablet by mouth 2 (two) times a day  Refills: 0      atorvastatin (LIPITOR) 10 mg tablet Take 1 tablet by mouth daily with dinner  Refills: 0      gabapentin (NEURONTIN) 300 mg capsule Take 3 capsules by mouth 3 (three) times a day  Refills: 0      Insulin Glargine (BASAGLAR KWIKPEN) 100 UNIT/ML SOPN Inject 21 Units under the skin daily at bedtime      insulin lispro (HumaLOG) 100 units/mL injection Inject 3 Units under the skin 3 (three) times a day with meals  Refills: 0      lisinopril (ZESTRIL) 20 mg tablet Take 0 5 tablets by mouth daily  Refills: 0      !! LORazepam (ATIVAN) 1 mg tablet Take 1 tablet by mouth every 6 (six) hours as needed for anxiety for up to 5 days  Qty: 30 tablet, Refills: 0      !!  LORazepam (ATIVAN) 1 mg tablet Take 0 5 mg by mouth 2 (two) times a day as needed for anxiety      melatonin 3 mg Take 2 tablets by mouth daily at bedtime  Refills: 0      metoprolol succinate (TOPROL-XL) 25 mg 24 hr tablet Take 25 mg by mouth daily      QUEtiapine (SEROquel) 400 MG tablet Take 1 tablet by mouth daily at bedtime for 10 days  Qty: 10 tablet, Refills: 0      torsemide (DEMADEX) 20 mg tablet Take 1 tablet by mouth daily  Qty: 30 tablet, Refills: 0      bisacodyl (DULCOLAX) 10 mg suppository Insert 1 suppository into the rectum daily as needed for constipation  Qty: 12 suppository, Refills: 0      ferrous gluconate (FERGON) 324 mg tablet Take 1 tablet by mouth 2 (two) times a day  Refills: 0      HYDROcodone-acetaminophen (NORCO) 5-325 mg per tablet Take 1 tablet by mouth every 6 (six) hours as needed for pain      insulin glargine (LANTUS) 100 units/mL subcutaneous injection Inject 21 Units under the skin daily at bedtime  Qty: 10 mL, Refills: 0      metoprolol tartrate (LOPRESSOR) 25 mg tablet Take 1 tablet by mouth daily  Refills: 0      montelukast (SINGULAIR) 10 mg tablet Take 10 mg by mouth daily at bedtime      nicotine (NICODERM CQ) 7 mg/24hr TD 24 hr patch Place 1 patch on the skin every 24 hours  Qty: 28 patch, Refills: 0      pantoprazole (PROTONIX) 40 mg tablet Take 1 tablet by mouth daily in the early morning  Refills: 0      polyethylene glycol (MIRALAX) 17 g packet Take 17 g by mouth daily as needed (constipation)  Qty: 14 each, Refills: 0      potassium chloride (K-DUR,KLOR-CON) 20 mEq tablet Take 1 tablet by mouth daily  Refills: 0      senna-docusate sodium (SENOKOT S) 8 6-50 mg per tablet Take 1 tablet by mouth daily at bedtime  Refills: 0      warfarin (COUMADIN) 5 mg tablet Take 1 tablet by mouth daily  Refills: 0       !! - Potential duplicate medications found  Please discuss with provider  No discharge procedures on file  ED Provider  Attending physically available and evaluated Ele Crew  I managed the patient along with the ED Attending      Electronically Signed by         Ginny Robert DO  Resident  11/15/17 0882

## 2017-11-15 NOTE — H&P
History and Physical - [facility] Internal Medicine    Patient Information: Rashida Sanches 61 y o  male MRN: [id number]  Unit/Bed#: ED 10 Encounter: [id number]  Admitting Physician: Bonnie Mendez MD  PCP: Luc Beckham DO  Date of Admission:  11/15/17    Assessment/Plan:  Upper GI bleed  - this time unable to obtain history from the patient, he is in severe pain   - started on Sandostatin drip in the ER, will continue it for now until otherwise advised by GI  - just been consulted and is evaluating the patient in the ER right now  - INR is pending , patient on Coumadin 5 mg daily for history of mechanical mitral valve replacement  - he was admitted in May 2017 for upper GI bleed that granted admission to ICU, and time EGD showed severe Candida esophagitis that was treated with 14 days nystatin  - Zofran Phenergan will be used for nausea, pain control for now with Dilaudid IV  - GI to advise for possible endoscopy  - hold Coumadin today  - H&H to be checked q 6 hours  - type and screen sent, 2 units of packed red blood cells to be prepared and on hold, consent for blood product transfusion was obtained by ED physician  - patient received bolus of normal saline in the ER, continue with IV normal saline 75 cc an hour for 24 hours    Severe diffuse abdominal pain  - id this time patient is not allowing physical exam  - he received 100 mcg of fentanyl in the ER with no relief  - he is not receiving 1 mg IV Dilaudid, 25 mg Phenergan, 5 mg IV Valium  - CT scan chest abdomen and pelvis has been ordered the stat and is currently pending  - as patient has a leukocytosis, slight hypothermia, and having chills while in bed, started the patient on Rocephin and Flagyl until CT scan performed and negative for intra-abdominal infectious process  - for the same reason blood cultures have been requested and currently pending    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  - for now hold Lantus and Humalog the patient is NPO  - point of care and insulin sliding scale q 6 hours initiated  - modify therapy as needed come especially when p o  Resumes    Hypertension  - for now hold antihypertensive, monitor and resume them as indicated    Chronic systolic congestive heart failure status post ICD  - compensated  - monitor closely with patient is on IV fluids  - hold off dose of diuretics today    History of mitral valve replacement with mechanical valve  - INR is above  - if INR supratherapeutic might consider use of FFP  - hold Coumadin today  - if patient INR within 2 5 and 3 5 no need to start heparin, if INR below 2 consideration for heparin drip with close monitoring      VTE Prophylaxis: Refer to assessment and plan  / sequential compression device   Code Status:  Full code  POLST: POLST form is not discussed and not completed at this time  Anticipated Length of Stay:  Patient will be admitted on an Inpatient basis with an anticipated length of stay of  greater than 2 midnights  Justification for Hospital Stay:  Refer to above    Total Time for Visit, including Counseling / Coordination of Care: 45 minutes  Greater than 50% of this total time spent on direct patient counseling and coordination of care  Chief Complaint:   I am too sick    History of Present Illness:    Kayli Foley is a 61 y o  male who presents to ER with complaining of vomiting blood  At this time patient is severely nauseous and has severe abdominal pain, therefore I am unable to obtain good history from him  The only thing that he states in between screaming if that he started vomiting about yesterday afternoon, too many times, and this morning he noticed blood in it  No other history can be obtained this time, other than the severe abdominal pain      Review of Systems:    Review of Systems   Unable to perform ROS: Acuity of condition       Past Medical and Surgical History:     Past Medical History: Diagnosis Date    Anticoagulated on Coumadin     Mechanical MVR    Anxiety     Bipolar 1 disorder (HCC)     CAD (coronary artery disease)     [facility] 4/18/2017    Colitis 4/2/2017    Diabetes mellitus ([facility] )     DM type 2 with diabetic peripheral neuropathy ([facility] )     Hiatal hernia     Hypertension     Ischemic cardiomyopathy     Pacemaker     PAD (peripheral artery disease) ([facility])        Past Surgical History:   Procedure Laterality Date    AMPUTATION ABOVE KNEE (AKA) Left 8/24/2017    Procedure: AMPUTATION ABOVE KNEE (AKA); Surgeon: Jamey Correa MD;  [facility];  Service: Vascular    CARDIAC PACEMAKER REMOVAL      CORONARY ARTERY BYPASS GRAFT      ESOPHAGOGASTRODUODENOSCOPY N/A 4/20/2017    Procedure: ESOPHAGOGASTRODUODENOSCOPY (EGD); Surgeon: Kassie Matthews MD;  [facility];  Service:     ESOPHAGOGASTRODUODENOSCOPY N/A 5/26/2017    Procedure: ESOPHAGOGASTRODUODENOSCOPY (EGD); Surgeon: Flor Middleton MD;  [facility];  Service:     MITRAL VALVE REPLACEMENT      Mechanical    TOE AMPUTATION Left 7/21/2017    Procedure: PARTIAL FIRST RAY AMPUTATION; EXCISION OF WOUND W/ TOE FLAP CLOSURE;  Surgeon: Stella Martínez DPM;  [facility];  Service: Podiatry    VAC DRESSING APPLICATION Left 5/38/4295    Procedure: VAC application;  Surgeon: Stella Martínez DPM;  [facility];  Service: Podiatry    WOUND DEBRIDEMENT Left 8/19/2017    Procedure: wound debridement with washout; resection of left 1st metatarsal;  Surgeon: Stella Martínez DPM;  [facility];  Service: Podiatry       Meds/Allergies:    Prior to Admission medications    Medication Sig Start Date End Date Taking?  Authorizing Provider   acetaminophen (TYLENOL) 325 mg tablet Take 3 tablets by mouth 3 (three) times a day 9/6/17   Rere Hannah MD   ascorbic acid (VITAMIN C) 500 MG tablet Take 1 tablet by mouth 2 (two) times a day 9/6/17   Rere Hannah MD atorvastatin (LIPITOR) 10 mg tablet Take 1 tablet by mouth daily with dinner 9/6/17   Sunday Fabian MD   bisacodyl (DULCOLAX) 10 mg suppository Insert 1 suppository into the rectum daily as needed for constipation 9/6/17   Sunday Fabian MD   ferrous gluconate (FERGON) 324 mg tablet Take 1 tablet by mouth 2 (two) times a day 9/6/17   Sunday Fabian MD   gabapentin (NEURONTIN) 300 mg capsule Take 3 capsules by mouth 3 (three) times a day 9/6/17   Sunday Fabian MD   insulin glargine (LANTUS) 100 units/mL subcutaneous injection Inject 21 Units under the skin daily at bedtime 7/26/17   Germain Zaragoza DPM   insulin lispro (HumaLOG) 100 units/mL injection Inject 3 Units under the skin 3 (three) times a day with meals 9/6/17   Sunday Fabian MD   lisinopril (ZESTRIL) 20 mg tablet Take 0 5 tablets by mouth daily 9/6/17   Sunday Fabian MD   LORazepam (ATIVAN) 1 mg tablet Take 1 tablet by mouth every 6 (six) hours as needed for anxiety for up to 5 days 9/6/17 9/11/17  Sunday Fabian MD   melatonin 3 mg Take 2 tablets by mouth daily at bedtime 9/6/17   Sunday Fabian MD   metoprolol tartrate (LOPRESSOR) 25 mg tablet Take 1 tablet by mouth daily 9/6/17   Sunday Fabian MD   nicotine (NICODERM CQ) 7 mg/24hr TD 24 hr patch Place 1 patch on the skin every 24 hours 9/6/17   Sunday Fabian MD   pantoprazole (PROTONIX) 40 mg tablet Take 1 tablet by mouth daily in the early morning 9/6/17   Sunday Fabian MD   polyethylene glycol (MIRALAX) 17 g packet Take 17 g by mouth daily as needed (constipation) 9/6/17   Sunday Fabian MD   potassium chloride (K-DUR,KLOR-CON) 20 mEq tablet Take 1 tablet by mouth daily 9/6/17   Sunday Fabian MD   QUEtiapine (SEROquel) 200 mg tablet Take 1 tablet by mouth 2 (two) times a day for 10 days 9/6/17 9/16/17  Sunday Fabian MD   QUEtiapine (SEROquel) 400 MG tablet Take 1 tablet by mouth daily at bedtime for 10 days 9/6/17 9/16/17  Rocio ROCHA Meng Martinez MD   senna-docusate sodium (SENOKOT S) 8 6-50 mg per tablet Take 1 tablet by mouth daily at bedtime 9/6/17   Magdalena Hooper MD   torsemide (DEMADEX) 20 mg tablet Take 1 tablet by mouth daily 5/28/17   Terell Villavicencio DO   warfarin (COUMADIN) 5 mg tablet Take 1 tablet by mouth daily 9/7/17   Magdalena Hooper MD         Allergies: Allergies   Allergen Reactions    Aspirin Other (See Comments)     Received ASA & bleed out    Morphine Other (See Comments)     Gets red streak up arm above IV site    Omeprazole Diarrhea       Social History:     Marital Status:      Substance Use History:   History   Alcohol Use No     History   Smoking Status    Current Some Day Smoker    Packs/day: 0 50    Types: Cigarettes   Smokeless Tobacco    Never Used     History   Drug Use No       Family History:    Unable to discuss    Physical Exam:  I have attempted physical exam today, but patient slapped my hands away several times and is declining physical exam    Vitals:   Blood Pressure: 147/78 (11/15/17 1040)  Pulse: 76 (11/15/17 1040)  Temperature: (!) 97 3 °F (36 3 °C) (11/15/17 0929)  Temp Source: Tympanic (11/15/17 0929)  Respirations: 20 (11/15/17 1040)  SpO2: 99 % (11/15/17 1040)    Physical Exam    Additional Data:     Lab Results: I have personally reviewed pertinent reports  Results from last 7 days  Lab Units 11/15/17  0946   WBC Thousand/uL 17 45*   HEMOGLOBIN g/dL 14 8   HEMATOCRIT % 41 9   PLATELETS Thousands/uL 361   LYMPHO PCT % 6*   MONO PCT MAN % 3*   EOSINO PCT MANUAL % 0           Invalid input(s): LABALBU        Imaging: Pending CT chest abdomen pelvis    No results found  ** Please Note: Dragon 360 Dictation voice to text software may have been used in the creation of this document   **

## 2017-11-15 NOTE — ED ATTENDING ATTESTATION
Aisha Lincoln MD, saw and evaluated the patient  All available labs and X-rays were ordered by me or the resident and have been reviewed by myself  I discussed the patient with the resident / non-physician and agree with the resident's / non-physician practitioner's findings and plan as documented in the resident's / non-physician practicitioner's note, except where noted  At this point, I agree with the current assessment done in the ED  Chief Complaint   Patient presents with    Vomiting Blood     Pt c/o coffeeground emesis and severe abdominal pain since last night  Pt is on coumadin  This is a 61year old male presenting for evaluation of coffee-ground emesis and severe epigastric discomfort  The patient states that this pain started last night and has been getting worse gradually  Today the pain is significantly worse and has noted that his vomit is coffee-colored  Of note the patient has had similar in the past and is on warfarin  Recent hospitalization:  - 8/9 to 9/6 for gangrene of the foot  - 5/24: vomiting blood  PMH:  - HTN  - DM2  - PM  - Bipolar 1 / Anxiety  - Colitis  - CAD  - csCHF + IM  - DM2  - PAD  PSH:  - CABG  - EGD  - MV replacement (mechanical)  - Toe amputation   - Wound debridgement / Toe amputation  +smoking daily  No alcohol  No drugs  PE:  Vitals:    11/15/17 1601 11/15/17 1749 11/15/17 2047 11/15/17 2300   BP:  104/70 116/77 111/75   Pulse:   68 67   Resp:   18 18   Temp: 97 6 °F (36 4 °C)  98 9 °F (37 2 °C) 98 8 °F (37 1 °C)   TempSrc: Oral  Oral Oral   SpO2:   98% 98%   General: VS reviewed  Appears ill; vomiting coffee-ground emesis into basin  Appears very uncomfortable  Well-nourished, well-developed  Appears stated age  Speaking normally in full sentences  Head: Normocephalic, atraumatic, nontender  Eyes: EOM-I  No diplopia  No hyphema  No subconjunctival hemorrhages  Symmetrical lids  ENT: Atraumatic external nose and ears      Dry MM  No malocclusion  No stridor  Normal phonation  No drooling  Normal swallowing  Neck: No JVD  CV:   Systolic murmur; not tachycardic  No pallor noted  Peripheral pulses +2 throughout  No chest wall tenderness  Lungs:   Mild tachypnea  No tachypnea  No respiratory distress  MSK:   FROM   Skin: Dry, intact  Neuro: Awake, alert, GCS15, CN II-XII grossly intact  Motor grossly intact  Psychiatric/Behavioral: Appropriate mood and affect   Exam: deferred  A:  - UGI bleed  - ST Depression on EKG  P:  - Unclear reason for the bleed, patient says his "esophagus bled"  - Will presume hx of cirrhosis / cirrhotic bleed  Add ABX (Antibiotic prophylaxis for cirrhotic patients with upper gastrointestinal bleeding [employer] Database Syst Rev  2010 Sep 8;(9):AR488633  doi: 62 [id number]  [id number] CDX2 ) Unclear if cirrhotic or not  - Will do protonix, octreotide  Supportive care  Blood?  - Admit  - Discussed with resident to hold off on reversal until we discuss with GI  I would prefer not reversing as I think, based on physical, this is likely moderate severity bleed and the coumadin is for a mechanical valve, not afib  If needed or worsening bleeding or anemia (given bleed x24 hours), then reverse  Durenda Radha, leave to GI/medicine to decide  - Likely some degree of strain due to anemia given EKG  - 13 point ROS was performed and all are normal unless stated in the history above  - Nursing note reviewed  Vitals reviewed  - Orders placed by myself and/or advanced practitioner / resident     - Previous chart was reviewed  - No language barrier    - History obtained from patient  - There are no limitations to the history obtained  - Critical care time: 35 minutes  - Critical care time was exclusive of seperately bilable procedures and treating other patients as well as teaching time     - Critical care was necessary to treat or prevent imminent or life-threatening deterioration of the following condition: UGI bleed, possible need for transfusion, STD on EKG  - Critical care time was spent personally by me on the following activities as well as the above as per the ED course and rest of chart: blood draw for specimens, obtaining history from patient / surrogate, developement of a treatment plan, discussions with consultants, evaluation of patient's response to the treatment, examination of the patient, ordering/performing treatements and interventions, re-evaluation of the patient's condition, review of old charts, ordering/reviewing laboratory studies, ordering/reviewing of radiographic studies    Final Diagnosis:  1  Acute upper GI bleed    2   History of mitral valve replacement with mechanical valve        ED Course as of Nov 16 0642   Wed Nov 15, 2017   1006 WBC: (!) 17 45   1006 Hemoglobin: 14 8   1006 Platelets: 842   5915 WBC: (!) 17 45     Medications   promethazine (PHENERGAN) injection 25 mg (25 mg Intravenous Given 11/15/17 1058)   nicotine (NICODERM CQ) 7 mg/24hr TD 24 hr patch 1 patch (1 patch Transdermal Medication Applied 11/15/17 1435)   insulin lispro (HumaLOG) 100 units/mL subcutaneous injection 1-5 Units (1 Units Subcutaneous Not Given 11/16/17 0045)   ceftriaxone (ROCEPHIN) 1 g/50 mL in dextrose IVPB (0 mg Intravenous Stopped 11/15/17 1340)   metroNIDAZOLE (FLAGYL) IVPB (premix) 500 mg (500 mg Intravenous New Bag 11/16/17 0451)   ondansetron (ZOFRAN) 8 mg in sodium chloride 0 9 % 50 mL IVPB (0 mg Intravenous Stopped 11/15/17 1722)   diazepam (VALIUM) injection 5 mg (5 mg Intravenous Given 11/16/17 0011)   HYDROmorphone (DILAUDID) 1 mg/mL injection 1 mg (1 mg Intravenous Given 11/16/17 0454)   HYDROmorphone (DILAUDID) 1 mg/mL injection 0 5 mg (0 5 mg Intravenous Given 11/15/17 2222)   sodium chloride 0 9 % infusion (75 mL/hr Intravenous New Bag 11/15/17 1313)   pantoprazole (PROTONIX) 80 mg in sodium chloride 0 9 % 100 mL infusion (8 mg/hr Intravenous New Bag 11/16/17 0012)   metoclopramide (REGLAN) injection 10 mg (10 mg Intravenous Given 11/16/17 0454)   sterile water injection 10 mL (10 mL Injection Given 11/15/17 1009)   fentanyl citrate (PF) 100 MCG/2ML 100 mcg (100 mcg Intravenous Given 11/15/17 0957)   ondansetron (ZOFRAN) injection 4 mg (4 mg Intravenous Given 11/15/17 0954)   pantoprazole (PROTONIX) injection 40 mg (40 mg Intravenous Given 11/15/17 1009)   ondansetron (ZOFRAN) injection 4 mg (4 mg Intravenous Given 11/15/17 1038)   HYDROmorphone (DILAUDID) 1 mg/mL injection 1 mg (1 mg Intravenous Given 11/15/17 1102)   diazepam (VALIUM) injection 5 mg (5 mg Intravenous Given 11/15/17 1108)   sodium chloride 0 9 % bolus 1,000 mL (1,000 mL Intravenous New Bag 11/15/17 1223)   haloperidol lactate (HALDOL) injection 1 mg (1 mg Intramuscular Given 11/15/17 1449)     CT chest abdomen pelvis wo contrast   Final Result      No acute findings in the chest, abdomen or pelvis  Pulmonary emphysema with right apical bullous disease  No definite esophageal abnormalities as visualized in this patient with a history of coffee ground emesis and esophagitis  No definite bowel abnormalities; enteric contrast is not administered  An area of gastric wall thickening is not apparent  No    bowel obstruction  Workstation performed: [id number]           Orders Placed This Encounter   Procedures    ED ECG Documentation Only    Blood culture    Blood culture    CT chest abdomen pelvis wo contrast    CBC and differential    Comprehensive metabolic panel    Lipase    Protime-INR    APTT    Lactic acid, plasma    Basic metabolic panel    Magnesium    Phosphorus    Hemoglobin and hematocrit, blood    CBC    APTT    Protime-INR    Hemoglobin and hematocrit, blood    Diet NPO    POCT troponin    Nursing communcation Continue IV as ordered     Daly Densno Notify admitting physician    Notify admitting physician on arrival    Vital Signs per unit routine    Insulin Subcutaneous Notify Physician    Insulin Subcutaneous Instruction    Telemetry monitoring    Pulse Oximetry,Spot    Up and OOB as tolerated    Daily weights    I/O    Maintain IV access    Place sequential compression device    Fingerstick Glucose (POCT)    Nasogastric tube insertion    Nasogastric tube maintenance    Heparin: VTE/PE  Infusion Protocol Administration Instructions    Heparin: VTE/PE  Infusion Protocol Notify Physician If:    Heparin Infusion and Platelet Monitoring Per Protocol    Nursing Communication Try arterial stick to collect blood work    Level 1-Full Code: all life saving measures are indicated    Inpatient consult to gastroenterology    Inpatient consult to Case Management    Inpatient consult to Cardiology    Contact isolation status    ECG 12 lead    Type and screen    Prepare RBC:Has consent been obtained? Yes; Where is the Surgery Scheduled? [facility], 2 Units    Type and screen    Antibody identification    Type and screen    Antibody identification    Insert peripheral IV    Inpatient Admission (expected length of stay for this patient is greater than two midnights)     Labs Reviewed   CBC AND DIFFERENTIAL - Abnormal        Result Value Ref Range Status    WBC 17 45 (*) 4 31 - 10 16 Thousand/uL Final    RDW 17 6 (*) 11 6 - 15 1 % Final    RBC 5 06  3 88 - 5 62 Million/uL Final    Hemoglobin 14 8  12 0 - 17 0 g/dL Final    Hematocrit 41 9  36 5 - 49 3 % Final    MCV 83  82 - 98 fL Final    MCH 29 2  26 8 - 34 3 pg Final    MCHC 35 3  31 4 - 37 4 g/dL Final    Platelets 890  055 - 390 Thousands/uL Final    nRBC 0  /100 WBCs Final    Comment: This is an appended report  These results have been appended to a previously preliminary verified report  Narrative: This is an appended report  These results have been appended to a previously verified report     COMPREHENSIVE METABOLIC PANEL - Abnormal     Sodium 135 (*) 136 - 145 mmol/L Final    Chloride 99 (*) 100 - 108 mmol/L Final Anion Gap 14 (*) 4 - 13 mmol/L Final    BUN 27 (*) 5 - 25 mg/dL Final    Creatinine 1 76 (*) 0 60 - 1 30 mg/dL Final    Comment: Standardized to IDMS reference method    Glucose 273 (*) 65 - 140 mg/dL Final    Comment:   If the patient is fasting, the ADA then defines impaired fasting glucose as > 100 mg/dL and diabetes as > or equal to 123 mg/dL  Specimen collection should occur prior to Sulfasalazine administration due to the potential for falsely depressed results  Specimen collection should occur prior to Sulfapyridine administration due to the potential for falsely elevated results  Calcium 10 3 (*) 8 3 - 10 1 mg/dL Final    Alkaline Phosphatase 205 (*) 46 - 116 U/L Final    Total Protein 9 7 (*) 6 4 - 8 2 g/dL Final    Total Bilirubin 1 09 (*) 0 20 - 1 00 mg/dL Final    Potassium 4 6  3 5 - 5 3 mmol/L Final    CO2 22  21 - 32 mmol/L Final    AST 33  5 - 45 U/L Final    Comment:   Specimen collection should occur prior to Sulfasalazine administration due to the potential for falsely depressed results  ALT 42  12 - 78 U/L Final    Comment:   Specimen collection should occur prior to Sulfasalazine and/or Sulfapyridine administration due to the potential for falsely depressed results  Albumin 4 4  3 5 - 5 0 g/dL Final    eGFR 42  ml/min/1 73sq m Final    Narrative:     National Kidney Disease Education Program recommendations are as follows:  GFR calculation is accurate only with a steady state creatinine  Chronic Kidney disease less than 60 ml/min/1 73 sq  meters  Kidney failure less than 15 ml/min/1 73 sq  meters  PROTIME-INR - Abnormal     Protime 26 3 (*) 12 1 - 14 4 seconds Final    INR 2 38 (*) 0 86 - 1 16 Final   APTT - Abnormal     PTT 39 (*) 23 - 35 seconds Final    Narrative: Therapeutic Heparin Range = 60-90 seconds   LACTIC ACID, PLASMA - Abnormal     LACTIC ACID 6 1 (*) 0 5 - 2 0 mmol/L Final    Narrative:     Result may be elevated if tourniquet was used during collection     MANUAL DIFFERENTIAL(PHLEBS DO NOT ORDER) - Abnormal     Segmented % 91 (*) 43 - 75 % Final    Lymphocytes % 6 (*) 14 - 44 % Final    Monocytes % 3 (*) 4 - 12 % Final    Absolute Neutrophils 15 88 (*) 1 85 - 7 62 Thousand/uL Final    Eosinophils % 0  0 - 6 % Final    Basophils % 0  0 - 1 % Final    Lymphocytes Absolute 1 05  0 60 - 4 47 Thousand/uL Final    Monocytes Absolute 0 52  0 00 - 1 22 Thousand/uL Final    Eosinophils Absolute 0 00  0 00 - 0 40 Thousand/uL Final    Basophils Absolute 0 00  0 00 - 0 10 Thousand/uL Final    Total Counted     Final    RBC Morphology Present   Final    Anisocytosis Present   Final    [place] Cells Present   Final    Ovalocytes Present   Final    Poikilocytes Present   Final    Platelet Estimate Adequate  Adequate Final   LIPASE - Normal    Lipase 95  73 - 393 u/L Final   HEMOGLOBIN AND HEMATOCRIT, BLOOD - Normal    Hemoglobin 14 9  12 0 - 17 0 g/dL Final    Hematocrit 43 6  36 5 - 49 3 % Final   POCT TROPONIN - Normal    POC Troponin I 0 00  0 00 - 0 08 ng/ml Final    Specimen Type VENOUS   Final    Narrative:     [employer] i-Stat handheld analyzer 99% cutoff is > 0 08ng/mL in [facility]    o cTnI 99% cutoff is useful only when applied to patients in the clinical setting of myocardial ischemia  o cTnI 99% cutoff should be interpreted in the context of clinical history, ECG findings and possibly cardiac imaging to establish correct diagnosis  o cTnI 99% cutoff may be suggestive but clearly not indicative of a coronary event without the clinical setting of myocardial ischemia     BLOOD CULTURE   BLOOD CULTURE   TYPE AND SCREEN    ABO Grouping A   Final    Rh Factor Positive   Final    Antibody Screen Positive   Final    Specimen Expiration Date [id number]   Final     Time reflects when diagnosis was documented in both MDM as applicable and the Disposition within this note     Time User Action Codes Description Comment    11/15/2017 10:55 AM Conrad Almonte Add [K92 2] Acute upper GI bleed     11/15/2017 11:46 AM Malika Hay Add [Z95 2] History of mitral valve replacement with mechanical valve     11/15/2017 11:46 AM Malika Hay Modify [Z95 2] History of mitral valve replacement with mechanical valve       ED Disposition     None      Follow-up Information    None       Current Discharge Medication List      CONTINUE these medications which have NOT CHANGED    Details   acetaminophen (TYLENOL) 325 mg tablet Take 3 tablets by mouth 3 (three) times a day  Qty: 30 tablet, Refills: 0      ascorbic acid (VITAMIN C) 500 MG tablet Take 1 tablet by mouth 2 (two) times a day  Refills: 0      atorvastatin (LIPITOR) 10 mg tablet Take 1 tablet by mouth daily with dinner  Refills: 0      gabapentin (NEURONTIN) 300 mg capsule Take 3 capsules by mouth 3 (three) times a day  Refills: 0      Insulin Glargine (BASAGLAR KWIKPEN) 100 UNIT/ML SOPN Inject 21 Units under the skin daily at bedtime      insulin lispro (HumaLOG) 100 units/mL injection Inject 3 Units under the skin 3 (three) times a day with meals  Refills: 0      lisinopril (ZESTRIL) 20 mg tablet Take 0 5 tablets by mouth daily  Refills: 0      LORazepam (ATIVAN) 1 mg tablet Take 0 5 mg by mouth 2 (two) times a day as needed for anxiety      melatonin 3 mg Take 2 tablets by mouth daily at bedtime  Refills: 0      metoprolol succinate (TOPROL-XL) 25 mg 24 hr tablet Take 25 mg by mouth daily      QUEtiapine (SEROquel) 400 MG tablet Take 1 tablet by mouth daily at bedtime for 10 days  Qty: 10 tablet, Refills: 0      torsemide (DEMADEX) 20 mg tablet Take 1 tablet by mouth daily  Qty: 30 tablet, Refills: 0      bisacodyl (DULCOLAX) 10 mg suppository Insert 1 suppository into the rectum daily as needed for constipation  Qty: 12 suppository, Refills: 0      ferrous gluconate (FERGON) 324 mg tablet Take 1 tablet by mouth 2 (two) times a day  Refills: 0      HYDROcodone-acetaminophen (NORCO) 5-325 mg per tablet Take 1 tablet by mouth every 6 (six) hours as needed for pain      insulin glargine (LANTUS) 100 units/mL subcutaneous injection Inject 21 Units under the skin daily at bedtime  Qty: 10 mL, Refills: 0      metoprolol tartrate (LOPRESSOR) 25 mg tablet Take 1 tablet by mouth daily  Refills: 0      montelukast (SINGULAIR) 10 mg tablet Take 10 mg by mouth daily at bedtime      nicotine (NICODERM CQ) 7 mg/24hr TD 24 hr patch Place 1 patch on the skin every 24 hours  Qty: 28 patch, Refills: 0      pantoprazole (PROTONIX) 40 mg tablet Take 1 tablet by mouth daily in the early morning  Refills: 0      polyethylene glycol (MIRALAX) 17 g packet Take 17 g by mouth daily as needed (constipation)  Qty: 14 each, Refills: 0      potassium chloride (K-DUR,KLOR-CON) 20 mEq tablet Take 1 tablet by mouth daily  Refills: 0      QUEtiapine (SEROquel) 200 mg tablet Take 1 tablet by mouth 2 (two) times a day for 10 days  Qty: 20 tablet, Refills: 0      senna-docusate sodium (SENOKOT S) 8 6-50 mg per tablet Take 1 tablet by mouth daily at bedtime  Refills: 0      warfarin (COUMADIN) 5 mg tablet Take 1 tablet by mouth daily  Refills: 0           No discharge procedures on file  Prior to Admission Medications   Prescriptions Last Dose Informant Patient Reported? Taking?    HYDROcodone-acetaminophen (NORCO) 5-325 mg per tablet Unknown at Unknown time  Yes No   Sig: Take 1 tablet by mouth every 6 (six) hours as needed for pain   Insulin Glargine (BASAGLAR KWIKPEN) 100 UNIT/ML SOPN 11/14/2017 at Unknown time  Yes Yes   Sig: Inject 21 Units under the skin daily at bedtime   LORazepam (ATIVAN) 1 mg tablet Past Month at Unknown time  No Yes   Sig: Take 1 tablet by mouth every 6 (six) hours as needed for anxiety for up to 5 days   LORazepam (ATIVAN) 1 mg tablet   Yes Yes   Sig: Take 0 5 mg by mouth 2 (two) times a day as needed for anxiety   QUEtiapine (SEROquel) 200 mg tablet   No No   Sig: Take 1 tablet by mouth 2 (two) times a day for 10 days   QUEtiapine (SEROquel) 400 MG tablet 11/14/2017 at Unknown time  No Yes   Sig: Take 1 tablet by mouth daily at bedtime for 10 days   acetaminophen (TYLENOL) 325 mg tablet Past Month at Unknown time  No Yes   Sig: Take 3 tablets by mouth 3 (three) times a day   ascorbic acid (VITAMIN C) 500 MG tablet Past Week at Unknown time  No Yes   Sig: Take 1 tablet by mouth 2 (two) times a day   atorvastatin (LIPITOR) 10 mg tablet 11/14/2017 at Unknown time  No Yes   Sig: Take 1 tablet by mouth daily with dinner   bisacodyl (DULCOLAX) 10 mg suppository Unknown at Unknown time  No No   Sig: Insert 1 suppository into the rectum daily as needed for constipation   ferrous gluconate (FERGON) 324 mg tablet Unknown at Unknown time  No No   Sig: Take 1 tablet by mouth 2 (two) times a day   gabapentin (NEURONTIN) 300 mg capsule 11/14/2017 at Unknown time  No Yes   Sig: Take 3 capsules by mouth 3 (three) times a day   insulin glargine (LANTUS) 100 units/mL subcutaneous injection Unknown at Unknown time  No No   Sig: Inject 21 Units under the skin daily at bedtime   insulin lispro (HumaLOG) 100 units/mL injection 11/14/2017 at Unknown time  No Yes   Sig: Inject 3 Units under the skin 3 (three) times a day with meals   lisinopril (ZESTRIL) 20 mg tablet 11/14/2017 at Unknown time  No Yes   Sig: Take 0 5 tablets by mouth daily   melatonin 3 mg Past Week at Unknown time  No Yes   Sig: Take 2 tablets by mouth daily at bedtime   metoprolol succinate (TOPROL-XL) 25 mg 24 hr tablet 11/14/2017 at Unknown time  Yes Yes   Sig: Take 25 mg by mouth daily   metoprolol tartrate (LOPRESSOR) 25 mg tablet More than a month at Unknown time  No No   Sig: Take 1 tablet by mouth daily   montelukast (SINGULAIR) 10 mg tablet Unknown at Unknown time  Yes No   Sig: Take 10 mg by mouth daily at bedtime   nicotine (NICODERM CQ) 7 mg/24hr TD 24 hr patch Unknown at Unknown time  No No   Sig: Place 1 patch on the skin every 24 hours   pantoprazole (PROTONIX) 40 mg tablet Unknown at Unknown time  No No   Sig: Take 1 tablet by mouth daily in the early morning   polyethylene glycol (MIRALAX) 17 g packet Unknown at Unknown time  No No   Sig: Take 17 g by mouth daily as needed (constipation)   potassium chloride (K-DUR,KLOR-CON) 20 mEq tablet Unknown at Unknown time  No No   Sig: Take 1 tablet by mouth daily   senna-docusate sodium (SENOKOT S) 8 6-50 mg per tablet Unknown at Unknown time  No No   Sig: Take 1 tablet by mouth daily at bedtime   torsemide (DEMADEX) 20 mg tablet 11/14/2017 at Unknown time  No Yes   Sig: Take 1 tablet by mouth daily   warfarin (COUMADIN) 5 mg tablet Unknown at Unknown time  No No   Sig: Take 1 tablet by mouth daily   Patient taking differently: Take 5 mg by mouth daily 1-2 tab daily as directed       Facility-Administered Medications: None       Portions of the record may have been created with voice recognition software  Occasional wrong word or "sound a like" substitutions may have occurred due to the inherent limitations of voice recognition software  Read the chart carefully and recognize, using context, where substitutions have occurred      Electronically signed by:  Lang Mortimer

## 2017-11-15 NOTE — PROGRESS NOTES
50-year-old male with past medical history significant for possible variceal bleeding, upper GI bleed presents for evaluation of acute onset the diffuse severe abdominal pain, hematemesis since yesterday evening  Vitals significant for mild tachycardia, hypertension otherwise respiratory rate is normal satting 99% on room air    Stat lab work obtained, will treat prophylactically for upper GI bleeding likely related to variceal bleeds will obtain type and screen and abdominal lab work, CT abdomen and consult GI for further management

## 2017-11-15 NOTE — ED NOTES
IV removed from left AC at this time due to being occluded  Catheter intact   Gauze dressing applied     Rosalia Lo RN  11/15/17 8960

## 2017-11-15 NOTE — SIGNIFICANT EVENT
At this time, INR received noted 2 38  Case was discussed with cardiologist, in regards of proper approach for anticoagulation, at this time an official consult has been placed for the physician to review the chart appropriately and advice  Also patient noted to have acute kidney injury with creatinine 1 76, CT scan to be obtained without contrast as stated in the H&P, patient is on fluids, BMP will be done in the morning  Lactic acidosis, level 6 1, likely related to recurrent vomiting and hypokalemia, likely hypoperfusion, of course bowel with perforation remains a concern and therefore CT scan ordered in the 1st place, in light of the lactate will also continue antibiotic as per H&P  New lactate to be obtained now and every 2 hours until below two

## 2017-11-15 NOTE — CONSULTS
Consultation -  [facility]  Paresh Brooks 61 y o  male MRN: [id number]  Unit/Bed#: [facility] Encounter: [id number]        ASSESSMENT/PLAN:  N/V  Hematemesis  Abd pain    1  N/V/ Hematemesis - Pt has been admitted several times in the past for the same  On both of his previous EGD's he was found to have severe esophagitis which bleed secondary to his Coumadin  There was previous mention that he had not been compliant with his PPI, however it is on his home med list  This could also be from PUD or Dieulafoy's  Would recommend Protonix drip, NGT, anti-emetics as needed  We are waiting for labs & stat CT scan prior to performing EGD  -Follow H&H  -Protonix   -NGT  -Anti-emetics as needed  -NPO  -Follow-up CT  -Hold Coumdain    2  Abd pain - possibly related to #1  Also possible gastroenteritis as he did have diarrhea  Could also be ischemia as he has CAD & PAD  Will await the results of the CT    -Pain meds as needed      Consults    Reason for Consult / Principal Problem: GI bleed    HPI: Paresh Brooks is a 61y o  year old male with a PMH of CAD, MVR on coumadin, pacemaker, CHF, diabetes, who was admitted with hematemesis  He states he has not felt well for the past 48hrs  No appetite, nausea  Then he began vomiting  He states it was mostly bile but towards the end he saw blood  He has passed blood clots as well as coffee grounds  H complains of diffuse abd pain  He denies rectal bleeding but does describes yellow diarrhea  He has had hematemesis in the past & was last scoped in May 2017 in [facility]  At that time he was found to have severe candidal esophagitis & gastric polyps, however bx showed no fungal organisms  He had an EGD in April for the same which showed severe esophagitis & hiatal hernia  H pylori (-)  Last colonoscopy was in [place] approx the beginning of the yr         Review of Systems: as per HPI  Review of Systems   All other systems reviewed and are negative  Historical Information   Past Medical History:   Diagnosis Date    Anticoagulated on Coumadin     Mechanical MVR    Anxiety     Bipolar 1 disorder (HCC)     CAD (coronary artery disease)     Chronic systolic congestive heart failure ([facility] ) 4/18/2017    Colitis 4/2/2017    Diabetes mellitus ([facility] )     DM type 2 with diabetic peripheral neuropathy [facility]     Hiatal hernia     Hypertension     Ischemic cardiomyopathy     Pacemaker     PAD (peripheral artery disease) ([facility]      Past Surgical History:   Procedure Laterality Date    AMPUTATION ABOVE KNEE (AKA) Left 8/24/2017    Procedure: AMPUTATION ABOVE KNEE (AKA); Surgeon: Patricia Belcher MD;  [facility];  Service: Vascular    CARDIAC PACEMAKER REMOVAL      CORONARY ARTERY BYPASS GRAFT      ESOPHAGOGASTRODUODENOSCOPY N/A 4/20/2017    Procedure: ESOPHAGOGASTRODUODENOSCOPY (EGD); Surgeon: Sea Hallman MD;  [facility];  Service:     ESOPHAGOGASTRODUODENOSCOPY N/A 5/26/2017    Procedure: ESOPHAGOGASTRODUODENOSCOPY (EGD);   Surgeon: Tana Moran MD;  [facility];  Service:     MITRAL VALVE REPLACEMENT      Mechanical    TOE AMPUTATION Left 7/21/2017    Procedure: PARTIAL FIRST RAY AMPUTATION; EXCISION OF WOUND W/ TOE FLAP CLOSURE;  Surgeon: Stefani Soler DPM;  [facility];  Service: Podiatry    VAC DRESSING APPLICATION Left 9/34/7534    Procedure: VAC application;  Surgeon: Stefani Soler DPM;  [facility];  Service: Podiatry    WOUND DEBRIDEMENT Left 8/19/2017    Procedure: wound debridement with washout; resection of left 1st metatarsal;  Surgeon: Stefani Soler DPM;  [facility];  Service: Podiatry     Social History   History   Alcohol Use No     History   Drug Use No     History   Smoking Status    Current Some Day Smoker    Packs/day: 0 50    Types: Cigarettes   Smokeless Tobacco    Never Used     Family History   Problem Relation Age of Onset    Hypertension Mother     Diabetes Father        Meds/Allergies       (Not in a hospital admission)  Current Facility-Administered Medications   Medication Dose Route Frequency    ceftriaxone (ROCEPHIN) 1 g/50 mL in dextrose IVPB  1,000 mg Intravenous Q24H    diazepam (VALIUM) injection 5 mg  5 mg Intravenous Q6H PRN    HYDROmorphone (DILAUDID) 1 mg/mL injection 0 5 mg  0 5 mg Intravenous Q3H PRN    HYDROmorphone (DILAUDID) 1 mg/mL injection 1 mg  1 mg Intravenous Q3H PRN    insulin lispro (HumaLOG) 100 units/mL subcutaneous injection 1-5 Units  1-5 Units Subcutaneous Q6H [address]    insulin lispro (HumaLOG) 100 units/mL subcutaneous injection 1-5 Units  1-5 Units Subcutaneous HS    metroNIDAZOLE (FLAGYL) IVPB (premix) 500 mg  500 mg Intravenous Q8H    nicotine (NICODERM CQ) 7 mg/24hr TD 24 hr patch 1 patch  1 patch Transdermal Q24H    ondansetron (ZOFRAN) 8 mg in sodium chloride 0 9 % 50 mL IVPB  8 mg Intravenous Q6H PRN    pantoprazole (PROTONIX) injection 40 mg  40 mg Intravenous Q12H [place]    promethazine (PHENERGAN) injection 25 mg  25 mg Intravenous Q6H PRN    sodium chloride 0 9 % bolus 1,000 mL  1,000 mL Intravenous Once    sodium chloride 0 9 % infusion  75 mL/hr Intravenous Continuous       Allergies   Allergen Reactions    Aspirin Other (See Comments)     Received ASA & bleed out    Morphine Other (See Comments)     Gets red streak up arm above IV site    Omeprazole Diarrhea       Objective     Blood pressure 144/86, pulse 78, temperature (!) 97 3 °F (36 3 °C), temperature source Tympanic, resp  rate 20, SpO2 98 %  No intake or output data in the 24 hours ending 11/15/17 1240    PHYSICAL EXAM     Physical Exam   Constitutional: He is oriented to person, place, and time  He appears distressed  HENT:   Head: Normocephalic and atraumatic  Eyes: Conjunctivae are normal    Neck: Normal range of motion  Cardiovascular: Normal rate and regular rhythm  Murmur heard    Pulmonary/Chest: Effort normal and breath sounds normal  Abdominal: Soft  Bowel sounds are normal  He exhibits no distension  There is tenderness  Musculoskeletal:   Left above the knee amputation   Neurological: He is alert and oriented to person, place, and time  Skin: Skin is warm and dry  Psychiatric: He has a normal mood and affect   His behavior is normal        Lab Results:   CBC: Lab Results   Component Value Date    WBC 17 45 (H) 11/15/2017    HGB 14 8 11/15/2017    HCT 41 9 11/15/2017    MCV 83 11/15/2017     11/15/2017    MCH 29 2 11/15/2017    MCHC 35 3 11/15/2017    RDW 17 6 (H) 11/15/2017    NRBC 0 11/15/2017   ,   CMP: Lab Results   Component Value Date     (L) 11/15/2017    K 4 6 11/15/2017    CL 99 (L) 11/15/2017    CO2 22 11/15/2017    ANIONGAP 14 (H) 11/15/2017    BUN 27 (H) 11/15/2017    CREATININE 1 76 (H) 11/15/2017    GLUCOSE 273 (H) 11/15/2017    CALCIUM 10 3 (H) 11/15/2017    AST 33 11/15/2017    ALT 42 11/15/2017    ALKPHOS 205 (H) 11/15/2017    PROT 9 7 (H) 11/15/2017    ALBUMIN 4 4 11/15/2017    BILITOT 1 09 (H) 11/15/2017    EGFR 42 11/15/2017   ,   Lipase:   Lab Results   Component Value Date    LIPASE 95 11/15/2017   ,  PT/INR:   Lab Results   Component Value Date    INR 2 38 (H) 11/15/2017   ,   Imaging Studies:    CT abd/pelvis: pending

## 2017-11-16 ENCOUNTER — GENERIC CONVERSION - ENCOUNTER (OUTPATIENT)
Dept: OTHER | Facility: OTHER | Age: 57
End: 2017-11-16

## 2017-11-16 ENCOUNTER — ANESTHESIA EVENT (INPATIENT)
Dept: GASTROENTEROLOGY | Facility: HOSPITAL | Age: 57
DRG: 242 | End: 2017-11-16
Payer: COMMERCIAL

## 2017-11-16 ENCOUNTER — ANESTHESIA (INPATIENT)
Dept: GASTROENTEROLOGY | Facility: HOSPITAL | Age: 57
DRG: 242 | End: 2017-11-16
Payer: COMMERCIAL

## 2017-11-16 LAB
ANION GAP SERPL CALCULATED.3IONS-SCNC: 8 MMOL/L (ref 4–13)
BACTERIA UR QL AUTO: ABNORMAL /HPF
BILIRUB UR QL STRIP: NEGATIVE
BLOOD GROUP ANTIBODIES SERPL: NORMAL
BUN SERPL-MCNC: 29 MG/DL (ref 5–25)
CALCIUM SERPL-MCNC: 9 MG/DL (ref 8.3–10.1)
CHLORIDE SERPL-SCNC: 105 MMOL/L (ref 100–108)
CLARITY UR: CLEAR
CO2 SERPL-SCNC: 25 MMOL/L (ref 21–32)
COLOR UR: YELLOW
CREAT SERPL-MCNC: 1.14 MG/DL (ref 0.6–1.3)
GFR SERPL CREATININE-BSD FRML MDRD: 70 ML/MIN/1.73SQ M
GLUCOSE SERPL-MCNC: 104 MG/DL (ref 65–140)
GLUCOSE SERPL-MCNC: 122 MG/DL (ref 65–140)
GLUCOSE SERPL-MCNC: 133 MG/DL (ref 65–140)
GLUCOSE SERPL-MCNC: 140 MG/DL (ref 65–140)
GLUCOSE SERPL-MCNC: 141 MG/DL (ref 65–140)
GLUCOSE SERPL-MCNC: 144 MG/DL (ref 65–140)
GLUCOSE SERPL-MCNC: 161 MG/DL (ref 65–140)
GLUCOSE UR STRIP-MCNC: NEGATIVE MG/DL
HCT VFR BLD AUTO: 36.6 % (ref 36.5–49.3)
HGB BLD-MCNC: 12.1 G/DL (ref 12–17)
HGB UR QL STRIP.AUTO: ABNORMAL
HYALINE CASTS #/AREA URNS LPF: ABNORMAL /LPF
INR PPP: 2.96 (ref 0.86–1.16)
KETONES UR STRIP-MCNC: NEGATIVE MG/DL
LEUKOCYTE ESTERASE UR QL STRIP: NEGATIVE
MAGNESIUM SERPL-MCNC: 2.2 MG/DL (ref 1.6–2.6)
NITRITE UR QL STRIP: NEGATIVE
NON-SQ EPI CELLS URNS QL MICRO: ABNORMAL /HPF
PH UR STRIP.AUTO: 6.5 [PH] (ref 4.5–8)
PHOSPHATE SERPL-MCNC: 2.8 MG/DL (ref 2.7–4.5)
POTASSIUM SERPL-SCNC: 3.6 MMOL/L (ref 3.5–5.3)
PROT UR STRIP-MCNC: ABNORMAL MG/DL
PROTHROMBIN TIME: 31.2 SECONDS (ref 12.1–14.4)
RBC #/AREA URNS AUTO: ABNORMAL /HPF
SODIUM SERPL-SCNC: 138 MMOL/L (ref 136–145)
SP GR UR STRIP.AUTO: 1.02 (ref 1–1.03)
UROBILINOGEN UR QL STRIP.AUTO: 0.2 E.U./DL
WBC #/AREA URNS AUTO: ABNORMAL /HPF

## 2017-11-16 PROCEDURE — 85018 HEMOGLOBIN: CPT | Performed by: INTERNAL MEDICINE

## 2017-11-16 PROCEDURE — 81001 URINALYSIS AUTO W/SCOPE: CPT | Performed by: HOSPITALIST

## 2017-11-16 PROCEDURE — 84100 ASSAY OF PHOSPHORUS: CPT | Performed by: INTERNAL MEDICINE

## 2017-11-16 PROCEDURE — 80048 BASIC METABOLIC PNL TOTAL CA: CPT | Performed by: INTERNAL MEDICINE

## 2017-11-16 PROCEDURE — 82948 REAGENT STRIP/BLOOD GLUCOSE: CPT

## 2017-11-16 PROCEDURE — 85610 PROTHROMBIN TIME: CPT | Performed by: INTERNAL MEDICINE

## 2017-11-16 PROCEDURE — C9113 INJ PANTOPRAZOLE SODIUM, VIA: HCPCS | Performed by: INTERNAL MEDICINE

## 2017-11-16 PROCEDURE — 85014 HEMATOCRIT: CPT | Performed by: INTERNAL MEDICINE

## 2017-11-16 PROCEDURE — 83735 ASSAY OF MAGNESIUM: CPT | Performed by: INTERNAL MEDICINE

## 2017-11-16 PROCEDURE — 0DJ08ZZ INSPECTION OF UPPER INTESTINAL TRACT, VIA NATURAL OR ARTIFICIAL OPENING ENDOSCOPIC: ICD-10-PCS | Performed by: INTERNAL MEDICINE

## 2017-11-16 RX ORDER — PROPOFOL 10 MG/ML
INJECTION, EMULSION INTRAVENOUS CONTINUOUS PRN
Status: DISCONTINUED | OUTPATIENT
Start: 2017-11-16 | End: 2017-11-16

## 2017-11-16 RX ORDER — LORAZEPAM 2 MG/1
2 TABLET ORAL ONCE
Status: DISCONTINUED | OUTPATIENT
Start: 2017-11-16 | End: 2017-11-16

## 2017-11-16 RX ORDER — ACETAMINOPHEN 325 MG/1
650 TABLET ORAL EVERY 6 HOURS PRN
Status: DISCONTINUED | OUTPATIENT
Start: 2017-11-16 | End: 2017-11-20 | Stop reason: HOSPADM

## 2017-11-16 RX ORDER — PROPOFOL 10 MG/ML
INJECTION, EMULSION INTRAVENOUS AS NEEDED
Status: DISCONTINUED | OUTPATIENT
Start: 2017-11-16 | End: 2017-11-16 | Stop reason: SURG

## 2017-11-16 RX ORDER — LISINOPRIL 10 MG/1
10 TABLET ORAL DAILY
Status: DISCONTINUED | OUTPATIENT
Start: 2017-11-17 | End: 2017-11-20 | Stop reason: HOSPADM

## 2017-11-16 RX ORDER — WARFARIN SODIUM 5 MG/1
5 TABLET ORAL
Status: DISCONTINUED | OUTPATIENT
Start: 2017-11-16 | End: 2017-11-20 | Stop reason: HOSPADM

## 2017-11-16 RX ORDER — TORSEMIDE 20 MG/1
20 TABLET ORAL DAILY
Status: DISCONTINUED | OUTPATIENT
Start: 2017-11-17 | End: 2017-11-20 | Stop reason: HOSPADM

## 2017-11-16 RX ORDER — INSULIN GLARGINE 100 [IU]/ML
12 INJECTION, SOLUTION SUBCUTANEOUS
Status: DISCONTINUED | OUTPATIENT
Start: 2017-11-16 | End: 2017-11-20

## 2017-11-16 RX ORDER — LORAZEPAM 0.5 MG/1
0.5 TABLET ORAL EVERY 8 HOURS PRN
Status: DISCONTINUED | OUTPATIENT
Start: 2017-11-16 | End: 2017-11-20

## 2017-11-16 RX ORDER — LIDOCAINE HYDROCHLORIDE 10 MG/ML
INJECTION, SOLUTION INFILTRATION; PERINEURAL AS NEEDED
Status: DISCONTINUED | OUTPATIENT
Start: 2017-11-16 | End: 2017-11-16 | Stop reason: SURG

## 2017-11-16 RX ORDER — ATORVASTATIN CALCIUM 10 MG/1
10 TABLET, FILM COATED ORAL
Status: DISCONTINUED | OUTPATIENT
Start: 2017-11-16 | End: 2017-11-20 | Stop reason: HOSPADM

## 2017-11-16 RX ORDER — PANTOPRAZOLE SODIUM 40 MG/1
40 TABLET, DELAYED RELEASE ORAL
Status: DISCONTINUED | OUTPATIENT
Start: 2017-11-16 | End: 2017-11-20 | Stop reason: HOSPADM

## 2017-11-16 RX ORDER — METOPROLOL SUCCINATE 25 MG/1
25 TABLET, EXTENDED RELEASE ORAL DAILY
Status: DISCONTINUED | OUTPATIENT
Start: 2017-11-16 | End: 2017-11-20 | Stop reason: HOSPADM

## 2017-11-16 RX ORDER — LORAZEPAM 0.5 MG/1
0.5 TABLET ORAL 2 TIMES DAILY PRN
Status: DISCONTINUED | OUTPATIENT
Start: 2017-11-16 | End: 2017-11-16

## 2017-11-16 RX ORDER — LISINOPRIL 20 MG/1
20 TABLET ORAL DAILY
Status: DISCONTINUED | OUTPATIENT
Start: 2017-11-16 | End: 2017-11-16

## 2017-11-16 RX ADMIN — INSULIN GLARGINE 12 UNITS: 100 INJECTION, SOLUTION SUBCUTANEOUS at 23:27

## 2017-11-16 RX ADMIN — NICOTINE 1 PATCH: 7 PATCH, EXTENDED RELEASE TRANSDERMAL at 11:25

## 2017-11-16 RX ADMIN — METRONIDAZOLE 500 MG: 500 INJECTION, SOLUTION INTRAVENOUS at 11:22

## 2017-11-16 RX ADMIN — LISINOPRIL 20 MG: 20 TABLET ORAL at 11:26

## 2017-11-16 RX ADMIN — METRONIDAZOLE 500 MG: 500 INJECTION, SOLUTION INTRAVENOUS at 04:51

## 2017-11-16 RX ADMIN — HYDROMORPHONE HYDROCHLORIDE 1 MG: 1 INJECTION, SOLUTION INTRAMUSCULAR; INTRAVENOUS; SUBCUTANEOUS at 04:54

## 2017-11-16 RX ADMIN — METOCLOPRAMIDE 10 MG: 5 INJECTION, SOLUTION INTRAMUSCULAR; INTRAVENOUS at 04:54

## 2017-11-16 RX ADMIN — ATORVASTATIN CALCIUM 10 MG: 10 TABLET, FILM COATED ORAL at 16:17

## 2017-11-16 RX ADMIN — DIAZEPAM 5 MG: 5 INJECTION, SOLUTION INTRAMUSCULAR; INTRAVENOUS at 08:46

## 2017-11-16 RX ADMIN — LIDOCAINE HYDROCHLORIDE 50 MG: 10 INJECTION, SOLUTION INFILTRATION; PERINEURAL at 09:13

## 2017-11-16 RX ADMIN — WARFARIN SODIUM 5 MG: 5 TABLET ORAL at 17:26

## 2017-11-16 RX ADMIN — PANTOPRAZOLE SODIUM 40 MG: 40 TABLET, DELAYED RELEASE ORAL at 16:17

## 2017-11-16 RX ADMIN — SODIUM CHLORIDE: 0.9 INJECTION, SOLUTION INTRAVENOUS at 09:08

## 2017-11-16 RX ADMIN — LORAZEPAM 0.5 MG: 0.5 TABLET ORAL at 17:26

## 2017-11-16 RX ADMIN — PROPOFOL 100 MCG/KG/MIN: 10 INJECTION, EMULSION INTRAVENOUS at 09:13

## 2017-11-16 RX ADMIN — LORAZEPAM 0.5 MG: 0.5 TABLET ORAL at 12:36

## 2017-11-16 RX ADMIN — SODIUM CHLORIDE 8 MG/HR: 9 INJECTION, SOLUTION INTRAVENOUS at 00:12

## 2017-11-16 RX ADMIN — PROPOFOL 50 MG: 10 INJECTION, EMULSION INTRAVENOUS at 09:14

## 2017-11-16 RX ADMIN — ACETAMINOPHEN 650 MG: 325 TABLET ORAL at 17:26

## 2017-11-16 RX ADMIN — PROPOFOL 80 MG: 10 INJECTION, EMULSION INTRAVENOUS at 09:13

## 2017-11-16 RX ADMIN — METOCLOPRAMIDE 10 MG: 5 INJECTION, SOLUTION INTRAMUSCULAR; INTRAVENOUS at 18:58

## 2017-11-16 RX ADMIN — METOPROLOL SUCCINATE 25 MG: 25 TABLET, EXTENDED RELEASE ORAL at 11:26

## 2017-11-16 RX ADMIN — DIAZEPAM 5 MG: 5 INJECTION, SOLUTION INTRAMUSCULAR; INTRAVENOUS at 00:11

## 2017-11-16 NOTE — PROGRESS NOTES
[facility] Internal Medicine Progress Note  Patient: Maryuri Villalobos 61 y o  male   MRN: [id number]  PCP: Gretchen Tovar DO  Unit/Bed#: [facility] 604-01 Encounter: [id number]  Date Of Visit: 11/16/17    Assessment:    Principal Problem:    Acute upper GI bleed  Active Problems:    Hypertension    Diabetes mellitus ([facility] [address] )    Erosive esophagitis    Chronic systolic congestive heart failure ([facility] )    History of mitral valve replacement with mechanical valve    GI bleed      Plan:    · Acute upper GI bleed:  · 2/2 reflux esophagitis, gastritis, jj mantilla tear at GE junction seen in EGD today 11/16/17  · Cleared by GI for diet & anticoagulation  · Change protonix drip to 40 PO BID  · Dint have significant anemia, repeat in AM unless rebleeds  · H/o mechanical MV replacement:  · INR yesterday 2 5 - repeat today - if < 2 5 will need bridge - would consider argatroban as was done last admission due to arterial thrombosis on heparin drip  · Coumadin 5 mg started by cardio (per pt he would be alternating 5mg & 7 5 mg or 5mg daily)  · Chronic systolic CHF:  · Compensated, appears stable  · Restarted on lisinopril, BB, will start home oral torsemide 20 mg from tomorrow  · Appreciate cardio input  · Leucocytosis:  · Could be from bleed, CT chest abdomen eng, check UA, f/u BC  · DC Abx till above w/u  · SEAN:  · 2/2 hypovolemia  · Resolved with IVF, DC IVF, monitor  · HTN:  · Cont lopressor, lisinopril  · DM:  · Start home lantus at lower dose 12 U HS instead of 21 U HS  · Cont ISS  · PAD:  · S/p left AKA 09/2017  · Anxiety: ativan 0 5 mg PO BID PRN      VTE Pharmacologic Prophylaxis:   Pharmacologic: Warfarin (Coumadin)  Mechanical VTE Prophylaxis in Place: Yes    Patient Centered Rounds: I have performed bedside rounds with nursing staff today  Discussions with Specialists or Other Care Team Provider:     Education and Discussions with Family / Patient: patient    Time Spent for Care: 45 minutes    More than 50% of total time spent on counseling and coordination of care as described above  Current Length of Stay: 1 day(s)    Current Patient Status: Inpatient   Certification Statement: The patient will continue to require additional inpatient hospital stay due to monitor hb, bleed, INR    Discharge Plan / Estimated Discharge Date: 1-2 days    Code Status: Level 1 - Full Code      Subjective:   Feels better, no more abdo pain, no n/v, no CP/SOB    Objective:     Vitals:   Temp (24hrs), Av 5 °F (36 9 °C), Min:97 6 °F (36 4 °C), Max:99 3 °F (37 4 °C)    HR:  [65-95] 65  Resp:  [18-20] 18  BP: (102-138)/(58-80) 136/60  SpO2:  [93 %-98 %] 98 %  There is no height or weight on file to calculate BMI  Input and Output Summary (last 24 hours): Intake/Output Summary (Last 24 hours) at 17 1225  Last data filed at 17 0943   Gross per 24 hour   Intake          2223 92 ml   Output              700 ml   Net          1523 92 ml       Physical Exam:     Physical Exam   Constitutional: He is oriented to person, place, and time  He appears well-developed and well-nourished  HENT:   Head: Normocephalic and atraumatic  Eyes: Conjunctivae are normal  No scleral icterus  Cardiovascular: Normal rate, regular rhythm and normal heart sounds  Exam reveals no gallop and no friction rub  No murmur heard  Pulmonary/Chest: Effort normal and breath sounds normal  No respiratory distress  He has no wheezes  Abdominal: Soft  He exhibits no distension  There is no tenderness  Musculoskeletal: He exhibits no edema  Neurological: He is alert and oriented to person, place, and time           Additional Data:     Labs:      Results from last 7 days  Lab Units 17  0553 11/15/17  1703  11/15/17  0946   WBC Thousand/uL  --  16 25*  --  17 45*   HEMOGLOBIN g/dL 12 1 13 0  < > 14 8   HEMATOCRIT % 36 6 37 7  < > 41 9   PLATELETS Thousands/uL  --  216  --  361   LYMPHO PCT %  --   --   --  6*   MONO PCT MAN %  --   --   --  3* EOSINO PCT MANUAL %  --   --   --  0   < > = values in this interval not displayed  Results from last 7 days  Lab Units 11/16/17  0551 11/15/17  1047   SODIUM mmol/L 138 135*   POTASSIUM mmol/L 3 6 4 6   CHLORIDE mmol/L 105 99*   CO2 mmol/L 25 22   BUN mg/dL 29* 27*   CREATININE mg/dL 1 14 1 76*   CALCIUM mg/dL 9 0 10 3*   TOTAL PROTEIN g/dL  --  9 7*   BILIRUBIN TOTAL mg/dL  --  1 09*   ALK PHOS U/L  --  205*   ALT U/L  --  42   AST U/L  --  33   GLUCOSE RANDOM mg/dL 141* 273*       Results from last 7 days  Lab Units 11/15/17  1703   INR  2 57*       * I Have Reviewed All Lab Data Listed Above  * Additional Pertinent Lab Tests Reviewed: All Labs Within Last 24 Hours Reviewed    Imaging:    Imaging Reports Reviewed Today Include:   Imaging Personally Reviewed by Myself Includes:      Recent Cultures (last 7 days):           Last 24 Hours Medication List:     atorvastatin 10 mg Oral Daily With Dinner   insulin glargine 12 Units Subcutaneous HS   insulin lispro 1-5 Units Subcutaneous 4x Daily (AC & HS)   [START ON 11/17/2017] lisinopril 10 mg Oral Daily   metoprolol succinate 25 mg Oral Daily   nicotine 1 patch Transdermal Q24H   pantoprazole 40 mg Oral BID AC   [START ON 11/17/2017] torsemide 20 mg Oral Daily   warfarin 5 mg Oral Daily (warfarin)        Today, Patient Was Seen By: Jesse Dickey MD    ** Please Note: This note has been constructed using a voice recognition system   **

## 2017-11-16 NOTE — PROGRESS NOTES
Dr Bull Watson made aware of pt lactic acid of 2 5  Per Dr Bull Watson no other lactic acid labs needed and order to be discontinued

## 2017-11-16 NOTE — PROGRESS NOTES
Cardiology Progress Note - Chang Section 61 y o  male MRN: [id number]    Unit/Bed#: [facility] 604-01 Encounter: [id number]      Assessment:    1  GI bleed with endoscopy showing esophagitis, gastritis and a small jj mantilla tear  2  Mechanical MVR  3  CAD s/p CABG  4  Lactic acidosis  5  Chronic systolic heart failure with EF 40%  6  Diabetes with vasculopathy s/p left AKA    Plan:    Okay to resume anticoagulation as per GI  No further abdominal pain  Will start warfarin 5 mg tonight  Check INR today  If below 2 5 then start heparin gtt  Goal INR is 2 5 - 3 5  Resume other home CV medications including ACEi, betablockers and torsemide    Subjective:   Patient seen and examined  No significant events overnight  Denies chest pain, pnd, orthopnea, abdominal pain, nausea  Complaining of headaches    Objective:     Vitals: Blood pressure 103/58, pulse 68, temperature 97 6 °F (36 4 °C), temperature source Oral, resp  rate 20, SpO2 98 %  , There is no height or weight on file to calculate BMI , Orthostatic Blood Pressures    Flowsheet Row Most Recent Value   Blood Pressure  103/58 filed at 11/16/2017 0943   Patient Position - Orthostatic VS  Lying filed at 11/16/2017 0700            Intake/Output Summary (Last 24 hours) at 11/16/17 1033  Last data filed at 11/16/17 0921   Gross per 24 hour   Intake          2103 92 ml   Output              700 ml   Net          1403 92 ml       No significant arrhythmias seen on telemetry review       Physical Exam:    GEN: Mark A Randall appears well, alert and oriented x 3  HEENT: anicteric  NECK: no jvd  HEART: regular rhythm, normal S1 and S2, Mechanical click  LUNGS: clear to auscultation bilaterally; no wheezes  ABDOMEN: soft, no tenderness, no distention  EXTREMITIES: peripheral pulses normal; no edema, cool to touch  NEURO: no focal findings   SKIN: normal without suspicious lesions on exposed skin      Current Facility-Administered Medications:     ceftriaxone (ROCEPHIN) 1 g/50 mL in dextrose IVPB, 1,000 mg, Intravenous, Q24H, Osmin Harvey MD, Stopped at 11/15/17 1340    diazepam (VALIUM) injection 5 mg, 5 mg, Intravenous, Q6H PRN, Osmin Harvey MD, 5 mg at 11/16/17 0846    HYDROmorphone (DILAUDID) 1 mg/mL injection 0 5 mg, 0 5 mg, Intravenous, Q3H PRN, Osmin Harvey MD, 0 5 mg at 11/15/17 2222    HYDROmorphone (DILAUDID) 1 mg/mL injection 1 mg, 1 mg, Intravenous, Q3H PRN, Osmin Harvey MD, 1 mg at 11/16/17 0454    insulin lispro (HumaLOG) 100 units/mL subcutaneous injection 1-5 Units, 1-5 Units, Subcutaneous, Q6H [facility], 1 Units at 11/15/17 1856 **AND** Fingerstick Glucose (POCT), , , Q6H, Osmin Harvey MD    metoclopramide (REGLAN) injection 10 mg, 10 mg, Intravenous, Q6H PRN, Osmin Harvey MD, 10 mg at 11/16/17 0454    metroNIDAZOLE (FLAGYL) IVPB (premix) 500 mg, 500 mg, Intravenous, Q8H, Osmin Harvey MD, Last Rate: 200 mL/hr at 11/16/17 0451, 500 mg at 11/16/17 0451    nicotine (NICODERM CQ) 7 mg/24hr TD 24 hr patch 1 patch, 1 patch, Transdermal, Q24H, Osmin Harvey MD, 1 patch at 11/15/17 1435    ondansetron (ZOFRAN) 8 mg in sodium chloride 0 9 % 50 mL IVPB, 8 mg, Intravenous, Q6H PRN, Osmin Harvey MD, Stopped at 11/15/17 1722    pantoprazole (PROTONIX) 80 mg in sodium chloride 0 9 % 100 mL infusion, 8 mg/hr, Intravenous, Continuous, Osmin Harvey MD, Last Rate: 10 mL/hr at 11/16/17 0012, 8 mg/hr at 11/16/17 0012    promethazine (PHENERGAN) injection 25 mg, 25 mg, Intravenous, Q6H PRN, Eleuterio Almonte DO, 25 mg at 11/15/17 1058    sodium chloride 0 9 % infusion, 75 mL/hr, Intravenous, Continuous, Osmin Harvey MD, Stopped at 11/16/17 1378    Labs & Results:    Lab Results   Component Value Date    TROPONINI <0 02 08/31/2017    TROPONINI <0 02 08/31/2017    TROPONINI <0 02 08/31/2017       Lab Results   Component Value Date    GLUCOSE 141 (H) 11/16/2017    CALCIUM 9 0 11/16/2017     11/16/2017    K 3 6 11/16/2017 CO2 25 11/16/2017     11/16/2017    BUN 29 (H) 11/16/2017    CREATININE 1 14 11/16/2017       Lab Results   Component Value Date    WBC 16 25 (H) 11/15/2017    HGB 12 1 11/16/2017    HCT 36 6 11/16/2017    MCV 83 11/15/2017     11/15/2017       Results from last 7 days  Lab Units 11/15/17  1703   INR  2 57*       Lab Results   Component Value Date    CHOL 83 08/11/2017    CHOL 75 07/18/2017     Lab Results   Component Value Date    HDL 36 (L) 08/11/2017    HDL 40 07/18/2017     Lab Results   Component Value Date    LDLCALC 30 08/11/2017    LDLCALC 20 07/18/2017     Lab Results   Component Value Date    TRIG 86 08/11/2017    TRIG 73 07/18/2017       Lab Results   Component Value Date    ALT 42 11/15/2017    AST 33 11/15/2017

## 2017-11-16 NOTE — ANESTHESIA PREPROCEDURE EVALUATION
Review of Systems/Medical History  Patient summary reviewed  Chart reviewed      Cardiovascular  EKG reviewed, Pacemaker/AICD, Hyperlipidemia, Hypertension , Valve replacement, Past MI , CAD, , CHF ,    Pulmonary  COPD mild- PRN medicaiton , ,        GI/Hepatic    GI bleeding , active, GERD poorly controlled,  Hiatal hernia,        Kidney disease CKD, Chronic kidney disease ,        Endo/Other  Diabetes type 2 ,      GYN       Hematology  Anemia acute blood loss anemia,     Musculoskeletal    Comment: Left AKA      Neurology    Neuromuscular disease , Diabetic neuropathy,    Psychology   Anxiety, Depression , bipolar disorder,            Physical Exam    Airway    Mallampati score: II  TM Distance: >3 FB  Neck ROM: full     Dental   No notable dental hx     Cardiovascular  Rhythm: regular, Rate: normal, Cardiovascular exam normal    Pulmonary  Pulmonary exam normal Breath sounds clear to auscultation,     Other Findings        Anesthesia Plan  ASA Score- 3       Anesthesia Type- IV sedation with anesthesia with ASA Monitors  Additional Monitors:   Airway Plan:           Induction- intravenous  Informed Consent- Anesthetic plan and risks discussed with patient  I personally reviewed this patient with the CRNA  Discussed and agreed on the Anesthesia Plan with the CRNA  Dana Hernández Recent labs personally reviewed:  Lab Results   Component Value Date    WBC 16 25 (H) 11/15/2017    HGB 12 1 11/16/2017     11/15/2017     Lab Results   Component Value Date     11/16/2017    K 3 6 11/16/2017    BUN 29 (H) 11/16/2017    CREATININE 1 14 11/16/2017    GLUCOSE 141 (H) 11/16/2017     Lab Results   Component Value Date    PTT 39 (H) 11/15/2017      Lab Results   Component Value Date    INR 2 57 (H) 11/15/2017       Blood type A    Lab Results   Component Value Date    HGBA1C 8 1 (H) 07/13/2017       I, Hunter Srinivasan MD, have personally seen and evaluated the patient prior to anesthetic care    I have reviewed the pre-anesthetic record, and other medical records if appropriate to the anesthetic care  If a CRNA is involved in the case, I have reviewed the CRNA assessment, if present, and agree  Risks/benefits and alternatives discussed with patient including possible PONV, sore throat, and possibility of rare anesthetic and surgical emergencies

## 2017-11-16 NOTE — PROGRESS NOTES
Unable to obtain labs after Multiple attempts made by nurses and PCAs  Dr Yazan Sherman made aware  Order being placed to have respiratory attempt arterial stick to obtain labs  Respiratory aware and coming to bedside

## 2017-11-16 NOTE — CASE MANAGEMENT
Initial Clinical Review    St  [address] [facility] in the [facility] by Jemal Felipe for 2017  Network Utilization Review Department  Phone: [phone]; Fax [phone]  ATTENTION: The Network Utilization Review Department is now centralized for our 7 Facilities  Make a note that we have a new phone and fax numbers for our Department  Please call with any questions or concerns to [phone] and carefully follow the prompts so that you are directed to the right person  All voicemails are confidential  Fax any determinations, approvals, denials, and requests for initial or continue stay review clinical to [phone]  Due to HIGH CALL volume, it would be easier if you could please send faxed requests to expedite your requests and in part, help us provide discharge notifications faster  Admission: Date/Time/Statement: 11/15/17 @ 1056     Orders Placed This Encounter   Procedures    Inpatient Admission (expected length of stay for this patient is greater than two midnights)     Standing Status:   Standing     Number of Occurrences:   1     Order Specific Question:   Admitting Physician     Answer:   Leann Sanchez [[id number]]     Order Specific Question:   Level of Care     Answer:   Med Surg [[id number]]     Order Specific Question:   Estimated length of stay     Answer:   More than 2 Midnights     Order Specific Question:   Certification     Answer:   I certify that inpatient services are medically necessary for this patient for a duration of greater than two midnights  See H&P and MD Progress Notes for additional information about the patient's course of treatment           ED: Date/Time/Mode of Arrival:   ED Arrival Information     Expected Arrival Acuity Means of Arrival Escorted By Service Admission Type    - 11/15/2017 09:25 Emergent Ambulance [facility] General Medicine Emergency    Arrival Complaint    GI Bleed          Chief Complaint:   Chief Complaint   Patient presents with    Vomiting Blood     Pt c/o coffeeground emesis and severe abdominal pain since last night  Pt is on coumadin  History of Illness: Nelia Worthy is a 61 y o  male who presents to ER with complaining of vomiting blood  At this time patient is severely nauseous and has severe abdominal pain, therefore I am unable to obtain good history from him  The only thing that he states in between screaming if that he started vomiting about yesterday afternoon, too many times, and this morning he noticed blood in it    No other history can be obtained this time, other than the severe abdominal pain        ED Vital Signs:   ED Triage Vitals [11/15/17 0929]   Temperature Pulse Respirations Blood Pressure SpO2   (!) 97 3 °F (36 3 °C) 88 20 137/99 99 %      Temp Source Heart Rate Source Patient Position - Orthostatic VS BP Location FiO2 (%)   Tympanic Monitor Lying Right arm --      Pain Score       Worst Possible Pain        Wt Readings from Last 1 Encounters:   08/27/17 79 2 kg (174 lb 9 7 oz)       Vital Signs (abnormal):   Date/Time Temp Pulse Resp BP MAP (mmHg) SpO2 O2 Device Patient Position - Orthostatic VS   11/16/17 1145 98 9 °F (37 2 °C) 65 18 136/60 100 98 % -- Lying   11/16/17 0943 -- 68 20 103/58 -- 98 % None (Room air) --   11/16/17 0924 -- 71 20 114/60 -- 96 % None (Room air) --   11/16/17 0832 97 6 °F (36 4 °C) 75 20 128/60 -- 98 % None (Room air) --   11/16/17 0700 98 1 °F (36 7 °C) 65 18 102/62 -- 93 % -- Lying   11/15/17 2300 98 8 °F (37 1 °C) 67 18 111/75 -- 98 % -- Lying   11/15/17 2047 98 9 °F (37 2 °C) 68 18 116/77 -- 98 % None (Room air) Lying   11/15/17 1749 -- -- -- 104/70 -- -- -- Sitting   11/15/17 1601 97 6 °F (36 4 °C) -- -- -- -- -- -- --   11/15/17 1517 -- 95 18 -- -- 97 % None (Room air) Sitting   11/15/17 1414 -- -- -- -- -- -- None (Room air) --   11/15/17 1317 99 3 °F (37 4 °C) 68 18 138/80 -- -- -- Lying   11/15/17 1200 -- 78 20 144/86 -- 98 % None (Room air) Sitting 11/15/17 1149 -- 80 18 144/86 -- 98 % None (Room air) Sitting   11/15/17 1040 -- 76 20 147/78 -- 99 % None (Room air) Lying   11/15/17 0946 -- -- -- -- -- -- None (Room air) --         Abnormal Labs/Diagnostic Test Results:    Ref Range & Units 11/16/17 0553 11/15/17 1703 11/15/17 1231 11/15/17 0946   Hemoglobin 12 0 - 17 0 g/dL 12 1  13 0  14 9  14 8    Hematocrit 36 5 - 49 3 % 36 6  37 7  43 6  41 9             Ref Range & Units 11/16/17 0551 11/15/17 1047   Sodium 136 - 145 mmol/L 138  135     Potassium 3 5 - 5 3 mmol/L 3 6  4 6    Chloride 100 - 108 mmol/L 105  99     CO2 21 - 32 mmol/L 25  22    Anion Gap 4 - 13 mmol/L 8  14     BUN 5 - 25 mg/dL 29   27     Creatinine 0 60 - 1 30 mg/dL 1 14  1 76CM     Glucose 65 - 140 mg/dL 141   273CM     Calcium 8 3 - 10 1 mg/dL 9 0  10 3     eGFR ml/min/1 73sq m 70  42               Ref Range & Units 11/16/17 1158 11/15/17 1703   Protime 12 1 - 14 4 seconds 31 2   27 9     INR 0 86 - 1 16 2 96   2 57             CT ABD & pelvis - No acute findings in the chest, abdomen or pelvis    Pulmonary emphysema with right apical bullous disease    No definite esophageal abnormalities as visualized in this patient with a history of coffee ground emesis and esophagitis  No definite bowel abnormalities; enteric contrast is not administered  An area of gastric wall thickening is not apparent  No bowel obstruction  EGD on 11/16 -Esophagitis seen in the distal third of the esophagus  Small   clean base jj mantilla tear at the GEJ  Gastritis found in the antrum   and body of the stomach  Normal duodenal bulb, 2nd portion of the   duodenum, and major papilla       ED Treatment:   Medication Administration from 11/15/2017 0925 to 11/15/2017 1253       Date/Time Order Dose Route Action Action by Comments     11/15/2017 1009 sterile water injection 10 mL 10 mL Injection Given Mady Cooper RN for protonix     11/15/2017 0957 fentanyl citrate (PF) 100 MCG/2ML 100 mcg 100 mcg Intravenous Given Jenet Setting, RN      11/15/2017 0954 ondansetron (ZOFRAN) injection 4 mg 4 mg Intravenous Given Jenet Setting, RN      11/15/2017 1009 pantoprazole (PROTONIX) injection 40 mg 40 mg Intravenous Given Jenet Setting, RN      11/15/2017 1038 ondansetron (ZOFRAN) injection 4 mg 4 mg Intravenous Given Jenet Setting, RN      11/15/2017 1035 sodium chloride 0 9 % bolus 1,000 mL 1,000 mL Intravenous New Bag Jenet Setting, RN      11/15/2017 1102 HYDROmorphone (DILAUDID) 1 mg/mL injection 1 mg 1 mg Intravenous Given Jenet Setting, RN      11/15/2017 1058 promethazine (PHENERGAN) injection 25 mg 25 mg Intravenous Given Jenet Setting, RN      11/15/2017 1108 diazepam (VALIUM) injection 5 mg 5 mg Intravenous Given Jenet Setting, RN      11/15/2017 1229 ceftriaxone (ROCEPHIN) 1 g/50 mL in dextrose IVPB 1,000 mg Intravenous [address] Shirley Paolo, RN      11/15/2017 1225 pantoprazole (PROTONIX) injection 40 mg 40 mg Intravenous Given [facility], RN      11/15/2017 1223 sodium chloride 0 9 % bolus 1,000 mL 1,000 mL Intravenous New Bag Casper Boone RN           Past Medical/Surgical History:   Past Medical History:   Diagnosis Date    Anticoagulated on Coumadin     Anxiety     Bipolar 1 disorder ([facility] )     CAD (coronary artery disease)     Chronic kidney disease     Chronic systolic congestive heart failure ([facility] ) 4/18/2017    Colitis 4/2/2017    COPD (chronic obstructive pulmonary disease) ([facility] )     Diabetes mellitus ([facility] )     Difficulty urinating     DM type 2 with diabetic peripheral neuropathy (HCC)     Hiatal hernia     Hyperlipidemia     Hypertension     Ischemic cardiomyopathy     Myocardial infarction     Pacemaker     PAD (peripheral artery disease) (HCC)     Rectal bleed     Vomiting        Admitting Diagnosis: GI bleed [K92 2]  Acute upper GI bleed [K92 2]  History of mitral valve replacement with mechanical valve [Z95 2]    Age/Sex: 61 y o  male    Assessment/Plan:  Upper GI bleed  - this time unable to obtain history from the patient, he is in severe pain   - started on Protonix  drip in the ER, will continue it for now until otherwise advised by GI  - just been consulted and is evaluating the patient in the ER right now  - INR is pending , patient on Coumadin 5 mg daily for history of mechanical mitral valve replacement  - he was admitted in May 2017 for upper GI bleed and EGD showed severe Candida esophagitis that was treated with 14 days nystatin  - Zofran Phenergan will be used for nausea, pain control for now with Dilaudid IV  - GI to advise for possible endoscopy  - hold Coumadin today  - H&H to be checked q 6 hours  - type and screen sent, 2 units of packed red blood cells to be prepared and on hold, consent for blood product transfusion was obtained by ED physician  - patient received bolus of normal saline in the ER, continue with IV normal saline 75 cc an hour for 24 hours     Severe diffuse abdominal pain  - id this time patient is not allowing physical exam  - he received 100 mcg of fentanyl in the ER with no relief  - he is not receiving 1 mg IV Dilaudid, 25 mg Phenergan, 5 mg IV Valium  - CT scan chest abdomen and pelvis has been ordered the stat and is currently pending  - as patient has a leukocytosis, slight hypothermia, and having chills while in bed, started the patient on Rocephin and Flagyl until CT scan performed and negative for intra-abdominal infectious process  - for the same reason blood cultures have been requested and currently pending     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  - for now hold Lantus and Humalog the patient is NPO  - point of care and insulin sliding scale q 6 hours initiated  - modify therapy as needed come especially when p o   Resumes     Hypertension  - for now hold antihypertensive, monitor and resume them as indicated     Chronic systolic congestive heart failure status post ICD  - compensated  - monitor closely with patient is on IV fluids  - hold off dose of diuretics today     History of mitral valve replacement with mechanical valve  - INR is above  - if INR supratherapeutic might consider use of FFP  - hold Coumadin today  - if patient INR within 2 5 and 3 5 no need to start heparin, if INR below 2 consideration for heparin drip with close monitoring       Admission Orders:  Scheduled Meds:   atorvastatin 10 mg Oral Daily With Dinner   insulin glargine 12 Units Subcutaneous HS   insulin lispro 1-5 Units Subcutaneous 4x Daily (AC & HS)   [START ON 11/17/2017] lisinopril 10 mg Oral Daily   metoprolol succinate 25 mg Oral Daily   nicotine 1 patch Transdermal Q24H   pantoprazole 40 mg Oral BID AC   [START ON 11/17/2017] torsemide 20 mg Oral Daily   warfarin 5 mg Oral Daily (warfarin)     Continuous Infusions:  Protonix gtt   NS @ 75ml/hr   PRN Meds: LORazepam po 11/16 x 1    Metoclopramide iv 11/15 x 1- 11/16 x 1    Ondansetron iv 11/15 x 1     Promethazine iv 11/15 x 1   Valium iv 1/15 x 1 - 11/16 x 2   Dilaudid iv 11/15 x 2 - 11/16 x 1     Nursing orders -     GI consult - N/V/ Hematemesis - Pt has been admitted several times in the past for the same  On both of his previous EGD's he was found to have severe esophagitis which bleed secondary to his Coumadin  There was previous mention that he had not been compliant with his PPI, however it is on his home med list  This could also be from PUD or Dieulafoy's  Would recommend Protonix drip, NGT, anti-emetics as needed  We are waiting for labs & stat CT scan prior to performing EGD  -Follow H&H  -Protonix   -NGT  -Anti-emetics as needed  -NPO  -Follow-up CT  -Hold Coumdain     2  Abd pain - possibly related to #1  Also possible gastroenteritis as he did have diarrhea  Could also be ischemia as he has CAD & PAD  Will await the results of the CT    -Pain meds as needed    Cardiology consult - Impression:  1  GI bleed - patient with stable hemoglobin, and doesn't demonstrate active hemodynamically significant bleeding, so would not reverse anticoagulation at this time  2  Mechanical MVR - hold warfarin tonight, but no reversal agent unless significant GI bleeding      Recommendations:  1  Hold warfarin  2  No reversal at this time - if reversal needed, would use FFP  3  Await GI evaluation

## 2017-11-16 NOTE — ANESTHESIA POSTPROCEDURE EVALUATION
Post-Op Assessment Note      CV Status:  Stable    Mental Status:  Alert and awake    Hydration Status:  Euvolemic    PONV Controlled:  Controlled    Airway Patency:  Patent    Post Op Vitals Reviewed: Yes          Staff: CRNA           /60 (11/16/17 0924)    Temp      Pulse 71 (11/16/17 0924)   Resp 20 (11/16/17 0924)    SpO2 96 % (11/16/17 0924)

## 2017-11-16 NOTE — OP NOTE
**** GI/ENDOSCOPY REPORT ****     PATIENT NAME: MAYCO PRINCE - VISIT ID:  Patient ID: [id number]   YOB: 1960     INTRODUCTION: Esophagogastroduodenoscopy - A 61 male patient presents for   an inpatient Esophagogastroduodenoscopy at [address] [place] [address]  INDICATIONS: Hematemesis/coffee ground  On coumadin for mechanical aortic   valve  CONSENT: The benefits, risks, and alternatives to the procedure were   discussed and informed consent was obtained from the patient  PREPARATION:  EKG, pulse, pulse oximetry and blood pressure were monitored   throughout the procedure  ASA Classification: Class 3 - Patient has severe   systemic disturbance that may or may not be related to the disorder   requiring surgery  Airway Assessment Classification: Airway class 2 -   Visualization of the soft palate, fauces and uvula  Mallampati   Classification: Class 2 - Faucial pillars, soft palate visible  MEDICATIONS: Anesthesia-check records     PROCEDURE:  The endoscope was passed without difficulty through the mouth   under direct visualization and advanced to the 2nd portion of the   duodenum  The scope was withdrawn and the mucosa was carefully examined  FINDINGS:   Esophagus: LA Class D erosive reflux-induced esophagitis was   found in the distal third of the esophagus  Stomach: Small clean base   jj mantilla tear at the GEJ  Gastritis was found in the antrum and body   of the stomach  Duodenum: The duodenal bulb, 2nd portion of the duodenum,   and major papilla appeared to be normal      COMPLICATIONS: There were no complications  IMPRESSIONS: Esophagitis seen in the distal third of the esophagus  Small   clean base jj mantilla tear at the GEJ  Gastritis found in the antrum   and body of the stomach  Normal duodenal bulb, 2nd portion of the   duodenum, and major papilla  RECOMMENDATIONS: Anti-reflux measures: Raise the head of the bed 4 to 6   inches  Avoid smoking  Avoid excess coffee, tea or other caffeinated   beverages  Avoid garments that fit tightly through the abdomen  Avoid   eating before bed  Resume regular diet as tolerated  Protonix 40 mg po   bid  Resume anticoagulation  ESTIMATED BLOOD LOSS:     PATHOLOGY SPECIMENS:     PROCEDURE CODES:     ICD-9 Codes: 578 0 Hematemesis 535 50 Unspecified gastritides and   gastroduodenitis, without mention of hemorrhage     ICD-10 Codes: K92 0 Hematemesis K20 9 Esophagitis, unspecified K29   Gastritis and duodenitis     PERFORMED BY: RAMONITA Burgos  on 11/16/2017  Version 1, electronically signed by RAMONITA Truong  on 11/16/2017   at 09:32

## 2017-11-17 LAB
ANION GAP SERPL CALCULATED.3IONS-SCNC: 8 MMOL/L (ref 4–13)
BASOPHILS # BLD AUTO: 0.03 THOUSANDS/ΜL (ref 0–0.1)
BASOPHILS NFR BLD AUTO: 0 % (ref 0–1)
BUN SERPL-MCNC: 16 MG/DL (ref 5–25)
CALCIUM SERPL-MCNC: 8.8 MG/DL (ref 8.3–10.1)
CHLORIDE SERPL-SCNC: 107 MMOL/L (ref 100–108)
CO2 SERPL-SCNC: 24 MMOL/L (ref 21–32)
CREAT SERPL-MCNC: 0.84 MG/DL (ref 0.6–1.3)
EOSINOPHIL # BLD AUTO: 0.08 THOUSAND/ΜL (ref 0–0.61)
EOSINOPHIL NFR BLD AUTO: 1 % (ref 0–6)
ERYTHROCYTE [DISTWIDTH] IN BLOOD BY AUTOMATED COUNT: 16.9 % (ref 11.6–15.1)
GFR SERPL CREATININE-BSD FRML MDRD: 97 ML/MIN/1.73SQ M
GLUCOSE SERPL-MCNC: 136 MG/DL (ref 65–140)
GLUCOSE SERPL-MCNC: 147 MG/DL (ref 65–140)
GLUCOSE SERPL-MCNC: 167 MG/DL (ref 65–140)
GLUCOSE SERPL-MCNC: 169 MG/DL (ref 65–140)
GLUCOSE SERPL-MCNC: 193 MG/DL (ref 65–140)
HCT VFR BLD AUTO: 36.2 % (ref 36.5–49.3)
HGB BLD-MCNC: 12 G/DL (ref 12–17)
INR PPP: 3.25 (ref 0.86–1.16)
LYMPHOCYTES # BLD AUTO: 1.84 THOUSANDS/ΜL (ref 0.6–4.47)
LYMPHOCYTES NFR BLD AUTO: 18 % (ref 14–44)
MCH RBC QN AUTO: 28 PG (ref 26.8–34.3)
MCHC RBC AUTO-ENTMCNC: 33.1 G/DL (ref 31.4–37.4)
MCV RBC AUTO: 85 FL (ref 82–98)
MONOCYTES # BLD AUTO: 0.63 THOUSAND/ΜL (ref 0.17–1.22)
MONOCYTES NFR BLD AUTO: 6 % (ref 4–12)
NEUTROPHILS # BLD AUTO: 7.43 THOUSANDS/ΜL (ref 1.85–7.62)
NEUTS SEG NFR BLD AUTO: 75 % (ref 43–75)
NRBC BLD AUTO-RTO: 0 /100 WBCS
PLATELET # BLD AUTO: 165 THOUSANDS/UL (ref 149–390)
POTASSIUM SERPL-SCNC: 3.5 MMOL/L (ref 3.5–5.3)
PROTHROMBIN TIME: 33.6 SECONDS (ref 12.1–14.4)
RBC # BLD AUTO: 4.28 MILLION/UL (ref 3.88–5.62)
SODIUM SERPL-SCNC: 139 MMOL/L (ref 136–145)
WBC # BLD AUTO: 10.04 THOUSAND/UL (ref 4.31–10.16)

## 2017-11-17 PROCEDURE — 80048 BASIC METABOLIC PNL TOTAL CA: CPT | Performed by: HOSPITALIST

## 2017-11-17 PROCEDURE — 85610 PROTHROMBIN TIME: CPT | Performed by: HOSPITALIST

## 2017-11-17 PROCEDURE — 82948 REAGENT STRIP/BLOOD GLUCOSE: CPT

## 2017-11-17 PROCEDURE — 97163 PT EVAL HIGH COMPLEX 45 MIN: CPT

## 2017-11-17 PROCEDURE — G8979 MOBILITY GOAL STATUS: HCPCS

## 2017-11-17 PROCEDURE — 85025 COMPLETE CBC W/AUTO DIFF WBC: CPT | Performed by: HOSPITALIST

## 2017-11-17 PROCEDURE — G8978 MOBILITY CURRENT STATUS: HCPCS

## 2017-11-17 RX ORDER — QUETIAPINE FUMARATE 100 MG/1
400 TABLET, FILM COATED ORAL
Status: DISCONTINUED | OUTPATIENT
Start: 2017-11-17 | End: 2017-11-20 | Stop reason: HOSPADM

## 2017-11-17 RX ADMIN — WARFARIN SODIUM 5 MG: 5 TABLET ORAL at 18:54

## 2017-11-17 RX ADMIN — LORAZEPAM 0.5 MG: 0.5 TABLET ORAL at 12:10

## 2017-11-17 RX ADMIN — TORSEMIDE 20 MG: 20 TABLET ORAL at 09:33

## 2017-11-17 RX ADMIN — LISINOPRIL 10 MG: 10 TABLET ORAL at 09:33

## 2017-11-17 RX ADMIN — LORAZEPAM 0.5 MG: 0.5 TABLET ORAL at 23:50

## 2017-11-17 RX ADMIN — PANTOPRAZOLE SODIUM 40 MG: 40 TABLET, DELAYED RELEASE ORAL at 18:54

## 2017-11-17 RX ADMIN — METOCLOPRAMIDE 10 MG: 5 INJECTION, SOLUTION INTRAMUSCULAR; INTRAVENOUS at 02:06

## 2017-11-17 RX ADMIN — PROMETHAZINE HYDROCHLORIDE 25 MG: 25 INJECTION INTRAMUSCULAR; INTRAVENOUS at 21:19

## 2017-11-17 RX ADMIN — METOPROLOL SUCCINATE 25 MG: 25 TABLET, EXTENDED RELEASE ORAL at 09:33

## 2017-11-17 RX ADMIN — QUETIAPINE FUMARATE 400 MG: 100 TABLET ORAL at 22:58

## 2017-11-17 RX ADMIN — NICOTINE 1 PATCH: 7 PATCH, EXTENDED RELEASE TRANSDERMAL at 12:06

## 2017-11-17 RX ADMIN — INSULIN GLARGINE 12 UNITS: 100 INJECTION, SOLUTION SUBCUTANEOUS at 22:58

## 2017-11-17 RX ADMIN — PANTOPRAZOLE SODIUM 40 MG: 40 TABLET, DELAYED RELEASE ORAL at 06:34

## 2017-11-17 RX ADMIN — ATORVASTATIN CALCIUM 10 MG: 10 TABLET, FILM COATED ORAL at 18:50

## 2017-11-17 RX ADMIN — QUETIAPINE FUMARATE 400 MG: 100 TABLET ORAL at 02:41

## 2017-11-17 RX ADMIN — LORAZEPAM 0.5 MG: 0.5 TABLET ORAL at 02:06

## 2017-11-17 NOTE — SOCIAL WORK
Initial interview and DC Dash:     CM met with the patient to explain the CM role and discuss possible dc needs  Pt reported that [place] in [place] but is currently living in a trailer on his Dtr's property, in [address] [place] [address], so that he may be avail to care for his granddaughter  Pt is s/p recent left AKA and had a 33 day stay in August 2017 at [facility]  Pt stated that at time of this admission he was open to [facility] for RN, PT/OT and HHA services  Pt is requesting referral to an Acute rehab and stated a preference for either [facility] or [facility]; CM sent referral to [facility] referral pending [facility] response  Pt uses a RW or WC for mobility; pt states that he is still learning how to ambulate since his Left AKA and only just received his prosthesis  Pt reported history of Bipolar and [facility] Dr [facility]  Pt was optioned in August 2017 for IP rehab at that time  Pt has a history of DME and CHF; pt reported that he has a glucometer and scale in his trailer  Prescriptions are filled at [facility] in [address]  Main contact: Dtr Ketan Hamilton [phone]  Admit Dx GI Bleed, Esophagitis  Hx CHF, DM2, Cardiomyopathy and Bipolar  CM reviewed d/c planning process including the following: identifying help at home, patient preference for d/c planning needs, Discharge Lounge, [facility] Meds to Bed program, availability of treatment team to discuss questions or concerns patient and/or family may have regarding understanding medications and recognizing signs and symptoms once discharged  CM also encouraged patient to follow up with all recommended appointments after discharge  Patient advised of importance for patient and family to participate in managing patients medical well being

## 2017-11-17 NOTE — PROGRESS NOTES
Cardiology Progress Note - Arnold Cuellar 61 y o  male MRN: [id number]    [facility] Encounter: [id number]        Subjective:    No significant events overnight  No complaints  Review of Systems   Cardiovascular: Negative for chest pain, leg swelling and palpitations  Respiratory: Negative for shortness of breath  Objective:   Vitals: Blood pressure 130/52, pulse 72, temperature 99 1 °F (37 3 °C), temperature source Oral, resp  rate 18, SpO2 100 %  , There is no height or weight on file to calculate BMI , Orthostatic Blood Pressures    Flowsheet Row Most Recent Value   Blood Pressure  130/52 filed at 11/17/2017 0758   Patient Position - Orthostatic VS  Lying filed at 11/17/2017 2511         Systolic (04GWA), SVA:640 , Min:130 , REP:574     Diastolic (82ARV), DGQ:48, Min:52, Max:78      Intake/Output Summary (Last 24 hours) at 11/17/17 1019  Last data filed at 11/17/17 1000   Gross per 24 hour   Intake              760 ml   Output              775 ml   Net              -15 ml     Weight (last 2 days)     None        Telemetry Review: Afib    Physical Exam   Cardiovascular: Normal rate, regular rhythm and normal heart sounds  Exam reveals no gallop and no friction rub  No murmur heard  Mech heart sounds  Pulmonary/Chest: Breath sounds normal  He has no wheezes  He has no rales  Musculoskeletal: He exhibits no edema           Laboratory Results:        CBC with diff:   Results from last 7 days  Lab Units 11/17/17  0510 11/16/17  0553 11/15/17  1703 11/15/17  1231 11/15/17  0946   WBC Thousand/uL 10 04  --  16 25*  --  17 45*   HEMOGLOBIN g/dL 12 0 12 1 13 0 14 9 14 8   HEMATOCRIT % 36 2* 36 6 37 7 43 6 41 9   MCV fL 85  --  83  --  83   PLATELETS Thousands/uL 165  --  216  --  361   MCH pg 28 0  --  28 4  --  29 2   MCHC g/dL 33 1  --  34 5  --  35 3   RDW % 16 9*  --  17 4*  --  17 6*   NRBC AUTO /100 WBCs 0  --   --   --  0         CMP:  Results from last 7 days  Lab Units 11/17/17  0510 11/16/17  0551 11/15/17  1047   SODIUM mmol/L 139 138 135*   POTASSIUM mmol/L 3 5 3 6 4 6   CHLORIDE mmol/L 107 105 99*   CO2 mmol/L 24 25 22   ANION GAP mmol/L 8 8 14*   BUN mg/dL 16 29* 27*   CREATININE mg/dL 0 84 1 14 1 76*   GLUCOSE RANDOM mg/dL 147* 141* 273*   CALCIUM mg/dL 8 8 9 0 10 3*   AST U/L  --   --  33   ALT U/L  --   --  42   ALK PHOS U/L  --   --  205*   TOTAL PROTEIN g/dL  --   --  9 7*   ALBUMIN g/dL  --   --  4 4   BILIRUBIN TOTAL mg/dL  --   --  1 09*   EGFR ml/min/1 73sq m 97 70 42         BMP:  Results from last 7 days  Lab Units 11/17/17  0510 11/16/17  0551 11/15/17  1047   SODIUM mmol/L 139 138 135*   POTASSIUM mmol/L 3 5 3 6 4 6   CHLORIDE mmol/L 107 105 99*   CO2 mmol/L 24 25 22   BUN mg/dL 16 29* 27*   CREATININE mg/dL 0 84 1 14 1 76*   GLUCOSE RANDOM mg/dL 147* 141* 273*   CALCIUM mg/dL 8 8 9 0 10 3*       BNP:     Magnesium:   Results from last 7 days  Lab Units 11/16/17  0551   MAGNESIUM mg/dL 2 2       Coags:   Results from last 7 days  Lab Units 11/17/17  0510 11/16/17  1158 11/15/17  1703 11/15/17  1047   PTT seconds  --   --   --  39*   INR  3 25* 2 96* 2 57* 2 38*     Cardiac testing:   Results for orders placed during the hospital encounter of 04/17/17   Echo complete with contrast if indicated       Study date:  17-Apr-2017    SUMMARY    LEFT VENTRICLE:  The ventricle was mildly dilated  Systolic function was moderately reduced  Ejection fraction was estimated to be 40 %  There was severe hypokinesis to akinesis of the basal-mid inferoseptal, entire inferior, and basal-mid inferolateral wall(s)  Wall thickness was at the upper limits of normal     RIGHT VENTRICLE:  The size was normal   Systolic function was low normal     LEFT ATRIUM:  The atrium was mildly dilated  MITRAL VALVE:  A mechanical prosthesis was present  It exhibited normal function  There was trace regurgitation  There was no significant perivalvular regurgitation    Mean transmitral gradient was 2 5 mmHg  AORTIC VALVE:  There was trace regurgitation         Meds/Allergies     atorvastatin 10 mg Oral Daily With Dinner   insulin glargine 12 Units Subcutaneous HS   insulin lispro 1-5 Units Subcutaneous 4x Daily (AC & HS)   lisinopril 10 mg Oral Daily   metoprolol succinate 25 mg Oral Daily   nicotine 1 patch Transdermal Q24H   pantoprazole 40 mg Oral BID AC   QUEtiapine 400 mg Oral HS   torsemide 20 mg Oral Daily   warfarin 5 mg Oral Daily (warfarin)        Prescriptions Prior to Admission   Medication    acetaminophen (TYLENOL) 325 mg tablet    ascorbic acid (VITAMIN C) 500 MG tablet    atorvastatin (LIPITOR) 10 mg tablet    gabapentin (NEURONTIN) 300 mg capsule    Insulin Glargine (BASAGLAR KWIKPEN) 100 UNIT/ML SOPN    insulin lispro (HumaLOG) 100 units/mL injection    lisinopril (ZESTRIL) 20 mg tablet    LORazepam (ATIVAN) 1 mg tablet    LORazepam (ATIVAN) 1 mg tablet    melatonin 3 mg    metoprolol succinate (TOPROL-XL) 25 mg 24 hr tablet    QUEtiapine (SEROquel) 400 MG tablet    torsemide (DEMADEX) 20 mg tablet    bisacodyl (DULCOLAX) 10 mg suppository    ferrous gluconate (FERGON) 324 mg tablet    HYDROcodone-acetaminophen (NORCO) 5-325 mg per tablet    insulin glargine (LANTUS) 100 units/mL subcutaneous injection    metoprolol tartrate (LOPRESSOR) 25 mg tablet    montelukast (SINGULAIR) 10 mg tablet    nicotine (NICODERM CQ) 7 mg/24hr TD 24 hr patch    pantoprazole (PROTONIX) 40 mg tablet    polyethylene glycol (MIRALAX) 17 g packet    potassium chloride (K-DUR,KLOR-CON) 20 mEq tablet    QUEtiapine (SEROquel) 200 mg tablet    senna-docusate sodium (SENOKOT S) 8 6-50 mg per tablet    warfarin (COUMADIN) 5 mg tablet       Assessment:  Principal Problem:    Acute upper GI bleed  Active Problems:    Hypertension    Diabetes mellitus (HCC)    Erosive esophagitis    Chronic systolic congestive heart failure (HCC)    History of mitral valve replacement with mechanical valve GI bleed      Impression:  1  GI bleed - resolved  Likely due to small esophageal tear  2  Mechanical MVR - on anticoagulation      Recommendations:  1  Continue current cardiac medications  2  Will sign off for now  Please call with questions  Follow up with Dr Ulysses Shelley in 2-3 weeks  Stable for discharge from cardiac standpoint

## 2017-11-17 NOTE — PHYSICAL THERAPY NOTE
PHYSICAL THERAPY EVALUATION  NAME: Mark Pereira  AGE:   61 y o  MRN:  [id number]  ADMIT DX: GI bleed [K92 2]  Acute upper GI bleed [K92 2]  History of mitral valve replacement with mechanical valve [Z95 2]    PMH:   Past Medical History:   Diagnosis Date    Anticoagulated on Coumadin     Mechanical MVR    Anxiety     Bipolar 1 disorder (HCC)     CAD (coronary artery disease)     Chronic kidney disease     left kideny being monitored    Chronic systolic congestive heart failure ([facility] [address] ) 4/18/2017    Colitis 4/2/2017    COPD (chronic obstructive pulmonary disease) (HCC)     Diabetes mellitus (HCC)     Difficulty urinating     DM type 2 with diabetic peripheral neuropathy (HCC)     Hiatal hernia     Hyperlipidemia     Hypertension     Ischemic cardiomyopathy     Myocardial infarction     Pacemaker     PAD (peripheral artery disease) (HCC)     Rectal bleed     Vomiting      LENGTH OF STAY: 2     11/17/17 1550   Pain Assessment   Pain Assessment No/denies pain   Pain Score No Pain   Home Living   Type of Home (Camper in daughter's yard)   Home Layout One level;Stairs to enter with rails  (4 MIREYA with rail)   Bathroom Shower/Tub Tub/shower unit   [employer] Tub transfer [place]; Wheelchair-manual   Additional Comments Ambulates with mod I and RW at baseline  Pt recently received prosthetic 1 week ago and has been receiving home PT  Per pt, home therapist reports that she is limited on amount of gait training she can do "in home" setting  Prior Function   Level of [place] Independent with ADLs and functional mobility   Lives With Alone  (daughter is next door)   ADL Assistance Independent   IADLs Independent   Restrictions/Precautions   Weight Bearing Precautions Per Order No   Braces or Orthoses Prosthesis  (LLE prosthetic; not present in room)   Other Precautions Contact/isolation; Impulsive; Fall Risk   General   Additional Pertinent History LLE AKA Family/Caregiver Present No   Cognition   Overall Cognitive Status Impaired   Arousal/Participation Cooperative   Orientation Level Oriented to person;Oriented to place;Oriented to time   Memory Within functional limits   Following Commands Follows one step commands with increased time or repetition   Comments Requires significant redirection  RLE Assessment   RLE Assessment WFL   LLE Assessment   LLE Assessment X   Strength LLE   LLE Overall Strength (full AROM assessed only )   Bed Mobility   Supine to Sit 6  Modified independent   Additional items Increased time required   Sit to Supine 6  Modified independent   Additional items Increased time required   Transfers   Sit to Stand 5  Supervision   Additional items Impulsive; Increased time required;Verbal cues   Stand to Sit 5  Supervision   Additional items Increased time required;Verbal cues; Impulsive   Stand pivot 4  Minimal assistance   Additional items Assist x 1; Increased time required; Impulsive;Verbal cues  (with RW)   Ambulation/Elevation   Gait pattern Improper Weight shift;Decreased foot clearance; Short stride  (pt using "hopping" method due to no prosthetic present)   Gait Assistance 4  Minimal assist   Additional items Assist x 1;Verbal cues   Assistive Device Rolling walker   Distance 60` x1   Stair Management Assistance Not tested  (due to fatigue)   Balance   Static Sitting Good   Dynamic Sitting Fair +   Static Standing Fair   Dynamic Standing Fair -   Ambulatory Poor +   Endurance Deficit   Endurance Deficit Yes   Endurance Deficit Description limited ambulation distance, fatigue   Activity Tolerance   Activity Tolerance Patient limited by fatigue   Nurse Made Aware Per RN, pt appropriate to evaluate   Assessment   Prognosis Good   Problem List Decreased strength;Decreased endurance; Impaired balance;Decreased mobility   Goals   Patient Goals to go to rehab for prosthetic training   STG Expiration Date 11/26/17   Short Term Goal #1 Pt will be able to: (1) perform sit to stand with mod I (2) ambulate 200` with mod I and RW without prosthetic (3) ambulate 200` with min A and RW and prosthetic if available (5) initiate HEP (6) increase standing balance by 1 grade (7) PT to see to assess stair negotiation   Treatment Day 0   Plan   Treatment/Interventions Functional transfer training;LE strengthening/ROM; Elevations; Therapeutic exercise; Endurance training;Patient/family training;Equipment eval/education; Bed mobility;Gait training   PT Frequency 5x/wk   Recommendation   Recommendation Post acute IP rehab  (PM&R consult)   Equipment Recommended Walker   Barthel Index   Feeding 10   Bathing 5   Grooming Score 5   Dressing Score 5   Bladder Score 10   Bowels Score 10   Toilet Use Score 5   Transfers (Bed/Chair) Score 10   Mobility (Level Surface) Score 0   Stairs Score 0   Barthel Index Score 60       Assessment: Pt is a 61 y o  male seen for PT evaluation s/p admit to [facility] on 11/15/2017 w/ Acute upper GI bleed  Order placed for PT  Comorbidities affecting pt's physical performance listed above  Personal factors affecting pt at time of IE include: steps to enter environment and limited home support  Prior to admission, pt was independent w/ all functional mobility w/ RW, lives in one floor environment, has 4 MIREYA and lives alone in a camper  Upon evaluation: Pt requires SBA for sit to stand and min A for ambulation with RW  Decreased endurance noted as pt declines further mobility due to fatigue  Pt is deconditioned, however he is extremely motivated and willing to participate as able  Pt recently received a new LLE prosthesis with limited gait training available to him  Pt will require inpatient rehabilitation for initiation of formal gait training with prosthesis in order to maximize his functional mobility and decrease his risk of falls at home  (Please find full objective findings from PT assessment regarding body systems outlined above)  Impairments and limitations also listed above, especially due to  weakness, impaired balance, decreased endurance, gait deviations and decreased activity tolerance  The following objective measures performed on IE also reveal limitations: Barthel Index 60/100  Pt's clinical presentation is currently unstable/unpredictable seen in pt's presentation of decreased safety awareness and impulsivity  Pt to benefit from continued skilled PT tx while in hospital and upon DC to address deficits as defined above and maximize level of functional mobility  From PT/mobility standpoint, recommendation at time of d/c would be Short term rehab and PM&R consult pending progress in order to maximize pt's functional independence and consistency w/ mobility in order to facilitate return to PLOF  Recommend progression of ambulation and initiation of HEP        Lázaro Singh, PT,DPT

## 2017-11-17 NOTE — PROGRESS NOTES
[facility] Internal Medicine Progress Note  Patient: Desmond Syed 61 y o  male   MRN: [id number]  PCP: Graham Cantor DO  Unit/Bed#: [facility] 604-01 Encounter: [id number]  Date Of Visit: 11/17/17    Assessment:    Principal Problem:    Acute upper GI bleed  Active Problems:    Hypertension    Diabetes mellitus ([facility] [address] )    Erosive esophagitis    Chronic systolic congestive heart failure ([facility] )    History of mitral valve replacement with mechanical valve    GI bleed      Plan:    · Acute upper GI bleed:  ? 2/2 reflux esophagitis, gastritis, jj mantilla tear at GE junction seen in EGD today 11/16/17  ? No more episodes, hb stable, protonix 40 PO BID  · H/o mechanical MV replacement:  ? INR at goal 2 5 - 3 5  ? Cont coumadin 5 mg  · Chronic systolic CHF:  ? Compensated, appears stable  ? Restarted on lisinopril, BB, torsemide 20 mg  ? Appreciate cardio input  · Leucocytosis:  ? Could be from bleed, CT chest abdomen eng, UA neg, BC neg  ? DC Abx till above w/u  · SEAN:  ? 2/2 hypovolemia  ? Resolved with IVF, DC IVF, monitor  · HTN:  ? Cont lopressor, lisinopril  · DM:  ? Start home lantus at lower dose 12 U HS instead of 21 U HS  ? Cont ISS  · PAD:  ? S/p left AKA 09/2017  · Anxiety: ativan 0 5 mg PO BID PRN    Cleared by cardio, GI  PT OT eval, patient wants to know if he can go to [facility] for rehab      VTE Pharmacologic Prophylaxis:   Pharmacologic: Warfarin (Coumadin)  Mechanical VTE Prophylaxis in Place: Yes    Patient Centered Rounds: I have performed bedside rounds with nursing staff today  Discussions with Specialists or Other Care Team Provider: GI    Education and Discussions with Family / Patient: patient    Time Spent for Care: 45 minutes  More than 50% of total time spent on counseling and coordination of care as described above      Current Length of Stay: 2 day(s)    Current Patient Status: Inpatient   Certification Statement: The patient will continue to require additional inpatient hospital stay due to PT eval, DC plan    Discharge Plan / Estimated Discharge Date: ready, working on DC plan    Code Status: Level 1 - Full Code      Subjective:     Feels better, no CP/SOB, no abdop pain/n/v    Objective:     Vitals:   Temp (24hrs), Av 1 °F (37 3 °C), Min:98 9 °F (37 2 °C), Max:99 2 °F (37 3 °C)    HR:  [62-72] 72  Resp:  [18] 18  BP: (130-141)/(52-78) 130/52  SpO2:  [98 %-100 %] 100 %  There is no height or weight on file to calculate BMI  Input and Output Summary (last 24 hours): Intake/Output Summary (Last 24 hours) at 17 1037  Last data filed at 17 1000   Gross per 24 hour   Intake              760 ml   Output              775 ml   Net              -15 ml       Physical Exam:     Physical Exam   Constitutional: He is oriented to person, place, and time  He appears well-developed and well-nourished  HENT:   Head: Normocephalic and atraumatic  Eyes: Conjunctivae are normal  No scleral icterus  Cardiovascular: Normal rate, regular rhythm and normal heart sounds  Exam reveals no gallop and no friction rub  No murmur heard  Pulmonary/Chest: Effort normal and breath sounds normal  No respiratory distress  He has no wheezes  Abdominal: Soft  He exhibits no distension  There is no tenderness  Musculoskeletal: He exhibits no edema  Left AKA   Neurological: He is alert and oriented to person, place, and time           Additional Data:     Labs:      Results from last 7 days  Lab Units 17  0510   WBC Thousand/uL 10 04   HEMOGLOBIN g/dL 12 0   HEMATOCRIT % 36 2*   PLATELETS Thousands/uL 165   NEUTROS PCT % 75   LYMPHS PCT % 18   MONOS PCT % 6   EOS PCT % 1       Results from last 7 days  Lab Units 17  0510  11/15/17  1047   SODIUM mmol/L 139  < > 135*   POTASSIUM mmol/L 3 5  < > 4 6   CHLORIDE mmol/L 107  < > 99*   CO2 mmol/L 24  < > 22   BUN mg/dL 16  < > 27*   CREATININE mg/dL 0 84  < > 1 76*   CALCIUM mg/dL 8 8  < > 10 3*   TOTAL PROTEIN g/dL  --   --  9 7* BILIRUBIN TOTAL mg/dL  --   --  1 09*   ALK PHOS U/L  --   --  205*   ALT U/L  --   --  42   AST U/L  --   --  33   GLUCOSE RANDOM mg/dL 147*  < > 273*   < > = values in this interval not displayed  Results from last 7 days  Lab Units 11/17/17  0510   INR  3 25*       * I Have Reviewed All Lab Data Listed Above  * Additional Pertinent Lab Tests Reviewed: All Labs Within Last 24 Hours Reviewed    Imaging:    Imaging Reports Reviewed Today Include:   Imaging Personally Reviewed by Myself Includes:      Recent Cultures (last 7 days):       Results from last 7 days  Lab Units 11/15/17  1232 11/15/17  1229   BLOOD CULTURE  No Growth at 24 hrs  No Growth at 24 hrs  Last 24 Hours Medication List:     atorvastatin 10 mg Oral Daily With Dinner   insulin glargine 12 Units Subcutaneous HS   insulin lispro 1-5 Units Subcutaneous 4x Daily (AC & HS)   lisinopril 10 mg Oral Daily   metoprolol succinate 25 mg Oral Daily   nicotine 1 patch Transdermal Q24H   pantoprazole 40 mg Oral BID AC   QUEtiapine 400 mg Oral HS   torsemide 20 mg Oral Daily   warfarin 5 mg Oral Daily (warfarin)        Today, Patient Was Seen By: Juve Cotton MD    ** Please Note: This note has been constructed using a voice recognition system   **

## 2017-11-17 NOTE — PLAN OF CARE
Problem: PHYSICAL THERAPY ADULT  Goal: Performs mobility at highest level of function for planned discharge setting  See evaluation for individualized goals  Treatment/Interventions: Functional transfer training, LE strengthening/ROM, Elevations, Therapeutic exercise, Endurance training, Patient/family training, Equipment eval/education, Bed mobility, Gait training  Equipment Recommended: Doris Coon       See flowsheet documentation for full assessment, interventions and recommendations  Prognosis: Good  Problem List: Decreased strength, Decreased endurance, Impaired balance, Decreased mobility  Assessment: Pt is a 61 y o  male seen for PT evaluation s/p admit to [facility] on 11/15/2017 w/ Acute upper GI bleed  Order placed for PT  Comorbidities affecting pt's physical performance listed above  Personal factors affecting pt at time of IE include: steps to enter environment and limited home support  Prior to admission, pt was independent w/ all functional mobility w/ RW, lives in one floor environment, has 4 MIREYA and lives alone in a camper  Upon evaluation: Pt requires SBA for sit to stand and min A for ambulation with RW  Decreased endurance noted as pt declines further mobility due to fatigue  Pt is deconditioned, however he is extremely motivated and willing to participate as able  Pt recently received a new LLE prosthesis with limited gait training available to him  Pt will require inpatient rehabilitation for initiation of formal gait training with prosthesis in order to maximize his functional mobility and decrease his risk of falls at home  (Please find full objective findings from PT assessment regarding body systems outlined above)  Impairments and limitations also listed above, especially due to  weakness, impaired balance, decreased endurance, gait deviations and decreased activity tolerance  The following objective measures performed on IE also reveal limitations: Barthel Index 60/100  Pt's clinical presentation is currently unstable/unpredictable seen in pt's presentation of decreased safety awareness and impulsivity  Pt to benefit from continued skilled PT tx while in hospital and upon DC to address deficits as defined above and maximize level of functional mobility  From PT/mobility standpoint, recommendation at time of d/c would be Short term rehab and PM&R consult pending progress in order to maximize pt's functional independence and consistency w/ mobility in order to facilitate return to PLOF  Recommend progression of ambulation and initiation of HEP  Recommendation: Post acute IP rehab (PM&R consult)          See flowsheet documentation for full assessment

## 2017-11-17 NOTE — PLAN OF CARE
Potential for Falls     Patient will remain free of falls Not Progressing        Prexisting or High Potential for Compromised Skin Integrity     Skin integrity is maintained or improved Not Progressing

## 2017-11-18 LAB
BASOPHILS # BLD AUTO: 0.03 THOUSANDS/ΜL (ref 0–0.1)
BASOPHILS NFR BLD AUTO: 0 % (ref 0–1)
EOSINOPHIL # BLD AUTO: 0.39 THOUSAND/ΜL (ref 0–0.61)
EOSINOPHIL NFR BLD AUTO: 5 % (ref 0–6)
ERYTHROCYTE [DISTWIDTH] IN BLOOD BY AUTOMATED COUNT: 16.9 % (ref 11.6–15.1)
GLUCOSE SERPL-MCNC: 138 MG/DL (ref 65–140)
GLUCOSE SERPL-MCNC: 140 MG/DL (ref 65–140)
GLUCOSE SERPL-MCNC: 171 MG/DL (ref 65–140)
GLUCOSE SERPL-MCNC: 223 MG/DL (ref 65–140)
HCT VFR BLD AUTO: 36.6 % (ref 36.5–49.3)
HGB BLD-MCNC: 12.6 G/DL (ref 12–17)
INR PPP: 2.9 (ref 0.86–1.16)
LYMPHOCYTES # BLD AUTO: 2.31 THOUSANDS/ΜL (ref 0.6–4.47)
LYMPHOCYTES NFR BLD AUTO: 27 % (ref 14–44)
MCH RBC QN AUTO: 28.8 PG (ref 26.8–34.3)
MCHC RBC AUTO-ENTMCNC: 34.4 G/DL (ref 31.4–37.4)
MCV RBC AUTO: 84 FL (ref 82–98)
MONOCYTES # BLD AUTO: 0.62 THOUSAND/ΜL (ref 0.17–1.22)
MONOCYTES NFR BLD AUTO: 7 % (ref 4–12)
NEUTROPHILS # BLD AUTO: 5.04 THOUSANDS/ΜL (ref 1.85–7.62)
NEUTS SEG NFR BLD AUTO: 61 % (ref 43–75)
NRBC BLD AUTO-RTO: 0 /100 WBCS
PLATELET # BLD AUTO: 163 THOUSANDS/UL (ref 149–390)
PROTHROMBIN TIME: 30.7 SECONDS (ref 12.1–14.4)
RBC # BLD AUTO: 4.37 MILLION/UL (ref 3.88–5.62)
WBC # BLD AUTO: 8.43 THOUSAND/UL (ref 4.31–10.16)

## 2017-11-18 PROCEDURE — 82948 REAGENT STRIP/BLOOD GLUCOSE: CPT

## 2017-11-18 PROCEDURE — 85610 PROTHROMBIN TIME: CPT | Performed by: HOSPITALIST

## 2017-11-18 PROCEDURE — 85025 COMPLETE CBC W/AUTO DIFF WBC: CPT | Performed by: HOSPITALIST

## 2017-11-18 RX ADMIN — METOPROLOL SUCCINATE 25 MG: 25 TABLET, EXTENDED RELEASE ORAL at 09:44

## 2017-11-18 RX ADMIN — INSULIN GLARGINE 12 UNITS: 100 INJECTION, SOLUTION SUBCUTANEOUS at 22:06

## 2017-11-18 RX ADMIN — NICOTINE 1 PATCH: 7 PATCH, EXTENDED RELEASE TRANSDERMAL at 13:05

## 2017-11-18 RX ADMIN — PANTOPRAZOLE SODIUM 40 MG: 40 TABLET, DELAYED RELEASE ORAL at 06:26

## 2017-11-18 RX ADMIN — QUETIAPINE FUMARATE 400 MG: 100 TABLET ORAL at 22:06

## 2017-11-18 RX ADMIN — LISINOPRIL 10 MG: 10 TABLET ORAL at 09:44

## 2017-11-18 RX ADMIN — WARFARIN SODIUM 5 MG: 5 TABLET ORAL at 16:27

## 2017-11-18 RX ADMIN — ATORVASTATIN CALCIUM 10 MG: 10 TABLET, FILM COATED ORAL at 16:27

## 2017-11-18 RX ADMIN — TORSEMIDE 20 MG: 20 TABLET ORAL at 13:04

## 2017-11-18 RX ADMIN — PANTOPRAZOLE SODIUM 40 MG: 40 TABLET, DELAYED RELEASE ORAL at 16:27

## 2017-11-18 RX ADMIN — LORAZEPAM 0.5 MG: 0.5 TABLET ORAL at 22:06

## 2017-11-18 NOTE — SOCIAL WORK
ARC has not reviewed referral  Confirmed fran/Bettina @ [facility]  she will review w/MD and f/u w/CM re: same

## 2017-11-18 NOTE — SOCIAL WORK
Met w/pt to provide update and to f/u on additional SNF choices  Left pt with additional SNF list  Pt will review w/daughter on Sunday for backup choices  CM to f/u with pt for choices

## 2017-11-18 NOTE — PROGRESS NOTES
[facility] Internal Medicine Progress Note  Patient: Vicki Mosley 61 y o  male   MRN: [id number]  PCP: Saima Harkins DO  [facility] Encounter: [id number]  Date Of Visit: 11/18/17    Assessment:    Principal Problem:    Acute upper GI bleed  Active Problems:    Hypertension    Diabetes mellitus ([facility] [address] )    Erosive esophagitis    Chronic systolic congestive heart failure ([facility] )    History of mitral valve replacement with mechanical valve    GI bleed      Plan:    · Acute upper GI bleed:  ? 2/2 reflux esophagitis, gastritis, jj mantilla tear at GE junction seen in EGD today 11/16/17  ? No more episodes, hb stable, protonix 40 PO BID  · H/o mechanical MV replacement:  ? INR at goal 2 5 - 3 5  ? Cont coumadin 5 mg  · Chronic systolic CHF:  ? Compensated, appears stable  ? Restarted on lisinopril, BB, torsemide 20 mg  ? Appreciate cardio input  · Leucocytosis:  ? Could be from bleed, CT chest abdomen eng, UA neg, BC neg  ? DC Abx till above w/u  · SEAN:  ? 2/2 hypovolemia  ? Resolved with IVF, DC IVF, monitor  · HTN:  ? Cont lopressor, lisinopril  · DM:  ? Start home lantus at lower dose 12 U HS instead of 21 U HS  ? Cont ISS  · PAD:  ? S/p left AKA 09/2017  · Anxiety: ativan 0 5 mg PO BID PRN  · PT eval; rec rehab, working on that      VTE Pharmacologic Prophylaxis:   Pharmacologic: Warfarin (Coumadin)  Mechanical VTE Prophylaxis in Place: Yes    Patient Centered Rounds: I have performed bedside rounds with nursing staff today  Discussions with Specialists or Other Care Team Provider:     Education and Discussions with Family / Patient: aptient    Time Spent for Care: 45 minutes  More than 50% of total time spent on counseling and coordination of care as described above      Current Length of Stay: 3 day(s)    Current Patient Status: Inpatient   Certification Statement: The patient will continue to require additional inpatient hospital stay due to working on DC plan    Discharge Plan / Estimated Discharge Date: rehab     Code Status: Level 1 - Full Code      Subjective:   Feels good, no CP/SOB/abdo pain/n/v    Objective:     Vitals:   Temp (24hrs), Av 3 °F (36 8 °C), Min:97 7 °F (36 5 °C), Max:98 9 °F (37 2 °C)    HR:  [60-80] 60  Resp:  [16-20] 20  BP: (110-132)/(62-82) 117/70  SpO2:  [99 %] 99 %  Body mass index is 21 2 kg/m²  Input and Output Summary (last 24 hours): Intake/Output Summary (Last 24 hours) at 17 1105  Last data filed at 17 0710   Gross per 24 hour   Intake              705 ml   Output              200 ml   Net              505 ml       Physical Exam:     Physical Exam   Constitutional: He is oriented to person, place, and time  He appears well-developed and well-nourished  HENT:   Head: Normocephalic and atraumatic  Eyes: Conjunctivae are normal  No scleral icterus  Cardiovascular: Normal rate, regular rhythm and normal heart sounds  Exam reveals no gallop and no friction rub  No murmur heard  Pulmonary/Chest: Effort normal and breath sounds normal  No respiratory distress  Abdominal: Soft  He exhibits no distension  There is no tenderness  Musculoskeletal: He exhibits no edema  Left AKA   Neurological: He is alert and oriented to person, place, and time           Additional Data:     Labs:      Results from last 7 days  Lab Units 17  0524   WBC Thousand/uL 8 43   HEMOGLOBIN g/dL 12 6   HEMATOCRIT % 36 6   PLATELETS Thousands/uL 163   NEUTROS PCT % 61   LYMPHS PCT % 27   MONOS PCT % 7   EOS PCT % 5       Results from last 7 days  Lab Units 17  0510  11/15/17  1047   SODIUM mmol/L 139  < > 135*   POTASSIUM mmol/L 3 5  < > 4 6   CHLORIDE mmol/L 107  < > 99*   CO2 mmol/L 24  < > 22   BUN mg/dL 16  < > 27*   CREATININE mg/dL 0 84  < > 1 76*   CALCIUM mg/dL 8 8  < > 10 3*   TOTAL PROTEIN g/dL  --   --  9 7*   BILIRUBIN TOTAL mg/dL  --   --  1 09*   ALK PHOS U/L  --   --  205*   ALT U/L  --   --  42   AST U/L  --   --  33   GLUCOSE RANDOM mg/dL 147*  < > 273*   < > = values in this interval not displayed  Results from last 7 days  Lab Units 11/18/17  0524   INR  2 90*       * I Have Reviewed All Lab Data Listed Above  * Additional Pertinent Lab Tests Reviewed: All Labs Within Last 24 Hours Reviewed    Imaging:    Imaging Reports Reviewed Today Include:   Imaging Personally Reviewed by Myself Includes:      Recent Cultures (last 7 days):       Results from last 7 days  Lab Units 11/15/17  1232 11/15/17  1229   BLOOD CULTURE  No Growth at 48 hrs  No Growth at 48 hrs  Last 24 Hours Medication List:     atorvastatin 10 mg Oral Daily With Dinner   insulin glargine 12 Units Subcutaneous HS   insulin lispro 1-5 Units Subcutaneous 4x Daily (AC & HS)   lisinopril 10 mg Oral Daily   metoprolol succinate 25 mg Oral Daily   nicotine 1 patch Transdermal Q24H   pantoprazole 40 mg Oral BID AC   QUEtiapine 400 mg Oral HS   torsemide 20 mg Oral Daily   warfarin 5 mg Oral Daily (warfarin)        Today, Patient Was Seen By: Evelia Mora MD    ** Please Note: This note has been constructed using a voice recognition system   **

## 2017-11-18 NOTE — SOCIAL WORK
Per Bettina @ [facility], pt needs OT note  CM spoke with OT to request visit from OT  They will see pt on Sunday  Pt will need auth if accepted by [place] as insurance company is closed on weekend  CM to continue to follow for discharge needs

## 2017-11-19 LAB
ABO GROUP BLD BPU: NORMAL
ABO GROUP BLD BPU: NORMAL
ANION GAP SERPL CALCULATED.3IONS-SCNC: 10 MMOL/L (ref 4–13)
BPU ID: NORMAL
BPU ID: NORMAL
BUN SERPL-MCNC: 21 MG/DL (ref 5–25)
CALCIUM SERPL-MCNC: 8.8 MG/DL (ref 8.3–10.1)
CHLORIDE SERPL-SCNC: 106 MMOL/L (ref 100–108)
CO2 SERPL-SCNC: 23 MMOL/L (ref 21–32)
CREAT SERPL-MCNC: 1.11 MG/DL (ref 0.6–1.3)
CROSSMATCH: NORMAL
CROSSMATCH: NORMAL
GFR SERPL CREATININE-BSD FRML MDRD: 73 ML/MIN/1.73SQ M
GLUCOSE SERPL-MCNC: 156 MG/DL (ref 65–140)
GLUCOSE SERPL-MCNC: 171 MG/DL (ref 65–140)
GLUCOSE SERPL-MCNC: 183 MG/DL (ref 65–140)
GLUCOSE SERPL-MCNC: 191 MG/DL (ref 65–140)
GLUCOSE SERPL-MCNC: 217 MG/DL (ref 65–140)
INR PPP: 2.7 (ref 0.86–1.16)
POTASSIUM SERPL-SCNC: 3.4 MMOL/L (ref 3.5–5.3)
PROTHROMBIN TIME: 29 SECONDS (ref 12.1–14.4)
SODIUM SERPL-SCNC: 139 MMOL/L (ref 136–145)
UNIT DISPENSE STATUS: NORMAL
UNIT DISPENSE STATUS: NORMAL
UNIT PRODUCT CODE: NORMAL
UNIT PRODUCT CODE: NORMAL
UNIT RH: NORMAL
UNIT RH: NORMAL

## 2017-11-19 PROCEDURE — G8988 SELF CARE GOAL STATUS: HCPCS

## 2017-11-19 PROCEDURE — 97535 SELF CARE MNGMENT TRAINING: CPT

## 2017-11-19 PROCEDURE — 85610 PROTHROMBIN TIME: CPT | Performed by: HOSPITALIST

## 2017-11-19 PROCEDURE — 82948 REAGENT STRIP/BLOOD GLUCOSE: CPT

## 2017-11-19 PROCEDURE — G8987 SELF CARE CURRENT STATUS: HCPCS

## 2017-11-19 PROCEDURE — 80048 BASIC METABOLIC PNL TOTAL CA: CPT | Performed by: HOSPITALIST

## 2017-11-19 PROCEDURE — 97166 OT EVAL MOD COMPLEX 45 MIN: CPT

## 2017-11-19 RX ADMIN — LORAZEPAM 0.5 MG: 0.5 TABLET ORAL at 10:47

## 2017-11-19 RX ADMIN — LORAZEPAM 0.5 MG: 0.5 TABLET ORAL at 22:44

## 2017-11-19 RX ADMIN — PANTOPRAZOLE SODIUM 40 MG: 40 TABLET, DELAYED RELEASE ORAL at 08:20

## 2017-11-19 RX ADMIN — ATORVASTATIN CALCIUM 10 MG: 10 TABLET, FILM COATED ORAL at 15:46

## 2017-11-19 RX ADMIN — PANTOPRAZOLE SODIUM 40 MG: 40 TABLET, DELAYED RELEASE ORAL at 15:47

## 2017-11-19 RX ADMIN — QUETIAPINE FUMARATE 400 MG: 100 TABLET ORAL at 22:44

## 2017-11-19 RX ADMIN — WARFARIN SODIUM 5 MG: 5 TABLET ORAL at 16:58

## 2017-11-19 RX ADMIN — ACETAMINOPHEN 650 MG: 325 TABLET ORAL at 22:46

## 2017-11-19 RX ADMIN — NICOTINE 1 PATCH: 7 PATCH, EXTENDED RELEASE TRANSDERMAL at 10:49

## 2017-11-19 RX ADMIN — ACETAMINOPHEN 650 MG: 325 TABLET ORAL at 11:43

## 2017-11-19 RX ADMIN — INSULIN GLARGINE 12 UNITS: 100 INJECTION, SOLUTION SUBCUTANEOUS at 22:44

## 2017-11-19 NOTE — OCCUPATIONAL THERAPY NOTE
Occupational Therapy Evaluation      Mark Pereira    11/19/2017    Patient Active Problem List   Diagnosis    Hypertension    [facility]    Diabetes mellitus ([facility] )    Hiatal hernia    Erosive esophagitis    CAD (coronary artery disease)    Pacemaker    Chronic systolic congestive heart failure ([facility] )    Peripheral artery disease (HCC)    History of mitral valve replacement with mechanical valve    Bipolar 1 disorder, depressed (HCC)    Bullous dermatosis    GI bleed    Acute upper GI bleed       Past Medical History:   Diagnosis Date    Anticoagulated on Coumadin     Mechanical MVR    Anxiety     Bipolar 1 disorder ([facility]     CAD (coronary artery disease)     Chronic kidney disease     left kideny being monitored    Chronic systolic congestive heart failure ([facility] ) 4/18/2017    Colitis 4/2/2017    [facility]     Diabetes mellitus ([facility])     Difficulty urinating     DM type 2 with diabetic peripheral neuropathy ([facility])     Hiatal hernia     Hyperlipidemia     Hypertension     Ischemic cardiomyopathy     Myocardial infarction     Pacemaker     PAD (peripheral artery disease) ([facility])     Rectal bleed     Vomiting        Past Surgical History:   Procedure Laterality Date    AMPUTATION ABOVE KNEE (AKA) Left 8/24/2017    Procedure: AMPUTATION ABOVE KNEE (AKA); Surgeon: Shawna Curry MD;  [facility];  Service: Vascular    CARDIAC DEFIBRILLATOR PLACEMENT      CORONARY ARTERY BYPASS GRAFT      ESOPHAGOGASTRODUODENOSCOPY N/A 4/20/2017    Procedure: ESOPHAGOGASTRODUODENOSCOPY (EGD); Surgeon: Yu Ash MD;  [facility];  Service:     ESOPHAGOGASTRODUODENOSCOPY N/A 5/26/2017    Procedure: ESOPHAGOGASTRODUODENOSCOPY (EGD); Surgeon: Cheli Gillespie MD;  [facility];  Service:     ESOPHAGOGASTRODUODENOSCOPY N/A 11/16/2017    Procedure: ESOPHAGOGASTRODUODENOSCOPY (EGD);   Surgeon: Prabha Brown MD; [facility]; Service: Gastroenterology    MITRAL VALVE REPLACEMENT      Mechanical    TOE AMPUTATION Left 7/21/2017    Procedure: PARTIAL FIRST RAY AMPUTATION; EXCISION OF WOUND W/ TOE FLAP CLOSURE;  Surgeon: Harinder Hook DPM;  [facility];  Service: Podiatry    TONSILLECTOMY      VAC DRESSING APPLICATION Left 0/86/3213    Procedure: VAC application;  Surgeon: Harinder Hook DPM;  [facility];  Service: Podiatry    WOUND DEBRIDEMENT Left 8/19/2017    Procedure: wound debridement with washout; resection of left 1st metatarsal;  Surgeon: Harinder Hook DPM;  [facility];  Service: Podiatry      11/19/17 1008   Note Type   Note type Eval/Treat   Restrictions/Precautions   Weight Bearing Precautions Per Order No   Braces or Orthoses LE Braces   Other Precautions Contact/isolation   Pain Assessment   Pain Assessment No/denies pain   Pain Score No Pain   Home Living   Type of Home Mobile home   Home Layout One level;Stairs to enter without rails   Bathroom Shower/Tub Tub/shower unit   Bathroom Equipment Tub transfer [facility]; Wheelchair-manual   Prior Function   Level of [place] Independent with ADLs and functional mobility   Lives With Alone   ADL Assistance Independent   IADLs Independent   Falls in the last 6 months 0   Vocational Retired   Lifestyle   Autonomy Patient rerports he was using RW to "hop" at home, just recently bought a w/c at a yard sale  Reports he resides in a camper in his daughters yard  Reports there is no room for him in his daughter's home because it is only 1 bedroom and she has 2 children,  Patient reports he is concerned because winter in coming and there is no heat in the camper  Patient reports he just recieved his new prosthesis x 1 week ago, is only able to wear  for 30 min at a time       Reciprocal Relationships Patient is , has a daughter involved with care but she works and has 2 children    Intrinsic Gratification reports limited engagement in leisure activtities since onset of medical issues    Psychosocial   Psychosocial (WDL) WDL   Patient Behaviors/Mood Pleasant; Cooperative   ADL   Where Assessed Edge of bed   Eating Assistance 7  Independent   Grooming Assistance 6  Modified Independent   Grooming Deficit (seated )   UB Bathing Assistance 6  Modified Independent   LB Bathing Assistance 5  Supervision/Setup   UB Dressing Assistance 7  Independent   LB Dressing Assistance 4  Minimal Assistance   LB Dressing Deficit (dons/doffs pants in spine)   Toileting Assistance  5  Supervision/Setup   Bed Mobility   Rolling R 7  Independent   Additional items Assist x 1   Rolling L 7  Independent   Additional items Assist x 1   Supine to Sit 7  Independent   Additional items Assist x 1   Sit to Supine 7  Independent   Transfers   Sit to Stand 5  Supervision   Additional items Assist x 1; Impulsive;Verbal cues   Stand to Sit 5  Supervision   Additional items Verbal cues; Impulsive   Stand pivot 5  Supervision   Additional items Verbal cues  (bed to w/c sit-pivot transfers )   Functional Mobility   Functional Mobility 5  Supervision   Additional items Rolling walker   Balance   Static Sitting Normal   Dynamic Sitting Good   Static Standing Fair   Dynamic Standing Fair -   Activity Tolerance   Activity Tolerance Patient tolerated treatment well   Medical Staff Made Aware OT spoke with YISSEL Cuevas    RUE Assessment   RUE Assessment WFL  (4+-5/5 )   LUE Assessment   LUE Assessment WFL  (4+-5/5 )   Vision-Basic Assessment   Current Vision No visual deficits   Cognition   Overall Cognitive Status WFL   Arousal/Participation Alert   Attention Within functional limits   Orientation Level Oriented X4   Memory Within functional limits   Following Commands Follows all commands and directions without difficulty   Comments tangential at times, needs re-direction back to task/covervation    Assessment   Limitation Decreased ADL status; Decreased Safe judgement during ADL;Decreased endurance;Decreased self-care trans;Decreased high-level ADLs   Prognosis Good   Assessment Patient is a 62 y/o male with hx of AKA admitted with c/o diffuse and severe hematemesis  Patient diagnosed with acute upper GI bleed, referred to OT for functional assessment of ADL and IADL ability for discharge planning  Patient is alert and oriented, very pleasant and cooperaive  Patient reports recent hx of in-patiemt rehab following in AKA, was discharged home and getting [place] [address] PT services  Patient reports he recently recieved his LLE prosthetic and has begun some prosthetic training at home but is really only wearing prosthesis  approximaltely 20-30 min at at time  Patient doing fairly well from ADL standpoint, manages most self-care in seated positiion however is a fall risk for ADL activities in stance and has difficulty with maintaining balance with unilateral support  From OT statndpoint, patient may benefit from in-patient rehab for additional prosthetic training and improvement of ADL/IADL function with use of posthesis  Patient will benefit from OT services to progress independence and safety with ADL function and use of prostheis during hosptial stay  Goals   Patient Goals "go to rehab so I can use that leg better"    LTG Time Frame 3-7   Long Term Goal #1 1  Patient will complete LB dressing at Mod I level from seated position EOB or in wheelchair; 2  Patient will increase ADL transfers to Mod I level with G safety awarness; 3  Patient will increase functional mobility to I level with use of RW in room with G balance and safety awarness 4  Long Term Goal #2 4  Patient will increase dyanmic standing balance for ADL tasks in stance at sink to G without need for b/l UE support 5   PAtient willi increase BUE strength to 5/5 and be I with HEP    Plan   Treatment Interventions ADL retraining;Functional transfer training;UE strengthening/ROM; Endurance training   Goal Expiration Date 11/26/17   OT Frequency 3-5x/wk   Additional Treatment Session   Start Time 0945   End Time 1005   Treatment Assessment Patient seen for OT intervention focusng on ADL transfer safety (toilet/bed/wheelchair), dynamic standng at sink with use of BUEs to complete grooming and endurnace training via w/c mobility  Patient tolerated activity well, has difficulty with maintaining balance in daniel to complete simple unilateral UE  Patient will benefit from continued OT services to progress ADL function for activites in stance with or without use of prosthetic  Patient requires minimal physical A however reqires cues for safety due to impulsivity at times  Recommendation   Recommendation PT consult; Physiatry Consult   OT Discharge Recommendation Short Term Rehab   OT - OK to Discharge Yes   Barthel Index   Feeding 10   Bathing 0   Grooming Score 5   Dressing Score 5   Bladder Score 10   Bowels Score 10   Toilet Use Score 5   Transfers (Bed/Chair) Score 10   Mobility (Level Surface) Score 5   Stairs Score 0   Barthel Index Score 60   Ulysses Tian, OT

## 2017-11-19 NOTE — PROGRESS NOTES
[facility] Internal Medicine Progress Note  Patient: Elgin Asher 61 y o  male   MRN: [id number]  PCP: Pamela Albrecht DO  Unit/Bed#: [facility] 604-01 Encounter: [id number]  Date Of Visit: 11/19/17    Assessment:    Principal Problem:    Acute upper GI bleed  Active Problems:    Hypertension    Diabetes mellitus ([facility] )    Erosive esophagitis    Chronic systolic congestive heart failure ([facility] )    History of mitral valve replacement with mechanical valve    GI bleed      Plan:    · Acute upper GI bleed:  ? 2/2 reflux esophagitis, gastritis, jj mantilla tear at GE junction seen in EGD today 11/16/17  ? No more episodes, hb stable, protonix 40 PO BID  · H/o mechanical MV replacement:  ? INR at goal 2 5 - 3 5  ? Cont coumadin 5 mg  · Chronic systolic CHF:  ? Compensated, appears stable  ? Restarted on lisinopril, BB, torsemide 20 mg  ? Appreciate cardio input  · Leucocytosis:  ? Could be from bleed, CT chest abdomen eng, UA neg, BC neg  ? DC Abx till above w/u  · SEAN:  ? 2/2 hypovolemia  ? Resolved with IVF, DC IVF, monitor  · HTN:  ? Cont lopressor, lisinopril  · DM:  ? Start home lantus at lower dose 12 U HS instead of 21 U HS  ? Cont ISS  · PAD:  ? S/p left AKA 09/2017  · Anxiety: ativan 0 5 mg PO BID PRN  · PT eval; rec rehab, working on that      VTE Pharmacologic Prophylaxis:   Pharmacologic: Warfarin (Coumadin)  Mechanical VTE Prophylaxis in Place: Yes    Patient Centered Rounds: I have performed bedside rounds with nursing staff today  Discussions with Specialists or Other Care Team Provider:     Education and Discussions with Family / Patient: pateint    Time Spent for Care: 45 minutes  More than 50% of total time spent on counseling and coordination of care as described above      Current Length of Stay: 4 day(s)    Current Patient Status: Inpatient   Certification Statement: The patient will continue to require additional inpatient hospital stay due to Work on placement    Discharge Plan / Estimated Discharge Date: once placement    Code Status: Level 1 - Full Code      Subjective:   Feels good, no CP/SOB, no abdo pain/n/v    Objective:     Vitals:   Temp (24hrs), Av 7 °F (37 1 °C), Min:98 °F (36 7 °C), Max:99 6 °F (37 6 °C)    HR:  [61-77] 68  Resp:  [18-20] 18  BP: ()/(60-76) 90/60  SpO2:  [99 %-100 %] 99 %  Body mass index is 21 2 kg/m²  Input and Output Summary (last 24 hours): Intake/Output Summary (Last 24 hours) at 17 1014  Last data filed at 17 0959   Gross per 24 hour   Intake              825 ml   Output             1150 ml   Net             -325 ml       Physical Exam:     Physical Exam   Constitutional: He is oriented to person, place, and time  He appears well-developed and well-nourished  HENT:   Head: Normocephalic and atraumatic  Eyes: Conjunctivae are normal  No scleral icterus  Cardiovascular: Normal rate, regular rhythm and normal heart sounds  Exam reveals no gallop and no friction rub  No murmur heard  Pulmonary/Chest: Effort normal and breath sounds normal  No respiratory distress  He has no wheezes  Abdominal: Soft  He exhibits no distension  There is no tenderness  Musculoskeletal: He exhibits no edema  Neurological: He is alert and oriented to person, place, and time           Additional Data:     Labs:      Results from last 7 days  Lab Units 17  0524   WBC Thousand/uL 8 43   HEMOGLOBIN g/dL 12 6   HEMATOCRIT % 36 6   PLATELETS Thousands/uL 163   NEUTROS PCT % 61   LYMPHS PCT % 27   MONOS PCT % 7   EOS PCT % 5       Results from last 7 days  Lab Units 17  0624  11/15/17  1047   SODIUM mmol/L 139  < > 135*   POTASSIUM mmol/L 3 4*  < > 4 6   CHLORIDE mmol/L 106  < > 99*   CO2 mmol/L 23  < > 22   BUN mg/dL 21  < > 27*   CREATININE mg/dL 1 11  < > 1 76*   CALCIUM mg/dL 8 8  < > 10 3*   TOTAL PROTEIN g/dL  --   --  9 7*   BILIRUBIN TOTAL mg/dL  --   --  1 09*   ALK PHOS U/L  --   --  205*   ALT U/L  --   --  42   AST U/L  --   --  33 GLUCOSE RANDOM mg/dL 156*  < > 273*   < > = values in this interval not displayed  Results from last 7 days  Lab Units 11/19/17  0624   INR  2 70*       * I Have Reviewed All Lab Data Listed Above  * Additional Pertinent Lab Tests Reviewed: All Labs Within Last 24 Hours Reviewed    Imaging:    Imaging Reports Reviewed Today Include:   Imaging Personally Reviewed by Myself Includes:      Recent Cultures (last 7 days):       Results from last 7 days  Lab Units 11/15/17  1232 11/15/17  1229   BLOOD CULTURE  No Growth at 72 hrs  No Growth at 72 hrs  Last 24 Hours Medication List:     atorvastatin 10 mg Oral Daily With Dinner   insulin glargine 12 Units Subcutaneous HS   insulin lispro 1-5 Units Subcutaneous 4x Daily (AC & HS)   lisinopril 10 mg Oral Daily   metoprolol succinate 25 mg Oral Daily   nicotine 1 patch Transdermal Q24H   pantoprazole 40 mg Oral BID AC   QUEtiapine 400 mg Oral HS   torsemide 20 mg Oral Daily   warfarin 5 mg Oral Daily (warfarin)        Today, Patient Was Seen By: Ryan Washington MD    ** Please Note: This note has been constructed using a voice recognition system   **

## 2017-11-19 NOTE — PROGRESS NOTES
Patient refused IV change in this morning  To be evaluated for short term rehab tomorrow am  Nurse re-approached patient again this afternoon  Patient continued refusal  Does not use IV for any medications  Nakul Paz and made aware  Physician stated IV is okay to remain or can be pulled  Will continue to monitor patient

## 2017-11-19 NOTE — PLAN OF CARE
Problem: OCCUPATIONAL THERAPY ADULT  Goal: Performs self-care activities at highest level of function for planned discharge setting  See evaluation for individualized goals  Treatment Interventions: ADL retraining, Functional transfer training, UE strengthening/ROM, Endurance training          See flowsheet documentation for full assessment, interventions and recommendations  Limitation: Decreased ADL status, Decreased Safe judgement during ADL, Decreased endurance, Decreased self-care trans, Decreased high-level ADLs  Prognosis: Good  Assessment: Patient is a 60 y/o male with hx of AKA admitted with c/o diffuse and severe hematemesis  Patient diagnosed with acute upper GI bleed, referred to OT for functional assessment of ADL and IADL ability for discharge planning  Patient is alert and oriented, very pleasant and cooperaive  Patient reports recent hx of in-patiemt rehab following in [facility], was discharged home and getting [facility]  Patient reports he recently recieved his LLE prosthetic and has begun some prosthetic training at home but is really only wearing prosthesis  approximaltely 20-30 min at at time  Patient doing fairly well from ADL standpoint, manages most self-care in seated positiion however is a fall risk for ADL activities in stance and has difficulty with maintaining balance with unilateral support  From OT statndpoint, patient may benefit from in-patient rehab for additional prosthetic training and improvement of ADL/IADL function with use of posthesis  Patient will benefit from OT services to progress independence and safety with ADL function and use of prostheis during hosptial stay  Recommendation: PT consult, Physiatry Consult  OT Discharge Recommendation: Short Term Rehab  OT - OK to Discharge:  Yes

## 2017-11-20 ENCOUNTER — HOSPITAL ENCOUNTER (INPATIENT)
Facility: HOSPITAL | Age: 57
LOS: 9 days | Discharge: HOME | DRG: 862 | End: 2017-11-29
Attending: PHYSICAL MEDICINE & REHABILITATION | Admitting: PHYSICAL MEDICINE & REHABILITATION
Payer: COMMERCIAL

## 2017-11-20 VITALS
HEART RATE: 68 BPM | BODY MASS INDEX: 21.56 KG/M2 | DIASTOLIC BLOOD PRESSURE: 80 MMHG | TEMPERATURE: 98.2 F | RESPIRATION RATE: 18 BRPM | OXYGEN SATURATION: 92 % | SYSTOLIC BLOOD PRESSURE: 149 MMHG | WEIGHT: 163.4 LBS

## 2017-11-20 DIAGNOSIS — Z89.612 STATUS POST ABOVE KNEE AMPUTATION OF LEFT LOWER EXTREMITY: Primary | ICD-10-CM

## 2017-11-20 DIAGNOSIS — F31.9 BIPOLAR 1 DISORDER (HCC): Chronic | ICD-10-CM

## 2017-11-20 DIAGNOSIS — K92.2 GI BLEED: ICD-10-CM

## 2017-11-20 LAB
BACTERIA BLD CULT: NORMAL
BACTERIA BLD CULT: NORMAL
GLUCOSE SERPL-MCNC: 131 MG/DL (ref 65–140)
GLUCOSE SERPL-MCNC: 155 MG/DL (ref 65–140)
GLUCOSE SERPL-MCNC: 227 MG/DL (ref 65–140)
GLUCOSE SERPL-MCNC: 237 MG/DL (ref 65–140)

## 2017-11-20 PROCEDURE — 82948 REAGENT STRIP/BLOOD GLUCOSE: CPT

## 2017-11-20 PROCEDURE — 97110 THERAPEUTIC EXERCISES: CPT

## 2017-11-20 PROCEDURE — 97116 GAIT TRAINING THERAPY: CPT

## 2017-11-20 RX ORDER — GABAPENTIN 300 MG/1
300 CAPSULE ORAL 3 TIMES DAILY
Status: DISCONTINUED | OUTPATIENT
Start: 2017-11-20 | End: 2017-11-23

## 2017-11-20 RX ORDER — HYDROCODONE BITARTRATE AND ACETAMINOPHEN 5; 325 MG/1; MG/1
1 TABLET ORAL EVERY 6 HOURS PRN
Status: DISCONTINUED | OUTPATIENT
Start: 2017-11-20 | End: 2017-11-29 | Stop reason: HOSPADM

## 2017-11-20 RX ORDER — METOPROLOL SUCCINATE 25 MG/1
25 TABLET, EXTENDED RELEASE ORAL DAILY
Status: DISCONTINUED | OUTPATIENT
Start: 2017-11-21 | End: 2017-11-29 | Stop reason: HOSPADM

## 2017-11-20 RX ORDER — LISINOPRIL 10 MG/1
10 TABLET ORAL DAILY
Status: DISCONTINUED | OUTPATIENT
Start: 2017-11-21 | End: 2017-11-29 | Stop reason: HOSPADM

## 2017-11-20 RX ORDER — GABAPENTIN 300 MG/1
300 CAPSULE ORAL 3 TIMES DAILY
Status: CANCELLED | OUTPATIENT
Start: 2017-11-20

## 2017-11-20 RX ORDER — WARFARIN SODIUM 5 MG/1
5 TABLET ORAL
Status: CANCELLED | OUTPATIENT
Start: 2017-11-20

## 2017-11-20 RX ORDER — ATORVASTATIN CALCIUM 10 MG/1
10 TABLET, FILM COATED ORAL
Status: CANCELLED | OUTPATIENT
Start: 2017-11-20

## 2017-11-20 RX ORDER — WARFARIN SODIUM 5 MG/1
5 TABLET ORAL
Status: DISCONTINUED | OUTPATIENT
Start: 2017-11-20 | End: 2017-11-25

## 2017-11-20 RX ORDER — INSULIN GLARGINE 100 [IU]/ML
14 INJECTION, SOLUTION SUBCUTANEOUS
Status: DISCONTINUED | OUTPATIENT
Start: 2017-11-20 | End: 2017-11-20 | Stop reason: HOSPADM

## 2017-11-20 RX ORDER — LISINOPRIL 10 MG/1
10 TABLET ORAL DAILY
Status: CANCELLED | OUTPATIENT
Start: 2017-11-21

## 2017-11-20 RX ORDER — INSULIN GLARGINE 100 [IU]/ML
14 INJECTION, SOLUTION SUBCUTANEOUS
Status: DISCONTINUED | OUTPATIENT
Start: 2017-11-20 | End: 2017-11-29 | Stop reason: HOSPADM

## 2017-11-20 RX ORDER — LORAZEPAM 0.5 MG/1
0.5 TABLET ORAL EVERY 6 HOURS PRN
Status: DISCONTINUED | OUTPATIENT
Start: 2017-11-20 | End: 2017-11-23

## 2017-11-20 RX ORDER — BISACODYL 10 MG
10 SUPPOSITORY, RECTAL RECTAL DAILY PRN
Status: DISCONTINUED | OUTPATIENT
Start: 2017-11-20 | End: 2017-11-29 | Stop reason: HOSPADM

## 2017-11-20 RX ORDER — DOCUSATE SODIUM 100 MG/1
100 CAPSULE, LIQUID FILLED ORAL 2 TIMES DAILY
Status: DISCONTINUED | OUTPATIENT
Start: 2017-11-20 | End: 2017-11-29 | Stop reason: HOSPADM

## 2017-11-20 RX ORDER — HYDROCODONE BITARTRATE AND ACETAMINOPHEN 5; 325 MG/1; MG/1
1 TABLET ORAL EVERY 6 HOURS PRN
Status: CANCELLED | OUTPATIENT
Start: 2017-11-20

## 2017-11-20 RX ORDER — ACETAMINOPHEN 325 MG/1
650 TABLET ORAL EVERY 6 HOURS PRN
Status: DISCONTINUED | OUTPATIENT
Start: 2017-11-20 | End: 2017-11-29 | Stop reason: HOSPADM

## 2017-11-20 RX ORDER — HYDROCODONE BITARTRATE AND ACETAMINOPHEN 5; 325 MG/1; MG/1
1 TABLET ORAL EVERY 6 HOURS PRN
Status: DISCONTINUED | OUTPATIENT
Start: 2017-11-20 | End: 2017-11-20 | Stop reason: HOSPADM

## 2017-11-20 RX ORDER — TORSEMIDE 20 MG/1
20 TABLET ORAL DAILY
Status: CANCELLED | OUTPATIENT
Start: 2017-11-21

## 2017-11-20 RX ORDER — ACETAMINOPHEN 325 MG/1
650 TABLET ORAL EVERY 6 HOURS PRN
Status: CANCELLED | OUTPATIENT
Start: 2017-11-20

## 2017-11-20 RX ORDER — LORAZEPAM 0.5 MG/1
0.5 TABLET ORAL EVERY 6 HOURS PRN
Status: DISCONTINUED | OUTPATIENT
Start: 2017-11-20 | End: 2017-11-20 | Stop reason: HOSPADM

## 2017-11-20 RX ORDER — GABAPENTIN 300 MG/1
300 CAPSULE ORAL 3 TIMES DAILY
Status: DISCONTINUED | OUTPATIENT
Start: 2017-11-20 | End: 2017-11-20 | Stop reason: HOSPADM

## 2017-11-20 RX ORDER — LORAZEPAM 0.5 MG/1
0.5 TABLET ORAL EVERY 6 HOURS PRN
Status: CANCELLED | OUTPATIENT
Start: 2017-11-20

## 2017-11-20 RX ORDER — QUETIAPINE FUMARATE 100 MG/1
400 TABLET, FILM COATED ORAL
Status: DISCONTINUED | OUTPATIENT
Start: 2017-11-20 | End: 2017-11-29 | Stop reason: HOSPADM

## 2017-11-20 RX ORDER — ONDANSETRON 4 MG/1
4 TABLET, ORALLY DISINTEGRATING ORAL EVERY 6 HOURS PRN
Status: DISCONTINUED | OUTPATIENT
Start: 2017-11-20 | End: 2017-11-29 | Stop reason: HOSPADM

## 2017-11-20 RX ORDER — ATORVASTATIN CALCIUM 10 MG/1
10 TABLET, FILM COATED ORAL
Status: DISCONTINUED | OUTPATIENT
Start: 2017-11-21 | End: 2017-11-29 | Stop reason: HOSPADM

## 2017-11-20 RX ORDER — POLYETHYLENE GLYCOL 3350 17 G/17G
17 POWDER, FOR SOLUTION ORAL DAILY PRN
Status: DISCONTINUED | OUTPATIENT
Start: 2017-11-20 | End: 2017-11-29 | Stop reason: HOSPADM

## 2017-11-20 RX ORDER — PANTOPRAZOLE SODIUM 40 MG/1
40 TABLET, DELAYED RELEASE ORAL
Status: CANCELLED | OUTPATIENT
Start: 2017-11-20

## 2017-11-20 RX ORDER — INSULIN GLARGINE 100 [IU]/ML
14 INJECTION, SOLUTION SUBCUTANEOUS
Status: CANCELLED | OUTPATIENT
Start: 2017-11-20

## 2017-11-20 RX ORDER — LORAZEPAM 0.5 MG/1
0.5 TABLET ORAL EVERY 8 HOURS PRN
Status: CANCELLED | OUTPATIENT
Start: 2017-11-20

## 2017-11-20 RX ORDER — TORSEMIDE 20 MG/1
20 TABLET ORAL DAILY
Status: DISCONTINUED | OUTPATIENT
Start: 2017-11-21 | End: 2017-11-29 | Stop reason: HOSPADM

## 2017-11-20 RX ORDER — QUETIAPINE FUMARATE 100 MG/1
400 TABLET, FILM COATED ORAL
Status: CANCELLED | OUTPATIENT
Start: 2017-11-20

## 2017-11-20 RX ORDER — METOPROLOL SUCCINATE 25 MG/1
25 TABLET, EXTENDED RELEASE ORAL DAILY
Status: CANCELLED | OUTPATIENT
Start: 2017-11-21

## 2017-11-20 RX ORDER — PANTOPRAZOLE SODIUM 40 MG/1
40 TABLET, DELAYED RELEASE ORAL
Status: DISCONTINUED | OUTPATIENT
Start: 2017-11-21 | End: 2017-11-29 | Stop reason: HOSPADM

## 2017-11-20 RX ADMIN — METOPROLOL SUCCINATE 25 MG: 25 TABLET, EXTENDED RELEASE ORAL at 08:55

## 2017-11-20 RX ADMIN — TORSEMIDE 20 MG: 20 TABLET ORAL at 08:55

## 2017-11-20 RX ADMIN — PANTOPRAZOLE SODIUM 40 MG: 40 TABLET, DELAYED RELEASE ORAL at 16:18

## 2017-11-20 RX ADMIN — ACETAMINOPHEN 650 MG: 325 TABLET ORAL at 16:30

## 2017-11-20 RX ADMIN — INSULIN LISPRO 2 UNITS: 100 INJECTION, SOLUTION INTRAVENOUS; SUBCUTANEOUS at 21:54

## 2017-11-20 RX ADMIN — QUETIAPINE FUMARATE 400 MG: 100 TABLET ORAL at 21:53

## 2017-11-20 RX ADMIN — HYDROCODONE BITARTRATE AND ACETAMINOPHEN 1 TABLET: 5; 325 TABLET ORAL at 18:02

## 2017-11-20 RX ADMIN — GABAPENTIN 300 MG: 300 CAPSULE ORAL at 16:31

## 2017-11-20 RX ADMIN — ATORVASTATIN CALCIUM 10 MG: 10 TABLET, FILM COATED ORAL at 16:18

## 2017-11-20 RX ADMIN — PANTOPRAZOLE SODIUM 40 MG: 40 TABLET, DELAYED RELEASE ORAL at 06:39

## 2017-11-20 RX ADMIN — INSULIN GLARGINE 14 UNITS: 100 INJECTION, SOLUTION SUBCUTANEOUS at 21:53

## 2017-11-20 RX ADMIN — LORAZEPAM 0.5 MG: 0.5 TABLET ORAL at 16:18

## 2017-11-20 RX ADMIN — NICOTINE 1 PATCH: 7 PATCH, EXTENDED RELEASE TRANSDERMAL at 13:38

## 2017-11-20 RX ADMIN — GABAPENTIN 300 MG: 300 CAPSULE ORAL at 21:53

## 2017-11-20 RX ADMIN — WARFARIN SODIUM 5 MG: 5 TABLET ORAL at 18:02

## 2017-11-20 NOTE — PROGRESS NOTES
[facility] Internal Medicine Progress Note  Patient: Yuan Person 61 y o  male   MRN: [id number]  PCP: Deanna Ramirez DO  [facility] Encounter: [id number]  Date Of Visit: 11/20/17    Assessment:    Principal Problem:    Acute upper GI bleed  Active Problems:    Hypertension    Diabetes mellitus ([facility] )    Erosive esophagitis    Chronic systolic [facility] ([facility] )    History of mitral valve replacement with mechanical valve    GI bleed      Plan:    · Acute upper GI bleed:  ? 2/2 reflux esophagitis, gastritis, jj mantilla tear at GE junction seen in EGD today 11/16/17  ? No more episodes, hb stable, protonix 40 PO BID  · H/o mechanical MV replacement:  ? INR at goal 2 5 - 3 5  ? Cont coumadin 5 mg  · Chronic systolic CHF:  ? Compensated, appears stable  ? Restarted on lisinopril, BB, torsemide 20 mg  ? Appreciate cardio input  · Leucocytosis:  ? Could be from bleed, CT chest abdomen eng, UA neg, BC neg  ? DC Abx till above w/u  · SEAN:  ? 2/2 hypovolemia  ? Resolved with IVF, DC IVF, monitor  · HTN:  ? Cont lopressor, lisinopril  · DM:  ? Start home lantus at lower dose 12 U HS instead of 21 U HS  ? Cont ISS  · PAD:  ? S/p left AKA 09/2017  · Anxiety: ativan 0 5 mg PO BID PRN  · PT eval; rec rehab, working on that      VTE Pharmacologic Prophylaxis:   Pharmacologic: Warfarin (Coumadin)  Mechanical VTE Prophylaxis in Place: Yes    Patient Centered Rounds: I have performed bedside rounds with nursing staff today  Discussions with Specialists or Other Care Team Provider:     Education and Discussions with Family / Patient: patient    Time Spent for Care: 45 minutes  More than 50% of total time spent on counseling and coordination of care as described above      Current Length of Stay: 5 day(s)    Current Patient Status: Inpatient   Certification Statement: The patient will continue to require additional inpatient hospital stay due to rehab    Discharge Plan / Estimated Discharge Date: kari    Code Status: Level 1 - Full Code      Subjective:   Feels good, wants to go, no complains    Objective:     Vitals:   Temp (24hrs), Av 9 °F (36 6 °C), Min:97 4 °F (36 3 °C), Max:98 3 °F (36 8 °C)    HR:  [69-80] 80  Resp:  [16-19] 16  BP: (110-138)/(59-82) 110/82  SpO2:  [99 %] 99 %  Body mass index is 21 56 kg/m²  Input and Output Summary (last 24 hours): Intake/Output Summary (Last 24 hours) at 17 1024  Last data filed at 17 1005   Gross per 24 hour   Intake             1115 ml   Output              600 ml   Net              515 ml       Physical Exam:     Physical Exam   Constitutional: He is oriented to person, place, and time  He appears well-developed and well-nourished  HENT:   Head: Normocephalic and atraumatic  Eyes: Conjunctivae are normal  No scleral icterus  Cardiovascular: Normal rate, regular rhythm and normal heart sounds  Exam reveals no gallop and no friction rub  No murmur heard  Pulmonary/Chest: Effort normal and breath sounds normal  No respiratory distress  He has no wheezes  Abdominal: Soft  He exhibits no distension  There is no tenderness  Musculoskeletal: He exhibits no edema  Left AKA   Neurological: He is alert and oriented to person, place, and time           Additional Data:     Labs:      Results from last 7 days  Lab Units 17  0524   WBC Thousand/uL 8 43   HEMOGLOBIN g/dL 12 6   HEMATOCRIT % 36 6   PLATELETS Thousands/uL 163   NEUTROS PCT % 61   LYMPHS PCT % 27   MONOS PCT % 7   EOS PCT % 5       Results from last 7 days  Lab Units 17  0624  11/15/17  1047   SODIUM mmol/L 139  < > 135*   POTASSIUM mmol/L 3 4*  < > 4 6   CHLORIDE mmol/L 106  < > 99*   CO2 mmol/L 23  < > 22   BUN mg/dL 21  < > 27*   CREATININE mg/dL 1 11  < > 1 76*   CALCIUM mg/dL 8 8  < > 10 3*   TOTAL PROTEIN g/dL  --   --  9 7*   BILIRUBIN TOTAL mg/dL  --   --  1 09*   ALK PHOS U/L  --   --  205*   ALT U/L  --   --  42   AST U/L  --   --  33   GLUCOSE RANDOM mg/dL 156*  < > 273*   < > = values in this interval not displayed  Results from last 7 days  Lab Units 11/19/17  0624   INR  2 70*       * I Have Reviewed All Lab Data Listed Above  * Additional Pertinent Lab Tests Reviewed: All Labs Within Last 24 Hours Reviewed    Imaging:    Imaging Reports Reviewed Today Include:   Imaging Personally Reviewed by Myself Includes:      Recent Cultures (last 7 days):       Results from last 7 days  Lab Units 11/15/17  1232 11/15/17  1229   BLOOD CULTURE  No Growth After 4 Days  No Growth After 4 Days  Last 24 Hours Medication List:     atorvastatin 10 mg Oral Daily With Dinner   insulin glargine 14 Units Subcutaneous HS   insulin lispro 1-5 Units Subcutaneous 4x Daily (AC & HS)   lisinopril 10 mg Oral Daily   metoprolol succinate 25 mg Oral Daily   nicotine 1 patch Transdermal Q24H   pantoprazole 40 mg Oral BID AC   QUEtiapine 400 mg Oral HS   torsemide 20 mg Oral Daily   warfarin 5 mg Oral Daily (warfarin)        Today, Patient Was Seen By: Vinnie Gamez MD    ** Please Note: This note has been constructed using a voice recognition system   **

## 2017-11-20 NOTE — SOCIAL WORK
[facility] able to accept pt  [facility] admissions to submit for insurance auth  Pt and pt's dtr updated of same  Anticipate d/c to [facility] today if Betsy Moniqueup is obtained

## 2017-11-20 NOTE — DISCHARGE SUMMARY
Transition of Care Discharge Summary - [facility] Internal Medicine    Patient Information: Dawna Helton 61 y o  male MRN: [id number]  [facility] Encounter: [id number]    Discharging Physician / Practitioner: Romi Alcaraz MD  PCP: Haley Kowalski DO  Admission Date: 11/15/2017  Discharge Date: 11/20/17    Disposition:      [facility] [address]   to [facility] SN[facility]:   · Not Applicable to this Patient - Not Applicable to this Patient    Reason for Admission: hemetemesis    Discharge Diagnoses:     Principal Problem:    Acute upper GI bleed  Active Problems:    Hypertension    Diabetes mellitus ([facility] [address] )    Erosive esophagitis    Chronic systolic congestive heart failure ([facility] [address] )    History of mitral valve replacement with mechanical valve    GI bleed  Resolved Problems:    * No resolved hospital problems  *      Consultations During Hospital Stay:  · Gastroenterology  · Cardiology    Procedures Performed:     · EGD on 11/16/2017: Esophagitis seen in the distal third of the esophagus  Small clean base jj mantilla tear at the GEJ  Gastritis found in the antrum and body of the stomach    Medication Adjustments and Discharge Medications:  · Summary of Medication Adjustments made as a result of this hospitalization: none  · Medication Dosing Tapers - Please refer to Discharge Medication List for details on any medication dosing tapers (if applicable to patient)  · Medications being temporarily held (include recommended restart time): none  · Discharge Medication List: See after visit summary for reconciled discharge medications  Wound Care Recommendations:  When applicable, please see wound care section of After Visit Summary      Diet Recommendations at Discharge:  Diet -        Diet Orders            Start     Ordered    11/20/17 0941  Diet Cardiac; Cardiac Step 1; Consistent Carbohydrate Diet Level 2 (5 carb servings/75 grams CHO/meal)  Diet effective midnight Question Answer Comment   Diet Type Cardiac    Cardiac Cardiac Step 1    Other Restriction(s): Consistent Carbohydrate Diet Level 2 (5 carb servings/75 grams CHO/meal)    RD to adjust diet per protocol? Yes        11/20/17 7455          Instructions for any Catheters / Lines Present at Discharge (including removal date, if applicable): none    Significant Findings / Test Results:     · As above in EGD    Incidental Findings:   · none     Test Results Pending at Discharge (will require follow up):   · none     Outpatient Tests Requested:  · PT/INR in 1-2 days    Complications:  none    Hospital Course:     Ashwin Pierre is a 61 y o  male patient who originally presented to the hospital on 11/15/2017 due to hematemesis and severe abdominal pain  Patient has history of mechanical mitral valve replacement, hypertension, diabetes, peripheral arterial disease status post and left-sided AKA  After his recent AKA he was discharged to rehab after which she was discharged home and presented from home due to 1 day of few episodes of hematemesis associated with severe diffuse abdominal pain  On admission in the emergency room is found to be hemodynamically stable, his hemoglobin was at 14, 100 underwent CT scan of chest abdomen pelvis which did not show any acute pathology  He was admitted to the floors for further Gastroenterology evaluation  He has been on Coumadin since his mechanical valve replacement for which she was seen by Cardiology during his hospital stay    · Acute upper GI bleed:  His hemoglobin remained stable throughout, initially 14 subsequently 12  He underwent EGD on day 2 by Gastroenterology which showed reflux esophagitis, gastritis, Joan-Perez small tear at the GE junction  Placed on Protonix 40 Twice a day  Since then he has been feeling very well normal episodes of pain or bleeding    Anticoagulation as below  · H/o mechanical MV replacement:  On the day of admission his Coumadin was held, since he was not hemodynamically unstable, it was not reversed  On D-2 post EGD is cleared by Gastroenterology to restart his Coumadin  Coumadin restarted at 5 mg and he is maintaining his INR at goal between 2 5-3 5  · Chronic systolic CHF: Compensated, appears stable  Initially had a SEAN hence home lisinopril as well as diuretics were held  Restarted his home lisinopril beta blockers & torsemide 20 mg daily  · Leucocytosis: 2/2 bleed, CT chest abdomen eng, UA neg, BC neg  · SEAN: 2/2 hypovolemia  Resolved with IVF  · HTN:  Well controlled on oral metoprolol and lisinopril  · DM: Was on Lantus 20 units at home, currently was on 12 units was increased to 14 units  Patient complaining about sweating at nighttime but never had documented hypoglycemia with blood sugars greater than to 200 during the daytime  · PAD: S/p left AKA 09/2017  · Anxiety: ativan 0 5 mg PO BID PRN  · PT eval; rec rehab    Being discharged to [address] [place] [address] acute rehab today  Consider checking INR tomorrow or day after    Condition at Discharge: stable     Discharge Day Visit / Exam:     * Please refer to separate progress note for these details *    Goals of Care Discussions:  · Code Status at Discharge: Level 1 - Full Code  · Were there any Goals of Care Discussions during Hospitalization?: No  · Results of any General Goals of Care Discussions:    · POLST Completed: No   · If POLST Completed, Summary of POLST Agreement Provided Here:    · OK to Rehospitalize if Needed? Yes    Discharge instructions/Information to patient and family:   See after visit summary section titled Discharge Instructions for information provided to patient and family  Planned Readmission: no      Discharge Statement:  I spent 45 minutes discharging the patient  This time was spent on the day of discharge  I had direct contact with the patient on the day of discharge   Greater than 50% of the total time was spent examining patient, answering all patient questions, arranging and discussing plan of care with patient as well as directly providing post-discharge instructions  Additional time then spent on discharge activities      ** Please Note: This note has been constructed using a voice recognition system **

## 2017-11-20 NOTE — PHYSICAL THERAPY NOTE
Physical Therapy Treatment Note  Peter Mcgarry, PT     11/20/17 1011   Pain Assessment   Pain Assessment No/denies pain   Pain Score No Pain   Restrictions/Precautions   Weight Bearing Precautions Per Order (No, however pt did not have prosthesis in room   )   Braces or Orthoses Other (Comment)  (L AKA prothesis - but not in room at the time  )   Other Precautions Contact/isolation   General   Chart Reviewed Yes   Response to Previous Treatment Patient with no complaints from previous session  Family/Caregiver Present No   Cognition   Overall Cognitive Status WFL   Arousal/Participation Alert; Responsive; Cooperative  (very motivated to participate  )   Attention Within functional limits   Orientation Level Oriented X4   Memory Within functional limits   Following Commands Follows all commands and directions without difficulty   Subjective   Subjective " I don't want to just sit in a corner  I want to get moving " as per pt report  Bed Mobility   Rolling R (Pt seated in w/c upon PT's arrival in room  )   Transfers   Sit to Stand 5  Supervision   Additional items Assist x 1   Stand to Sit 5  Supervision   Additional items Assist x 1;Verbal cues; Increased time required   Ambulation/Elevation   Gait pattern Step to;Excessively slow; Short stride   Gait Assistance 4  Minimal assist   Additional items Assist x 1  (CG assist to rise to stand then supervision therafter to wal)   Assistive Device Rolling walker   Distance 80'x1 with seated rest break between walks      Stair Management Assistance Not tested  (Pt did not have prosthesis in room  )   Additional items ("My daughter had to work all weekend and couldn't bring it")   Balance   Static Sitting Normal   Dynamic Sitting Good   Static Standing Fair   Dynamic Standing Fair -   Ambulatory Fair -   Endurance Deficit   Endurance Deficit Yes   Activity Tolerance   Activity Tolerance Patient tolerated treatment well;Patient limited by fatigue   Medical Staff Made Aware RN- Shabana Don consented to allow pt to  participate in PT rx  ( informed of PT's rec's for d/c  )   Nurse Made Aware yes   Exercises   Hip Flexion Sitting;AROM; Right  (30 rep's)   Hip Extension Standing;AROM;20 reps; Left  (Mod HHA for balance in standing  )   Knee AROM Long Arc Quad Sitting;AROM; Bilateral  (30 rep's)   Ankle Pumps Sitting;AROM; Right  (30 rep's)   Assessment   Prognosis Good   Problem List Decreased strength;Decreased endurance; Impaired balance;Decreased mobility   Assessment Pt seen for physical therapy treatment consisting of completion of seated and 1 standing ex, ambulation [de-identified]' with RW, CG assist -close supervision of 1 without use of L AK prosthesis  Pt did not have his prosthesis in his room because his daughter had to work all weekend and was not able to bring it to the hospital   Pt plans to have her bring it in today  Pt was highly motivated to participate in all exercise and ambulatory activities  Prior to rx pt was independently using w/c in his room  Recommend d/c to inpatient rehab  Pt has several steps into his home and has not had the opportunity to use his new L AK prosthesis during this admission  Pt also lives alone and will not have help at home after d/c thus needs to be completely independent with mobility skills for falls prevention  Barriers to Discharge Inaccessible home environment;Decreased caregiver support   Goals   Patient Goals To go to rehab   STG Expiration Date 11/30/17   Short Term Goal #1 (Goals still appropriate  )   Treatment Day 1   Plan   Treatment/Interventions Functional transfer training;LE strengthening/ROM; Patient/family training;Equipment eval/education; Bed mobility;Gait training;Spoke to nursing;Spoke to case management   Progress Progressing toward goals   PT Frequency 5x/wk   Recommendation   Recommendation Other (Comment)  (inpatient rehab  )   Equipment Recommended Elana Whipple   PT - OK to Discharge Yes   Additional Comments (When medically ready for d/c  )

## 2017-11-20 NOTE — SOCIAL WORK
CM informed by [facility] was obtained for pt's admission today  Transfer time to be 4:30pm to room 966  Pt, pt's dtr, and bedside RN notified of transfer time

## 2017-11-20 NOTE — PLAN OF CARE
Problem: PHYSICAL THERAPY ADULT  Goal: Performs mobility at highest level of function for planned discharge setting  See evaluation for individualized goals  Treatment/Interventions: Functional transfer training, LE strengthening/ROM, Elevations, Therapeutic exercise, Endurance training, Patient/family training, Equipment eval/education, Bed mobility, Gait training  Equipment Recommended: Malini Florez       See flowsheet documentation for full assessment, interventions and recommendations  Prognosis: Good  Problem List: Decreased strength, Decreased endurance, Impaired balance, Decreased mobility  Assessment: Pt seen for physical therapy treatment consisting of completion of seated and 1 standing ex, ambulation [de-identified]' with RW, CG assist -close supervision of 1 without use of L AK prosthesis  Pt did not have his prosthesis in his room because his daughter had to work all weekend and was not able to bring it to the hospital   Pt plans to have her bring it in today  Pt was highly motivated to participate in all exercise and ambulatory activities  Prior to rx pt was independently using w/c in his room  Recommend d/c to inpatient rehab  Pt has several steps into his home and has not had the opportunity to use his new L AK prosthesis during this admission  Pt also lives alone and will not have help at home after d/c thus needs to be completely independent with mobility skills for falls prevention  Barriers to Discharge: Inaccessible home environment, Decreased caregiver support     Recommendation: Other (Comment) (inpatient rehab  )     PT - OK to Discharge: Yes    See flowsheet documentation for full assessment

## 2017-11-20 NOTE — H&P
PHYSICAL MEDICINE AND REHABILITATION H&P/ADMISSION NOTE  Elgin Asher 61 y o  male MRN: [id number]  Unit/Bed#: -01 Encounter: [id number]     Rehab Diagnosis: Amputation:  05 3  Unilateral Lower Limb Above the Knee, prosthetic training    History of Present Illness:   Elgin Asher is a 61 y o  male who  has a past medical history of Anticoagulated on Coumadin; Anxiety; Bipolar 1 disorder ([facility] ); CAD (coronary artery disease); Chronic kidney disease; Chronic systolic congestive heart failure ([facility] ) (4/18/2017); Colitis (4/2/2017); COPD (chronic obstructive pulmonary disease) ([place] [address] ); Diabetes mellitus ([place] [address] ); Difficulty urinating; DM type 2 with diabetic peripheral neuropathy ([place] [address] ); Hiatal hernia; Hyperlipidemia; Hypertension; Ischemic cardiomyopathy; Myocardial infarction; Pacemaker; PAD (peripheral artery disease) ([place] [address] ); Rectal bleed; and Vomiting  and presented to the [address] [place] [address] with diffuse abdominal pain, hematemesis  Patient noted to have admissions in the past for similar reasons; patient noted to have esophagitis on previous EGDs  CT of the chest abdomen pelvis demonstrated pulmonary emphysema, no definite esophageal abnormalities  Repeat EGD was performed on 11/16 with demonstrated reflux esophagitis, gastritis, and mallorry-mantilla tear at GE junction  Of note patient has history of mitral valve replacement for which he is anticoagulated with coumadin (INR of 2 57)  Patient was initially on Protonix drip which was converted to PO form  Patient also has history of AKA, performed on 09/2017  He has recently received prosthesis, but has had no training as of yet  Patient accepted to [facility] for prosthesis, gait training on 11/20/17  Patient seen and examined at bedside  Denies any CP or SOB  No abdominal pain  No headaches, blurry vision  Reports no nausea or emesis, no recent bleeding episodes  Daughter is to bring in his prosthesis today   Endorses phantom limb pain, which he notes is usually improved with gabapentin  Restrictions include: Fall precautions     Hospital Course/comorbidities:    Comorbidities: As above  Complications: As above     Last Weight:    Wt Readings from Last 1 Encounters:   11/20/17 74 1 kg (163 lb 6 4 oz)     Last BMI:  Estimated body mass index is 21 56 kg/m² as calculated from the following:    Height as of 8/9/17: 6' 1" (1 854 m)  Weight as of an earlier encounter on 11/20/17: 74 1 kg (163 lb 6 4 oz)      Changes Since Pre-admission Assessment: None -This patient's participation in rehab continues to be reasonable, necessary and appropriate      Functional History:  Prior to Admission:    - fairly independent with ADLs  Utilized RW and wheelchair  Present:  PT:re-eval pending  OT:re-eval pending  SLP:re-eval pending    Past Medical History:   Past Surgical History:   Family History:     Past Medical History:   Diagnosis Date    Anticoagulated on Coumadin     Mechanical MVR    Anxiety     Bipolar 1 disorder ([facility] )     CAD (coronary artery disease)     Chronic kidney disease     left kideny being monitored    Chronic systolic congestive heart failure ([facility] ) 4/18/2017    Colitis 4/2/2017    COPD (chronic obstructive pulmonary disease) (HCC)     Diabetes mellitus (HCC)     Difficulty urinating     DM type 2 with diabetic peripheral neuropathy ([facility])     Hiatal hernia     Hyperlipidemia     Hypertension     Ischemic cardiomyopathy     Myocardial infarction     Pacemaker     PAD (peripheral artery disease) ([facility])     Rectal bleed     Vomiting     Past Surgical History:   Procedure Laterality Date    AMPUTATION ABOVE KNEE (AKA) Left 8/24/2017    Procedure: AMPUTATION ABOVE KNEE (AKA);   Surgeon: Akash Bailon MD;  [facility];  Service: Vascular    CARDIAC DEFIBRILLATOR PLACEMENT      CORONARY ARTERY BYPASS GRAFT      ESOPHAGOGASTRODUODENOSCOPY N/A 4/20/2017    Procedure: ESOPHAGOGASTRODUODENOSCOPY (EGD); Surgeon: Jemal Courtney MD;  [facility];  Service:     ESOPHAGOGASTRODUODENOSCOPY N/A 5/26/2017    Procedure: ESOPHAGOGASTRODUODENOSCOPY (EGD); Surgeon: Miriam Graves MD;  [facility];  Service:     ESOPHAGOGASTRODUODENOSCOPY N/A 11/16/2017    Procedure: ESOPHAGOGASTRODUODENOSCOPY (EGD); Surgeon: Naomi Markham MD;  Location: [facility];   Service: Gastroenterology    MITRAL VALVE REPLACEMENT      Mechanical    TOE AMPUTATION Left 7/21/2017    Procedure: PARTIAL FIRST RAY AMPUTATION; EXCISION OF WOUND W/ TOE FLAP CLOSURE;  Surgeon: Janette Ordaz DPM;  [facility];  Service: Podiatry    TONSILLECTOMY      VAC DRESSING APPLICATION Left 8/51/9910    Procedure: VAC application;  Surgeon: Janette Ordaz DPM;  [facility];  Service: Podiatry    WOUND DEBRIDEMENT Left 8/19/2017    Procedure: wound debridement with washout; resection of left 1st metatarsal;  Surgeon: Janette Ordaz DPM;  [facility];  Service: Podiatry     Family History   Problem Relation Age of Onset    Hypertension Mother     Diabetes Father           Medications:    Current Facility-Administered Medications:     acetaminophen (TYLENOL) tablet 650 mg, 650 mg, Oral, Q6H PRN, Evon Clifford MD    [START ON 11/21/2017] atorvastatin (LIPITOR) tablet 10 mg, 10 mg, Oral, Daily With Dinner, Evon Clifford MD    bisacodyl (DULCOLAX) rectal suppository 10 mg, 10 mg, Rectal, Daily PRN, Evon Clifford MD    docusate sodium (COLACE) capsule 100 mg, 100 mg, Oral, BID, Ronald Ray MD    gabapentin (NEURONTIN) capsule 300 mg, 300 mg, Oral, TID, Evon Clifford MD    HYDROcodone-acetaminophen (NORCO) 5-325 mg per tablet 1 tablet, 1 tablet, Oral, Q6H PRN, Ronald Ray MD    insulin glargine (LANTUS) subcutaneous injection 14 Units, 14 Units, Subcutaneous, HS, Ronald Ray MD    insulin lispro (HumaLOG) 100 units/mL subcutaneous injection 1-5 Units, 1-5 Units, Subcutaneous, 4x Daily (AC & HS) **AND** Fingerstick Glucose (POCT), , , 4x Daily AC and at bedtime, Ronald Ray MD    [START ON 11/21/2017] lisinopril (ZESTRIL) tablet 10 mg, 10 mg, Oral, Daily, Ronald Ray MD    LORazepam (ATIVAN) tablet 0 5 mg, 0 5 mg, Oral, Q6H PRN, Nya Renee MD    [START ON 11/21/2017] metoprolol succinate (TOPROL-XL) 24 hr tablet 25 mg, 25 mg, Oral, Daily, Ronald Ray MD    [START ON 11/21/2017] nicotine (NICODERM CQ) 7 mg/24hr TD 24 hr patch 1 patch, 1 patch, Transdermal, Q24H, Ronald Ray MD    ondansetron (ZOFRAN-ODT) dispersible tablet 4 mg, 4 mg, Oral, Q6H PRN, Ronald Ray MD    [START ON 11/21/2017] pantoprazole (PROTONIX) EC tablet 40 mg, 40 mg, Oral, BID AC, Ronald Ray MD    polyethylene glycol (MIRALAX) packet 17 g, 17 g, Oral, Daily PRN, Ronald Ray MD    QUEtiapine (SEROquel) tablet 400 mg, 400 mg, Oral, HS, Ronald Ray MD    [START ON 11/21/2017] torsemide (DEMADEX) tablet 20 mg, 20 mg, Oral, Daily, Ronald Ray MD    warfarin (COUMADIN) tablet 5 mg, 5 mg, Oral, Daily (warfarin), Nya Renee MD    Allergies: Allergies   Allergen Reactions    Aspirin Other (See Comments)     Received ASA & bleed out    Morphine Other (See Comments)     Gets red streak up arm above IV site    Omeprazole Diarrhea      Social History:    Social History     Social History    Marital status:      Spouse name: N/A    Number of children: N/A    Years of education: N/A     Social History Main Topics    Smoking status: Current Some Day Smoker     Packs/day: 0 50     Types: Cigarettes    Smokeless tobacco: Never Used    Alcohol use No    Drug use: No    Sexual activity: Not on file     Other Topics Concern    Not on file     Social History Narrative    No narrative on file         Fleet Doctor is  and lives alone    He lives in [facility] mobile home  The living area: can live on one level  Equipment in home: [address], Rolling Walker and Manual Wheelchair  There 3 steps to enter the home  Patient/family's goals: Return to previous home/apartment  The patient will not have 24 hour supervision/physical assistance available upon discharge      Review of Systems: A 10-point review of systems was performed  Negative except as listed above  Physical Exam:    Temp:  [97 4 °F (36 3 °C)-98 3 °F (36 8 °C)] 98 2 °F (36 8 °C)  HR:  [68-80] 68  Resp:  [16-18] 18  BP: (110-149)/(59-82) 149/80 No intake or output data in the 24 hours ending 11/20/17 1725 Estimated body mass index is 21 56 kg/m² as calculated from the following:    Height as of 8/9/17: 6' 1" (1 854 m)  Weight as of an earlier encounter on 11/20/17: 74 1 kg (163 lb 6 4 oz)       General: alert, no apparent distress, cooperative and comfortable  Head: Normal, normocephalic, atraumatic  Eye: Normal external eye, conjunctiva, lids   Ears: Normal external ears  Nose: Normal external nose, mucus membranes  Pharynx: Dental Hygiene adequate  Normal buccal mucosa  Normal pharynx  Pulmonary: no retractions, no abnormal respiratory pattern, chest expansion normal  Abdomen: soft, nontender, nondistended, no masses or organomegaly  Skin/Extremity: no rashes, no erythema, no peripheral edema  LLE Stump site Inicision: C/D/I, with no erythema  Well-healed  Neurologic: mental status, speech normal, alert and oriented x3 and reflexes normal and symmetric  Psych: Appropriate affect, mood  Circuitous speech pattern, but is redirectable    Musculoskeletal - Strength:   Right  Left  Site  Right  Left  Site    5 5  S Ab: Shoulder Abductors  5  5  HF: Hip Flexors    5 5  EF: Elbow Flexors  5  - KF: Knee Flexors    5  5  EE: Elbow Extensors  5  - KE: Knee Extensors    5  5  WE: Wrist Extensors  5  - DR: Dorsi Flexors    5  5  FF: Finger Flexors  5  - PF: Plantar Flexors    5  5  HI: Hand Intrinsics  5  - EHL: Extensor Hallucis Longus     Laboratory:      Lab Results   Component Value Date    HGB 12 6 11/18/2017 HCT 36 6 11/18/2017    WBC 8 43 11/18/2017     Lab Results   Component Value Date    BUN 21 11/19/2017     11/19/2017    K 3 4 (L) 11/19/2017     11/19/2017    GLUCOSE 156 (H) 11/19/2017    GLUCOSE 273 (H) 04/18/2017    CREATININE 1 11 11/19/2017     Lab Results   Component Value Date    PROTIME 29 0 (H) 11/19/2017    INR 2 70 (H) 11/19/2017        Imaging: reviewed     Assessment and Plan:     Rehabilitation Diagnoses:   Amputation:  05 3  Unilateral Lower Limb Above the Knee  Medical Diagnoses:   Patient Active Problem List   Diagnosis    Hypertension    B[facility] disorder ([facility] )    Diabetes mellitus ([facility] )    Hiatal hernia    Erosive esophagitis    CAD (coronary artery disease)    Pacemaker    Chronic systolic congestive heart failure (HCC)    Peripheral artery disease (HCC)    History of mitral valve replacement with mechanical valve    Bipolar 1 disorder, depressed (HCC)    Bullous dermatosis    GI bleed    Acute upper GI bleed    Status post above knee amputation of left lower extremity ([facility] )     Associated Injuries: none  Hospital Complications/comorbidities: As listed in HPI     Rehabilitation Prognosis: good     Tolerance for three hours of therapy a day: good      1    Current Medical Problems/Risks/Management:  # Amputation: left AKA secondary to PAD  - Acute comprehensive interdisciplinary inpatient rehabilitation including PT, OT, SLP, RN, CM, SW, dietary, psychology, etc   - Appreciate Internal Medicine following - Dr Mouna Moon service   - S/p left AKA on 8/24/17 by Dr Vianey Mccollum  - Continue prosthetic training by PT/OT  - continue gabapentin for phantom limb pain, titrate up as tolerated  - f/u vascular surgery as outpatient    # GI bleed  - s/p EGD  - secondary to reflux esophagitis, gastritis, and mallorry-mantilla tear at GE junction  - Continue protonix    - GI f/u as outpatient    # SEAN  - Continue monitoring kidney function  - encourage fluid intake    Results from last 7 days  Lab Units 11/19/17  0624 11/17/17  0510 11/16/17  0551   CREATININE mg/dL 1 11 0 84 1 14     # DM  - Continue ISS  - Continue finger sticks    Results from last 7 days  Lab Units 11/19/17  0624 11/17/17  0510 11/16/17  0551   GLUCOSE RANDOM mg/dL 156* 147* 141*     # HTN/CAD/s/p MV replacement/CHF  - Continue Atorvastatin  - Continue torsemide  - Continue Lisinopril  - Continue Metoprolol   - IM team dosing warfarin    Temp:  [97 4 °F (36 3 °C)-98 3 °F (36 8 °C)] 98 2 °F (36 8 °C)  HR:  [68-80] 68  Resp:  [16-18] 18  BP: (110-149)/(59-82) 149/80      Results from last 7 days  Lab Units 11/19/17  0624 11/18/17  0524 11/17/17  0510   INR  2 70* 2 90* 3 25*     # Hypokalemia  - supplement as needed      Results from last 7 days  Lab Units 11/19/17  0624 11/17/17  0510 11/16/17  0551   POTASSIUM mmol/L 3 4* 3 5 3 6       # Pain  - Continue tylenol PRN, for max of 3gm daily  - Continue Norco PRN   - Continue gabapentin     # Rehab Psych  - Continue lorazepam PRN  - continue seroquel QHS  - Neuropsych consult, appreciate recs    # FENA/prophy  - Diet: diabetic  SLP and nutrition to monitor and adjust as necessary   - DVT prophy: Sequential compression device (Venodyne)  and Warfarin (Coumadin)  - GI ppx: Pantaprazole  - Bowel: colace , dulcolax suppository  and miralax PRn  - Nausea: Zofran PRN  - Supplements: None  - Sleep: None     CODE: Level 1: Full Code    2  Bladder and Bowel Problems/Risks/Management:  Monitor bowel/bladder function  3  Nutrition Problems/Risks/Managment:  Appetite: fair  Current Diet: diabetic  4  Psychological/social/spiritual/vocational  referral to Rehabilitation Psychology and continue current psychiatric medication(s)  5  Family/Patient Goals:  Patient/family's goals: Return to previous home/apartment  Patient will receive PT and  OT 90 minutes each per day, five days per week to achieve rehab goals     6   Mobility Goals:   Bed Mobility: Moderate [place]  Bed to wheelchair transfer: Moderate [place]  Sit to stand: Moderate [place]  Ambulation: Moderate [place]  Stairs: Moderate [place]  7  Activities of Daily Living (ADLs) Goals:  Eating: Moderate [place]  Hygiene and Grooming: Moderate [place]  Bathing: Moderate [place]  Upper Extremity Dressing: Moderate [place]  Diet / Swallowing: Moderate [place]  Lower Extremity Dressing: Moderate [place]  Tub/shower Transfers: Moderate [place]  Toilet Transfers: Moderate [place]  Toileting / Hygiene: Moderate [place]  8  Cognition / Communication:  Cognition grossly intact  Speech intact  9   Discharge Planning:  Rehabilitation and discharge goals discussed with the patient and/or family  Case Managment and Social Work to review patient/family resources and to coordinate Discharge Planning  Estimated length of stay: 7 to 10 days    Patient and Family Education and Training:  Rehabilitation and discharge goals discussed with the patient and/or family  Patient/family education/training needs to be discussed in weekly team meeting  Other equipment: May require wheelchair evaluation    CMS Required Post-Admission Physician Evaluation Elements  History and Physical, including medical history, functional history and active comorbidities as in above text      PostAdmission Physician Evaluation:  The patient has the potential to make improvement and is in need of physical And, occupational Therapy services  The patient may also need nutritional services  Given the patient's complex medical condition and risk of further medical complications, rehabilitative services cannot be safely provided at a lower level of care, such as a skilled nursing facility  I have reviewed the patient's functional and medical status at the time of the preadmission screening and they are the same as on the day of this admission   I acknowledge that I have personally performed a full physical examination on this patient within 24 hours of admission  The patient demonstrated understanding the rehabilitation program and the discharge process after we discussed them      Agree in entirety: yes  Minor adaptions: none    Major changes: none     Ermias Lucas MD MPH  Physical Medicine and Rehabilitation    ** Please Note: Fluency Direct voice to text software may have been used in the creation of this document   **

## 2017-11-20 NOTE — PROGRESS NOTES
PHYSICAL MEDICINE AND REHABILITATION   PREADMISSION ASSESSMENT     Projected [facility] Diagnoses:  Impairment of mobility, safety and Activities of Daily Living (ADLs) due to Amputation:  05 3  Unilateral Lower Limb Above the Knee   Etiology: Left Above The Knee Amputation  Date of Onset: 11/15/17   Date of surgery: 08/24/17    PATIENT INFORMATION  Name: Ned Dias Phone #: [phone] (home)   Address: [place] [address] [address] [id number]  YOB: 1960 Age: 61 y o  SS#   Marital Status:   Ethnicity:   Employment Status: retired  Extended Emergency Contact Information  Primary Emergency Contact: Ariadne Pereira  Address: [address] [place] [address] Phone: [phone]  Relation: Daughter  Advance Directive: Level 1 Full Code, AD Unknown    INSURANCE/COVERAGE:     Primary Payor: [employer] / Plan: [employer] / Product Type: Medicaid HMO /   Secondary Payer:Private Pay   Payer Contact: Harriett Jennings   Kind Payer Contact:   Contact Phone: [phone]  Contact Fax: [phone] Contact Phone:   Authorization #:[id number]   Coverage Dates:11/20/17-11/27/17  LCD: 11/27/17  Medicare Days:  Medical Record #: [id number]    REFERRAL SOURCE:   Referring provider: Chago Perry MD  Referring facility: [address] [place] [address]  Room: [address] [place] [address] [phone]  PCP: Merary Jarrell DO PCP phone number: [phone]    MEDICAL INFORMATION  HPI: Patient is a 61year old male who presented to [facility] on 11/15/17 with coffee ground emesis,nausea and abdominal pain  He was medicated with pain medications and anti emetics and his anticoagulants were placed on hold  CT of the chest, abdomen and pelvis was negative for acute findings  GI was consulted who recommended a EGD    During the EGD history of  reflux induced esophagitis was confirmed and a small jj vangie tear was found   GI recommended keeping head of bed elevated, avoiding irritating foods, and Protonix 40mg BID  Emesis, nausea and abdominal pain has since resolved and patient is medically stable  Patient was discharged home in September after having a AKA  He now has a prosthesiss and is requiring training for which acute inpatient rehab is being recommended  Past Medical History:   Past Surgical History: Allergies:     Past Medical History:   Diagnosis Date    Anticoagulated on Coumadin     Mechanical MVR    Anxiety     Bipolar 1 disorder (HCC)     CAD (coronary artery disease)     Chronic kidney disease     left kideny being monitored    Chronic systolic congestive heart failure ([address] ) 4/18/2017    Colitis 4/2/2017    COPD [facility]chronic obstructive pulmonary disease) [facility]     Diabetes mellitus (HCC)     Difficulty urinating     DM type 2 with diabetic peripheral neuropathy ([facility]     Hiatal hernia     Hyperlipidemia     Hypertension     Ischemic cardiomyopathy     Myocardial infarction     Pacemaker     PAD (peripheral artery disease) ([facility])     Rectal bleed     Vomiting     Past Surgical History:   Procedure Laterality Date    AMPUTATION ABOVE KNEE (AKA) Left 8/24/2017    Procedure: AMPUTATION ABOVE KNEE (AKA); Surgeon: Brook Dalton MD;  [facility];  Service: Vascular    CARDIAC DEFIBRILLATOR PLACEMENT      CORONARY ARTERY BYPASS GRAFT      ESOPHAGOGASTRODUODENOSCOPY N/A 4/20/2017    Procedure: ESOPHAGOGASTRODUODENOSCOPY (EGD); Surgeon: Raymond Carrington MD;  [facility];  Service:     ESOPHAGOGASTRODUODENOSCOPY N/A 5/26/2017    Procedure: ESOPHAGOGASTRODUODENOSCOPY (EGD); Surgeon: Lavonne Pittman MD;  [facility];  Service:     ESOPHAGOGASTRODUODENOSCOPY N/A 11/16/2017    Procedure: ESOPHAGOGASTRODUODENOSCOPY (EGD); Surgeon: May Jensen MD;  Location[facility];   Service: Gastroenterology    MITRAL VALVE REPLACEMENT      Mechanical    TOE AMPUTATION Left 7/21/2017    Procedure: PARTIAL FIRST RAY AMPUTATION; EXCISION OF WOUND W/ TOE FLAP CLOSURE;  Surgeon: Sami Moran DPM;  [facility];  Service: Podiatry    TONSILLECTOMY      VAC DRESSING APPLICATION Left 6/59/9416    Procedure: VAC application;  Surgeon: Sami Moran DPM;  [facility];  Service: Podiatry    WOUND DEBRIDEMENT Left 8/19/2017    Procedure: wound debridement with washout; resection of left 1st metatarsal;  Surgeon: Sami Moran DPM;  [facility];  Service: Podiatry     Allergies   Allergen Reactions    Aspirin Other (See Comments)     Received ASA & bleed out    Morphine Other (See Comments)     Gets red streak up arm above IV site    Omeprazole Diarrhea         Comorbidities: Small jj mantilla tear, Erosive esophagitis, Chronic systolic CHF, History of mitral valve replacement with mechanical valve, leukocytosis    CURRENT VITAL SIGNS:   Temp:  [97 4 °F (36 3 °C)-98 3 °F (36 8 °C)] 97 4 °F (36 3 °C)  HR:  [71-80] 80  Resp:  [16-18] 16  BP: (110-133)/(59-82) 110/82   Intake/Output Summary (Last 24 hours) at 11/20/17 1530  Last data filed at 11/20/17 1440   Gross per 24 hour   Intake             1355 ml   Output              600 ml   Net              755 ml        LABORATORY RESULTS:      Lab Results   Component Value Date    HGB 12 6 11/18/2017    HCT 36 6 11/18/2017    WBC 8 43 11/18/2017     Lab Results   Component Value Date    BUN 21 11/19/2017     11/19/2017    K 3 4 (L) 11/19/2017     11/19/2017    GLUCOSE 156 (H) 11/19/2017    GLUCOSE 273 (H) 04/18/2017    CREATININE 1 11 11/19/2017     Lab Results   Component Value Date    PROTIME 29 0 (H) 11/19/2017    INR 2 70 (H) 11/19/2017        DIAGNOSTIC STUDIES:  Ct Chest Abdomen Pelvis Wo Contrast    Result Date: 11/15/2017  Impression: No acute findings in the chest, abdomen or pelvis  Pulmonary emphysema with right apical bullous disease   No definite esophageal abnormalities as visualized in this patient with a history of coffee ground emesis and esophagitis  No definite bowel abnormalities; enteric contrast is not administered  An area of gastric wall thickening is not apparent  No bowel obstruction  Workstation performed: [id number]       PRECAUTIONS/SPECIAL NEEDS:  Tobacco:   History   Smoking Status    Current Some Day Smoker    Packs/day: 0 50    Types: Cigarettes   Smokeless Tobacco    Never Used   , Alcohol:    History   Alcohol Use No   , Weight Bearing Precautions:   To be clarified, Splints/Braces:Left lower extremity prosthesis, Isolation Precautions:  Contact, Anticoagulation:  warfarin, Blood Sugar Management: as per MD, Edema Management, Safety Concerns and Dietary Restrictions: Cardiac Step 1; Consistent Carbohydrate Diet Level 2, Fall Precautions        MEDICATIONS:     Current Facility-Administered Medications:     acetaminophen (TYLENOL) tablet 650 mg, 650 mg, Oral, Q6H PRN, Adam Phillips MD, 650 mg at 11/19/17 2246    atorvastatin (LIPITOR) tablet 10 mg, 10 mg, Oral, Daily With Marty Menjivar MD, 10 mg at 11/19/17 1546    insulin glargine (LANTUS) subcutaneous injection 14 Units, 14 Units, Subcutaneous, HS, Rocio Swan MD    insulin lispro (HumaLOG) 100 units/mL subcutaneous injection 1-5 Units, 1-5 Units, Subcutaneous, 4x Daily (AC & HS), 2 Units at 11/20/17 1338 **AND** Fingerstick Glucose (POCT), , , 4x Daily AC and at bedtime, Rocio Swan MD    lisinopril (ZESTRIL) tablet 10 mg, 10 mg, Oral, Daily, Rocio Swan MD, Stopped at 11/19/17 1047    LORazepam (ATIVAN) tablet 0 5 mg, 0 5 mg, Oral, Q8H PRN, Adam Phillips MD, 0 5 mg at 11/19/17 2244    metoclopramide (REGLAN) injection 10 mg, 10 mg, Intravenous, Q6H PRN, Kathe Avila MD, 10 mg at 11/17/17 0206    metoprolol succinate (TOPROL-XL) 24 hr tablet 25 mg, 25 mg, Oral, Daily, Jaleesa Thurman MD, 25 mg at 11/20/17 0855    nicotine (NICODERM CQ) 7 mg/24hr TD 24 hr patch 1 patch, 1 patch, Transdermal, Q24H, Aminah Crespo MD, 1 patch at 11/20/17 1338    ondansetron (ZOFRAN) 8 mg in sodium chloride 0 9 % 50 mL IVPB, 8 mg, Intravenous, Q6H PRN, Aminah Crespo MD, Stopped at 11/15/17 1722    pantoprazole (PROTONIX) EC tablet 40 mg, 40 mg, Oral, BID AC, Juve Cotton MD, 40 mg at 11/20/17 2974    promethazine (PHENERGAN) injection 25 mg, 25 mg, Intravenous, Q6H PRN, Eleuterio Almonte DO, 25 mg at 11/17/17 2119    QUEtiapine (SEROquel) tablet 400 mg, 400 mg, Oral, HS, Ted Sarmiento PA-C, 400 mg at 11/19/17 2244    torsemide (DEMADEX) tablet 20 mg, 20 mg, Oral, Daily, Rocio Swan MD, 20 mg at 11/20/17 0855    warfarin (COUMADIN) tablet 5 mg, 5 mg, Oral, Daily (warfarin), Magalys Perea MD, 5 mg at 11/19/17 1658    SKIN INTEGRITY:   no rashes, no erythema, no peripheral edema    PRIOR LEVEL OF FUNCTION:  He lives in [facility] mobile [place]  Maryuri Villalobos is  and Lives alone in a mobile home on his daughter's property  Self Care: Independent, Indoor Mobility: Independent, Stairs (in/outdoor): Independent and Cognition: Independent    HOME ENVIRONMENT:  The living area: can live on one level  There 4 steps to enter the home  The patient will not have 24 hour supervision/physical assistance available upon discharge  PREVIOUS DME:  Equipment in home (previous DME): Tub Bench, Rolling Walker and Manual Wheelchair    FUNCTIONAL STATUS:  Physical Therapy Occupational Therapy Speech Therapy        11/20/17 1011   Pain Assessment   Pain Assessment No/denies pain   Pain Score No Pain   Restrictions/Precautions   Weight Bearing Precautions Per Order (No, however pt did not have prosthesis in room   )   Braces or Orthoses Other (Comment)  (L AKA prothesis - but not in room at the time  )   Other Precautions Contact/isolation   General   Chart Reviewed Yes   Response to Previous Treatment Patient with no complaints from previous session     Family/Caregiver Present No Cognition   Overall Cognitive Status WFL   Arousal/Participation Alert; Responsive; Cooperative  (very motivated to participate  )   Attention Within functional limits   Orientation Level Oriented X4   Memory Within functional limits   Following Commands Follows all commands and directions without difficulty   Subjective   Subjective " I don't want to just sit in a corner  I want to get moving " as per pt report  Bed Mobility   Rolling R (Pt seated in w/c upon PT's arrival in room  )   Transfers   Sit to Stand 5  Supervision   Additional items Assist x 1   Stand to Sit 5  Supervision   Additional items Assist x 1;Verbal cues; Increased time required   Ambulation/Elevation   Gait pattern Step to;Excessively slow; Short stride   Gait Assistance 4  Minimal assist   Additional items Assist x 1  (CG assist to rise to stand then supervision therafter to wal)   Assistive Device Rolling walker   Distance 80'x1 with seated rest break between walks  Stair Management Assistance Not tested  (Pt did not have prosthesis in room  )   Additional items ("My daughter had to work all weekend and couldn't bring it")   Balance   Static Sitting Normal   Dynamic Sitting Good   Static Standing Fair   Dynamic Standing Fair -   Ambulatory Fair -   Endurance Deficit   Endurance Deficit Yes   Activity Tolerance   Activity Tolerance Patient tolerated treatment well;Patient limited by fatigue   Medical Staff Made Aware REILLY Monte consented to allow pt to  participate in PT rx  ( informed of PT's rec's for d/c  )   Nurse Made Aware yes   Exercises   Hip Flexion Sitting;AROM; Right  (30 rep's)   Hip Extension Standing;AROM;20 reps; Left  (Mod HHA for balance in standing  )   Knee AROM Long Arc Quad Sitting;AROM; Bilateral  (30 rep's)   Ankle Pumps Sitting;AROM; Right  (30 rep's)   Assessment   Prognosis Good   Problem List Decreased strength;Decreased endurance; Impaired balance;Decreased mobility   Assessment Pt seen for physical therapy treatment consisting of completion of seated and 1 standing ex, ambulation [de-identified]' with RW, CG assist -close supervision of 1 without use of L AK prosthesis  Pt did not have his prosthesis in his room because his daughter had to work all weekend and was not able to bring it to the hospital   Pt plans to have her bring it in today  Pt was highly motivated to participate in all exercise and ambulatory activities  Prior to rx pt was independently using w/c in his room  Recommend d/c to inpatient rehab  Pt has several steps into his home and has not had the opportunity to use his new L AK prosthesis during this admission  Pt also lives alone and will not have help at home after d/c thus needs to be completely independent with mobility skills for falls prevention            11/19/17 1008   Note Type   Note type Eval/Treat   Restrictions/Precautions   Weight Bearing Precautions Per Order No   Braces or Orthoses LE Braces   Other Precautions Contact/isolation   Pain Assessment   Pain Assessment No/denies pain   Pain Score No Pain   Home Living   Type of Home Mobile home   Home Layout One level;Stairs to enter without rails   Bathroom Shower/Tub Tub/shower unit   [employer] Tub transfer [place]; Wheelchair-manual   Prior Function   Level of [place] Independent with ADLs and functional mobility   Lives With Alone   ADL Assistance Independent   IADLs Independent   Falls in the last 6 months 0   Vocational Retired   Lifestyle   Autonomy Patient rerports he was using RW to "hop" at home, just recently bought a w/c at a yard sale  Reports he resides in a camper in his daughters yard  Reports there is no room for him in his daughter's home because it is only 1 bedroom and she has 2 children,  Patient reports he is concerned because winter in coming and there is no heat in the camper    Patient reports he just recieved his new prosthesis x 1 week ago, is only able to wear  for 30 min at a time  Reciprocal Relationships Patient is , has a daughter involved with care but she works and has 2 children    Intrinsic Gratification reports limited engagement in leisure activtities since onset of medical issues    Psychosocial   Psychosocial (WDL) WDL   Patient Behaviors/Mood Pleasant; Cooperative   ADL   Where Assessed Edge of bed   Eating Assistance 7  Independent   Grooming Assistance 6  Modified Independent   Grooming Deficit (seated )   UB Bathing Assistance 6  Modified Independent   LB Bathing Assistance 5  Supervision/Setup   UB Dressing Assistance 7  Independent   LB Dressing Assistance 4  Minimal Assistance   LB Dressing Deficit (dons/doffs pants in spine)   Toileting Assistance  5  Supervision/Setup   Bed Mobility   Rolling R 7  Independent   Additional items Assist x 1   Rolling L 7  Independent   Additional items Assist x 1   Supine to Sit 7  Independent   Additional items Assist x 1   Sit to Supine 7  Independent   Transfers   Sit to Stand 5  Supervision   Additional items Assist x 1; Impulsive;Verbal cues   Stand to Sit 5  Supervision   Additional items Verbal cues; Impulsive   Stand pivot 5  Supervision   Additional items Verbal cues  (bed to w/c sit-pivot transfers )   Functional Mobility   Functional Mobility 5  Supervision   Additional items Rolling walker   Balance   Static Sitting Normal   Dynamic Sitting Good   Static Standing Fair   Dynamic Standing Fair -   Activity Tolerance   Activity Tolerance Patient tolerated treatment well   Medical Staff Made Aware OT spoke with YISSEL Cuevas    RUE Assessment   RUE Assessment WFL  (4+-5/5 )   LUE Assessment   LUE Assessment WFL  (4+-5/5 )   Vision-Basic Assessment   Current Vision No visual deficits   Cognition   Overall Cognitive Status WFL   Arousal/Participation Alert   Attention Within functional limits   Orientation Level Oriented X4   Memory Within functional limits   Following Commands Follows all commands and directions without difficulty   Comments tangential at times, needs re-direction back to task/covervation    Assessment   Limitation Decreased ADL status; Decreased Safe judgement during ADL;Decreased endurance;Decreased self-care trans;Decreased high-level ADLs   Prognosis Good   Assessment Patient is a 60 y/o male with hx of AKA admitted with c/o diffuse and severe hematemesis  Patient diagnosed with acute upper GI bleed, referred to OT for functional assessment of ADL and IADL ability for discharge planning  Patient is alert and oriented, very pleasant and cooperaive  Patient reports recent hx of in-patiemt rehab following in AKA, was discharged home and getting [place] [id number] PT services  Patient reports he recently recieved his LLE prosthetic and has begun some prosthetic training at home but is really only wearing prosthesis  approximaltely 20-30 min at at time  Patient doing fairly well from ADL standpoint, manages most self-care in seated positiion however is a fall risk for ADL activities in stance and has difficulty with maintaining balance with unilateral support  From OT statndpoint, patient may benefit from in-patient rehab for additional prosthetic training and improvement of ADL/IADL function with use of posthesis  Patient will benefit from OT services to progress independence and safety with ADL function and use of prostheis during hosptial stay  N/A     CURRENT GAP IN FUNCTION     Prior to Admission:     Functional Status: Patient was independent with mobility/ambulation, transfers, ADL's, IADL's  At this time patient is a min assist with ambulation with rolling walker hopping and a supervision with transfers without his prosthesis  Patient is a modified independent to supervision with upper body ADLs and a supervision to min assist with lower body ADLs without prosthesis        Estimated length of stay: 7 to 10 days    Anticipated Post-Discharge Disposition/Treatment  Disposition: Return to previous home/apartment  Outpatient Services: Physical Therapy (PT) and Occupational Therapy (OT)    BARRIERS TO DISCHARGE  Balance Difficulty, Fatigue, Home Accessibility, Caregiver Accessibility, Financial Resources, Equipment Needs, Resource Availability and Lives Alone    INTERVENTIONS FOR DISCHARGE  Adaptive equipment, Patient/Family/Caregiver Education, Community Resources, Home Evaluation, Financial Assistance, Arrange DME needs, Home Modifcations, Medication Changes as per MD, Therapy exercises and Center of balance support     REQUIRED THERAPY:  Patient will require PT and OT 90 minutes each per day, five days per week to achieve rehab goals  REQUIRED FUNCTIONAL AND MEDICAL MANAGEMENT FOR INPATIENT REHABILITATION:  Deep Vein Thrombosis (DVT) Prophylaxis:  Coumadin with INR goal 2-3 and SCD's while in bed, Diabetes Management: continue sliding scale insulin, patient to do finger sticks as ordered, SLIM to continue to manage diabetes, and medical conditions, PT/OT intervention, patinet and family education and training, and any additional consults as needed,    RECOMMENDED LEVEL OF CARE:  Patient presented to [facility] on 11/15/17 with coffee ground emesis and abdominal pain  EGD showed small jj mantilla tear  Emesis and pain has resolved  Patient had recent AKA and is in need of prosthesis training  Prior to admission patient was modified independent with transfers and ambulation with rolling walker and independent with upper and lower body ADLs  At this time he is a min assist with ambulation with rolling walker hopping and a supervision with transfers without his prosthesis  Patient is a modified independent to supervision with upper body ADLs and a supervision to min assist with lower body ADLs without prosthesis  He will need training with his new prosthesis    Nursing management is recommended for education on medication changes and to monitor stump site for skin integrity, SLIM management to monitor and manage medical conditions, PMR to manage medical condition and to maximize functional abilities, and inpatient rehab to maximize self care and mobility with new prosthesis upon discharge to home

## 2017-11-21 LAB
ANION GAP SERPL CALCULATED.3IONS-SCNC: 6 MMOL/L (ref 4–13)
BASOPHILS # BLD AUTO: 0.04 THOUSANDS/ΜL (ref 0–0.1)
BASOPHILS NFR BLD AUTO: 0 % (ref 0–1)
BUN SERPL-MCNC: 28 MG/DL (ref 5–25)
CALCIUM SERPL-MCNC: 8.9 MG/DL (ref 8.3–10.1)
CHLORIDE SERPL-SCNC: 106 MMOL/L (ref 100–108)
CO2 SERPL-SCNC: 25 MMOL/L (ref 21–32)
CREAT SERPL-MCNC: 1.19 MG/DL (ref 0.6–1.3)
EOSINOPHIL # BLD AUTO: 0.6 THOUSAND/ΜL (ref 0–0.61)
EOSINOPHIL NFR BLD AUTO: 6 % (ref 0–6)
ERYTHROCYTE [DISTWIDTH] IN BLOOD BY AUTOMATED COUNT: 16.7 % (ref 11.6–15.1)
GFR SERPL CREATININE-BSD FRML MDRD: 67 ML/MIN/1.73SQ M
GLUCOSE SERPL-MCNC: 110 MG/DL (ref 65–140)
GLUCOSE SERPL-MCNC: 158 MG/DL (ref 65–140)
GLUCOSE SERPL-MCNC: 173 MG/DL (ref 65–140)
GLUCOSE SERPL-MCNC: 175 MG/DL (ref 65–140)
GLUCOSE SERPL-MCNC: 211 MG/DL (ref 65–140)
HCT VFR BLD AUTO: 40.1 % (ref 36.5–49.3)
HGB BLD-MCNC: 13.7 G/DL (ref 12–17)
INR PPP: 3 (ref 0.86–1.16)
LYMPHOCYTES # BLD AUTO: 2 THOUSANDS/ΜL (ref 0.6–4.47)
LYMPHOCYTES NFR BLD AUTO: 21 % (ref 14–44)
MCH RBC QN AUTO: 28.7 PG (ref 26.8–34.3)
MCHC RBC AUTO-ENTMCNC: 34.2 G/DL (ref 31.4–37.4)
MCV RBC AUTO: 84 FL (ref 82–98)
MONOCYTES # BLD AUTO: 0.56 THOUSAND/ΜL (ref 0.17–1.22)
MONOCYTES NFR BLD AUTO: 6 % (ref 4–12)
NEUTROPHILS # BLD AUTO: 6.3 THOUSANDS/ΜL (ref 1.85–7.62)
NEUTS SEG NFR BLD AUTO: 67 % (ref 43–75)
NRBC BLD AUTO-RTO: 0 /100 WBCS
PLATELET # BLD AUTO: 203 THOUSANDS/UL (ref 149–390)
PMV BLD AUTO: 12.2 FL (ref 8.9–12.7)
POTASSIUM SERPL-SCNC: 4.1 MMOL/L (ref 3.5–5.3)
PROTHROMBIN TIME: 31.6 SECONDS (ref 12.1–14.4)
RBC # BLD AUTO: 4.78 MILLION/UL (ref 3.88–5.62)
SODIUM SERPL-SCNC: 137 MMOL/L (ref 136–145)
WBC # BLD AUTO: 9.54 THOUSAND/UL (ref 4.31–10.16)

## 2017-11-21 PROCEDURE — 82948 REAGENT STRIP/BLOOD GLUCOSE: CPT

## 2017-11-21 PROCEDURE — 97535 SELF CARE MNGMENT TRAINING: CPT

## 2017-11-21 PROCEDURE — 85610 PROTHROMBIN TIME: CPT | Performed by: PHYSICAL MEDICINE & REHABILITATION

## 2017-11-21 PROCEDURE — 97110 THERAPEUTIC EXERCISES: CPT

## 2017-11-21 PROCEDURE — 97165 OT EVAL LOW COMPLEX 30 MIN: CPT

## 2017-11-21 PROCEDURE — 97530 THERAPEUTIC ACTIVITIES: CPT

## 2017-11-21 PROCEDURE — 85025 COMPLETE CBC W/AUTO DIFF WBC: CPT | Performed by: PHYSICIAN ASSISTANT

## 2017-11-21 PROCEDURE — 97162 PT EVAL MOD COMPLEX 30 MIN: CPT

## 2017-11-21 PROCEDURE — 80048 BASIC METABOLIC PNL TOTAL CA: CPT | Performed by: PHYSICIAN ASSISTANT

## 2017-11-21 RX ORDER — NICOTINE 21 MG/24HR
14 PATCH, TRANSDERMAL 24 HOURS TRANSDERMAL DAILY
Status: DISCONTINUED | OUTPATIENT
Start: 2017-11-21 | End: 2017-11-29 | Stop reason: HOSPADM

## 2017-11-21 RX ADMIN — WARFARIN SODIUM 5 MG: 5 TABLET ORAL at 18:28

## 2017-11-21 RX ADMIN — PANTOPRAZOLE SODIUM 40 MG: 40 TABLET, DELAYED RELEASE ORAL at 06:19

## 2017-11-21 RX ADMIN — INSULIN GLARGINE 14 UNITS: 100 INJECTION, SOLUTION SUBCUTANEOUS at 21:40

## 2017-11-21 RX ADMIN — PANTOPRAZOLE SODIUM 40 MG: 40 TABLET, DELAYED RELEASE ORAL at 16:56

## 2017-11-21 RX ADMIN — INSULIN LISPRO 1 UNITS: 100 INJECTION, SOLUTION INTRAVENOUS; SUBCUTANEOUS at 08:09

## 2017-11-21 RX ADMIN — GABAPENTIN 300 MG: 300 CAPSULE ORAL at 21:40

## 2017-11-21 RX ADMIN — GABAPENTIN 300 MG: 300 CAPSULE ORAL at 16:56

## 2017-11-21 RX ADMIN — INSULIN LISPRO 2 UNITS: 100 INJECTION, SOLUTION INTRAVENOUS; SUBCUTANEOUS at 11:21

## 2017-11-21 RX ADMIN — ATORVASTATIN CALCIUM 10 MG: 10 TABLET, FILM COATED ORAL at 16:56

## 2017-11-21 RX ADMIN — QUETIAPINE FUMARATE 400 MG: 100 TABLET ORAL at 21:40

## 2017-11-21 RX ADMIN — LORAZEPAM 0.5 MG: 0.5 TABLET ORAL at 08:18

## 2017-11-21 RX ADMIN — LISINOPRIL 10 MG: 10 TABLET ORAL at 08:07

## 2017-11-21 RX ADMIN — TORSEMIDE 20 MG: 20 TABLET ORAL at 08:08

## 2017-11-21 RX ADMIN — LORAZEPAM 0.5 MG: 0.5 TABLET ORAL at 21:43

## 2017-11-21 RX ADMIN — ACETAMINOPHEN 650 MG: 325 TABLET, FILM COATED ORAL at 14:20

## 2017-11-21 RX ADMIN — INSULIN LISPRO 1 UNITS: 100 INJECTION, SOLUTION INTRAVENOUS; SUBCUTANEOUS at 21:41

## 2017-11-21 RX ADMIN — GABAPENTIN 300 MG: 300 CAPSULE ORAL at 08:07

## 2017-11-21 RX ADMIN — METOPROLOL SUCCINATE 25 MG: 25 TABLET, EXTENDED RELEASE ORAL at 08:07

## 2017-11-21 RX ADMIN — NICOTINE 14 MG: 14 PATCH, EXTENDED RELEASE TRANSDERMAL at 11:20

## 2017-11-21 RX ADMIN — LORAZEPAM 0.5 MG: 0.5 TABLET ORAL at 14:23

## 2017-11-21 NOTE — SOCIAL WORK
CM Evaluation  CM met with patient to review rehab routine and CM role  Patient lives in a camper with 2 MIREYA and no heat on his daughter's property  Patient came to [place] from [place] in February to help when his daughter was diagnosed with Lyme Disease  Per patient he can only run 1 portable heater at a time because it pops the circuit  He states he is not allowed in the home and he eats microwave food and he has a hot plate  He states he is receiving SSI and food stamps which he turns over to his daughter  He is torn between staying here and returning to [facility] because he wants to see his grandchildren grow up  He did not have a relationship with his daughter for 28 years prior and he has supportive friends and family in [facility]  He has a wheelchair, walker, and tub bench  He used [facility] prior to amputation  He uses [facility] in [place]  Informed of weekly team meeting and potential dc needs  Discussed insurance coverage and insurance update due 11/27  CM LM for daughter Les Hernandez to return call to discuss potential dc needs  CM will follow to assist with dc needs

## 2017-11-21 NOTE — PCC PHYSICAL THERAPY
Pt presents with good understanding of how to perform functional transfers and tasks as he has been using WC and RW at home PTA as he has been healing from a L AKA  Pt is here primarily for prosthetic training, however prosthetic is not fitting well so therapy performed without it  Pt is participating in therapy well and we have been working on higher level balance and functional tasks to help further progress strength and balance for using a prosthetic leg and reviewing floor<>stand transfers

## 2017-11-21 NOTE — PROGRESS NOTES
11/21/17 1230   Patient Data   Rehab Impairment Amputation   Etiologic Diagnosis L AKA   Date of Onset 11/15/17   Home Setup   Type of Home Mobile Home   Method of Entry Stairs;Hand Rail Right   Number of Stairs 3   Number of Stairs in Home 0   Available Equipment Wheelchair;Roller Walker   Prior Level of Function   Self-Care 3  Independent - Patient completed the activities by him/herself, with or without an assistive device, with no assistance from a helper  Indoor-Mobility (Ambulation) 3  Independent - Patient completed the activities by him/herself, with or without an assistive device, with no assistance from a helper  Stairs 2  Needed Some Help - Patient needed a partial assistance from another person to complete activities  Functional Cognition 3  Independent - Patient completed the activities by him/herself, with or without an assistive device, with no assistance from a helper  Prior Device Used A  Manual wheelchair;D  Walker   Patient Preference   Nickname (Patient Preference) lilia   Psychosocial   Psychosocial (WDL) WDL   Restrictions/Precautions   Precautions Fall Risk;Contact/isolation   Pain Assessment   Pain Assessment No/denies pain   QI: Roll Left and Right   Assistance Needed Independent   Roll Left and Right CARE Score 6   QI: Sit to Lying   Assistance Needed Independent   Sit to Lying CARE Score 6   QI: Lying to Sitting on Side of Bed   Assistance Needed Independent   Lying to Sitting on Side of Bed CARE Score 6   QI: Sit to Stand   Assistance Needed Supervision; Adaptive equipment   Sit to Stand CARE Score 4   QI: Chair/Bed-to-Chair Transfer   Assistance Needed Supervision; Adaptive equipment   Chair/Bed-to-Chair Transfer CARE Score 4   QI: Car Transfer   Reason if not Attempted Activity not applicable   Car Transfer CARE Score 9   Transfer Bed/Chair/Wheelchair   Limitations Noted In Balance; Endurance;LE Strength   Adaptive Equipment Roller Walker;None   Sit Pivot Moderate Assist   Stand Pivot Supervision;Contact Guard  (modified SPT without L prosthetic)   Sit to Stand Supervision  (CGA)   Stand to Sit Supervision   Supine to Sit (independent)   Sit to Supine (independent)   Findings pt able to perform sit pivot transfers safely with WC, practiced sit<>stands with and without prosthetic  pt reports poor fit with L prosthetic and therefore we didn't practice more transfers/walking with prosthetic  pt reports it his too high on medial groin   Bed, Chair, Wheelchair Transfer (FIM) 4 - [place] lifts one extremity during transfer   QI: Toilet Transfer   Assistance Needed Supervision; Adaptive equipment   Toilet Transfer CARE Score 4   Toilet Transfer   Surface Assessed Standard Toilet   Transfer Technique Stand Pivot  (modified with grab bars)   Limitations Noted In Balance; Endurance;LE Strength   Adaptive Equipment Grab Bar   Findings S for safety VC for improved set up   Toilet Transfer (FIM) 5 - Patient requires supervision/monitoring   QI: Walk 10 Feet   Assistance Needed Incidental touching; Adaptive equipment   Walk 10 Feet CARE Score 4   QI: Walk 50 Feet with Two Turns   Reason if not Attempted Activity not applicable   Walk 50 Feet with Two Turns CARE Score 9   QI: Walk 150 Feet   Reason if not Attempted Activity not applicable   Walk 537 Feet CARE Score 9   QI: Walking 10 Feet on Uneven Surfaces   Reason if not Attempted Activity not applicable   Walking 10 Feet on Uneven Surfaces CARE Score 9   Ambulation   Does the patient walk? 2  Yes   Primary Discharge Mode of Locomotion Walk;Wheelchair   Walk Assist Level Contact Guard   Gait Pattern (hopping RLE)   Assist Device Roller Walker   Distance Walked (feet) 40 ft   Limitations Noted In Balance; Endurance; Heel Strike;Swing;Strength;Speed   Walking (FIM) 1 - Patient ambulates less than 49 feet regardless of assist/device/set up   QI: Wheel 50 Feet with Two [address] [facility] 50 Feet with Two Turns CARE Score 6   QI: Wheel 150 Feet   Assistance Needed Independent   Wheel 150 Feet CARE Score 6   Wheelchair mobility   QI: Does the patient use a wheelchair? 1  Yes   QI: Indicate the type of wheelchair 1  Manual   Wheelchair Assist Level Modified Independent   Method Right upper extremity; Left upper extremity;Right lower extremity   Distance Level Surface (feet) 150 ft   Wheelchair (FIM) 6 - Patient wheels 150 feet or more, no rests AND independently, timely and safely   QI: 1 Step (Curb)   Reason if not Attempted Activity not applicable   1 Step (Curb) CARE Score 9   QI: 4 Steps   Reason if not Attempted Activity not applicable   4 Steps CARE Score 9   QI: 12 Steps   Reason if not Attempted Activity not applicable   12 Steps CARE Score 9   Stairs   Type Stairs   # of Steps 2   Weight Bearing Precautions Fall Risk   Assist Devices Single Rail   Findings pt used BUE support on hand rail on L side on 6"  steps, CGA-Oriana for safety  pt able to clear foot and no LOB   Stairs (FIM) 1 - Patient goes up and down less than 4 stairs regardless of assist/device/set up   Comprehension   QI: Comprehension 4  Undestands: Clear comprehension without cues or repetitions   Comprehension (FIM) 6 - Understands complex/abstract but requires more time   Expression   QI: Expression 4  Express complex messages without difficulty and with speech that is clear and easy to [place]   Expression (FIM) 7 - Expresses complex/abstract ideas in a reasonable time w/o devices or helper     Social Interaction   Social Interaction (FIM) 6 - Interacts appropriately with others BUT requires extra  time   Problem Solving   Problem solving (FIM) 6 - Solves complex problems BUT requires extra time   Memory   Memory (FIM) 6 - Recognizes with extra time   RLE Assessment   RLE Assessment WFL   LLE Assessment   LLE Assessment (WNL AROM for all directions L hip)   Coordination   Movements are Fluid and Coordinated 1   Cognition   Arousal/Participation Cooperative   Objective Measure   PT Measure(s) session started with pt ed about overall goals and discussion pt's PLOF with RW and WC  practiced functional mobility in room; dicsussed with pt that he can perform transfers in his room using WC and sit pivot transfers at this time as he demonstrated good safety and balance with these transfers  Discussed keeping hopping wtih therapy for now for safety and to dec fall risk  Pt reported there is a ramp to get to the porch and then 3 steps to enter that he was hopping up and down with a hand rail PTA and that often during the day he is home alone  He reports using the [address] outside the house and the RW inside the house  Discussed with pt goal of only hopping short distances now to preseve shoulder function and limit possibility of overuse injuries; pt demonstrated understanding  Practiced standing with prosthetic on; pt only has thickest sock here and no others, so we were unable to add more socks to see if it changed the fit  currently the prosthetic rises too high on medial thigh/neelima area and pt reports too much discomfort  Pt reported that the prosthetic is tight and fits fine in the posterior region  PT Findings Pt demonstrates good understanding of how to use the WC and sit pivot transfers as primary mode of locomotion and demonstrates safety awareness with hopping  Noted dec foot clearance with hopping, however  Pt is here primarily for prosthetic training, however the prosthetic doesn't fit appropraitely; to call the prosthetic company  Pt will benefit from skilled PT to further progress standing balance and strength on LLE, strengthen BUE to prevent shoulder injuries and further progress overall activity tolerance as a continuation of pre-prosthetic training, and then will continue with prosthetic training when it fits appropriately      PT Therapy Minutes   PT Time In 1230   PT Time Out 1330   PT Total Time (minutes) 60   PT Mode of treatment - Individual (minutes) 60   PT Mode of treatment - Concurrent (minutes) 0   PT Mode of treatment - Group (minutes) 0   PT Mode of treatment - Co-treat (minutes) 0   PT Mode of Teatment - Total time(minutes) 60 minutes

## 2017-11-21 NOTE — PCC NURSING
Pt admitted with Left above knee amputation, recent hx of GI bleed, on coumadin for MVR, pain managed with Tylenol and Norco, on Seroquel for bipolar disorder, LCW pacemaker, nicotine patch in place, diabetes managed with QID blood sugar checks and SSI  This  week we will continue to monitor vital signs, blood sugar,  and lab results, we will encourage independence with ADLS, safety with transfers to prevent falls

## 2017-11-21 NOTE — CONSULTS
Consultation - Blease Sample 61 y o  male MRN: [id number]    Unit/Bed#: -01 Encounter: [id number]        History of Present Illness     HPI: Viniase Sample is a 61y o  year old male, with PMH of erosive esophagitis, upper GI bleed, HTN, chronic systolic CHF, PPM and ICD placement, DM type 2,  PAD status post left AKA, and mechanical mitral valve on Coumadin, who presented to the ER 11/15/17 with coffee-ground and bright red blood vomiting  He reported diffuse abdominal pain at 10/10  Patient was noted to have acute kidney injury upon presentation  Coumadin was placed on hold  Patient was seen by GI and had an EGD  EGD revealed esophagitis in the distal 3rd of the esophagus and a small clean based Joan-Perez tear at the GEJ  Gastritis was also found in the antrum and body of the stomach  Coumadin was resumed  Patient was found to have leukocytosis  Septic workup was negative  ROS:  Constitutional: Negative  HENT: Negative  Respiratory: Negative  Cardiovascular: Negative  Gastrointestinal: Negative  Musculoskeletal: Negative  Neurological: Negative  Psychiatric/Behavioral: Negative          Historical Information   Past Medical History:   Diagnosis Date    Anticoagulated on Coumadin     Mechanical MVR    Anxiety     Bipolar 1 disorder (HCC)     CAD (coronary artery disease)     Chronic kidney disease     left kideny being monitored    Chronic systolic congestive heart failure ([facility] [address] ) 4/18/2017    Colitis 4/2/2017    COPD (chronic obstructive pulmonary disease) (HCC)     Diabetes mellitus (HCC)     Difficulty urinating     DM type 2 with diabetic peripheral neuropathy (HCC)     Hiatal hernia     Hyperlipidemia     Hypertension     Ischemic cardiomyopathy     Myocardial infarction     Pacemaker     PAD (peripheral artery disease) (HCC)     Rectal bleed     Vomiting      Past Surgical History:   Procedure Laterality Date    AMPUTATION ABOVE KNEE (AKA) Left 8/24/2017    Procedure: AMPUTATION ABOVE KNEE (AKA); Surgeon: John Levy MD;  [facility];  Service: Vascular    CARDIAC DEFIBRILLATOR PLACEMENT      CORONARY ARTERY BYPASS GRAFT      ESOPHAGOGASTRODUODENOSCOPY N/A 4/20/2017    Procedure: ESOPHAGOGASTRODUODENOSCOPY (EGD); Surgeon: Damon Pollock MD;  [facility];  Service:     ESOPHAGOGASTRODUODENOSCOPY N/A 5/26/2017    Procedure: ESOPHAGOGASTRODUODENOSCOPY (EGD); Surgeon: Harriett Gonzalez MD;  [facility];  Service:     ESOPHAGOGASTRODUODENOSCOPY N/A 11/16/2017    Procedure: ESOPHAGOGASTRODUODENOSCOPY (EGD); Surgeon: Gemini Harrsi MD;  [facility];   Service: Gastroenterology    MITRAL VALVE REPLACEMENT      Mechanical    TOE AMPUTATION Left 7/21/2017    Procedure: PARTIAL FIRST RAY AMPUTATION; EXCISION OF WOUND W/ TOE FLAP CLOSURE;  Surgeon: Vasquez Paz DPM;  [facility];  Service: Podiatry    TONSILLECTOMY      VAC DRESSING APPLICATION Left 9/65/3770    Procedure: VAC application;  Surgeon: Vasquez Paz DPM;  [facility];  Service: Podiatry    WOUND DEBRIDEMENT Left 8/19/2017    Procedure: wound debridement with washout; resection of left 1st metatarsal;  Surgeon: Vasquez Paz DPM;  [facility];  Service: Podiatry     Social History   History   Alcohol Use No     History   Drug Use No     History   Smoking Status    Current Some Day Smoker    Packs/day: 0 50    Types: Cigarettes   Smokeless Tobacco    Never Used     Family History   Problem Relation Age of Onset    Hypertension Mother     Diabetes Father        Meds/Allergies   current meds:  Current Facility-Administered Medications   Medication Dose Route Frequency    acetaminophen (TYLENOL) tablet 650 mg  650 mg Oral Q6H PRN    atorvastatin (LIPITOR) tablet 10 mg  10 mg Oral Daily With Dinner    bisacodyl (DULCOLAX) rectal suppository 10 mg  10 mg Rectal Daily PRN    docusate sodium (COLACE) capsule 100 mg  100 mg Oral BID    gabapentin (NEURONTIN) capsule 300 mg  300 mg Oral TID    HYDROcodone-acetaminophen (NORCO) 5-325 mg per tablet 1 tablet  1 tablet Oral Q6H PRN    influenza inactivated quadrivalent vaccine (FLULAVAL) IM injection 0 5 mL  0 5 mL Intramuscular Prior to discharge    insulin glargine (LANTUS) subcutaneous injection 14 Units  14 Units Subcutaneous HS    insulin lispro (HumaLOG) 100 units/mL subcutaneous injection 1-5 Units  1-5 Units Subcutaneous 4x Daily (AC & HS)    lisinopril (ZESTRIL) tablet 10 mg  10 mg Oral Daily    LORazepam (ATIVAN) tablet 0 5 mg  0 5 mg Oral Q6H PRN    metoprolol succinate (TOPROL-XL) 24 hr tablet 25 mg  25 mg Oral Daily    nicotine (NICODERM CQ) 7 mg/24hr TD 24 hr patch 1 patch  1 patch Transdermal Q24H    ondansetron (ZOFRAN-ODT) dispersible tablet 4 mg  4 mg Oral Q6H PRN    pantoprazole (PROTONIX) EC tablet 40 mg  40 mg Oral BID AC    polyethylene glycol (MIRALAX) packet 17 g  17 g Oral Daily PRN    QUEtiapine (SEROquel) tablet 400 mg  400 mg Oral HS    torsemide (DEMADEX) tablet 20 mg  20 mg Oral Daily    warfarin (COUMADIN) tablet 5 mg  5 mg Oral Daily (warfarin)       PTA meds:   Prescriptions Prior to Admission   Medication    acetaminophen (TYLENOL) 325 mg tablet    atorvastatin (LIPITOR) 10 mg tablet    ferrous gluconate (FERGON) 324 mg tablet    gabapentin (NEURONTIN) 300 mg capsule    HYDROcodone-acetaminophen (NORCO) 5-325 mg per tablet    Insulin Glargine (BASAGLAR KWIKPEN) 100 UNIT/ML SOPN    insulin glargine (LANTUS) 100 units/mL subcutaneous injection    insulin lispro (HumaLOG) 100 units/mL injection    lisinopril (ZESTRIL) 20 mg tablet    LORazepam (ATIVAN) 1 mg tablet    melatonin 3 mg    metoprolol succinate (TOPROL-XL) 25 mg 24 hr tablet    montelukast (SINGULAIR) 10 mg tablet    nicotine (NICODERM CQ) 7 mg/24hr TD 24 hr patch    pantoprazole (PROTONIX) 40 mg tablet    potassium chloride (K-DUR,KLOR-CON) 20 mEq tablet    torsemide (DEMADEX) 20 mg tablet    warfarin (COUMADIN) 5 mg tablet    ascorbic acid (VITAMIN C) 500 MG tablet    bisacodyl (DULCOLAX) 10 mg suppository    LORazepam (ATIVAN) 1 mg tablet    metoprolol tartrate (LOPRESSOR) 25 mg tablet    polyethylene glycol (MIRALAX) 17 g packet    QUEtiapine (SEROquel) 200 mg tablet    QUEtiapine (SEROquel) 400 MG tablet    senna-docusate sodium (SENOKOT S) 8 6-50 mg per tablet     Allergies   Allergen Reactions    Aspirin Other (See Comments)     Received ASA & bleed out    Morphine Other (See Comments)     Gets red streak up arm above IV site    Omeprazole Diarrhea       Objective   Vitals: Blood pressure 125/76, pulse 66, temperature 98 2 °F (36 8 °C), temperature source Oral, resp  rate 20, height 6' 1" (1 854 m), weight 75 2 kg (165 lb 12 6 oz), SpO2 99 %  Physical Exam   Constitutional: Pt is oriented to person, place, and time  HENT:   Head: Normocephalic  Eyes: EOM are normal  Pupils are equal, round, and reactive to light  Neck: Neck supple  Cardiovascular: Normal rate and regular rhythm  No murmur heard  Pulmonary/Chest: Breath sounds normal  No respiratory distress  Pt has no wheezes  Pt has no rales  Abdominal: Soft  Bowel sounds are normal  Pt exhibits no distension  There is no tenderness  There is no rebound and no guarding  Musculoskeletal: Lt AKA  He exhibits no edema  Neurological: Pt is alert and oriented to person, place, and time  Psychiatric: Pt has a normal mood and affect         Lab Results:   Results from last 7 days  Lab Units 11/18/17  0524 11/17/17  0510   WBC Thousand/uL 8 43 10 04   HEMOGLOBIN g/dL 12 6 12 0   HEMATOCRIT % 36 6 36 2*   PLATELETS Thousands/uL 163 165       Results from last 7 days  Lab Units 11/19/17  0624 11/17/17  0510   SODIUM mmol/L 139 139   POTASSIUM mmol/L 3 4* 3 5   CHLORIDE mmol/L 106 107   CO2 mmol/L 23 24   BUN mg/dL 21 16   CREATININE mg/dL 1 11 0 84   GLUCOSE RANDOM mg/dL 156* 147*   CALCIUM mg/dL 8 8 8 8 Results from last 7 days  Lab Units 11/21/17  0513 11/19/17  0624   INR  3 00* 2 70*       Glucose (mg/dL)   Date Value   11/19/2017 156 (H)   11/17/2017 147 (H)   11/16/2017 141 (H)   11/15/2017 273 (H)     Glucose, i-STAT (mg/dl)   Date Value   04/18/2017 273 (H)       Labs reviewed    Imaging: reviewed  EKG, Pathology, and Other Studies: I have personally reviewed pertinent reports  VTE Prophylaxis: Warfarin (Coumadin)    Code Status: Level 1 - Full Code   Advance Directive and Living Will: Yes    Power of : Yes  POLST:      Assessment/Plan     1  Upper GI bleed due to esophagitis and Joan-Perez tear:  Monitor hemoglobin  Continue Protonix 40 mg b i d     2  PAD  status post left AKA:  Prosthetic training per primary service  3  History of mechanical mitral valve replacement:  Continue Coumadin with goal INR 2 5-3 5  INR 3 today  4  Hypertension:  Continue Lisinopril 10 mg daily and Toprol XL 25 mg daily  Monitor blood pressure Q shift and adjust medications as needed  5  Chronic systolic CHF:  Continue Demadex 20 mg daily and monitor daily weights  6  Diabetes mellitus type 2:  Continue Lantus 14 units at bedtime  Patient does use 21 units at home  Monitor blood sugars and adjust insulin as needed  7  Leukocytosis:  Septic workup negative  Likely reactive  Observe off antibiotics  Counseling / Coordination of Care  Total floor / unit time spent today 60 minutes  Greater than 50% of total time was spent with the patient and / or family counseling and / or coordination of care        Mark Luke PA-C

## 2017-11-21 NOTE — PROGRESS NOTES
DEUCE HOWARD   11/21/17 0830   Patient Data   Rehab Impairment Amputation   Etiologic Diagnosis Left Above The Knee Amputation   Date of Onset 11/15/17   Home Setup   Type of Home Mobile Home  ([facility])   Method of Entry Stairs   Number of Stairs 3   Number of Stairs in Home 0   First Floor Bathroom Full;Tub   First Floor Bathroom Accessibility Tub bench   First Floor Setup Available Yes   Home Modification Comment Pt resides in mobile [place] in [facility]'s backyard with 3 MIREYA and hand rails present  Pt reports he is able to utilize w/c in camper    Available Equipment [address]   Transportation Family/friends drive;    Prior Device(s) Used Wheelchair;Roller Walker   Prior IADL Participation   Meal Preparation Full Participation   Laundry Full Participation   Prior Level of Function   Self-Care 3  Independent - Patient completed the activities by him/herself, with or without an assistive device, with no assistance from a helper  Indoor-Mobility (Ambulation) 3  Independent - Patient completed the activities by him/herself, with or without an assistive device, with no assistance from a helper  Stairs 2  Needed Some Help - Patient needed a partial assistance from another person to complete activities  Functional Cognition 3  Independent - Patient completed the activities by him/herself, with or without an assistive device, with no assistance from a helper  Prior Device Used D  Walker;A   Manual wheelchair   Patient Preference   Nickname (Patient Preference) Moe   Psychosocial   Psychosocial (WDL) WDL   Patient Behaviors/Mood Anxious   Restrictions/Precautions   Precautions Contact/isolation   Braces or Orthoses Prosthesis  (LLE prosthetic, poor fit noted, pt unable to tolerate)   Pain Assessment   Pain Assessment No/denies pain   Pain Score No Pain   QI: Eating   Assistance Needed Independent   Eating CARE Score 6   Eating Assessment   Eating (FIM) 6 - Patient requires increased time or safety concern   QI: Oral Hygiene   Assistance Needed Independent   Oral Hygiene CARE Score 6   Grooming   Able To Initiate Tasks; Acquire Items;Comb/Brush Hair;Wash/Dry Face;Brush/Clean Teeth;Wash/Dry Hands   Findings pt completed grooming tasks at w/c level   Grooming (FIM) 6 - Patient requires assistive device/extra time/safety concerns but completes independently   QI: Shower/Bathe [address]; Adaptive equipment   Shower/Bathe Self CARE Score 6   Bathing   Assessed Bath Style Shower   Anticipated D/C Bath Style Tub   Able to Gather/Transport Yes   Able to Adjust Water Temperature Yes   Able to Wash/Rinse/Dry (body part) Left Arm;Right Arm;R Upper Leg;L Upper Leg;R Lower Leg/Foot;Chest;Abdomen;Perineal Area; Buttocks   Positioning Seated;Standing   Adaptive Equipment Tub Bench; Shower Bars   Findings  Pt completed bathing tasks seated on tub bench as pt would complete at home  Pt retrieved items at w/c level with LLE prosthetic doffed as pt would complete at home   Bathing (FIM) 6 - Patient requires assistive device/extra time/safety concerns but completes independently   QI: Upper Body Dressing   Assistance Needed Independent   Upper Body Dressing CARE Score 6   QI: [address]   Lower Body Dressing CARE Score 6   QI: Putting On/Taking Off [address]   Putting On/Taking Off Footwear CARE Score 6   QI: Picking Up Object   Reason if not Attempted Activity not applicable   Picking Up Object CARE Score 9   Dressing/Undressing Clothing   Able to  [place] Clothes Undergarment;Pants;Socks   [address]; Undergarment;Socks; Shoes   Positioning Supported Sit;Standing   Findings Pt completed all aspects of dressing at Mod I level with LLE doffed   Pt stating he cannot tolerated LLE prosthetic due to positioning and overall fit  Pt stating the prosthetic "digs into his groin and bottom" and he is unable to tolerate  with prosthetic donned, pt would req additional assist for dressing task   UB Dressing (FIM) 6 - Patient requires assistive device/extra time/safety concerns but completes independently   LB Dressing (FIM) 6 - Patient requires assistive device/extra time/safety concerns but completes independently   QI: [address]  Score 6   Toileting   Able to [address] down yes, up yes  Able to Manage Clothing Closures Yes   Manage Hygiene Bladder   Toileting (FIM) 6 - Patient requires assistive device/extra time/safety concerns but completes independently   QI: Sit to [address]   Sit to Stand CARE Score 6   QI: Chair/Bed-to-Chair Transfer   Assistance Needed Independent   Chair/Bed-to-Chair Transfer CARE Score 6   Transfer Bed/Chair/Wheelchair   Adaptive Equipment Roller Walker   Findings pt with G management of w/c brakes and arm rests for sit pivot and fxnl mob with RW  Pt completed all xfers with prosthethic doffed 2* poor tolerance fror prosthesis   Bed, Chair, Wheelchair Transfer (FIM) 6 - Patient requires assistive device/extra time/safety concerns but completes independently   QI: Toilet Transfer   Assistance Needed Independent   Toilet Transfer CARE Score 6   Toilet Transfer   Surface Assessed Standard Toilet   Findings Sit pivot xfer to standard toilet   Toilet Transfer (FIM) 6 - Patient requires assistive device/extra time/safety concerns but completes independently   Tub/Shower Transfer   Adaptive Equipment Transfer Bench;Grab Bars   Findings pt completed shower xfer at Mod I level with RW   Shower Transfer (FIM) 6 - Patient requires assistive device/extra time/safety concerns but completes independently   Comprehension   QI: Comprehension 4   Undestands: Clear comprehension without cues or repetitions   Comprehension (FIM) 6 - Understands complex/abstract but requires more time   Expression   QI: Expression 4  Express complex messages without difficulty and with speech that is clear and easy to [place]   Expression (FIM) 7 - Expresses complex/abstract ideas in a reasonable time w/o devices or helper  Social Interaction   Social Interaction (FIM) 6 - Interacts appropriately with others BUT requires medication for control   Problem Solving   Problem solving (FIM) 6 - Solves complex problems BUT requires extra time   Memory   Memory (FIM) 6 - Recognizes with extra time   RUE Assessment   RUE Assessment WFL   LUE Assessment   LUE Assessment WFL   Cognition   Overall Cognitive Status WFL   Arousal/Participation Alert; Cooperative   Attention Within functional limits   Orientation Level Oriented X4   Memory Within functional limits   Following Commands Follows all commands and directions without difficulty   Discharge Information   Vocational Plan Retired/not working   Patient's Discharge Plan d/c home with [facility] support   Patient's Rehab Expectations "To be able to walk with my prosthetic"   Barriers to Discharge Home Limited Family Support; Safety Considerations   Impressions Pt is 62 y/o male presenting to [facility] for LLE prosthetic training  Pt unable to tolerate prosthetic at this time 2* fit  Pt overall Mod I with LLE prosthetic doffed for sit pivot, fxnl mob and ADL fxn with RW and w/c  Pt ELOS 7-10 days     OT Therapy Minutes   OT Time In 0830   OT Time Out 1000   OT Total Time (minutes) 90   OT Mode of treatment - Individual (minutes) 90   OT Mode of treatment - Concurrent (minutes) 0   OT Mode of treatment - Group (minutes) 0   OT Mode of treatment - Co-treat (minutes) 0   OT Mode of Teatment - Total time(minutes) 90 minutes

## 2017-11-21 NOTE — PROGRESS NOTES
Physical Medicine and Rehabilitation Progress Note:  Amputation:  05 5  Bilateral Lower Limb Above the Knee  Andreea Pereira 61 y o  male MRN: [id number]  Unit/Bed#: -01 Encounter: [id number]    Assessment: Mary Lees is a 61 y o  male who presented to the [address] with diffuse abdominal pain, hematemesis  He was previously admitted for the same complaints and EGD revealed esophagitis  On this admission CT of the chest abdomen pelvis demonstrated pulmonary emphysema, no definite esophageal abnormalities  Repeat EGD was performed on 11/16 with demonstrated reflux esophagitis, gastritis, and mallorry-mantilla tear at GE junction  Of note patient has history of mitral valve replacement for which he was anticoagulated with coumadin (INR of 2 57)  He was initially on Protonix drip which was converted to PO form       Patient also has history of AKA, performed on 09/2017  He has recently received prosthesis, but has had no training as of yet  Patient accepted to [facility] for prosthesis, gait training on 11/20/17  Subjective: Patient was seen at bedside and was working with therapy  He denies any complaints and reports that his pain is well controlled  No additional complaints at this time  ROS: A 10-point ROS was performed  Negative except as listed above  Restrictions include: Fall precautions    Disposition: TBD    Plan:  1    Current Medical Problems/Risks/Management:  # Amputation: left AKA secondary to PAD  - Acute comprehensive interdisciplinary inpatient rehabilitation including PT, OT, SLP, RN, CM, SW, dietary, psychology, etc   - Appreciate Internal Medicine following - Dr Navjot Yung service   - S/p left AKA on 8/24/17 by Dr Zelda Greenberg  - Continue prosthetic training by PT/OT  - continue gabapentin for phantom limb pain, titrate up as tolerated  - f/u vascular surgery as outpatient     # GI bleed  - s/p EGD  - secondary to reflux esophagitis, gastritis, and mallorry-mantilla tear at GE junction  - IM is monitoring hemoglobin  - Continue protonix   - GI f/u as outpatient     # SEAN  - Continue monitoring kidney function  - encourage fluid intake     Results from last 7 days  Lab Units 11/19/17  0624 11/17/17  0510 11/16/17  0551   CREATININE mg/dL 1 11 0 84 1 14      # DM  - Continue ISS  - Continue finger sticks     Results from last 7 days  Lab Units 11/19/17  0624 11/17/17  0510 11/16/17  0551   GLUCOSE RANDOM mg/dL 156* 147* 141*      # HTN/CAD/s/p MV replacement/CHF  - Continue Atorvastatin  - Continue torsemide  - Continue Lisinopril  - Continue Metoprolol   - IM team dosing warfarin     Temp:  [97 4 °F (36 3 °C)-98 3 °F (36 8 °C)] 98 2 °F (36 8 °C)  HR:  [68-80] 68  Resp:  [16-18] 18  BP: (110-149)/(59-82) 149/80        Results from last 7 days  Lab Units 11/19/17  0624 11/18/17  0524 11/17/17  0510   INR   2 70* 2 90* 3 25*      # Hypokalemia  - supplement as needed        Results from last 7 days  Lab Units 11/19/17  0624 11/17/17  0510 11/16/17  0551   POTASSIUM mmol/L 3 4* 3 5 3 6       # Pain  - Continue tylenol PRN, for max of 3gm daily     - Continue Norco PRN   - Continue gabapentin      # Rehab Psych  - Continue lorazepam PRN  - continue seroquel QHS  - Neuropsych consult, appreciate recs     # FENA/prophy  - Diet: diabetic   SLP and nutrition to monitor and adjust as necessary   - DVT prophy: Sequential compression device (Venodyne)  and Warfarin (Coumadin)  - GI ppx: Pantaprazole  - Bowel: colace , dulcolax suppository  and miralax PRN  - Nausea: Zofran PRN  - Supplements: None  - Sleep: None      CODE: Level 1: Full Code      Objective:  Nursing staff reporting: no problems    Functional Update:  Physical Therapy Occupational Therapy Speech Therapy   Pending re-evaluation     UB Dressing (FIM) 6 - Patient requires assistive device/extra time/safety concerns but completes independently   LB Dressing (FIM) 6 - Patient requires assistive device/extra time/safety concerns but completes independently     Toileting (FIM) 6 - Patient requires assistive device/extra time/safety concerns but completes independently     Bed, Chair, Wheelchair Transfer (FIM) 6 - Patient requires assistive device/extra time/safety concerns but completes independently     Shower Transfer (FIM) 6 - Patient requires assistive device/extra time/safety concerns but completes independently                Allergies and Medications per EMR    Physical Exam:  General: alert, no apparent distress, cooperative and comfortable  HENMT: Head: Normal, normocephalic, atraumatic  Eye: Normal external eye, conjunctiva, lids   Ears: Normal external ears  Nose: Normal external nose, mucus membranes  Pulmonary: chest expansion normal, no retractions, no accessory muscle usage  Abdomen: soft, nontender, nondistended, no masses or organomegaly  Skin/Extremity: no rashes, no erythema, no peripheral edema  Incisions: C/D/I, with no erythema  Staples in place  Steristrips in place  Dressing with/without strikethrough  Neurologic: Awake alert orientedx3  Psych: normal mood, behavior, speech, dress, and thought processes  Musculoskeletal - Strength:   Right  Left  Site  Right  Left  Site    5 5  S Ab: Shoulder Abductors  5  5  HF: Hip Flexors    5 5  EF: Elbow Flexors  5/   5  5/   5  H Ab: Hip Abductors/   H Ad: Hip Adductors    5  5  EE: Elbow Extensors  5 NA KF: Knee Flexors    5  5  WE: Wrist Extensors  5 NA KE: Knee Extensors    5  5  FF: Finger Flexors  5 NA DR: Dorsi Flexors    5  5  HI: Hand Intrinsics  5 NA PF: Plantar Flexors        Diagnostic Studies: reviewed, no new imaging  No results found      Vitals:  Temp:  [97 8 °F (36 6 °C)-98 2 °F (36 8 °C)] 98 2 °F (36 8 °C)  HR:  [59-73] 66  Resp:  [18-20] 20  BP: (117-149)/(72-82) 125/76   Intake/Output Summary (Last 24 hours) at 11/21/17 1329  Last data filed at 11/21/17 1100   Gross per 24 hour   Intake              832 ml   Output                0 ml   Net              832 ml        Laboratory:    Lab Results   Component Value Date    HGB 13 7 11/21/2017    HCT 40 1 11/21/2017    WBC 9 54 11/21/2017     Lab Results   Component Value Date    BUN 28 (H) 11/21/2017     11/21/2017    K 4 1 11/21/2017     11/21/2017    GLUCOSE 173 (H) 11/21/2017    GLUCOSE 273 (H) 04/18/2017    CREATININE 1 19 11/21/2017     Lab Results   Component Value Date    PROTIME 31 6 (H) 11/21/2017    INR 3 00 (H) 11/21/2017        Patient Active Problem List   Diagnosis    Hypertension    Bipolar 1 disorder ([facility] [address] [facility]    [facility]    Hiatal hernia    Erosive esophagitis    CAD (coronary artery disease)    Pacemaker    Chronic systolic congestive [facility] failure [facility]    Peripheral artery disease (HCC)    History of mitral valve replacement with mechanical valve    Bipolar 1 disorder, depressed (HCC)    Bullous dermatosis    GI bleed    Acute upper GI bleed    Status post above knee amputation of left lower extremity (HCC)       ** Please Note: Fluency Direct voice to text software may have been used in the creation of this document  **    [ x ] Total time spent: 30 Mins, and greater than 50% of this time was spent counseling/coordinating care

## 2017-11-21 NOTE — PROGRESS NOTES
11/21/17 1505   Pain Assessment   Pain Score 5   Pain Location Head   Hospital Pain Intervention(s) Medication (See MAR)   Restrictions/Precautions   Precautions Fall Risk;Contact/isolation   Cognition   Arousal/Participation Cooperative   Subjective   Subjective pt reported feeling well and ready for therapy    QI: Roll Left and Right   Assistance Needed Independent   Roll Left and Right CARE Score 6   QI: Sit to Lying   Assistance Needed Independent   Sit to Lying CARE Score 6   QI: Lying to Sitting on Side of Bed   Assistance Needed Independent   Lying to Sitting on Side of Bed CARE Score 6   QI: Sit to Stand   Assistance Needed Supervision; Adaptive equipment   Sit to Stand CARE Score 4   QI: Chair/Bed-to-Chair Transfer   Assistance Needed Independent; Adaptive equipment  (sit pivots)   Chair/Bed-to-Chair Transfer CARE Score 6   Transfer Bed/Chair/Wheelchair   Limitations Noted In Balance; Endurance;LE Strength   Sit Pivot Modified Independent   Sit to Stand Supervision;Minimal Assist   Stand to Sit Supervision;Minimal Assist   Supine to Sit Independent   Sit to Supine Independent   Findings S for sit<>stands when using BUE support on arm rests/parallel bars, and Raoul when not using any UE support  practiced repeated sit<>Stands and then partial stands from elevated high lo mat with minimal use of BUE support to work on strengthening RLE and working on ant weight shift to help facilitate use of RLE to stand and not pushing on/using mat for support  Bed, Chair, Wheelchair Transfer (FIM) 2 - [place] needs to lift or boost to rise AND assist to sit   Ambulation   Primary Discharge Mode of Locomotion Wheelchair   QI: Wheel 50 Feet with Two [address] 50 Feet with Two Turns CARE Score 6   QI: Wheel 150 [address] 150 Feet CARE Score 6   Wheelchair mobility   QI: Does the patient use a wheelchair? 1  Yes   QI: Indicate the type of wheelchair 1  Manual   Wheelchair Assist Level Modified Independent   Method Right upper extremity; Left upper extremity;Right lower extremity   Distance Level Surface (feet) 200 ft   Wheelchair (FIM) 6 - Patient wheels 150 feet or more, no rests AND independently, timely and safely   Therapeutic Interventions   Strengthening seated with upright posture, unsupported, using orange theraband for b/l shoulder ER to focus on strengthening rotator cuff muscles, 4 sets total and gave pt band for in his room  standing calf raises RLE iwth BUE support on parallel bars, 2 sets total  prone hip ext LLE with pillow under hips; pt reports this is not comfortable on LLE due to where his femu bone is situated in his leg and the pressure that is on it when he is prone  standing in parallel bars, L hip ext without and with resistance from orange theraband with BUE support on bars    Balance practiced static standing x3 times in parallel bars without holding on or with use of prosthetic leg, pt able to stand for about 60 sec each time  Other practiced floor<>stand/high lo mat transfers x6 with pt being able to lower himself to the mat, practiced crab walking/scooting on his bottom forward and back to mimic needing to move at home to get to a couch/stable surface, facing the mat with BUE on the mat and R knee on floor, bumping his leg/body up enough to pivot over and sit down  discsused different ways pt can position himself to perform this, that he would need to take the prosthetic off if he were wearing it at home and he fell  pt demonstrated undersatnding  Other Comments   Comments session foucsed on standing balance, therex for strenghening RLE and b/l shoulders; mat therex, and reviewed/practiced how to perform floor <>stand transfers from high lo mat, with mat on floor  Assessment   Treatment Assessment Pt cont to demonstrate good undersatnding of how to set up sit pivot transfers safely   Reviewed partial stands from [facility] for pressure relief, which pt is able to perform to adequately offload bottom  Pt demonstrates good overall strength and balance and was able to practice using toe extension to get more leverage for performing a partial stand from the floor with BUE on the high lo mat to get his body up and then pivot to sit down  Pt will cont to benefit from skilled PT to further progress overall strength, balace and activity tolerance to further progress I with functional tasks and to assist in his progress with prosthetic training  PT Barriers   Physical Impairment Decreased strength;Decreased endurance; Impaired balance   Functional Limitation Stair negotiation   Plan   Treatment/Interventions Functional transfer training;LE strengthening/ROM; Therapeutic exercise; Endurance training;Elevations; Patient/family training;Equipment eval/education; Bed mobility;Gait training   Progress Progressing toward goals   Recommendation   Recommendation Outpatient PT; Home with family support   Equipment Recommended Wheelchair;Walker   PT Therapy Minutes   PT Time In 1505   PT Time Out 1600   PT Total Time (minutes) 55   PT Mode of treatment - Individual (minutes) 45   PT Mode of treatment - Concurrent (minutes) 10   PT Mode of treatment - Group (minutes) 0   PT Mode of treatment - Co-treat (minutes) 0   PT Mode of Teatment - Total time(minutes) 55 minutes   Therapy Time missed   Time missed?  No

## 2017-11-22 LAB
GLUCOSE SERPL-MCNC: 117 MG/DL (ref 65–140)
GLUCOSE SERPL-MCNC: 152 MG/DL (ref 65–140)
GLUCOSE SERPL-MCNC: 165 MG/DL (ref 65–140)
GLUCOSE SERPL-MCNC: 173 MG/DL (ref 65–140)

## 2017-11-22 PROCEDURE — 97530 THERAPEUTIC ACTIVITIES: CPT | Performed by: OCCUPATIONAL THERAPY ASSISTANT

## 2017-11-22 PROCEDURE — 82948 REAGENT STRIP/BLOOD GLUCOSE: CPT

## 2017-11-22 PROCEDURE — 97110 THERAPEUTIC EXERCISES: CPT

## 2017-11-22 PROCEDURE — 97110 THERAPEUTIC EXERCISES: CPT | Performed by: OCCUPATIONAL THERAPY ASSISTANT

## 2017-11-22 PROCEDURE — 97530 THERAPEUTIC ACTIVITIES: CPT

## 2017-11-22 RX ADMIN — ATORVASTATIN CALCIUM 10 MG: 10 TABLET, FILM COATED ORAL at 16:51

## 2017-11-22 RX ADMIN — INSULIN LISPRO 1 UNITS: 100 INJECTION, SOLUTION INTRAVENOUS; SUBCUTANEOUS at 07:45

## 2017-11-22 RX ADMIN — GABAPENTIN 300 MG: 300 CAPSULE ORAL at 21:45

## 2017-11-22 RX ADMIN — WARFARIN SODIUM 5 MG: 5 TABLET ORAL at 18:32

## 2017-11-22 RX ADMIN — QUETIAPINE FUMARATE 400 MG: 100 TABLET ORAL at 21:45

## 2017-11-22 RX ADMIN — LORAZEPAM 0.5 MG: 0.5 TABLET ORAL at 18:33

## 2017-11-22 RX ADMIN — GABAPENTIN 300 MG: 300 CAPSULE ORAL at 16:51

## 2017-11-22 RX ADMIN — LISINOPRIL 10 MG: 10 TABLET ORAL at 09:50

## 2017-11-22 RX ADMIN — LORAZEPAM 0.5 MG: 0.5 TABLET ORAL at 12:44

## 2017-11-22 RX ADMIN — PANTOPRAZOLE SODIUM 40 MG: 40 TABLET, DELAYED RELEASE ORAL at 06:16

## 2017-11-22 RX ADMIN — METOPROLOL SUCCINATE 25 MG: 25 TABLET, EXTENDED RELEASE ORAL at 09:51

## 2017-11-22 RX ADMIN — INSULIN LISPRO 1 UNITS: 100 INJECTION, SOLUTION INTRAVENOUS; SUBCUTANEOUS at 12:08

## 2017-11-22 RX ADMIN — INSULIN GLARGINE 14 UNITS: 100 INJECTION, SOLUTION SUBCUTANEOUS at 21:45

## 2017-11-22 RX ADMIN — PANTOPRAZOLE SODIUM 40 MG: 40 TABLET, DELAYED RELEASE ORAL at 16:51

## 2017-11-22 RX ADMIN — GABAPENTIN 300 MG: 300 CAPSULE ORAL at 09:51

## 2017-11-22 RX ADMIN — TORSEMIDE 20 MG: 20 TABLET ORAL at 09:50

## 2017-11-22 RX ADMIN — INSULIN LISPRO 1 UNITS: 100 INJECTION, SOLUTION INTRAVENOUS; SUBCUTANEOUS at 21:46

## 2017-11-22 RX ADMIN — NICOTINE 14 MG: 14 PATCH, EXTENDED RELEASE TRANSDERMAL at 09:52

## 2017-11-22 NOTE — TEAM CONFERENCE
Acute RehabilitationTeam Conference Note  Date: 11/22/2017   Time: 10:36 AM       Patient Name:  Ned Dias       Medical Record Number: [id number]   YOB: 1960  Sex:  Male          Room/Bed:  [facility]6/[facility] 966-01  Payor Info:  Payor: [employer] / Plan: [employer] / Product Type: Medicaid HMO /      Admitting Diagnosis: GI bleed [K92 2]   Admit Date/Time:  11/20/2017  5:09 PM  Admission Comments: No comment available     Primary Diagnosis:  Status post above knee amputation of left lower extremity ([facility])  Principal Problem: Status post above knee amputation of left lower extremity [facility])    Patient Active Problem List    Diagnosis Date Noted    Status post above knee amputation of left lower extremity ([facility] ) 11/20/2017    Acute upper GI bleed 11/15/2017    GI bleed 05/25/2017    Peripheral artery disease ([place] [address] ) 05/24/2017    History of mitral valve replacement with mechanical valve 05/24/2017    Bipolar 1 disorder, depressed ([place] [address] ) 05/24/2017    Bullous dermatosis 05/24/2017    Erosive esophagitis 04/18/2017    Chronic systolic congestive heart failure ([place] [address] ) 04/18/2017    CAD (coronary artery disease)     Pacemaker     Hypertension 04/02/2017    Bipolar 1 disorder ([place] [address] ) 04/02/2017    Diabetes mellitus ([place] [address] ) 04/02/2017    Hiatal hernia 04/02/2017       Physical Therapy:    Weight Bearing Status: Full Weight Bearing (RLE only)  Transfers: Modified Independent (for sit pivots, )  Bed Mobility: Independent  Amulation Distance (ft): 40 feet  Ambulation: Contact Guard  Assistive Device for Ambulation: Roller Walker  Wheelchair Mobility Distance: 200 ft  Wheelchair Mobility: Independent  Number of Stairs: 2  Assistive Device for Stairs: [address]: Minimal Assistance  Discharge Recommendations: Home with:  [address] with[de-identified] Family Support, First Floor Setup, Outpatient Physical Therapy    Pt presents with good understanding of how to perform functional transfers and tasks as he has been using WC and RW at home PTA as he has been healing from a L AKA  Pt is here primarily for prosthetic training, however prosthetic is not fitting well so therapy performed without it  Pt is participating in therapy well and we have been working on higher level balance and functional tasks to help further progress strength and balance for using a prosthetic leg and reviewing floor<>stand transfers  Occupational Therapy:  Eating: Modified Independent  Grooming: Modified Independent  Bathing: Modified Independent  Bathing: Modified Independent  Upper Body Dressing: Modified Independent  Lower Body Dressing: Modified Independent  Toileting: Modified Independent  Tub/Shower Transfer: Modified Independent  Toilet Transfer: Tally Leventhal           Pt is 62 y/o male presenting to [facility] for LLE prosthetic training  Pt unable to tolerate prosthetic at this time 2* fit  Pt overall Mod I with LLE prosthetic doffed for sit pivot, fxnl mob and ADL fxn with RW and w/c  Pt ELOS 7-10 days  OT spoke with CM, CM spoke with pt's dtr  Per dtrs reports, pt is unable to return to dtrs home  Pt has provided therapists with inaccurate information regarding d/c plan and PLOF  Speech Therapy:           No notes on file    Nursing Notes:  Appetite: Good  Diet Type: Cardiac                      Diet Patient/Family Education Complete:  Yes                            Bladder: 5 - Supervision     Bladder Patient/Family Education: Yes  Bowel: 7 - Complete [place]     Bowel Patient/Family Education: Yes  Pain Location: Head  Pain Orientation: Left  Pain Score: 0                       Hospital Pain Intervention(s): Medication (See MAR), Rest  Pain Patient/Family Education: Yes  Medication Management/Safety  Injectable: Insulin  Safe Administration: Yes  Medication Patient/Family Education Complete: Yes    Pt admitted with Left above knee amputation, recent hx of GI bleed, QID blood sugar checks, on coumadin for MVR, pain managed with Tylenol and Norco, on Seroquel for bipolar disorder, LCW pacemaker, This  week we will continue to monitor vital signs and lab results, we will encourage independence with ADLS, safety with transfers to prevent falls  Case Management:     Discharge Planning  Living Arrangements: Children  Support Systems: Children  Assistance Needed: yes  Type of Current Residence: Private residence  Current Home Care Services: Yes  Type of Current Home Care Services: Home health aide ( due to insurance)  Pt is participating with therapy and is mod I with w/c mobility  Pt's admission is for prosthetic training however is homeless  Pt was sleeping in a camper on his dtr's property but it is not possible for him to return there  Pt is originally from [place] and would like to return there  Cm is unsure of dc plans at this time  Pt may be dc'd to a shelter  Is the patient actively participating in therapies? yes  List any modifications to the treatment plan:     Barriers Interventions   homeless Provide homeless shelter info   Prosthesis not fitting Prosthesis being adjusted                 Is the patient making expected progress toward goals?  yes  List any update or changes to goals:     Medical Goals: Patient will be medically stable for discharge to [facility] upon completion of rehab program and Patient will be able to manage medical conditions and comorbid conditions with medications and follow up upon completion of rehab program    Weekly Team Goals:   Rehab Team Goals  ADL Team Goal: Patient will be independent with ADLs with least restrictive device upon completion of rehab program  Bowel/Bladder Team Goal: Patient will return to premorbid level for bladder/bowel management upon completion of rehab program  Transfer Team Goal: Patient will be independent with transfers with least restrictive device upon completion of rehab program  Locomotion Team Goal: Patient will be independent with locomotion with least restrictive device upon completion of rehab program  Cognitive Team Goal: Patient will be independent for basic and complex tasks upon completion of rehab program    Discussion: in attendance to review pts progress is rn pt ot cm and physician  Pt is mod I with w/c mobility and is present for prosthesis training  Goals are for pt to be mod I with prosthetic and a roller walkr  Pt has a multitude of social issues and is homeless  Dr martin plans on pt being ready for dc by 11/29       Anticipated Discharge Date:  11/29/17

## 2017-11-22 NOTE — SOCIAL WORK
bradyg left on voice mail of pts dtr bro asking her to bring in pt's equipment that he was using prior to admission

## 2017-11-22 NOTE — PCC OCCUPATIONAL THERAPY
Pt is 62 y/o male presenting to [facility] for LLE prosthetic training  Pt unable to tolerate prosthetic at this time 2* fit  Pt overall Mod I with LLE prosthetic doffed for sit pivot, fxnl mob and ADL fxn with RW and w/c  Pt ELOS 7-10 days  OT spoke with CM, CM spoke with pt's dtr  Per dtrs reports, pt is unable to return to dtrs home  Pt has provided therapists with inaccurate information regarding d/c plan and PLOF

## 2017-11-22 NOTE — PROGRESS NOTES
11/22/17 0830   Pain Assessment   Pain Score No Pain   Restrictions/Precautions   Precautions Contact/isolation   Weight Bearing Restrictions No   Cognition   Arousal/Participation Alert; Cooperative   Attention Within functional limits   Memory Within functional limits   Following Commands Follows all commands and directions without difficulty   Subjective   Subjective Pt  has no complaints  Ready to participate in therapy    QI: Sit to Stand   Assistance Needed Incidental touching   Sit to Stand CARE Score 4   QI: Chair/Bed-to-Chair Transfer   Assistance Needed Independent   Comment sit pivot    Chair/Bed-to-Chair Transfer CARE Score 6   Transfer Bed/Chair/Wheelchair   Adaptive Equipment Roller Walker   Sit Pivot Modified Independent   Sit to Stand Contact Guard   Stand to Sit Supervision   Findings mod I with sit pivots in room    Bed, Chair, Wheelchair Transfer (FIM) 4 - Patient requires steadying assist or light touching   QI: Car Transfer   Assistance Needed Incidental touching   Car Transfer CARE Score 4   QI: Walk 10 Feet   Assistance Needed Incidental touching   Walk 10 Feet CARE Score 4   QI: Walk 50 Feet with Two Turns   Reason if not Attempted Activity not applicable   Walk 50 Feet with Two Turns CARE Score 9   QI: Walk 150 Feet   Reason if not Attempted Activity not applicable   Walk 727 Feet CARE Score 9   QI: Walking 10 Feet on Uneven Surfaces   Reason if not Attempted Activity not applicable   Walking 10 Feet on Uneven Surfaces CARE Score 9   Ambulation   Does the patient walk? 2  Yes   Primary Discharge Mode of Locomotion Walk;Wheelchair   Walk Assist Level Contact Guard   Gait Pattern (R LE hopping )   Assist Device Roller Walker   Distance Walked (feet) 35 ft   Limitations Noted In Balance; Endurance   Walking (FIM) 1 - Patient ambulates less than 49 feet regardless of assist/device/set up   QI: Wheel 50 Feet with Two Turns   Assistance Needed Independent   Wheel 50 Feet with Two Turns CARE Score 6 QI: Wheel 150 Feet   Assistance Needed Independent   Wheel 150 Feet CARE Score 6   Wheelchair mobility   QI: Does the patient use a wheelchair? 1  Yes   QI: Indicate the type of wheelchair 1  Manual   Wheelchair Assist Level Modified Independent   QI: 1 Step (Curb)   Reason if not Attempted Safety concerns   1 Step (Curb) CARE Score 88   QI: 4 Steps   Reason if not Attempted Safety concerns   4 Steps CARE Score 88   QI: 12 Steps   Reason if not Attempted Activity not applicable   12 Steps CARE Score 9   Therapeutic Interventions   Strengthening standing L hip abd, flexion and extension, R heel and toe raises    Other floor transfers from mat to floor    Equipment Use   NuStep Level 2 X 10 mins    Other Comments   Comments Joan from [facility] present around 9 am and tried to fit L prosthesis on pt  assessed points where pt  is having pain  [facility] made measurements and took prosthetic leg to office to make some adjustments  Will be going back later to fit prosthesis again  Assessment   Treatment Assessment Pt  tolerated tx well but c/o fatigue on R LE towards the end of session  Trialed donning prosthesis while [facility] from [facility] was here but pt  cont to have some pain by the groin, and ischial tuberosity area  Tx focused this session on residual limb strengthening, car transfers and floor transfers  WIll resume prosthetic training again once prosthetic leg has a much better fit and with less pain from pt  Problem List Decreased endurance; Impaired balance   Barriers to Discharge Decreased caregiver support   PT Barriers   Physical Impairment Decreased endurance; Impaired balance   Functional Limitation Stair negotiation;Standing;Transfers; Walking   Plan   Treatment/Interventions Functional transfer training;LE strengthening/ROM; Elevations; Therapeutic exercise; Endurance training;Patient/family training;Equipment eval/education; Bed mobility;Gait training   Recommendation   Recommendation Outpatient PT; Home with family support   Equipment Recommended Wheelchair;Walker   PT Therapy Minutes   PT Time In 0830   PT Time Out 0930   PT Total Time (minutes) 60   PT Mode of treatment - Individual (minutes) 60   PT Mode of treatment - Concurrent (minutes) 0   PT Mode of treatment - Group (minutes) 0   PT Mode of treatment - Co-treat (minutes) 0   PT Mode of Teatment - Total time(minutes) 60 minutes   Therapy Time missed   Time missed?  No

## 2017-11-22 NOTE — PROGRESS NOTES
Internal Medicine Progress Note  Patient: Rashida Sanches  Age/sex: 61 y o  male  Medical Record #: [id number]      ASSESSMENT/PLAN:  Rashida Sanches is seen and examined and mangement for following issues:    1  Upper GI bleed due to esophagitis and Joan-Perez tear:  hemoglobin stable  Continue Protonix 40 mg b i d     2   PAD; status post left AKA (8/2017):  Prosthetic training per primary service  3   History of mechanical mitral valve replacement:  Continue Coumadin 5mg qd with goal INR 2 5-3 5  INR 3 yesterday  INR AM   For procedures in the future he would need to be bridged (per Dr Ebony Padilla note in office) but see notes from 8/2017 admit since there was concern for HIT even though labs neg    4  Hypertension:  stable; continue Lisinopril 10 mg daily and Toprol XL 25 mg daily  5   Chronic systolic CHF/LVEF 20%:  Continue Demadex 20 mg daily and monitor daily weights  May need to add back his daily KCL dose = BMP in AM    6   DM 2:  Continue Lantus 14 units at bedtime  Patient uses Lantus 21 units qhs at home  Continue Accuchecks QID/SSI and DM diet  , 211, 110, 158; fasting today 152    7  Leukocytosis:  resolved  Septic workup was negative  Likely reactive  Observe off antibiotics  8   Nicotine abuse:  Was on 7mg patch but not effective to curb cravings so increased yesterday to 14mg    9  ICD:  Follows with Dr Naima Traore and [facility] device clinic    10  Hx bipolar disorder: Only on Seroquel    11   ?hx HIT:  See notes from 8/2017 admit; 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 and even though labs negative 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 and 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      Subjective: Patient seen and examined   New or overnight issues     ROS:   GI: denies abdominal pain, change bowel habits or reflux symptoms  Neuro: No new neurologic changes  Respiratory: No Cough, SOB  Cardiovascular: No CP, palpitations     Scheduled Meds:    atorvastatin 10 mg Oral Daily With Dinner   docusate sodium 100 mg Oral BID   gabapentin 300 mg Oral TID   insulin glargine 14 Units Subcutaneous HS   insulin lispro 1-5 Units Subcutaneous 4x Daily (AC & HS)   lisinopril 10 mg Oral Daily   metoprolol succinate 25 mg Oral Daily   nicotine 14 mg Transdermal Daily   pantoprazole 40 mg Oral BID AC   QUEtiapine 400 mg Oral HS   torsemide 20 mg Oral Daily   warfarin 5 mg Oral Daily (warfarin)       Labs:       Results from last 7 days  Lab Units 11/21/17  1224 11/18/17  0524   WBC Thousand/uL 9 54 8 43   HEMOGLOBIN g/dL 13 7 12 6   HEMATOCRIT % 40 1 36 6   PLATELETS Thousands/uL 203 163       Results from last 7 days  Lab Units 11/21/17  1224 11/19/17  0624   SODIUM mmol/L 137 139   POTASSIUM mmol/L 4 1 3 4*   CHLORIDE mmol/L 106 106   CO2 mmol/L 25 23   BUN mg/dL 28* 21   CREATININE mg/dL 1 19 1 11   GLUCOSE RANDOM mg/dL 173* 156*   CALCIUM mg/dL 8 9 8 8           Results from last 7 days  Lab Units 11/21/17  0513 11/19/17  0624   INR  3 00* 2 70*        Glucose (mg/dL)   Date Value   11/21/2017 173 (H)   11/19/2017 156 (H)   11/17/2017 147 (H)   11/16/2017 141 (H)     Glucose, i-STAT (mg/dl)   Date Value   04/18/2017 273 (H)       Labs reviewed    Physical Examination:  Vitals:   Vitals:    11/21/17 0500 11/21/17 1415 11/21/17 2013 11/22/17 0500   BP: 125/76 106/63 115/65 107/67   Pulse: 66 69 67 65   Resp: 20 20 18 20   Temp: 98 2 °F (36 8 °C) 98 1 °F (36 7 °C) 97 7 °F (36 5 °C) 98 7 °F (37 1 °C)   TempSrc: Oral Oral Oral Oral   SpO2: 99% 99% 98% 98%   Weight:    76 8 kg (169 lb 5 oz)   Height:           GEN: NAD  HEENT: NC/AT  RESP: BBS w/o crackles/wheeze/rhonci; resp unlabored  CV: +S1 S2, regular rate, no rubs/murmurs; +prosthetic valve click  ABD: soft, NT, ND, normal BS   : no bella  EXT: no edema  Skin: no rashes  Neuro: AAO      [x ] Total time spent: 30 Mins and greater than 50% of this time was spent counseling/coordinating care        Beatriz Machuca, [facility]  Internal Medicine

## 2017-11-22 NOTE — PROGRESS NOTES
11/22/17 1130   Assessment   Treatment Assessment Gopiwayne Sam from [facility] back after trimming pt's prosthetic leg   Pt cont to have severe pain on ischial tuberosity when standing on prosthrtic leg  pt  though reported that all the other areas feels better but cont to not tolerate standing with pressure too much on ischial tuberosity  Hailyilya Flaco took again prosthetic leg to their office to make some more adjustments and will try to come back again later to fit pt  PT Therapy Minutes   PT Time In 1130   PT Time Out 1145   PT Total Time (minutes) 15   PT Mode of treatment - Individual (minutes) 15   PT Mode of treatment - Concurrent (minutes) 0   PT Mode of treatment - Group (minutes) 0   PT Mode of treatment - Co-treat (minutes) 0   PT Mode of Teatment - Total time(minutes) 15 minutes   Therapy Time missed   Time missed?  No

## 2017-11-22 NOTE — PROGRESS NOTES
Pt is agitated and upset about the realization that his dog may not be able to accompany him to a shelter  Pt was redirected and given anti-anxiety meds  Pt was allowed to voice his concerns and was asked to call facilities to ensure occupancy  Pt voiced that he would be distraught if he and his dog were apart and made reference to putting a rope around his neck  Dr Dea Flaherty made aware of pt's status
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
## 2017-11-22 NOTE — PLAN OF CARE
Individualized Plan of [address] 61 y o  male MRN: [id number]  Unit/Bed#: -01 Encounter: [id number]     PATIENT INFORMATION  ADMISSION DATE: 11/20/2017  5:09 PM AISHA CATEGORY:Amputation:  05 3  Unilateral Lower Limb Above the Knee   ADMISSION DIAGNOSIS: GI bleed [K92 2]  EXPECTED LOS: 4-7 days     MEDICAL/FUNCTIONAL PROGNOSIS  Based on my assessment of the patient's medical conditions and current functional status, the prognosis for attaining medical and functional goals or the IRF stay is:  Good    Medical Goals: Patient will be medically stable for discharge to [facility] restrictive envrionment upon completion of rehab program    ANTICIPATED DISCHARGE [place]: Home - alone    ANTICIPATED FOLLOW-UP SERVICE:   Outpatient Therapy Services: PT and OT      Home Health Services: PT, OT and Nursing    DISCIPLINE SPECIFIC PLANS:  Required Disciplines & Services: Rehabillitation Nursing, Case Management, Dietay/Nutrition, Diabetes Education, Prosthetics/Orthostics and Psychology    REQUIRED THERAPY:  Therapy Hours per Day Days per Week Total Days   Physical Therapy 1 5 5 5   Occupational Therapy 1 5 5 5   NOTE: Additional therapy time(s) may be added as appropriate to meet patient needs and to achieve functional goals  REQUIRED THERAPY:  Therapy Hours per Day Days per Week Total Days   Physical Therapy 1 5 5   Occupational Therapy 1 5 5   Speech/Language Therapy 1 5 5   NOTE: Additional therapy time(s) may be added as appropriate to meet patient needs and to achieve functional goals      Patient will either participate in above therapy regimen or participate in 900 minutes of therapy within 7 day week consisting of PT and OT due to the following medical procedure/condition:Amputation:  05 3  Unilateral Lower Limb Above the Knee    ANTICIPATED FUNCTIONAL OUTCOMES:  ADL: Patient will be independent with ADLs with least restrictive device upon completion of rehab program   Bladder/Bowel: Patient will return to premorbid level for bladder/bowel management upon completion of rehab program   Transfers: Patient will be independent with transfers with least restrictive device upon completion of rehab program   Locomotion: Patient will be independent with locomotion with least restrictive device upon completion of rehab program   Cognitive: Patient will be independent for basic and complex tasks upon completion of rehab program     DISCHARGE PLANNING NEEDS  Equipment needs: Discharge needs to be reviewed with team    REHAB ANTICIPATED PARTICIPATION RESTRICTIONS:  None

## 2017-11-22 NOTE — PCC CARE MANAGEMENT
Pt is participating with therapy and is mod I with w/c mobility  Pt's admission is for prosthetic training however is homeless  Pt was sleeping in a camper on his dtr's property but it is not possible for him to return there  Pt is originally from [place] and would like to return there  Cm is unsure of dc plans at this time  Pt may be dc'd to a shelter

## 2017-11-22 NOTE — PROGRESS NOTES
assist/device/set up   QI: Wheel 50 Feet with Two [address] 50 Feet with Two Turns CARE Score 6   QI: Wheel 150 [address] 150 Feet CARE Score 6   Wheelchair mobility   QI: Does the patient use a wheelchair? 1  Yes   Wheelchair Assist Level Modified Independent   Method Right upper extremity; Left upper extremity   Distance Level Surface (feet) 150 ft   Wheelchair (FIM) 6 - Patient wheels 150 feet or more, no rests AND independently, timely and safely   Therapeutic Interventions   Balance standing without UE support @ // bars X 1 5 mins, balloon taps with only 1 UE support , stepping over red bolster @ // bars    Assessment   Treatment Assessment Pt  was very upset and was making some comments about not being able to bring his dog to the shelter  He stated that his dog gives him comfort and if something happens to his dog might as well "give me a rope and I will hang myself"   Nurse made aware of this comment  pt  though wanted to participate during session to take his mind of the situation  Prosthetist came back again this session with the prosthetic leg and donned it on pt  Patient stated that it fits a lot better and not anymore painful   Pt  then trialed to walk with it within the room about 5 feet at a time  Noted to have very unstable gait pattern when knee was unlock and prosthetist recommended to probably just keep knee in extension for now for optimum safety   SInce plan for pt  is to be d/c to a shelter and Aaron Doll from [place] stated that it would be safe to keep in extension when walking because probably she will not be able to follow up with pt  after [facility] d/c  Stated to pt  that therapy will cont to work on prosthetic training and gait training and see what would be best and safe for pt  Pt  able to show understanding with this  Also noted when pt  was turnign with prosthetic leg, residula limb tends to displace a little from prosthesis  Extra socks and pelvic belt might help with this  Jamesjean pierre Klinefelter will bring pelvic loop belt on monday/ tuenday  Problem List Decreased endurance; Impaired balance;Decreased mobility   Barriers to Discharge Inaccessible home environment;Decreased caregiver support   PT Barriers   Physical Impairment Decreased endurance; Impaired balance;Decreased mobility   Functional Limitation Ramp negotiation;Stair negotiation;Standing;Transfers; Walking   Plan   Treatment/Interventions Functional transfer training;LE strengthening/ROM; Elevations; Therapeutic exercise; Endurance training;Equipment eval/education; Bed mobility;Gait training   Recommendation   Recommendation Outpatient PT; Home with family support   Equipment Recommended Walker   PT Therapy Minutes   PT Time In 1300   PT Time Out 1345   PT Total Time (minutes) 45   PT Mode of treatment - Individual (minutes) 45   PT Mode of treatment - Concurrent (minutes) 0   PT Mode of treatment - Group (minutes) 0   PT Mode of treatment - Co-treat (minutes) 0   PT Mode of Teatment - Total time(minutes) 45 minutes   Therapy Time missed   Time missed?  No

## 2017-11-22 NOTE — PROGRESS NOTES
11/22/17 1000   Pain Assessment   Pain Assessment No/denies pain   Pain Score No Pain   Restrictions/Precautions   Precautions Contact/isolation   Weight Bearing Restrictions No   Grooming   Able To Initiate Tasks; Acquire Items; Shave;Wash/Dry Hands   Findings Pt completed shaving at sink seated in w/c    Grooming (FIM) 6 - Patient requires assistive device/extra time/safety concerns but completes independently   QI: Sit to Stand   Assistance Needed Incidental touching   Assistance Provided by [place] No physical assistance   Sit to Stand CARE Score 4   QI: Chair/Bed-to-Chair Transfer   Assistance Needed Independent   Comment sit pivot    Chair/Bed-to-Chair Transfer CARE Score 6   Transfer Bed/Chair/Wheelchair   Limitations Noted In Balance; Endurance;LE Strength   Adaptive Equipment None   Findings sit pivot from w/c    Bed, Chair, Wheelchair Transfer (FIM) 6 - Patient requires assistive device/extra time/safety concerns but completes independently   Exercise Tools   Exercise Tools Yes   UE Ergometer Pt completed seated UE arm exercises using [employer] arm bike with resistance level 1 5  Pt completed 10min forward and 10min backwards to increase overall endurance needed for completing functional transfers and standing tolerance  Pt tolerated exercise well with rest break between motions  Cognition   Overall Cognitive Status WFL   Arousal/Participation Alert; Cooperative   Attention Within functional limits   Orientation Level Oriented X4   Memory Within functional limits   Following Commands Follows all commands and directions without difficulty   Activity Tolerance   Activity Tolerance Patient tolerated treatment well   Assessment   Treatment Assessment Pt session focused on endurance, strengthening, functional transfers and overall standing tolerance  Pt completed seated exercise to increase endurance and tolerated this well  Pt is Mod I with w/c and sit pivot's   Pt toelrated standing lasting 10min using table top to completed B/L UE activity  Pt educated on safety with rest to prevent fatigue and reduce risk for falls  Pt will continue current POC to maximize functional abilities for Mod I level  Pt will continue practice with prosthetic when returned from company for adjustments  Problem List Decreased strength;Decreased endurance; Impaired balance;Decreased mobility   Barriers to Discharge Inaccessible home environment;Decreased caregiver support   Recommendation   OT Discharge Recommendation Home independent   OT Therapy Minutes   OT Time In 1000   OT Time Out 1130   OT Total Time (minutes) 90   OT Mode of treatment - Individual (minutes) 90   OT Mode of treatment - Concurrent (minutes) 0   OT Mode of treatment - Group (minutes) 0   OT Mode of treatment - Co-treat (minutes) 0   OT Mode of Teatment - Total time(minutes) 90 minutes   Therapy Time missed   Time missed?  No

## 2017-11-23 LAB
ANION GAP SERPL CALCULATED.3IONS-SCNC: 8 MMOL/L (ref 4–13)
BUN SERPL-MCNC: 32 MG/DL (ref 5–25)
CALCIUM SERPL-MCNC: 9 MG/DL (ref 8.3–10.1)
CHLORIDE SERPL-SCNC: 106 MMOL/L (ref 100–108)
CO2 SERPL-SCNC: 24 MMOL/L (ref 21–32)
CREAT SERPL-MCNC: 1.1 MG/DL (ref 0.6–1.3)
GFR SERPL CREATININE-BSD FRML MDRD: 74 ML/MIN/1.73SQ M
GLUCOSE SERPL-MCNC: 135 MG/DL (ref 65–140)
GLUCOSE SERPL-MCNC: 183 MG/DL (ref 65–140)
GLUCOSE SERPL-MCNC: 190 MG/DL (ref 65–140)
GLUCOSE SERPL-MCNC: 222 MG/DL (ref 65–140)
POTASSIUM SERPL-SCNC: 3.7 MMOL/L (ref 3.5–5.3)
SODIUM SERPL-SCNC: 138 MMOL/L (ref 136–145)

## 2017-11-23 PROCEDURE — 97530 THERAPEUTIC ACTIVITIES: CPT | Performed by: PHYSICAL THERAPIST

## 2017-11-23 PROCEDURE — 97110 THERAPEUTIC EXERCISES: CPT | Performed by: OCCUPATIONAL THERAPY ASSISTANT

## 2017-11-23 PROCEDURE — 80048 BASIC METABOLIC PNL TOTAL CA: CPT | Performed by: NURSE PRACTITIONER

## 2017-11-23 PROCEDURE — 97116 GAIT TRAINING THERAPY: CPT | Performed by: PHYSICAL THERAPIST

## 2017-11-23 PROCEDURE — 82948 REAGENT STRIP/BLOOD GLUCOSE: CPT

## 2017-11-23 PROCEDURE — 97110 THERAPEUTIC EXERCISES: CPT | Performed by: PHYSICAL THERAPIST

## 2017-11-23 RX ORDER — LORAZEPAM 0.5 MG/1
0.5 TABLET ORAL 3 TIMES DAILY
Status: DISCONTINUED | OUTPATIENT
Start: 2017-11-23 | End: 2017-11-29 | Stop reason: HOSPADM

## 2017-11-23 RX ORDER — LORAZEPAM 0.5 MG/1
0.25 TABLET ORAL 2 TIMES DAILY PRN
Status: DISCONTINUED | OUTPATIENT
Start: 2017-11-23 | End: 2017-11-29 | Stop reason: HOSPADM

## 2017-11-23 RX ORDER — GABAPENTIN 300 MG/1
300 CAPSULE ORAL ONCE
Status: COMPLETED | OUTPATIENT
Start: 2017-11-23 | End: 2017-11-23

## 2017-11-23 RX ORDER — GABAPENTIN 300 MG/1
900 CAPSULE ORAL 3 TIMES DAILY
Status: DISCONTINUED | OUTPATIENT
Start: 2017-11-23 | End: 2017-11-29 | Stop reason: HOSPADM

## 2017-11-23 RX ADMIN — GABAPENTIN 300 MG: 300 CAPSULE ORAL at 13:43

## 2017-11-23 RX ADMIN — NICOTINE 14 MG: 14 PATCH, EXTENDED RELEASE TRANSDERMAL at 09:00

## 2017-11-23 RX ADMIN — DOCUSATE SODIUM 100 MG: 100 CAPSULE, LIQUID FILLED ORAL at 16:14

## 2017-11-23 RX ADMIN — TORSEMIDE 20 MG: 20 TABLET ORAL at 09:00

## 2017-11-23 RX ADMIN — INSULIN LISPRO 1 UNITS: 100 INJECTION, SOLUTION INTRAVENOUS; SUBCUTANEOUS at 21:56

## 2017-11-23 RX ADMIN — PANTOPRAZOLE SODIUM 40 MG: 40 TABLET, DELAYED RELEASE ORAL at 06:04

## 2017-11-23 RX ADMIN — INSULIN GLARGINE 14 UNITS: 100 INJECTION, SOLUTION SUBCUTANEOUS at 21:55

## 2017-11-23 RX ADMIN — GABAPENTIN 300 MG: 300 CAPSULE ORAL at 09:00

## 2017-11-23 RX ADMIN — ATORVASTATIN CALCIUM 10 MG: 10 TABLET, FILM COATED ORAL at 16:13

## 2017-11-23 RX ADMIN — ACETAMINOPHEN 650 MG: 325 TABLET, FILM COATED ORAL at 13:46

## 2017-11-23 RX ADMIN — INSULIN LISPRO 2 UNITS: 100 INJECTION, SOLUTION INTRAVENOUS; SUBCUTANEOUS at 16:15

## 2017-11-23 RX ADMIN — WARFARIN SODIUM 5 MG: 5 TABLET ORAL at 16:13

## 2017-11-23 RX ADMIN — GABAPENTIN 900 MG: 300 CAPSULE ORAL at 21:50

## 2017-11-23 RX ADMIN — PANTOPRAZOLE SODIUM 40 MG: 40 TABLET, DELAYED RELEASE ORAL at 16:13

## 2017-11-23 RX ADMIN — METOPROLOL SUCCINATE 25 MG: 25 TABLET, EXTENDED RELEASE ORAL at 09:00

## 2017-11-23 RX ADMIN — LORAZEPAM 0.25 MG: 0.5 TABLET ORAL at 12:54

## 2017-11-23 RX ADMIN — LORAZEPAM 0.5 MG: 0.5 TABLET ORAL at 09:00

## 2017-11-23 RX ADMIN — LORAZEPAM 0.5 MG: 0.5 TABLET ORAL at 16:13

## 2017-11-23 RX ADMIN — GABAPENTIN 900 MG: 300 CAPSULE ORAL at 16:13

## 2017-11-23 RX ADMIN — LORAZEPAM 0.5 MG: 0.5 TABLET ORAL at 21:51

## 2017-11-23 RX ADMIN — LISINOPRIL 10 MG: 10 TABLET ORAL at 09:00

## 2017-11-23 RX ADMIN — INSULIN LISPRO 1 UNITS: 100 INJECTION, SOLUTION INTRAVENOUS; SUBCUTANEOUS at 12:54

## 2017-11-23 RX ADMIN — DOCUSATE SODIUM 100 MG: 100 CAPSULE, LIQUID FILLED ORAL at 09:00

## 2017-11-23 RX ADMIN — QUETIAPINE FUMARATE 400 MG: 100 TABLET ORAL at 21:51

## 2017-11-23 NOTE — PROGRESS NOTES
11/22/17 1900   Clinical Encounter Type   Visited With Patient   Hinduism Encounters   Hinduism Needs Prayer

## 2017-11-23 NOTE — PROGRESS NOTES
Spoke with [facility] & they have Dr Elvin Byrnes who is schduled to come see the pt today  Spoke with Dr Juan Manuel Cabral & she'll be coming in about 1 hr to see pt

## 2017-11-23 NOTE — PROGRESS NOTES
11/23/17 0900   Pain Assessment   Pain Assessment No/denies pain   Pain Score No Pain   Subjective   Subjective Pt expressed disguest about issues with the leg and how he can't get into it and it's not working  " they have to do something tomorrow so why bother using the leg today"  Education provided to patient about PT expierance with amputees with  and outpatient background  Pt was agreeable to attempt this session  QI: Roll Left and Right   Assistance Needed Independent   Roll Left and Right CARE Score 6   QI: Sit to Lying   Assistance Needed Independent   Sit to Lying CARE Score 6   QI: Lying to Sitting on Side of Bed   Assistance Needed Independent   Lying to Sitting on Side of Bed CARE Score 6   QI: Sit to [address] Provided by [place] No physical assistance   Sit to Stand CARE Score 4   QI: Chair/Bed-to-Chair Transfer   Assistance Needed Independent   Comment SPT    Chair/Bed-to-Chair Transfer CARE Score 6   Transfer Bed/Chair/Wheelchair   Sit Pivot Modified Independent   Sit to Stand Supervision   Stand to Sit Supervision   Supine to Sit Independent   Sit to Supine Independent   Findings CS and CGA    QI: Walk 10 Feet   Reason if not Attempted Activity not applicable   Walk 10 Feet CARE Score 9   QI: Walk 50 Feet with Two Turns   Reason if not Attempted Activity not applicable   Walk 50 Feet with Two Turns CARE Score 9   QI: Walk 150 Feet   Reason if not Attempted Activity not applicable   Walk 270 Feet CARE Score 9   QI: Walking 10 Feet on Uneven Surfaces   Reason if not Attempted Activity not applicable   Walking 10 Feet on Uneven Surfaces CARE Score 9   Ambulation   Findings unable to attempt at this time due to top buckle on prothetic leg breaking off due to attempting to jazmine leg this session      QI: Wheel 50 Feet with Two [address]  [facility] [address] 50 Feet with Two Turns CARE Score 6   QI: Wheel 150 Feet   Assistance Needed Independent   Comment 150 feet    Wheel 150 Feet CARE Score 6   Wheelchair mobility   QI: Does the patient use a wheelchair? 1  Yes   QI: Indicate the type of wheelchair 1  Manual   Wheelchair Assist Level Modified Independent   Method Right upper extremity; Left upper extremity;Right lower extremity   Distance Level Surface (feet) 150 ft   QI: 1 Step (Curb)   Reason if not Attempted Safety concerns   1 Step (Curb) CARE Score 88   QI: 4 Steps   Reason if not Attempted Safety concerns   4 Steps CARE Score 88   QI: 12 Steps   Reason if not Attempted Activity not applicable   12 Steps CARE Score 9   QI: Picking Up Object   Reason if not Attempted Activity not applicable   Picking Up Object CARE Score 9   Therapeutic Interventions   Strengthening standing resisted hip flexion and extension 2 x 10, sitting EOB PNF chops R/L with manual resistance from PT 2 x 10 reps  Flexibility prone hip lying on stomach with pillow underneath residual limb 2 x 5 minutes due to hip flexor contraction of 15 deg  Other Education on donning gel liner with Pt being able to jazmine I  education on wearing schdule for 30 minute on and 30 minutes off of liner with increase each day by 15 to 30 minutes  as long as no redness  Pt gave verbale understanding and repeated back precautions  No issues with skin check  Education with standing posture with noted hip flexor contraction of 15 deg or greater  informed patient that the current limb is designed to be donned with  0 to 5 deg hip flexion not current degree of limitation which is 15 deg or greater noted that a hip flexor braket needs to be added to compensate for hip flexor contraction  Currently patient residual limb is hitting anterior wall with PT assisting to provided hip ext pt was able to slide further down into limb however anterior brace snap off due to stress   PT educated patient with donning of leg with tightening strap hand tight in sittitng once standing and sinkings into the leg to pull strap firmly down to futher place self into the socket  total time 30 minutes    Assessment   Treatment Assessment PT session focus on education with donning of gel liner, education with donning of leg with noted limitation with hip flexor contracture of 15 degress limitng ability to properly sink into limb  Educated staff about limitation in leg with needing bracket to hip correct hip flexion contraucture as well as PT stressing to pt for prone hip lying  Pt was greatful for insight and education this session  [facility] staff will follow up with proth and primary PT about findings this date  Plan   Treatment/Interventions Functional transfer training;LE strengthening/ROM; Elevations; Therapeutic exercise; Endurance training;Bed mobility;Gait training   Progress Progressing toward goals   PT Therapy Minutes   PT Time In 0900   PT Time Out 1000   PT Total Time (minutes) 60   PT Mode of treatment - Individual (minutes) 60   PT Mode of treatment - Concurrent (minutes) 0   PT Mode of treatment - Group (minutes) 0   PT Mode of treatment - Co-treat (minutes) 0   PT Mode of Teatment - Total time(minutes) 60 minutes   Therapy Time missed   Time missed?  No

## 2017-11-23 NOTE — PROGRESS NOTES
Tried to get blood work on pt twice this morning  One attempt was successful with one tube collleted  The second attempt was not successful  Pt got agitated and refused another PCA to attempt blood work on the pt  Pt did not want to be woken up in the morning for blood work and got worked up  Will continue to monitor

## 2017-11-23 NOTE — PROGRESS NOTES
Internal Medicine Progress Note  Patient: Jonathan Villegas  Age/sex: 61 y o  male  Medical Record #: [id number]      ASSESSMENT/PLAN:  Jonathan Villegas is seen and examined and mangement for following issues:    1  Upper GI bleed due to esophagitis and Joan-Perez tear:  hemoglobin stable  Continue Protonix 40 mg b i d     2   PAD; status post left AKA (8/2017):  Prosthetic training per primary service  3   History of mechanical mitral valve replacement:  Continue Coumadin 5mg qd with goal INR 2 5-3 5  INR 3 yesterday  INR AM   For procedures in the future he would need to be bridged (per Dr Mark Oliveira note in office) but see notes from 8/2017 admit since there was concern for HIT even though labs neg    4  Hypertension:  stable; continue Lisinopril 10 mg daily and Toprol XL 25 mg daily  5   Chronic systolic CHF/LVEF 46%:  Continue Demadex 20 mg daily and monitor daily weights  K normal today  Do not add back daily KCl     6   DM 2:  Continue Lantus 14 units at bedtime  Patient uses Lantus 21 units qhs at home  Continue Accuchecks QID/SSI and DM diet  , 117, 173, 183    7  Leukocytosis:  resolved  Septic workup was negative  Likely reactive  Observe off antibiotics  8   Nicotine abuse:  Was on 7mg patch but not effective to curb cravings so increased yesterday to 14mg    9  ICD:  Follows with Dr Andujar Co and [facility] device clinic    10  Hx bipolar disorder: Only on Seroquel  Pt suicidal overnight  1:1 ordered by primary service  11   ?hx HIT:  See notes from 8/2017 admit; 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 and even though labs negative 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 and 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      Subjective: Patient seen and examined  Pt expressed suicidal ideation overnight and a 1:1 was ordered  He is refusing bloodwork today  He denies any other complaints      ROS:   GI: denies abdominal pain, change bowel habits or reflux symptoms  Neuro: No new neurologic changes  Respiratory: No Cough, SOB  Cardiovascular: No CP, palpitations     Scheduled Meds:    atorvastatin 10 mg Oral Daily With Dinner   docusate sodium 100 mg Oral BID   gabapentin 300 mg Oral TID   insulin glargine 14 Units Subcutaneous HS   insulin lispro 1-5 Units Subcutaneous 4x Daily (AC & HS)   lisinopril 10 mg Oral Daily   LORazepam 0 5 mg Oral TID   metoprolol succinate 25 mg Oral Daily   nicotine 14 mg Transdermal Daily   pantoprazole 40 mg Oral BID AC   QUEtiapine 400 mg Oral HS   torsemide 20 mg Oral Daily   warfarin 5 mg Oral Daily (warfarin)       Labs:       Results from last 7 days  Lab Units 11/21/17  1224 11/18/17  0524   WBC Thousand/uL 9 54 8 43   HEMOGLOBIN g/dL 13 7 12 6   HEMATOCRIT % 40 1 36 6   PLATELETS Thousands/uL 203 163       Results from last 7 days  Lab Units 11/23/17  0615 11/21/17  1224   SODIUM mmol/L 138 137   POTASSIUM mmol/L 3 7 4 1   CHLORIDE mmol/L 106 106   CO2 mmol/L 24 25   BUN mg/dL 32* 28*   CREATININE mg/dL 1 10 1 19   GLUCOSE RANDOM mg/dL 135 173*   CALCIUM mg/dL 9 0 8 9           Results from last 7 days  Lab Units 11/21/17  0513 11/19/17  0624   INR  3 00* 2 70*          Glucose (mg/dL)   Date Value   11/23/2017 135   11/21/2017 173 (H)   11/19/2017 156 (H)   11/17/2017 147 (H)     Glucose, i-STAT (mg/dl)   Date Value   04/18/2017 273 (H)       Labs reviewed    Physical Examination:  Vitals:   Vitals:    11/22/17 1344 11/22/17 2039 11/23/17 0600 11/23/17 0604   BP: 105/76 167/75  122/68   Pulse: 77 81  64   Resp: 20 20  18   Temp: 98 9 °F (37 2 °C) 98 2 °F (36 8 °C)  98 1 °F (36 7 °C)   TempSrc: Oral Tympanic  Oral   SpO2: 99% 100%  97%   Weight:   77 kg (169 lb 12 1 oz)    Height:           GEN: NAD  HEENT: NC/AT  RESP: BBS w/o crackles/wheeze/rhonci; resp unlabored  CV: +S1 S2, regular rate, no rubs/murmurs; +prosthetic valve click  ABD: soft, NT, ND, normal BS   : no bella  EXT: no edema  Skin: no rashes  Neuro: AAO      [ X ] Total time spent: 30 Mins and greater than 50% of this time was spent counseling/coordinating care        Evangelist Montgomery PA-C  Internal Medicine

## 2017-11-23 NOTE — PROGRESS NOTES
Patient remaining upset and agitated over discharge plan  Requesting to see psychiatrist, wheeled himself up to the nurse's station to express this  Continues to state that he wants to harm himself but not while he is on the unit, that he wants to continue with therapy while he is here and to improve with therapy  Patient is alert and orientated x 4 and is able to transfer himself independently with the use of a w/c  Concern noted with patient increasingly expressing himself to staff over discharge plans  Call placed to on call physician, order noted for continual observation until seen by psychiatry  Psychiatric consult in place

## 2017-11-23 NOTE — PROGRESS NOTES
While attempting to administer medication, patient became increasingly agitated and refusing to take medication  Educated patient the regarding the benefit vs risk of not taking medication  Patient expressed agitation regarding continual observation, educated regarding the need for observation for safety purposes  Patient not receptive to education and this nurse then had another nurse administer the patient's medications

## 2017-11-23 NOTE — PROGRESS NOTES
Patient requesting to see psychiatry, called placed to on-call from psychiatry who stated that she takes care of outpatients  Recommended that patient be placed on one-to-one observation since psychiatry would not be able to come and see patient face to face  This nurse also received a call regarding TelePsych for patient  Message left for physician to follow-up  Patient was also asked if he still wanted to harm himself while he is still here on the unit  He stated "No, but I can't make any promises once I leave here "  Continuing to monitor

## 2017-11-23 NOTE — PROGRESS NOTES
11/22/17 [address] [place] [address]   Spiritual Beliefs/Perceptions   Support Systems Children   Stress Factors   Patient Stress Factors Other (Comment)   Coping Responses   Patient Coping Open/discussion   Plan of Care   Comments Called to see pt  who is having a hard time with discharged plan  Provided listening support and prayer support     Assessment Completed by: Unit visit

## 2017-11-23 NOTE — PROGRESS NOTES
11/23/17 1000   Pain Assessment   Pain Assessment No/denies pain   Pain Score No Pain   Restrictions/Precautions   Precautions 1:1;Fall Risk;Contact/isolation   Weight Bearing Restrictions No   Braces or Orthoses Prosthesis   QI: Sit to [address] Provided by [place] No physical assistance   Sit to Stand CARE Score 6   QI: Chair/Bed-to-Chair Transfer   Assistance Needed Independent   Assistance Provided by [place] No physical assistance   Comment sit pivot    Chair/Bed-to-Chair Transfer CARE Score 6   Transfer Bed/Chair/Wheelchair   Limitations Noted In Balance; Endurance;LE Strength   Adaptive Equipment None   Bed, Chair, Wheelchair Transfer (FIM) 6 - Patient requires assistive device/extra time/safety concerns but completes independently   Cognition   Overall Cognitive Status Impaired   Arousal/Participation Cooperative   Attention Difficulty attending to directions   Orientation Level Oriented X4   Memory Within functional limits   Following Commands Follows one step commands with increased time or repetition   Activity Tolerance   Activity Tolerance Patient tolerated treatment well   Assessment   Treatment Assessment Pt session focused on discussion of safety while participating in therapy  Pt reported feeling frustrated and felt better after discussing it while in therapy  Pt completedseated UE exercise involving trunk rotation and core strengthening to increase safety with prosthetic training and overall safety  Pt tolerated this well with side to side rotation while seated unsupported holding a small ball with 30 reps  Pt was educated to foot flat on floor to help with stability  Pt then completed diagnal arm and truck rotation to increase strength in lower back needed to increase safety with standing while using LE prosthetic  Pt toelrated this well also with 30reps   Pt will benefit from continued skilled OT with focus on safety, endurance and standing tolerance and increase I with prosthetic management  Continue with current POC  Prognosis Good   Problem List Decreased strength;Decreased endurance; Impaired balance;Decreased mobility; Decreased safety awareness   Barriers to Discharge Inaccessible home environment;Decreased caregiver support   Plan   Treatment/Interventions ADL retraining;Functional transfer training;LE strengthening/ROM; Therapeutic exercise; Endurance training   Progress Progressing toward goals   Recommendation   OT Discharge Recommendation Home independent   OT Therapy Minutes   OT Time In 1000   OT Time Out 1030   OT Total Time (minutes) 30   OT Mode of treatment - Individual (minutes) 30   OT Mode of treatment - Concurrent (minutes) 0   OT Mode of treatment - Group (minutes) 0   OT Mode of treatment - Co-treat (minutes) 0   OT Mode of Teatment - Total time(minutes) 30 minutes   Therapy Time missed   Time missed?  No

## 2017-11-23 NOTE — PROGRESS NOTES
Pt would not take medications for nurse, so I went in to talk with patient about the situation  He did not want to be on the 1:1 because he did not feel that it was necessary because he wasn't going to harm himself or someone else  He said that there is nothing in the room that he could possibly harm himself with  Pt states that he did not want anyone to be staring at him while he was trying to sleep  He said that he would want to kill himself if his dog was taken away from him  He said that his dog is all that he has left and it would not be right for someone to take her  He was very upset and agitated about being on the continual observation  He went on to discuss all the guns, ammunition, and grenades he has at home  Again, he says that he would not do anything to himself or to others  He said that collecting guns and hunting are his hobbies  The pt would like to talk with the doctor in the morning to address his many concerns  He is very upset about 1:1  I talked with the patient for about an hour to calm him down  He seemed to be much better after he was able to express his concerns  Will continue to monitor patient throughout the night

## 2017-11-23 NOTE — PROGRESS NOTES
Patient expressing feelings regarding current living situation and requesting to speak to someone from pastoral care  [facility]

## 2017-11-23 NOTE — PROGRESS NOTES
Pt verbally abusive to PCA doing 1:1  & nurse  Has extreme anger because he has someone watching him at all times  Insists he would not hurt himself in the hospital but wants to be discharged today to his daughters house  Dr Sheila Bermudez on call today- will notify of current issue  Spoke with nursing supervisor of current situation  Attempting to help pt  to explain situation & policy but not wanting to hear this  Did not want to particpate in OT session this am ,  He was too angry  Now  c/o how he is not getting his therapy sessions to help with his prosthetic teaching  I explained he does have 90 mins of therapy today but he had refused to participate with the OT 30mins session this am   Mushtaq Rowe from PT came at 0902 to do his PT session for 1 hr

## 2017-11-23 NOTE — PROGRESS NOTES
Progress Note:    Subjective:  Overnight patient had an order placed for continuous observation due to comments made with concerns for suicidal ideation  This morning patient personally seen, there are NO concerns for active suicidal ideation, patient does NOT have a plan to harm himself or others  Patient is frustrated with his disposition plan and would like him in his dog to be together upon discharge  Patient reports if he can have his dog he would rather be dead  Patient is understanding of the continuous observation for 24 hours, he is agreeable to have this reassessed in the morning by myself  Patient also discussed medication discrepancies  He was previously on Neurontin 900 mg t i d  for neuropathic pain and his Ativan was previously scheduled  This was corrected  Patient was provided with a list of his medications further more to show the change  Lastly, we discussed his left hip flexor contraction and therapeutic measures for improvement  He is agreeable to a Q 2 hours schedule while awake of encouraging prone lying for 20-30 minutes as tolerated  Objective:    /68   Pulse 64   Temp 98 1 °F (36 7 °C) (Oral)   Resp 18   Ht 6' 1" (1 854 m)   Wt 77 kg (169 lb 12 1 oz)   SpO2 97%   BMI 22 40 kg/m²   Left hip flexor contraction  Left above-the-knee amputation (transfemoral)      Assessment:  Mark Pereira is a 61 y  o  male with recent left transfemoral amputation (L BKA) who presented to the [address] [place] [address] diffuse abdominal pain, hematemesis  Repeat EGD was performed on 11/16 with demonstrated reflux esophagitis, gastritis, and mallorry-mantilla tear at GE junction  Of note patient has history of mitral valve replacement for which he was anticoagulated with coumadin (INR of 2 57)  He was initially on Protonix drip which was converted to PO form  Patient admitted to the [facility] for prosthetic training          Plan:   · Adjust medication as follows: Neurontin increased for neuropathic pain and phantom symptoms to 900 mg t i d   Ativan scheduled at 0 5 mg t i d , with 0 25 mg b i d  p r n  for anxiety  · Left hip flexor contraction:  agreeable to a Q 2 hours schedule while awake of encouraging prone lying for 20-30 minutes as tolerated with 1 pillow under the very distal portion of his residual limb    · Maintain continue observation times 24 hours  · Psychiatry consultation pending       Clifton Aleman MD

## 2017-11-23 NOTE — CONSULTS
Psychiatric Evaluation - Behavioral Health       Assessment/Plan  Principal Problem:  F31 4 bipolar disorder severe depressed without psychotic feature  F41 1 generalized anxiety disorder    PLAN:   1) DC one one observation  2) patient will follow up with his outpatient psychiatrist   3) case management consult to help patient did with a improved over outpatient plan patient does not want to be in an outpatient to homeless shelter  4) Communicated with patients' RN      Chief Complaint: "I tested with it and never meant it  "    History of Present Illness: This is a 18-year-old  male with recent left transfer more amputation who was admitted with a diffuse abdominal pain med to assist her at a psych consult was requested as close to discharge patient made statement that he wanted to kill himself during his discharge plan planning he found out that he was being discharged to a homeless shelter he said that if he had to go to discharge homeless shelter he would rather kill himself he also told during the conversation that he had guns and grenades  When this writer talked to patient patient says that he made those comments because he did not want to go to homeless shelter he said he recently moved here from [place] to help his daughter however his daughter did not vomit one time he has a little puppy dog which is only form the left  with him and this is a therapy dog and then he goes to sheltered he cannot keep his dog and that is also devastating for him  He said he feels fine mood wise he sees a psychiatrist and is doing very well he is a just upset about being in a homeless shelter but nausea started medical team case management is family and they are working have with him to come up with a better discharge planning    He said he does not want to given to kill himself he recently got an above-the-knee amputation and a new all prosthetic leg and is working with PT to learn to walk and he has a lot of will to live  PAST PSYCH HISTORY:    He has history of anxiety and bipolar disorder and follows up with outpatient psychiatrist he denied having any previous suicide attempts or inpatient psychiatric hospitalization      Substance Abuse History:    He said he used to drink but not anymore because of his medical problems      Family Psychiatric History:   He reports that his mother has depression    Social History:  Social History     Social History    Marital status:      Spouse name: N/A    Number of children: N/A    Years of education: N/A     Occupational History    Not on file  Social History Main Topics    Smoking status: Current Some Day Smoker     Packs/day: 0 50     Types: Cigarettes    Smokeless tobacco: Never Used    Alcohol use No    Drug use: No    Sexual activity: Not on file     Other Topics Concern    Not on file     Social History Narrative    No narrative on file         Traumatic History:   Abuse: None  Other Traumatic Events: None  Past Medical History:   Diagnosis Date    Anticoagulated on Coumadin     Mechanical MVR    Anxiety     Bipolar 1 disorder (HCC)     CAD (coronary artery disease)     Chronic kidney disease     left kideny being monitored    Chronic systolic congestive heart failure ([facility] [place] [address] ) 4/18/2017    Colitis 4/2/2017    COPD [facility]chronic obstructive [facility]) [facility]     Diabetes mellitus ([facility])     Difficulty urinating     DM type 2 with diabetic peripheral neuropathy ([facility])     Hiatal hernia     Hyperlipidemia     Hypertension     Ischemic cardiomyopathy     Myocardial infarction     Pacemaker     PAD (peripheral artery disease) ([facility])     Rectal bleed     Vomiting        Medical Review Of Systems:  All 12 point review of system is normal except for what is mention in medical hisotry    Scheduled Meds:  atorvastatin 10 mg Oral Daily With Dinner   docusate sodium 100 mg Oral BID   gabapentin 900 mg Oral TID   insulin glargine 14 Units Subcutaneous HS   insulin lispro 1-5 Units Subcutaneous 4x Daily (AC & HS)   lisinopril 10 mg Oral Daily   LORazepam 0 5 mg Oral TID   metoprolol succinate 25 mg Oral Daily   nicotine 14 mg Transdermal Daily   pantoprazole 40 mg Oral BID AC   QUEtiapine 400 mg Oral HS   torsemide 20 mg Oral Daily   warfarin 5 mg Oral Daily (warfarin)     Continuous Infusions:   PRN Meds:   acetaminophen    bisacodyl    HYDROcodone-acetaminophen    influenza vaccine    LORazepam    ondansetron    polyethylene glycol     Allergies   Allergen Reactions    Aspirin Other (See Comments)     Received ASA & bleed out    Heparin      Please see 8/2017 admit = 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    Morphine Other (See Comments)     Gets red streak up arm above IV site    Omeprazole Diarrhea       Vitals:    11/23/17 1400   BP: 102/59   Pulse: 89   Resp: 18   Temp: 98 5 °F (36 9 °C)   SpO2: 98%             Mental Status Evaluation:  Appearance:   appeared of age and had good hygiene    Behavior:   behavior was cooperative    Speech:  Speech was normal goal directed loud and rapid   Mood:  He said his mood is fine   Affect Affect was anxious he said he was just upset about his discharge planning   Language: naming objects and Goal directed  Thought Process:   logical and linear    Thought Content:  Normal   Perceptual Disturbances:  denying any auditory and visual hallucinations  Risk Potential: No suicidal ideas but feels hopeless   Sensorium:  person, place, time/date, situation, day of week, month of year and year   Cognition:  grossly intact   Consciousness:   alert, awake, and oriented ×3    Attention: Good attention span   Intellect: Normal   Fund of Knowledge: awareness of current events: normal    Insight:  Fair insight   Judgment: Good   Muscle Strength and Tone: Normal    Gait/Station:  It was not assessed however he has left above-the-knee amputation and uses a prosthetic like Motor Activity: no abnormal movements     Lab Results: I have personally reviewed pertinent lab results  NOTE:  Total of 40 minutes were spent in talking to patient completing this medical record reviewing medical chart medical decision making    Shameka Rdz MD

## 2017-11-24 LAB
BASOPHILS # BLD AUTO: 0.03 THOUSANDS/ΜL (ref 0–0.1)
BASOPHILS NFR BLD AUTO: 0 % (ref 0–1)
EOSINOPHIL # BLD AUTO: 0.57 THOUSAND/ΜL (ref 0–0.61)
EOSINOPHIL NFR BLD AUTO: 6 % (ref 0–6)
ERYTHROCYTE [DISTWIDTH] IN BLOOD BY AUTOMATED COUNT: 17.2 % (ref 11.6–15.1)
GLUCOSE SERPL-MCNC: 111 MG/DL (ref 65–140)
GLUCOSE SERPL-MCNC: 154 MG/DL (ref 65–140)
GLUCOSE SERPL-MCNC: 161 MG/DL (ref 65–140)
GLUCOSE SERPL-MCNC: 252 MG/DL (ref 65–140)
HCT VFR BLD AUTO: 37.2 % (ref 36.5–49.3)
HGB BLD-MCNC: 12.5 G/DL (ref 12–17)
INR PPP: 2.59 (ref 0.86–1.16)
LYMPHOCYTES # BLD AUTO: 1.89 THOUSANDS/ΜL (ref 0.6–4.47)
LYMPHOCYTES NFR BLD AUTO: 19 % (ref 14–44)
MCH RBC QN AUTO: 28.2 PG (ref 26.8–34.3)
MCHC RBC AUTO-ENTMCNC: 33.6 G/DL (ref 31.4–37.4)
MCV RBC AUTO: 84 FL (ref 82–98)
MONOCYTES # BLD AUTO: 0.68 THOUSAND/ΜL (ref 0.17–1.22)
MONOCYTES NFR BLD AUTO: 7 % (ref 4–12)
NEUTROPHILS # BLD AUTO: 6.94 THOUSANDS/ΜL (ref 1.85–7.62)
NEUTS SEG NFR BLD AUTO: 68 % (ref 43–75)
NRBC BLD AUTO-RTO: 0 /100 WBCS
PLATELET # BLD AUTO: 202 THOUSANDS/UL (ref 149–390)
PMV BLD AUTO: 12.2 FL (ref 8.9–12.7)
PROTHROMBIN TIME: 28.1 SECONDS (ref 12.1–14.4)
RBC # BLD AUTO: 4.43 MILLION/UL (ref 3.88–5.62)
WBC # BLD AUTO: 10.16 THOUSAND/UL (ref 4.31–10.16)

## 2017-11-24 PROCEDURE — 97110 THERAPEUTIC EXERCISES: CPT

## 2017-11-24 PROCEDURE — 85025 COMPLETE CBC W/AUTO DIFF WBC: CPT | Performed by: PHYSICIAN ASSISTANT

## 2017-11-24 PROCEDURE — 97535 SELF CARE MNGMENT TRAINING: CPT

## 2017-11-24 PROCEDURE — 82948 REAGENT STRIP/BLOOD GLUCOSE: CPT

## 2017-11-24 PROCEDURE — 85610 PROTHROMBIN TIME: CPT | Performed by: PHYSICIAN ASSISTANT

## 2017-11-24 PROCEDURE — 97116 GAIT TRAINING THERAPY: CPT

## 2017-11-24 PROCEDURE — 97530 THERAPEUTIC ACTIVITIES: CPT

## 2017-11-24 RX ADMIN — LISINOPRIL 10 MG: 10 TABLET ORAL at 09:31

## 2017-11-24 RX ADMIN — GABAPENTIN 900 MG: 300 CAPSULE ORAL at 22:01

## 2017-11-24 RX ADMIN — WARFARIN SODIUM 5 MG: 5 TABLET ORAL at 17:10

## 2017-11-24 RX ADMIN — DOCUSATE SODIUM 100 MG: 100 CAPSULE, LIQUID FILLED ORAL at 09:30

## 2017-11-24 RX ADMIN — LORAZEPAM 0.5 MG: 0.5 TABLET ORAL at 22:01

## 2017-11-24 RX ADMIN — INSULIN LISPRO 2 UNITS: 100 INJECTION, SOLUTION INTRAVENOUS; SUBCUTANEOUS at 22:02

## 2017-11-24 RX ADMIN — QUETIAPINE FUMARATE 400 MG: 100 TABLET ORAL at 22:01

## 2017-11-24 RX ADMIN — INSULIN GLARGINE 14 UNITS: 100 INJECTION, SOLUTION SUBCUTANEOUS at 22:01

## 2017-11-24 RX ADMIN — ATORVASTATIN CALCIUM 10 MG: 10 TABLET, FILM COATED ORAL at 17:11

## 2017-11-24 RX ADMIN — GABAPENTIN 900 MG: 300 CAPSULE ORAL at 17:10

## 2017-11-24 RX ADMIN — GABAPENTIN 900 MG: 300 CAPSULE ORAL at 09:31

## 2017-11-24 RX ADMIN — METOPROLOL SUCCINATE 25 MG: 25 TABLET, EXTENDED RELEASE ORAL at 09:32

## 2017-11-24 RX ADMIN — PANTOPRAZOLE SODIUM 40 MG: 40 TABLET, DELAYED RELEASE ORAL at 06:27

## 2017-11-24 RX ADMIN — LORAZEPAM 0.5 MG: 0.5 TABLET ORAL at 17:11

## 2017-11-24 RX ADMIN — TORSEMIDE 20 MG: 20 TABLET ORAL at 09:33

## 2017-11-24 RX ADMIN — NICOTINE 14 MG: 14 PATCH, EXTENDED RELEASE TRANSDERMAL at 09:33

## 2017-11-24 RX ADMIN — INSULIN LISPRO 1 UNITS: 100 INJECTION, SOLUTION INTRAVENOUS; SUBCUTANEOUS at 12:27

## 2017-11-24 RX ADMIN — PANTOPRAZOLE SODIUM 40 MG: 40 TABLET, DELAYED RELEASE ORAL at 17:11

## 2017-11-24 RX ADMIN — INSULIN LISPRO 1 UNITS: 100 INJECTION, SOLUTION INTRAVENOUS; SUBCUTANEOUS at 09:33

## 2017-11-24 RX ADMIN — ACETAMINOPHEN 650 MG: 325 TABLET, FILM COATED ORAL at 17:15

## 2017-11-24 RX ADMIN — LORAZEPAM 0.5 MG: 0.5 TABLET ORAL at 09:32

## 2017-11-24 NOTE — PROGRESS NOTES
11/24/17 1410   Pain Assessment   Pain Assessment No/denies pain   Restrictions/Precautions   Precautions Fall Risk;Contact/isolation   Cognition   Arousal/Participation Cooperative   Subjective   Subjective pt reported feeling well and happy that his prosthetic leg got fixed during this session    QI: Roll Left and Right   Assistance Needed Independent   Roll Left and Right CARE Score 6   QI: Sit to [address] to Lying CARE Score 6   QI: Lying to Sitting on Side of Bed   Assistance Needed Independent   Lying to Sitting on Side of Bed CARE Score 6   QI: Sit to [address]; Adaptive equipment   Sit to Stand CARE Score 6   QI: Chair/Bed-to-Chair Transfer   Assistance Needed Independent; Adaptive equipment   Chair/Bed-to-Chair Transfer CARE Score 6   Transfer Bed/Chair/Wheelchair   Limitations Noted In Balance; Endurance;LE Strength   Adaptive Equipment Roller Walker   Sit Pivot Modified Independent   Stand Pivot Minimal Assist;Contact Guard  (Min-CGA with prosthetic on)   Sit to Stand Supervision;Minimal Assist   Stand to Sit Supervision;Minimal Assist   Supine to Sit Independent   Sit to Supine Independent   Findings session focused on working with Farhan Nash from [facility] [employer]  She adjusted the alignment of the prosthetic leg and removed the lanyard  she added hip suspension wrap over the L prosthetic leg and around pt's waist to help keep a proper fit and to prevent excess movement from around proximal hip  She adjusted the alignment so when pt hip hikes the foot will land in a straight line, and made a cut out of the liner of the prosthetic so pt can manage the lock  Pt instructed to make sure that the leg is locked for walking, and that due to the liner of the mechanism that he needs to push down to lock it before walking   discussed with pt he can practice different ways of putting on the suspension wrap (supine, standing<>seated, and standing with R shoulder against the wall for balance support due to needing 2 hands to don it correctly; discussed this with OT for them to practice with this tomorrow  practiced S- Oriana for balance for sit<>stands and transfers with prosthetic leg   Bed, Chair, Wheelchair Transfer (FIM) 2 - [place] needs to lift or boost to rise AND assist to sit   QI: Walk 10 Feet   Assistance Needed Physical assistance; Adaptive equipment   Assistance Provided by [place] Less than 25%   Walk 10 Feet CARE Score 3   QI: Walk 50 Feet with Two Turns   Assistance Needed Physical assistance; Adaptive equipment   Assistance Provided by [facility] Less than 25%   Walk 50 Feet with Two Turns CARE Score 3   Ambulation   Does the patient walk? 2  Yes   Primary Discharge Mode of Locomotion Wheelchair   Walk Assist Level Minimum Assist;Contact Guard   Gait Pattern (prosthetic leg with knee locked LLE, hip hiking LLE)   Assist Device Roller Walker   Distance Walked (feet) 35 ft  (x2, 20x4, 50 x1)   Limitations Noted In Balance; Endurance; Heel Strike;Swing;Strength;Speed   Findings session focused on use of prosthetic leg with prosthetist here to adjust as needed  practiced short distance walking in pt's room on tile and then in the hallway on carpet  Oriana for LOB x1, otherwise CGA-Oriana for safety as pt is getting used to using the leg  Walking (FIM) 1 - Patient ambulates less than 49 feet regardless of assist/device/set up   QI: Wheel 50 Feet with Two [address] 50 Feet with Two Turns CARE Score 6   QI: Wheel 150 [address] 150 Feet CARE Score 6   Wheelchair mobility   QI: Does the patient use a wheelchair? 1  Yes   QI: Indicate the type of wheelchair 1  Manual   Wheelchair Assist Level Modified Independent   Method Right upper extremity; Left upper extremity;Right lower extremity   Distance Level Surface (feet) 150 ft   Wheelchair (FIM) 6 - Patient wheels 150 feet or more, no rests AND independently, timely and safely   Therapeutic Interventions   Strengthening mat program and supine on bed while prosthetic leg being adjusted for prone lying, prone press ups, prone lying with pillow under his L leg to facilitate hip flexor stretch, prone hip ext LLE with TC/Oriana to keep hips down as needed,  Flexibility repeated L hip flexor stretching in R sidelying 4x60 sec    Balance static standing balance without UE support and no prosthetic leg start of session in parallel bars 4x1 min   Assessment   Treatment Assessment Pt demonstrated improved ability to perform gait training with prosthetic leg due to improved fit  Pt cont to rely on BUE support and RLE due to leaning about how to use prosthetic leg and reported RLE getting tired with walking  Pt ed repeated several times about how the position of the foot is dependent upon the rotation/position of the hip, as the leg was rotated in more when sitting; discused based upon sitting position, posture and surface, his L hip position will change rotation and this affects how the leg is positioned  DIsucssed this is more important for walking;/standing to make sure he has a stable JOHAN and the foot isnt kicking out to the side and hittng the RW or slipping out from under him  Pt will cont to benefit from skilled PT to further progress gait training with prosthetic, with knee locked in ext for safety purposes at this time  To cont to focus on weight shifting, stability in stance and control of the L hip and strength of L hip to control prosthetic foot placement  PT Barriers   Physical Impairment Decreased strength;Decreased range of motion;Decreased endurance; Impaired balance   Functional Limitation Walking;Stair negotiation   Plan   Treatment/Interventions Functional transfer training;LE strengthening/ROM; Therapeutic exercise; Endurance training;Patient/family training;Equipment eval/education;Gait training;Bed mobility   Progress Progressing toward goals Recommendation   Recommendation Outpatient PT; Home with family support   Equipment Recommended Wheelchair;Walker   PT Therapy Minutes   PT Time In 1410   PT Time Out 1540   PT Total Time (minutes) 90   PT Mode of treatment - Individual (minutes) 90   PT Mode of treatment - Concurrent (minutes) 0   PT Mode of treatment - Group (minutes) 0   PT Mode of treatment - Co-treat (minutes) 0   PT Mode of Teatment - Total time(minutes) 90 minutes   Therapy Time missed   Time missed?  No

## 2017-11-24 NOTE — PROGRESS NOTES
11/24/17 0700   Pain Assessment   Pain Assessment 0-10   Pain Score No Pain   Restrictions/Precautions   Precautions Fall Risk;Contact/isolation   Weight Bearing Restrictions No   QI: Oral Hygiene   Assistance Needed Independent   Assistance Provided by [place] No physical assistance   Oral Hygiene CARE Score 6   Grooming   Able To Initiate Tasks; Acquire Items; Wash/Dry Hands;Brush/Clean Teeth;Comb/Brush Hair;Wash/Dry Face   Grooming (FIM) 6 - Patient requires assistive device/extra time/safety concerns but completes independently   QI: Shower/Bathe [address] Provided by [place] No physical assistance   Shower/Bathe Self CARE Score 6   Bathing   Assessed Bath Style Shower   Anticipated D/C Bath Style Tub   Able to Gather/Transport Yes   Able to Adjust Water Temperature Yes   Able to Wash/Rinse/Dry (body part) Left Arm;Right Arm;L Upper Leg;R Upper Leg;L Lower Leg/Foot;R Lower Leg/Foot;Chest;Abdomen;Perineal Area; Buttocks   Positioning Seated;Standing   Adaptive Equipment Tub Bench; Shower Bars   Findings  Completes all bathing tasks seated on tub bench with lateral weight shifts for rear hygiene  Bathing (FIM) 6 - Patient requires assistive device/extra time/safety concerns but completes independently   Tub/Shower Transfer   Adaptive Equipment Transfer Bench   Findings Sit pivot transfer from w/c <> tub bench      Shower Transfer (FIM) 6 - Patient requires assistive device/extra time/safety concerns but completes independently   QI: Upper Body [address] Provided by [place] No physical assistance   Upper Body Dressing CARE Score 6   QI: Lower Body [address] Provided by [place] No physical assistance   Lower Body Dressing CARE Score 6   QI: Putting On/Taking Off [address] Provided by [place] No physical assistance   Putting On/Taking Off Footwear CARE Score 6   Dressing/Undressing Clothing   Able to  Obtain Clothing;Store removed clothing   Remove UB Clothes Pullover Shirt   Remove LB Clothes Undergarment;Socks;Pants   Don UB Clothes Pullover Shirt   Don LB Clothes Pants; Undergarment;Socks   Positioning Supported Sit;Standing   Findings Pt continues to complete all ADL tasks without prosthetic donned at this time from seated level  Pt would require additional A with use of prosthetic  UB Dressing (FIM) 6 - Patient requires assistive device/extra time/safety concerns but completes independently   LB Dressing (FIM) 6 - Patient requires assistive device/extra time/safety concerns but completes independently   QI: Lying to Sitting on Side of Bed   Assistance Needed Independent   Assistance Provided by [place] No physical assistance   Lying to Sitting on Side of Bed CARE Score 6   QI: Chair/Bed-to-Chair Transfer   Assistance Needed Independent   Assistance Provided by [place] No physical assistance   Comment sit pivot   Chair/Bed-to-Chair Transfer CARE Score 6   Transfer Bed/Chair/Wheelchair   Sit Pivot Modified Independent   Bed, Chair, Wheelchair Transfer (FIM) 6 - Patient requires assistive device/extra time/safety concerns but completes independently   Exercise Tools   UE Ergometer Scifit completed at level 1 5 with 10 minutes prograde and 10 minutes retrograde to increase overall endurance and strength in preparation for functional transfers  Cognition   Overall Cognitive Status Impaired   Arousal/Participation Cooperative   Attention Difficulty attending to directions   Orientation Level Oriented X4   Memory Within functional limits   Following Commands Follows one step commands with increased time or repetition   Activity Tolerance   Activity Tolerance Patient tolerated treatment well   Assessment   Treatment Assessment Pt participated in skilled OT services with focus on ADL retraining, functional transfers, and endurance   Unable to don prosthetic at this time 2* to not fitting appropriately and buckle broken, PT placed phone call with prosthetist to address  Pt is able to complete all functional sit pivot transfers and ADL tasks from w/c at Xin level  Pt will require increased A with all ADL tasks and prosthetic training once appropriately fitting  Pt does require VC's for safety awareness at times throughout session as he attempts to complete transfer without donning proper footwear  Pt will continue to benefit from skilled OT services following POC with focus on prosthetic training once appropriate  Prognosis Good   Problem List Decreased strength;Decreased endurance; Impaired balance;Decreased mobility; Decreased safety awareness   Plan   Treatment/Interventions ADL retraining;Functional transfer training; Therapeutic exercise; Endurance training; Compensatory technique education; Bed mobility   Progress Progressing toward goals   Recommendation   OT Discharge Recommendation Home independent   OT Therapy Minutes   OT Time In 0700   OT Time Out 0830   OT Total Time (minutes) 90   OT Mode of treatment - Individual (minutes) 90   OT Mode of treatment - Concurrent (minutes) 0   OT Mode of treatment - Group (minutes) 0   OT Mode of treatment - Co-treat (minutes) 0   OT Mode of Teatment - Total time(minutes) 90 minutes   Therapy Time missed   Time missed?  No

## 2017-11-24 NOTE — PROGRESS NOTES
Internal Medicine Progress Note  Patient: Hima Garcia  Age/sex: 61 y o  male  Medical Record #: [id number]      ASSESSMENT/PLAN:  Hima Garcia is seen and examined and mangement for following issues:    1  Upper GI bleed due to esophagitis and Joan-Perez tear:  hemoglobin stable  Continue Protonix 40 mg b i d     2   PAD; status post left AKA (8/2017):  Prosthetic training per primary service  3   History of mechanical mitral valve replacement:  Continue Coumadin 5mg qd with goal INR 2 5-3 5  INR 2 59  Pt has been intermittently refusing blood draws  For procedures in the future he would need to be bridged (per Dr Fazal Xavier note in office) but see notes from 8/2017 admit since there was concern for HIT even though labs neg    4  Hypertension:  stable; continue Lisinopril 10 mg daily and Toprol XL 25 mg daily  5   Chronic systolic CHF/LVEF 82%:  Continue Demadex 20 mg daily and monitor daily weights  K normal today  Do not add back daily KCl at this time  6   DM 2:  Continue Lantus 14 units at bedtime  Patient uses Lantus 21 units qhs at home  Continue Accuchecks QID/SSI and DM diet  , 222, 190, 161    7  Leukocytosis:  resolved  Septic workup was negative  Likely reactive  Observe off antibiotics  8   Nicotine abuse:  Was on 7mg patch but not effective to curb cravings so increased yesterday to 14mg    9  ICD:  Follows with Dr Andrés Lilly and 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 and 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      Subjective: Patient seen and examined  Pt is off 1:1 and has no suicidal ideation or plan  He denies any other complaints      ROS:   GI: denies abdominal pain, change bowel habits or reflux symptoms  Neuro: No new neurologic changes  Respiratory: No Cough, SOB  Cardiovascular: No CP, palpitations     Scheduled Meds:    atorvastatin 10 mg Oral Daily With Dinner   docusate sodium 100 mg Oral BID   gabapentin 900 mg Oral TID   insulin glargine 14 Units Subcutaneous HS   insulin lispro 1-5 Units Subcutaneous 4x Daily (AC & HS)   lisinopril 10 mg Oral Daily   LORazepam 0 5 mg Oral TID   metoprolol succinate 25 mg Oral Daily   nicotine 14 mg Transdermal Daily   pantoprazole 40 mg Oral BID AC   QUEtiapine 400 mg Oral HS   torsemide 20 mg Oral Daily   warfarin 5 mg Oral Daily (warfarin)       Labs:       Results from last 7 days  Lab Units 11/24/17  0637 11/21/17  1224   WBC Thousand/uL 10 16 9 54   HEMOGLOBIN g/dL 12 5 13 7   HEMATOCRIT % 37 2 40 1   PLATELETS Thousands/uL 202 203       Results from last 7 days  Lab Units 11/23/17  0615 11/21/17  1224   SODIUM mmol/L 138 137   POTASSIUM mmol/L 3 7 4 1   CHLORIDE mmol/L 106 106   CO2 mmol/L 24 25   BUN mg/dL 32* 28*   CREATININE mg/dL 1 10 1 19   GLUCOSE RANDOM mg/dL 135 173*   CALCIUM mg/dL 9 0 8 9           Results from last 7 days  Lab Units 11/24/17  0637 11/21/17  0513   INR  2 59* 3 00*          Glucose (mg/dL)   Date Value   11/23/2017 135   11/21/2017 173 (H)   11/19/2017 156 (H)   11/17/2017 147 (H)     Glucose, i-STAT (mg/dl)   Date Value   04/18/2017 273 (H)       Labs reviewed    Physical Examination:  Vitals:   Vitals:    11/23/17 1400 11/23/17 2126 11/24/17 0600 11/24/17 0627   BP: 102/59 106/58  118/72   Pulse: 89 61  74   Resp: 18 16  18   Temp: 98 5 °F (36 9 °C) 98 6 °F (37 °C)  99 5 °F (37 5 °C)   TempSrc: Oral Oral  Oral   SpO2: 98% 99%  100%   Weight:   77 3 kg (170 lb 6 7 oz) 77 3 kg (170 lb 6 7 oz)   Height:           GEN: NAD  HEENT: NC/AT  RESP: BBS w/o crackles/wheeze/rhonci; resp unlabored  CV: +S1 S2, regular rate, no rubs/murmurs; +prosthetic valve click  ABD: soft, NT, ND, normal BS   : no bella  EXT: no edema  Skin: no rashes  Neuro: AAO      [ X ] Total time spent: 30 Mins and greater than 50% of this time was spent counseling/coordinating care        Eriberto Delacruz PA-C  Internal Medicine

## 2017-11-25 LAB
GLUCOSE SERPL-MCNC: 145 MG/DL (ref 65–140)
GLUCOSE SERPL-MCNC: 176 MG/DL (ref 65–140)
GLUCOSE SERPL-MCNC: 176 MG/DL (ref 65–140)
GLUCOSE SERPL-MCNC: 186 MG/DL (ref 65–140)
INR PPP: 2.49 (ref 0.86–1.16)
PROTHROMBIN TIME: 27.2 SECONDS (ref 12.1–14.4)

## 2017-11-25 PROCEDURE — 82948 REAGENT STRIP/BLOOD GLUCOSE: CPT

## 2017-11-25 PROCEDURE — 97116 GAIT TRAINING THERAPY: CPT

## 2017-11-25 PROCEDURE — 97110 THERAPEUTIC EXERCISES: CPT

## 2017-11-25 PROCEDURE — 97530 THERAPEUTIC ACTIVITIES: CPT

## 2017-11-25 PROCEDURE — 85610 PROTHROMBIN TIME: CPT | Performed by: PHYSICIAN ASSISTANT

## 2017-11-25 PROCEDURE — 97535 SELF CARE MNGMENT TRAINING: CPT

## 2017-11-25 RX ORDER — WARFARIN SODIUM 6 MG/1
6 TABLET ORAL
Status: DISCONTINUED | OUTPATIENT
Start: 2017-11-25 | End: 2017-11-26

## 2017-11-25 RX ADMIN — INSULIN LISPRO 1 UNITS: 100 INJECTION, SOLUTION INTRAVENOUS; SUBCUTANEOUS at 07:15

## 2017-11-25 RX ADMIN — LORAZEPAM 0.5 MG: 0.5 TABLET ORAL at 08:07

## 2017-11-25 RX ADMIN — ATORVASTATIN CALCIUM 10 MG: 10 TABLET, FILM COATED ORAL at 16:20

## 2017-11-25 RX ADMIN — TORSEMIDE 20 MG: 20 TABLET ORAL at 08:07

## 2017-11-25 RX ADMIN — INSULIN GLARGINE 14 UNITS: 100 INJECTION, SOLUTION SUBCUTANEOUS at 21:50

## 2017-11-25 RX ADMIN — INSULIN LISPRO 1 UNITS: 100 INJECTION, SOLUTION INTRAVENOUS; SUBCUTANEOUS at 21:51

## 2017-11-25 RX ADMIN — QUETIAPINE FUMARATE 400 MG: 100 TABLET ORAL at 21:50

## 2017-11-25 RX ADMIN — DOCUSATE SODIUM 100 MG: 100 CAPSULE, LIQUID FILLED ORAL at 16:21

## 2017-11-25 RX ADMIN — ACETAMINOPHEN 650 MG: 325 TABLET, FILM COATED ORAL at 16:21

## 2017-11-25 RX ADMIN — PANTOPRAZOLE SODIUM 40 MG: 40 TABLET, DELAYED RELEASE ORAL at 16:21

## 2017-11-25 RX ADMIN — DOCUSATE SODIUM 100 MG: 100 CAPSULE, LIQUID FILLED ORAL at 08:07

## 2017-11-25 RX ADMIN — LORAZEPAM 0.5 MG: 0.5 TABLET ORAL at 21:50

## 2017-11-25 RX ADMIN — INSULIN LISPRO 1 UNITS: 100 INJECTION, SOLUTION INTRAVENOUS; SUBCUTANEOUS at 11:11

## 2017-11-25 RX ADMIN — WARFARIN SODIUM 6 MG: 6 TABLET ORAL at 18:31

## 2017-11-25 RX ADMIN — LISINOPRIL 10 MG: 10 TABLET ORAL at 08:07

## 2017-11-25 RX ADMIN — PANTOPRAZOLE SODIUM 40 MG: 40 TABLET, DELAYED RELEASE ORAL at 06:42

## 2017-11-25 RX ADMIN — METOPROLOL SUCCINATE 25 MG: 25 TABLET, EXTENDED RELEASE ORAL at 08:07

## 2017-11-25 RX ADMIN — NICOTINE 14 MG: 14 PATCH, EXTENDED RELEASE TRANSDERMAL at 08:06

## 2017-11-25 RX ADMIN — GABAPENTIN 900 MG: 300 CAPSULE ORAL at 08:07

## 2017-11-25 RX ADMIN — GABAPENTIN 900 MG: 300 CAPSULE ORAL at 16:20

## 2017-11-25 RX ADMIN — ACETAMINOPHEN 650 MG: 325 TABLET, FILM COATED ORAL at 10:33

## 2017-11-25 RX ADMIN — GABAPENTIN 900 MG: 300 CAPSULE ORAL at 21:50

## 2017-11-25 RX ADMIN — LORAZEPAM 0.5 MG: 0.5 TABLET ORAL at 16:21

## 2017-11-25 NOTE — PROGRESS NOTES
Internal Medicine Progress Note  Patient: Levon Gutierrez  Age/sex: 61 y o  male  Medical Record #: [id number]      ASSESSMENT/PLAN:  Levon Gutierrez is seen and examined and mangement for following issues:    1  Upper GI bleed due to esophagitis and Joan-Perez tear:  hemoglobin stable  Continue Protonix 40 mg b i d     2   PAD; status post left AKA (8/2017):  Prosthetic training per primary service  3   History of mechanical mitral valve replacement:  Increase Coumadin to 6mg qd with goal INR 2 5-3 5  INR 2 49  Pt has been intermittently refusing blood draws  For procedures in the future he would need to be bridged (per Dr Mickie Agosto note in office) but see notes from 8/2017 admit since there was concern for HIT even though labs neg    4  Hypertension:  stable; continue Lisinopril 10 mg daily and Toprol XL 25 mg daily  5   Chronic systolic CHF/LVEF 81%:  Continue Demadex 20 mg daily and monitor daily weights  K normal today  Do not add back daily KCl at this time  6   DM 2:  Continue Lantus 14 units at bedtime  Patient uses Lantus 21 units qhs at home  Continue Accuchecks QID/SSI and DM diet  , 111, 252, 176  Increase diet to constant carb level 3     7   Leukocytosis:  resolved  Septic workup was negative  Likely reactive  Observe off antibiotics  8   Nicotine abuse:  Was on 7mg patch but not effective to curb cravings so increased yesterday to 14mg    9  ICD:  Follows with Dr Annmarie Severance and [facility] device clinic    10  Hx bipolar disorder: Only on Seroquel  Pt suicidal earlier in the week  1:1 ordered by primary service  Pt able to contract for safety  11   ?hx HIT:  See notes from 8/2017 admit; 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 and even though labs negative 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 and 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      Subjective: Patient seen and examined  Patient complains of being hungry all the time    We will increase his diet to constant carb level 3 and monitor blood sugars  Patient denies any other complaints      ROS:   GI: denies abdominal pain, change bowel habits or reflux symptoms  Neuro: No new neurologic changes  Respiratory: No Cough, SOB  Cardiovascular: No CP, palpitations     Scheduled Meds:    atorvastatin 10 mg Oral Daily With Dinner   docusate sodium 100 mg Oral BID   gabapentin 900 mg Oral TID   insulin glargine 14 Units Subcutaneous HS   insulin lispro 1-5 Units Subcutaneous 4x Daily (AC & HS)   lisinopril 10 mg Oral Daily   LORazepam 0 5 mg Oral TID   metoprolol succinate 25 mg Oral Daily   nicotine 14 mg Transdermal Daily   pantoprazole 40 mg Oral BID AC   QUEtiapine 400 mg Oral HS   torsemide 20 mg Oral Daily   warfarin 5 mg Oral Daily (warfarin)       Labs:       Results from last 7 days  Lab Units 11/24/17  0637 11/21/17  1224   WBC Thousand/uL 10 16 9 54   HEMOGLOBIN g/dL 12 5 13 7   HEMATOCRIT % 37 2 40 1   PLATELETS Thousands/uL 202 203       Results from last 7 days  Lab Units 11/23/17  0615 11/21/17  1224   SODIUM mmol/L 138 137   POTASSIUM mmol/L 3 7 4 1   CHLORIDE mmol/L 106 106   CO2 mmol/L 24 25   BUN mg/dL 32* 28*   CREATININE mg/dL 1 10 1 19   GLUCOSE RANDOM mg/dL 135 173*   CALCIUM mg/dL 9 0 8 9           Results from last 7 days  Lab Units 11/25/17  0645 11/24/17  0637   INR  2 49* 2 59*          Glucose (mg/dL)   Date Value   11/23/2017 135   11/21/2017 173 (H)   11/19/2017 156 (H)   11/17/2017 147 (H)     Glucose, i-STAT (mg/dl)   Date Value   04/18/2017 273 (H)       Labs reviewed    Physical Examination:  Vitals:   Vitals:    11/24/17 1335 11/24/17 2027 11/25/17 0600 11/25/17 0636   BP: 138/65 112/57  122/56   Pulse: 83 82  66   Resp: 18 18 18   Temp: 98 1 °F (36 7 °C) 97 9 °F (36 6 °C)  97 6 °F (36 4 °C)   TempSrc: Oral Tympanic  Oral   SpO2: 99% 99%  97%   Weight:   77 3 kg (170 lb 6 7 oz) 77 3 kg (170 lb 6 7 oz)   Height:           GEN: NAD  HEENT: NC/AT  RESP: BBS w/o crackles/wheeze/rhonci; resp unlabored  CV: +S1 S2, regular rate, no rubs/murmurs; +prosthetic valve click  ABD: soft, NT, ND, normal BS   : no bella  EXT: no edema  Skin: no rashes  Neuro: AAO      [ X ] Total time spent: 30 Mins and greater than 50% of this time was spent counseling/coordinating care        Mark Luke PA-C  Internal Medicine

## 2017-11-25 NOTE — PROGRESS NOTES
11/25/17 1300   Pain Assessment   Pain Assessment 0-10   Pain Score 4   Pain Type Acute pain   Pain Location Head   Restrictions/Precautions   Precautions Fall Risk;Contact/isolation   Braces or Orthoses Prosthesis   Subjective   Subjective pt reports feeling okay and ready for PT session  QI: Lying to Sitting on Side of Bed   Assistance Needed Independent   Lying to Sitting on Side of Bed CARE Score 6   QI: Sit to Stand   Assistance Needed Independent   Sit to Stand CARE Score 6   Bed Mobility   Findings Mod I level    QI: Chair/Bed-to-Chair Transfer   Assistance Needed Independent   Chair/Bed-to-Chair Transfer CARE Score 6   Transfer Bed/Chair/Wheelchair   Limitations Noted In Balance; Endurance;LE Strength   Adaptive Equipment Roller Walker   Sit Pivot Modified Independent   Stand Pivot Minimal Assist;Contact Guard  (prosth )   Supine to Sit Independent   Sit to Supine Independent   Findings pt able to jazmine  and doff prosth left leg    Bed, Chair, Wheelchair Transfer (FIM) 6 - Patient requires assistive device/extra time/safety concerns but completes independently   Ambulation   Does the patient walk? 2  Yes   Primary Discharge Mode of Locomotion Wheelchair   Walk Assist Level Minimum Assist;Contact Guard   Assist Device Roller Walker   Distance Walked (feet) 50 ft  (x2)   Limitations Noted In Balance; Endurance; Safety;Posture; Heel Strike;Swing;Strength;Speed   Findings pt left  prosth/leg hip hicking to advance LLE at times pt lock leg prosth / leg before ambulation , pt vears at time to his right side , cueing to correct   Walking (FIM) 2 - Patient ambulates between 50 - 149 feet regardless of assist/device/set up   QI: Wheel [address]   Wheel 150 Feet CARE Score 6   Wheelchair mobility   QI: Does the patient use a wheelchair? 1  Yes   QI: Indicate the type of wheelchair 1  Manual   Wheelchair Assist Level Modified Independent   Method Right upper extremity; Left upper extremity;Right lower extremity   Needs Assist With Press Release;Obstacles; Locking Brakes   Distance Level Surface (feet) 200 ft   Wheelchair (FIM) 6 - Patient wheels 150 feet or more, no rests AND independently, timely and safely   Therapeutic Interventions   Strengthening mat program bridges gluts , prone lying for 8 min LLE ext  stretch manual 10 -15 reps RLE LAQ SAQ X15    Balance Static stand with LLE prosth/ leg    Equipment Use   NuStep level 2 for 15 min    Assessment   Treatment Assessment pt perform PT session thex ex's , functional activities and gait training with RW  pt also able to jazmine  and doff prosth/ left leg at S level , pt instructed with LLE foot placement durign gait training and left hip movts   pt understand Left prosth/leg needs to be lock during ambulation and post doffing needs to check skin integrity LLE , pt noted a littl redness or pink around med/laterial sides, pt will cont to benefit with PT session to meet goals    Problem List Decreased strength;Decreased endurance; Impaired balance;Decreased mobility; Decreased safety awareness   Barriers to Discharge Inaccessible home environment;Decreased caregiver support   Plan   Treatment/Interventions Functional transfer training;LE strengthening/ROM; Therapeutic exercise; Endurance training;Cognitive reorientation;Patient/family training;Bed mobility;Gait training   Progress Progressing toward goals   PT Therapy Minutes   PT Time In 1300   PT Time Out 1430   PT Total Time (minutes) 90   PT Mode of treatment - Individual (minutes) 90   PT Mode of treatment - Concurrent (minutes) 0   PT Mode of treatment - Group (minutes) 0   PT Mode of treatment - Co-treat (minutes) 0   PT Mode of Teatment - Total time(minutes) 90 minutes   Therapy Time missed   Time missed?  No

## 2017-11-26 LAB
GLUCOSE SERPL-MCNC: 157 MG/DL (ref 65–140)
GLUCOSE SERPL-MCNC: 164 MG/DL (ref 65–140)
GLUCOSE SERPL-MCNC: 169 MG/DL (ref 65–140)
GLUCOSE SERPL-MCNC: 224 MG/DL (ref 65–140)
INR PPP: 2.2 (ref 0.86–1.16)
PROTHROMBIN TIME: 24.7 SECONDS (ref 12.1–14.4)

## 2017-11-26 PROCEDURE — 85610 PROTHROMBIN TIME: CPT | Performed by: PHYSICIAN ASSISTANT

## 2017-11-26 PROCEDURE — 97110 THERAPEUTIC EXERCISES: CPT

## 2017-11-26 PROCEDURE — 97530 THERAPEUTIC ACTIVITIES: CPT

## 2017-11-26 PROCEDURE — 97116 GAIT TRAINING THERAPY: CPT

## 2017-11-26 PROCEDURE — 82948 REAGENT STRIP/BLOOD GLUCOSE: CPT

## 2017-11-26 PROCEDURE — 97535 SELF CARE MNGMENT TRAINING: CPT

## 2017-11-26 RX ORDER — WARFARIN SODIUM 7.5 MG/1
7.5 TABLET ORAL
Status: DISCONTINUED | OUTPATIENT
Start: 2017-11-26 | End: 2017-11-27

## 2017-11-26 RX ADMIN — NICOTINE 14 MG: 14 PATCH, EXTENDED RELEASE TRANSDERMAL at 08:00

## 2017-11-26 RX ADMIN — LORAZEPAM 0.5 MG: 0.5 TABLET ORAL at 07:58

## 2017-11-26 RX ADMIN — GABAPENTIN 900 MG: 300 CAPSULE ORAL at 07:57

## 2017-11-26 RX ADMIN — GABAPENTIN 900 MG: 300 CAPSULE ORAL at 22:12

## 2017-11-26 RX ADMIN — INSULIN GLARGINE 14 UNITS: 100 INJECTION, SOLUTION SUBCUTANEOUS at 22:12

## 2017-11-26 RX ADMIN — LORAZEPAM 0.5 MG: 0.5 TABLET ORAL at 22:12

## 2017-11-26 RX ADMIN — LISINOPRIL 10 MG: 10 TABLET ORAL at 07:58

## 2017-11-26 RX ADMIN — INSULIN LISPRO 1 UNITS: 100 INJECTION, SOLUTION INTRAVENOUS; SUBCUTANEOUS at 16:32

## 2017-11-26 RX ADMIN — NICOTINE 14 MG: 14 PATCH, EXTENDED RELEASE TRANSDERMAL at 07:57

## 2017-11-26 RX ADMIN — INSULIN LISPRO 1 UNITS: 100 INJECTION, SOLUTION INTRAVENOUS; SUBCUTANEOUS at 06:53

## 2017-11-26 RX ADMIN — QUETIAPINE FUMARATE 400 MG: 100 TABLET ORAL at 22:12

## 2017-11-26 RX ADMIN — METOPROLOL SUCCINATE 25 MG: 25 TABLET, EXTENDED RELEASE ORAL at 07:57

## 2017-11-26 RX ADMIN — GABAPENTIN 900 MG: 300 CAPSULE ORAL at 16:30

## 2017-11-26 RX ADMIN — ACETAMINOPHEN 650 MG: 325 TABLET, FILM COATED ORAL at 22:15

## 2017-11-26 RX ADMIN — PANTOPRAZOLE SODIUM 40 MG: 40 TABLET, DELAYED RELEASE ORAL at 06:05

## 2017-11-26 RX ADMIN — LORAZEPAM 0.5 MG: 0.5 TABLET ORAL at 16:31

## 2017-11-26 RX ADMIN — WARFARIN SODIUM 7.5 MG: 7.5 TABLET ORAL at 17:50

## 2017-11-26 RX ADMIN — ATORVASTATIN CALCIUM 10 MG: 10 TABLET, FILM COATED ORAL at 16:31

## 2017-11-26 RX ADMIN — INSULIN LISPRO 2 UNITS: 100 INJECTION, SOLUTION INTRAVENOUS; SUBCUTANEOUS at 22:12

## 2017-11-26 RX ADMIN — INSULIN LISPRO 1 UNITS: 100 INJECTION, SOLUTION INTRAVENOUS; SUBCUTANEOUS at 11:01

## 2017-11-26 RX ADMIN — PANTOPRAZOLE SODIUM 40 MG: 40 TABLET, DELAYED RELEASE ORAL at 16:31

## 2017-11-26 RX ADMIN — TORSEMIDE 20 MG: 20 TABLET ORAL at 07:58

## 2017-11-26 NOTE — PROGRESS NOTES
Internal Medicine Progress Note  Patient: Johnathan Moody  Age/sex: 61 y o  male  Medical Record #: [id number]      ASSESSMENT/PLAN:  Johnahtan Moody is seen and examined and mangement for following issues:    1  Upper GI bleed due to esophagitis and Joan-Perez tear:  hemoglobin stable  Continue Protonix 40 mg b i d     2   PAD; status post left AKA (8/2017):  Prosthetic training per primary service  3   History of mechanical mitral valve replacement:  Will give 7 5mg of Coumadin tonight with goal INR 2 5-3 5  INR 2 20  Pt has been intermittently refusing blood draws  For procedures in the future he would need to be bridged (per Dr Gerhardt Quick note in office) but see notes from 8/2017 admit since there was concern for HIT even though labs neg    4  Hypertension:  stable; continue Lisinopril 10 mg daily and Toprol XL 25 mg daily  5   Chronic systolic CHF/LVEF 21%:  Continue Demadex 20 mg daily and monitor daily weights  K normal today  Do not add back daily KCl at this time  6   DM 2:  Continue Lantus 14 units at bedtime  Patient uses Lantus 21 units qhs at home  Continue Accuchecks QID/SSI and DM diet  , 145, 186, 164  Increased diet to constant carb level 3  Patient continues to complain that he is not getting enough to eat  7   Leukocytosis:  resolved  Septic workup was negative  Likely reactive  Observe off antibiotics  8   Nicotine abuse:  Was on 7mg patch but not effective to curb cravings so increased yesterday to 14mg    9  ICD:  Follows with Dr Francesca Bullock and [facility] device clinic    10  Hx bipolar disorder: Only on Seroquel  Pt suicidal earlier in the week  1:1 ordered by primary service  Pt able to contract for safety  11   ?hx HIT:  See notes from 8/2017 admit; 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 and even though labs negative 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 and 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      Subjective: Patient seen and examined    Patient complains of being hungry all the time  Continue constant carb level 3 diet  Pain is well controlled  Patient denies any other complaints      ROS:   GI: denies abdominal pain, change bowel habits or reflux symptoms  Neuro: No new neurologic changes  Respiratory: No Cough, SOB  Cardiovascular: No CP, palpitations     Scheduled Meds:    atorvastatin 10 mg Oral Daily With Dinner   docusate sodium 100 mg Oral BID   gabapentin 900 mg Oral TID   insulin glargine 14 Units Subcutaneous HS   insulin lispro 1-5 Units Subcutaneous 4x Daily (AC & HS)   lisinopril 10 mg Oral Daily   LORazepam 0 5 mg Oral TID   metoprolol succinate 25 mg Oral Daily   nicotine 14 mg Transdermal Daily   pantoprazole 40 mg Oral BID AC   QUEtiapine 400 mg Oral HS   torsemide 20 mg Oral Daily   warfarin 6 mg Oral Daily (warfarin)       Labs:       Results from last 7 days  Lab Units 11/24/17  0637 11/21/17  1224   WBC Thousand/uL 10 16 9 54   HEMOGLOBIN g/dL 12 5 13 7   HEMATOCRIT % 37 2 40 1   PLATELETS Thousands/uL 202 203       Results from last 7 days  Lab Units 11/23/17  0615 11/21/17  1224   SODIUM mmol/L 138 137   POTASSIUM mmol/L 3 7 4 1   CHLORIDE mmol/L 106 106   CO2 mmol/L 24 25   BUN mg/dL 32* 28*   CREATININE mg/dL 1 10 1 19   GLUCOSE RANDOM mg/dL 135 173*   CALCIUM mg/dL 9 0 8 9           Results from last 7 days  Lab Units 11/26/17  0517 11/25/17  0645   INR  2 20* 2 49*          Glucose (mg/dL)   Date Value   11/23/2017 135   11/21/2017 173 (H)   11/19/2017 156 (H)   11/17/2017 147 (H)     Glucose, i-STAT (mg/dl)   Date Value   04/18/2017 273 (H)       Labs reviewed    Physical Examination:  Vitals:   Vitals:    11/25/17 0636 11/25/17 1415 11/25/17 2054 11/26/17 0500   BP: 122/56 113/58 110/62 124/74   Pulse: 66 68 64 77   Resp: 18 20 18 20   Temp: 97 6 °F (36 4 °C) 98 6 °F (37 °C) 98 5 °F (36 9 °C) 98 4 °F (36 9 °C)   TempSrc: Oral Oral Oral Oral   SpO2: 97% 98% 97% 100%   Weight: 77 3 kg (170 lb 6 7 oz)   79 3 kg (174 lb 13 2 oz)   Height: GEN: NAD  HEENT: NC/AT  RESP: BBS w/o crackles/wheeze/rhonci; resp unlabored  CV: +S1 S2, regular rate, no rubs/murmurs; +prosthetic valve click  ABD: soft, NT, ND, normal BS   : no bella  EXT: no edema  Skin: no rashes  Neuro: AAO      [ X ] Total time spent: 30 Mins and greater than 50% of this time was spent counseling/coordinating care        Maryuri Burr PA-C  Internal Medicine

## 2017-11-26 NOTE — PROGRESS NOTES
11/26/17 1000   Pain Assessment   Pain Assessment 0-10   Pain Score No Pain   QI: Chair/Bed-to-Chair Transfer   Assistance Needed Supervision   Chair/Bed-to-Chair Transfer CARE Score 4   Transfer Bed/Chair/Wheelchair   Adaptive Equipment Roller Walker   Findings S for fxnl mob with RW and LLE prosthesis donned, Mod I w/c level sit pivot xfers   Bed, Chair, Wheelchair Transfer (FIM) 5 - Patient requires supervision/monitoring   QI: [facility] CARE Score 6   Toileting   Able to [address] down yes, up yes  Able to Manage Clothing Closures Yes   Manage Hygiene Bladder   Findings Mod I w/c level   Toileting (FIM) 6 - Patient requires assistive device/extra time/safety concerns but completes independently   QI: Toilet Transfer   Assistance Needed Independent   Toilet Transfer CARE Score 6   Toilet Transfer   Surface Assessed Standard Toilet   Findings w/c level sit pivot xfer   Toilet Transfer (FIM) 6 - Patient requires assistive device/extra time/safety concerns but completes independently   Kitchen Mobility   Kitchen-Mobility Level Wheelchair   Kitchen Activity Retrieve items;Transport items   Kitchen Mobility Comments Pt engaged in w/c mob in kitchen to pour cup of coffee, retrieve necessary ingredients, and    Functional Standing Tolerance   Time 10 min x 1   Activity jenga; b/l UE release   Comments Pt engaged in standing tolerance task with LLE prosthetic donned to inc overall standing endurance and tolerance for LLE prosthesis  Pt engaged in preferred leisure task in stance with b/l UE release with no LOB noted  Mod I in static stance   Exercise Tools   UE Ergometer 6 min x 2 (prograde/retrograde) seated on scifit to inc UB strength and fxnl endurance for improved xfer status and ADL fxn   Pt tolerated well at 2 0 resist with brief rest break to manage fatigue   Additional Activities   Additional Activities Comments w/c management training: pt engaged in w/c management training to don/doff and manage elevating leg rests  Pt provided with demonstration on management and able to carryover indep  With kitchen mobility task and LLE prosthetic donned, pt noted with dec foot clearance and LLE becoming tangled with wheelchair front wheel  Pt edu on recommendation that when he has prosthesis donned, he should place LLE on wheelchair  Pt verbalized understanding   Assessment   Treatment Assessment Pt perseverative on diet and his inability to recieve double portions  Pt req redirection to engage in tx  Pt stating his w/c from home is one he purchased indep from [employer]  stating some of the pieces do not function appropriately  OT to speak with PT and CM regarding possible need for w/c order  Pt stating no one is available to bring in his DME to [facility]  pt overall Mod I w/c level and S with use of RW with LLE prosthetic donned   Prognosis Good   Problem List Decreased strength;Decreased endurance; Impaired balance;Decreased mobility   Plan   Treatment/Interventions ADL retraining;Functional transfer training;Patient/family training; Endurance training; Therapeutic exercise;Equipment eval/education   Progress Progressing toward goals   OT Therapy Minutes   OT Time In 1000   OT Time Out 1100   OT Total Time (minutes) 60   OT Mode of treatment - Individual (minutes) 60   OT Mode of treatment - Concurrent (minutes) 0   OT Mode of treatment - Group (minutes) 0   OT Mode of treatment - Co-treat (minutes) 0   OT Mode of Teatment - Total time(minutes) 60 minutes   Therapy Time missed   Time missed?  No

## 2017-11-26 NOTE — PROGRESS NOTES
11/26/17 0830   Pain Assessment   Pain Assessment 0-10   Pain Score No Pain   Restrictions/Precautions   Precautions Fall Risk;Contact/isolation   Braces or Orthoses Prosthesis   Subjective   Subjective pt reports ready for PT session pt states would like his strap for AKA   QI: Lying to Sitting on Side of Bed   Assistance Needed Independent   Lying to Sitting on Side of Bed CARE Score 6   QI: Sit to Stand   Assistance Needed Independent   Sit to Stand CARE Score 6   Bed Mobility   Findings Mod I level    QI: Chair/Bed-to-Chair Transfer   Assistance Needed Independent   Chair/Bed-to-Chair Transfer CARE Score 6   Transfer Bed/Chair/Wheelchair   Limitations Noted In Balance; Endurance;LE Strength;UE Strength   Adaptive Equipment Roller Walker   Sit Pivot Modified Independent   Stand Pivot Contact Guard   Sit to Stand Modified Independent;Supervision   Stand to YADIRA Goodwin, Chair, Wheelchair Transfer (FIM) 5 - Patient requires supervision/monitoring   QI: Car Transfer   Assistance Needed Supervision   Car Transfer CARE Score 4   QI: Walk 10 Feet   Assistance Needed Supervision   Walk 10 Feet CARE Score 4   Ambulation   Does the patient walk? 2  Yes   Primary Discharge Mode of Locomotion Wheelchair   Walk Assist Level Close Supervision   Gait Pattern Decreased foot clearance; Inconsistant Netta;Trendelenburg;Decreased L stance   Assist Device Zen Sherley Holguin Walked (feet) 60 ft  (x2)   Limitations Noted In Balance; Endurance; Coordination;Device Management;Posture; Safety;Strength; Sequencing   Findings adjusted prosthesis LLE ,pt cont lefg hip hicking at times    Walking (FIM) 2 - Patient completes 25 - 49% of all tasks AND distance 150 feet or more, no rest   QI: Wheel 50 Feet with [place] [address] 50 Feet with Two Turns CARE Score 6   Wheelchair mobility   QI: Does the patient use a wheelchair? 1  Yes   QI: Indicate the type of wheelchair 1   Manual    Wheelchair Assist Level Modified Independent   Method Left upper extremity;Right upper extremity   Needs Assist With Press Release;Obstacles; Locking Brakes   Distance Level Surface (feet) 300 ft   Wheelchair (FIM) 6 - Patient wheels 150 feet or more, no rests AND independently, timely and safely   QI: 1 Step (Curb)   Assistance Needed Physical assistance   Assistance Provided by [place] Less than 25%   1 Step (Curb) CARE Score 3   QI: 4 Steps   Assistance Needed Physical assistance   Assistance Provided by [place] Less than 25%   4 Steps CARE Score 3   QI: 12 Steps   Reason if not Attempted Activity not applicable   12 Steps CARE Score 9   Stairs   Type Stairs;Curb   # of Steps 6  (x2)   Assist Devices Single Rail  (right HR )   Findings cueing for sequence ,pt right HR sideways    Stairs (FIM) 2 - Patient goes up and down 4 - 11 stairs regardless of assist/device/setup   Therapeutic Interventions   Strengthening mat program all planes RLE SAQ AP SLR hip ADD/ABD 10-15 REPS WITH 2 #    Flexibility Left residual limb manual stretch right  sidelying Left hip  Extension , ,prone x10 LLE hip stretrch   Balance l   Neuromuscular Re-Education etf    Equipment Use   NuStep level 3 for 15 min    Other Comments   Comments core strengthening with 5# ball supine   Assessment   Treatment Assessment pt perform jazmine and doff left prosthesis leg  inc gait distance  and endurance pt instruted to check left residual limb for inc redness   pt left hip hiking during ambulation , overall pt imroving with prosthesis leg kei durign walking , pt perform ascending and descending 6 inch transfers steps right hand rail with vc's for left foot and hand placement for safety , pt will cont to benefit with PT session    Problem List Decreased strength;Decreased endurance;Decreased mobility;Orthopedic restrictions;Decreased skin integrity   Barriers to Discharge Inaccessible home environment;Decreased caregiver support   Plan   Treatment/Interventions Functional transfer training;LE strengthening/ROM; Therapeutic exercise; Endurance training;Elevations;Cognitive reorientation;Gait training   Progress Progressing toward goals   PT Therapy Minutes   PT Time In 0830   PT Time Out 1000   PT Total Time (minutes) 90   PT Mode of treatment - Individual (minutes) 90   PT Mode of treatment - Concurrent (minutes) 0   PT Mode of treatment - Group (minutes) 0   PT Mode of treatment - Co-treat (minutes) 0   PT Mode of Teatment - Total time(minutes) 90 minutes   Therapy Time missed   Time missed?  No   Manual WC

## 2017-11-26 NOTE — PROGRESS NOTES
11/25/17 0930   Pain Assessment   Pain Assessment 0-10   Pain Score 4   Pain Type Acute pain   Pain Location Head   Hospital Pain Intervention(s) Medication (See MAR)   Restrictions/Precautions   Precautions Fall Risk;Contact/isolation   Weight Bearing Restrictions No   Braces or Orthoses Prosthesis   QI: Lower Body Dressing   Assistance Needed Independent   Assistance Provided by [place] No physical assistance   Lower Body Dressing CARE Score 6   QI: Putting On/Taking Off [address] Provided by [place] No physical assistance   Putting On/Taking Off Footwear CARE Score 6   Dressing/Undressing Clothing   Remove LB Clothes Pants;Socks; Shoes   Don LB Clothes Pants;Socks; Shoes   Positioning Supported Sit;Standing   LB Dressing (FIM) 6 - Patient requires assistive device/extra time/safety concerns but completes independently   QI: Sit to [address] Provided by [place] No physical assistance   Sit to Stand CARE Score 6   QI: Chair/Bed-to-Chair Transfer   Assistance Needed Independent   Assistance Provided by [place] No physical assistance   Comment sit pivots   Chair/Bed-to-Chair Transfer CARE Score 6   Transfer Bed/Chair/Wheelchair   Limitations Noted In Balance; Endurance   Bed, Chair, Wheelchair Transfer (FIM) 6 - Patient requires assistive device/extra time/safety concerns but completes independently   Functional Standing Tolerance   Time 10 minutes    Activity card matching in stance  Comments Pt engaged in static standing with LLE prosthetic donned to increase tolerance to prosthetic and standing tolerance  Pt tolerates well with BUE release and NO LOB noted in stance  VC's to achieve wider JOHAN and for equal weight shift  Exercise Tools   UE Ergometer Pt completes scifit on level 1 5 with 5 minutes prograde and 5 mintues retrograde to increase ovearll endurance and UB strength for increased independence with ADL tasks  Cognition   Overall Cognitive Status Impaired   Arousal/Participation Cooperative   Attention Difficulty attending to directions   Orientation Level Oriented X4   Memory Within functional limits   Following Commands Follows one step commands with increased time or repetition   Activity Tolerance   Activity Tolerance Patient tolerated treatment well   Assessment   Treatment Assessment Pt participated in skilled OT services with focus on prosthetic training, functional mobility/transfers, standing tolerance, and endurance  Pt demos G carryover of proper technique with donning/doffing prosthetic  Applies strap around waist while seated EOB and tightens in stance  Requires steadying A in stance to complete lateral weight shifts with BUE support on RW to ensure residual limb is fully in prosthetic  Pt is able to complete functional mobility with use of RW and w/c follow with overall CGA and VC's for technique  Pt noted to hit L side of RW during advancement of prosthetic during mobility  Pt with slight pinkness noted on medial aspect of residual limb post session when doffed  Education provided on skin integrity and importance of checking skin and proper wear schedule  Pt will continue to benefit from skilled OT services following POC to increase safety and independence with ADL tasks and prosthetic training  Prognosis Good   Problem List Decreased strength;Decreased endurance; Impaired balance;Decreased mobility; Decreased safety awareness   Plan   Treatment/Interventions ADL retraining;Functional transfer training; Endurance training;Equipment eval/education; Compensatory technique education   Progress Progressing toward goals   Recommendation   OT Discharge Recommendation Home independent   OT Therapy Minutes   OT Time In 0930   OT Time Out 1100   OT Total Time (minutes) 90   OT Mode of treatment - Individual (minutes) 90   OT Mode of treatment - Concurrent (minutes) 0   OT Mode of treatment - Group (minutes) 0   OT Mode of treatment - Co-treat (minutes) 0   OT Mode of Teatment - Total time(minutes) 90 minutes   Therapy Time missed   Time missed?  No

## 2017-11-26 NOTE — PROGRESS NOTES
11/26/17 1300   Pain Assessment   Pain Assessment No/denies pain   Pain Score No Pain   Restrictions/Precautions   Precautions Fall Risk;Contact/isolation   Weight Bearing Restrictions No   Braces or Orthoses Prosthesis   QI: Sit to [address] Provided by [place] No physical assistance   Sit to Stand CARE Score 6   QI: Chair/Bed-to-Chair Transfer   Assistance Needed Independent; Adaptive equipment   Assistance Provided by [place] No physical assistance   Chair/Bed-to-Chair Transfer CARE Score 6   Transfer Bed/Chair/Wheelchair   Limitations Noted In Balance; Endurance   Findings Xin w/c level  Bed, Chair, Wheelchair Transfer (FIM) 6 - Patient requires assistive device/extra time/safety concerns but completes independently   QI: [address] Provided by [place] No physical assistance   Toileting Hygiene CARE Score 6   Toileting   Able to [address] down yes, up yes  Able to [facility] Bladder   Findings Xin w/c level   Toileting (FIM) 6 - Patient requires assistive device/extra time/safety concerns but completes independently   QI: [address] Provided by [place] No physical assistance   Toilet Transfer CARE Score 6   Toilet Transfer   Surface Assessed Standard Toilet   Transfer Technique Stand Pivot   Limitations Noted In Balance; Endurance;LE Strength   Findings sit pivot from w/c level   Toilet Transfer (FIM) 6 - Patient requires assistive device/extra time/safety concerns but completes independently   Exercise Tools   Other Exercise Tool 1 Pt completes w/c pushups 2 x 10 to increase independence with functional transfers   Core strengthening completed with weight therapy ball including trunk rotation and trunk flexion/ext, and bridiging completed while supine to increase core strength and balance for increased independence with functional mobility and transfers  Cognition   Overall Cognitive Status Impaired   Arousal/Participation Cooperative   Attention Difficulty attending to directions   Orientation Level Oriented X4   Memory Within functional limits   Following Commands Follows one step commands with increased time or repetition   Activity Tolerance   Activity Tolerance Patient tolerated treatment well   Assessment   Treatment Assessment Pt continuing to perseverate on double portions with meals and on upset stomach, nursing notified  Pt requires redirection to engage in therapy session  Pt is agreeable with encouragement to bedside treat  Pt engaged in various core/UB strengthening exercises to increase muscle strength and endurance for increased independence with functional mobility and balance during prosthetic training  Pt continues to be Xin with sit pivot transfers but requires CS with all functional transfers and mobility with use of prosthetic  Pt will continue to benefit from skilled OT services following POC to increase safety and independence with ADL tasks using prosthetic  Prognosis Good   Problem List Decreased strength;Decreased endurance; Impaired balance;Decreased mobility   Plan   Treatment/Interventions Functional transfer training;ADL retraining; Therapeutic exercise; Endurance training;Equipment eval/education; Compensatory technique education; Bed mobility   Progress Progressing toward goals   Recommendation   OT Discharge Recommendation Home independent   OT Therapy Minutes   OT Time In 1300   OT Time Out 1330   OT Total Time (minutes) 30   OT Mode of treatment - Individual (minutes) 30   OT Mode of treatment - Concurrent (minutes) 0   OT Mode of treatment - Group (minutes) 0   OT Mode of treatment - Co-treat (minutes) 0   OT Mode of Teatment - Total time(minutes) 30 minutes   Therapy Time missed   Time missed?  No

## 2017-11-27 LAB
ANION GAP SERPL CALCULATED.3IONS-SCNC: 6 MMOL/L (ref 4–13)
BASOPHILS # BLD AUTO: 0.03 THOUSANDS/ΜL (ref 0–0.1)
BASOPHILS NFR BLD AUTO: 0 % (ref 0–1)
BUN SERPL-MCNC: 26 MG/DL (ref 5–25)
CALCIUM SERPL-MCNC: 8.9 MG/DL (ref 8.3–10.1)
CHLORIDE SERPL-SCNC: 105 MMOL/L (ref 100–108)
CO2 SERPL-SCNC: 25 MMOL/L (ref 21–32)
CREAT SERPL-MCNC: 1.2 MG/DL (ref 0.6–1.3)
EOSINOPHIL # BLD AUTO: 0.37 THOUSAND/ΜL (ref 0–0.61)
EOSINOPHIL NFR BLD AUTO: 5 % (ref 0–6)
ERYTHROCYTE [DISTWIDTH] IN BLOOD BY AUTOMATED COUNT: 16.7 % (ref 11.6–15.1)
GFR SERPL CREATININE-BSD FRML MDRD: 66 ML/MIN/1.73SQ M
GLUCOSE P FAST SERPL-MCNC: 154 MG/DL (ref 65–99)
GLUCOSE SERPL-MCNC: 109 MG/DL (ref 65–140)
GLUCOSE SERPL-MCNC: 134 MG/DL (ref 65–140)
GLUCOSE SERPL-MCNC: 151 MG/DL (ref 65–140)
GLUCOSE SERPL-MCNC: 154 MG/DL (ref 65–140)
GLUCOSE SERPL-MCNC: 182 MG/DL (ref 65–140)
GLUCOSE SERPL-MCNC: 191 MG/DL (ref 65–140)
HCT VFR BLD AUTO: 38.6 % (ref 36.5–49.3)
HGB BLD-MCNC: 13 G/DL (ref 12–17)
INR PPP: 1.95 (ref 0.86–1.16)
LYMPHOCYTES # BLD AUTO: 1.47 THOUSANDS/ΜL (ref 0.6–4.47)
LYMPHOCYTES NFR BLD AUTO: 19 % (ref 14–44)
MCH RBC QN AUTO: 28.6 PG (ref 26.8–34.3)
MCHC RBC AUTO-ENTMCNC: 33.7 G/DL (ref 31.4–37.4)
MCV RBC AUTO: 85 FL (ref 82–98)
MONOCYTES # BLD AUTO: 0.85 THOUSAND/ΜL (ref 0.17–1.22)
MONOCYTES NFR BLD AUTO: 11 % (ref 4–12)
NEUTROPHILS # BLD AUTO: 4.9 THOUSANDS/ΜL (ref 1.85–7.62)
NEUTS SEG NFR BLD AUTO: 65 % (ref 43–75)
NRBC BLD AUTO-RTO: 0 /100 WBCS
PLATELET # BLD AUTO: 221 THOUSANDS/UL (ref 149–390)
POTASSIUM SERPL-SCNC: 4.1 MMOL/L (ref 3.5–5.3)
PROTHROMBIN TIME: 22.4 SECONDS (ref 12.1–14.4)
RBC # BLD AUTO: 4.54 MILLION/UL (ref 3.88–5.62)
SODIUM SERPL-SCNC: 136 MMOL/L (ref 136–145)
WBC # BLD AUTO: 7.65 THOUSAND/UL (ref 4.31–10.16)

## 2017-11-27 PROCEDURE — 80048 BASIC METABOLIC PNL TOTAL CA: CPT | Performed by: PHYSICIAN ASSISTANT

## 2017-11-27 PROCEDURE — 97542 WHEELCHAIR MNGMENT TRAINING: CPT

## 2017-11-27 PROCEDURE — 97110 THERAPEUTIC EXERCISES: CPT

## 2017-11-27 PROCEDURE — 97535 SELF CARE MNGMENT TRAINING: CPT

## 2017-11-27 PROCEDURE — 97116 GAIT TRAINING THERAPY: CPT

## 2017-11-27 PROCEDURE — 85610 PROTHROMBIN TIME: CPT | Performed by: PHYSICIAN ASSISTANT

## 2017-11-27 PROCEDURE — 97530 THERAPEUTIC ACTIVITIES: CPT

## 2017-11-27 PROCEDURE — 85025 COMPLETE CBC W/AUTO DIFF WBC: CPT | Performed by: PHYSICIAN ASSISTANT

## 2017-11-27 PROCEDURE — 82948 REAGENT STRIP/BLOOD GLUCOSE: CPT

## 2017-11-27 RX ORDER — WARFARIN SODIUM 5 MG/1
10 TABLET ORAL
Status: DISCONTINUED | OUTPATIENT
Start: 2017-11-27 | End: 2017-11-29 | Stop reason: HOSPADM

## 2017-11-27 RX ADMIN — GABAPENTIN 900 MG: 300 CAPSULE ORAL at 08:02

## 2017-11-27 RX ADMIN — TORSEMIDE 20 MG: 20 TABLET ORAL at 08:03

## 2017-11-27 RX ADMIN — INSULIN LISPRO 1 UNITS: 100 INJECTION, SOLUTION INTRAVENOUS; SUBCUTANEOUS at 22:03

## 2017-11-27 RX ADMIN — INSULIN GLARGINE 14 UNITS: 100 INJECTION, SOLUTION SUBCUTANEOUS at 22:02

## 2017-11-27 RX ADMIN — QUETIAPINE FUMARATE 400 MG: 100 TABLET ORAL at 22:02

## 2017-11-27 RX ADMIN — LORAZEPAM 0.5 MG: 0.5 TABLET ORAL at 22:02

## 2017-11-27 RX ADMIN — PANTOPRAZOLE SODIUM 40 MG: 40 TABLET, DELAYED RELEASE ORAL at 16:35

## 2017-11-27 RX ADMIN — INSULIN LISPRO 1 UNITS: 100 INJECTION, SOLUTION INTRAVENOUS; SUBCUTANEOUS at 11:32

## 2017-11-27 RX ADMIN — PANTOPRAZOLE SODIUM 40 MG: 40 TABLET, DELAYED RELEASE ORAL at 05:51

## 2017-11-27 RX ADMIN — ATORVASTATIN CALCIUM 10 MG: 10 TABLET, FILM COATED ORAL at 16:35

## 2017-11-27 RX ADMIN — LORAZEPAM 0.5 MG: 0.5 TABLET ORAL at 16:35

## 2017-11-27 RX ADMIN — GABAPENTIN 900 MG: 300 CAPSULE ORAL at 16:35

## 2017-11-27 RX ADMIN — INSULIN LISPRO 1 UNITS: 100 INJECTION, SOLUTION INTRAVENOUS; SUBCUTANEOUS at 07:31

## 2017-11-27 RX ADMIN — LISINOPRIL 10 MG: 10 TABLET ORAL at 08:03

## 2017-11-27 RX ADMIN — NICOTINE 14 MG: 14 PATCH, EXTENDED RELEASE TRANSDERMAL at 08:04

## 2017-11-27 RX ADMIN — WARFARIN SODIUM 10 MG: 5 TABLET ORAL at 17:55

## 2017-11-27 RX ADMIN — LORAZEPAM 0.5 MG: 0.5 TABLET ORAL at 08:03

## 2017-11-27 RX ADMIN — GABAPENTIN 900 MG: 300 CAPSULE ORAL at 22:02

## 2017-11-27 RX ADMIN — ACETAMINOPHEN 650 MG: 325 TABLET, FILM COATED ORAL at 18:01

## 2017-11-27 RX ADMIN — METOPROLOL SUCCINATE 25 MG: 25 TABLET, EXTENDED RELEASE ORAL at 08:03

## 2017-11-27 NOTE — SOCIAL WORK
CM received return call from daughter Ugo Cast and she has found housing for patient in her 's home that is currently being rented  There is a bottom floor efficiency and she has set it up for him with furniture  She will be picking him up on Wednesday 11/29 around 10-11am  She will set up his outpatient PT at a [facility] in [facility]  She will arrange his appointments with her work schedule, as well as his follow up with PCP

## 2017-11-27 NOTE — PROGRESS NOTES
Physical Medicine and Rehabilitation Progress Note:  Amputation:  05 5  Bilateral Lower Limb Above the Knee  Ricardo Pereira 61 y o  male MRN: [id number]  Unit/Bed#: -01 Encounter: [id number]    Assessment: Hima Garcia is a 61 y o  male who presented to the [address] with diffuse abdominal pain, hematemesis  He was previously admitted for the same complaints and EGD revealed esophagitis  On this admission CT of the chest abdomen pelvis demonstrated pulmonary emphysema, no definite esophageal abnormalities  Repeat EGD was performed on 11/16 with demonstrated reflux esophagitis, gastritis, and mallorry-mantilla tear at GE junction  Of note patient has history of mitral valve replacement for which he was anticoagulated with coumadin (INR of 2 57)  He was initially on Protonix drip which was converted to PO form       Patient also has history of AKA, performed on 09/2017  He has recently received prosthesis, but has had no training as of yet  Patient accepted to [facility] for prosthesis, gait training on 11/20/17  Subjective: During Holiday weekend, patient requried 1:1 observation due to comments of suicidal idealization  This was discontinued after inpatient [facility] service performed consultation  This AM, patient reports feeling much better  He reports he has talked with family about going to son-in-law's brother's place upon discharge, where he will have his dog with him  He denies any CP or SOB  Pain is controlled  No phantom limb pain  Reports prosthesis is fitting much better  ROS: A 10-point ROS was performed  Negative except as listed above  Restrictions include: Fall precautions    Disposition: TBD    Plan:  1    Current Medical Problems/Risks/Management:  # Amputation: left AKA secondary to PAD  - Acute comprehensive interdisciplinary inpatient rehabilitation including PT, OT, SLP, RN, CM, SW, dietary, psychology, etc   - [address] [employer] Internal Medicine following - Dr Brown Mackenzie service   - S/p left AKA on 8/24/17 by Dr Juliana Tamayo  - Continue prosthetic training by PT/OT  - continue gabapentin for phantom limb pain, titrate up as tolerated  - f/u vascular surgery as outpatient    # Suicidal idealization  - patient endorsed SI twice during [facility] stay, but states he does not mean it  He reports his distress stemmed from unclear disposition  Much better today as he reports he has a discharge plan in place with family  - Psychiatry consultation performed, appreciate recs     - Continue lorazepam PRN  - continue seroquel QHS     # GI bleed  - s/p EGD  - secondary to reflux esophagitis, gastritis, and mallorry-mantilla tear at GE junction  - continue CBC monitoring  - Continue protonix   - GI f/u as outpatient      Results from last 7 days  Lab Units 11/27/17  0539 11/24/17  0637 11/21/17  1224   HEMOGLOBIN g/dL 13 0 12 5 13 7     # SEAN  - Continue monitoring kidney function  - encourage fluid intake       Results from last 7 days  Lab Units 11/27/17  0539 11/23/17  0615 11/21/17  1224   CREATININE mg/dL 1 20 1 10 1 19     # DM  - Continue ISS  - Continue finger sticks      Results from last 7 days  Lab Units 11/27/17  0539 11/23/17  0615 11/21/17  1224   GLUCOSE RANDOM mg/dL 154* 135 173*     # HTN/CAD/s/p MV replacement/CHF  - Continue Atorvastatin  - Continue torsemide  - Continue Lisinopril  - Continue Metoprolol   - IM team dosing warfarin     Temp:  [97 9 °F (36 6 °C)-98 9 °F (37 2 °C)] 98 9 °F (37 2 °C)  HR:  [62-78] 76  Resp:  [18-20] 20  BP: (118-137)/(60-68) 118/64       Results from last 7 days  Lab Units 11/27/17  0539 11/26/17  0517 11/25/17  0645   INR  1 95* 2 20* 2 49*     # Hypokalemia  - supplement as needed       Results from last 7 days  Lab Units 11/27/17  0539 11/23/17  0615 11/21/17  1224   POTASSIUM mmol/L 4 1 3 7 4 1     # Pain  - Continue tylenol PRN, for max of 3gm daily     - Continue Norco PRN   - Continue gabapentin      # Rehab Psych  - Neuropsych consult, appreciate recs     # FENA/prophy  - Diet: diabetic  SLP and nutrition to monitor and adjust as necessary   - DVT prophy: Sequential compression device (Venodyne)  and Warfarin (Coumadin)  - GI ppx: Pantaprazole  - Bowel: colace , dulcolax suppository  and miralax PRN  - Nausea: Zofran PRN  - Supplements: None  - Sleep: None      CODE: Level 1: Full Code    Objective:  Nursing staff reporting: no problems    Functional Update:  Physical Therapy Occupational Therapy   Weight Bearing Status: Full Weight Bearing (RLE only)  Transfers: Modified Independent (for sit pivots, )  Bed Mobility: Independent  Amulation Distance (ft): 40 feet  Ambulation: Contact Guard  Assistive Device for Ambulation: Roller Walker  Wheelchair Mobility Distance: 200 ft  Wheelchair Mobility: Independent  Number of Stairs: 2  Assistive Device for Stairs: [place] Hand Rail  Stair Assistance: Minimal Assistance  Discharge Recommendations: [facility]:  [address] [facility] with[de-identified] Family Support, First Floor Setup, Outpatient Physical Therapy   Eating: Modified Independent  Grooming: Modified Independent  Bathing: Modified Independent  Bathing: Modified Independent  Upper Body Dressing: Modified Independent  Lower Body Dressing: Modified Independent  Toileting: Modified Independent  Tub/Shower Transfer: Modified Independent  Toilet Transfer: Modified Independent       Allergies and Medications per EMR    Physical Exam:  General: alert, cooperative and comfortable  HENMT: Head: Normal, normocephalic, atraumatic  Eye: Normal external eye, conjunctiva, lids   Ears: Normal external ears  Nose: Normal external nose, mucus membranes  Pulmonary: chest expansion normal, no retractions, no accessory muscle usage  Abdomen: soft, nontender, nondistended, no masses or organomegaly  Skin/Extremity: no rashes, no erythema, no peripheral edema  Incisions: well healed incision  No erythema, no drainage  Neurologic: Awake alert orientedx3  Psych: much calmer today  Motivated  No abnormal speech pattern  Diagnostic Studies: reviewed, no new imaging    Vitals:  Temp:  [97 9 °F (36 6 °C)-98 9 °F (37 2 °C)] 98 9 °F (37 2 °C)  HR:  [62-78] 76  Resp:  [18-20] 20  BP: (118-137)/(60-68) 118/64   Intake/Output Summary (Last 24 hours) at 11/27/17 1138  Last data filed at 11/27/17 0946   Gross per 24 hour   Intake              785 ml   Output                0 ml   Net              785 ml        Laboratory:    Lab Results   Component Value Date    HGB 13 0 11/27/2017    HCT 38 6 11/27/2017    WBC 7 65 11/27/2017     Lab Results   Component Value Date    BUN 26 (H) 11/27/2017     11/27/2017    K 4 1 11/27/2017     11/27/2017    GLUCOSE 154 (H) 11/27/2017    GLUCOSE 273 (H) 04/18/2017    CREATININE 1 20 11/27/2017     Lab Results   Component Value Date    PROTIME 22 4 (H) 11/27/2017    INR 1 95 (H) 11/27/2017        Patient Active Problem List   Diagnosis    Hypertension    Bipolar 1 disorder ([facility]    Diabetes mellitus ([facility]    Hiatal hernia    Erosive esophagitis    CAD (coronary artery disease)    Pacemaker    Chronic systolic congestive heart failure ([facility] )    Peripheral artery disease (HCC)    History of mitral valve replacement with mechanical valve    Bipolar 1 disorder, depressed (HCC)    Bullous dermatosis    GI bleed    Acute upper GI bleed    Status post above knee amputation of left lower extremity (HCC)       ** Please Note: Fluency Direct voice to text software may have been used in the creation of this document  **    [ x ] Total time spent: 30 Mins, and greater than 50% of this time was spent counseling/coordinating care

## 2017-11-27 NOTE — PROGRESS NOTES
Internal Medicine Progress Note  Patient: Kaye Martinez  Age/sex: 61 y o  male  Medical Record #: [id number]      ASSESSMENT/PLAN:  Kaye Martinez is seen and examined and mangement for following issues:    1  Upper GI bleed due to esophagitis and Joan-Perez tear:  hemoglobin stable  Continue Protonix 40 mg b i d     2   PAD; status post left AKA (8/2017):  Prosthetic training per primary service  3   History of mechanical mitral valve replacement:  Increase Coumadin to 10mg tonight with goal INR 2 5-3 5  INR 1 95  Pt has been intermittently refusing blood draws  For procedures in the future he would need to be bridged (per Dr Charlton More note in office) but see notes from 8/2017 admit since there was concern for HIT even though labs neg  Pt alternates 7 5mg and 5mg at home  4   Hypertension:  stable; continue Lisinopril 10 mg daily and Toprol XL 25 mg daily  5   Chronic systolic CHF/LVEF 49%:  Continue Demadex 20 mg daily and monitor daily weights  K normal today  Do not add back daily KCl at this time  6   DM 2:  Continue Lantus 14 units at bedtime  Patient uses Lantus 21 units qhs at home  Continue Accuchecks QID/SSI and DM diet  , 157, 224, 151  Increased diet to constant carb level 3     7   Leukocytosis:  resolved  Septic workup was negative  Likely reactive  Observe off antibiotics  8   Nicotine abuse:  Was on 7mg patch but not effective to curb cravings so increased to 14mg    9  ICD:  Follows with Dr Zacarias Irving and [facility] device clinic    10  Hx bipolar disorder: Only on Seroquel  Pt suicidal earlier in the week  1:1 ordered by primary service  Pt able to contract for safety  11   ?hx HIT:  See notes from 8/2017 admit; 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 and even though labs negative 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 and 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      Subjective: Patient seen and examined  Pt reports he is doing well  Pain is controlled  Mood has improved  Patient denies any other complaints      ROS:   GI: denies abdominal pain, change bowel habits or reflux symptoms  Neuro: No new neurologic changes  Respiratory: No Cough, SOB  Cardiovascular: No CP, palpitations     Scheduled Meds:    atorvastatin 10 mg Oral Daily With Dinner   docusate sodium 100 mg Oral BID   gabapentin 900 mg Oral TID   insulin glargine 14 Units Subcutaneous HS   insulin lispro 1-5 Units Subcutaneous 4x Daily (AC & HS)   lisinopril 10 mg Oral Daily   LORazepam 0 5 mg Oral TID   metoprolol succinate 25 mg Oral Daily   nicotine 14 mg Transdermal Daily   pantoprazole 40 mg Oral BID AC   QUEtiapine 400 mg Oral HS   torsemide 20 mg Oral Daily   warfarin 7 5 mg Oral Daily (warfarin)       Labs:       Results from last 7 days  Lab Units 11/27/17  0539 11/24/17  0637   WBC Thousand/uL 7 65 10 16   HEMOGLOBIN g/dL 13 0 12 5   HEMATOCRIT % 38 6 37 2   PLATELETS Thousands/uL 221 202       Results from last 7 days  Lab Units 11/27/17  0539 11/23/17  0615   SODIUM mmol/L 136 138   POTASSIUM mmol/L 4 1 3 7   CHLORIDE mmol/L 105 106   CO2 mmol/L 25 24   BUN mg/dL 26* 32*   CREATININE mg/dL 1 20 1 10   GLUCOSE RANDOM mg/dL 154* 135   CALCIUM mg/dL 8 9 9 0           Results from last 7 days  Lab Units 11/27/17  0539 11/26/17  0517   INR  1 95* 2 20*          Glucose (mg/dL)   Date Value   11/27/2017 154 (H)   11/23/2017 135   11/21/2017 173 (H)   11/19/2017 156 (H)     Glucose, i-STAT (mg/dl)   Date Value   04/18/2017 273 (H)       Labs reviewed    Physical Examination:  Vitals:   Vitals:    11/26/17 0500 11/26/17 1342 11/26/17 2109 11/27/17 0500   BP: 124/74 128/60 132/68 137/65   Pulse: 77 62 68 78   Resp: 20 20 18 20   Temp: 98 4 °F (36 9 °C) 97 9 °F (36 6 °C) 98 °F (36 7 °C) 98 9 °F (37 2 °C)   TempSrc: Oral Oral Oral Oral   SpO2: 100% 100% 99% 98%   Weight: 79 3 kg (174 lb 13 2 oz)   78 4 kg (172 lb 13 5 oz)   Height:           GEN: NAD  HEENT: NC/AT  RESP: BBS w/o crackles/wheeze/rhonci; resp unlabored  CV: +S1 S2, regular rate, no rubs/murmurs; +prosthetic valve click  ABD: soft, NT, ND, normal BS   : no bella  EXT: no edema  Skin: no rashes  Neuro: AAO      [ X ] Total time spent: 30 Mins and greater than 50% of this time was spent counseling/coordinating care        Jv Foote PA-C  Internal Medicine

## 2017-11-27 NOTE — PROGRESS NOTES
11/27/17 1400   Pain Assessment   Pain Assessment 0-10   Pain Score No Pain   Restrictions/Precautions   Precautions Fall Risk   Braces or Orthoses Prosthesis   Subjective   Subjective pt reports ready  for PT session    QI: Roll Left and Right   Assistance Needed Independent   Roll Left and Right CARE Score 6   QI: Sit to Lying   Assistance Needed Independent   Sit to Lying CARE Score 6   QI: Lying to Sitting on Side of Bed   Assistance Needed Independent   Lying to Sitting on Side of Bed CARE Score 6   QI: Sit to Stand   Assistance Needed Independent   Sit to Stand CARE Score 6   Bed Mobility   Findings Mod I    QI: Chair/Bed-to-Chair Transfer   Assistance Needed Independent   Chair/Bed-to-Chair Transfer CARE Score 6   Transfer Bed/Chair/Wheelchair   Limitations Noted In Balance; Endurance;LE Strength;UE Strength   Adaptive Equipment Roller Walker   Sit Pivot Modified Independent   Stand Pivot Supervision   Sit to Stand Modified Independent   Stand to Sit Modified Independent   Supine to Sit Modified Independent   Sit to Supine Modified Independent   Car Transfer Modified Independent   Findings progressing to D/C goals    Bed, Chair, Wheelchair Transfer (FIM) 6 - Patient requires assistive device/extra time/safety concerns but completes independently   QI: Car Transfer   Assistance Needed Set-up / clean-up   Car Transfer CARE Score 5   QI: Walk 50 Feet with Two [address] / clean-up   Comment with RW    Walk 50 Feet with Two Turns CARE Score 5   Ambulation   Does the patient walk? 2  Yes   Primary Discharge Mode of Locomotion Wheelchair   Walk Assist Level Supervision   Gait Pattern Inconsistant Netta;Decreased foot clearance; Improper weight shift   Assist Device Zen Sherley Holguin Walked (feet) 60 ft  (x3)   Limitations Noted In Endurance;Balance; Heel Strike; Safety;Strength; Sequencing   Findings adjusted prosthesis with [place]  and repaired strap    Walking (FIM) 2 - Patient ambulates between 50 - 149 feet regardless of assist/device/set up   QI: Wheel 50 Feet with Two [address] 50 Feet with Two Turns CARE Score 6   QI: Wheel 150 [address] 150 Feet CARE Score 6   Wheelchair mobility   QI: Does the patient use a wheelchair? 1  Yes   QI: Indicate the type of wheelchair 1  Manual   Wheelchair Assist Level Modified Independent   Method Right upper extremity; Left upper extremity;Right lower extremity   Needs Assist With Press Release;Obstacles; Locking Brakes; Remove Leg Rest;Replace Leg Rest;Remove armrests;Replace armrests   Distance Level Surface (feet) 400 ft  (x3)   Findings pt primary mobility will be wheelchair level at this time and for D/C    Wheelchair (FIM) 6 - Patient wheels 150 feet or more, no rests AND independently, timely and safely   QI: 4 Steps   Assistance Needed Supervision   4 Steps CARE Score 4   Stairs   Type Stairs   # of Steps 6   Assist Devices Single Rail  (right side )   Findings cueing form LLE prosthesis placement    Stairs (FIM) 2 - Patient goes up and down 4 - 11 stairs regardless of assist/device/setup   Therapeutic Interventions   Strengthening mat program focus on RLE residual limb extension and core muscle strengthening for 30 min , pt also perform RLE bridges x10 GLUTS X15 SAQ 2# X20 PRONE stretch and prone hamstring 2# x20    Flexibility Left residual limb stretching right sidelying for extexsion ,    Balance standing balance RLE w/o prosthesis in parpl bars on green foam to inc right Ankle strengthening and balance awareness   Other weighted ball 2 # for abdominal stretching and  in sitting long on mat   Equipment Use   [facility] for 13 min    Parallel Bars static standing with green foam RLE    Assessment   Treatment Assessment Pt primary mobility is at [facility] level d/t dec gait distance on prosthesis LLE and safety for longer distance    Pt will need WC for community distance kei long distance , pt also perform thex ex's functional activities, gait training short distance d/t unsafe on LLE prosthesis   pt instructed to check LLE residual limb for inc redness , Pt will cont to benefit with PT session    Problem List Impaired balance;Decreased skin integrity; Decreased coordination;Decreased strength   Barriers to Discharge Inaccessible home environment;Decreased caregiver support   PT Barriers   Functional Limitation Walking;Stair negotiation   Plan   Treatment/Interventions Functional transfer training;LE strengthening/ROM; Elevations; Therapeutic exercise; Endurance training;Cognitive reorientation;Patient/family training;Gait training;Bed mobility   Progress Progressing toward goals   Recommendation   Equipment Recommended Wheelchair   PT Therapy Minutes   PT Time In 1400   PT Time Out 1530   PT Total Time (minutes) 90   PT Mode of treatment - Individual (minutes) 90   PT Mode of treatment - Concurrent (minutes) 0   PT Mode of treatment - Group (minutes) 0   PT Mode of treatment - Co-treat (minutes) 0   PT Mode of Teatment - Total time(minutes) 90 minutes   Therapy Time missed   Time missed?  No

## 2017-11-27 NOTE — SOCIAL WORK
Insurance update completed; awaiting determination  Patient for dc 11/29 adjustments continue to be made to prosthesis

## 2017-11-27 NOTE — PROGRESS NOTES
11/27/17 1000   Pain Assessment   Pain Assessment No/denies pain   Pain Score No Pain   Restrictions/Precautions   Precautions Fall Risk   QI: Eating   Assistance Needed Independent   Eating CARE Score 6   Eating Assessment   Eating (FIM) 7 - Patient completely independent   QI: Lower Body Dressing   Assistance Needed Independent   Lower Body Dressing CARE Score 6   QI: Putting On/Taking Off Footwear   Assistance Needed Independent   Putting On/Taking Off Footwear CARE Score 6   Dressing/Undressing Clothing   Able to  [address] LB Clothes Shorts;Socks; Shoes   Positioning Sit Edge Of Bed   LB Dressing (FIM) 6 - Patient requires assistive device/extra time/safety concerns but completes independently   QI: Sit to [address]   Sit to Stand CARE Score 6   QI: Chair/Bed-to-Chair Transfer   Assistance Needed Independent   Chair/Bed-to-Chair Transfer CARE Score 6   Transfer Bed/Chair/Wheelchair   Findings Upright w/ use of prostetic leg  Bed, Chair, Wheelchair Transfer (FIM) 5 - Patient requires supervision/monitoring   Cognition   Overall Cognitive Status Impaired   Arousal/Participation Alert; Cooperative   Attention Difficulty attending to directions   Orientation Level Oriented X4   Memory Within functional limits   Following Commands Follows one step commands with increased time or repetition   Activity Tolerance   Activity Tolerance Patient tolerated treatment well   Assessment   Treatment Assessment Pt participates in skilled OT session focusing on LB dressing, toile ting, prosthetic management  Pt participates in discussion of home activities and use of RW vs WC level  Education provided for completion of daily activities w/ WC set up in locations for wearing schedule  Pt reports "I want to wear my leg as much as possible " Pt reporting D/C plan to single level 1 bedroom apartment w/ no MIREYA   Pt reports daughter has set up furnishings and has a kitchen  No stove available at this time but dicussed use of microwave and hot plate wich he is familiar with previously  Pt engages in trialing LB dressing w  Use of neoprene sleeve on prosthetic  Pt EDu on sequencing to don clothing over brace for athletic appeal  Pt trials use of toileting w/ neoprene sleeve on prosthetic and demonstrated difficulty managing parts and clothing without removal of prosthetic, and expresses concern regarding cleanliness  If Pt was to remove prosteisis on toilet it would not be safe for Pt to don while sitting on toilet  Pt demonstrating low frustration tolerance for new method of prosthetic anchoring and wishes to have strap back in place for ease of toileting  Anamika Aleman from [employer] called and concerns reported to her and she reports that she will be making adjustments to replace D ring today  Pt EDU on IADL management of pet care  Pt reports taht the new house has a back porch and a chain for the Dog  Recommend seat placed at door for line management, and hook in high location to reduce fall risk  Pt continues to require skilled OT services to increase overall functional independence  and safety w/ ADLs and functional transfers  Prognosis Good   Problem List Impaired balance   Plan   Treatment/Interventions ADL retraining;Functional transfer training   Progress Progressing toward goals   OT Therapy Minutes   OT Time In 1000   OT Time Out 1130   OT Total Time (minutes) 90   OT Mode of treatment - Individual (minutes) 90   OT Mode of treatment - Concurrent (minutes) 0   OT Mode of treatment - Group (minutes) 0   OT Mode of treatment - Co-treat (minutes) 0   OT Mode of Teatment - Total time(minutes) 90 minutes   Therapy Time missed   Time missed?  No

## 2017-11-27 NOTE — PHYSICAL THERAPY NOTE
Planning for upcoming discharge later this week  Discussed DME needs with team,  and patient/dtr  Pt will be discharging to family , but not his daughters  He has RW in place, but will require a w/c  Pt still having adjustments made to prosthesis and has just started gait training with prosthesis  Pt primary mode of locomotion will remain w/c due to safety, balance deficits with prosthesis at this time  Pt to continue with outpt therapy services for further training

## 2017-11-28 LAB
GLUCOSE SERPL-MCNC: 139 MG/DL (ref 65–140)
GLUCOSE SERPL-MCNC: 157 MG/DL (ref 65–140)
GLUCOSE SERPL-MCNC: 161 MG/DL (ref 65–140)
GLUCOSE SERPL-MCNC: 201 MG/DL (ref 65–140)
INR PPP: 2.17 (ref 0.86–1.16)
PROTHROMBIN TIME: 24.4 SECONDS (ref 12.1–14.4)

## 2017-11-28 PROCEDURE — 85610 PROTHROMBIN TIME: CPT | Performed by: PHYSICIAN ASSISTANT

## 2017-11-28 PROCEDURE — 97530 THERAPEUTIC ACTIVITIES: CPT

## 2017-11-28 PROCEDURE — 97116 GAIT TRAINING THERAPY: CPT

## 2017-11-28 PROCEDURE — 97535 SELF CARE MNGMENT TRAINING: CPT

## 2017-11-28 PROCEDURE — 82948 REAGENT STRIP/BLOOD GLUCOSE: CPT

## 2017-11-28 RX ORDER — WARFARIN SODIUM 5 MG/1
10 TABLET ORAL
Qty: 30 TABLET | Refills: 0 | Status: SHIPPED | OUTPATIENT
Start: 2017-11-28 | End: 2017-11-29

## 2017-11-28 RX ORDER — PANTOPRAZOLE SODIUM 40 MG/1
40 TABLET, DELAYED RELEASE ORAL
Qty: 60 TABLET | Refills: 0 | Status: SHIPPED | OUTPATIENT
Start: 2017-11-28 | End: 2017-11-29 | Stop reason: HOSPADM

## 2017-11-28 RX ORDER — GABAPENTIN 300 MG/1
900 CAPSULE ORAL 3 TIMES DAILY
Qty: 270 CAPSULE | Refills: 0 | Status: SHIPPED | OUTPATIENT
Start: 2017-11-28 | End: 2018-04-18 | Stop reason: DRUGHIGH

## 2017-11-28 RX ORDER — QUETIAPINE FUMARATE 400 MG/1
400 TABLET, FILM COATED ORAL
Qty: 30 TABLET | Refills: 0 | Status: SHIPPED | OUTPATIENT
Start: 2017-11-28 | End: 2017-11-29 | Stop reason: HOSPADM

## 2017-11-28 RX ORDER — INSULIN GLARGINE 100 [IU]/ML
14 INJECTION, SOLUTION SUBCUTANEOUS
Qty: 10 ML | Refills: 0
Start: 2017-11-28 | End: 2017-11-29 | Stop reason: HOSPADM

## 2017-11-28 RX ADMIN — INSULIN LISPRO 1 UNITS: 100 INJECTION, SOLUTION INTRAVENOUS; SUBCUTANEOUS at 16:39

## 2017-11-28 RX ADMIN — NICOTINE 14 MG: 14 PATCH, EXTENDED RELEASE TRANSDERMAL at 11:23

## 2017-11-28 RX ADMIN — ATORVASTATIN CALCIUM 10 MG: 10 TABLET, FILM COATED ORAL at 16:38

## 2017-11-28 RX ADMIN — PANTOPRAZOLE SODIUM 40 MG: 40 TABLET, DELAYED RELEASE ORAL at 16:39

## 2017-11-28 RX ADMIN — INSULIN LISPRO 1 UNITS: 100 INJECTION, SOLUTION INTRAVENOUS; SUBCUTANEOUS at 21:45

## 2017-11-28 RX ADMIN — TORSEMIDE 20 MG: 20 TABLET ORAL at 08:05

## 2017-11-28 RX ADMIN — METOPROLOL SUCCINATE 25 MG: 25 TABLET, EXTENDED RELEASE ORAL at 08:05

## 2017-11-28 RX ADMIN — INSULIN GLARGINE 14 UNITS: 100 INJECTION, SOLUTION SUBCUTANEOUS at 21:43

## 2017-11-28 RX ADMIN — INSULIN LISPRO 1 UNITS: 100 INJECTION, SOLUTION INTRAVENOUS; SUBCUTANEOUS at 08:05

## 2017-11-28 RX ADMIN — LORAZEPAM 0.5 MG: 0.5 TABLET ORAL at 21:43

## 2017-11-28 RX ADMIN — LISINOPRIL 10 MG: 10 TABLET ORAL at 08:05

## 2017-11-28 RX ADMIN — LORAZEPAM 0.5 MG: 0.5 TABLET ORAL at 08:05

## 2017-11-28 RX ADMIN — GABAPENTIN 900 MG: 300 CAPSULE ORAL at 08:05

## 2017-11-28 RX ADMIN — GABAPENTIN 900 MG: 300 CAPSULE ORAL at 16:39

## 2017-11-28 RX ADMIN — QUETIAPINE FUMARATE 400 MG: 100 TABLET ORAL at 21:43

## 2017-11-28 RX ADMIN — LORAZEPAM 0.5 MG: 0.5 TABLET ORAL at 16:38

## 2017-11-28 RX ADMIN — PANTOPRAZOLE SODIUM 40 MG: 40 TABLET, DELAYED RELEASE ORAL at 06:24

## 2017-11-28 RX ADMIN — GABAPENTIN 900 MG: 300 CAPSULE ORAL at 21:43

## 2017-11-28 RX ADMIN — WARFARIN SODIUM 10 MG: 5 TABLET ORAL at 19:12

## 2017-11-28 NOTE — PROGRESS NOTES
Internal Medicine Progress Note  Patient: Jonathan Villegas  Age/sex: 61 y o  male  Medical Record #: [id number]      ASSESSMENT/PLAN:  Jonathan Villegas is seen and examined and mangement for following issues:    1  Upper GI bleed due to esophagitis and Joan-Perez tear:  hemoglobin stable  Continue Protonix 40 mg b i d   DC planned for tomorrow  2   PAD; status post left AKA (8/2017):  Prosthetic training per primary service  3   History of mechanical mitral valve replacement:  Will give another 10mg of Coumadin tonight with goal INR 2 5-3 5  INR 2 17  Pt has been intermittently refusing blood draws  For procedures in the future he would need to be bridged (per Dr Mark Oliveira note in office) but see notes from 8/2017 admit since there was concern for HIT even though labs neg  Pt alternates 7 5mg and 5mg at home  4   Hypertension:  stable; continue Lisinopril 10 mg daily and Toprol XL 25 mg daily  5   Chronic systolic CHF/LVEF 10%:  Continue Demadex 20 mg daily and monitor daily weights  K normal today  Do not add back daily KCl at this time  6   DM 2:  Continue Lantus 14 units at bedtime  Patient uses Lantus 21 units qhs at home  Continue Accuchecks QID/SSI and DM diet  , 109, 182, 157  Increased diet to constant carb level 3     7   Leukocytosis:  resolved  Septic workup was negative  Likely reactive  Observe off antibiotics  8   Nicotine abuse:  Was on 7mg patch but not effective to curb cravings so increased to 14mg    9  ICD:  Follows with Dr Andujar Co and [facility] device clinic    10  Hx bipolar disorder: Only on Seroquel  Pt suicidal last week  1:1 ordered by primary service  Pt able to contract for safety  11   ?hx HIT:  See notes from 8/2017 admit; 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 and even though labs negative 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 and 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      Subjective: Patient seen and examined  Pt reports he is doing well    Pain is controlled  Mood has improved  Patient denies any other complaints      ROS:   GI: denies abdominal pain, change bowel habits or reflux symptoms  Neuro: No new neurologic changes  Respiratory: No Cough, SOB  Cardiovascular: No CP, palpitations     Scheduled Meds:    atorvastatin 10 mg Oral Daily With Dinner   docusate sodium 100 mg Oral BID   gabapentin 900 mg Oral TID   insulin glargine 14 Units Subcutaneous HS   insulin lispro 1-5 Units Subcutaneous 4x Daily (AC & HS)   lisinopril 10 mg Oral Daily   LORazepam 0 5 mg Oral TID   metoprolol succinate 25 mg Oral Daily   nicotine 14 mg Transdermal Daily   pantoprazole 40 mg Oral BID AC   QUEtiapine 400 mg Oral HS   torsemide 20 mg Oral Daily   warfarin 10 mg Oral Daily (warfarin)       Labs:       Results from last 7 days  Lab Units 11/27/17  0539 11/24/17  0637   WBC Thousand/uL 7 65 10 16   HEMOGLOBIN g/dL 13 0 12 5   HEMATOCRIT % 38 6 37 2   PLATELETS Thousands/uL 221 202       Results from last 7 days  Lab Units 11/27/17  0539 11/23/17  0615   SODIUM mmol/L 136 138   POTASSIUM mmol/L 4 1 3 7   CHLORIDE mmol/L 105 106   CO2 mmol/L 25 24   BUN mg/dL 26* 32*   CREATININE mg/dL 1 20 1 10   GLUCOSE RANDOM mg/dL 154* 135   CALCIUM mg/dL 8 9 9 0           Results from last 7 days  Lab Units 11/28/17  0627 11/27/17  0539   INR  2 17* 1 95*          Glucose (mg/dL)   Date Value   11/27/2017 154 (H)   11/23/2017 135   11/21/2017 173 (H)   11/19/2017 156 (H)     Glucose, i-STAT (mg/dl)   Date Value   04/18/2017 273 (H)       Labs reviewed    Physical Examination:  Vitals:   Vitals:    11/27/17 0803 11/27/17 1435 11/27/17 2133 11/28/17 0626   BP: 118/64 110/63 121/56 114/74   Pulse: 76 73 60 71   Resp:  20 18 20   Temp:  99 °F (37 2 °C) 97 7 °F (36 5 °C) 97 8 °F (36 6 °C)   TempSrc:  Oral Oral Oral   SpO2:  99% 97% 99%   Weight:    72 2 kg (159 lb 2 8 oz)   Height:           GEN: NAD  HEENT: NC/AT  RESP: BBS w/o crackles/wheeze/rhonci; resp unlabored  CV: +S1 S2, regular rate, no rubs/murmurs; +prosthetic valve click  ABD: soft, NT, ND, normal BS   : no bella  EXT: no edema  Skin: no rashes  Neuro: AAO      [ X ] Total time spent: 30 Mins and greater than 50% of this time was spent counseling/coordinating care        Valerio Tan PA-C  Internal Medicine

## 2017-11-28 NOTE — PROGRESS NOTES
11/28/17 1000   Pain Assessment   Pain Assessment No/denies pain   Restrictions/Precautions   Precautions Fall Risk   Braces or Orthoses Prosthesis  (LLE)   General   Change In Medical/Functional Status mod I w/c level   Subjective   Subjective pt concerned about going to [facility] bathroom with prosthetic on and was reviewing options with OT   QI: Roll Left and Right   Assistance Needed Independent   Roll Left and Right CARE Score 6   QI: Sit to Lying   Assistance Needed Independent   Sit to Lying CARE Score 6   QI: Lying to Sitting on Side of Bed   Assistance Needed Independent   Lying to Sitting on Side of Bed CARE Score 6   QI: Sit to Stand   Assistance Needed Independent   Sit to Stand CARE Score 6   Bed Mobility   Findings mod I   QI: Chair/Bed-to-Chair Transfer   Assistance Needed Independent   Comment w/c level sit pivots   Chair/Bed-to-Chair Transfer CARE Score 6   Transfer Bed/Chair/Wheelchair   Limitations Noted In Balance; Endurance;LE Strength   Adaptive Equipment Roller Walker;None   Sit Pivot Modified Independent   Stand Pivot Supervision   Sit to Stand Modified Independent   Stand to Sit Modified Independent   Supine to Sit Modified Independent   Sit to Supine Modified Independent   Bed, Chair, Wheelchair Transfer (FIM) 6 - Patient requires assistive device/extra time/safety concerns but completes independently   QI: [address] / Daniel Gomez; Verbal cues   Car Transfer CARE Score 4   QI: Walk 10 Feet   Assistance Needed Incidental touching; Adaptive equipment   Walk 10 Feet CARE Score 4   QI: Walk 50 Feet with Two Turns   Assistance Needed Incidental touching; Adaptive equipment   Walk 50 Feet with Two Turns CARE Score 4   QI: Walk 150 Feet   Reason if not Attempted Safety concerns   Walk 150 Feet CARE Score 88   QI: Walking 10 Feet on Uneven Surfaces   Reason if not Attempted Activity not applicable   Walking 10 Feet on Uneven Surfaces CARE Score 9   Ambulation   Does the patient walk? 2  Yes   Primary Discharge Mode of Locomotion Wheelchair   Walk Assist Level Contact Guard   Gait Pattern Inconsistant Netta;Decreased foot clearance; Improper weight shift   Assist Device Roller Sherley Holguin Walked (feet) 50 ft   Limitations Noted In Balance; Endurance; Safety;Strength   Findings short distance to cont practice with prosthesis   Walking (FIM) 2 - Patient ambulates between 50 - 149 feet regardless of assist/device/set up   QI: Wheel 50 Feet with Two [address] 50 Feet with Two Turns CARE Score 6   QI: Wheel 150 [address] 150 Feet CARE Score 6   Wheelchair mobility   QI: Does the patient use a wheelchair? 1  Yes   QI: Indicate the type of wheelchair 1  Manual   Wheelchair Assist Level Modified Independent   Method Right upper extremity; Left upper extremity;Right lower extremity   Distance Level Surface (feet) 300 ft   Findings educated on importance of locking brakes before txing to decrease fall risk   Wheelchair (FIM) 6 - Patient wheels 150 feet or more, no rests AND independently, timely and safely   QI: 1 Step (Curb)   Reason if not Attempted Activity not applicable   1 Step (Curb) CARE Score 9   QI: 4 Steps   Assistance Needed Incidental touching; Adaptive equipment   4 Steps CARE Score 4   QI: 12 Steps   Assistance Needed Incidental touching; Adaptive equipment   12 Steps CARE Score 4   Stairs   Type Stairs   # of Steps 12   Assist Devices Single Rail;Bilateral Rail   Findings with prosthetic on   using L HR only than B HRs going up fwd   Stairs (FIM) 4 - Patient requires steadying assist or light touching AND patient goes up and down full flight (12- 14 stairs)   QI: Picking Up Object   Reason if not Attempted Activity not applicable   Picking Up Object CARE Score 9   QI: Toilet Transfer   Reason if not Attempted Activity not applicable   Toilet Transfer CARE Score 9   Therapeutic Interventions   Balance standing and reaching out of JOHAN, unsupported   Other Comments   Comments reviewed home safety for d/c   practiced floor tx on mat with prosthetic on and problem solving if able to do on or would have to remove to perform   Assessment   Treatment Assessment session cont to focus on improving safety awareness for d/c home  pt able to be mod I from w/c and cont to educate pt on no amb at home due to decrease balance and cont to need practice with prosthetic before doing on own   spoke to pt about amb only in therapy  pt able to don/dof prosthetic edge of mat with no difficulty  to improve overall balance and safety with txs pt educated on keeping prosthetic locked if having to stand up and sit down to decrease buckling  pt set up for cont therapy to improve balance, safety and overall functional mobility with prosthetic when at home  Problem List Impaired balance;Decreased mobility; Decreased safety awareness   Barriers to Discharge Inaccessible home environment;Decreased caregiver support   PT Barriers   Physical Impairment Impaired balance;Decreased mobility; Decreased coordination;Decreased safety awareness   Functional Limitation Walking;Transfers;Standing;Stair negotiation   Plan   Treatment/Interventions Functional transfer training;LE strengthening/ROM; Equipment eval/education;Gait training   Progress Progressing toward goals   Recommendation   Recommendation Outpatient PT   PT Therapy Minutes   PT Time In 1000   PT Time Out 1130   PT Total Time (minutes) 90   PT Mode of treatment - Individual (minutes) 90   PT Mode of treatment - Concurrent (minutes) 0   PT Mode of treatment - Group (minutes) 0   PT Mode of treatment - Co-treat (minutes) 0   PT Mode of Teatment - Total time(minutes) 90 minutes   Therapy Time missed   Time missed?  No

## 2017-11-28 NOTE — SOCIAL WORK
Authorization received from [facility] for extended stay till 12/4/2017

## 2017-11-28 NOTE — PROGRESS NOTES
11/28/17 0830   Pain Assessment   Pain Assessment No/denies pain   Pain Score No Pain   Restrictions/Precautions   Precautions Fall Risk   Weight Bearing Restrictions No   Braces or Orthoses Prosthesis   QI: Oral Hygiene   Assistance Needed Independent   Assistance Provided by [place] No physical assistance   Oral Hygiene CARE Score 6   Grooming   Able To Initiate Tasks; Acquire Items;Comb/Brush Hair;Wash/Dry Face;Brush/Clean Teeth;Wash/Dry Hands   Grooming (FIM) 6 - Patient requires assistive device/extra time/safety concerns but completes independently   QI: Shower/Bathe [address] Provided by [place] No physical assistance   Shower/Bathe Self CARE Score 6   Bathing   Assessed Bath Style Tub   Anticipated D/C Bath Style Tub   Able to Gather/Transport Yes   Able to Adjust Water Temperature Yes   Able to Wash/Rinse/Dry (body part) Left Arm;Right Arm;L Upper Leg;R Upper Leg;L Lower Leg/Foot;R Lower Leg/Foot;Chest;Abdomen;Perineal Area; Buttocks   Positioning Seated;Standing   Adaptive Equipment Tub Bench;Hand Held Shower   Findings  Completed seated on tub bench  Bathing (FIM) 6 - Patient requires assistive device/extra time/safety concerns but completes independently   Tub/Shower Transfer   Adaptive Equipment Transfer Bench   Findings Pt with use of tub transfer bench to complete tub transfer  Reports having one previously at home      Tub Transfer (FIM) 6 - Patient requires assistive device/extra time/safety concerns but completes independently   QI: Upper Body [address] Provided by [place] No physical assistance   Upper Body Dressing CARE Score 6   QI: Lower Body [address] Provided by [place] No physical assistance   Lower Body Dressing CARE Score 6   QI: Putting On/Taking Off [address] Provided by [place] No physical assistance   Putting On/Taking Off Footwear CARE Score 6   Dressing/Undressing Clothing   Able to  Obtain Clothing;Store removed clothing   Remove UB Clothes Pullover Shirt   Remove LB Clothes Pants; Undergarment;Socks; Shoes   Don UB Clothes Pullover Shirt   Don LB Clothes Pants; Undergarment;Socks; Shoes   Positioning Standing;Sit Edge Of Bed   UB Dressing (FIM) 6 - Patient requires assistive device/extra time/safety concerns but completes independently   LB Dressing (FIM) 6 - Patient requires assistive device/extra time/safety concerns but completes independently   QI: Sit to [address] Provided by [place] No physical assistance   Sit to Stand CARE Score 6   QI: Chair/Bed-to-Chair Transfer   Assistance Needed Independent   Assistance Provided by [place] No physical assistance   Chair/Bed-to-Chair Transfer CARE Score 6   Transfer Bed/Chair/Wheelchair   Bed, Chair, Wheelchair Transfer (FIM) 6 - Patient requires assistive device/extra time/safety concerns but completes independently   QI: [address] Provided by [place] No physical assistance   Indra Berger 83 Score 6   Toileting   Able to [address] down yes, up yes  Able to [address] Yes   Manage Hygiene Bladder   Toileting (FIM) 6 - Patient requires assistive device/extra time/safety concerns but completes independently   QI: Toilet Transfer   Assistance Needed Independent; Adaptive equipment   Assistance Provided by [place] No physical assistance   Toilet Transfer CARE Score 6   Toilet Transfer   Surface Assessed Standard Commode   Transfer Technique Standard   Limitations Noted In Balance; Endurance;LE Strength   Toilet Transfer (FIM) 6 - Patient requires assistive device/extra time/safety concerns but completes independently   Cognition   Overall Cognitive Status Impaired   Arousal/Participation Alert; Cooperative   Attention Difficulty attending to directions   Orientation Level Oriented X4   Memory Within functional limits   Following Commands Follows one step commands with increased time or repetition   Activity Tolerance   Activity Tolerance Patient tolerated treatment well   Assessment   Treatment Assessment Pt participated in skilled OT tx session with focus on ADL retraining, functional mobility, item transportation/retrieval at both RW and w/c level  Education provided on RW bag vs  Basket  Pt trials transport with RW bag  Pt states he will be showering in the AM prior to donning prosthetic  Pt completes self propelled w/c mobility to ADL suite with use of BUEs and RLE and transports items on lap via w/c at Xin level  Pt reports w/c will fit into bathroom in order to complete sit pivot from w/c <> tub bench, simulated to home set up at [facility]  Pt confirms already having tub transfer bench at home  Discussed donning robe and footwear to RLE and completing w/c mobility back to bedroom to complete all dressing tasks and prosthetic management after shower while at EOB for increased safety/independence  Pt receptive  Pt completes all dressing and prosthetic management while seated at EOB at Xin level  Pt continues to be limited by toileting with use of prosthetic 2* to decreased standing balance and ability to manage neoprene sleeve around waist safely  D ring has been reapplied but Sophia Reardon the prosthetist is continuing to recommend neoprene sleeve for increased stability  Attempted to complete management of prosthetic from seated level including management of neoprene sleeve but pt demos decreased frustration tolerance with task  Pt reports he will have a toileting routine where he will go in the AM prior to donning prosthetic and in emergency situations he will doff prosthetic prior to completing toileting task and then proceed to complete at w/c level  Pt reports having tub transfer bench, order placed with CM for w/c and BSC   Pt reports feeling confident in d/c home at [facility] and reports he will alternate between w/c level tasks and RW tasks to maintain skin integrity on residual limb  Pt marissa SKELTON understanding of all prosthetic training safety and skin integrity education  Pt to be dc'd from skilled OT services having met Xin goals  Prognosis Good   Problem List Impaired balance;Decreased skin integrity; Decreased coordination;Decreased strength   Plan   Treatment/Interventions ADL retraining;Functional transfer training; Endurance training;Equipment eval/education; Compensatory technique education   Progress Progressing toward goals   Recommendation   OT Discharge Recommendation Home independent   OT Equipment ordered [facility]   Date ordered 11/28/17   OT Therapy Minutes   OT Time In 0830   OT Time Out 1000   OT Total Time (minutes) 90   OT Mode of treatment - Individual (minutes) 90   OT Mode of treatment - Concurrent (minutes) 0   OT Mode of treatment - Group (minutes) 0   OT Mode of treatment - Co-treat (minutes) 0   OT Mode of Teatment - Total time(minutes) 90 minutes   Therapy Time missed   Time missed?  No

## 2017-11-28 NOTE — CONSULTS
Consultation - Neuropsychology    Kelsy Serrano 61 y o  male MRN: [id number]  Unit/Bed#: -55 Encounter: [id number]    Assessment/Plan     Assessment:  Pt has history of bipolar I disorder and anxiety; prescribed medication to help manage symptoms  Pt is from [facility]; he reported he moved locally to help his daughter and son-in-law due to daughter being ill and having difficulties managing her household; pt has two granddaughters, ages 15 and 3  Pt reported feeling overwhelmed due to being away from [facility]; his parents live in [facility] and pt visited with them often; he is also worried about his own health and recent AKA, as well as 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 to help pt cope with illness  History of Present Illness   Physician Requesting Consult: Tashia Santana MD  Reason for Consult / Principal Problem: coping, adjustment     Patient is a 61 y o  male   Primary complaints include: anxiety, concern about health problems, feeling depressed and poor concentration  Psychosocial Stressors: family and health      Inpatient consult to Neuropsychology  Consult performed by: Jose Niño ordered by: [facility], [address] [place] [address] Avenue:  sleep: no  appetite changes: no  weight changes: no  energy/anergy: no  interest/pleasure/anhedonia: no  somatic symptoms: yes  anxiety/panic: yes  kiley: yes  guilty/hopeless: no  self injurious behavior/risky behavior: no    Historical Information   Past Psychiatric History: hx of bipolar I disorder and anxiety  Substance Abuse History:  Use of Alcohol: denied and 0 out of 4 on CAGE    Smoking history: cigarette smoker; 0 5 packs/day  Family Psychiatric History: none noted    Social History  Education: high school diploma/GED  Marital history:   Living arrangement, social support: The patient lives in home with self     Occupational History: on permanent disability  Functioning Relationships: good support system and good relationship with children  Other Pertinent History: pt is from [facility]; moved locally one year ago to help his daughter and son-in-law with their two children, pt daughter was dx with Lymes disease  Traumatic History:   Abuse: none noted  Other Traumatic Events: other traumatic events: AKA; death of younger brother       Past Medical History:   Diagnosis Date    Anticoagulated on Coumadin     Mechanical MVR    Anxiety     Bipolar 1 disorder (HCC)     CAD (coronary artery disease)     Chronic kidney disease     left kideny being monitored    Chronic systolic congestive heart failure ([facility] [address] ) 4/18/2017    Colitis 4/2/2017    COPD (chronic obstructive pulmonary disease) ([facility])     Diabetes mellitus (HCC)     Difficulty urinating     DM type 2 with diabetic peripheral neuropathy (HCC)     Hiatal hernia     Hyperlipidemia     Hypertension     Ischemic cardiomyopathy     Myocardial infarction     Pacemaker     PAD (peripheral artery disease) (HCC)     Rectal bleed     Vomiting        Medical Review Of Systems:  Review of Systems    Meds/Allergies   current meds:   Current Facility-Administered Medications   Medication Dose Route Frequency    acetaminophen (TYLENOL) tablet 650 mg  650 mg Oral Q6H PRN    atorvastatin (LIPITOR) tablet 10 mg  10 mg Oral Daily With Dinner    bisacodyl (DULCOLAX) rectal suppository 10 mg  10 mg Rectal Daily PRN    docusate sodium (COLACE) capsule 100 mg 100 mg Oral BID    gabapentin (NEURONTIN) capsule 900 mg  900 mg Oral TID    HYDROcodone-acetaminophen (NORCO) 5-325 mg per tablet 1 tablet  1 tablet Oral Q6H PRN    influenza inactivated quadrivalent vaccine (FLULAVAL) IM injection 0 5 mL  0 5 mL Intramuscular Prior to discharge    insulin glargine (LANTUS) subcutaneous injection 14 Units  14 Units Subcutaneous HS    insulin lispro (HumaLOG) 100 units/mL subcutaneous injection 1-5 Units  1-5 Units Subcutaneous 4x Daily (AC & HS)    lisinopril (ZESTRIL) tablet 10 mg  10 mg Oral Daily    LORazepam (ATIVAN) tablet 0 25 mg  0 25 mg Oral BID PRN    LORazepam (ATIVAN) tablet 0 5 mg  0 5 mg Oral TID    metoprolol succinate (TOPROL-XL) 24 hr tablet 25 mg  25 mg Oral Daily    nicotine (NICODERM CQ) 14 mg/24hr TD 24 hr patch 14 mg  14 mg Transdermal Daily    ondansetron (ZOFRAN-ODT) dispersible tablet 4 mg  4 mg Oral Q6H PRN    pantoprazole (PROTONIX) EC tablet 40 mg  40 mg Oral BID AC    polyethylene glycol (MIRALAX) packet 17 g  17 g Oral Daily PRN    QUEtiapine (SEROquel) tablet 400 mg  400 mg Oral HS    torsemide (DEMADEX) tablet 20 mg  20 mg Oral Daily    warfarin (COUMADIN) tablet 10 mg  10 mg Oral Daily (warfarin)     Allergies   Allergen Reactions    Aspirin Other (See Comments)     Received ASA & bleed out    Heparin      Please see 8/2017 admit = 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    Morphine Other (See Comments)     Gets red streak up arm above IV site    Omeprazole Diarrhea       Objective   Vital signs in last 24 hours:  Temp:  [97 7 °F (36 5 °C)-99 °F (37 2 °C)] 98 8 °F (37 1 °C)  HR:  [60-73] 66  Resp:  [18-20] 20  BP: (110-124)/(56-74) 124/70      Intake/Output Summary (Last 24 hours) at 11/28/17 1353  Last data filed at 11/28/17 1244   Gross per 24 hour   Intake              890 ml   Output                0 ml   Net              890 ml       Mental Status Evaluation:  Appearance:  younger than stated age Behavior:  normal   Speech:  tangential   Mood:  euthymic   Affect:  mood-congruent   Language:  WNL   Thought Process:  flight of ideas and tangential   Thought Content:  normal   Perceptual Disturbances: None   Risk Potential: none   Sensorium:  person, place, time/date and situation   Cognition:  grossly intact   Consciousness:  alert and awake    Attention: attention span appeared shorter than expected for age   Intellect: within normal limits   Fund of Knowledge: vocabulary: WNL   Insight:  fair   Judgment: fair   Muscle Strength and Tone: see PT eval   Gait/Station: see PT eval   Motor Activity: no abnormal movements

## 2017-11-28 NOTE — PROGRESS NOTES
Physical Medicine and Rehabilitation Progress Note:  Amputation:  05 5  Bilateral Lower Limb Above the Knee  Zeke Pereira 61 y o  male MRN: [id number]  Unit/Bed#: -01 Encounter: [id number]    Assessment: Kelsy Serrano is a 61 y o  male who presented to the [address] with diffuse abdominal pain, hematemesis  He was previously admitted for the same complaints and EGD revealed esophagitis  On this admission CT of the chest abdomen pelvis demonstrated pulmonary emphysema, no definite esophageal abnormalities  Repeat EGD was performed on 11/16 with demonstrated reflux esophagitis, gastritis, and mallorry-mantilla tear at GE junction  Of note patient has history of mitral valve replacement for which he was anticoagulated with coumadin (INR of 2 57)  He was initially on Protonix drip which was converted to PO form       Patient also has history of AKA, performed on 09/2017  He has recently received prosthesis, but has had no training as of yet  Patient accepted to [facility] for prosthesis, gait training on 11/20/17  Subjective: No acute events overnight  Dee Dee Hudson was seen while working with therapies  Denies any complaints  No pain  ROS: A 10-point ROS was performed  Negative except as listed above  Restrictions include: Fall precautions    Disposition: Discharge home 11/28    Plan:  1  Current Medical Problems/Risks/Management:  # Amputation: left AKA secondary to PAD  - Acute comprehensive interdisciplinary inpatient rehabilitation including PT, OT, SLP, RN, CM, SW, dietary, psychology, etc   - Appreciate Internal Medicine following - Dr Cielo Sullivan service   - S/p left AKA on 8/24/17 by Dr Gualberto Reddy  - Continue prosthetic training by PT/OT  - continue gabapentin for phantom limb pain, titrate up as tolerated  - f/u vascular surgery as outpatient    # Suicidal idealization  - patient endorsed SI twice during [facility] stay, but states he does not mean it   He reports his distress stemmed from unclear disposition  Much better today as he reports he has a discharge plan in place with family  - Psychiatry consultation performed, appreciate recs  - Continue lorazepam PRN  - continue seroquel QHS     # GI bleed  - s/p EGD  - secondary to reflux esophagitis, gastritis, and mallorry-mantilla tear at GE junction  - continue CBC monitoring  - Continue protonix   - GI f/u as outpatient      Results from last 7 days  Lab Units 11/27/17  0539 11/24/17  0637 11/21/17  1224   HEMOGLOBIN g/dL 13 0 12 5 13 7     # SEAN  - Continue monitoring kidney function  - encourage fluid intake       Results from last 7 days  Lab Units 11/27/17  0539 11/23/17  0615 11/21/17  1224   CREATININE mg/dL 1 20 1 10 1 19     # DM  - Continue ISS  - Continue finger sticks      Results from last 7 days  Lab Units 11/27/17  0539 11/23/17  0615 11/21/17  1224   GLUCOSE RANDOM mg/dL 154* 135 173*     # HTN/CAD/s/p MV replacement/CHF  - Continue Atorvastatin  - Continue torsemide  - Continue Lisinopril  - Continue Metoprolol   - IM team dosing warfarin     Temp:  [97 7 °F (36 5 °C)-99 °F (37 2 °C)] 97 8 °F (36 6 °C)  HR:  [60-73] 71  Resp:  [18-20] 20  BP: (110-121)/(56-74) 114/74       Results from last 7 days  Lab Units 11/28/17  0627 11/27/17  0539 11/26/17  0517   INR  2 17* 1 95* 2 20*     # Hypokalemia  - supplement as needed       Results from last 7 days  Lab Units 11/27/17  0539 11/23/17  0615 11/21/17  1224   POTASSIUM mmol/L 4 1 3 7 4 1     # Pain  - Continue tylenol PRN, for max of 3gm daily     - Continue Norco PRN   - Continue gabapentin      # Rehab Psych  - Neuropsych consult, appreciate recs     # FENA/prophy  - Diet: diabetic   SLP and nutrition to monitor and adjust as necessary   - DVT prophy: Sequential compression device (Venodyne)  and Warfarin (Coumadin)  - GI ppx: Pantaprazole  - Bowel: colace , dulcolax suppository  and miralax PRN  - Nausea: Zofran PRN  - Supplements: None  - Sleep: None      CODE: Level 1: Full Code    Objective:  Nursing staff reporting: no problems    Functional Update:  Physical Therapy Occupational Therapy   Weight Bearing Status: Full Weight Bearing (RLE only)  Transfers: Modified Independent (for sit pivots, )  Bed Mobility: Independent  Amulation Distance (ft): 40 feet  Ambulation: Contact Guard  Assistive Device for Ambulation: Roller Walker  Wheelchair Mobility Distance: 200 ft  Wheelchair Mobility: Independent  Number of Stairs: 2  Assistive Device for Stairs: Lehft Hand Rail  Stair Assistance: Minimal Assistance  Discharge Recommendations: Home with:  [address] with[de-identified] Family Support, First Floor Setup, Outpatient Physical Therapy   Eating: Modified Independent  Grooming: Modified Independent  Bathing: Modified Independent  Bathing: Modified Independent  Upper Body Dressing: Modified Independent  Lower Body Dressing: Modified Independent  Toileting: Modified Independent  Tub/Shower Transfer: Modified Independent  Toilet Transfer: Modified Independent       Allergies and Medications per EMR    Physical Exam:  General: alert, cooperative and comfortable  HENMT: Head: Normal, normocephalic, atraumatic  Eye: Normal external eye, conjunctiva, lids   Ears: Normal external ears  Nose: Normal external nose, mucus membranes  Pulmonary: chest expansion normal, no retractions, no accessory muscle usage  Abdomen: soft, nontender, nondistended, no masses or organomegaly  Skin/Extremity: no rashes, no erythema, no peripheral edema  Incisions: well healed incision  No erythema, no drainage  Neurologic: Awake alert orientedx3  Psych: calm, participating in therapies  Normal speech pattern  Gait assessment: mild left sided hip hiking, however no circumduction of gait noted  Walking with RW       Diagnostic Studies: reviewed, no new imaging    Vitals:  Temp:  [97 7 °F (36 5 °C)-99 °F (37 2 °C)] 97 8 °F (36 6 °C)  HR:  [60-73] 71  Resp:  [18-20] 20  BP: (110-121)/(56-74) 114/74   Intake/Output Summary (Last 24 hours) at 11/28/17 1042  Last data filed at 11/28/17 0846   Gross per 24 hour   Intake              970 ml   Output                0 ml   Net              970 ml        Laboratory:    Lab Results   Component Value Date    HGB 13 0 11/27/2017    HCT 38 6 11/27/2017    WBC 7 65 11/27/2017     Lab Results   Component Value Date    BUN 26 (H) 11/27/2017     11/27/2017    K 4 1 11/27/2017     11/27/2017    GLUCOSE 154 (H) 11/27/2017    GLUCOSE 273 (H) 04/18/2017    CREATININE 1 20 11/27/2017     Lab Results   Component Value Date    PROTIME 24 4 (H) 11/28/2017    INR 2 17 (H) 11/28/2017        Patient Active Problem List   Diagnosis    Hypertension    [facility]    Diabetes mellitus ([facility] )    Hiatal hernia    Erosive esophagitis    CAD (coronary artery disease)    Pacemaker    Chronic systolic congestive heart failure ([facility] )    Peripheral artery disease (HCC)    History of mitral valve replacement with mechanical valve    Bipolar 1 disorder, depressed (HCC)    Bullous dermatosis    GI bleed    Acute upper GI bleed    Status post above knee amputation of left lower extremity (HCC)       ** Please Note: Fluency Direct voice to text software may have been used in the creation of this document  **    [ x ] Total time spent: 30 Mins, and greater than 50% of this time was spent counseling/coordinating care

## 2017-11-28 NOTE — DISCHARGE INSTRUCTIONS
Please have your blood work drawn (PT/INR) the results will be sent to your doctors    Please note you are restricted from driving/operating a motorized vehicle/operating heavy machinery/etc until you are cleared through a formal driving evaluation  This service is offered through [facility] driving evaluation program on [address] however this evaluation can be done at a site of your choosing  Please contact your family doctor for a referral when your family doctor clears you to perform this evaluation once your neurologic/physical deficits have stabilized       Please see your doctors listed in the follow up providers section of your discharge paperwork, and take the discharge paperwork with you to your appointments    Please note changes may have been made to your medications please refer to your discharge paperwork for your current medications and take this list with you to all your doctors appointments for your doctors to review    Please do not resume a home medication unless the medication reconciliation sheet indicates to do so, please do not assume that a medication that you were given a prescription for is the same as a medication you have at home based on both medications having the same name as dosages and frequency may have changed    Please do not combine pain medications (including but not limited to tramadol, vicodin, percocet, oxycodone, oxycontin, morphine, hydropmorphone, oxymorphone, fentanyl, hydrocodone, etc) with muscle relaxants (including but not limited to zanaflex, flexaril, soma, robaxin, etc) or sedatives/sleep aids/anti-anxiety medications (including but not limited to [place], valium, xanax, klonipin, ativan, etc) that you may have been prescribed prior to admission    Please check your blood sugars 3 times daily at mealtime and at bedtime and record them to provide to your doctors, contact your family doctor immediately for blood sugars higher than 220 or lower than 100     Please check your blood pressure prior to taking your blood pressure medications and 1 hour after, please contact your family doctor immediately for a blood pressure below 100/60 and do not take your blood pressure medications until speaking with them, please contact your family doctor immediately for blood pressure greater than 160/100    Please note the following incidental findings were found during your recent hospitalization please discuss them with your doctors so that they may arrange any tests/referrals as they deem necessary:   · Diabetes - Your lantus dose was reduced to 14units at bedtime  Please follow-up with your family physician  · Psychiatric history - continue to follow with your psychiatrist as outpatient       Please avoid NSAID (including but not limited to advil, aleve, motrin, naproxen, ibuprofen, mobic, meloxicam, etc) medications, anti platelet medications (including but not limited to plavix and plavix containing products), and any prescription blood thinners due to your already being on warfarin and when combined with these other medications they can increase your risk of bleeding; you may be cleared to use these medications in the future but do not do so unless advised to do so by your doctors    Unless specifically noted in your medication list provided to you in your discharge paper work, please discuss with your family doctor prior to resuming any vitamins/supplements you may have been taking prior to your hospitalization     Please note a summary of your hospital stay with relevant information for your doctors has been sent to them, please confirm with your doctors at your follow up visits that they have received this summary and have them contact [employer] if they have not received them along with any other medical records they may require

## 2017-11-29 VITALS
WEIGHT: 170.19 LBS | DIASTOLIC BLOOD PRESSURE: 78 MMHG | HEART RATE: 66 BPM | TEMPERATURE: 98.1 F | HEIGHT: 73 IN | OXYGEN SATURATION: 99 % | BODY MASS INDEX: 22.56 KG/M2 | RESPIRATION RATE: 20 BRPM | SYSTOLIC BLOOD PRESSURE: 128 MMHG

## 2017-11-29 LAB
GLUCOSE SERPL-MCNC: 193 MG/DL (ref 65–140)
GLUCOSE SERPL-MCNC: 208 MG/DL (ref 65–140)
INR PPP: 2.75 (ref 0.86–1.16)
PROTHROMBIN TIME: 29.5 SECONDS (ref 12.1–14.4)

## 2017-11-29 PROCEDURE — 85610 PROTHROMBIN TIME: CPT | Performed by: PHYSICIAN ASSISTANT

## 2017-11-29 PROCEDURE — 90686 IIV4 VACC NO PRSV 0.5 ML IM: CPT | Performed by: PHYSICAL MEDICINE & REHABILITATION

## 2017-11-29 PROCEDURE — 82948 REAGENT STRIP/BLOOD GLUCOSE: CPT

## 2017-11-29 RX ORDER — OMEPRAZOLE 20 MG/1
20 CAPSULE, DELAYED RELEASE ORAL 2 TIMES DAILY
Qty: 60 CAPSULE | Refills: 0 | Status: SHIPPED | OUTPATIENT
Start: 2017-11-29 | End: 2017-11-29 | Stop reason: HOSPADM

## 2017-11-29 RX ORDER — WARFARIN SODIUM 5 MG/1
TABLET ORAL
Qty: 30 TABLET | Refills: 0
Start: 2017-11-29 | End: 2018-08-02 | Stop reason: SDUPTHER

## 2017-11-29 RX ORDER — PANTOPRAZOLE SODIUM 40 MG/1
40 TABLET, DELAYED RELEASE ORAL
Qty: 60 TABLET | Refills: 3 | Status: SHIPPED | OUTPATIENT
Start: 2017-11-29 | End: 2018-04-13 | Stop reason: SDUPTHER

## 2017-11-29 RX ADMIN — LISINOPRIL 10 MG: 10 TABLET ORAL at 09:00

## 2017-11-29 RX ADMIN — TORSEMIDE 20 MG: 20 TABLET ORAL at 08:57

## 2017-11-29 RX ADMIN — INSULIN LISPRO 1 UNITS: 100 INJECTION, SOLUTION INTRAVENOUS; SUBCUTANEOUS at 07:30

## 2017-11-29 RX ADMIN — INFLUENZA VIRUS VACCINE 0.5 ML: 15; 15; 15; 15 SUSPENSION INTRAMUSCULAR at 09:05

## 2017-11-29 RX ADMIN — LORAZEPAM 0.5 MG: 0.5 TABLET ORAL at 08:57

## 2017-11-29 RX ADMIN — METOPROLOL SUCCINATE 25 MG: 25 TABLET, EXTENDED RELEASE ORAL at 08:58

## 2017-11-29 RX ADMIN — GABAPENTIN 900 MG: 300 CAPSULE ORAL at 08:58

## 2017-11-29 RX ADMIN — PANTOPRAZOLE SODIUM 40 MG: 40 TABLET, DELAYED RELEASE ORAL at 06:37

## 2017-11-29 NOTE — PROGRESS NOTES
Physical Medicine and Rehabilitation Progress Note:  Amputation:  05 5  Bilateral Lower Limb Above the Knee  Denis Pereira 61 y o  male MRN: [id number]  Unit/Bed#: -01 Encounter: [id number]    Assessment: Elgin Asher is a 61 y o  male who presented to the [address] with diffuse abdominal pain, hematemesis  He was previously admitted for the same complaints and EGD revealed esophagitis  On this admission CT of the chest abdomen pelvis demonstrated pulmonary emphysema, no definite esophageal abnormalities  Repeat EGD was performed on 11/16 with demonstrated reflux esophagitis, gastritis, and mallorry-mantilla tear at GE junction  Of note patient has history of mitral valve replacement for which he was anticoagulated with coumadin (INR of 2 57)  He was initially on Protonix drip which was converted to PO form  Patient also has history of AKA, performed on 09/2017  He has recently received prosthesis, but has had no training as of yet  Patient accepted to [facility] for prosthesis, gait training on 11/20/17  Subjective: No acute events overnight  Trena Cooper was seen while in his room  Denies any CP or SOB  We discussed his discharge today, including prescriptions and follow-up appointments  ROS: A 10-point ROS was performed  Negative except as listed above  Restrictions include: Fall precautions    Disposition: Discharge home today    Plan:  1    Current Medical Problems/Risks/Management:  # Amputation: left AKA secondary to PAD  - Acute comprehensive interdisciplinary inpatient rehabilitation including PT, OT, SLP, RN, CM, SW, dietary, psychology, etc   - Appreciate Internal Medicine following - Dr Kyle Mendoza service   - S/p left AKA on 8/24/17 by Dr Merle Lancaster  - Continue prosthetic training by PT/OT  - continue gabapentin for phantom limb pain, titrate up as tolerated  - f/u vascular surgery as outpatient    # Suicidal idealization  - patient endorsed SI twice during [facility] stay, but states he does not mean it  He reports his distress stemmed from unclear disposition  Much better today as he reports he has a discharge plan in place with family  - Psychiatry consultation performed, appreciate recs  Follow-up with psychiatrist as outpatient (Dr Kamlesh Loco)  - Continue lorazepam PRN  - continue seroquel QHS     # GI bleed  - s/p EGD  - secondary to reflux esophagitis, gastritis, and mallorry-mantilla tear at GE junction  - continue CBC monitoring  - Continue protonix  Test script sent to pharmacy, patient's co-pay is $1    - GI f/u as outpatient      Results from last 7 days  Lab Units 11/27/17  0539 11/24/17  0637   HEMOGLOBIN g/dL 13 0 12 5     # SEAN  - Continue monitoring kidney function  - encourage fluid intake       Results from last 7 days  Lab Units 11/27/17  0539 11/23/17  0615   CREATININE mg/dL 1 20 1 10     # DM  - Continue ISS  - Continue Lantus   - Continue finger sticks      Results from last 7 days  Lab Units 11/27/17  0539 11/23/17  0615   GLUCOSE RANDOM mg/dL 154* 135     # HTN/CAD/s/p MV replacement/CHF  - Continue Atorvastatin  - Continue torsemide  - Continue Lisinopril  - Continue Metoprolol   - IM team dosing warfarin  Dr Lyndon Meyer office follows PT/INR as outpatient  Will resume alternating 5mg/7 5mg schedule     Temp:  [98 1 °F (36 7 °C)-98 8 °F (37 1 °C)] 98 1 °F (36 7 °C)  HR:  [66-71] 66  Resp:  [18-20] 20  BP: (119-130)/(62-78) 128/78       Results from last 7 days  Lab Units 11/29/17  0638 11/28/17  0627 11/27/17  0539   INR  2 75* 2 17* 1 95*     # Hypokalemia  - supplement as needed       Results from last 7 days  Lab Units 11/27/17  0539 11/23/17  0615   POTASSIUM mmol/L 4 1 3 7     # Pain  - Continue tylenol PRN, for max of 3gm daily     - Continue Norco PRN (has not required)  - Continue gabapentin      # Rehab Psych  - Neuropsych consult, appreciate recs     # FENA/prophy  - Diet: diabetic   SLP and nutrition to monitor and adjust as necessary   - DVT prophy: Sequential compression device (Venodyne)  and Warfarin (Coumadin)  - GI ppx: Pantaprazole  - Bowel: colace , dulcolax suppository  and miralax PRN  - Nausea: Zofran PRN  - Supplements: None  - Sleep: None      CODE: Level 1: Full Code    Objective:  Nursing staff reporting: no problems    Functional Update:  Physical Therapy Occupational Therapy   Weight Bearing Status: Full Weight Bearing (RLE only)  Transfers: Modified Independent (for sit pivots, )  Bed Mobility: Independent  Amulation Distance (ft): 40 feet  Ambulation: Contact Guard  Assistive Device for Ambulation: Roller Walker  Wheelchair Mobility Distance: 200 ft  Wheelchair Mobility: Independent  Number of Stairs: 2  Assistive Device for Stairs: Lehft Hand Rail  Stair Assistance: Minimal Assistance  Discharge Recommendations: Home with:  [address] with[de-identified] Family Support, First Floor Setup, Outpatient Physical Therapy   Eating: Modified Independent  Grooming: Modified Independent  Bathing: Modified Independent  Bathing: Modified Independent  Upper Body Dressing: Modified Independent  Lower Body Dressing: Modified Independent  Toileting: Modified Independent  Tub/Shower Transfer: Modified Independent  Toilet Transfer: Modified Independent       Allergies and Medications per EMR    Physical Exam:  General: alert, cooperative and comfortable  HENMT: Head: Normal, normocephalic, atraumatic  Eye: Normal external eye, conjunctiva, lids   Ears: Normal external ears  Nose: Normal external nose, mucus membranes  Pulmonary: chest expansion normal, no retractions, no accessory muscle usage  Abdomen: soft, nontender, nondistended, no masses or organomegaly  Skin/Extremity: no rashes, no erythema, no peripheral edema  Incisions: well healed incision  No erythema, no drainage  Neurologic: Awake alert orientedx3  Psych: calm, participating in therapies  Normal speech pattern  Gait assessment: mild left sided hip hiking, however no circumduction of gait noted   Walking with RW  Diagnostic Studies: reviewed, no new imaging    Vitals:  Temp:  [98 1 °F (36 7 °C)-98 8 °F (37 1 °C)] 98 1 °F (36 7 °C)  HR:  [66-71] 66  Resp:  [18-20] 20  BP: (119-130)/(62-78) 128/78   Intake/Output Summary (Last 24 hours) at 11/29/17 1057  Last data filed at 11/29/17 0906   Gross per 24 hour   Intake              980 ml   Output                0 ml   Net              980 ml        Laboratory:    Lab Results   Component Value Date    HGB 13 0 11/27/2017    HCT 38 6 11/27/2017    WBC 7 65 11/27/2017     Lab Results   Component Value Date    BUN 26 (H) 11/27/2017     11/27/2017    K 4 1 11/27/2017     11/27/2017    GLUCOSE 154 (H) 11/27/2017    GLUCOSE 273 (H) 04/18/2017    CREATININE 1 20 11/27/2017     Lab Results   Component Value Date    PROTIME 29 5 (H) 11/29/2017    INR 2 75 (H) 11/29/2017        Patient Active Problem List   Diagnosis    Hypertension    B[facility] 1 disorder [facility]    Diabetes mellitus ([facility]    Hiatal hernia    Erosive esophagitis    CAD (coronary artery disease)    Pacemaker    Chronic systolic congestive heart failure ([facility] )    Peripheral artery disease (HCC)    History of mitral valve replacement with mechanical valve    Bipolar 1 disorder, depressed (HCC)    Bullous dermatosis    GI bleed    Acute upper GI bleed    Status post above knee amputation of left lower extremity (HCC)       ** Please Note: Fluency Direct voice to text software may have been used in the creation of this document  **    [ x ] Total time spent: 30 Mins, and greater than 50% of this time was spent counseling/coordinating care

## 2017-11-29 NOTE — DISCHARGE SUMMARY
Discharge Summary - PMR   Desmond Syed 61 y o  male MRN: [id number]  Unit/Bed#: -60 Encounter: [id number]    Admission Date: 11/20/2017     Discharge Date: 11/29/2017    Rehabilitation Diagnosis: Impairment of mobility, safety and Activities of Daily Living (ADLs) due to Amputation:  05 3  Unilateral Lower Limb Above the Knee    Etiologic Diagnosis: AKA    HPI:   Desmond Syed is a 61 y o  male who  has a past medical history of Anticoagulated on Coumadin; Anxiety; Bipolar 1 disorder ([facility] ); CAD (coronary artery disease); Chronic kidney disease; Chronic systolic congestive heart failure ([place] [address] ) (4/18/2017); Colitis (4/2/2017); COPD (chronic obstructive pulmonary disease) ([place] [address] ); Diabetes mellitus ([place] [address] ); Difficulty urinating; DM type 2 with diabetic peripheral neuropathy ([place] [address] ); Hiatal hernia; Hyperlipidemia; Hypertension; Ischemic cardiomyopathy; Myocardial infarction; Pacemaker; PAD (peripheral artery disease) ([place] [address] ); Rectal bleed; and Vomiting  and presented to the [address] [place] [address] with diffuse abdominal pain, hematemesis  Patient noted to have admissions in the past for similar reasons; patient noted to have esophagitis on previous EGDs       CT of the chest abdomen pelvis demonstrated pulmonary emphysema, no definite esophageal abnormalities  Repeat EGD was performed on 11/16 with demonstrated reflux esophagitis, gastritis, and mallorry-mantilla tear at GE junction  Of note patient has history of mitral valve replacement for which he is anticoagulated with coumadin (INR of 2 57)  Patient was initially on Protonix drip which was converted to PO form       Patient also has history of AKA, performed on 09/2017  He has recently received prosthesis, but has had no training as of yet  Patient accepted to [facility] for prosthesis, gait training on 11/20/17       Procedures Performed During [facility] Admission: None    Acute Rehabilitation Center Course:    Patient participated in a comprehensive interdisciplinary inpatient rehabilitation program which included involvment of MD, therapies (PT, OT, and/or SLP), RN, CM, SW, dietary, and psychology services  he was able to be advanced to a modified independent level of assist and considered safe for discharge home  Please see below for patient's day to day management of medical needs  # Amputation: left AKA secondary to PAD  - Acute comprehensive interdisciplinary inpatient rehabilitation including PT, OT, SLP, RN, CM, SW, dietary, psychology, etc   - Appreciate Internal Medicine following - Dr Cira Peraza  - S/p left AKA on 8/24/17 by Dr Rodriguez  - prosthetic training by PT/OT during [facility] stay  - continue gabapentin for phantom limb pain, titrate up as tolerated  - f/u vascular surgery as outpatient     # Suicidal idealization  - patient endorsed SI twice during [facility] stay, but states he does not mean it  He reports his distress stemmed from unclear disposition  Much better today as he reports he has a discharge plan in place with family  - Psychiatry consultation performed, appreciate recs  Follow-up with psychiatrist, Dr Charlotte Tomlinson, as outpatient  - Continue lorazepam PRN  - continue seroquel QHS     # GI bleed  - s/p EGD  - secondary to reflux esophagitis, gastritis, and mallorry-mantilla tear at GE junction  - continue CBC monitoring  - Continue protonix  Test script sent to pharmacy, patient's copay is $1  - GI f/u as outpatient        Results from last 7 days  Lab Units 11/27/17  0539 11/24/17  0637 11/21/17  1224   HEMOGLOBIN g/dL 13 0 12 5 13 7      # SEAN  - Continue monitoring kidney function  - encourage fluid intake      Results from last 7 days  Lab Units 11/27/17  0539 11/23/17  0615 11/21/17  1224   CREATININE mg/dL 1 20 1 10 1  19      # DM  - Continue ISS  - continue lantus, dose reduced to 14u qhs  - Continue finger sticks        Results from last 7 days  Lab Units 11/27/17  0539 11/23/17  0615 11/21/17  1224   GLUCOSE RANDOM mg/dL 154* 135 173*      # HTN/CAD/s/p MV replacement/CHF  - Continue Atorvastatin  - Continue torsemide  - Continue Lisinopril  - Continue Metoprolol   -Dr Pavan Reyes office follows PT/INR as outpatient  Will resume alternating 5mg/7 5mg schedule     Temp:  [97 7 °F (36 5 °C)-99 °F (37 2 °C)] 97 8 °F (36 6 °C)  HR:  [60-73] 71  Resp:  [18-20] 20  BP: (110-121)/(56-74) 114/74        Results from last 7 days  Lab Units 11/28/17  0627 11/27/17  0539 11/26/17  0517   INR   2 17* 1 95* 2 20*      # Hypokalemia  - supplement as needed        Results from last 7 days  Lab Units 11/27/17  0539 11/23/17  0615 11/21/17  1224   POTASSIUM mmol/L 4 1 3 7 4 1      # Pain  - Continue tylenol PRN, for max of 3gm daily     - Continue Norco PRN (has not required)  - Continue gabapentin      # Rehab Psych  - Neuropsych consult, appreciate recs     # FENA/prophy  - Diet: diabetic  SLP and nutrition to monitor and adjust as necessary   - DVT prophy: Sequential compression device (Venodyne)  and Warfarin (Coumadin)  - GI ppx: Pantaprazole  - Bowel: colace , dulcolax suppository  and miralax PRN  - Nausea: Zofran PRN  - Supplements: None  - Sleep: None      CODE: Level 1: Full Code    Discharge Physical Examination:  General: alert, cooperative and comfortable  HENMT: Head: Normal, normocephalic, atraumatic  Eye: Normal external eye, conjunctiva, lids   Ears: Normal external ears  Nose: Normal external nose, mucus membranes  Pulmonary: chest expansion normal, no retractions, no accessory muscle usage  Abdomen: soft, nontender, nondistended, no masses or organomegaly  Skin/Extremity: no rashes, no erythema, no peripheral edema  Incisions: well healed incision  No erythema, no drainage  Neurologic: Awake alert orientedx3  Psych: calm, participating in therapies  Normal speech pattern    Gait assessment: mild left sided hip hiking, however no circumduction of gait noted  Walking with RW       Significant Findings, Care, Treatment and Services Provided: Acute comprehensive interdisciplinary inpatient rehabilitation including PT, OT, SLP, RN, CM, SW, dietary, psychology, etc     Complications: Patient had suicidal idealization during early stage of [facility] stay as he was distraught that he may not be able to return to daughter's home on discharge  He was in particular anxious that he may have to go to homeless shelter without his dog; he made comments such as placing rope around neck, and stating that he has a firearm at home that he "knows how to use"  Psychiatry service was consulted  Patient eventually calmed, and no 1:1 was required  He continued to remain stable, with no plans to harm himself or others, and will be following with his psychiatrist as outpatient  Functional Status Upon Discharge:    Physical Therapy Occupational Therapy   Weight Bearing Status: Full Weight Bearing (RLE only)  Transfers: Modified Independent (for sit pivots, )  Bed Mobility: Independent  Amulation Distance (ft): 40 feet  Ambulation: Contact Guard  Assistive Device for Ambulation: Roller Walker  Wheelchair Mobility Distance: 200 ft  Wheelchair Mobility: Independent  Number of Stairs: 2  Assistive Device for Stairs: Lehft Hand Rail  Stair Assistance: Minimal Assistance  Discharge Recommendations: Home with:  [address] [facility] [address] with[de-identified] Family Support, First Floor Setup, Outpatient Physical Therapy   Eating: Modified Independent  Grooming: Modified Independent  Bathing: Modified Independent  Bathing: Modified Independent  Upper Body Dressing: Modified Independent  Lower Body Dressing: Modified Independent  Toileting: Modified Independent  Tub/Shower Transfer: Modified Independent  Toilet Transfer: Modified Independent         Discharge Diagnosis: Impairment of mobility, safety and Activities of Daily Living (ADLs) due to Amputation:  05 3  Unilateral Lower Limb Above the Knee    Discharge Medications:   See after visit summary for reconciled discharge medications provided to patient and family  Condition at Discharge: stable     Discharge instructions/Information to patient and family:   See after visit summary for information provided to patient and family  Provisions for Follow-Up Care:  See after visit summary for information related to follow-up care and any pertinent home health orders  Disposition: Home    Planned Readmission: No    Discharge Statement   I spent 45 minutes discharging the patient  This time was spent on the day of discharge  I had direct contact with the patient on the day of discharge  Greater than 50% of the total time was spent examining patient, answering all patient questions, arranging and discussing plan of care with patient as well as directly providing post-discharge instructions  Additional time then spent on discharge activities  Discharge Medications:  See after visit summary for reconciled discharge medications provided to patient and family

## 2017-11-29 NOTE — NURSING NOTE
Patient discharged today MOD I wheel chair  Afebrile with stable vitals  Skin intact, no breakdown  Patient is continent of bowel and bladder  Offers no c/o pain or discomfort  Daughter will be picking patient up and taking patient to new residence

## 2017-11-29 NOTE — CASE MANAGEMENT
Team dc summary - pt made good progress and returned home w/contd outpt physical therapy services at [facility] in [place]  Pt received a wheelchair through [facility]  Pt's dtr present for dc instructions and aware of pts functional ability

## 2017-12-04 NOTE — PHYSICAL THERAPY NOTE
Physical Therapy Discharge Summary  Pt discharged home  Pt using w/c as primary mode of locomotion  Pt had prosthesis by prosthetist from [facility] and gait training was initiated  Pt continues to have gait and balance deviations  Pt to continue with outpt therapy services

## 2017-12-06 ENCOUNTER — ALLSCRIPTS OFFICE VISIT (OUTPATIENT)
Dept: OTHER | Facility: OTHER | Age: 57
End: 2017-12-06

## 2017-12-06 DIAGNOSIS — I25.10 ATHEROSCLEROTIC HEART DISEASE OF NATIVE CORONARY ARTERY WITHOUT ANGINA PECTORIS: ICD-10-CM

## 2017-12-06 DIAGNOSIS — Z89.612 ACQUIRED ABSENCE OF LEFT LOWER EXTREMITY ABOVE KNEE (HCC): ICD-10-CM

## 2017-12-06 DIAGNOSIS — I50.22 CHRONIC SYSTOLIC CONGESTIVE HEART FAILURE (HCC): ICD-10-CM

## 2017-12-06 DIAGNOSIS — I70.209 ATHEROSCLEROSIS OF NATIVE ARTERY OF EXTREMITY (HCC): ICD-10-CM

## 2017-12-07 ENCOUNTER — GENERIC CONVERSION - ENCOUNTER (OUTPATIENT)
Dept: OTHER | Facility: OTHER | Age: 57
End: 2017-12-07

## 2017-12-07 LAB
DEPRECATED RDW RBC AUTO: 17 % (ref 12.3–15.4)
HCT VFR BLD AUTO: 37.2 % (ref 37.5–51)
HGB BLD-MCNC: 12.2 G/DL (ref 13–17.7)
INR PPP: 1.7 (ref 0.8–1.2)
MCH RBC QN AUTO: 28.2 PG (ref 26.6–33)
MCHC RBC AUTO-ENTMCNC: 32.8 G/DL (ref 31.5–35.7)
MCV RBC AUTO: 86 FL (ref 79–97)
PLATELET # BLD AUTO: 214 X10E3/UL (ref 150–379)
PROTHROMBIN TIME: 16.9 SEC (ref 9.1–12)
RBC (HISTORICAL): 4.33 X10E6/UL (ref 4.14–5.8)
WBC # BLD AUTO: 9.1 X10E3/UL (ref 3.4–10.8)

## 2017-12-11 ENCOUNTER — GENERIC CONVERSION - ENCOUNTER (OUTPATIENT)
Dept: FAMILY MEDICINE CLINIC | Facility: CLINIC | Age: 57
End: 2017-12-11

## 2017-12-13 ENCOUNTER — APPOINTMENT (OUTPATIENT)
Dept: PHYSICAL THERAPY | Facility: CLINIC | Age: 57
End: 2017-12-13
Payer: COMMERCIAL

## 2017-12-13 DIAGNOSIS — Z89.612 ACQUIRED ABSENCE OF LEFT LOWER EXTREMITY ABOVE KNEE (HCC): ICD-10-CM

## 2017-12-13 PROCEDURE — 97163 PT EVAL HIGH COMPLEX 45 MIN: CPT

## 2017-12-13 PROCEDURE — G8991 OTHER PT/OT GOAL STATUS: HCPCS

## 2017-12-13 PROCEDURE — G8990 OTHER PT/OT CURRENT STATUS: HCPCS

## 2017-12-15 ENCOUNTER — GENERIC CONVERSION - ENCOUNTER (OUTPATIENT)
Dept: FAMILY MEDICINE CLINIC | Facility: CLINIC | Age: 57
End: 2017-12-15

## 2017-12-17 ENCOUNTER — GENERIC CONVERSION - ENCOUNTER (OUTPATIENT)
Dept: FAMILY MEDICINE CLINIC | Facility: CLINIC | Age: 57
End: 2017-12-17

## 2017-12-19 ENCOUNTER — APPOINTMENT (OUTPATIENT)
Dept: PHYSICAL THERAPY | Facility: CLINIC | Age: 57
End: 2017-12-19
Payer: COMMERCIAL

## 2017-12-19 PROCEDURE — 97112 NEUROMUSCULAR REEDUCATION: CPT

## 2017-12-19 PROCEDURE — 97530 THERAPEUTIC ACTIVITIES: CPT

## 2017-12-19 PROCEDURE — 97116 GAIT TRAINING THERAPY: CPT

## 2017-12-19 PROCEDURE — 97140 MANUAL THERAPY 1/> REGIONS: CPT

## 2017-12-22 ENCOUNTER — APPOINTMENT (OUTPATIENT)
Dept: PHYSICAL THERAPY | Facility: CLINIC | Age: 57
End: 2017-12-22
Payer: COMMERCIAL

## 2017-12-26 ENCOUNTER — ALLSCRIPTS OFFICE VISIT (OUTPATIENT)
Dept: OTHER | Facility: OTHER | Age: 57
End: 2017-12-26

## 2017-12-27 ENCOUNTER — APPOINTMENT (OUTPATIENT)
Dept: PHYSICAL THERAPY | Facility: CLINIC | Age: 57
End: 2017-12-27
Payer: COMMERCIAL

## 2017-12-27 LAB
INR PPP: 3.9 (ref 0.8–1.2)
PROTHROMBIN TIME: 37.1 SEC (ref 9.1–12)

## 2017-12-29 ENCOUNTER — APPOINTMENT (OUTPATIENT)
Dept: PHYSICAL THERAPY | Facility: CLINIC | Age: 57
End: 2017-12-29
Payer: COMMERCIAL

## 2017-12-29 ENCOUNTER — GENERIC CONVERSION - ENCOUNTER (OUTPATIENT)
Dept: OTHER | Facility: OTHER | Age: 57
End: 2017-12-29

## 2018-01-02 ENCOUNTER — APPOINTMENT (OUTPATIENT)
Dept: PHYSICAL THERAPY | Facility: CLINIC | Age: 58
End: 2018-01-02
Payer: COMMERCIAL

## 2018-01-02 PROCEDURE — 97530 THERAPEUTIC ACTIVITIES: CPT

## 2018-01-02 PROCEDURE — 97112 NEUROMUSCULAR REEDUCATION: CPT

## 2018-01-02 PROCEDURE — 97110 THERAPEUTIC EXERCISES: CPT

## 2018-01-03 ENCOUNTER — APPOINTMENT (OUTPATIENT)
Dept: PHYSICAL THERAPY | Facility: CLINIC | Age: 58
End: 2018-01-03
Payer: COMMERCIAL

## 2018-01-05 ENCOUNTER — APPOINTMENT (OUTPATIENT)
Dept: PHYSICAL THERAPY | Facility: CLINIC | Age: 58
End: 2018-01-05
Payer: COMMERCIAL

## 2018-01-08 ENCOUNTER — APPOINTMENT (OUTPATIENT)
Dept: PHYSICAL THERAPY | Facility: CLINIC | Age: 58
End: 2018-01-08
Payer: COMMERCIAL

## 2018-01-09 NOTE — PROGRESS NOTES
REPORT NAME: Progress Notes Report  VISIT DATE: 8/3/2017  VISIT TIME: 1:58 PM EDT  PATIENT NAME: Kristi Salamanca   MEDICAL RECORD NUMBER: [id number]  YOB: 1960  AGE: 61  REFERRING PHYSICIAN: Maged Yanez  SUPERVISING CLINICIAN: Susie Pal Barre CARE PROVIDER: Colt Perez  PATIENT HOME ADDRESS: [address] [place] [address], [address] [facility], [facility]  [address]   PATIENT HOME PHONE: [phone]  SOCIAL SECURITY NUMBER:   DIAGNOSIS 1: Mitral Valve Replacement / [id number]  DIAGNOSIS 2:   INR RANGE: 2 5 - 3 5  INR GOAL: 3  TREATMENT START DATE:   TREATMENT END DATE:   NEXT VISIT: 9/5/2017  [facility]  VISIT RESULTS  ENCOUNTER NUMBER:   TEST LOCATION: [facility]  TEST TYPE: Outside Lab (Venipuncture)  VISIT TYPE: Phone Consult  CURRENT INR: 1 8 PROTIME: 18  SPECIMEN COL AND RPT DATE: 8/3/2017 1:58 PM  EDT  VITAL SIGNS  PULSE:  BP: / WEIGHT:  HEIGHT:  TEMP:   CURRENT ANTICOAGULANT DOSING SCHEDULE  DOSE SIZE: 5mg    ANTICOAGULANT TYPE: WARFARIN  DOSING REGIMEN  Sun       Mon       Tues      Wed       Thurs     Fri       Sat  Total/Wk  10        5         0         0         0         10        10        35  PATIENT MEDICATION INSTRUCTION: Yes  PATIENT NUTRITIONAL COUNSELING: No  PATIENT BRUISING INSTRUCTION: No  LAST EDUCATION DATE:   PREVIOUS VISIT INFORMATION  VISITDATE  INRGoal INR   Sun    Mon    Tues   Wed    Thurs  Fri    Sat  Total/wk  8/3/2017    3       1 8   10     5      0      0      0      10     10  35  8/2/2017    3       1 9   0      0      0      5      5      0      0  10  7/31/2017   3       1 41  5      10     10     0      0      0      0  25  7/13/2017   3       1 2   0      0      5      5      5      0      0  15  ADDITIONAL PREVIOUS VISIT INFORMATION  VISITDATE   PRIMARY RX               DOSE      CrCl  8/3/2017    WARFARIN                 5mg  8/2/2017    WARFARIN                 5mg  7/31/2017   WARFARIN                 5mg  7/13/2017   WARFARIN 5mg  OTHER CURRENT MEDICATIONS:  WARFARIN  PROGRESS NOTES:   PATIENT INSTRUCTIONS: 8/4/17, tc pt's daughter, lm am,per RP,  cont lovenox  injections, warfarin 10 mg f/s/s, inr mon, 8/7  [address]    [phone]  reviewed same  she states she cannot drawn blood for  inr on mon,pt must to to lab  tc to daughter, lm am, relayed message from  nurse,rupesh imchel to take pt to [facility] pn mon 8/7  give only 5 mg warfarin  on mon dose  rani   ---- Additional Instructions entered on 8/7/2017 1:40 PM EDT by Fran Knight :  phone call from [facility], patient has not yet gone  to lab today for inr  she reports pt is on antibiotics for wound   again  emphasized the importance of inr today  nurse will instruct patient  denies  any bleeding  continue on lovenox with 4 syrinages left  in home  rani   ---- Additional Instructions entered on 8/8/2017 3:57 PM EDT by Rosita Turner :  8/8/17, called to [facility]  reports no inr received from 8/7   called to pt, lm am, to call office regarding inr testing  called to  daughter, lm am, to call office regarding inr testing  rani   ---- Additional Instructions entered on 8/9/2017 10:56 AM EDT by Rosita Turner :  8/9/17, tc to patient and daughterHyacinth Annelieseniranjan on am and  cell phone to call office regarding inr testing  called to [facility], no  blood samples  received since 8/3   tc from [facility]  will see patient for home visit tomorrow  rani   ---- Additional Instructions entered on 8/10/2017 10:04 AM EDT by Rosita Turner :  8/10/17, noted pt inpt at [facility] on 8/9/17  rani  ---- Additional Instructions entered on 8/22/2017 1:58 PM EDT by Fran Knight :  8/22/17, reviewed chart, patient remains inpt at [facility] hospital   rani   ---- Additional Instructions entered on 8/24/2017 3:34 PM EDT by Rosita Turner :  8/24/17, pt inpt at [facility], had abk amp done today   rani  ---- Additional Instructions entered on 8/29/2017 8:29 AM EDT by Israel Turner :  8/29/17, [facility]  ---- Additional Instructions entered on 9/20/2017 4:10 PM EDT by Israel Malone  [facility] :  9/20/17, noted 9/6/17, d/c'd from [facility] to short term rehab   [place]   ---- Additional Instructions entered on 9/28/2017 3:19 PM EDT by Israel Malone  [facility] :  9/28/17, reviewed chart, remains at [facility]  center in [facility]  TEST LOCATION: [facility], ,           ,             INBOUND LAB DATA:  Lab       Lab Value Col Date                 Rpt Date                 Lab  Reference Range  Electronically signed by:  Israel Turner on 9/28/2017 3:19 PM EDT

## 2018-01-10 ENCOUNTER — GENERIC CONVERSION - ENCOUNTER (OUTPATIENT)
Dept: OTHER | Facility: OTHER | Age: 58
End: 2018-01-10

## 2018-01-10 ENCOUNTER — APPOINTMENT (OUTPATIENT)
Dept: PHYSICAL THERAPY | Facility: CLINIC | Age: 58
End: 2018-01-10
Payer: COMMERCIAL

## 2018-01-10 LAB — INR PPP: 2.8 (ref 0.86–1.16)

## 2018-01-10 PROCEDURE — 97110 THERAPEUTIC EXERCISES: CPT

## 2018-01-10 NOTE — MISCELLANEOUS
Message   Recorded as Task   Date: 06/16/2017 03:00 PM, Created By: Ivone Canseco   Task Name: Care Coordination   Assigned To: Ivone Canseco   Regarding Patient: Abhishek Griffith, Status: Complete   Comment:    Ivone Canseco - 16 Jun 2017 3:00 PM     TASK CREATED  Care Coordination  RECEIVED MESSAGE REGARDING WARFARIN HOLD FOR COLONOSCOPY AND LOVENOX BRIDGING AS PER DR Alison Pierre  COLONOSCOPY DATE VERIFIED WITH GI    7/11/17  AS PER USUAL PROTOCOL   WILL BEGIN HOLDING WARFARIN 5 DAYS PRIOR TO COLONOSCOPY 7/6/17  BEGIN LOVENOX INJECTIONS TWICE DAILY,3 DAYS PRIOR TO COLONOSCOPY ON 7/8/17  LAST DOSE OF LOVENOX IN AM 7/10, DAY PRIOR TO PROCEDURE  ON DAY OF PROCEDURE, RESTART WARFARIN IN PM AT PREVIOUS DOSE  ON THE MORNING FOLLOWING COLONOSCOPY, 7/12, RESTART LOVENOX TWICE DAILY UNTIL INR IS GREATER THAN 2 0  PT/ INR DUE 7/14  PATIENT LAST RECORDED WEIGHT  Georgette Ormond Georgette Ormond 182 LB    82 KG  LAST CREATININE  ON 5/30/17 NOTED IN CHART WAS 1 06    DO YOU WANT LOVENOX  80 MG SQ BID, 12 SYRINGES SENT TO [facility]? Brayan Harper - 19 Jun 2017 4:19 PM     TASK REPLIED TO: Previously Assigned To Brayan Harper  Yes  That all sounds good and yes please send Lovenox 80mg to pharmacy  Thank you   Ivone Canseco - 20 Jun 2017 4:12 PM     TASK EDITED  called to patient's daughter, Iveth Cordova  reviewed instructions with daughter  prescription for enoxiprin sent to pharmacy  will mail written instructions to patient home  Ivone Canseco - 20 Jun 2017 4:12 PM     TASK COMPLETED   called to [facility], spoke to  joyce  faxed lovenox bridging instructions to joyce at [phone]      Active Problems    1  Acute colitis (558 9) (K52 9)   2  Acute gastritis (535 00) (K29 00)   3  Anxiety (300 00) (F41 9)   4  Atherosclerosis of arteries of extremities (440 20) (I70 209)   5  Benign essential hypertension (401 1) (I10)   6  Bilateral lower extremity edema (782 3) (R60 0)   7   Bipolar disorder (296 80) (F31 9)   8  Blister of left foot, initial encounter (917 2) (S90 822A)   9  Carotid arterial disease (447 9) (I77 9)   10  Cellulitis of foot (682 7) (L03 119)   11  Chronic congestive heart failure (428 0) (I50 9)   12  Coronary artery disease (414 00) (I25 10)   13  Decreased pedal pulses (785 9) (R09 89)   14  Diabetic gastroparesis associated with type 2 diabetes mellitus (250 60,536 3)    (K57 63,G62 71)   15  Diabetic ulcer of left foot associated with diabetes mellitus due to underlying condition    (249 80,707 15) (E08 621,L97 529)   16  Diabetic ulcer of right foot associated with diabetes mellitus due to underlying condition    (249 80,707 15) (E08 621,L97 519)   17  Diarrhea (787 91) (R19 7)   18  Dyspepsia (536 8) (R10 13)   19  Encounter for special screening examination for neoplasm of prostate (V76 44) (Z12 5)   20  Environmental allergies (V15 09) (Z91 09)   21  Erosive esophagitis (530 19) (K22 10)   22  Esophageal reflux (530 81) (K21 9)   23  Hiatal hernia (553 3) (K44 9)   24  History of mitral valve replacement with mechanical valve (V43 3) (Z95 2)   25  Hyperlipidemia (272 4) (E78 5)   26  Insulin dependent type 2 diabetes mellitus, uncontrolled (250 02,V58 67) (E11 65,Z79 4)   27  Mitral valve prolapse (424 0) (I34 1)   28  Nausea (787 02) (R11 0)   29  Onychomycosis (110 1) (B35 1)   30  Peripheral vascular disease (443 9) (I73 9)   31  PVC's (premature ventricular contractions) (427 69) (I49 3)   32  S/P CABG (coronary artery bypass graft) (V45 81) (Z95 1)   33  Screening for iron deficiency anemia (V78 0) (Z13 0)   34  Ulcer On The Feet Dorsal Cesar Scale 1  (0-5)   35  Ulceration (707 9) (L98 499)   36  Vesicles (709 8) (R23 8)    Current Meds   1  Accu-Chek Angelica Device; testing 3 times a day: dx: E11 65; Therapy: 04Axe6225 to (Last Rx:95Fla6372)  Requested for: 99Lie5449 Ordered   2  Accu-Chek Angelica Plus In Vitro Strip; TEST 3 TIMES A DAY  dx E11 65;    Therapy: 25Ufp6199 to (Last BI:70SJW7261)  Requested for: 59YYE1237 Ordered   3  Accu-Chek FastClix Lancets Miscellaneous; test 3-4 times a day dx: e11 65; Therapy: 50Xqn2957 to (Last Rx:24Apr2017)  Requested for: 61Bkn9685 Ordered   4  BD Pen Needle Short U/F 31G X 8 MM Miscellaneous; Therapy: 47YRZ1285 to Recorded   5  Gabapentin 300 MG Oral Capsule; Therapy: 27URX5577 to Recorded   6  Hydrocodone-Acetaminophen 5-325 MG Oral Tablet; TAKE 1 TABLET EVERY 6 HOURS   AS NEEDED; Therapy: 68AJR7271 to (Evaluate:53Jsm2909); Last Rx:06Jun2017 Ordered   7  Klor-Con M20 20 MEQ Oral Tablet Extended Release; Take 1 tablet daily; Therapy: 06LQA3075 to (96 496833)  Requested for: 22COK4937; Last   Rx:27Mar2017 Ordered   8  Lantus SoloStar 100 UNIT/ML Subcutaneous Solution Pen-injector; INJECT 21 UNIT   Bedtime; Therapy: 94ESK0292 to (Last Rx:07Jun2017)  Requested for: 07Jun2017 Ordered   9  Lisinopril 10 MG Oral Tablet; TAKE 1 TABLET DAILY  Requested for: 35QDO0704; Last   EA:47AYP5943 Ordered   10  LORazepam 1 MG Oral Tablet; Take 1 tablet twice daily as needed; Last Rx:00Qac4345    Ordered   11  Metoprolol Succinate ER 25 MG Oral Tablet Extended Release 24 Hour; Take 1 tablet    daily; Therapy: 95PXP9276 to (56 046458)  Requested for: 16RMI0521; Last    Rx:27Mar2017 Ordered   12  Montelukast Sodium 10 MG Oral Tablet; TAKE 1 TABLET DAILY; Therapy: 06Yce9494 to (Evaluate:24Qty4376)  Requested for: 67IXH2040; Last    Rx:05Jun2017 Ordered   13  Ondansetron 8 MG Oral Tablet Disintegrating; put 1 tab under tongue to dissolve every 8    hours as needed for nausea and vomiting; Therapy: (Recorded:06Mar2017) to Recorded   14  Pantoprazole Sodium 40 MG Oral Tablet Delayed Release; Take 1 tablet daily; Therapy: 18NFU5836 to (Last Shashank [place])  Requested for: 20Jun2017 Ordered   15  QUEtiapine Fumarate 400 MG Oral Tablet; take 2 tablets at bedtime;     Therapy: 09XMI6474 to (Last Shashank [place])  Requested for: 20Jun2017 Ordered   16  Torsemide 20 MG Oral Tablet; Take one tablet daily as needed; Therapy: 27MHM5590 to (Evaluate:68Ygm6549)  Requested for: 60HDQ2655; Last    Rx:07Jun2017 Ordered   17  Warfarin Sodium 5 MG Oral Tablet; take 1-2 tablets daily or as directed  Requested for:    42TVM5875; Last Rx:09Jun2017 Ordered    Allergies    1  Aspirin TABS   2  [facility]S Omeprazole TBEC   3  Morphine Derivatives    Plan  History of mitral valve replacement with mechanical valve, Mitral valve prolapse    · Enoxaparin Sodium 80 MG/0 8ML Subcutaneous Solution; take 80 mg sq every 12  hours as directed by md    Signatures   Electronically signed by :  Bettye Turner, ; Jun 20 2017  4:21PM EST                       (Administrative)

## 2018-01-11 NOTE — PROGRESS NOTES
REPORT NAME: Progress Notes Report  VISIT DATE: 3/30/2017  VISIT TIME: 9:24 AM EDT  PATIENT NAME: Calvin Sommers   MEDICAL RECORD NUMBER: [id number]  YOB: 1960  AGE: 61  REFERRING PHYSICIAN: Sheree Roberts  SUPERVISING CLINICIAN: Susie Pal Wall CARE PROVIDER: Kelsie WILKS [facility]  PATIENT HOME ADDRESS: [address] [place] [address], [address], [address]  61   PATIENT HOME PHONE: [phone]  SOCIAL SECURITY NUMBER:   DIAGNOSIS 1: Mitral Valve Replacement / 424 0  DIAGNOSIS 2:   INR RANGE: 2 5 - 3 5  INR GOAL: 3  TREATMENT START DATE:   TREATMENT END DATE:   NEXT VISIT: 4/6/2017  [facility]  VISIT RESULTS  ENCOUNTER NUMBER:   TEST LOCATION: [facility]  TEST TYPE: Outside Lab (Venipuncture)  VISIT TYPE: Phone Consult  CURRENT INR: 2 PROTIME: 21  SPECIMEN COL AND RPT DATE: 3/30/2017 9:24 AM  EDT  VITAL SIGNS  PULSE:  BP: / WEIGHT:  HEIGHT:  TEMP:   CURRENT ANTICOAGULANT DOSING SCHEDULE  DOSE SIZE: 5mg    ANTICOAGULANT TYPE: WARFARIN  DOSING REGIMEN  Sun       Mon       Tues      Wed       Thurs     Fri       Sat  Total/Wk  5         5         5         5         5         7 5       5         37 5  PATIENT MEDICATION INSTRUCTION: Yes  PATIENT NUTRITIONAL COUNSELING: No  PATIENT BRUISING INSTRUCTION: No  LAST EDUCATION DATE:   PREVIOUS VISIT INFORMATION  VISITDATE  INRGoal INR   Sun    Mon    Tues   Wed    Thurs  Fri    Sat  Total/wk  3/30/2017   3       2     5      5      5      5      5      7 5    5  37 5  ADDITIONAL PREVIOUS VISIT INFORMATION  VISITDATE   PRIMARY RX               DOSE      CrCl  3/30/2017   WARFARIN                 5mg  OTHER CURRENT MEDICATIONS:  WARFARIN  PROGRESS NOTES:   PATIENT INSTRUCTIONS: 3/31/17, tc to pt's daughterNathan  lm am  ,increase warfarin 7 5 mg fri, 5 mg others, rech 1 week, 4/6  rani   ---- Additional Instructions entered on 4/10/2017 4:24 PM EDT by Kelsie Turner :  4/10/17, TC GEORGIE POLANCO AT PCP OFFICE   INR DONE 4/7, NOT RUN AT  [facility] BEACUSE [facility] RECEIEVD SAMPLE AS DRAW 3/28  PT WAS INPT [facility] FOR RECTAL BLEED WITH COLITIS  TAKING 5 MG WARFARIN DAILY  WILL DO INR TODAY, SAMPLE TOP [facility]  [facility]  [facility] , ,   INBOUND LAB DATA:  Lab       Lab Value Col Date                 Rpt Date                 Lab  Reference Range  Electronically signed by:  Khai Turner on 4/10/2017 4:24 PM EDT

## 2018-01-11 NOTE — RESULT NOTES
Verified Results  (1) CBC/PLT/DIFF [id number] 12:00AM [facility]     Test Name Result Flag Reference   WBC 13 8 x10E3/uL H 3 4-10 8   RBC 4 41 x10E6/uL  4 14-5 80   Hemoglobin 12 6 g/dL  12 6-17 7   Hematocrit 40 4 %  37 5-51 0   MCV 92 fL  79-97   MCH 28 6 pg  26 6-33 0   MCHC 31 2 g/dL L 31 5-35 7   RDW 14 9 %  12 3-15 4   Platelets 758 N66C1/IN  150-379   Neutrophils 84 %     Lymphs 9 %     Monocytes 4 %     Eos 3 %     Basos 0 %     Neutrophils (Absolute) 11 5 x10E3/uL H 1 4-7 0   Lymphs (Absolute) 1 2 x10E3/uL  0 7-3 1   Monocytes(Absolute) 0 6 x10E3/uL  0 1-0 9   Eos (Absolute) 0 5 x10E3/uL H 0 0-0 4   Baso (Absolute) 0 0 x10E3/uL  0 0-0 2   Immature Granulocytes 0 %     Immature Grans (Abs) 0 0 x10E3/uL  0 0-0 1     (1) COMPREHENSIVE METABOLIC PANEL [id number] 94:12BU [facility]     Test Name Result Flag Reference   Glucose, Serum 296 mg/dL H 65-99   BUN 23 mg/dL  6-24   Creatinine, Serum 1 06 mg/dL  0 76-1 27   BUN/Creatinine Ratio 22 H 9-20   Sodium, Serum 138 mmol/L  134-144   Potassium, Serum 4 5 mmol/L  3 5-5 2   Chloride, Serum 95 mmol/L L    Carbon Dioxide, Total 22 mmol/L  18-29   Calcium, Serum 9 7 mg/dL  8 7-10 2   Protein, Total, Serum 7 5 g/dL  6 0-8 5   Albumin, Serum 4 3 g/dL  3 5-5 5   Globulin, Total 3 2 g/dL  1 5-4 5   A/G Ratio 1 3  1 2-2 2   Bilirubin, Total 0 3 mg/dL  0 0-1 2   Alkaline Phosphatase, S 134 IU/L H    AST (SGOT) 15 IU/L  0-40   ALT (SGPT) 8 IU/L  0-44   eGFR If NonAfricn Am 78 mL/min/1 73  >59   eGFR If Africn Am 90 mL/min/1 73  >59     (1) LIPID PANEL, FASTING [id number] 12:00AM Andreas [facility]ustof[facility]     Test Name Result Flag Reference   Cholesterol, Total 135 mg/dL  100-199   Triglycerides 156 mg/dL H 0-149   HDL Cholesterol 37 mg/dL L >39   VLDL Cholesterol João 31 mg/dL  5-40   LDL Cholesterol Calc 67 mg/dL  0-99   T  Chol/HDL Ratio 3 6 ratio units  0 0-5 0   T   Chol/HDL Ratio                                                             Men  Women 1/2 Avg  Risk  3 4    3 3                                                   Avg Risk  5 0    4 4                                                2X Avg  Risk  9 6    7 1                                                3X Avg  Risk 23 4   11 0     (1) PSA (SCREEN) (Dx V76 44 Screen for Prostate Cancer) [id number] 12:00AM [facility]     Test Name Result Flag Reference   Prostate Specific Ag, Serum 2 2 ng/mL  0 0-4 0   Roche ECLIA methodology  According to the American Urological Association, Serum PSA should  decrease and remain at undetectable levels after radical  prostatectomy  The AUA defines biochemical recurrence as an initial  PSA value 0 2 ng/mL or greater followed by a subsequent confirmatory  PSA value 0 2 ng/mL or greater  Values obtained with different assay methods or kits cannot be used  interchangeably  Results cannot be interpreted as absolute evidence  of the presence or absence of malignant disease       (1) TSH [id number] 12:00AM [facility]     Test Name Result Flag Reference   TSH 0 764 uIU/mL  0 450-4 500     (1) HEMOGLOBIN A1C [id number] 12:00AM [facility]     Test Name Result Flag Reference   Hemoglobin A1c 8 5 % H 4 8-5 6   Pre-diabetes: 5 7 - 6 4           Diabetes: >6 4           Glycemic control for adults with diabetes: <7 0

## 2018-01-12 VITALS
TEMPERATURE: 96.7 F | HEART RATE: 50 BPM | DIASTOLIC BLOOD PRESSURE: 80 MMHG | OXYGEN SATURATION: 99 % | HEIGHT: 72 IN | BODY MASS INDEX: 25.02 KG/M2 | WEIGHT: 184.75 LBS | SYSTOLIC BLOOD PRESSURE: 122 MMHG

## 2018-01-12 VITALS
WEIGHT: 171 LBS | SYSTOLIC BLOOD PRESSURE: 98 MMHG | BODY MASS INDEX: 22.66 KG/M2 | HEART RATE: 72 BPM | RESPIRATION RATE: 16 BRPM | DIASTOLIC BLOOD PRESSURE: 60 MMHG | HEIGHT: 73 IN

## 2018-01-12 NOTE — MISCELLANEOUS
Message  Called by Mushtaq Tolbert from [facility]---pt did not go for INR friday, they couldn't get in touch w/ him until today  INR drawn, 1 4  Pt noncompliant w/ warfarin, seems like last dose was Wednesday, has 7 5 mg and 5 mg tablets  Asked Mushtaq Tolbert to give 7 5 mg Sun, Mon and 5 mg tuesday and check INR wednesday  She also states he c/o LH w/ standing, has orthostatic BP gradient w/ SBP from 112 to 96 mmHg from sitting to standing  Will lower lisinopril to 10 mg daily and have [facility] recheck on Wednesday before INR draw  She will call Wed w/ update        Plan  Benign essential hypertension    · From  Lisinopril 20 MG Oral Tablet TAKE 1 TABLET DAILY To Lisinopril 10 MG  Oral Tablet TAKE 1 TABLET DAILY    Signatures   Electronically signed by Varun Yusuf DO; Jun 4 2017  3:33PM EST                       (Author)

## 2018-01-12 NOTE — RESULT NOTES
Verified Results  (1) PT WITH INR [id number] 12:00AM Allen Lopezsatinder     Test Name Result Flag Reference   INR 2 1 H 0 8-1 2   Reference interval is for non-anticoagulated patients                   Suggested INR therapeutic range for Vitamin K                 antagonist therapy:                    Standard Dose (moderate intensity                                   therapeutic range):       2 0 - 3 0                    Higher intensity therapeutic range       2 5 - 3 5   Prothrombin Time 21 9 sec H 9 1-12 0

## 2018-01-13 VITALS
TEMPERATURE: 97.5 F | BODY MASS INDEX: 24.22 KG/M2 | OXYGEN SATURATION: 99 % | DIASTOLIC BLOOD PRESSURE: 90 MMHG | HEIGHT: 73 IN | SYSTOLIC BLOOD PRESSURE: 126 MMHG | WEIGHT: 182.75 LBS | HEART RATE: 83 BPM

## 2018-01-13 VITALS
HEIGHT: 73 IN | DIASTOLIC BLOOD PRESSURE: 62 MMHG | BODY MASS INDEX: 24.25 KG/M2 | HEART RATE: 69 BPM | SYSTOLIC BLOOD PRESSURE: 114 MMHG | WEIGHT: 183 LBS

## 2018-01-13 VITALS
DIASTOLIC BLOOD PRESSURE: 100 MMHG | HEIGHT: 72 IN | BODY MASS INDEX: 26.68 KG/M2 | WEIGHT: 197 LBS | RESPIRATION RATE: 16 BRPM | HEART RATE: 87 BPM | SYSTOLIC BLOOD PRESSURE: 140 MMHG

## 2018-01-13 VITALS — DIASTOLIC BLOOD PRESSURE: 70 MMHG | SYSTOLIC BLOOD PRESSURE: 130 MMHG | HEIGHT: 73 IN | HEART RATE: 64 BPM

## 2018-01-13 VITALS
DIASTOLIC BLOOD PRESSURE: 82 MMHG | HEIGHT: 73 IN | WEIGHT: 182.13 LBS | RESPIRATION RATE: 20 BRPM | HEART RATE: 84 BPM | SYSTOLIC BLOOD PRESSURE: 120 MMHG | BODY MASS INDEX: 24.14 KG/M2 | TEMPERATURE: 97.3 F

## 2018-01-13 VITALS
HEART RATE: 68 BPM | SYSTOLIC BLOOD PRESSURE: 132 MMHG | DIASTOLIC BLOOD PRESSURE: 68 MMHG | TEMPERATURE: 98.3 F | HEIGHT: 73 IN | RESPIRATION RATE: 16 BRPM

## 2018-01-13 NOTE — MISCELLANEOUS
Assessment    1  Acute colitis (558 9) (K52 9)   2  Diabetes type 2, uncontrolled (250 02) (E11 65)   3  Current every day smoker (305 1) (F17 200)   4  Benign essential hypertension (401 1) (I10)   5  Environmental allergies (V15 09) (Z91 09)   6  Bipolar disorder (296 80) (F31 9)    Plan  Acute colitis, Diarrhea    · Diphenoxylate-Atropine 2 5-0 025 MG Oral Tablet (Lomotil); TAKE 1 TABLET 4  TIMES DAILY AS NEEDED FOR DIARRHEA   Rx By: Sunday Meyer; Dispense: 8 Days ; #:30 Tablet; Refill: 3; For: Acute colitis, Diarrhea; CAROLYNE = N; Print Rx  Environmental allergies    · Montelukast Sodium 10 MG Oral Tablet; TAKE 1 TABLET DAILY   Rx By: Sunday Meyer; Dispense: 0 Days ; #:30 Tablet; Refill: 1; For: Environmental allergies; CAROLYNE = N; Verified Transmission to [facility]/PHARMACY# [id number]; Last Updated By: System, SureScripts; 4/10/2017 4:20:11 PM  SocHx: Current every day smoker    · You need to quit smoking ; Status:Complete;   Done: 67YTE4264   Ordered; For:SocHx: Current every day smoker; Ordered By:Iona Read;    Discussion/Summary  Discussion Summary:   1)  - and appt with 4/25 with psychologist and psychiatrist  2) schedule with GI for further evaluation for colitis  3) [facility] transport for transportation- forms filled out   4) weekly pt/inr- [facility]ology - [facility] clare antony- continue 5mg daily until blood work drawn today or tomorrow  5) check blood sugar 3 times a day- before bedtime- use insulin in evening   6) call friday with blood sugars-   7) lomotil 1 tab 4 times a day as needed for diarrhe- gi evaluation   8) singulair 1 tab daily for sinus pressure  Medication SE Review and Pt Understands Tx: Possible side effects of new medications were reviewed with the patient/guardian today  The treatment plan was reviewed with the patient/guardian   The patient/guardian understands and agrees with the treatment plan      Chief Complaint  Chief Complaint Free Text Note Form: PT HERE FOR A FOLLOW UP TO [facility] [place] [address]  PT WAS ADMITTED ON 4/2/17 TO [facility] FOR VOMITING BLOOD AND DIARRHEA  PT WAS DIAGNOSED WITH COLITIS AND HE WAS DISCHARGED ON 4/5/17  PT HAS SOME CONCERNS ABOUT HIS PT INR AND WHO IS CONTROLLING IT  History of Present Illness  TCM Communication [place]: The patient is being contacted for follow-up after hospitalization  Hospital records were reviewed  He was hospitalized at [address] [facility]  The date of admission: 04/02/2017, date of discharge: 04/05/2017  Diagnosis: Colitis  He was discharged to home  Medications reviewed and updated today  He scheduled a follow up appointment  Symptoms: weakness, back pain left side, back pain right side and loose stools  Communication performed and completed by Maryanne Saavedra   HPI: pt here in follow up to recent hospitalization  He still has abdominal pain, mid abdomen although better  He has ongoing loose stools without blood  He had vomiting and blood - possible jj mantilla tears  pt states he had an endoscopy and a colonoscopy in [place] prior to moving to [place]  still with diarrhea, diagnosed with colitis   complains of left sided sinus pressure    had pt/inr done but does not have results  will need weekly inrs per cardiology - currently taking 5mg daily   ultrasound done today- upper and lower extremities and carotids  last monday- had an appt with psychiatrist-scheduled but missed because he was in the hospital   appt with therapist - reschedule with therapist and psychiatrist for 4/25-   states blood sugars still running in 200s  has been using 10u lantus at bedtime  states had 2 episodes at night - woke up sweaty- usual indication that blood sugar is low  Review of Systems  Complete-Male:   Constitutional: feeling tired, but as noted in HPI, no fever, not feeling poorly and no chills  Eyes: No complaints of eye pain, no red eyes, no discharge from eyes, no itchy eyes     ENT: no complaints of earache, no hearing loss, no nosebleeds, no nasal discharge, no sore throat, no hoarseness  Cardiovascular: No complaints of slow heart rate, no fast heart rate, no chest pain, no palpitations, no leg claudication, no lower extremity  Respiratory: No complaints of shortness of breath, no wheezing, no cough, no SOB on exertion, no orthopnea or PND  Gastrointestinal: abdominal pain, nausea, vomiting and diarrhea, but as noted in HPI, no constipation and no blood in stools  Genitourinary: No complaints of dysuria, no incontinence, no hesitancy, no nocturia, no genital lesion, no testicular pain  Musculoskeletal: No complaints of arthralgia, no myalgias, no joint swelling or stiffness, no limb pain or swelling  Integumentary: No complaints of skin rash or skin lesions, no itching, no skin wound, no dry skin  Neurological: No compliants of headache, no confusion, no convulsions, no numbness or tingling, no dizziness or fainting, no limb weakness, no difficulty walking  Psychiatric: anxiety and sleep disturbances, but not suicidal and no depression  Endocrine: No complaints of proptosis, no hot flashes, no muscle weakness, no erectile dysfunction, no deepening of the voice, no feelings of weakness  Hematologic/Lymphatic: No complaints of swollen glands, no swollen glands in the neck, does not bleed easily, no easy bruising  Active Problems    1  Anxiety (300 00) (F41 9)   2  Benign essential hypertension (401 1) (I10)   3  Bipolar disorder (296 80) (F31 9)   4  Carotid arterial disease (447 9) (I77 9)   5  Chronic congestive heart failure (428 0) (I50 9)   6  Coronary artery disease (414 00) (I25 10)   7  Decreased pedal pulses (785 9) (R09 89)   8  Diabetes type 2, uncontrolled (250 02) (E11 65)   9  Diabetic gastroparesis associated with type 2 diabetes mellitus (250 60,536 3)   (L86 88,G52 80)   10  Diarrhea (787 91) (R19 7)   11  Dyspepsia (536 8) (R10 13)   12  Esophageal reflux (530 81) (K21 9)   13   Hiatal hernia (553  3) (K44 9)   14  History of mitral valve replacement with mechanical valve (V43 3) (Z95 2)   15  Hyperlipidemia (272 4) (E78 5)   16  Mitral valve prolapse (424 0) (I34 1)   17  Nausea (787 02) (R11 0)   18  Onychomycosis (110 1) (B35 1)   19  Peripheral vascular disease (443 9) (I73 9)   20  PVC's (premature ventricular contractions) (427 69) (I49 3)   21  S/P CABG (coronary artery bypass graft) (V45 81) (Z95 1)    Past Medical History    1  History of Rotator cuff tear, right (840 4) (M75 101)    Surgical History    1  History of Implantable Cardioverter-Defibrillator   2  History of Mitral Valve Replacement   3  History of Rotator Cuff Repair   4  History of Tonsillectomy  Surgical History Reviewed: The surgical history was reviewed and updated today  Family History  Mother    1  Family history of diabetes mellitus (V18 0) (Z83 3)   2  Family history of hypertension (V17 49) (Z82 49)   3  Family history of migraine headaches (V17 2) (Z82 0)  Father    4  Family history of Bipolar disorder   5  Family history of hypertension (V17 49) (Z82 49)   6  Family history of malignant melanoma (V16 8) (Z80 8)  Daughter    9  Family history of Bipolar disorder   8  Family history of migraine headaches (V17 2) (Z82 0)  Maternal Grandfather    9  Family history of cardiac disorder (V17 49) (Z82 49)  Family History    10  Family history of arthritis (V17 7) (Z82 61)   11  Family history of cardiac disorder (V17 49) (Z82 49)   12  Family history of depression (V17 0) (Z81 8)  Family History Reviewed: The family history was reviewed and updated today  Social History    · Current every day smoker (305 1) (F17 200)   · Daily caffeine consumption, 1 serving a day   · Exercises daily (V49 89) (Z78 9)   · No alcohol use  Social History Reviewed: The social history was reviewed and updated today  The social history was reviewed and is unchanged  Current Meds   1   Carafate 1 GM/10ML Oral Suspension; TAKE 10 ML 4 TIMES DAILY; Therapy: 01PPW9288 to (Evaluate:48Lhs3478)  Requested for: 20Mar2017; Last   Rx:20Mar2017 Ordered   2  Ciclodan 8 % External Solution; APPLY COATING TO NAIL AS DIRECTED DAILY; Therapy: 78HHP9939 to (Last Rx:06Mar2017)  Requested for: 49FTG6360 Ordered   3  Klor-Con M20 20 MEQ Oral Tablet Extended Release; Take 1 tablet daily; Therapy: 79XYV0228 to [phone]  Requested for: 88YAL0524; Last   Rx:27Mar2017 Ordered   4  Lantus SoloStar 100 UNIT/ML Subcutaneous Solution Pen-injector; INJECT 10 UNIT   Bedtime; Therapy: 44XXK7799 to (Last Rx:06Mar2017)  Requested for: 08HFN8918 Ordered   5  Lisinopril 20 MG Oral Tablet; TAKE 1 TABLET DAILY; Therapy: (Recorded:48Tms0260) to Recorded   6  LORazepam 1 MG Oral Tablet; Take 1 tablet twice daily as needed; Therapy: (Recorded:27Feb2017) to Recorded   7  Metoprolol Succinate ER 25 MG Oral Tablet Extended Release 24 Hour; Take 1 tablet   daily; Therapy: 30IQZ5739 to [phone]  Requested for: 31CDJ7265; Last   Rx:27Mar2017 Ordered   8  NovoLOG Mix 70/30 (70-30) 100 UNIT/ML Subcutaneous Suspension; USE AS   DIRECTED; Therapy: (Recorded:17Gua6423) to Recorded   9  Ondansetron 8 MG Oral Tablet Dispersible; put 1 tab under tongue to dissolve every 8   hours as needed for nausea and vomiting; Therapy: (Recorded:06Mar2017) to Recorded   10  QUEtiapine Fumarate 200 MG Oral Tablet; take 2 tablets in am  Requested for:    59ZZH9094; Last Rx:07Mar2017 Ordered   11  QUEtiapine Fumarate 300 MG Oral Tablet; take 2 tablets at bedtime  Requested for:    99UFD0987; Last Rx:07Mar2017 Ordered   12  Torsemide 20 MG Oral Tablet; take 2 tablet daily; Therapy: 24OKC2192 to [phone])  Requested for: 95MDV3917; Last    Rx:27Mar2017 Ordered   13  Warfarin Sodium 5 MG Oral Tablet; TAKE 1 TABLET DAILY; Therapy: (Recorded:89Qnf7515) to Recorded  Medication List Reviewed: The medication list was reviewed and updated today  Allergies    1  Aspirin TABS   2  [employer] Omeprazole TBEC   3  Morphine Derivatives    Vitals  Signs   Recorded: 80Ttt2745 03:10PM   Temperature: 97 9 F  Heart Rate: 79  Systolic: 524  Diastolic: 72  Height: 6 ft   Weight: 191 lb   BMI Calculated: 25 9  BSA Calculated: 2 09  O2 Saturation: 97    Physical Exam    Constitutional   General appearance: No acute distress, well appearing and well nourished  Pulmonary   Respiratory effort: No increased work of breathing or signs of respiratory distress  Auscultation of lungs: Clear to auscultation, equal breath sounds bilaterally, no wheezes, no rales, no rhonci  Cardiovascular   Auscultation of heart: Normal rate and rhythm, normal S1 and S2, without murmurs  Abdomen   Abdomen: Abnormal   mild tenderness mid abdomen  Liver and spleen: No hepatomegaly or splenomegaly  Psychiatric   Orientation to person, place and time: Normal     Mood and affect: Normal          Future Appointments    Date/Time Provider Specialty Site   04/13/2017 11:30 AM Cardiology, [address] [place] [address]   04/26/2017 09:00 AM [facility]ma[facility] Moritz   04/18/2017 11:35 AM Mariel, Nurse Schedule  [facility]   04/25/2017 11:00 AM Mariel, Nurse Schedule  [facility]   05/02/2017 11:00 AM Mariel, Nurse Schedule  [facility]   05/09/2017 11:00 AM Mariel, Nurse Schedule  [facility]     Signatures   Electronically signed by : Michael Hewitt DO;  Apr 10 2017 10:01PM EST                       (Author)

## 2018-01-13 NOTE — PROGRESS NOTES
History of Present Illness  Care Coordination Encounter Information:   Type of Encounter: Telephonic   Contact: Initial Contact    Spoke to Adult Child   Davon Bright  Care Coordination  Nurse [address]:   The reason for call is to discuss outreach for follow up/needed services  I spoke to Mark's daughter Davon Bright who is active with his carer  Lucia Bucio was in bed resting and she agreed to talk to me in reference to services  She stated that he was still "not too great" and said he continues to onofre with his mental illness  He did have recent changes to his medication regime at the psychiatrist's appointment with [facility] (Dr Emmer Curling) and hopefully this will be helping soon  He received his walker but unsure how this will help in his home environment  I did offer hope in that once his frequent stooling slows down that he will have the ability to move around  I offered better meals options in order to decrease stooling  She verbalized those involved ini his home care  I will reach out again next week to assure that a GI appointment has been made since she is waiting for a cancelation appointment d/t only NP appointments available and she wants an MD appt  JG      Active Problems    1  Acute colitis (558 9) (K52 9)   2  Acute gastritis (535 00) (K29 00)   3  Anxiety (300 00) (F41 9)   4  Atherosclerosis of arteries of extremities (440 20) (I70 209)   5  Benign essential hypertension (401 1) (I10)   6  Bilateral lower extremity edema (782 3) (R60 0)   7  Bipolar disorder (296 80) (F31 9)   8  Blister of left foot, initial encounter (917 2) (S90 822A)   9  Carotid arterial disease (447 9) (I77 9)   10  Cellulitis of foot (682 7) (L03 119)   11  Chronic congestive heart failure (428 0) (I50 9)   12  Coronary artery disease (414 00) (I25 10)   13  Decreased pedal pulses (785 9) (R09 89)   14  Diabetic gastroparesis associated with type 2 diabetes mellitus (250 60,536 3)    (O08 11,W44 03)   15   Diabetic ulcer of left foot associated with diabetes mellitus due to underlying condition    (249 80,707 15) (E08 621,L97 529)   16  Diabetic ulcer of right foot associated with diabetes mellitus due to underlying condition    (249 80,707 15) (E08 621,L97 519)   17  Diarrhea (787 91) (R19 7)   18  Dyspepsia (536 8) (R10 13)   19  Encounter for special screening examination for neoplasm of prostate (V76 44) (Z12 5)   20  Environmental allergies (V15 09) (Z91 09)   21  Erosive esophagitis (530 19) (K22 10)   22  Esophageal reflux (530 81) (K21 9)   23  Hiatal hernia (553 3) (K44 9)   24  History of mitral valve replacement with mechanical valve (V43 3) (Z95 2)   25  Hyperlipidemia (272 4) (E78 5)   26  Insulin dependent type 2 diabetes mellitus, uncontrolled (250 02,V58 67) (E11 65,Z79 4)   27  Mitral valve prolapse (424 0) (I34 1)   28  Nausea (787 02) (R11 0)   29  Onychomycosis (110 1) (B35 1)   30  Peripheral vascular disease (443 9) (I73 9)   31  PVC's (premature ventricular contractions) (427 69) (I49 3)   32  S/P CABG (coronary artery bypass graft) (V45 81) (Z95 1)   33  Screening for iron deficiency anemia (V78 0) (Z13 0)   34  Ulcer On The Feet Dorsal Cesar Scale 1  (0-5)   35  Ulceration (707 9) (L98 499)   36  Vesicles (709 8) (R23 8)    Past Medical History    1  History of Rotator cuff tear, right (840 4) (M75 101)    Surgical History    1  History of Implantable Cardioverter-Defibrillator   2  History of Mitral Valve Replacement   3  History of Rotator Cuff Repair   4  History of Tonsillectomy    Family History  Mother    1  Family history of diabetes mellitus (V18 0) (Z83 3)   2  Family history of hypertension (V17 49) (Z82 49)   3  Family history of migraine headaches (V17 2) (Z82 0)  Father    4  Family history of Bipolar disorder   5  Family history of hypertension (V17 49) (Z82 49)   6  Family history of malignant melanoma (V16 8) (Z80 8)  Daughter    9  Family history of Bipolar disorder   8   Family history of migraine headaches (V17 2) (Z82 0)  Maternal Grandfather    9  Family history of cardiac disorder (V17 49) (Z82 49)  Family History    10  Family history of arthritis (V17 7) (Z82 61)   11  Family history of cardiac disorder (V17 49) (Z82 49)   12  Family history of depression (V17 0) (Z81 8)    Social History    · Current every day smoker (305 1) (F17 200)   · Daily caffeine consumption, 1 serving a day   · Exercises daily (V49 89) (Z78 9)   · No alcohol use    Current Meds    1  Diphenoxylate-Atropine 2 5-0 025 MG Oral Tablet (Lomotil); TAKE 1 TABLET 4 TIMES   DAILY AS NEEDED FOR DIARRHEA; Therapy: 10Apr2017 to (Evaluate:21Tlp6381); Last Rx:10Apr2017 Ordered    2  LORazepam 1 MG Oral Tablet; Take 1 tablet twice daily as needed; Last Rx:31May2017   Ordered    3  Lisinopril 10 MG Oral Tablet; TAKE 1 TABLET DAILY; Last Rx:04Jun2017 Ordered    4  QUEtiapine Fumarate 400 MG Oral Tablet; 1 TAB TWICE A DAY; Therapy: 07XZC9744 to (Last Rx:06Jun2017)  Requested for: 06Jun2017 Ordered    5  Klor-Con M20 20 MEQ Oral Tablet Extended Release; Take 1 tablet daily; Therapy: 82DLZ0048 to (77 873 135)  Requested for: 92GMD6091; Last   Rx:27Mar2017 Ordered   6  Metoprolol Succinate ER 25 MG Oral Tablet Extended Release 24 Hour; Take 1 tablet   daily; Therapy: 04EAH7282 to (77 873 135)  Requested for: 45DKB1034; Last   Rx:27Mar2017 Ordered   7  Torsemide 20 MG Oral Tablet; Take one tablet daily as needed; Therapy: 26GBX2888 to (Evaluate:63Iws4648)  Requested for: 22LBO0876; Last   Rx:07Jun2017 Ordered    8  Hydrocodone-Acetaminophen 5-325 MG Oral Tablet; TAKE 1 TABLET EVERY 6 HOURS   AS NEEDED; Therapy: 35SXO2878 to (Evaluate:22Itu5677); Last Rx:06Jun2017 Ordered    9  Montelukast Sodium 10 MG Oral Tablet; TAKE 1 TABLET DAILY; Therapy: 63Efx4407 to (Evaluate:42Hpm5785)  Requested for: 39ELD8125; Last   Rx:07Etl1855 Ordered    10  Accu-Chek Angelica Device; testing 3 times a day: dx: E11 65;     Therapy: 35PBT7066 to (Last Rx:24Apr2017)  Requested for: 11Oqo5741 Ordered   11  Accu-Chek Angelica Plus In Vitro Strip; TEST 3 TIMES A DAY  dx E11 65; Therapy: 24Apr2017 to (Last Rx:31May2017)  Requested for: 36EOJ8401 Ordered   12  Accu-Chek FastClix Lancets Miscellaneous; test 3-4 times a day dx: e11 65; Therapy: 24Apr2017 to (Last Rx:24Apr2017)  Requested for: 24Apr2017 Ordered   13  Lantus SoloStar 100 UNIT/ML Subcutaneous Solution Pen-injector; INJECT 21 UNIT    Bedtime; Therapy: 85CFH2022 to (Last Rx:07Jun2017)  Requested for: 07Jun2017 Ordered    14  Warfarin Sodium 5 MG Oral Tablet; TAKE 1 TABLET DAILY; Therapy: (Recorded:27Feb2017) to Recorded    15  Ondansetron 8 MG Oral Tablet Disintegrating; put 1 tab under tongue to dissolve every 8    hours as needed for nausea and vomiting; Therapy: (Recorded:06Mar2017) to Recorded    Allergies    1  Aspirin TABS   2  [employer]S Omeprazole TBEC   3  Morphine Derivatives    End of Encounter Meds    1  Diphenoxylate-Atropine 2 5-0 025 MG Oral Tablet (Lomotil); TAKE 1 TABLET 4 TIMES   DAILY AS NEEDED FOR DIARRHEA; Therapy: 10Apr2017 to (Evaluate:12May2017); Last Rx:10Apr2017 Ordered    2  LORazepam 1 MG Oral Tablet; Take 1 tablet twice daily as needed; Last Rx:31May2017   Ordered    3  Lisinopril 10 MG Oral Tablet; TAKE 1 TABLET DAILY; Last Rx:04Jun2017 Ordered    4  QUEtiapine Fumarate 400 MG Oral Tablet; 1 TAB TWICE A DAY; Therapy: 68YBK1380 to (Last Rx:06Jun2017)  Requested for: 06Jun2017 Ordered    5  Klor-Con M20 20 MEQ Oral Tablet Extended Release; Take 1 tablet daily; Therapy: 31PUC8167 to (21 )  Requested for: 04UEZ6640; Last   Rx:27Mar2017 Ordered   6  Metoprolol Succinate ER 25 MG Oral Tablet Extended Release 24 Hour; Take 1 tablet   daily; Therapy: 98ALN3910 to (21 )  Requested for: 73NXD3985; Last   Rx:27Mar2017 Ordered   7  Torsemide 20 MG Oral Tablet; Take one tablet daily as needed;    Therapy: 28GXW7283 to JoycelynTerrace Severance)  Requested for: 86KLO6396; Last   Rx:07Jun2017 Ordered    8  Hydrocodone-Acetaminophen 5-325 MG Oral Tablet; TAKE 1 TABLET EVERY 6 HOURS   AS NEEDED; Therapy: 82PIU2021 to (Evaluate:31Eqp4624); Last Rx:06Jun2017 Ordered    9  Montelukast Sodium 10 MG Oral Tablet; TAKE 1 TABLET DAILY; Therapy: 35Fui5316 to (Evaluate:80Twu9676)  Requested for: 58TVR0296; Last   Rx:05Jun2017 Ordered    10  Accu-Chek Angelica Device; testing 3 times a day: dx: E11 65; Therapy: 96Gfe5498 to (Last Rx:89Sys5100)  Requested for: 56Ovf1605 Ordered   11  Accu-Chek Angelcia Plus In Vitro Strip; TEST 3 TIMES A DAY  dx E11 65; Therapy: 24Apr2017 to (Last Rx:64Jtj8340)  Requested for: 52BPQ6468 Ordered   12  Accu-Chek FastClix Lancets Miscellaneous; test 3-4 times a day dx: e11 65; Therapy: 08Lcz4716 to (Last Rx:24Apr2017)  Requested for: 24Apr2017 Ordered   13  Lantus SoloStar 100 UNIT/ML Subcutaneous Solution Pen-injector; INJECT 21 UNIT    Bedtime; Therapy: 15XDX2640 to (Last Rx:07Jun2017)  Requested for: 07Jun2017 Ordered    14  Warfarin Sodium 5 MG Oral Tablet; TAKE 1 TABLET DAILY; Therapy: (Recorded:28Ykq4835) to Recorded    15  Ondansetron 8 MG Oral Tablet Disintegrating; put 1 tab under tongue to dissolve every 8    hours as needed for nausea and vomiting; Therapy: (Recorded:06Mar2017) to Recorded    Future Appointments    Date/Time Provider Specialty Site   06/19/2017 01:30 PM Heather Diaz DO Family Medicine [facility]   08/02/2017 01:30 PM Cardiology, [address]   06/26/2017 10:00 AM RAMONITA French  Cardiology [facility]   06/20/2017 10:00 AM RAMONITA Saldana   Family Medicine [facility]   06/13/2017 09:30 AM Dejah Goldberg Nurse Axel  [facility]   06/27/2017 10:00 AM Aroldo, Nurse Schedule  [facility]   07/11/2017 10:00 AM Nurse Aroldo Schedule  [facility]   07/18/2017 10:00 AM [facility]   07/25/2017 10:00 AM Gabe Lozoya Nurse Schedule  [facility]     Patient Care Team    Care Team Member Role Specialty Office Number   Gregory Abernathy DO Referring Family Medicine [phone]   [facility] Specialist Nurse Practitioner [phone]   Cardiology, [address]        Signatures   Electronically signed by : Rayshawn Cuevas RN; Jun 8 2017 12:14PM EST                       (Author)

## 2018-01-14 VITALS
HEART RATE: 103 BPM | TEMPERATURE: 98.7 F | HEIGHT: 72 IN | WEIGHT: 188.5 LBS | OXYGEN SATURATION: 95 % | SYSTOLIC BLOOD PRESSURE: 130 MMHG | DIASTOLIC BLOOD PRESSURE: 80 MMHG | BODY MASS INDEX: 25.53 KG/M2

## 2018-01-14 VITALS
HEART RATE: 89 BPM | BODY MASS INDEX: 26.68 KG/M2 | OXYGEN SATURATION: 98 % | SYSTOLIC BLOOD PRESSURE: 115 MMHG | DIASTOLIC BLOOD PRESSURE: 80 MMHG | TEMPERATURE: 96.5 F | HEIGHT: 72 IN | WEIGHT: 197 LBS

## 2018-01-14 VITALS
WEIGHT: 195.13 LBS | SYSTOLIC BLOOD PRESSURE: 118 MMHG | BODY MASS INDEX: 26.43 KG/M2 | HEART RATE: 88 BPM | DIASTOLIC BLOOD PRESSURE: 72 MMHG | HEIGHT: 72 IN | RESPIRATION RATE: 18 BRPM

## 2018-01-14 VITALS
HEART RATE: 66 BPM | WEIGHT: 190.25 LBS | BODY MASS INDEX: 25.77 KG/M2 | OXYGEN SATURATION: 98 % | DIASTOLIC BLOOD PRESSURE: 72 MMHG | SYSTOLIC BLOOD PRESSURE: 122 MMHG | HEIGHT: 72 IN | TEMPERATURE: 97.5 F

## 2018-01-14 VITALS
TEMPERATURE: 98.2 F | HEART RATE: 81 BPM | HEIGHT: 73 IN | BODY MASS INDEX: 24.19 KG/M2 | WEIGHT: 182.5 LBS | OXYGEN SATURATION: 96 %

## 2018-01-14 VITALS
TEMPERATURE: 96.9 F | BODY MASS INDEX: 26.31 KG/M2 | WEIGHT: 194.25 LBS | DIASTOLIC BLOOD PRESSURE: 80 MMHG | HEIGHT: 72 IN | HEART RATE: 99 BPM | OXYGEN SATURATION: 99 % | SYSTOLIC BLOOD PRESSURE: 126 MMHG

## 2018-01-14 VITALS
WEIGHT: 190.25 LBS | HEART RATE: 94 BPM | DIASTOLIC BLOOD PRESSURE: 76 MMHG | TEMPERATURE: 97.5 F | HEIGHT: 72 IN | SYSTOLIC BLOOD PRESSURE: 120 MMHG | BODY MASS INDEX: 25.77 KG/M2 | OXYGEN SATURATION: 97 %

## 2018-01-14 VITALS
DIASTOLIC BLOOD PRESSURE: 80 MMHG | HEART RATE: 80 BPM | SYSTOLIC BLOOD PRESSURE: 118 MMHG | RESPIRATION RATE: 16 BRPM | HEIGHT: 72 IN

## 2018-01-14 VITALS
RESPIRATION RATE: 18 BRPM | TEMPERATURE: 98.3 F | SYSTOLIC BLOOD PRESSURE: 114 MMHG | HEART RATE: 72 BPM | DIASTOLIC BLOOD PRESSURE: 62 MMHG

## 2018-01-15 ENCOUNTER — APPOINTMENT (OUTPATIENT)
Dept: PHYSICAL THERAPY | Facility: CLINIC | Age: 58
End: 2018-01-15
Payer: COMMERCIAL

## 2018-01-15 VITALS
HEART RATE: 54 BPM | TEMPERATURE: 97.1 F | HEIGHT: 73 IN | DIASTOLIC BLOOD PRESSURE: 72 MMHG | SYSTOLIC BLOOD PRESSURE: 118 MMHG

## 2018-01-15 NOTE — MISCELLANEOUS
Assessment    1  Amputated great toe of left foot (V49 71) (Z89 412)   2  Benign essential hypertension (401 1) (I10)   3  Bipolar disorder (296 80) (F31 9)   4  History of Surgery Left Foot Amputation MTP First Toe   5  Insulin dependent type 2 diabetes mellitus, uncontrolled (250 02,V58 67) (E11 65,Z79 4)   6  Peripheral vascular disease (443 9) (I73 9)    Plan  Amputated great toe of left foot    · Oxycodone-Acetaminophen 5-325 MG Oral Tablet; take 1tab every 4 hours as  needed   Rx By: Guvera Baxter; Dispense: 10 Days ; #:50 Tablet; Refill: 0; For: Amputated great toe of left foot; CAROLYNE = N; Print Rx   · OxyCONTIN 10 MG Oral Tablet ER 12 Hour Abuse-Deterrent; 1 tablet every 12  hours   Rx By: Simón Baxter; Dispense: 0 Days ; #:60 Tablet ER 12 Hour Abuse-Deterrent; Refill: 0; For: Amputated great toe of left foot; CAROLYNE = N; Print Rx    Discussion/Summary  Discussion Summary:   1) pain left foot after amputation on left great toe: oxycontin 10mg 1 tab twice a day  2) oxycodone 5/325mg 1 tab every 4 hours as needed for pain  3) keep appt with wound care tomorrow  4) meet with nurse coordinator- reschedule to 8/14  5) continue to follow with cardiology for pt/inr   6) call pa quit line for patches 21mcg- currently using 14mcg patches  concerned about chantix due to bipolar which is currently stable   7) dm type 2, next hba1c due end of august, continue lantus 21u daily and monitor blood sugars  8) return 2 weeks  Medication SE Review and Pt Understands Tx: Possible side effects of new medications were reviewed with the patient/guardian today  The treatment plan was reviewed with the patient/guardian  The patient/guardian understands and agrees with the treatment plan      Chief Complaint  Chief Complaint Free Text Note Form: PT IS HERE FOR HIS JAKE  PT HAS BEEN SEEN BY [facility] DAILY   PT WILL BE SEEN AT WOUND CARE TOMORROW      History of Present Illness  TCM Communication [facility]: The patient is being contacted for follow-up after hospitalization  Hospital records were reviewed  He was hospitalized at [facility]  The date of admission: 07/13/2017, date of discharge: 07/26/2017  Diagnosis: amputation of right great toe/STENT IN LEG  He was discharged to home  Symptoms: incisional pain  FOOT PAIN   Communication performed and completed by Maryanne Saavedra   HPI: pt here with daughter today  recently hospitalized for nonhealing wound on left great toe  needed amputation with peripheral vascular disease  no chest pains or shortness of breath  has a lot of pain,- phantom pain in left great toe   trouble sleeping at night  needs to cancel appt with nurse coordinator on monday  will reschedule-   he states his bms are better  he is using the nicotine patch- 14mcg and still has urge to smoke, wondering if there is anything stonger  concerned about chantix with pt being bipolar  cardiology is monitoring coumadin levels  pt has appt at wound care tomorrow  Review of Systems  Complete-Male:   Constitutional: recent 2 lb weight gain and feeling tired, but as noted in HPI, no fever and no chills  Eyes: No complaints of eye pain, no red eyes, no discharge from eyes, no itchy eyes  ENT: no complaints of earache, no hearing loss, no nosebleeds, no nasal discharge, no sore throat, no hoarseness  Cardiovascular: No complaints of slow heart rate, no fast heart rate, no chest pain, no palpitations, no leg claudication, no lower extremity  Respiratory: No complaints of shortness of breath, no wheezing, no cough, no SOB on exertion, no orthopnea or PND  Gastrointestinal: No complaints of abdominal pain, no constipation, no nausea or vomiting, no diarrhea or bloody stools  Genitourinary: No complaints of dysuria, no incontinence, no hesitancy, no nocturia, no genital lesion, no testicular pain  Musculoskeletal: pain left foot, but as noted in HPI  Integumentary: skin wound, but as noted in HPI     Neurological: No compliants of headache, no confusion, no convulsions, no numbness or tingling, no dizziness or fainting, no limb weakness, no difficulty walking  Psychiatric: bipolar, but as noted in HPI  Endocrine: No complaints of proptosis, no hot flashes, no muscle weakness, no erectile dysfunction, no deepening of the voice, no feelings of weakness  Hematologic/Lymphatic: No complaints of swollen glands, no swollen glands in the neck, does not bleed easily, no easy bruising  Active Problems    1  Acute colitis (558 9) (K52 9)   2  Acute gastritis (535 00) (K29 00)   3  Anxiety (300 00) (F41 9)   4  Atherosclerosis of arteries of extremities (440 20) (I70 209)   5  Benign essential hypertension (401 1) (I10)   6  Bilateral lower extremity edema (782 3) (R60 0)   7  Bipolar disorder (296 80) (F31 9)   8  Carotid arterial disease (447 9) (I77 9)   9  Chronic congestive heart failure (428 0) (I50 9)   10  Chronic systolic congestive heart failure (428 22,428 0) (I50 22)   11  Coronary artery disease (414 00) (I25 10)   12  Decreased pedal pulses (785 9) (R09 89)   13  Diabetic gastroparesis associated with type 2 diabetes mellitus (250 60,536 3)    (O12 89,S51 70)   14  Diabetic ulcer of right foot associated with diabetes mellitus due to underlying condition    (249 80,707 15) (E08 621,L97 519)   15  Encounter for screening colonoscopy (V76 51) (Z12 11)   16  Environmental allergies (V15 09) (Z91 09)   17  Erosive esophagitis (530 19) (K22 10)   18  Esophageal reflux (530 81) (K21 9)   19  Hiatal hernia (553 3) (K44 9)   20  History of mitral valve replacement with mechanical valve (V43 3) (Z95 2)   21  Hyperlipidemia (272 4) (E78 5)   22  Insulin dependent type 2 diabetes mellitus, uncontrolled (250 02,V58 67) (E11 65,Z79 4)   23  Ischemic cardiomyopathy (414 8) (I25 5)   24  Mitral valve prolapse (424 0) (I34 1)   25  Onychomycosis (110 1) (B35 1)   26  Peripheral vascular disease (443 9) (I73 9)   27   PVC's (premature ventricular contractions) (427 69) (I49 3)   28  S/P CABG (coronary artery bypass graft) (V45 81) (Z95 1)   29  Ulcer On The Feet Dorsal Cesar Scale 1  (0-5)    Past Medical History    1  History of Dyspepsia (536 8) (R10 13)   2  History of diarrhea (V12 79) (Z87 898)   3  History of Rotator cuff tear, right (840 4) (M75 101)    Surgical History    1  History of Implantable Cardioverter-Defibrillator   2  History of Mitral Valve Replacement   3  History of Rotator Cuff Repair   4  History of Surgery Left Foot Amputation MTP First Toe   5  History of Tonsillectomy  Surgical History Reviewed: The surgical history was reviewed and updated today  Family History  Mother    1  Family history of diabetes mellitus (V18 0) (Z83 3)   2  Family history of hypertension (V17 49) (Z82 49)   3  Family history of migraine headaches (V17 2) (Z82 0)  Father    4  Family history of Bipolar disorder   5  Family history of hypertension (V17 49) (Z82 49)   6  Family history of malignant melanoma (V16 8) (Z80 8)  Daughter    9  Family history of Bipolar disorder   8  Family history of migraine headaches (V17 2) (Z82 0)  Maternal Grandfather    9  Family history of cardiac disorder (V17 49) (Z82 49)  Family History    10  Family history of arthritis (V17 7) (Z82 61)   11  Family history of cardiac disorder (V17 49) (Z82 49)   12  Family history of depression (V17 0) (Z81 8)  Family History Reviewed: The family history was reviewed and updated today  Social History    · Current every day smoker (305 1) (F17 200)   · Daily caffeine consumption, 1 serving a day   · Exercises daily (V49 89) (Z78 9)   · No alcohol use  Social History Reviewed: The social history was reviewed and updated today  The social history was reviewed and is unchanged  Current Meds   1  Accu-Chek Angelica Device; testing 3 times a day: dx: E11 65; Therapy: 19Dtv8031 to (Last Rx:24Apr2017)  Requested for: 24Apr2017 Ordered   2   Accu-Chek Angelica Plus In Vitro Strip; TEST 3 TIMES A DAY  dx E11 65; Therapy: 76Nvm6805 to (Last Rx:16Jnh6408)  Requested for: 59AIL9942 Ordered   3  Accu-Chek FastClix Lancets Miscellaneous; test 3-4 times a day dx: e11 65; Therapy: 24Apr2017 to (Last Rx:24Apr2017)  Requested for: 61Klz9953 Ordered   4  BD Pen Needle Short U/F 31G X 8 MM Miscellaneous; Therapy: 27NYJ9797 to Recorded   5  Enoxaparin Sodium 80 MG/0 8ML Subcutaneous Solution; take 80 mg sq every 12 hours   as directed by md;   Therapy: 20Jun2017 to (Evaluate:26Jun2017)  Requested for: 20Jun2017; Last   Rx:20Jun2017 Ordered   6  Gabapentin 300 MG Oral Capsule; Therapy: 44USR3609 to Recorded   7  Hydrocodone-Acetaminophen 5-325 MG Oral Tablet; TAKE 1 TABLET EVERY 6 HOURS   AS NEEDED; Therapy: 59KED8781 to (Evaluate:17Mko4881); Last Rx:65Mgp5238 Ordered   8  Klor-Con M20 20 MEQ Oral Tablet Extended Release; Take 1 tablet daily; Therapy: 32NIA9799 to [phone])  Requested for: 34GWX9763; Last   Rx:27Mar2017 Ordered   9  Lantus SoloStar 100 UNIT/ML Subcutaneous Solution Pen-injector; INJECT 21 UNIT   Bedtime; Therapy: 46GZR0833 to (Last Rx:07Jun2017)  Requested for: 07Jun2017 Ordered   10  Lisinopril 10 MG Oral Tablet; TAKE 1 TABLET DAILY  Requested for: 08TCD5941; Last    CJ:53YXM7014 Ordered   11  Lisinopril 20 MG Oral Tablet; TAKE 1 TABLET DAILY; Therapy: 08Apr2017 to (Evaluate:65Ijr6018) Recorded   12  LORazepam 1 MG Oral Tablet; take 1 tablet twice a day as needed; Therapy: 25LDU4258 to (Evaluate:96Yyb0859)  Requested for: 44GXQ6763; Last    Rx:53Kgh2435 Ordered   13  MetFORMIN HCl - 500 MG Oral Tablet; Therapy: 34VST3141 to Recorded   14  Metoprolol Succinate ER 25 MG Oral Tablet Extended Release 24 Hour; Take 1 tablet    daily; Therapy: 89FFE9538 to [phone])  Requested for: 03HOW4247; Last    Rx:27Mar2017 Ordered   15  Montelukast Sodium 10 MG Oral Tablet; TAKE 1 TABLET DAILY;     Therapy: 33Kit5076 to (Vipul Berna)  Requested for: 55ZVB0009; Last    Rx:05Jun2017 Ordered   16  Ondansetron 8 MG Oral Tablet Disintegrating; put 1 tab under tongue to dissolve every 8    hours as needed for nausea and vomiting; Therapy: (Recorded:06Mar2017) to Recorded   17  Pantoprazole Sodium 40 MG Oral Tablet Delayed Release; Take 1 tablet daily; Therapy: 19ALG3819 to (Last [facility])  Requested for: 20Jun2017 Ordered   18  QUEtiapine Fumarate 400 MG Oral Tablet; take 2 tablets at bedtime; Therapy: 07QIJ9404 to (Last [facility])  Requested for: 20Jun2017 Ordered   19  Torsemide 20 MG Oral Tablet; Take one tablet daily as needed; Therapy: 56UHE8419 to (Evaluate:71Xrn2793)  Requested for: 62TVX9583; Last    Rx:07Jun2017 Ordered   20  Warfarin Sodium 5 MG Oral Tablet; take 1-2 tablets daily or as directed  Requested for:    87DOS1922; Last Rx:09Jun2017 Ordered  Medication List Reviewed: The medication list was reviewed and updated today  Allergies    1  Aspirin TABS   2  [employer] Omeprazole TBEC   3  Morphine Derivatives    Vitals  Signs   Recorded: 08Mzq3372 04:21PM   Temperature: 98 2 F  Heart Rate: 80  Systolic: 084  Diastolic: 80  Height: 6 ft 1 in  Weight: 185 lb   BMI Calculated: 24 41  BSA Calculated: 2 08  O2 Saturation: 97    Physical Exam    Constitutional   General appearance: No acute distress, well appearing and well nourished  Pulmonary   Respiratory effort: No increased work of breathing or signs of respiratory distress  Auscultation of lungs: Clear to auscultation, equal breath sounds bilaterally, no wheezes, no rales, no rhonci  Cardiovascular   Auscultation of heart: Normal rate and rhythm, normal S1 and S2, without murmurs  Examination of extremities for edema and/or varicosities: Abnormal   mild edema left foot  Abdomen   Abdomen: Non-tender, no masses  Liver and spleen: No hepatomegaly or splenomegaly      Musculoskeletal   Gait and station: Abnormal     Digits and nails: Abnormal   suture intact left foot, mild erythema surrounding area  Psychiatric   Orientation to person, place and time: Normal     Mood and affect: Normal          Results/Data  (1) HEMOGLOBIN A1C 59GPF3925 12:00AM [facility]     Test Name Result Flag Reference   Hemoglobin A1c 8 5 % H 4 8-5 6   Pre-diabetes: 5 7 - 6 4           Diabetes: >6 4           Glycemic control for adults with diabetes: <7 0     Health Management  Encounter for screening colonoscopy   COLONOSCOPY; every 2 years; Last 12JUN4479; Next Due: 29Qet6362;  Active    Future Appointments    Date/Time Provider Specialty Site   08/14/2017 02:15 PM Bob Kong, [address]  [facility] [address]     Signatures   Electronically signed by : Alessandra Carson DO; Aug  3 2017 12:37AM EST                       (Author)

## 2018-01-16 NOTE — PROGRESS NOTES
REPORT NAME: Progress Notes Report  VISIT DATE: 6/16/2017  VISIT TIME: 1:34 PM EDT  PATIENT NAME: Kait Solis   MEDICAL RECORD NUMBER: [id number]  YOB: 1960  AGE: 61  REFERRING PHYSICIAN: KHLOE DRAKE  SUPERVISING CLINICIAN: Susie Pal [place] CARE PROVIDER: Alberta Perez  PATIENT HOME ADDRESS: [address] [place] [address], [address] [facility], [facility]  [address]   PATIENT HOME PHONE: [phone]  SOCIAL SECURITY NUMBER:   DIAGNOSIS 1: Mitral Valve Replacement / 424 0  DIAGNOSIS 2:   INR RANGE: 2 5 - 3 5  INR GOAL: 3  TREATMENT START DATE:   TREATMENT END DATE:   NEXT VISIT: 6/23/2017  [facility]  VISIT RESULTS  ENCOUNTER NUMBER:   TEST LOCATION: Other - see notes  TEST TYPE: PT Test ([id number])  VISIT TYPE: Phone Consult  CURRENT INR: 2 1 PROTIME:   SPECIMEN COL AND RPT DATE: 6/16/2017 1:34 PM  EDT  VITAL SIGNS  PULSE:  BP: / WEIGHT:  HEIGHT:  TEMP:   CURRENT ANTICOAGULANT DOSING SCHEDULE  DOSE SIZE: 5mg    ANTICOAGULANT TYPE: WARFARIN  DOSING REGIMEN  Sun       Mon       Tues      Wed       Thurs     Fri       Sat  Total/Wk  5         5         5         5         5         7 5       5         37 5  PATIENT MEDICATION INSTRUCTION: Yes  PATIENT NUTRITIONAL COUNSELING: No  PATIENT BRUISING INSTRUCTION: No  LAST EDUCATION DATE:   PREVIOUS VISIT INFORMATION  VISITDATE  INRGoal INR   Sun    Mon    Tues   Wed    Thurs  Fri    Sat  Total/wk  6/16/2017   3       2 1   5      5      5      5      5      7 5    5  37 5  6/12/2017   3       3     5      5      5      5      5      5      5  35  6/7/2017    3       2 7   5      7 5    5      5      5      5      5  37 5  6/4/2017    3       1 4   7 5    7 5    5      0      0      0      0  20  ADDITIONAL PREVIOUS VISIT INFORMATION  VISITDATE   PRIMARY RX               DOSE      CrCl  6/16/2017   WARFARIN                 5mg  6/12/2017   WARFARIN                 5mg  6/7/2017    WARFARIN                 5mg  6/4/2017    WARFARIN 5mg  OTHER CURRENT MEDICATIONS:  WARFARIN  PROGRESS NOTES:   PATIENT INSTRUCTIONS: 6/16/17, TC FROM [facility]    [phone]  METER, WILL INCREASE WARFARIN 7 5 MG FRI, 5 MG OTHERS, RECH 1 WEEK,  6/23 rani   ---- Additional Instructions entered on 6/20/2017 4:23 PM EDT by Bjorn Enriquez :  see bridging instructions for colonoscopy 7/11  rani  TEST LOCATION: Other - see notes, , ,   INBOUND LAB DATA:  Lab       Lab Value Col Date                 Rpt Date                 Lab  Reference Range  Electronically signed by:  Lisandra Turner on 6/20/2017 4:23 PM EDT

## 2018-01-16 NOTE — RESULT NOTES
Verified Results  (1) STOOL CULTURE [id number] 07:40AM [facility]     Test Name Result Flag Reference   CLINICAL REPORT (Report)     Test:        Stool culture  Specimen Source:  Rectum  Specimen Type:   Stool  Specimen Date:   5/27/2017 7:40 AM  Result Date:    5/29/2017 8:38 AM  Result Status:   Final result  Resulting Lab:    [address] [address]            Tel: [phone]      CULTURE                                       ------------------                                   No Salmonella, Shigella or Campylobacter isolated      Shiga Toxin 1 NOT detected, Shiga Toxin 2 NOT detected

## 2018-01-16 NOTE — PROGRESS NOTES
REPORT NAME: Progress Notes Report  VISIT DATE: 10/16/2017  VISIT TIME: 9:06 AM EDT  PATIENT NAME: Chinyere Gonzalez   MEDICAL RECORD NUMBER: [id number]  YOB: 1960  AGE: 61  REFERRING PHYSICIAN: Harriett Mendez  SUPERVISING CLINICIAN: Susie Toro CARE PROVIDER: Neptali Perez  PATIENT HOME ADDRESS: [address] [place] [address], [address] [facility], [facility]  61   PATIENT HOME PHONE: [phone]  SOCIAL SECURITY NUMBER:   DIAGNOSIS 1: Mitral Valve Replacement / 424 0  DIAGNOSIS 2:   INR RANGE: 2 5 - 3 5  INR GOAL: 3  TREATMENT START DATE:   TREATMENT END DATE:   NEXT VISIT: 10/20/2017  [facility]  VISIT RESULTS  ENCOUNTER NUMBER:   TEST LOCATION: Other - see notes  TEST TYPE: Outside Lab (Venipuncture)  VISIT TYPE: Phone Consult  CURRENT INR: 3 2 PROTIME:   SPECIMEN COL AND RPT DATE: 10/16/2017 9:06 AM  EDT  VITAL SIGNS  PULSE:  BP: / WEIGHT:  HEIGHT:  TEMP:   CURRENT ANTICOAGULANT DOSING SCHEDULE  DOSE SIZE: 5mg    ANTICOAGULANT TYPE: WARFARIN  DOSING REGIMEN  Sun       Mon       Tues      Wed       Thurs     Fri       Sat  Total/Wk  7 5       5         7 5       5         7 5       0         0         32 5  PATIENT MEDICATION INSTRUCTION: Yes  PATIENT NUTRITIONAL COUNSELING: No  PATIENT BRUISING INSTRUCTION: Yes  LAST EDUCATION DATE:   PREVIOUS VISIT INFORMATION  VISITDATE  INRGoal INR   Sun    Mon    Tues   Wed    Thurs  Fri    Sat  Total/wk  10/16/2017  3       3 2   7 5    5      7 5    5      7 5    0      0  32 5  10/11/2017  3       1 8   7 5    0      0      7 5    7 5    7 5    5  35  8/3/2017    3       1 8   10     5      0      0      0      10     10  35  8/2/2017    3       1 9   0      0      0      5      5      0      0  10  ADDITIONAL PREVIOUS VISIT INFORMATION  VISITDATE   PRIMARY RX               DOSE      CrCl  10/16/2017  WARFARIN                 5mg  10/11/2017  WARFARIN                 5mg  8/3/2017    WARFARIN                 5mg  8/2/2017    WARFARIN 5mg  OTHER CURRENT MEDICATIONS:  WARFARIN  PROGRESS NOTES:   PATIENT INSTRUCTIONS: 10/16/17, tc from [facility] with [place]    [phone]  meter report  cont 5 mg alt 7 5 mg daily, rech 4 days, 10/20   rani   ---- Additional Instructions entered on 10/18/2017 10:47 AM EDT by Isaak Turner :  10/18/17, tc from [facility], [facility]  will test inr thur 10/19   rani  TEST LOCATION: Other - see notes, , ,   INBOUND LAB DATA:  Lab       Lab Value Col Date                 Rpt Date                 Lab  Reference Range  Electronically signed by:  Romero Turner on 10/18/2017 10:47 AM EDT

## 2018-01-16 NOTE — PROCEDURES
Procedures by Tim Flower RN at 8/10/2017  9:00 AM      Author:  Tim Flower RN Service:  (none) Author Type:  Registered Nurse    Filed:  8/10/2017  9:04 AM Date of Service:  8/10/2017  9:00 AM Status:  Signed    :  Tim Flower RN (Registered Nurse)        Procedure Orders:       1  Insert PICC line [id number] ordered by Brittani Zavala DPM at 08/09/17 1806                  Insert PICC  line  Date/Time: 8/10/2017 9:00 AM  Performed by: Chandler Stevens  Authorized by: Maria Fernanda Arreola     Patient location:  Bedside  Other Assisting Provider:  Yes (comment) ([address] [place] [address])    Consent:     Consent obtained:  Written    Consent given by:  Patient    Procedural risks discussed: consent obtained by physician  [facility] protocol:     Procedure explained and questions answered to patient or proxy's satisfaction: yes      Relevant documents present and verified: yes      Test results available and properly labeled: yes       Imaging studies available: yes      Required blood products, implants, devices, and special equipment available: yes      Site/side marked: yes      Immediately prior to procedure, a time out was called: yes      Patient identity confirmed:  Verbally with patient  Pre-procedure details:     Hand hygiene: Hand hygiene performed prior to insertion      Sterile barrier technique: All elements of maximal sterile technique followed      Skin preparation:  ChloraPrep    Skin preparation agent: Skin preparation agent completely dried prior to procedure    Indications:     PICC line indications: vascular access and long term antibiotics    Anesthesia (see MAR for exact dosages):      Anesthesia method:  Local infiltration    Local anesthetic:  Lidocaine 1% w/o epi (2 ml)  Procedure details:     Location:  Brachial    Vessel type: vein      Laterality:  Right    Approach: percutaneous endoscopic technique used      Patient position:  Flat    Procedural supplies:  Double lumen Catheter size:  5 Fr    Landmarks identified: yes      Ultrasound guidance: yes      Sterile ultrasound techniques: Sterile gel and sterile probe covers were used      Number of attempts:  1    Successful placement: yes      Vessel of catheter tip end:  Chest Xray needed to confirm placement    Total catheter length (cm):  44    Catheter out on skin (cm):  0    Max flow rate:  999    Arm circumference:  26  Post-procedure details:     Post-procedure:  Dressing applied and securement device placed    Assessment:  Placement verification pending x-ray result, free fluid flow and blood return through all ports    Post-procedure complications: none      Patient tolerance of procedure: Tolerated well, no immediate complications  Comments:      Zavalla wire used to assist with placement, unable to determin placement with Sapians  Will obtain xray, pink stickers attached   Primary RN notified                       Received for:Provider  EPIC   Aug 10 2017  9:04AM [facility] Standard Time

## 2018-01-16 NOTE — RESULT NOTES
Verified Results  (1) OVA AND PARASITES EXAM [id number] 09:00AM [facility]     Test Name Result Flag Reference   Ova + Parasite Exam Final report     These results were obtained using wet preparation(s) and trichrome  stained smear  This test does not include testing for Cryptosporidium  parvum, Cyclospora, or Microsporidia  Result 1 Comment     No ova, cysts, or parasites seen  One negative specimen does not rule out the possibility of a  parasitic infection  (1) STOOL CULTURE [id number] 09:00AM [facility]     Test Name Result Flag Reference   Salmonella/Shigella Screen Final report     Campylobacter Culture Final report     E coli Shiga Toxin EIA Negative  Negative   Result 1 Comment     No Salmonella or Shigella recovered  Result 1 Comment     No Campylobacter species isolated

## 2018-01-17 ENCOUNTER — APPOINTMENT (OUTPATIENT)
Dept: PHYSICAL THERAPY | Facility: CLINIC | Age: 58
End: 2018-01-17
Payer: COMMERCIAL

## 2018-01-17 NOTE — MISCELLANEOUS
Assessment    1  Current every day smoker (305 1) (F17 200)   2  Benign essential hypertension (401 1) (I10)   3  Ulceration (707 9) (L98 499)   4  Bipolar disorder (296 80) (F31 9)   5  Diarrhea (787 91) (R19 7)   6  Diabetic ulcer of right foot associated with diabetes mellitus due to underlying condition   (249 80,707 15) (E08 621,L97 519)   7  Dyspepsia (536 8) (R10 13)    Plan  Bipolar disorder    · QUEtiapine Fumarate 300 MG Oral Tablet   Rx By: "[employer] Dispense: 0 Days ; #:30 Tablet; Refill: 0; For: Bipolar disorder; CAROLYNE = N; Record   · From  QUEtiapine Fumarate 200 MG Oral Tablet take 1 tablet daily in am To  QUEtiapine Fumarate 400 MG Oral Tablet 1 TAB TWICE A DAY   Rx By: Blogvio Batch; Dispense: 0 Days ; #:60 Tablet; Refill: 0; For: Bipolar disorder; CAROLYNE = N; Record  Diabetic ulcer of left foot associated with diabetes mellitus due to underlying condition    · Hydrocodone-Acetaminophen 5-325 MG Oral Tablet; TAKE 1 TABLET EVERY 6  HOURS AS NEEDED   Rx By: "[employer] Dispense: 30 Days ; #:120 Tablet; Refill: 0; For: Diabetic ulcer of left foot associated with diabetes mellitus due to underlying condition; CAROLYNE = N; Print Rx  Insulin dependent type 2 diabetes mellitus, uncontrolled    · *VB - Foot Exam; Status:Complete;   Done: 38DYB8825 04:38PM   Performed: In Office; Due:11Jun2017; Last Updated By:Melania Hernandez; 6/6/2017 4:38:48 PM;Ordered; For:Insulin dependent type 2 diabetes mellitus, uncontrolled; Ordered By:Yamel Read;  SocHx: Current every day smoker    · You need to quit smoking ; Status:Complete;   Done: 20DZC0548   Ordered; For:SocHx: Current every day smoker; Ordered By:Yamel Read;  Unlinked    · Cephalexin 500 MG Oral Tablet (Cephalexin Monohydrate)   Rx By: "[employer] Dispense: 7 Days ; #:21 Tablet; Refill: 0; CAROLYNE = N; Print Rx    Discussion/Summary  Discussion Summary:   1) WOUND CARE JUNE 19 as scheduled, wet dressings    2) psychiatrist follow up for CHANGE in MEDICATION  SEROQUEL AFFECTING BLOOD SUGAR? continue 400mg twice a day   3) peripheral neuropathyr: GABAPENTIN 100MG 3 TABS DAILY   4) diabetes type 2: CONTINUE LANTUS 21U, IF blood sugar ABOVE 160 THEN INCREASE 23U  5) FOLLOW UP WITH GI - WILL NEED TO STOP COUMADIN PRIOR to colonoscopy, will get instructions from cardiology managing coumadin  pt has mechanical heart valve  6) CONTINUE COUMADIN-   7) PT NEEDS WALKER- FOR STABILIZATION- MULTIPLE BLISTERS ON FEET- HEALING AND WOUND CARE  8) FOLLOW UP WITH PSYCHIATRIST in 2 WEEKS  9) TORSEMIDE ONLY AS NEEDED   10) hypertension: controlled  Medication SE Review and Pt Understands Tx: Possible side effects of new medications were reviewed with the patient/guardian today  The treatment plan was reviewed with the patient/guardian  The patient/guardian understands and agrees with the treatment plan      Chief Complaint  Chief Complaint Free Text Note Form: PATIENT IS HERE FOR JAKE BILATERAL BLISTERS BOTH FEET DISCHARGED 5/28/17  History of Present Illness  TCM Communication [facility]: The patient is being contacted for follow-up after hospitalization  Hospital records were reviewed  He was hospitalized at [facility]  The date of admission: 05/24/2017, date of discharge: 05/28/2017  Diagnosis: upper gi bleed / vomiting  He was discharged to home  Medications reviewed and updated today  He scheduled a follow up appointment  Symptoms: weakness, dizziness, headache, fatigue, cough, shortness of breath and chest pain, but no fever, no back pain on left side, no back pain on right side, no arm pain left side, no arm pain on right side, no leg pain on left side, no leg pain on right side, no upper abdominal pain, no middle abdominal pain, no lower abdominal pain, no rash:, no anorexia, no nausea, no vomiting, no loose stools, no constipation, no pain with urinating, no incisional pain, no wound drainage and no swelling   FOOT PAIN Counseling was provided to the patient and Endocrine: No complaints of proptosis, no hot flashes, no muscle weakness, no erectile dysfunction, no deepening of the voice, no feelings of weakness  Hematologic/Lymphatic: No complaints of swollen glands, no swollen glands in the neck, does not bleed easily, no easy bruising  Active Problems     1  Acute colitis (558 9) (K52 9)   2  Acute gastritis (535 00) (K29 00)   3  Anxiety (300 00) (F41 9)   4  Atherosclerosis of arteries of extremities (440 20) (I70 209)   5  Benign essential hypertension (401 1) (I10)   6  Bilateral lower extremity edema (782 3) (R60 0)   7  Bipolar disorder (296 80) (F31 9)   8  Blister of left foot, initial encounter (917 2) (S90 822A)   9  Carotid arterial disease (447 9) (I77 9)   10  Chronic congestive heart failure (428 0) (I50 9)   11  Coronary artery disease (414 00) (I25 10)   12  Decreased pedal pulses (785 9) (R09 89)   13  Diabetic gastroparesis associated with type 2 diabetes mellitus (250 60,536 3)    (P99 56,K67 75)   14  Diarrhea (787 91) (R19 7)   15  Dyspepsia (536 8) (R10 13)   16  Encounter for special screening examination for neoplasm of prostate (V76 44) (Z12 5)   17  Environmental allergies (V15 09) (Z91 09)   18  Erosive esophagitis (530 19) (K22 10)   19  Esophageal reflux (530 81) (K21 9)   20  Hiatal hernia (553 3) (K44 9)   21  History of mitral valve replacement with mechanical valve (V43 3) (Z95 2)   22  Hyperlipidemia (272 4) (E78 5)   23  Insulin dependent type 2 diabetes mellitus, uncontrolled (250 02,V58 67) (E11 65,Z79 4)   24  Mitral valve prolapse (424 0) (I34 1)   25  Nausea (787 02) (R11 0)   26  Onychomycosis (110 1) (B35 1)   27  PVC's (premature ventricular contractions) (427 69) (I49 3)   28  S/P CABG (coronary artery bypass graft) (V45 81) (Z95 1)   29  Screening for iron deficiency anemia (V78 0) (Z13 0)   30  Vesicles (709 8) (R23 8)    Peripheral vascular disease (443 9) (I73 9)          Past Medical History    1   History of Rotator cuff tear, right (840 4) (M75 101)    Surgical History    1  History of Implantable Cardioverter-Defibrillator   2  History of Mitral Valve Replacement   3  History of Rotator Cuff Repair   4  History of Tonsillectomy  Surgical History Reviewed: The surgical history was reviewed and updated today  Family History  Mother    1  Family history of diabetes mellitus (V18 0) (Z83 3)   2  Family history of hypertension (V17 49) (Z82 49)   3  Family history of migraine headaches (V17 2) (Z82 0)  Father    4  Family history of Bipolar disorder   5  Family history of hypertension (V17 49) (Z82 49)   6  Family history of malignant melanoma (V16 8) (Z80 8)  Daughter    9  Family history of Bipolar disorder   8  Family history of migraine headaches (V17 2) (Z82 0)  Maternal Grandfather    9  Family history of cardiac disorder (V17 49) (Z82 49)  Family History    10  Family history of arthritis (V17 7) (Z82 61)   11  Family history of cardiac disorder (V17 49) (Z82 49)   12  Family history of depression (V17 0) (Z81 8)  Family History Reviewed: The family history was reviewed and updated today  Social History    · Current every day smoker (305 1) (F17 200)   · Daily caffeine consumption, 1 serving a day   · Exercises daily (V49 89) (Z78 9)   · No alcohol use  Social History Reviewed: The social history was reviewed and updated today  The social history was reviewed and is unchanged  Current Meds   1  Accu-Chek Angelica Device; testing 3 times a day: dx: E11 65; Therapy: 55Fcu1059 to (Last Rx:30Izt0084)  Requested for: 81Zmd3214 Ordered   2  Accu-Chek Angelica Plus In Vitro Strip; TEST 3 TIMES A DAY  dx E11 65; Therapy: 18Gfs5238 to (Last Rx:25Mvd6421)  Requested for: 75Pyj4906 Ordered   3  Accu-Chek FastClix Lancets Miscellaneous; test 3-4 times a day dx: e11 65; Therapy: 66Cjz3549 to (Last Rx:67Iuz8514)  Requested for: 62Drx2839 Ordered   4   Cephalexin 500 MG Oral Tablet; TAKE 1 TABLET EVERY 8 HOURS UNTIL ALL TAKEN; Therapy: 40JHC8664 to (Evaluate:30May2017); Last Rx:23May2017 Ordered   5  Diphenoxylate-Atropine 2 5-0 025 MG Oral Tablet; TAKE 1 TABLET 4 TIMES DAILY AS   NEEDED FOR DIARRHEA; Therapy: 65Gih5292 to (Evaluate:12May2017); Last Rx:10Apr2017 Ordered   6  Klor-Con M20 20 MEQ Oral Tablet Extended Release; Take 1 tablet daily; Therapy: 62MBM9396 to (7823 9952)  Requested for: 70SLH2730; Last   Rx:27Mar2017 Ordered   7  Lantus SoloStar 100 UNIT/ML Subcutaneous Solution Pen-injector; INJECT 10 UNIT   Bedtime; Therapy: 83YLQ6352 to (Last Rx:06Mar2017)  Requested for: 54SHN3349 Ordered   8  Lisinopril 20 MG Oral Tablet; TAKE 1 TABLET DAILY; Therapy: (Recorded:27Feb2017) to Recorded   9  LORazepam 1 MG Oral Tablet; Take 1 tablet twice daily as needed; Therapy: (Recorded:27Feb2017) to Recorded   10  Metoprolol Succinate ER 25 MG Oral Tablet Extended Release 24 Hour; Take 1 tablet    daily; Therapy: 14GZZ8987 to (9351 5124)  Requested for: 42DXR6215; Last    Rx:27Mar2017 Ordered   11  Montelukast Sodium 10 MG Oral Tablet; TAKE 1 TABLET DAILY; Therapy: 10Apr2017 to (Last Rx:10Apr2017)  Requested for: 28Jju4419 Ordered   12  Ondansetron 8 MG Oral Tablet Disintegrating; put 1 tab under tongue to dissolve every 8    hours as needed for nausea and vomiting; Therapy: (Recorded:06Mar2017) to Recorded   13  QUEtiapine Fumarate 200 MG Oral Tablet; TAKE 2 TABLETS IN THE MORNING; Therapy: 23AUQ0111 to (Evaluate:14Jun2017)  Requested for: 17RPQ2220; Last    Rx:15May2017 Ordered   14  QUEtiapine Fumarate 300 MG Oral Tablet; take 2 tablets at bedtime  Requested for:    40Pzl6621; Last Rx:17Apr2017 Ordered   15  Torsemide 20 MG Oral Tablet; take 2 tablet daily; Therapy: 92XFQ0602 to (1812 2950)  Requested for: 81BCY8068; Last    Rx:27Mar2017 Ordered   16  Warfarin Sodium 5 MG Oral Tablet; TAKE 1 TABLET DAILY;     Therapy: (Recorded:97Gkc6129) to Recorded  Medication List Reviewed: The medication list was reviewed and updated today  Allergies    1  Aspirin TABS   2  [employer] Omeprazole TBEC   3  Morphine Derivatives    Vitals  Signs   Recorded: 06Jun2017 04:30PM   Temperature: 97 5 F  Heart Rate: 83  Systolic: 765  Diastolic: 90  Height: 6 ft 1 in  Weight: 182 lb 12 oz  BMI Calculated: 24 11  BSA Calculated: 2 07  O2 Saturation: 99    Physical Exam    Constitutional   General appearance: No acute distress, well appearing and well nourished  Eyes   Conjunctiva and lids: No swelling, erythema, or discharge  Pupils and irises: Equal, round and reactive to light  Pulmonary   Respiratory effort: No increased work of breathing or signs of respiratory distress  Auscultation of lungs: Clear to auscultation, equal breath sounds bilaterally, no wheezes, no rales, no rhonci  Cardiovascular   Auscultation of heart: Normal rate and rhythm, normal S1 and S2, without murmurs  Examination of extremities for edema and/or varicosities: Normal     Abdomen   Abdomen: Non-tender, no masses  Liver and spleen: No hepatomegaly or splenomegaly  Musculoskeletal   Digits and nails: Abnormal   open areas left lateral foot over 1st mp and tips of toes right 2-4  Skin   Skin and subcutaneous tissue: Abnormal     Neurologic   Reflexes: 2+ and symmetric  Psychiatric   Orientation to person, place and time: Normal     Mood and affect: Normal          Results/Data  *VB - Foot Exam 90GNC9105 04:38PM Florinda Primmer     Test Name Result Flag Reference   FOOT EXAM [id number]         Procedure  Right Foot Findings: tenderness and ABNORMAL  The toes were erythematous and were tender  The sensory exam showed diminished vibratory sensation at the level of the toes and diminished position sense at the level of the toes  Left Foot Findings: tenderness and ABNORMAL  The toes were erythematous and were tender   The sensory exam showed diminished vibratory sensation at the level of the toes and diminished position sense at the level of the toes  Monofilament Testing: Tactile sensation in the right foot (see image for location): number 1 was diminished, number 4 was diminished, number 5 was diminished and number 6 was diminished  Tactile sensation in the left foot (see image for location): number 1 was diminished, number 4 was diminished, number 5 was diminished and number 6 was diminished  Vascular:      Future Appointments    Date/Time Provider Specialty Site   06/19/2017 01:30 PM Taylor Burdick DO Family Medicine [facility] WOUND CARE Elyce Endow   08/02/2017 01:30 PM Cardiology, Device Remote  ST [address] [place] [address] Ave   06/26/2017 10:00 AM RAMONITA Hwang  Cardiology [facility] CARDIOLOGY [place]   06/20/2017 10:00 AM RAMONITA Boggs   Family Medicine [facility]   06/13/2017 09:30 AM Gloria Carlisle, Nurse Schedule  [facility]   06/27/2017 10:00 AM Mariel, [facility]   07/11/2017 10:00 AM Mariel Nurse Schedule  [facility]   07/18/2017 10:00 AM Mariel, Nurse [facility]   07/25/2017 10:00 AM Gloria Carlisle Nurse Schedule  [facility]     Signatures   Electronically signed by : Maxine Perry DO; Jun 7 2017 12:35AM EST                       (Author)

## 2018-01-17 NOTE — PROGRESS NOTES
REPORT NAME: Progress Notes Report  VISIT DATE: 6/20/2017  VISIT TIME: 1:42 PM EDT  PATIENT NAME: Graciela Griffin   MEDICAL RECORD NUMBER: [id number]  YOB: 1960  AGE: 61  REFERRING PHYSICIAN: Khadijah Gan  SUPERVISING CLINICIAN: Susie Pal [place] CARE PROVIDER: Santiago Perez  PATIENT HOME ADDRESS: [address] [place] [address], [address] [facility], [facility]  [address]   PATIENT HOME PHONE: [phone]  SOCIAL SECURITY NUMBER:   DIAGNOSIS 1: Mitral Valve Replacement / 424 0  DIAGNOSIS 2:   INR RANGE: 2 5 - 3 5  INR GOAL: 3  TREATMENT START DATE:   TREATMENT END DATE:   NEXT VISIT: 6/23/2017  [facility]  VISIT RESULTS  ENCOUNTER NUMBER:   TEST LOCATION: Other - see notes  TEST TYPE: PT Test ([id number])  VISIT TYPE: Phone Consult  CURRENT INR: 1 2 PROTIME: 12 9  SPECIMEN COL AND RPT DATE: 6/20/2017 1:42  PM EDT  VITAL SIGNS  PULSE:  BP: / WEIGHT:  HEIGHT:  TEMP:   CURRENT ANTICOAGULANT DOSING SCHEDULE  DOSE SIZE: 5mg    ANTICOAGULANT TYPE: WARFARIN  DOSING REGIMEN  Sun       Mon       Tues      Wed       Thurs     Fri       Sat  Total/Wk  0         0         0         7 5       7 5       0         0         15  PATIENT MEDICATION INSTRUCTION: Yes  PATIENT NUTRITIONAL COUNSELING: No  PATIENT BRUISING INSTRUCTION: No  LAST EDUCATION DATE:   PREVIOUS VISIT INFORMATION  VISITDATE  INRGoal INR   Sun    Mon    Tues   Wed    Thurs  Fri    Sat  Total/wk  6/20/2017   3       1 2   0      0      0      7 5    7 5    0      0  15  6/16/2017   3       2 1   5      5      5      5      5      7 5    5  37 5  6/12/2017   3       3     5      5      5      5      5      5      5  35  6/7/2017    3       2 7   5      7 5    5      5      5      5      5  37 5  ADDITIONAL PREVIOUS VISIT INFORMATION  VISITDATE   PRIMARY RX               DOSE      CrCl  6/20/2017   WARFARIN                 5mg  6/16/2017   WARFARIN                 5mg  6/12/2017   WARFARIN                 5mg  6/7/2017    WARFARIN 5mg  OTHER CURRENT MEDICATIONS:  WARFARIN  PROGRESS NOTES:   PATIENT INSTRUCTIONS: 6/21/17, TC FROM [place], [place]    [phone]  REPORTS PT HAD INR DRAWN 6/20 AT DR MENDOZA OFFICE  DAUGHTER SOY REPORTED  PATIENT MISSED 5 MG DOSES ON SAT AND SUN LAST WEEK  WILL INCREASE 7 5 MG W/TH, [place] 49 FRI, 6/23  SPOKE  [facility]  RANI   ---- Additional Instructions entered on 6/22/2017 1:15 PM EDT by Kelsie Simon 7 :  6/22/17, TC FROM [facility] [place]  INSURANCE WILL ONLY COVER 10  SYRINAGES OF LOVENOX  rani   ---- Additional Instructions entered on 6/23/2017 1:24 PM EDT by Kelsie Turner :  6/23/17, tc from rupesh lemons, pt no0t home for visit today  will cont previous dose 7 5 mg fri, 5 mg others  rupesh will do home visit  mon 6/26  called to daughter, Johnny Piña, lm am  dolores  TEST LOCATION: Other - see notes, , ,   INBOUND LAB DATA:  Lab       Lab Value Col Date                 Rpt Date                 Lab  Reference Range  Electronically signed by:  Kelsie Turner on 6/23/2017 1:24 PM EDT

## 2018-01-17 NOTE — MISCELLANEOUS
Message  vm from Jenifer, pt's daughter, to clarify when staples are to be removed s/p amp  she is receiving conflicting info from [facility] home/rehab in [facility]  called # she provided ([phone]) 3 x, nothing happens, phone does not ring, no vm, just some static  called home number listed in chart and left her vm explaining staples remain in for min 4 weeks, pt has f/u ov 9/20 - his incision will be eval at that time and staples removed if appropriate  stressed nursing home should NOT remove staples, req she call if any additional questions  Active Problems    1  Acute colitis (558 9) (K52 9)   2  Acute gastritis (535 00) (K29 00)   3  Amputated great toe of left foot (V49 71) (Z89 412)   4  Anxiety (300 00) (F41 9)   5  Atherosclerosis of arteries of extremities (440 20) (I70 209)   6  Benign essential hypertension (401 1) (I10)   7  Bilateral lower extremity edema (782 3) (R60 0)   8  Bipolar disorder (296 80) (F31 9)   9  Carotid arterial disease (447 9) (I77 9)   10  Chronic congestive heart failure (428 0) (I50 9)   11  Chronic systolic congestive heart failure (428 22,428 0) (I50 22)   12  Coronary artery disease (414 00) (I25 10)   13  Decreased pedal pulses (785 9) (R09 89)   14  Diabetic gastroparesis associated with type 2 diabetes mellitus (250 60,536 3)    (X35 57,D70 16)   15  Diabetic ulcer of right foot associated with diabetes mellitus due to underlying condition    (249 80,707 15) (E08 621,L97 519)   16  Encounter for screening colonoscopy (V76 51) (Z12 11)   17  Environmental allergies (V15 09) (Z91 09)   18  Erosive esophagitis (530 19) (K22 10)   19  Esophageal reflux (530 81) (K21 9)   20  Hiatal hernia (553 3) (K44 9)   21  History of mitral valve replacement with mechanical valve (V43 3) (Z95 2)   22  Hyperlipidemia (272 4) (E78 5)   23  Insulin dependent type 2 diabetes mellitus, uncontrolled (250 02,V58 67) (E11 65,Z79 4)   24  Ischemic cardiomyopathy (414 8) (I25 5)   25  Mitral valve prolapse (424 0) (I34 1)   26  Onychomycosis (110 1) (B35 1)   27  Peripheral vascular disease (443 9) (I73 9)   28  PVC's (premature ventricular contractions) (427 69) (I49 3)   29  S/P CABG (coronary artery bypass graft) (V45 81) (Z95 1)   30  Ulcer On The Feet Dorsal Cesar Scale 1  (0-5)    Current Meds   1  Accu-Chek Angelica Device; testing 3 times a day: dx: E11 65; Therapy: 77Pbg0208 to (Last Rx:24Apr2017)  Requested for: 17Fom8500 Ordered   2  Accu-Chek Angelica Plus In Vitro Strip; TEST 3 TIMES A DAY  dx E11 65; Therapy: 24Apr2017 to (Last Rx:12Fsf6967)  Requested for: 83NEL4266 Ordered   3  Accu-Chek FastClix Lancets Miscellaneous; test 3-4 times a day dx: e11 65; Therapy: 17Iwg8093 to (Last Rx:24Apr2017)  Requested for: 24Apr2017 Ordered   4  Basaglar KwikPen 100 UNIT/ML Subcutaneous Solution Pen-injector; INJECT 12 UNITS   BEDTIME; Therapy: 07Aug2017 to (Last Rx:07Aug2017)  Requested for: 07Aug2017 Ordered   5  BD Pen Needle Short U/F 31G X 8 MM Miscellaneous; Therapy: 53EJP8362 to Recorded   6  Enoxaparin Sodium 80 MG/0 8ML Subcutaneous Solution; take 80 mg sq every 12 hours   as directed by md;   Therapy: 20Jun2017 to (Evaluate:41Vcq4199)  Requested for: 29Qle6331; Last   Rx:15Zwl2495 Ordered   7  FentaNYL 25 MCG/HR Transdermal Patch 72 Hour; APPLY 1 PATCH EVERY 3 DAYS; Therapy: 07Aug2017 to (Evaluate:34Rnl0575); Last Rx:07Aug2017 Ordered   8  Gabapentin 300 MG Oral Capsule; Therapy: 54DSA9152 to Recorded   9  Hydrocodone-Acetaminophen 5-325 MG Oral Tablet; TAKE 1 TABLET EVERY 6 HOURS   AS NEEDED; Therapy: 43SMC9351 to (Evaluate:91Xjg8975); Last Rx:73Mst3421 Ordered   10  Klor-Con M20 20 MEQ Oral Tablet Extended Release; Take 1 tablet daily; Therapy: 83UIT8614 to (Darleen Lopez)  Requested for: 18LZG6268; Last    Rx:27Mar2017 Ordered   11  Lantus SoloStar 100 UNIT/ML Subcutaneous Solution Pen-injector; inject 10 units at    bedtime;     Therapy: 53FRL3042 to (Evaluate:51Woy4463)  Requested for: 81QST5689; Last    Rx:30Wze0825 Ordered   12  Lisinopril 10 MG Oral Tablet; TAKE 1 TABLET DAILY  Requested for: 93UYH0365; Last    IX:87MZF3169 Ordered   13  Lisinopril 20 MG Oral Tablet; TAKE 1 TABLET DAILY; Therapy: 08Apr2017 to (Evaluate:02Usf6014) Recorded   14  LORazepam 1 MG Oral Tablet; take 1 tablet twice a day as needed; Therapy: 96JIM5533 to (Evaluate:70Rbi8015)  Requested for: 02XIM6105; Last    Rx:51Vfm3966 Ordered   15  MetFORMIN HCl - 500 MG Oral Tablet; Take 1 tablet by mouth  twice a day with food; Therapy: 61CZC7974 to (03 17 74 30 53)  Requested for: 50GBT6778; Last    Rx:39Zbx4819 Ordered   16  Metoprolol Succinate ER 25 MG Oral Tablet Extended Release 24 Hour; Take 1 tablet    daily; Therapy: 55GEX3147 to (03 17 74 30 53)  Requested for: 63TEJ6563; Last    Rx:27Mar2017 Ordered   17  Montelukast Sodium 10 MG Oral Tablet; TAKE 1 TABLET DAILY; Therapy: 92Uds2902 to (Evaluate:71Zru3953)  Requested for: 88SKV2417; Last    Rx:05Jun2017 Ordered   18  Ondansetron 8 MG Oral Tablet Disintegrating; put 1 tab under tongue to dissolve every 8    hours as needed for nausea and vomiting; Therapy: (Recorded:06Mar2017) to Recorded   19  Oxycodone-Acetaminophen 5-325 MG Oral Tablet; take 1tab every 4 hours as needed; Therapy: 67Gko2626 to (Evaluate:37Owd7611); Last Rx:84Uuw8760 Ordered   20  OxyCONTIN 10 MG Oral Tablet ER 12 Hour Abuse-Deterrent; 1 tablet every 12 hours; Therapy: 02Aug2017 to (Last Rx:02Aug2017) Ordered   21  Pantoprazole Sodium 40 MG Oral Tablet Delayed Release; Take 1 tablet daily; Therapy: 11DED7280 to (Serge Darden)  Requested for: 20Jun2017 Ordered   22  QUEtiapine Fumarate 400 MG Oral Tablet; take 2 tablets at bedtime; Therapy: 79ELH2472 to (Serge Darden)  Requested for: 20Jun2017 Ordered   23  Torsemide 20 MG Oral Tablet; Take one tablet daily as needed;     Therapy: 15HMW2561 to (Evaluate:30Rme5519) Requested for: 29LMX3943; Last    Rx:07Jun2017 Ordered   24  Warfarin Sodium 5 MG Oral Tablet; take 1-2 tablets daily or as directed  Requested for:    38AAW3466; Last Rx:09Jun2017 Ordered    Allergies    1  Aspirin TABS   2  [employer] Omeprazole TBEC   3   Morphine Derivatives    Signatures   Electronically signed by : Nabil Mehta, ; Sep 12 2017  3:28PM EST                       (Author)

## 2018-01-17 NOTE — MISCELLANEOUS
Provider Comments  Provider Comments:   spoke with daughter  wasn't aware of appointment  she will call back to reschedule        Signatures   Electronically signed by : RAMONITA White ; Jun 26 2017  3:54PM EST

## 2018-01-18 NOTE — PROGRESS NOTES
REPORT NAME: Progress Notes Report  VISIT DATE: 10/27/2017  VISIT TIME: 8:09 AM EDT  PATIENT NAME: Lamar Knox   MEDICAL RECORD NUMBER: [id number]  YOB: 1960  AGE: 61  REFERRING PHYSICIAN: Sha Pedroza  SUPERVISING CLINICIAN: Susie Pal Ulmer CARE PROVIDER: Jeanie Perez  PATIENT HOME ADDRESS: [address] [place] [address], [address], Beartiz Andrade   61   PATIENT HOME PHONE: [phone]  SOCIAL SECURITY NUMBER:   DIAGNOSIS 1: Mitral Valve Replacement / [phone] 0  DIAGNOSIS 2:   INR RANGE: 2 5 - 3 5  INR GOAL: 3  TREATMENT START DATE:   TREATMENT END DATE:   NEXT VISIT: 10/31/2017  [facility]  VISIT RESULTS  ENCOUNTER NUMBER:   TEST LOCATION: Other - see notes  TEST TYPE: Outside Lab (Venipuncture)  VISIT TYPE: Phone Consult  CURRENT INR: 2 7 PROTIME:   SPECIMEN COL AND RPT DATE: 10/27/2017 8:09 AM  EDT  VITAL SIGNS  PULSE:  BP: / WEIGHT:  HEIGHT:  TEMP:   CURRENT ANTICOAGULANT DOSING SCHEDULE  DOSE SIZE: 5mg    ANTICOAGULANT TYPE: WARFARIN  DOSING REGIMEN  Sun       Mon       Tues      Wed       Thurs     Fri       Sat  Total/Wk  5         7 5       5         7 5       5         7 5       5         42 5  PATIENT MEDICATION INSTRUCTION: Yes  PATIENT NUTRITIONAL COUNSELING: No  PATIENT BRUISING INSTRUCTION: No  LAST EDUCATION DATE:   PREVIOUS VISIT INFORMATION  VISITDATE  INRGoal INR   Sun    Mon    Tues   Wed    Thurs  Fri    Sat  Total/wk  10/27/2017  3       2 7   5      7 5    5      7 5    5      7 5    5  42 5  10/23/2017  3       3 6   0      5      5      7 5    5      0      0  22 5  10/19/2017  3       1 4   7 5    0      0      0      10     10     10  37 5  10/16/2017  3       3 2   7 5    5      7 5    5      7 5    0      0  32 5  ADDITIONAL PREVIOUS VISIT INFORMATION  VISITDATE   PRIMARY RX               DOSE      CrCl  10/27/2017  WARFARIN                 5mg  10/23/2017  WARFARIN                 5mg  10/19/2017  WARFARIN                 5mg  10/16/2017  WARFARIN 5mg  OTHER CURRENT MEDICATIONS:  WARFARIN  PROGRESS NOTES:   PATIENT INSTRUCTIONS: 10/27/17, tc from [facility] , [place] vna  meter, 7 5 mg  m/w/f, 5 mg others, rech tues, 10/31  rani   ---- Additional Instructions entered on 10/30/2017 3:19 PM EDT by Ximena Paula :  10/30/17, tc from [facility] with vna, unable to do inr tues, will  do inr wed, 11/1  rani  TEST LOCATION: Other - see notes, , ,   INBOUND LAB DATA:  Lab       Lab Value Col Date                 Rpt Date                 Lab  Reference Range  Electronically signed by:  Ashley Turner on 10/30/2017 3:19 PM EDT

## 2018-01-18 NOTE — PROGRESS NOTES
History of Present Illness  Care Coordination Encounter Information:   Type of Encounter: Telephonic   Contact: Follow-Up    Spoke to Patient and Adult Child   Leatha Abbasi and his daughter Erlin Ramirez  Care Coordination  Nurse [facility]: I spoke at length with this family and will be meeting them at the practice to discuss DM  JG      Active Problems    1  Acute colitis (558 9) (K52 9)   2  Acute gastritis (535 00) (K29 00)   3  Anxiety (300 00) (F41 9)   4  Atherosclerosis of arteries of extremities (440 20) (I70 209)   5  Benign essential hypertension (401 1) (I10)   6  Bilateral lower extremity edema (782 3) (R60 0)   7  Bipolar disorder (296 80) (F31 9)   8  Blister of left foot, initial encounter (917 2) (S90 822A)   9  Carotid arterial disease (447 9) (I77 9)   10  Chronic congestive heart failure (428 0) (I50 9)   11  Chronic systolic congestive heart failure (428 22,428 0) (I50 22)   12  Coronary artery disease (414 00) (I25 10)   13  Decreased pedal pulses (785 9) (R09 89)   14  Diabetic gastroparesis associated with type 2 diabetes mellitus (250 60,536 3)    (H68 22,Q43 68)   15  Diabetic ulcer of left foot associated with diabetes mellitus due to underlying condition    (249 80,707 15) (E08 621,L97 529)   16  Diabetic ulcer of right foot associated with diabetes mellitus due to underlying condition    (249 80,707 15) (E08 621,L97 519)   17  Diarrhea (787 91) (R19 7)   18  Dyspepsia (536 8) (R10 13)   19  Encounter for screening colonoscopy (V76 51) (Z12 11)   20  Environmental allergies (V15 09) (Z91 09)   21  Erosive esophagitis (530 19) (K22 10)   22  Esophageal reflux (530 81) (K21 9)   23  Hiatal hernia (553 3) (K44 9)   24  History of mitral valve replacement with mechanical valve (V43 3) (Z95 2)   25  Hyperlipidemia (272 4) (E78 5)   26  Insulin dependent type 2 diabetes mellitus, uncontrolled (250 02,V58 67) (E11 65,Z79 4)   27  Ischemic cardiomyopathy (414 8) (I25 5)   28   Mitral valve prolapse (424  0) (I34 1)   29  Nausea (787 02) (R11 0)   30  Onychomycosis (110 1) (B35 1)   31  Peripheral vascular disease (443 9) (I73 9)   32  PVC's (premature ventricular contractions) (427 69) (I49 3)   33  S/P CABG (coronary artery bypass graft) (V45 81) (Z95 1)   34  Screening for iron deficiency anemia (V78 0) (Z13 0)   35  Ulcer On The Feet Dorsal Cesar Scale 1  (0-5)   36  Ulceration (707 9) (L98 499)   37  Vesicles (709 8) (R23 8)    Past Medical History    1  History of Rotator cuff tear, right (840 4) (M75 101)    Surgical History    1  History of Implantable Cardioverter-Defibrillator   2  History of Mitral Valve Replacement   3  History of Rotator Cuff Repair   4  History of Tonsillectomy    Family History  Mother    1  Family history of diabetes mellitus (V18 0) (Z83 3)   2  Family history of hypertension (V17 49) (Z82 49)   3  Family history of migraine headaches (V17 2) (Z82 0)  Father    4  Family history of Bipolar disorder   5  Family history of hypertension (V17 49) (Z82 49)   6  Family history of malignant melanoma (V16 8) (Z80 8)  Daughter    9  Family history of Bipolar disorder   8  Family history of migraine headaches (V17 2) (Z82 0)  Maternal Grandfather    9  Family history of cardiac disorder (V17 49) (Z82 49)  Family History    10  Family history of arthritis (V17 7) (Z82 61)   11  Family history of cardiac disorder (V17 49) (Z82 49)   12  Family history of depression (V17 0) (Z81 8)    Social History    · Current every day smoker (305 1) (F17 200)   · Daily caffeine consumption, 1 serving a day   · Exercises daily (V49 89) (Z78 9)   · No alcohol use    Current Meds    1  LORazepam 1 MG Oral Tablet; take 1 tablet twice a day as needed; Therapy: 96EPV8393 to (Evaluate:44Ivs4218)  Requested for: 74VMF6089; Last   Rx:82Wkk0620 Ordered    2  Lisinopril 10 MG Oral Tablet; TAKE 1 TABLET DAILY  Requested for: 09IRN3629; Last   MQ:53MST2039 Ordered    3   QUEtiapine Fumarate 400 MG Oral Tablet; take 2 tablets at bedtime; Therapy: 13IKW8419 to (Last Karen Tereza)  Requested for: 20Jun2017 Ordered    4  Klor-Con M20 20 MEQ Oral Tablet Extended Release; Take 1 tablet daily; Therapy: 27OGC0824 to [phone])  Requested for: 07DLR1810; Last   Rx:27Mar2017 Ordered   5  Metoprolol Succinate ER 25 MG Oral Tablet Extended Release 24 Hour; Take 1 tablet   daily; Therapy: 00LOG4083 to [phone])  Requested for: 68VIA0877; Last   Rx:27Mar2017 Ordered   6  Torsemide 20 MG Oral Tablet; Take one tablet daily as needed; Therapy: 11LPD4235 to (Evaluate:57Svo4974)  Requested for: 99OQL3899; Last   Rx:07Jun2017 Ordered    7  Hydrocodone-Acetaminophen 5-325 MG Oral Tablet; TAKE 1 TABLET EVERY 6 HOURS   AS NEEDED; Therapy: 36FSW5030 to (Evaluate:26Yjo3446); Last Rx:80Mnp9371 Ordered    8  Montelukast Sodium 10 MG Oral Tablet; TAKE 1 TABLET DAILY; Therapy: 62Nkw7024 to (Evaluate:68Brv0433)  Requested for: 38LOR8512; Last   Rx:05Jun2017 Ordered    9  Pantoprazole Sodium 40 MG Oral Tablet Delayed Release; Take 1 tablet daily; Therapy: 94SOU4929 to (Last Karen Tereza)  Requested for: 20Jun2017 Ordered    10  Enoxaparin Sodium 80 MG/0 8ML Subcutaneous Solution; take 80 mg sq every 12 hours    as directed by md;    Therapy: 20Jun2017 to (Evaluate:87Hta0519)  Requested for: 02Kln8312; Last    Rx:53Ftn8982; Status: ACTIVE - Retrospective Authorization Ordered    11  Accu-Chek Angelica Device; testing 3 times a day: dx: E11 65; Therapy: 20Uxh8968 to (Last Rx:03Ext0276)  Requested for: 09Ikv2444 Ordered   12  Accu-Chek Angelica Plus In Vitro Strip; TEST 3 TIMES A DAY  dx E11 65; Therapy: 49Eei3192 to (Last Rx:45Zlz5584)  Requested for: 36EPS1847 Ordered   13  Accu-Chek FastClix Lancets Miscellaneous; test 3-4 times a day dx: e11 65; Therapy: 29Oer3113 to (Last Rx:24Apr2017)  Requested for: 24Apr2017 Ordered   14   Lantus SoloStar 100 UNIT/ML Subcutaneous Solution Pen-injector; INJECT 21 UNIT    Bedtime; Therapy: 80RRP4295 to (Last Rx:07Jun2017)  Requested for: 07Jun2017 Ordered    15  Warfarin Sodium 5 MG Oral Tablet; take 1-2 tablets daily or as directed  Requested for:    86JLE3685; Last Rx:09Jun2017 Ordered    16  Ondansetron 8 MG Oral Tablet Disintegrating; put 1 tab under tongue to dissolve every 8    hours as needed for nausea and vomiting; Therapy: (Recorded:06Mar2017) to Recorded    17  BD Pen Needle Short U/F 31G X 8 MM Miscellaneous; Therapy: 18OHJ7343 to Recorded   18  Gabapentin 300 MG Oral Capsule; Therapy: 30CLC1279 to Recorded   19  Lisinopril 20 MG Oral Tablet; TAKE 1 TABLET DAILY; Therapy: 08Apr2017 to (Evaluate:48Drz7594) Recorded   20  MetFORMIN HCl - 500 MG Oral Tablet; Therapy: 13GNE0785 to Recorded    Allergies    1  Aspirin TABS   2  [facility] Omeprazole TBEC   3  Morphine Derivatives    Health Management   COLONOSCOPY; every 2 years; Last 82JCD3475; Next Due: 27Kro7021; Active    End of Encounter Meds    1  LORazepam 1 MG Oral Tablet; take 1 tablet twice a day as needed; Therapy: 73GXW3525 to (Evaluate:58Tnu9872)  Requested for: 38GOV3806; Last   Rx:12Ujv6782 Ordered    2  Lisinopril 10 MG Oral Tablet; TAKE 1 TABLET DAILY  Requested for: 57RAO0077; Last   MP:25MGY7891 Ordered    3  QUEtiapine Fumarate 400 MG Oral Tablet; take 2 tablets at bedtime; Therapy: 16JEL2635 to (Last Dahlia Mansfield)  Requested for: 20Jun2017 Ordered    4  Klor-Con M20 20 MEQ Oral Tablet Extended Release; Take 1 tablet daily; Therapy: 90OSM9222 to (05 12 73 93 30)  Requested for: 88IDC1949; Last   Rx:27Mar2017 Ordered   5  Metoprolol Succinate ER 25 MG Oral Tablet Extended Release 24 Hour; Take 1 tablet   daily; Therapy: 37WEP4534 to (05 12 73 93 30)  Requested for: 32UER7129; Last   Rx:27Mar2017 Ordered   6  Torsemide 20 MG Oral Tablet; Take one tablet daily as needed; Therapy: 70BVM7849 to (Evaluate:75Okq9299)  Requested for: 40BDY8284; Last   Rx:07Jun2017 Ordered    7  Hydrocodone-Acetaminophen 5-325 MG Oral Tablet; TAKE 1 TABLET EVERY 6 HOURS   AS NEEDED; Therapy: 99YKU9638 to (Evaluate:32Fpo4832); Last Rx:03Iuq2293 Ordered    8  Montelukast Sodium 10 MG Oral Tablet; TAKE 1 TABLET DAILY; Therapy: 94Vnr8526 to (Evaluate:44Tvp1815)  Requested for: 90XKH4365; Last   Rx:05Jun2017 Ordered    9  Pantoprazole Sodium 40 MG Oral Tablet Delayed Release; Take 1 tablet daily; Therapy: 37TAA5997 to (Last Karma Courser)  Requested for: 20Jun2017 Ordered    10  Enoxaparin Sodium 80 MG/0 8ML Subcutaneous Solution; take 80 mg sq every 12 hours    as directed by md;    Therapy: 20Jun2017 to (Evaluate:94Cxw1662)  Requested for: 54Fcq4058; Last    Rx:99Ivw2281; Status: ACTIVE - Retrospective Authorization Ordered    11  Accu-Chek Angelica Device; testing 3 times a day: dx: E11 65; Therapy: 69Cso4834 to (Last Rx:31Sux0306)  Requested for: 28Wza4851 Ordered   12  Accu-Chek Angelica Plus In Vitro Strip; TEST 3 TIMES A DAY  dx E11 65; Therapy: 53Ecr1592 to (Last Rx:61Eij5388)  Requested for: 00HRL5830 Ordered   13  Accu-Chek FastClix Lancets Miscellaneous; test 3-4 times a day dx: e11 65; Therapy: 24Apr2017 to (Last Rx:81Obl9579)  Requested for: 12Lpr7695 Ordered   14  Lantus SoloStar 100 UNIT/ML Subcutaneous Solution Pen-injector; INJECT 21 UNIT    Bedtime; Therapy: 11DPT9993 to (Last Rx:07Jun2017)  Requested for: 07Jun2017 Ordered    15  Warfarin Sodium 5 MG Oral Tablet; take 1-2 tablets daily or as directed  Requested for:    33UGB4860; Last Rx:09Jun2017 Ordered    16  Ondansetron 8 MG Oral Tablet Disintegrating; put 1 tab under tongue to dissolve every 8    hours as needed for nausea and vomiting; Therapy: (Recorded:06Mar2017) to Recorded    17  BD Pen Needle Short U/F 31G X 8 MM Miscellaneous; Therapy: 07OQG5700 to Recorded   18  Gabapentin 300 MG Oral Capsule; Therapy: 90MIY3259 to Recorded   19  Lisinopril 20 MG Oral Tablet; TAKE 1 TABLET DAILY;     Therapy: 08Apr2017 to (Evaluate:63Uhf7202) Recorded   20  MetFORMIN HCl - 500 MG Oral Tablet;     Therapy: 51SLU4943 to Recorded    Future Appointments    Date/Time Provider Specialty Site   08/02/2017 01:30 PM Cardiology, Device Remote   [address]   08/02/2017 04:15 PM Andreas Hanson DO Family Medicine [facility]   08/14/2017 02:15 PM Linda Baugh, [address] V[address]  [facility]     Patient Care Team    Care Team Member Role Specialty Office Number   Andreas Hanson DO Referring Family Medicine [phone]   [facility] Specialist Nurse Practitioner [phone]   Cardiology, [address]        Signatures   Electronically signed by : Wei Ramires RN; Jul 28 2017 11:26AM EST                       (Author)

## 2018-01-19 ENCOUNTER — ANTICOAG VISIT (OUTPATIENT)
Dept: CARDIOLOGY CLINIC | Facility: CLINIC | Age: 58
End: 2018-01-19

## 2018-01-19 ENCOUNTER — APPOINTMENT (OUTPATIENT)
Dept: PHYSICAL THERAPY | Facility: CLINIC | Age: 58
End: 2018-01-19
Payer: COMMERCIAL

## 2018-01-22 ENCOUNTER — APPOINTMENT (OUTPATIENT)
Dept: PHYSICAL THERAPY | Facility: CLINIC | Age: 58
End: 2018-01-22
Payer: COMMERCIAL

## 2018-01-22 VITALS
HEIGHT: 73 IN | OXYGEN SATURATION: 97 % | BODY MASS INDEX: 24.52 KG/M2 | WEIGHT: 185 LBS | HEART RATE: 80 BPM | TEMPERATURE: 98.2 F | SYSTOLIC BLOOD PRESSURE: 138 MMHG | DIASTOLIC BLOOD PRESSURE: 80 MMHG

## 2018-01-22 VITALS
HEART RATE: 69 BPM | SYSTOLIC BLOOD PRESSURE: 110 MMHG | OXYGEN SATURATION: 98 % | DIASTOLIC BLOOD PRESSURE: 88 MMHG | TEMPERATURE: 96.9 F

## 2018-01-22 VITALS
TEMPERATURE: 97.9 F | BODY MASS INDEX: 131.8 KG/M2 | OXYGEN SATURATION: 98 % | WEIGHT: 315 LBS | HEART RATE: 60 BPM | SYSTOLIC BLOOD PRESSURE: 138 MMHG | DIASTOLIC BLOOD PRESSURE: 80 MMHG

## 2018-01-22 VITALS
BODY MASS INDEX: 25.87 KG/M2 | WEIGHT: 191 LBS | HEIGHT: 72 IN | TEMPERATURE: 97.9 F | DIASTOLIC BLOOD PRESSURE: 72 MMHG | HEART RATE: 79 BPM | OXYGEN SATURATION: 97 % | SYSTOLIC BLOOD PRESSURE: 120 MMHG

## 2018-01-22 PROCEDURE — 97530 THERAPEUTIC ACTIVITIES: CPT

## 2018-01-22 PROCEDURE — 97110 THERAPEUTIC EXERCISES: CPT

## 2018-01-22 PROCEDURE — G8991 OTHER PT/OT GOAL STATUS: HCPCS

## 2018-01-22 PROCEDURE — G8990 OTHER PT/OT CURRENT STATUS: HCPCS

## 2018-01-23 NOTE — PROGRESS NOTES
REPORT NAME: Progress Notes Report  VISIT DATE: 1/10/2018  VISIT TIME: 3:22 PM EST  PATIENT NAME: Carlos Alberto Good   MEDICAL RECORD NUMBER: [id number]  YOB: 1960  AGE: 61  REFERRING PHYSICIAN: Jeremy Ferris  SUPERVISING CLINICIAN: Susie Toro CARE PROVIDER: Rob SHAW [facility]  PATIENT HOME ADDRESS: [address] [place] [address], [address], [place] [address]  61   PATIENT HOME PHONE: [phone]  SOCIAL SECURITY NUMBER:   DIAGNOSIS 1: Presence of prosthetic heart valve / Z95 2  DIAGNOSIS 2:   INR RANGE: 2 5 - 3 5  INR GOAL: 3  TREATMENT START DATE:   TREATMENT END DATE:   NEXT VISIT: 1/25/2018  [facility]  VISIT RESULTS  ENCOUNTER NUMBER:   TEST LOCATION: [facility]  TEST TYPE: Outside Lab (Venipuncture)  VISIT TYPE: Phone Consult  CURRENT INR: 2 8 PROTIME: 27 8  SPECIMEN COL AND RPT DATE: 1/10/2018 3:22  PM EST  VITAL SIGNS  PULSE:  BP: / WEIGHT:  HEIGHT:  TEMP:   CURRENT ANTICOAGULANT DOSING SCHEDULE  DOSE SIZE: 5mg    ANTICOAGULANT TYPE: WARFARIN  DOSING REGIMEN  Sun       Mon       Tues      Wed       Thurs     Fri       Sat  Total/Wk  5         7 5       5         5         5         5         5         37 5  PATIENT MEDICATION INSTRUCTION: Yes  PATIENT NUTRITIONAL COUNSELING: No  PATIENT BRUISING INSTRUCTION: No  LAST EDUCATION DATE:   PREVIOUS VISIT INFORMATION  VISITDATE  INRGoal INR   Sun    Mon    Tues   Wed    Thurs  Fri    Sat  Total/wk  1/10/2018   3       2 8   5      7 5    5      5      5      5      5  37 5  12/26/2017  3       3 9   5      7 5    5      5      5      5      5  37 5  12/20/2017  3       4 1   5      7 5    5      5      5      7 5    5  40  12/6/2017   3       1 7   0      0      0      0      0      7 5    7 5  15  5      7 5    5      7 5    5      7 5    5  42 5  ADDITIONAL PREVIOUS VISIT INFORMATION  VISITDATE   PRIMARY RX               DOSE      CrCl  1/10/2018   WARFARIN                 5mg  12/26/2017  WARFARIN                 5mg  12/20/2017 WARFARIN                 5mg  12/6/2017   WARFARIN                 5mg  OTHER CURRENT MEDICATIONS:  WARFARIN  PROGRESS NOTES:   ---- Additional Notes entered on 1/19/2018 9:53 AM EST by Criselda Simon 7 :  1/19/18, jarad pt,discussed he should contact his insurance  carrier to inquire if mobile lab for homebound would be covered and if he  would any out of pocket cost  he will have daughter call Monday  rani  PATIENT INSTRUCTIONS: 1/11/18, jarad pt, spoke  with pt and daughter, cont  current dose rech 2 week, 1/25  rani  TEST LOCATION: [facility], ,           ,             INBOUND LAB DATA:  Lab       Lab Value Col Date                 Rpt Date                 Lab  Reference Range  Electronically signed by:  Criselda Turner on 1/19/2018 9:53 AM EST

## 2018-01-23 NOTE — RESULT NOTES
Verified Results  (1) CBC/ PLT (NO DIFF) 85CZL3937 12:00AM [place]     Test Name Result Flag Reference   WBC 9 1 x10E3/uL  3 4-10 8   RBC 4 33 x10E6/uL  4 14-5 80   Hemoglobin 12 2 g/dL L 13 0-17 7   **Please note reference interval change**   Hematocrit 37 2 % L 37 5-51 0   MCV 86 fL  79-97   MCH 28 2 pg  26 6-33 0   MCHC 32 8 g/dL  31 5-35 7   RDW 17 0 % H 12 3-15 4   Platelets 411 H97Y1/HY  150-379     (1) PT WITH INR 79EIT3483 12:00AM [place]     Test Name Result Flag Reference   INR 1 7 H 0 8-1 2   Reference interval is for non-anticoagulated patients                   Suggested INR therapeutic range for Vitamin K                 antagonist therapy:                    Standard Dose (moderate intensity                                   therapeutic range):       2 0 - 3 0                    Higher intensity therapeutic range       2 5 - 3 5   Prothrombin Time 16 9 sec H 9 1-12 0

## 2018-01-23 NOTE — RESULT NOTES
Verified Results  (1) PT WITH INR 88Lpn7082 10:35AM Simón [place]     Test Name Result Flag Reference   INR 3 9 H 0 8-1 2   Reference interval is for non-anticoagulated patients                   Suggested INR therapeutic range for Vitamin K                 antagonist therapy:                    Standard Dose (moderate intensity                                   therapeutic range):       2 0 - 3 0                    Higher intensity therapeutic range       2 5 - 3 5   Prothrombin Time 37 1 sec H 9 1-12 0

## 2018-01-23 NOTE — MISCELLANEOUS
Assessment    1  Benign essential hypertension (401 1) (I10)   2  GI bleed (578 9) (K92 2)   3  Joan-Perez tear (530 7) (K22 6)   4  Anxiety (300 00) (F41 9)   5  Insulin dependent type 2 diabetes mellitus, controlled (250 00,V58 67) (E11 9,Z79 4)   6  Atherosclerosis of arteries of extremities (440 20) (I70 209)   7  Anemia (285 9) (D64 9)    Plan  Anemia    · Ferrous Gluconate 240 (27 Fe) MG Oral Tablet   Rx By: Chelo Rowe; Dispense: 30 Days ; #:30 Tablet; Refill: 5; For: Anemia; CAROLYNE = N; Record   · Ferrous Gluconate 240 (27 Fe) MG Oral Tablet   Rx By: Chelo Rowe; Dispense: 30 Days ; #:30 Tablet; Refill: 0; For: Anemia; CAROLYNE = N; Record  Anxiety    · Citalopram Hydrobromide 20 MG Oral Tablet; TAKE 1/2 TABLET DAILY   Rx By: Chelo Rowe; Dispense: 30 Days ; #:15 Tablet; Refill: 0; For: Anxiety; CAROLYNE = N; Verified Transmission to [facility] 3999; Last Updated By: System, SureScripts; 12/6/2017 12:55:13 PM  Anxiety, Atherosclerosis of arteries of extremities, Benign essential hypertension, Carotid  arterial disease    · (1) CBC/ PLT (NO DIFF); Status:Active; Requested for:73Oni6380;    Perform:[facility] [employer]; [facility]:[id number]; Ordered; For:Anxiety, Atherosclerosis of arteries of extremities, Benign essential hypertension, Carotid arterial disease; Ordered By:Yamel Read;   · (1) PT WITH INR; Status:Active; Requested for:21Hge9356;    Perform:[facility] [employer]; QFW:[id number]; Ordered; For:Anxiety, Atherosclerosis of arteries of extremities, Benign essential hypertension, Carotid arterial disease; Ordered By:Yamel Read;  Chronic insomnia    · Melatonin 3 MG Oral Capsule; 2 tabs daily at bedtime   Rx By: Chelo Rowe; Dispense: 0 Days ; #:60 Capsule;  Refill: 5; For: Chronic insomnia; CAROLYNE = N; Verified Transmission to [facility] 7711; Last Updated By: System, SureScripts; 12/6/2017 12:55:13 PM  Environmental allergies    · Montelukast Sodium 10 MG Oral Tablet   Rx By: Chelo Rowe; Dispense: 30 Days ; #:30 TAB; Refill: 5; For: Environmental allergies; CAROLYNE = N; Sent To: [facility]# 3861  Erosive esophagitis, Esophageal reflux    · Pantoprazole Sodium 40 MG Oral Tablet Delayed Release; take 1 tablet by mouth  twice a day   Rx By: Shaunna Simental; Dispense: 30 Days ; #:60 Tablet Delayed Release; Refill: 1; For: Erosive esophagitis, Esophageal reflux; CAROLYNE = N; Record  Insulin dependent type 2 diabetes mellitus, controlled    · From  Basaglar KwikPen 100 UNIT/ML Subcutaneous Solution Pen-injector  INJECT 21 UNITS BEDTIME To Basaglar KwikPen 100 UNIT/ML Subcutaneous Solution  Pen-injector INJECT 14 UNITS BEDTIME   Rx By: Shaunna Simental; Dispense: 0 Days ; #:1 X 3 ML Pen (5 Pens); Refill: 0; For: Insulin dependent type 2 diabetes mellitus, controlled; CAROLYNE = N; Record   · HumaLOG 100 UNIT/ML Subcutaneous Solution; INJECT 1-3 UNITS AS  DIRECTED 3 TIMES A DAY   Rx By: Shaunna Simental; Dispense: 30 Days ; #:10 ML; Refill: 0; For: Insulin dependent type 2 diabetes mellitus, controlled; CAROLYNE = N; Verified Transmission to [facility]# 0372; Last Updated By: System, SureScripts; 12/6/2017 12:55:13 PM  Rosacea    · MetroNIDAZOLE 0 75 % External Cream   Rx By: Shaunna Simental; Dispense: 0 Days ; #:1 X 45 GM Tube; Refill: 0; For: Rosacea; CAROLYNE = N; Sent To: [facility]# 5460  Status post above knee amputation of left lower extremity    · *1 - [facility]  * prostatic use left leg  Status: Active   Requested for: 48YIT8441   Ordered;  For: Status post above knee amputation of left lower extremity; Ordered By: Shaunna Simental Performed:  Order Comments: Prosthetic use left leg Due: 78JOT9931  Care Summary provided  : Yes    Discussion/Summary  Discussion Summary:   1) SCHEDULE OUTPATIENT PHYSICAL THERAPY-[facility]  2) CONTINUE CITALOPRAM 20MG 1/2 TAB DAILY AND THEN INCREASE TO 1 TAB DAILY PER DR VICTOR  3) KEEP APPOINTMENT WITH GASTROENTEROLOGIST FOR FOLLOW UP   4) VNA EVALUATION 5) KEEP FOLLOW UP WITH DR VICTOR FOR MEDICATION ADJUSTMENT  6) CBC and PT INR drawn today  -INR monitored by Cardiology  7) hypertension: Controlled, continue current medications  8) diabetes type 2: Controlled, next hemoglobin A1c due February 8th  Medication SE Review and Pt Understands Tx: Possible side effects of new medications were reviewed with the patient/guardian today  The treatment plan was reviewed with the patient/guardian  The patient/guardian understands and agrees with the treatment plan      Chief Complaint  Chief Complaint Free Text Note Form: PT IS HERE FOR HIS JAKE FROM [facility]  History of Present Illness  TCM Communication [facility]: The patient is being contacted for follow-up after hospitalization and [facility] records were reviewed  He was hospitalized at [place] [address]  The dates of hospitalization: 11/20-11/29/2017, date of admission: 11/20/2017, date of discharge: 11/29/2017  Diagnosis: GI bleed/Joan-Perez tear  He was discharged to home  Medications reviewed and updated today  He scheduled a follow up appointment  LEFT LEG PAIN Counseling was provided to patient's family  SPOKE WITH PT DAUGHTER  Communication performed and completed by Lynnae Meigs RMA   HPI: Patient is here with his daughter for a follow-up from the recent hospitalization  He had a GI bleed from a Joan-Perez tear  He had an endoscopy in the hospital  He is currently on pantoprazole twice a day  He brought all of his medications with him for review  He had just been started on citalopram by his psychiatrist prior to admission  He had been advised to take citalopram 20 mg 1/2 tablet daily with the goal to increase to full tablet daily  He is also continuing on Seroquel, but has been able to decrease the dose to 400 mg at bedtime  He could not tolerate 200 mg twice a day, the morning dose made him too drowsy    His Coumadin level is controlled by his cardiologist   He has been using his long-acting insulin, Basaglar, 14 units at bedtime  And 1-3 units of short-acting insulin, Humalog 3 times a day  His blood sugars have been around 160s  His most recent hemoglobin A1c on November 8 was 6 2  He has a prescription for physical therapy but is unable to make it up the [place]  His daughter will contact [facility]      Review of Systems  Complete-Male:   Constitutional: feeling tired, but as noted in HPI  Eyes: No complaints of eye pain, no red eyes, no discharge from eyes, no itchy eyes  ENT: no complaints of earache, no hearing loss, no nosebleeds, no nasal discharge, no sore throat, no hoarseness  Cardiovascular: No complaints of slow heart rate, no fast heart rate, no chest pain, no palpitations, no leg claudication, no lower extremity  Respiratory: No complaints of shortness of breath, no wheezing, no cough, no SOB on exertion, no orthopnea or PND  Genitourinary: No complaints of dysuria, no incontinence, no hesitancy, no nocturia, no genital lesion, no testicular pain  Musculoskeletal: limb pain and Phantom leg pain left, but as noted in HPI  Integumentary: No complaints of skin rash or skin lesions, no itching, no skin wound, no dry skin  Neurological: No compliants of headache, no confusion, no convulsions, no numbness or tingling, no dizziness or fainting, no limb weakness, no difficulty walking  Psychiatric: anxiety and depression, but as noted in HPI, not suicidal and no sleep disturbances  Endocrine: No complaints of proptosis, no hot flashes, no muscle weakness, no erectile dysfunction, no deepening of the voice, no feelings of weakness  Hematologic/Lymphatic: Anemia, but as noted in HPI  Active Problems    1  Anemia (285 9) (D64 9)   2  Anxiety (300 00) (F41 9)   3  Atherosclerosis of arteries of extremities (440 20) (I70 209)   4  Benign essential hypertension (401 1) (I10)   5   Bipolar disorder (296 80) (F31 9)   6  Carotid arterial disease (447 9) (I77 9)   7  Chronic insomnia (780 52) (F51 04)   8  Chronic systolic congestive heart failure (428 22,428 0) (I50 22)   9  Coronary artery disease (414 00) (I25 10)   10  Decreased pedal pulses (785 9) (R09 89)   11  Diabetic gastroparesis associated with type 2 diabetes mellitus (250 60,536 3)    (T99 44,Q58 18)   12  Diabetic ulcer of right foot associated with diabetes mellitus due to underlying condition    (249 80,707 15) (E08 621,L97 519)   13  Edema of right lower extremity (782 3) (R60 0)   14  Environmental allergies (V15 09) (Z91 09)   15  Erosive esophagitis (530 19) (K22 10)   16  Esophageal reflux (530 81) (K21 9)   17  Hiatal hernia (553 3) (K44 9)   18  History of mitral valve replacement with mechanical valve (V43 3) (Z95 2)   19  Hyperlipidemia (272 4) (E78 5)   20  Insulin dependent type 2 diabetes mellitus, controlled (250 00,V58 67) (E11 9,Z79 4)   21  Ischemic cardiomyopathy (414 8) (I25 5)   22  Mitral valve prolapse (424 0) (I34 1)   23  Onychomycosis (110 1) (B35 1)   24  Peripheral edema (782 3) (R60 9)   25  Peripheral vascular disease (443 9) (I73 9)   26  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 6) (G54 6)   27  PVC's (premature ventricular contractions) (427 69) (I49 3)   28  Rosacea (695 3) (L71 9)   29  S/P CABG (coronary artery bypass graft) (V45 81) (Z95 1)   30  Status post above knee amputation of left lower extremity (V49 76) (Z89 612)   31  Ulcer On The Feet Dorsal Cesar Scale 1  (0-5)    Past Medical History    1  History of Acute colitis (558 9) (K52 9)   2  History of Amputated great toe of left foot (V49 71) (Z89 412)   3  History of Dyspepsia (536 8) (R10 13)   4  History of acute gastritis (V12 70) (Z87 19)   5  History of diarrhea (V12 79) (Z87 898)   6  History of Rotator cuff tear, right (840 4) (M94 245)    Surgical History    1  History of Amputation Of Leg Above Knee   2   History of Implantable Cardioverter-Defibrillator   3  History of Mitral Valve Replacement   4  History of Rotator Cuff Repair   5  History of Surgery Left Foot Amputation MTP First Toe   6  History of Tonsillectomy  Surgical History Reviewed: The surgical history was reviewed and updated today  Family History  Mother    1  Family history of diabetes mellitus (V18 0) (Z83 3)   2  Family history of hypertension (V17 49) (Z82 49)   3  Family history of migraine headaches (V17 2) (Z82 0)  Father    4  Family history of Bipolar disorder   5  Family history of hypertension (V17 49) (Z82 49)   6  Family history of malignant melanoma (V16 8) (Z80 8)  Daughter    9  Family history of Bipolar disorder   8  Family history of migraine headaches (V17 2) (Z82 0)  Maternal Grandfather    9  Family history of cardiac disorder (V17 49) (Z82 49)  Family History    10  Family history of arthritis (V17 7) (Z82 61)   11  Family history of cardiac disorder (V17 49) (Z82 49)   12  Family history of depression (V17 0) (Z81 8)  Family History Reviewed: The family history was reviewed and updated today  Social History    · Current every day smoker (305 1) (F17 200)   · Daily caffeine consumption, 1 serving a day   · Exercises daily (V49 89) (Z78 9)   · No alcohol use  Social History Reviewed: The social history was reviewed and updated today  The social history was reviewed and is unchanged  Current Meds   1  Accu-Chek Angelica Device; testing 3 times a day: dx: E11 65; Therapy: 06Hcw0759 to (Last Rx:98Bcr3433)  Requested for: 92Hyl5760 Ordered   2  Accu-Chek Angelica Plus In Vitro Strip; TEST 3 TIMES A DAY  dx E11 65; Therapy: 70Cjt3041 to (Last Rx:73Xpa9333)  Requested for: 36ADS3090 Ordered   3  Accu-Chek FastClix Lancets Miscellaneous; test 3-4 times a day dx: e11 65; Therapy: 65Gbx7163 to (Last Rx:92Xjl4510)  Requested for: 88Dum5444 Ordered   4  Atorvastatin Calcium 10 MG Oral Tablet;  Take 1 tablet daily  Requested for: 94EIT5918; Last Rx:16Oct2017 Ordered   5  Basaglar KwikPen 100 UNIT/ML Subcutaneous Solution Pen-injector; INJECT 21 UNITS   BEDTIME; Therapy: 49Yqi7861 to (Last Rx:17Oct2017)  Requested for: 17Oct2017 Ordered   6  BD Pen Needle Short U/F 31G X 8 MM Miscellaneous; use 3 daily as directed dx E11 65; Therapy: 97BLH5480 to (Evaluate:08Jan2018)  Requested for: 02IGK2211; Last   Rx:10Oct2017 Ordered   7  Citalopram Hydrobromide 20 MG Oral Tablet; TAKE 1/2 TABLET DAILY; Therapy: (75 196 772) to Recorded   8  Ferrous Gluconate 240 (27 Fe) MG Oral Tablet; TAKE 1 TABLET DAILY AS DIRECTED; Therapy: (75 196 772) to Recorded   9  Ferrous Gluconate 240 (27 Fe) MG Oral Tablet; take 2 tablet daily as directed; Therapy: 74SGP9510 to ([facility])  Requested for: 08ZIO8999; Last   Rx:08Nov2017 Ordered   10  Gabapentin 300 MG Oral Capsule; take 1 capsule three times a day; Therapy: 01UBH6314 to (Evaluate:08Jan2018); Last Rx:10Oct2017 Ordered   11  HumaLOG 100 UNIT/ML Subcutaneous Solution; INJECT 3 UNITS AS DIRECTED 3    TIMES A DAY; Therapy: 68OXI4427 to (Evaluate:16Nov2017)  Requested for: 62AYM1248; Last    Rx:17Oct2017 Ordered   12  Hydrocodone-Acetaminophen 5-325 MG Oral Tablet; TAKE 1 TABLET EVERY 6 HOURS    AS NEEDED; Therapy: 66BGP4989 to (Evaluate:53Vrl9085); Last Rx:08Nov2017 Ordered   13  Lisinopril 10 MG Oral Tablet; TAKE 1 TABLET DAILY  Requested for: 84PAS8025; Last    Rx:16Oct2017 Ordered   14  LORazepam 1 MG Oral Tablet; take 1 tablet twice a day as needed; Therapy: 48URS7226 to (Evaluate:09Uvc9200)  Requested for: 75LDY6892; Last    Rx:04Yja4985 Ordered   15  Melatonin 3 MG Oral Capsule; 2 tabs daily at bedtime  Requested for: 06YML5687; Last    Rx:17Oct2017 Ordered   16  Metoprolol Succinate ER 25 MG Oral Tablet Extended Release 24 Hour; Take 1 tablet    daily; Therapy: 28YGB1400 to (Evaluate:11Oct2018)  Requested for: 16QDS4274; Last    Rx:16Oct2017 Ordered   17   MetroNIDAZOLE 0 75 % External Cream; APPLY AND GENTLY MASSAGE INTO    AFFECTED AREA(S) ONCE DAILY; Therapy: 61FLH2687 to (Last Rx:08Nov2017)  Requested for: 71KCQ6973 Ordered   18  Montelukast Sodium 10 MG Oral Tablet; TAKE 1 TABLET DAILY; Therapy: 53Yoo3820 to (Evaluate:90Iiq1636)  Requested for: 02AJH7718; Last    Rx:04Mub2826 Ordered   19  QUEtiapine Fumarate 400 MG Oral Tablet; TAKE 1 TABLET AT BEDTIME; Therapy: 26DBU7820 to (Evaluate:46Dfo5503)  Requested for: 92FHW2093; Last    Rx:08Nov2017 Ordered   20  Sulfacetamide Sodium (Acne) 10 % External Lotion; APPLY SPARINGLY TO AFFECTED    AREA(S) TWICE DAILY; Therapy: 12BYX4648 to (Wendy Ortiz)  Requested for: 78OBI6892; Last    Rx:09Nov2017 Ordered   21  Torsemide 20 MG Oral Tablet; Take one tablet daily; Therapy: 46QNQ3892 to (Kori Valentino)  Requested for: 58UKK8793; Last    Rx:10Oct2017 Ordered   22  Warfarin Sodium 5 MG Oral Tablet; take 1-2 tablets daily or as directed  Requested for:    45NQQ1264; Last Rx:16Oct2017 Ordered  Medication List Reviewed: The medication list was reviewed and updated today  Allergies    1  Aspirin TABS   2  [facility] Omeprazole TBEC   3  Morphine Derivatives    Vitals  Signs   Recorded: 21KYO2651 12:12PM   Temperature: 96 9 F  Heart Rate: 69  Systolic: 385  Diastolic: 88  Height Unobtainable: Yes  Weight Unobtainable: Yes  O2 Saturation: 98    Physical Exam    Constitutional   General appearance: No acute distress, well appearing and well nourished  Pulmonary   Respiratory effort: No increased work of breathing or signs of respiratory distress  Cardiovascular   Auscultation of heart: Normal rate and rhythm, normal S1 and S2, without murmurs  Examination of extremities for edema and/or varicosities: Normal     Abdomen   Abdomen: Non-tender, no masses  Liver and spleen: No hepatomegaly or splenomegaly  Musculoskeletal   Gait and station: Abnormal     Digits and nails: Normal without clubbing or cyanosis  Inspection/palpation of joints, bones, and muscles: Abnormal   Above the knee amputation left leg  Psychiatric   Orientation to person, place and time: Normal     Mood and affect: Normal          Health Management  History of Encounter for screening colonoscopy   COLONOSCOPY; every 2 years; Last 04JPD7631; Next Due: 48Jlr9759; Active    Future Appointments    Date/Time Provider Specialty Site   02/12/2018 11:20 AM RAMONITA Grigsby   [address] [id number] [place]      Signatures   Electronically signed by : Alessandra Carson DO; Dec  6 2017 10:55PM EST                       (Author)

## 2018-01-24 ENCOUNTER — OFFICE VISIT (OUTPATIENT)
Dept: PHYSICAL THERAPY | Facility: CLINIC | Age: 58
End: 2018-01-24
Payer: COMMERCIAL

## 2018-01-24 DIAGNOSIS — Z89.612 S/P AKA (ABOVE KNEE AMPUTATION) UNILATERAL, LEFT (HCC): Primary | ICD-10-CM

## 2018-01-24 PROCEDURE — 97110 THERAPEUTIC EXERCISES: CPT | Performed by: PHYSICAL THERAPIST

## 2018-01-24 NOTE — PROGRESS NOTES
Daily Note     Today's date: 2018  Patient name: Theodore Keita  : 1960  MRN: [id number]  Referring provider: Ramón Estes DO  Dx:   Encounter Diagnosis   Name Primary?     S/P AKA (above knee amputation) unilateral, left (HCC) Yes                  Subjective: states that he has been in contact with prosthetist and she has scheduled a home  Visit on       Objective: See treatment diary below      Precautions: DM/CHF/anticoagulants    Daily Treatment Diary     Manual              Hip ext stretch                                                                     Exercise Diary              SLR x 4 RLE 10xea            Bridges 10x10"            SL hip add L 12x            Prone hip ext 10xea            Hip flexor stretch supine w/ 5# L attempted            Prone lying 2'            Partial curl ups 10x5"            R gastroc stretch w/ strap nv                                      Gait training             Wt shift standing                                                                                           Prosthetic mgt                              Modalities                                                           Assessment: difficulty w/ SLR's RLE due to hip weakness; needs continued work on strengthening; needs increased ROM L hip extension      Plan: continue Pt; contact prosthetist re: prosthetic fit

## 2018-01-26 ENCOUNTER — APPOINTMENT (OUTPATIENT)
Dept: PHYSICAL THERAPY | Facility: CLINIC | Age: 58
End: 2018-01-26
Payer: COMMERCIAL

## 2018-01-29 ENCOUNTER — OFFICE VISIT (OUTPATIENT)
Dept: PHYSICAL THERAPY | Facility: CLINIC | Age: 58
End: 2018-01-29
Payer: COMMERCIAL

## 2018-01-29 DIAGNOSIS — Z89.612 S/P AKA (ABOVE KNEE AMPUTATION) UNILATERAL, LEFT (HCC): Primary | ICD-10-CM

## 2018-01-29 PROCEDURE — 97140 MANUAL THERAPY 1/> REGIONS: CPT | Performed by: PHYSICAL THERAPIST

## 2018-01-29 PROCEDURE — 97110 THERAPEUTIC EXERCISES: CPT | Performed by: PHYSICAL THERAPIST

## 2018-01-31 ENCOUNTER — OFFICE VISIT (OUTPATIENT)
Dept: FAMILY MEDICINE CLINIC | Facility: CLINIC | Age: 58
End: 2018-01-31
Payer: COMMERCIAL

## 2018-01-31 ENCOUNTER — APPOINTMENT (OUTPATIENT)
Dept: PHYSICAL THERAPY | Facility: CLINIC | Age: 58
End: 2018-01-31
Payer: COMMERCIAL

## 2018-01-31 ENCOUNTER — TELEPHONE (OUTPATIENT)
Dept: CARDIOLOGY CLINIC | Facility: CLINIC | Age: 58
End: 2018-01-31

## 2018-01-31 VITALS
HEIGHT: 73 IN | SYSTOLIC BLOOD PRESSURE: 120 MMHG | DIASTOLIC BLOOD PRESSURE: 72 MMHG | BODY MASS INDEX: 22.53 KG/M2 | HEART RATE: 67 BPM | TEMPERATURE: 97.3 F | OXYGEN SATURATION: 99 % | WEIGHT: 170 LBS

## 2018-01-31 DIAGNOSIS — E11.40 TYPE 2 DIABETES MELLITUS WITH DIABETIC NEUROPATHY, UNSPECIFIED LONG TERM INSULIN USE STATUS: Chronic | ICD-10-CM

## 2018-01-31 DIAGNOSIS — Z89.612 STATUS POST ABOVE KNEE AMPUTATION OF LEFT LOWER EXTREMITY: ICD-10-CM

## 2018-01-31 DIAGNOSIS — K61.1 PERIRECTAL ABSCESS: Primary | ICD-10-CM

## 2018-01-31 PROCEDURE — 99214 OFFICE O/P EST MOD 30 MIN: CPT | Performed by: FAMILY MEDICINE

## 2018-01-31 RX ORDER — AMOXICILLIN AND CLAVULANATE POTASSIUM 875; 125 MG/1; MG/1
1 TABLET, FILM COATED ORAL EVERY 12 HOURS SCHEDULED
Qty: 20 TABLET | Refills: 0 | Status: SHIPPED | OUTPATIENT
Start: 2018-01-31 | End: 2018-02-10

## 2018-01-31 RX ORDER — HYDROCODONE BITARTRATE AND ACETAMINOPHEN 7.5; 325 MG/1; MG/1
1 TABLET ORAL EVERY 6 HOURS PRN
Qty: 120 TABLET | Refills: 0 | Status: SHIPPED | OUTPATIENT
Start: 2018-01-31 | End: 2018-03-06 | Stop reason: SDUPTHER

## 2018-01-31 NOTE — TELEPHONE ENCOUNTER
Phone call to patient, unable to leave message on cell phone  Called to patient's daughter, Linette Christensen, left message on her cell phone to call our office regarding pt inr testing  Per dr Gennaro Parker, patient would be candidate as medically homebound for mobile lab to draw pt/ inr testing  I had spoken with patient 2 weeks ago regarding arranging for mobile lab, but had suggested he or daughter inquire if there would  be out of cost co pay as per mobile lab suggestion  daughter , Linette Christensen called office, nadja to have mobile lab arranged with professional technicians  Will complete forms  And fax to professional technicians

## 2018-01-31 NOTE — PATIENT INSTRUCTIONS
Start augmentin  1 tab twice a day, pt is on coumadin and due to have checked on 2/6  Contact gastroenterology for evaluation - perirectal abscess  Call if fever, increased pain     Increase hydrocodone to 7 5/325 mg 1 tab 4 times a day as needed

## 2018-01-31 NOTE — PROGRESS NOTES
Assessment/Plan:    No problem-specific Assessment & Plan notes found for this encounter  Needs new prosthetic socket    Diagnoses and all orders for this visit:    Status post above knee amputation of left lower extremity (HCC)  -     Prosthetic  -     HYDROcodone-acetaminophen (NORCO) 7 5-325 mg per tablet; Take 1 tablet by mouth every 6 (six) hours as needed for pain Max Daily Amount: 4 tablets    Perirectal abscess  -     amoxicillin-clavulanate (AUGMENTIN) 875-125 mg per tablet; Take 1 tablet by mouth every 12 (twelve) hours for 10 days      perirectal abscess:  Patient will start her on Augmentin 1 tablet twice a day  Patient is on Coumadin and will monitor his Coumadin levels more frequently while on the antibiotic which is controlled by Cardiology  Patient will schedule with colorectal surgeon or GI for further evaluation of the area  Above the knee amputation left due to diabetic complications:  Patient is trying to work with prosthesis a physical therapy however has a poor fit and will need a new prosthetic socket :    Phantom pain:  Subjective:      Patient ID: Liana Ramirez is a 61 y o  male  Started with symptoms about 7 weeks ago  He did not want to tell anybody  The patient tried to squeeze area and got some mucousy drainage  The area is still sore  Has had anal fissure in the past    Needs new prosthetic socket  He has been unable to work with physical therapy because his prosthetic does not fit properly, with the decrease swelling in his stump there is a gap in the prosthetic socket,  He denies any recent illnesses  His moods have been stable  He continues to have phantom pain in his left leg  The following portions of the patient's history were reviewed and updated as appropriate: allergies, current medications, past family history, past medical history, past social history, past surgical history and problem list     Review of Systems   Constitutional: Negative      HENT: Negative  Cardiovascular: Negative  Gastrointestinal: Positive for rectal pain  Negative for anal bleeding and blood in stool  Musculoskeletal: Positive for gait problem and myalgias  Objective:     Physical Exam   Constitutional: He is oriented to person, place, and time  He appears well-developed and well-nourished  HENT:   Head: Normocephalic and atraumatic  Cardiovascular: Normal rate, regular rhythm and normal heart sounds  Pulmonary/Chest: Effort normal and breath sounds normal    Abdominal: Soft  Bowel sounds are normal    Genitourinary:   Genitourinary Comments: Small Open area right lateral perirectal area, no active drainage  Surrounding erythema   Neurological: He is alert and oriented to person, place, and time  He has normal reflexes  Skin: There is erythema  Psychiatric: He has a normal mood and affect   His behavior is normal  Judgment and thought content normal

## 2018-02-01 ENCOUNTER — TELEPHONE (OUTPATIENT)
Dept: FAMILY MEDICINE CLINIC | Facility: CLINIC | Age: 58
End: 2018-02-01

## 2018-02-01 PROBLEM — I49.3 PVC'S (PREMATURE VENTRICULAR CONTRACTIONS): Status: ACTIVE | Noted: 2017-03-27

## 2018-02-01 PROBLEM — K21.9 ESOPHAGEAL REFLUX: Status: ACTIVE | Noted: 2017-02-27

## 2018-02-01 PROBLEM — I25.5 ISCHEMIC CARDIOMYOPATHY: Status: ACTIVE | Noted: 2017-06-29

## 2018-02-01 PROBLEM — E11.43 DIABETIC GASTROPARESIS ASSOCIATED WITH TYPE 2 DIABETES MELLITUS (HCC): Status: ACTIVE | Noted: 2017-03-13

## 2018-02-01 PROBLEM — I73.9 PERIPHERAL VASCULAR DISEASE (HCC): Status: ACTIVE | Noted: 2017-02-27

## 2018-02-01 PROBLEM — I70.209 ATHEROSCLEROSIS OF ARTERIES OF EXTREMITIES (HCC): Status: ACTIVE | Noted: 2017-04-26

## 2018-02-01 PROBLEM — R60.9 PERIPHERAL EDEMA: Status: ACTIVE | Noted: 2017-10-10

## 2018-02-01 PROBLEM — K31.84 DIABETIC GASTROPARESIS ASSOCIATED WITH TYPE 2 DIABETES MELLITUS (HCC): Status: ACTIVE | Noted: 2017-03-13

## 2018-02-01 PROBLEM — F41.9 ANXIETY: Status: ACTIVE | Noted: 2017-02-27

## 2018-02-01 PROBLEM — E11.9 INSULIN DEPENDENT TYPE 2 DIABETES MELLITUS, CONTROLLED (HCC): Status: ACTIVE | Noted: 2017-11-09

## 2018-02-01 PROBLEM — B35.1 ONYCHOMYCOSIS: Status: ACTIVE | Noted: 2017-03-06

## 2018-02-01 PROBLEM — Z79.4 INSULIN DEPENDENT TYPE 2 DIABETES MELLITUS, CONTROLLED (HCC): Status: ACTIVE | Noted: 2017-11-09

## 2018-02-01 PROBLEM — K22.6 MALLORY-WEISS TEAR: Status: ACTIVE | Noted: 2017-12-06

## 2018-02-01 PROBLEM — F51.04 CHRONIC INSOMNIA: Status: ACTIVE | Noted: 2017-10-10

## 2018-02-01 PROBLEM — I34.1 MITRAL VALVE PROLAPSE: Status: ACTIVE | Noted: 2017-02-27

## 2018-02-01 PROBLEM — L71.9 ROSACEA: Status: ACTIVE | Noted: 2017-11-08

## 2018-02-01 PROBLEM — I10 BENIGN ESSENTIAL HYPERTENSION: Status: ACTIVE | Noted: 2017-02-27

## 2018-02-01 PROBLEM — R09.89 DECREASED PEDAL PULSES: Status: ACTIVE | Noted: 2017-03-29

## 2018-02-01 PROBLEM — R60.0 EDEMA OF RIGHT LOWER EXTREMITY: Status: ACTIVE | Noted: 2017-10-10

## 2018-02-01 PROBLEM — K92.2 ACUTE UPPER GI BLEED: Status: RESOLVED | Noted: 2017-11-15 | Resolved: 2018-02-01

## 2018-02-01 PROBLEM — I77.9 CAROTID ARTERIAL DISEASE (HCC): Status: ACTIVE | Noted: 2017-03-29

## 2018-02-01 PROBLEM — E78.5 HYPERLIPIDEMIA: Status: ACTIVE | Noted: 2017-03-27

## 2018-02-01 PROBLEM — L97.519 DIABETIC ULCER OF RIGHT FOOT ASSOCIATED WITH DIABETES MELLITUS DUE TO UNDERLYING CONDITION (HCC): Status: ACTIVE | Noted: 2017-06-05

## 2018-02-01 PROBLEM — E08.621 DIABETIC ULCER OF RIGHT FOOT ASSOCIATED WITH DIABETES MELLITUS DUE TO UNDERLYING CONDITION (HCC): Status: ACTIVE | Noted: 2017-06-05

## 2018-02-01 PROBLEM — I50.9 CHRONIC CONGESTIVE HEART FAILURE (HCC): Status: ACTIVE | Noted: 2017-03-27

## 2018-02-01 PROBLEM — G54.6 PHANTOM PAIN AFTER AMPUTATION OF LOWER EXTREMITY (HCC): Status: ACTIVE | Noted: 2017-10-10

## 2018-02-01 RX ORDER — ATORVASTATIN CALCIUM 10 MG/1
1 TABLET, FILM COATED ORAL DAILY
COMMUNITY
End: 2018-02-12 | Stop reason: SDUPTHER

## 2018-02-01 RX ORDER — LANCETS
1 EACH MISCELLANEOUS 3 TIMES DAILY
COMMUNITY
Start: 2017-04-24

## 2018-02-01 RX ORDER — WARFARIN SODIUM 5 MG/1
2 TABLET ORAL DAILY
COMMUNITY
Start: 2018-01-05 | End: 2018-02-12 | Stop reason: SDUPTHER

## 2018-02-01 RX ORDER — LISINOPRIL 10 MG/1
10 TABLET ORAL DAILY
Refills: 3 | COMMUNITY
Start: 2017-12-27 | End: 2018-12-21 | Stop reason: SDUPTHER

## 2018-02-01 RX ORDER — CITALOPRAM 20 MG/1
10 TABLET ORAL DAILY
Refills: 2 | COMMUNITY
Start: 2018-01-02 | End: 2018-02-12 | Stop reason: HOSPADM

## 2018-02-01 RX ORDER — SULFACETAMIDE SODIUM 100 MG/ML
1 LOTION TOPICAL 2 TIMES DAILY
Refills: 3 | COMMUNITY
Start: 2017-11-09 | End: 2018-02-12 | Stop reason: HOSPADM

## 2018-02-01 RX ORDER — MONTELUKAST SODIUM 10 MG/1
10 TABLET ORAL DAILY
Refills: 5 | COMMUNITY
Start: 2018-01-02 | End: 2018-03-05 | Stop reason: SDUPTHER

## 2018-02-01 RX ORDER — TORSEMIDE 20 MG/1
1 TABLET ORAL DAILY
COMMUNITY
Start: 2017-03-27 | End: 2018-02-12 | Stop reason: SDUPTHER

## 2018-02-01 RX ORDER — GABAPENTIN 100 MG/1
1 CAPSULE ORAL 3 TIMES DAILY
Refills: 1 | COMMUNITY
Start: 2018-01-02 | End: 2018-02-12 | Stop reason: ALTCHOICE

## 2018-02-01 RX ORDER — BLOOD-GLUCOSE METER
1 EACH MISCELLANEOUS 3 TIMES DAILY
COMMUNITY
Start: 2017-04-24

## 2018-02-01 RX ORDER — METOPROLOL SUCCINATE 25 MG/1
1 TABLET, EXTENDED RELEASE ORAL DAILY
COMMUNITY
Start: 2017-03-27 | End: 2018-02-12 | Stop reason: SDUPTHER

## 2018-02-01 RX ORDER — QUETIAPINE FUMARATE 400 MG/1
400 TABLET, FILM COATED ORAL
Refills: 1 | COMMUNITY
Start: 2018-01-11

## 2018-02-01 RX ORDER — GABAPENTIN 300 MG/1
300 CAPSULE ORAL 3 TIMES DAILY
Refills: 1 | COMMUNITY
Start: 2018-01-11 | End: 2018-02-12 | Stop reason: SDUPTHER

## 2018-02-01 RX ORDER — QUINIDINE GLUCONATE 324 MG
2 TABLET, EXTENDED RELEASE ORAL DAILY
COMMUNITY
Start: 2017-10-10 | End: 2018-02-12 | Stop reason: HOSPADM

## 2018-02-01 RX ORDER — LORAZEPAM 1 MG/1
1 TABLET ORAL 2 TIMES DAILY PRN
COMMUNITY
Start: 2017-07-02

## 2018-02-01 RX ORDER — PANTOPRAZOLE SODIUM 40 MG/1
1 TABLET, DELAYED RELEASE ORAL 2 TIMES DAILY
COMMUNITY
Start: 2017-12-06 | End: 2018-02-12 | Stop reason: SDUPTHER

## 2018-02-02 ENCOUNTER — TELEPHONE (OUTPATIENT)
Dept: FAMILY MEDICINE CLINIC | Facility: CLINIC | Age: 58
End: 2018-02-02

## 2018-02-02 ENCOUNTER — APPOINTMENT (OUTPATIENT)
Dept: PHYSICAL THERAPY | Facility: CLINIC | Age: 58
End: 2018-02-02
Payer: COMMERCIAL

## 2018-02-02 DIAGNOSIS — K61.1 PERIRECTAL ABSCESS: Primary | ICD-10-CM

## 2018-02-02 NOTE — TELEPHONE ENCOUNTER
PLEASE MAKE A ORDER FOR GASTRO  [address], FOR [facility]    NEEDS TO BE FAXED [phone]

## 2018-02-05 ENCOUNTER — OFFICE VISIT (OUTPATIENT)
Dept: PHYSICAL THERAPY | Facility: CLINIC | Age: 58
End: 2018-02-05
Payer: COMMERCIAL

## 2018-02-05 DIAGNOSIS — K61.1 PERIRECTAL ABSCESS: Primary | ICD-10-CM

## 2018-02-05 DIAGNOSIS — Z89.612 S/P AKA (ABOVE KNEE AMPUTATION) UNILATERAL, LEFT (HCC): Primary | ICD-10-CM

## 2018-02-05 PROCEDURE — 97110 THERAPEUTIC EXERCISES: CPT | Performed by: PHYSICAL THERAPIST

## 2018-02-05 PROCEDURE — 97530 THERAPEUTIC ACTIVITIES: CPT | Performed by: PHYSICAL THERAPIST

## 2018-02-05 NOTE — PROGRESS NOTES
Daily Note     Today's date: 2018  Patient name: Kelvin Heard  : 1960  MRN: [id number]  Referring provider: Francisco Basurto DO  Dx:   Encounter Diagnosis   Name Primary?  S/P AKA (above knee amputation) unilateral, left (HCC) Yes                  Subjective: reports that he has a wound on his buttock area that he will need surgery for and which he feels is irritated by the prosthesis - however, upon examination of patient's skin with the prosthesis on and immediately upon doffing it, there are no visible wounds or even unusual redness in that region    Objective: See treatment diary below      Precautions: DM/CHF/anticoagulants    Daily Treatment Diary     Manual             Hip ext stretch             Desensitization/ STM resid  limb  8'                                                      Exercise Diary            SLR x 4 RLE 10xea 5-10ea           Bridges 10x10" 10x5"           SL hip add L 12x 20x           Prone hip ext L 10xea 10x reviewed          Hip flexor stretch supine w/ 5# L attempted np reviewed          Prone lying 2'  5' w/ roll under resid limb          Partial curl ups 10x5" 15x3"           R gastroc stretch w/ strap nv 30"x3                                     Gait training             Wt shift standing                                       Laterality training recognize john  vanilla feet 4x                                                  Prosthetic mgt                              Modalities                                                           Assessment: meet with prosthetist this session to discuss fit of prosthesis, due to hip flexion contracture changes in prosthetic limb alignment are necessary before patient will be able to ambulate with prosthesis; prosthetist will take limb and make necessary changes; meeting scheduled for one wek form today while patient is in therapy to return limb amd complete fitting; next visit has been cancelled to conserve visits, hep reviewed and it has been emphasized to patient the importance of consistently doing home program, especially in regard to hip flexor stretching (patient advised to do stretches 3x daily and to lie prone at times during hte day instead of sitting all day)  Plan: home visit with prosthetist has been scheduled for tomorrow 1/31; will begin gait training when prosthesis has been adjusted/ fitted properly

## 2018-02-06 ENCOUNTER — TELEPHONE (OUTPATIENT)
Dept: CARDIOLOGY CLINIC | Facility: CLINIC | Age: 58
End: 2018-02-06

## 2018-02-06 NOTE — TELEPHONE ENCOUNTER
Phone call from patient, questioning when professional technician will come to his home to draw pt/inr blood test    Paperwork completed for professional technicians was originally  ordered for inr test to be done 2/6/18  Called to professional technicians, spoke to steve, they have patient scheduled for 2/7/18  Called to patient, explained same  Also reminded patient , service will call his daughter tonight between 5 pm  To 8 pm to verify time for mobile lab appointment for tomorrow   Patient understood same

## 2018-02-07 ENCOUNTER — APPOINTMENT (OUTPATIENT)
Dept: PHYSICAL THERAPY | Facility: CLINIC | Age: 58
End: 2018-02-07
Payer: COMMERCIAL

## 2018-02-07 DIAGNOSIS — K61.1 PERIRECTAL ABSCESS: Primary | ICD-10-CM

## 2018-02-09 ENCOUNTER — TELEPHONE (OUTPATIENT)
Dept: CARDIOLOGY CLINIC | Facility: CLINIC | Age: 58
End: 2018-02-09

## 2018-02-09 ENCOUNTER — APPOINTMENT (OUTPATIENT)
Dept: PHYSICAL THERAPY | Facility: CLINIC | Age: 58
End: 2018-02-09
Payer: COMMERCIAL

## 2018-02-09 NOTE — TELEPHONE ENCOUNTER
Phone call to patient regarding pt/inr  Patient was scheduled with professional technicians mobile lab for blood work to be done on 2/7/18  Per patient , there was a conflict of time with doctor appointment and arrival time for technician  appointment was cancelled  Spoke to mulugeta diaz professional technicians, spoke to patient's daughter, Alicia Rivas  Arranged for mobile lab to draw inr on tues,2/13/18

## 2018-02-12 ENCOUNTER — OFFICE VISIT (OUTPATIENT)
Dept: CARDIOLOGY CLINIC | Facility: CLINIC | Age: 58
End: 2018-02-12
Payer: COMMERCIAL

## 2018-02-12 ENCOUNTER — OFFICE VISIT (OUTPATIENT)
Dept: PHYSICAL THERAPY | Facility: CLINIC | Age: 58
End: 2018-02-12
Payer: COMMERCIAL

## 2018-02-12 VITALS — SYSTOLIC BLOOD PRESSURE: 136 MMHG | HEART RATE: 54 BPM | DIASTOLIC BLOOD PRESSURE: 74 MMHG

## 2018-02-12 DIAGNOSIS — I25.10 CORONARY ARTERY DISEASE INVOLVING NATIVE CORONARY ARTERY OF NATIVE HEART WITHOUT ANGINA PECTORIS: Primary | ICD-10-CM

## 2018-02-12 DIAGNOSIS — I49.3 PVC'S (PREMATURE VENTRICULAR CONTRACTIONS): ICD-10-CM

## 2018-02-12 DIAGNOSIS — I73.9 PERIPHERAL ARTERY DISEASE (HCC): ICD-10-CM

## 2018-02-12 DIAGNOSIS — Z95.2 HISTORY OF MITRAL VALVE REPLACEMENT WITH MECHANICAL VALVE: ICD-10-CM

## 2018-02-12 DIAGNOSIS — I10 BENIGN ESSENTIAL HYPERTENSION: ICD-10-CM

## 2018-02-12 DIAGNOSIS — I50.22 CHRONIC SYSTOLIC CONGESTIVE HEART FAILURE (HCC): Chronic | ICD-10-CM

## 2018-02-12 DIAGNOSIS — Z89.612 S/P AKA (ABOVE KNEE AMPUTATION) UNILATERAL, LEFT (HCC): Primary | ICD-10-CM

## 2018-02-12 DIAGNOSIS — I25.5 ISCHEMIC CARDIOMYOPATHY: ICD-10-CM

## 2018-02-12 PROCEDURE — 97116 GAIT TRAINING THERAPY: CPT | Performed by: PHYSICAL THERAPIST

## 2018-02-12 PROCEDURE — 99214 OFFICE O/P EST MOD 30 MIN: CPT | Performed by: INTERNAL MEDICINE

## 2018-02-12 PROCEDURE — 97110 THERAPEUTIC EXERCISES: CPT | Performed by: PHYSICAL THERAPIST

## 2018-02-12 NOTE — PROGRESS NOTES
Cardiology Follow Up    Eric Huerta  1960  [id number]  HEART & VASCULAR  [facility] Jodi Duque  [address]  [address] [address]    1  Coronary artery disease involving native coronary artery of native heart without angina pectoris     2  Peripheral artery disease ([address] )     3  Benign essential hypertension     4  Ischemic cardiomyopathy     5  PVC's (premature ventricular contractions)     6  Chronic systolic congestive heart failure ([address] )     7  History of mitral valve replacement with mechanical valve         Interval History:     Mr Bryce Mcpherson given his history of CAD and mitral valve disease  Zeferino Powers has a history of a mechanical mitral valve for what sounded like mitral valve prolapse  He also was found to have coronary artery disease at that time and had coronary bypass grafting as well  He told me in subsequent years he is received stenting to his coronary arteries  He also has a history of defibrillator, placed a little over a year ago  He had all of his care in [place], as he lived there all his life, but relocated to this area to live with his daughter  He states to me that his mitral valve replacement and CABG was in 2001  Unfortunately, we have no records of his prior history  His prior cardiologist, he tells me, closed his practice due to insurance fraud  He also carries a history of peripheral arterial disease  At our first visit I got an echocardiogram that showed his ejection fraction was 40% with regional wall motion abnormalities in the inferior and inferior lateral beach  Zeferino Powers has had several issues from a noncardiac standpoint  He has had on and off issues with nausea, vomiting and GI bleeding  He is been worked up for this without any significant findings  He will actually be going through more procedures in the near future, and we will be bridging his Coumadin  He has also had on and off atypical chest pains   Some of the pain sound GI related, and other sound musculoskeletal  We was in the hospital for GI bleeding I assessed this same chest pain  This has for the most part resolved as he has not had any issues with bleeding or chest/abdominal pains  Since I last saw him he was in the hospital for upper GI bleeding, which has now resolved  He is tolerating Coumadin at this time  He also battles peripheral arterial disease, uncontrolled diabetes, peripheral neuropathy and nonhealing diabetic foot ulcers  Since I last saw him had an extensive hospitalization for worsening lower extremity ulcers and gangrene  He eventually required a left-sided above-the-knee amputation  He went through extensive rehabilitation and is now home getting home health and physical therapy  Jose Dowd feels about the same from a cardiac standpoint  His chest pains have resolved  He does have chronic exertional shortness of breath, this is unchanged  He has intermittent edema of his right lower extremity, but it is improved  He denies any orthopnea, PND or any other signs of CHF  He denies any palpitations, lightheadedness or syncope  From a cardiac standpoint he appears stable       Patient Active Problem List   Diagnosis    Hypertension    Bipolar disorder ([facility]    Diabetes mellitus ([facility] )    Hiatal hernia    Erosive esophagitis    Coronary artery disease    Pacemaker    Chronic systolic congestive heart failure ([facility] )    Peripheral artery disease (HCC)    History of mitral valve replacement with mechanical valve    Bipolar 1 disorder, depressed (HCC)    Bullous dermatosis    GI bleed    Status post above knee amputation of left lower extremity (HCC)    Perirectal abscess    Anxiety    Atherosclerosis of arteries of extremities (HCC)    Benign essential hypertension    Carotid arterial disease (HCC)    Chronic congestive heart failure (HCC)    Chronic insomnia    Decreased pedal pulses    Diabetic gastroparesis associated with type 2 diabetes mellitus ([facility] )    Diabetic ulcer of right foot associated with diabetes mellitus due to underlying condition ([facility] )    Edema of right lower extremity    Esophageal reflux    Hyperlipidemia    Insulin dependent type 2 diabetes mellitus, controlled ([facility])    Ischemic cardiomyopathy    Joan-Perez tear    Mitral valve prolapse    Onychomycosis    Peripheral edema    Peripheral vascular disease ([facility])    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)    PVC's (premature ventricular contractions)    Rosacea     Past Medical History:   Diagnosis Date    Anticoagulated on Coumadin     Mechanical MVR    Anxiety     Bipolar 1 disorder ([facility])     CAD (coronary artery disease)     Chronic kidney disease     left kideny being monitored    Chronic systolic congestive heart failure ([facility] ) 4/18/2017    Colitis 4/2/2017    COPD (chronic obstructive pulmonary disease) ([facility])     Diabetes mellitus ([facility])     Difficulty urinating     DM type 2 with diabetic peripheral neuropathy ([facility])     Hiatal hernia     Hyperlipidemia     Hypertension     Ischemic cardiomyopathy     Myocardial infarction     Pacemaker     PAD (peripheral artery disease) ([facility])     Rectal bleed     Vomiting      Social History     Social History    Marital status:      Spouse name: N/A    Number of children: 1    Years of education: N/A     Occupational History    Not on file       Social History Main Topics    Smoking status: Current Some Day Smoker     Packs/day: 0 50     Types: Cigarettes    Smokeless tobacco: Never Used    Alcohol use No    Drug use: No    Sexual activity: Not on file     Other Topics Concern    Not on file     Social History Narrative    No narrative on file      Family History   Problem Relation Age of Onset    Hypertension Mother     Diabetes Father      Past Surgical History:   Procedure Laterality Date    AMPUTATION ABOVE KNEE (AKA) Left 8/24/2017    Procedure: AMPUTATION ABOVE KNEE (AKA); Surgeon: Ken Heart MD;  [facility];  Service: Vascular    CARDIAC DEFIBRILLATOR PLACEMENT      CORONARY ARTERY BYPASS GRAFT      ESOPHAGOGASTRODUODENOSCOPY N/A 4/20/2017    Procedure: ESOPHAGOGASTRODUODENOSCOPY (EGD); Surgeon: Aaliyah Bright MD;  [facility];  Service:     ESOPHAGOGASTRODUODENOSCOPY N/A 5/26/2017    Procedure: ESOPHAGOGASTRODUODENOSCOPY (EGD); Surgeon: Joshua Raman MD;  [facility];  Service:     ESOPHAGOGASTRODUODENOSCOPY N/A 11/16/2017    Procedure: ESOPHAGOGASTRODUODENOSCOPY (EGD); Surgeon: Anny Grewal MD;  [facility];   Service: Gastroenterology    MITRAL VALVE REPLACEMENT      Mechanical    TOE AMPUTATION Left 7/21/2017    Procedure: PARTIAL FIRST RAY AMPUTATION; EXCISION OF WOUND W/ TOE FLAP CLOSURE;  Surgeon: Marva Chang DPM;  [facility];  Service: Podiatry    TONSILLECTOMY      VAC DRESSING APPLICATION Left 2/82/6287    Procedure: VAC application;  Surgeon: Marva Chang DPM;  [facility];  Service: Podiatry    WOUND DEBRIDEMENT Left 8/19/2017    Procedure: wound debridement with washout; resection of left 1st metatarsal;  Surgeon: Marva Chang DPM;  [facility];  Service: Podiatry       Current Outpatient Prescriptions:     ACCU-CHEK FASTCLIX LANCETS MISC, 1 Units by Does not apply route 3 (three) times a day, Disp: , Rfl:     atorvastatin (LIPITOR) 10 mg tablet, Take 1 tablet by mouth daily with dinner, Disp: , Rfl: 0    Blood Glucose Monitoring Suppl (ACCU-CHEK APPLE PLUS) w/Device KIT, 1 Units by Does not apply route 3 (three) times a day, Disp: , Rfl:     glucose blood (ACCU-CHEK APPLE PLUS) test strip, 1 Units by In Vitro route 3 (three) times a day, Disp: , Rfl:     HYDROcodone-acetaminophen (NORCO) 7 5-325 mg per tablet, Take 1 tablet by mouth every 6 (six) hours as needed for pain Max Daily Amount: 4 tablets, Disp: 120 tablet, Rfl: 0    insulin glargine ([employer] KWIKPEN) injection pen 100 units/mL, Inject 14 Units under the skin daily at bedtime, Disp: , Rfl:     insulin lispro (HUMALOG) 100 units/mL injection, Inject 3 Units under the skin 3 (three) times a day, Disp: , Rfl:     Insulin Pen Needle (B-D ULTRAFINE III SHORT PEN) 31G X 8 MM MISC, 1 Units by Does not apply route 3 (three) times a day, Disp: , Rfl:     LORazepam (ATIVAN) 1 mg tablet, Take 1 tablet by mouth 2 (two) times a day as needed, Disp: , Rfl:     metoprolol succinate (TOPROL-XL) 25 mg 24 hr tablet, Take 25 mg by mouth daily, Disp: , Rfl:     metroNIDAZOLE (METROCREAM) 0 75 % cream, Apply 1 application topically daily, Disp: , Rfl:     montelukast (SINGULAIR) 10 mg tablet, Take 10 mg by mouth daily, Disp: , Rfl: 5    pantoprazole (PROTONIX) 40 mg tablet, Take 1 tablet by mouth 2 (two) times a day before meals, Disp: 60 tablet, Rfl: 3    QUEtiapine (SEROquel) 400 MG tablet, Take 400 mg by mouth daily at bedtime, Disp: , Rfl: 1    torsemide (DEMADEX) 20 mg tablet, Take 1 tablet by mouth daily, Disp: 30 tablet, Rfl: 0    warfarin (COUMADIN) 5 mg tablet, Resume alternating 5mg and 7 5mg dosing upon discharge  Dr Laura Mobley office will adjust as needed  , Disp: 30 tablet, Rfl: 0    gabapentin (NEURONTIN) 300 mg capsule, Take 3 capsules by mouth 3 (three) times a day for 30 days, Disp: 270 capsule, Rfl: 0    lisinopril (ZESTRIL) 10 mg tablet, Take 10 mg by mouth daily, Disp: , Rfl: 3  Allergies   Allergen Reactions    Aspirin Other (See Comments)     Received ASA & bleed out    Heparin      Please see 8/2017 admit = 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    Morphine Other (See Comments)     Gets red streak up arm above IV site    Omeprazole Diarrhea       Labs:  Lab Results   Component Value Date     11/27/2017     10/31/2017    K 4 1 11/27/2017    K 5 3 (H) 10/31/2017     11/27/2017     10/31/2017    CO2 25 11/27/2017    CO2 23 10/31/2017 BUN 26 (H) 11/27/2017    BUN 27 (H) 10/31/2017    CREATININE 1 20 11/27/2017    CREATININE 1 14 10/31/2017    GLUCOSE 154 (H) 11/27/2017    GLUCOSE 152 (H) 10/31/2017    CALCIUM 8 9 11/27/2017    CALCIUM 9 8 10/31/2017     Lab Results   Component Value Date    CHOL 83 08/11/2017    CHOL 135 05/30/2017    TRIG 86 08/11/2017    TRIG 156 (H) 05/30/2017    HDL 36 (L) 08/11/2017    HDL 37 (L) 05/30/2017     Imaging: No results found  Review of Systems:  Review of Systems   Constitutional: Negative  HENT: Negative  Eyes: Negative  Respiratory: Negative  Cardiovascular: Positive for leg swelling  Gastrointestinal: Negative  Musculoskeletal: Negative  Skin: Negative  Allergic/Immunologic: Negative  Neurological: Positive for numbness  Hematological: Negative  Psychiatric/Behavioral: Negative  All other systems reviewed and are negative  Physical Exam:  Physical Exam   Constitutional: He is oriented to person, place, and time  He appears well-developed and well-nourished  HENT:   Head: Normocephalic and atraumatic  Eyes: EOM are normal  Pupils are equal, round, and reactive to light  Right eye exhibits no discharge  Left eye exhibits no discharge  No scleral icterus  Neck: Normal range of motion  Neck supple  No JVD present  No thyromegaly present  Cardiovascular: Normal rate, regular rhythm, S1 normal, S2 normal and normal heart sounds  No extrasystoles are present  Exam reveals decreased pulses  Exam reveals no gallop and no friction rub  No murmur heard  Normal mechanical S1 sound   Pulmonary/Chest: Effort normal  No respiratory distress  He has decreased breath sounds  He has no wheezes  He has no rales  Abdominal: Soft  Bowel sounds are normal  He exhibits no distension  There is no tenderness  Musculoskeletal: Normal range of motion  He exhibits no edema, tenderness or deformity  Neurological: He is alert and oriented to person, place, and time   No cranial nerve deficit  Skin: Skin is warm and dry  No rash noted  Psychiatric: He has a normal mood and affect  Judgment and thought content normal        Discussion/Summary:    1  Mechanical mitral valve replacement - Mr Pereira had this performed back in 2001  His recent echocardiogram shows a normally functioning mitral valve replacement  He does take antibiotic prophylaxis for any dental procedures  Any procedures, particularly the ones he will be going through from a GI perspective, will require IV heparin or Lovenox bridging if Coumadin needs to be held  We will see him back in 6 months  2  Chronic systolic congestive heart failure - His ejection fraction by our echo shows a value of 40%  He has an ischemic cardiomyopathy with inferior and inferior lateral wall motion abnormalities  We will continue the same dose of torsemide  I've asked him to adhere to a low sodium diet, follow his weight and we will follow blood work closely  We will see him back in 4 months  3  CAD - He has had prior CABG, the time of his mitral valve replacement, and prior stenting he tells me  He is still recovering from an above-the-knee amputation and we will continue to let him rehabilitate  We will follow-up in 6 months and at some point get updated ischemic testing  He is without symptoms of angina  4  HTN - His blood pressure is well-controlled  He will remain on the same doses of lisinopril and Toprol  We will see him back in 4 months  5  ICD - He will continue to be followed by our device clinic  6  PAD - He is now status post above-the-knee amputation  He will continue to rehabilitate with physical therapy  He will also continue to follow with vascular surgery

## 2018-02-12 NOTE — PROGRESS NOTES
Daily Note     Today's date: 2018  Patient name: Rafal Milton  : 1960  MRN: [id number]  Referring provider: Ratna Wood DO  Dx:   Encounter Diagnosis   Name Primary?  S/P AKA (above knee amputation) unilateral, left (HCC) Yes                  Subjective: expresses no new complaints    Objective: See treatment diary below      Precautions: DM/CHF/anticoagulants    Daily Treatment Diary     Manual            Hip ext stretch   np          Desensitization/ STM resid  limb  8' np                                                     Exercise Diary           SLR x 4 RLE 10xea 5-10ea  np         Bridges 10x10" 10x5"  np         SL hip add L 12x 20x  np         Prone hip ext L 10xea 10x reviewed np         Hip flexor stretch supine w/ 5# L attempted np reviewed np         Prone lying 2'  5' w/ roll under resid limb np         Partial curl ups 10x5" 15x3"  np         R gastroc stretch w/ strap nv 30"x3  np                      Gait training ll bars    8'x6  CG , cues         Gait training w/ walker    15'  CG , cues         Wt shift standing w/ prosthesis    10x  10"                                   Laterality training recognize john  vanilla feet 4x                                                  Prosthetic mgt                              Modalities                                                           Assessment: received prosthesis w/ modifications completed (angle changed to accommodate hip flexion contracture)  initiated gait training w/ prosthesis, wt shift in stance w/ walker; patient instructed to use gluteal mm to extend prosthetic knee and to stabilize leg in stance  Emphasized importance of continuing w/ exercises at home  Instructed to wear prosthesis 3x daily and work on wt shift in stance but not to ambulate with it    Plan: continue Pt; emphasis on gait training

## 2018-02-13 ENCOUNTER — TELEPHONE (OUTPATIENT)
Dept: FAMILY MEDICINE CLINIC | Facility: CLINIC | Age: 58
End: 2018-02-13

## 2018-02-13 LAB — INR PPP: 2.7 (ref 0.86–1.16)

## 2018-02-13 NOTE — TELEPHONE ENCOUNTER
PT told he needs a script for a "tuben" which he discribed as a rubber piece for his leg that h'es supposed to put a sock over

## 2018-02-13 NOTE — TELEPHONE ENCOUNTER
SPOKE TO SOY, SHE WILL TALK TO HER DAD AND CHECK WITH THE COMPANY AND HAVE THEM FAX FOR THE SUPPLY      YOU CAN COMPLETE THIS THEN

## 2018-02-14 ENCOUNTER — APPOINTMENT (OUTPATIENT)
Dept: PHYSICAL THERAPY | Facility: CLINIC | Age: 58
End: 2018-02-14
Payer: COMMERCIAL

## 2018-02-14 ENCOUNTER — ANTICOAG VISIT (OUTPATIENT)
Dept: CARDIOLOGY CLINIC | Facility: CLINIC | Age: 58
End: 2018-02-14

## 2018-02-14 LAB
INR PPP: 2.7 (ref 0.8–1.2)
PROTHROMBIN TIME: 26.4 SEC (ref 9.1–12)

## 2018-02-19 ENCOUNTER — OFFICE VISIT (OUTPATIENT)
Dept: PHYSICAL THERAPY | Facility: CLINIC | Age: 58
End: 2018-02-19
Payer: COMMERCIAL

## 2018-02-19 ENCOUNTER — TRANSCRIBE ORDERS (OUTPATIENT)
Dept: FAMILY MEDICINE CLINIC | Facility: CLINIC | Age: 58
End: 2018-02-19

## 2018-02-19 ENCOUNTER — TELEPHONE (OUTPATIENT)
Dept: FAMILY MEDICINE CLINIC | Facility: CLINIC | Age: 58
End: 2018-02-19

## 2018-02-19 DIAGNOSIS — Z89.612 S/P AKA (ABOVE KNEE AMPUTATION) UNILATERAL, LEFT (HCC): Primary | ICD-10-CM

## 2018-02-19 PROCEDURE — 97140 MANUAL THERAPY 1/> REGIONS: CPT | Performed by: PHYSICAL THERAPIST

## 2018-02-19 PROCEDURE — 97116 GAIT TRAINING THERAPY: CPT | Performed by: PHYSICAL THERAPIST

## 2018-02-19 PROCEDURE — 97110 THERAPEUTIC EXERCISES: CPT | Performed by: PHYSICAL THERAPIST

## 2018-02-19 PROCEDURE — 97530 THERAPEUTIC ACTIVITIES: CPT | Performed by: PHYSICAL THERAPIST

## 2018-02-19 RX ORDER — SULFACETAMIDE SODIUM 100 MG/ML
LOTION TOPICAL
COMMUNITY
Start: 2018-02-15 | End: 2018-08-06 | Stop reason: SDUPTHER

## 2018-02-19 NOTE — PROGRESS NOTES
Daily Note     Today's date: 2018  Patient name: Lita Coats  : 1960  MRN: [id number]  Referring provider: Yamila Carrington DO  Dx:   Encounter Diagnosis   Name Primary?  S/P AKA (above knee amputation) unilateral, left ([facility] ) Yes       Start Time: 1116  Stop Time: 1210  Total time in clinic (min): 54 minutes    Subjective: reports still some phantom pain around L ankle but decreased; states he has been massaging limb at home and that has helped    Objective: See treatment diary below      Precautions: DM/CHF/anticoagulants    Daily Treatment Diary     Manual          Hip ext stretch   np  manual        Desensitization/ STM resid  limb  8' np  performed                                                   Exercise Diary          SLR x 4 RLE 10xea 5-10ea  np         Bridges 10x10" 10x5"  np         SL hip add L 12x 20x  np         Prone hip ext L 10xea 10x reviewed np         Hip flexor stretch supine w/ 5# L attempted np reviewed np reviewed  5 min         Prone lying 2'  5' w/ roll under resid limb np hep        Partial curl ups 10x5" 15x3"  np np        R gastroc stretch w/ strap nv 30"x3  np np                     Gait training ll bars    8'x6  CG , cues 8' x 5  CG  cues        Gait training w/ walker    15'  CG , cues np        Wt shift standing w/ prosthesis    10x  10" 10- 20x cues                                  Laterality training recognize john  vanilla feet 4x np np np                                               Prosthetic mgt     performed                         Modalities                                                           Assessment: unable to manually lock prosthetic knee - worked on standing wt shift w/ walker and ambulation in ll bars; redness noted at distal limb upon doffing prosthesis today - recommend adding 2 ply sock (currently wearing single 5 ply)  Has reported doing hip extension exercises at home but not doing hip flexor stretches - instructed again in supine hip flexor stretch w/ R knee to chest and 5# wt on distal residual limb    Plan: continue Pt; emphasis on gait training; consult w/ prosthetist re: manual lock on knee

## 2018-02-19 NOTE — TELEPHONE ENCOUNTER
DAUGHTER CALLED REQUESTING SOME TYPE OF TUBE FOR PAMELA'S PROSTHESIS  I CALLED [email or web address] WHERE HE GETS HIS SUPPLIES AND THEY STATED TO JUST PUT IN AN ORDER FOR ABOVE THE KNEE PROSTETHIC LINERS AND SUSPENSION SLEEVE AND THEY WOULD TAKE CARE OF CONTACTING THE PATIENT TO GET THE CORRECT ITEM      PLEASE FAX TO [employer]  [phone]

## 2018-02-20 LAB — INR PPP: 3.2 (ref 0.86–1.16)

## 2018-02-21 ENCOUNTER — OFFICE VISIT (OUTPATIENT)
Dept: PHYSICAL THERAPY | Facility: CLINIC | Age: 58
End: 2018-02-21
Payer: COMMERCIAL

## 2018-02-21 ENCOUNTER — ANTICOAG VISIT (OUTPATIENT)
Dept: CARDIOLOGY CLINIC | Facility: CLINIC | Age: 58
End: 2018-02-21

## 2018-02-21 DIAGNOSIS — Z89.612 S/P AKA (ABOVE KNEE AMPUTATION) UNILATERAL, LEFT (HCC): Primary | ICD-10-CM

## 2018-02-21 PROCEDURE — 97116 GAIT TRAINING THERAPY: CPT | Performed by: PHYSICAL THERAPIST

## 2018-02-21 PROCEDURE — 97110 THERAPEUTIC EXERCISES: CPT | Performed by: PHYSICAL THERAPIST

## 2018-02-21 PROCEDURE — 97530 THERAPEUTIC ACTIVITIES: CPT | Performed by: PHYSICAL THERAPIST

## 2018-02-21 NOTE — PROGRESS NOTES
Daily Note     Today's date: 2018  Patient name: Nikunj Aguero  : 1960  MRN: [id number]  Referring provider: Eddi Santana DO  Dx:   Encounter Diagnosis   Name Primary?  S/P AKA (above knee amputation) unilateral, left (HCC) Yes                  Subjective: reports he has been doing hip flexor stretch at home    Objective: See treatment diary below      Precautions: DM/CHF/anticoagulants    Daily Treatment Diary     Manual         Hip ext stretch   np  manual np       Desensitization/ STM resid  limb  8' np  performed np                                                  Exercise Diary         SLR x 4 RLE 10xea 5-10ea  np         Bridges 10x10" 10x5"  np         SL hip add L 12x 20x  np         Prone hip ext L 10xea 10x reviewed np         Hip flexor stretch supine w/ 5# L attempted np reviewed np reviewed  5 min         Prone lying 2'  5' w/ roll under resid limb np hep        Partial curl ups 10x5" 15x3"  np np        R gastroc stretch w/ strap nv 30"x3  np np                     Gait training ll bars    8'x6  CG , cues 8' x 5  CG  cues 8' x4  CG cues       Gait training w/ walker    15'  CG , cues np 6'  Cg/ cues       Wt shift standing w/ prosthesis    10x  10" 10- 20x cues 10-20x  Cues  walker                                 Laterality training recognize john  vanilla feet 4x np np np                                               Prosthetic mgt     performed performed                        Modalities                                                           Assessment: added 2 ply socks and no longer had redness or discomfort distal femur; c/o pain distal lateral femur; socket gaps at anterior-proximal aspect; still unable to progress ambulation outside ll bars due to not able to operate mechanical lock on knee; met with prosthetist at end of today's session - is taking leg back to lab to adjust cable for lock and to make adjustments to varus/valgus angle of socket to reduce distal lateral femur pressure    Plan: continue PT

## 2018-02-22 LAB
INR PPP: 3.2 (ref 0.8–1.2)
PROTHROMBIN TIME: 30.8 SEC (ref 9.1–12)

## 2018-02-26 ENCOUNTER — OFFICE VISIT (OUTPATIENT)
Dept: PHYSICAL THERAPY | Facility: CLINIC | Age: 58
End: 2018-02-26
Payer: COMMERCIAL

## 2018-02-26 DIAGNOSIS — Z89.612 S/P AKA (ABOVE KNEE AMPUTATION) UNILATERAL, LEFT (HCC): Primary | ICD-10-CM

## 2018-02-26 PROCEDURE — 97530 THERAPEUTIC ACTIVITIES: CPT | Performed by: PHYSICAL THERAPIST

## 2018-02-26 PROCEDURE — 97116 GAIT TRAINING THERAPY: CPT | Performed by: PHYSICAL THERAPIST

## 2018-02-26 NOTE — PROGRESS NOTES
Daily Note     Today's date: 2018  Patient name: Alvarado Carter  : 1960  MRN: [id number]  Referring provider: Alphonsus Merlin, DO  Dx:   Encounter Diagnosis   Name Primary?  S/P AKA (above knee amputation) unilateral, left ([facility] [address] ) Yes       Start Time: 1118          Subjective: states that over the weekend he was getting OOB and leaned on the WC - WC slid and he fell with the WC on top of him; is reporting rib pain today; this morning he was sitting in the WC in the kitchen and when he turned in the chair he fell out onto the floor - states that a nut is missing form one of the WC locks; presents to therapy today with a different WC  reviewed exercises to be performed at home - emphasis on hip extension    Objective: See treatment diary below    Assessment: prosthetist returned leg this morning after making needed modifications and loosening cable for lock; was able to ambulate w/ prosthesis and walker w/ CG only with knee locked; socket was too tight - decreased sock ply to 5 with improved fit/ comfort; reports pressure primarily on area of ischial tuberosity    Plan: continue PT    Precautions: DM/CHF/anticoagulants    Daily Treatment Diary     Manual        Hip ext stretch   np  manual np np      Desensitization/ STM resid  limb  8' np  performed np np                                                 Exercise Diary        SLR x 4 RLE 10xea 5-10ea  np   np      Bridges 10x10" 10x5"  np   np      SL hip add L 12x 20x  np   np      Prone hip ext L 10xea 10x reviewed np   np      Hip flexor stretch supine w/ 5# L attempted np reviewed np reviewed  5 min   np      Prone lying 2'  5' w/ roll under resid limb np hep  hep      Partial curl ups 10x5" 15x3"  np np  np      R gastroc stretch w/ strap nv 30"x3  np np  np                   Gait training ll bars    8'x6  CG , cues 8' x 5  CG  cues 8' x4  CG cues 8' x2      Gait training w/ walker 15'  CG , cues np 6'  CG / cues 30-40' x2  60-70'x1      Wt shift standing w/ prosthesis    10x  10" 10- 20x cues 10-20x  Cues  walker  1-2'                               Laterality training recognize john  vanilla feet 4x np np np np np np                                            Prosthetic mgt     performed performed  performed                      Modalities

## 2018-02-27 LAB — INR PPP: 4.3 (ref 0.86–1.16)

## 2018-02-28 ENCOUNTER — APPOINTMENT (OUTPATIENT)
Dept: RADIOLOGY | Facility: CLINIC | Age: 58
End: 2018-02-28
Payer: COMMERCIAL

## 2018-02-28 ENCOUNTER — OFFICE VISIT (OUTPATIENT)
Dept: PHYSICAL THERAPY | Facility: CLINIC | Age: 58
End: 2018-02-28

## 2018-02-28 ENCOUNTER — APPOINTMENT (OUTPATIENT)
Dept: PHYSICAL THERAPY | Facility: CLINIC | Age: 58
End: 2018-02-28
Payer: COMMERCIAL

## 2018-02-28 ENCOUNTER — ANTICOAG VISIT (OUTPATIENT)
Dept: CARDIOLOGY CLINIC | Facility: CLINIC | Age: 58
End: 2018-02-28

## 2018-02-28 ENCOUNTER — OFFICE VISIT (OUTPATIENT)
Dept: URGENT CARE | Facility: CLINIC | Age: 58
End: 2018-02-28
Payer: COMMERCIAL

## 2018-02-28 ENCOUNTER — TELEPHONE (OUTPATIENT)
Dept: CARDIOLOGY CLINIC | Facility: CLINIC | Age: 58
End: 2018-02-28

## 2018-02-28 VITALS
BODY MASS INDEX: 24.92 KG/M2 | HEART RATE: 65 BPM | TEMPERATURE: 97.1 F | WEIGHT: 188 LBS | SYSTOLIC BLOOD PRESSURE: 140 MMHG | OXYGEN SATURATION: 98 % | RESPIRATION RATE: 18 BRPM | HEIGHT: 73 IN | DIASTOLIC BLOOD PRESSURE: 86 MMHG

## 2018-02-28 DIAGNOSIS — R07.81 PAINFUL RIB: Primary | ICD-10-CM

## 2018-02-28 DIAGNOSIS — Z89.612 S/P AKA (ABOVE KNEE AMPUTATION) UNILATERAL, LEFT (HCC): Primary | ICD-10-CM

## 2018-02-28 LAB
INR PPP: 4.3 (ref 0.8–1.2)
PROTHROMBIN TIME: 40.5 SEC (ref 9.1–12)

## 2018-02-28 PROCEDURE — 71111 X-RAY EXAM RIBS/CHEST4/> VWS: CPT

## 2018-02-28 PROCEDURE — G0382 LEV 3 HOSP TYPE B ED VISIT: HCPCS | Performed by: NURSE PRACTITIONER

## 2018-02-28 PROCEDURE — 99283 EMERGENCY DEPT VISIT LOW MDM: CPT | Performed by: NURSE PRACTITIONER

## 2018-02-28 RX ORDER — GABAPENTIN 300 MG/1
300 CAPSULE ORAL 3 TIMES DAILY
Refills: 1 | COMMUNITY
Start: 2018-02-16 | End: 2018-03-29

## 2018-02-28 NOTE — PROGRESS NOTES
NAME: Romeo Neff is a 61 y o  male  : 1960    MRN: [id number]      Assessment and Plan   Painful rib [R07 81]  1  Painful rib  X-ray ribs bilateral 4+ vw with PA chest       Fran Flaherty was seen today for rib injury  Diagnoses and all orders for this visit:    Painful rib  -     X-ray ribs bilateral 4+ vw with PA chest    Discussed with pt and family for further evaluation and to go to Er, refused at this time and discussed to take tylenol for the symptoms and to use pillow  Discussed importance of taking deep breaths for risk of pneumonia and pt repeatly stating he can't  Offered to go to the ER for further evaluation due to being on coumadin and risks and pt refused  Patient Instructions   Patient Instructions   Follow up with pcp   Go to ER if symptoms worse, pt refused to go now,   vss  Pt left with daughter    Proceed to ER if symptoms worsen  Chief Complaint     Chief Complaint   Patient presents with    Rib Injury     b/l rib pain; on  night while reaching for wheelchair - wheelchair fell on patient injurying ribs; hurts on inspiration; pain 8/10         History of Present Illness       Patient here today with bilateral rib pain  Symptoms have been present for the last 3 days after reaching over for his wheelchair and stain wheelchair fell on top of him  Patient has 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 and is on Coumadin  Denies hitting his head  Daughter is at bedside and appears frustrated  Today patient was at physical therapy complaining about the pain when taking a deep breath  On arrival patient was in wheelchair a lot talk in full sentences and states has left and right rib pain at the base when taking a deep inspiration  Patient is a long-time smoker and is able to smoke but states painful to take a deep breath  No hematomas or contusions noted appears to have tension with his daughter at bedside     Denies hitting his head        Review of Systems   Review of Systems   HENT: Negative  Respiratory: Negative for cough  Cardiovascular: Negative for chest pain and palpitations  Musculoskeletal: Positive for gait problem  Skin: Negative            Current Medications       Current Outpatient Prescriptions:     ACCU-CHEK FASTCLIX LANCETS MISC, 1 Units by Does not apply route 3 (three) times a day, Disp: , Rfl:     atorvastatin (LIPITOR) 10 mg tablet, Take 1 tablet by mouth daily with dinner, Disp: , Rfl: 0    Blood Glucose Monitoring Suppl (ACCU-CHEK APPLE PLUS) w/Device KIT, 1 Units by Does not apply route 3 (three) times a day, Disp: , Rfl:     gabapentin (NEURONTIN) 300 mg capsule, Take 3 capsules by mouth 3 (three) times a day for 30 days, Disp: 270 capsule, Rfl: 0    gabapentin (NEURONTIN) 300 mg capsule, Take 300 mg by mouth 3 (three) times a day, Disp: , Rfl: 1    glucose blood (ACCU-CHEK APPLE PLUS) test strip, 1 Units by In Vitro route 3 (three) times a day, Disp: , Rfl:     HYDROcodone-acetaminophen (NORCO) 7 5-325 mg per tablet, Take 1 tablet by mouth every 6 (six) hours as needed for pain Max Daily Amount: 4 tablets, Disp: 120 tablet, Rfl: 0    insulin glargine (BASAGLAR KWIKPEN) injection pen 100 units/mL, Inject 14 Units under the skin daily at bedtime, Disp: , Rfl:     insulin lispro (HUMALOG) 100 units/mL injection, Inject 3 Units under the skin 3 (three) times a day, Disp: , Rfl:     Insulin Pen Needle (B-D ULTRAFINE III SHORT PEN) 31G X 8 MM MISC, 1 Units by Does not apply route 3 (three) times a day, Disp: , Rfl:     lisinopril (ZESTRIL) 10 mg tablet, Take 10 mg by mouth daily, Disp: , Rfl: 3    LORazepam (ATIVAN) 1 mg tablet, Take 1 tablet by mouth 2 (two) times a day as needed, Disp: , Rfl:     metoprolol succinate (TOPROL-XL) 25 mg 24 hr tablet, Take 25 mg by mouth daily, Disp: , Rfl:     metroNIDAZOLE (METROCREAM) 0 75 % cream, Apply 1 application topically daily, Disp: , Rfl:     montelukast (SINGULAIR) 10 mg tablet, Take 10 mg by mouth daily, Disp: , Rfl: 5    pantoprazole (PROTONIX) 40 mg tablet, Take 1 tablet by mouth 2 (two) times a day before meals, Disp: 60 tablet, Rfl: 3    QUEtiapine (SEROquel) 400 MG tablet, Take 400 mg by mouth daily at bedtime, Disp: , Rfl: 1    Sulfacetamide Sodium, Acne, 10 % LOTN, , Disp: , Rfl:     torsemide (DEMADEX) 20 mg tablet, Take 1 tablet by mouth daily, Disp: 30 tablet, Rfl: 0    warfarin (COUMADIN) 5 mg tablet, Resume alternating 5mg and 7 5mg dosing upon discharge  Dr Mal Cowan office will adjust as needed  , Disp: 30 tablet, Rfl: 0    Current Allergies     Allergies as of 02/28/2018 - Reviewed 02/28/2018   Allergen Reaction Noted    Aspirin Other (See Comments)     Heparin  11/22/2017    Morphine Other (See Comments) 04/02/2017    Omeprazole Diarrhea             The following portions of the patient's history were reviewed and updated as appropriate: allergies, current medications, past family history, past medical history, past social history, past surgical history and problem list     Objective   /86   Pulse 65   Temp (!) 97 1 °F (36 2 °C) (Tympanic)   Resp 18   Ht 6' 1" (1 854 m)   Wt 85 3 kg (188 lb)   SpO2 98%   BMI 24 80 kg/m²      Physical Exam     Physical Exam   Constitutional: He appears well-developed  He is cooperative  HENT:   Head: Normocephalic  Cardiovascular: Normal rate  Pace maker    Pulmonary/Chest: No accessory muscle usage  No apnea and no bradypnea  No respiratory distress  He has no decreased breath sounds  He has no wheezes  He has no rhonchi  He has no rales  Pain when taking a deep breath, no wheezing noted, no rhonchi noted   Abdominal: Soft  Normal appearance and bowel sounds are normal  He exhibits no distension, no abdominal bruit, no ascites and no mass  There is no hepatosplenomegaly, splenomegaly or hepatomegaly  There is no tenderness  There is no rigidity, no rebound, no guarding and no CVA tenderness     Musculoskeletal: Legs:  Pt has b/l right discomfort more anteriorly  No bruising no swelling no deformity  Neurological: He is alert  Skin: Skin is warm and dry  No abrasion, no bruising, no ecchymosis, no laceration, no lesion and no rash noted  He is not diaphoretic  There is pallor (slightly pale, normal per daughter)         Fredna Bile, CRNP

## 2018-02-28 NOTE — PROGRESS NOTES
Daily Note / DC Summary    Today's date: 2018  Patient name: Vahid Arias  : 1960  MRN: [id number]  Referring provider: David Ayers,   Dx:   Encounter Diagnosis   Name Primary?  S/P AKA (above knee amputation) unilateral, left ([address] ) Yes     ADDENDUM: 3-6-18  Per patient's daughter, patient was seen at urgent care after last therapy visit and x-rays were (-) for rib fracture; patient's daughter states that her father has continued to c/o severe pain, feels something is wrong; ended up in hospital, was prescribed Ativan and Dc'd  Patient's daughter expressed concern and frustration regarding patient's mental health - history of bipolar disorder and depression - was given phone number for [place] [phone] [facility] and encouraged to call them  Further, daughter reports that when patient was released form hospital, home health nurse was sent to home and PT ordered  Patient will be Dc'd from outpatient PT at this time due to initiation of home health services  Patient's daughter was advised of this situation and stated that her father wishes to continue outpatient therapy at this office as he feels it was helping him and he was improving  She was told that when home health services are no loner needed he may return here to resume outpatient therapy                 Subjective: patient presents to therapy c/o severe B chest pain - states that he thinks he cracked his ribs when he flee last weekend and pain is not subsiding; is unsure how much he can do in therapy due to pain    Objective: See treatment diary below    Assessment: severe tenderness to touch B chest wall; patient advised to go to urgent care center for assessment; held therapy today due to pain would interfere w/ ambulation    Plan: continue PT as tolerated pending results of medical consult    Precautions: DM/CHF/anticoagulants    Daily Treatment Diary     Manual        Hip ext stretch   np manual np np      Desensitization/ STM resid  limb  8' np  performed np np                                                 Exercise Diary  1/24 1/29 2/5 2/12 2/19 2/21 2/26      SLR x 4 RLE 10xea 5-10ea  np   np      Bridges 10x10" 10x5"  np   np      SL hip add L 12x 20x  np   np      Prone hip ext L 10xea 10x reviewed np   np      Hip flexor stretch supine w/ 5# L attempted np reviewed np reviewed  5 min   np      Prone lying 2'  5' w/ roll under resid limb np hep  hep      Partial curl ups 10x5" 15x3"  np np  np      R gastroc stretch w/ strap nv 30"x3  np np  np                   Gait training ll bars    8'x6  CG , cues 8' x 5  CG  cues 8' x4  CG cues 8' x2      Gait training w/ walker    15'  CG , cues np 6'  CG / cues 30-40' x2  60-70'x1      Wt shift standing w/ prosthesis    10x  10" 10- 20x cues 10-20x  Cues  walker  1-2'                               Laterality training recognize john  vanilla feet 4x np np np np np np                                            Prosthetic mgt     performed performed  performed                      Modalities

## 2018-03-01 ENCOUNTER — OFFICE VISIT (OUTPATIENT)
Dept: FAMILY MEDICINE CLINIC | Facility: CLINIC | Age: 58
End: 2018-03-01
Payer: COMMERCIAL

## 2018-03-01 ENCOUNTER — TELEPHONE (OUTPATIENT)
Dept: FAMILY MEDICINE CLINIC | Facility: CLINIC | Age: 58
End: 2018-03-01

## 2018-03-01 VITALS
TEMPERATURE: 98.3 F | HEART RATE: 70 BPM | HEIGHT: 73 IN | SYSTOLIC BLOOD PRESSURE: 132 MMHG | OXYGEN SATURATION: 98 % | WEIGHT: 188 LBS | DIASTOLIC BLOOD PRESSURE: 80 MMHG | BODY MASS INDEX: 24.92 KG/M2

## 2018-03-01 DIAGNOSIS — Z79.4 INSULIN DEPENDENT TYPE 2 DIABETES MELLITUS, CONTROLLED (HCC): ICD-10-CM

## 2018-03-01 DIAGNOSIS — E11.40 TYPE 2 DIABETES MELLITUS WITH DIABETIC NEUROPATHY, WITH LONG-TERM CURRENT USE OF INSULIN (HCC): ICD-10-CM

## 2018-03-01 DIAGNOSIS — E11.9 INSULIN DEPENDENT TYPE 2 DIABETES MELLITUS, CONTROLLED (HCC): ICD-10-CM

## 2018-03-01 DIAGNOSIS — G62.9 NEUROPATHY: Primary | ICD-10-CM

## 2018-03-01 DIAGNOSIS — I10 ESSENTIAL HYPERTENSION: Chronic | ICD-10-CM

## 2018-03-01 DIAGNOSIS — B35.4 TINEA CORPORIS: ICD-10-CM

## 2018-03-01 DIAGNOSIS — Z79.4 TYPE 2 DIABETES MELLITUS WITH DIABETIC NEUROPATHY, WITH LONG-TERM CURRENT USE OF INSULIN (HCC): ICD-10-CM

## 2018-03-01 DIAGNOSIS — I10 BENIGN ESSENTIAL HYPERTENSION: ICD-10-CM

## 2018-03-01 LAB
INR PPP: 1.55 (ref 0.86–1.16)
SL AMB POCT HEMOGLOBIN AIC: 6.7

## 2018-03-01 PROCEDURE — 83036 HEMOGLOBIN GLYCOSYLATED A1C: CPT | Performed by: FAMILY MEDICINE

## 2018-03-01 PROCEDURE — 99214 OFFICE O/P EST MOD 30 MIN: CPT | Performed by: FAMILY MEDICINE

## 2018-03-01 RX ORDER — VALACYCLOVIR HYDROCHLORIDE 1 G/1
1000 TABLET, FILM COATED ORAL 3 TIMES DAILY
Qty: 21 TABLET | Refills: 0 | Status: SHIPPED | OUTPATIENT
Start: 2018-03-01 | End: 2018-05-25 | Stop reason: ALTCHOICE

## 2018-03-01 RX ORDER — GABAPENTIN 600 MG/1
600 TABLET ORAL 3 TIMES DAILY
Qty: 90 TABLET | Refills: 0 | Status: SHIPPED | OUTPATIENT
Start: 2018-03-01 | End: 2018-04-01 | Stop reason: SDUPTHER

## 2018-03-01 NOTE — PROGRESS NOTES
Assessment/Plan:      Diagnoses and all orders for this visit:    Neuropathy  -     valACYclovir (VALTREX) 1,000 mg tablet; Take 1 tablet (1,000 mg total) by mouth 3 (three) times a day for 7 days    Insulin dependent type 2 diabetes mellitus, controlled ([facility])  -     POCT hemoglobin A1c    Tinea corporis  -     nystatin-triamcinolone (MYCOLOG-II) cream; Apply topically 2 (two) times a day    Benign essential hypertension    Essential hypertension    Type 2 diabetes mellitus with diabetic neuropathy, with long-term current use of insulin ([facility] )  -     Ambulatory referral to Neurology; Future  -     Ambulatory referral to Endocrinology; Future  -     gabapentin (NEURONTIN) 600 MG tablet; Take 1 tablet (600 mg total) by mouth 3 (three) times a day  -     POCT hemoglobin A1c        Neuropathy:  Patient has exquisite sensitivity to light touch over T5 dermatome on the left  He will start valacyclovir 1 gram 3 times a day and observe for any rash development  Possible beginning of shingles  Diabetes type 2:  Hemoglobin A1c in office today 6 7  He will continue with this current insulin dosages  Hypertension:  Controlled, continue current medication     Rash on face:  Likely has a fungal component  Will prescribe nystatin triamcinolone to apply a small amount twice a day for no longer than a week  Diabetic neuropathy:  Increase gabapentin, currently 500 milligrams 3 times a day to gradually increase to 600 milligrams 3 times a day  Still awaiting for prosthesis  Subjective:     Patient ID: Otis Barba is a 61 y o  male  Patient complains of skin sensitivity only on left side of rib  The symptoms started yesterday and he is very sensitive to light touch even the clothing bothers him  Patient started with cold symptoms about 3 days ago, worse over 2 days  Some coughing, feverish feeling  He denies any shortness of breath  He complains of an ongoing rash on his face beside his nose    He has been using the sulfa cream regularly without much improvement  The area is itchy  Review of Systems   Constitutional: Negative  HENT: Positive for congestion  Eyes: Negative  Respiratory: Positive for cough  Cardiovascular: Negative  Gastrointestinal: Negative  Endocrine: Negative  Genitourinary: Negative  Musculoskeletal:        Above the knee amputation left leg   Skin: Negative for rash  Skin sensitivity over left rib   Allergic/Immunologic: Negative  Neurological: Negative  Hematological: Negative  Psychiatric/Behavioral: Negative  The following portions of the patient's history were reviewed and updated as appropriate: allergies, current medications, past family history, past medical history, past social history, past surgical history and problem list     Objective:  Vitals:    03/01/18 1141   BP: 132/80   Pulse: 70   Temp: 98 3 °F (36 8 °C)   SpO2: 98%      Physical Exam   Constitutional: He is oriented to person, place, and time  He appears well-developed and well-nourished  HENT:   Head: Normocephalic and atraumatic  Right Ear: External ear normal    Left Ear: External ear normal    Nose: Nose normal    Mouth/Throat: Oropharynx is clear and moist    Neck: Normal range of motion  Neck supple  Cardiovascular: Normal rate, regular rhythm, normal heart sounds and intact distal pulses  Pulmonary/Chest: Effort normal and breath sounds normal    Abdominal: Soft  Bowel sounds are normal    Musculoskeletal:   Above the knee amputation on the left   Neurological: He is alert and oriented to person, place, and time  Skin:   Skin sensitivity to light touch over the T5 dermatome on left only   Psychiatric: He has a normal mood and affect   His behavior is normal  Judgment and thought content normal

## 2018-03-01 NOTE — TELEPHONE ENCOUNTER
Received call from [employer]  She was just in the office earlier today with her Dad  She is very concerned about him  Has only been in his life for 1 year and he is a mess  Has been in the Hospital 11 times in the last year and does not take care of himself at all  In addition to his physical condition he has mental health issues as well  She feels that he is a danger to himself and had considered 302'ing him  Pt has both a Psychiatrist and a psychologist  My advice to her was to contact his psychiatrist  I shared the the information with Dr David Goins

## 2018-03-01 NOTE — PATIENT INSTRUCTIONS
Increase gabapentin 300mg 1 tab am, pm and 2 tabs at bedtime, 1 week   Then 300mg 2 tabs in am 1 mid day then 2 tabs at bedtime for 1 week  300mg 2 tabs 3 times a day   valcyclovir 1g 1 tab 3 times a day observe for any rash  appt with Dr Ghulam Jacinto

## 2018-03-02 ENCOUNTER — APPOINTMENT (EMERGENCY)
Dept: CT IMAGING | Facility: HOSPITAL | Age: 58
End: 2018-03-02
Payer: COMMERCIAL

## 2018-03-02 ENCOUNTER — ANTICOAG VISIT (OUTPATIENT)
Dept: CARDIOLOGY CLINIC | Facility: CLINIC | Age: 58
End: 2018-03-02

## 2018-03-02 ENCOUNTER — TELEPHONE (OUTPATIENT)
Dept: CARDIOLOGY CLINIC | Facility: CLINIC | Age: 58
End: 2018-03-02

## 2018-03-02 ENCOUNTER — HOSPITAL ENCOUNTER (EMERGENCY)
Facility: HOSPITAL | Age: 58
Discharge: HOME | End: 2018-03-02
Attending: EMERGENCY MEDICINE
Payer: COMMERCIAL

## 2018-03-02 VITALS
OXYGEN SATURATION: 97 % | HEIGHT: 73 IN | SYSTOLIC BLOOD PRESSURE: 118 MMHG | WEIGHT: 188 LBS | TEMPERATURE: 97.5 F | BODY MASS INDEX: 24.92 KG/M2 | RESPIRATION RATE: 16 BRPM | DIASTOLIC BLOOD PRESSURE: 85 MMHG | HEART RATE: 77 BPM

## 2018-03-02 DIAGNOSIS — R11.2 NAUSEA, VOMITING AND DIARRHEA: Primary | ICD-10-CM

## 2018-03-02 DIAGNOSIS — R19.7 NAUSEA, VOMITING AND DIARRHEA: Primary | ICD-10-CM

## 2018-03-02 DIAGNOSIS — S20.212A CHEST WALL CONTUSION, LEFT, INITIAL ENCOUNTER: ICD-10-CM

## 2018-03-02 LAB
ALBUMIN SERPL BCP-MCNC: 3.7 G/DL (ref 3.5–5)
ALP SERPL-CCNC: 144 U/L (ref 46–116)
ALT SERPL W P-5'-P-CCNC: 18 U/L (ref 12–78)
ANION GAP SERPL CALCULATED.3IONS-SCNC: 14 MMOL/L (ref 4–13)
AST SERPL W P-5'-P-CCNC: 18 U/L (ref 5–45)
BASOPHILS # BLD AUTO: 0.02 THOUSANDS/ΜL (ref 0–0.1)
BASOPHILS NFR BLD AUTO: 0 % (ref 0–1)
BILIRUB SERPL-MCNC: 0.9 MG/DL (ref 0.2–1)
BUN SERPL-MCNC: 19 MG/DL (ref 5–25)
CALCIUM SERPL-MCNC: 8.8 MG/DL (ref 8.3–10.1)
CHLORIDE SERPL-SCNC: 102 MMOL/L (ref 100–108)
CO2 SERPL-SCNC: 23 MMOL/L (ref 21–32)
CREAT SERPL-MCNC: 1.18 MG/DL (ref 0.6–1.3)
EOSINOPHIL # BLD AUTO: 0 THOUSAND/ΜL (ref 0–0.61)
EOSINOPHIL NFR BLD AUTO: 0 % (ref 0–6)
ERYTHROCYTE [DISTWIDTH] IN BLOOD BY AUTOMATED COUNT: 15.1 % (ref 11.6–15.1)
GFR SERPL CREATININE-BSD FRML MDRD: 68 ML/MIN/1.73SQ M
GLUCOSE SERPL-MCNC: 240 MG/DL (ref 65–140)
HCT VFR BLD AUTO: 35 % (ref 36.5–49.3)
HGB BLD-MCNC: 11.5 G/DL (ref 12–17)
LIPASE SERPL-CCNC: 93 U/L (ref 73–393)
LYMPHOCYTES # BLD AUTO: 1.16 THOUSANDS/ΜL (ref 0.6–4.47)
LYMPHOCYTES NFR BLD AUTO: 9 % (ref 14–44)
MCH RBC QN AUTO: 29.5 PG (ref 26.8–34.3)
MCHC RBC AUTO-ENTMCNC: 32.9 G/DL (ref 31.4–37.4)
MCV RBC AUTO: 90 FL (ref 82–98)
MONOCYTES # BLD AUTO: 0.7 THOUSAND/ΜL (ref 0.17–1.22)
MONOCYTES NFR BLD AUTO: 5 % (ref 4–12)
NEUTROPHILS # BLD AUTO: 11.72 THOUSANDS/ΜL (ref 1.85–7.62)
NEUTS SEG NFR BLD AUTO: 86 % (ref 43–75)
PLATELET # BLD AUTO: 246 THOUSANDS/UL (ref 149–390)
PMV BLD AUTO: 12.4 FL (ref 8.9–12.7)
POTASSIUM SERPL-SCNC: 4.4 MMOL/L (ref 3.5–5.3)
PROT SERPL-MCNC: 8.1 G/DL (ref 6.4–8.2)
RBC # BLD AUTO: 3.9 MILLION/UL (ref 3.88–5.62)
SODIUM SERPL-SCNC: 139 MMOL/L (ref 136–145)
TROPONIN I SERPL-MCNC: <0.02 NG/ML
WBC # BLD AUTO: 13.6 THOUSAND/UL (ref 4.31–10.16)

## 2018-03-02 PROCEDURE — 84484 ASSAY OF TROPONIN QUANT: CPT | Performed by: EMERGENCY MEDICINE

## 2018-03-02 PROCEDURE — 36415 COLL VENOUS BLD VENIPUNCTURE: CPT | Performed by: EMERGENCY MEDICINE

## 2018-03-02 PROCEDURE — 80053 COMPREHEN METABOLIC PANEL: CPT | Performed by: EMERGENCY MEDICINE

## 2018-03-02 PROCEDURE — 99284 EMERGENCY DEPT VISIT MOD MDM: CPT

## 2018-03-02 PROCEDURE — 83690 ASSAY OF LIPASE: CPT | Performed by: EMERGENCY MEDICINE

## 2018-03-02 PROCEDURE — 74176 CT ABD & PELVIS W/O CONTRAST: CPT

## 2018-03-02 PROCEDURE — 85025 COMPLETE CBC W/AUTO DIFF WBC: CPT | Performed by: EMERGENCY MEDICINE

## 2018-03-02 PROCEDURE — 93005 ELECTROCARDIOGRAM TRACING: CPT | Performed by: EMERGENCY MEDICINE

## 2018-03-02 PROCEDURE — 96372 THER/PROPH/DIAG INJ SC/IM: CPT

## 2018-03-02 PROCEDURE — 71250 CT THORAX DX C-: CPT

## 2018-03-02 RX ORDER — ONDANSETRON 2 MG/ML
4 INJECTION INTRAMUSCULAR; INTRAVENOUS ONCE
Status: DISCONTINUED | OUTPATIENT
Start: 2018-03-02 | End: 2018-03-02

## 2018-03-02 RX ORDER — ONDANSETRON 4 MG/1
4 TABLET, ORALLY DISINTEGRATING ORAL EVERY 8 HOURS PRN
Qty: 20 TABLET | Refills: 0 | Status: SHIPPED | OUTPATIENT
Start: 2018-03-02 | End: 2018-10-08 | Stop reason: ALTCHOICE

## 2018-03-02 RX ORDER — ONDANSETRON 4 MG/1
4 TABLET, ORALLY DISINTEGRATING ORAL ONCE
Status: COMPLETED | OUTPATIENT
Start: 2018-03-02 | End: 2018-03-02

## 2018-03-02 RX ORDER — FENTANYL CITRATE 50 UG/ML
50 INJECTION, SOLUTION INTRAMUSCULAR; INTRAVENOUS ONCE
Status: DISCONTINUED | OUTPATIENT
Start: 2018-03-02 | End: 2018-03-02

## 2018-03-02 RX ORDER — MORPHINE SULFATE 4 MG/ML
6 INJECTION, SOLUTION INTRAMUSCULAR; INTRAVENOUS ONCE
Status: COMPLETED | OUTPATIENT
Start: 2018-03-02 | End: 2018-03-02

## 2018-03-02 RX ORDER — LORAZEPAM 1 MG/1
1 TABLET ORAL ONCE
Status: COMPLETED | OUTPATIENT
Start: 2018-03-02 | End: 2018-03-02

## 2018-03-02 RX ORDER — TRAMADOL HYDROCHLORIDE 50 MG/1
50 TABLET ORAL EVERY 6 HOURS PRN
Qty: 20 TABLET | Refills: 0 | Status: SHIPPED | OUTPATIENT
Start: 2018-03-02 | End: 2018-03-07

## 2018-03-02 RX ORDER — LIDOCAINE 50 MG/G
1 PATCH TOPICAL EVERY 24 HOURS
Qty: 30 PATCH | Refills: 0 | Status: SHIPPED | OUTPATIENT
Start: 2018-03-02 | End: 2018-05-25 | Stop reason: ALTCHOICE

## 2018-03-02 RX ADMIN — ONDANSETRON 4 MG: 4 TABLET, ORALLY DISINTEGRATING ORAL at 04:14

## 2018-03-02 RX ADMIN — LORAZEPAM 1 MG: 1 TABLET ORAL at 04:56

## 2018-03-02 RX ADMIN — MORPHINE SULFATE 6 MG: 4 INJECTION, SOLUTION INTRAMUSCULAR; INTRAVENOUS at 04:22

## 2018-03-02 NOTE — TELEPHONE ENCOUNTER
received pt/inr results from 3/1/18 ,1 55  Noted in chart patient in [facility] er awaiting discharge home today  Attempted calling patient, unable to reach patient, received message on phone, not able to connect  Called to patient's daughter, Johnny Piña  She is aware patient in [facility] er awaiting discharge home  She states she feels patient is threat to himself, he has stated he would hurt himself, has made suicidal threats  She states she has tried contacting patient's psychiatrist yesterday at [employer], but has not heard back  Instructed daughter to call [facility] er  And speak to er doctor regarding her concerns about patient current mental health  Also instructed to call his psychiatrist again to discuss her concerns  Gave instructions regarding warfarin  See anti coag visit     Will send notification of call to dr Carolyn Peters

## 2018-03-02 NOTE — ED NOTES
Patient states he will wait in the waiting room till his daughter decides weather she is willing to pick him up  I called and left a message for his daughter Unknown Flirt to come and pick him up  Patient is alert and oriented  Pt given discharge instructions and verbalized them back to me    Pt sitting out by the ambulance door smoking a ciggarette     Anat Munoz RN  03/02/18 0256

## 2018-03-02 NOTE — ED PROVIDER NOTES
History  Chief Complaint   Patient presents with    Rib Injury     fell out of wheelchair 3 days ago and got ran over by wheelchair, 3 days of N/V/D      62 yo male with hx of IDDM, L BKA in 2017, CABG s/p MVR and pacer on coumadin who moved up here from [facility] in 2017 to help daughter with lyme disease  Pt fell and wheelchair rolled over him (specifics unknown) 5 days ago - seen at [facility] and had chest xray, seen at [facility] for L sided rib pain this am but states they did an xray and sent him home and "I don't want to have nothing to do with that [facility] place"  Mainly c/o 3 days of N/V/D  History provided by:  Patient and EMS personnel   used: No    GI Problem   Severity:  Moderate  Onset quality:  Gradual  Duration:  3 days  Timing:  Constant  Progression:  Worsening  Chronicity:  New  Associated symptoms: chest pain (L sided chest wall pain), diarrhea, fatigue, nausea and vomiting    Associated symptoms: no abdominal pain, no congestion, no fever, no headaches, no rash, no shortness of breath, no sore throat and no wheezing    Chest pain:     Quality: aching      Severity:  Moderate    Onset quality:  Gradual    Duration:  5 days    Timing:  Constant    Progression:  Unchanged    Chronicity:  New  Diarrhea:     Quality:  Watery    Severity:  Moderate    Duration:  3 days    Timing:  Constant    Progression:  Unchanged  Fatigue:     Severity:  Moderate    Duration:  3 days    Timing:  Constant    Progression:  Unchanged  Nausea:     Severity:  Moderate    Onset quality:  Gradual    Duration:  3 days    Timing:  Constant  Chest Pain   Associated symptoms: fatigue, nausea and vomiting    Associated symptoms: no abdominal pain, no fever, no headache, no palpitations and no shortness of breath        Prior to Admission Medications   Prescriptions Last Dose Informant Patient Reported? Taking?    ACCU-CHEK FASTCLIX LANCETS MISC  Self Yes No   Si Units by Does not apply route 3 (three) times a day   Blood Glucose Monitoring Suppl (ACCU-CHEK APPLE PLUS) w/Device KIT  Self Yes No   Si Units by Does not apply route 3 (three) times a day   HYDROcodone-acetaminophen (NORCO) 7 5-325 mg per tablet  Self No No   Sig: Take 1 tablet by mouth every 6 (six) hours as needed for pain Max Daily Amount: 4 tablets   Insulin Pen Needle (B-D ULTRAFINE III SHORT PEN) 31G X 8 MM MISC  Self Yes No   Si Units by Does not apply route 3 (three) times a day   LORazepam (ATIVAN) 1 mg tablet  Self Yes No   Sig: Take 1 tablet by mouth 2 (two) times a day as needed   QUEtiapine (SEROquel) 400 MG tablet  Self Yes No   Sig: Take 400 mg by mouth daily at bedtime   Sulfacetamide Sodium, Acne, 10 % LOTN  Self Yes No   atorvastatin (LIPITOR) 10 mg tablet  Self No No   Sig: Take 1 tablet by mouth daily with dinner   gabapentin (NEURONTIN) 300 mg capsule   No No   Sig: Take 3 capsules by mouth 3 (three) times a day for 30 days   gabapentin (NEURONTIN) 300 mg capsule  Self Yes No   Sig: Take 300 mg by mouth 3 (three) times a day   gabapentin (NEURONTIN) 600 MG tablet   No No   Sig: Take 1 tablet (600 mg total) by mouth 3 (three) times a day   glucose blood (ACCU-CHEK APPLE PLUS) test strip  Self Yes No   Si Units by In Vitro route 3 (three) times a day   insulin glargine (BASAGLAR KWIKPEN) injection pen 100 units/mL  Self Yes No   Sig: Inject 14 Units under the skin daily at bedtime   insulin lispro (HUMALOG) 100 units/mL injection  Self Yes No   Sig: Inject 3 Units under the skin 3 (three) times a day   lisinopril (ZESTRIL) 10 mg tablet  Self Yes No   Sig: Take 10 mg by mouth daily   metoprolol succinate (TOPROL-XL) 25 mg 24 hr tablet  Self Yes No   Sig: Take 25 mg by mouth daily   metroNIDAZOLE (METROCREAM) 0 75 % cream  Self Yes No   Sig: Apply 1 application topically daily   montelukast (SINGULAIR) 10 mg tablet  Self Yes No   Sig: Take 10 mg by mouth daily   nystatin-triamcinolone (MYCOLOG-II) cream   No No   Sig: Apply topically 2 (two) times a day   pantoprazole (PROTONIX) 40 mg tablet  Self No No   Sig: Take 1 tablet by mouth 2 (two) times a day before meals   torsemide (DEMADEX) 20 mg tablet  Self No No   Sig: Take 1 tablet by mouth daily   valACYclovir (VALTREX) 1,000 mg tablet   No No   Sig: Take 1 tablet (1,000 mg total) by mouth 3 (three) times a day for 7 days   warfarin (COUMADIN) 5 mg tablet  Self No No   Sig: Resume alternating 5mg and 7 5mg dosing upon discharge  Dr Rincon Asp office will adjust as needed  Facility-Administered Medications: None       Past Medical History:   Diagnosis Date    Anticoagulated on Coumadin     Mechanical MVR    Anxiety     Bipolar 1 disorder (HCC)     CAD (coronary artery disease)     Chronic kidney disease     left kideny being monitored    Chronic systolic congestive heart failure ([facility] [address] ) 4/18/2017    Colitis 4/2/2017    COPD [facility]chronic obstructive pulmonary disease) [facility]     Diabetes mellitus ([facility])     Difficulty urinating     DM type 2 with diabetic peripheral neuropathy ([facility])     Hiatal hernia     Hyperlipidemia     Hypertension     Ischemic cardiomyopathy     Myocardial infarction     Pacemaker     PAD (peripheral artery disease) ([facility])     Rectal bleed     Vomiting        Past Surgical History:   Procedure Laterality Date    AMPUTATION ABOVE KNEE (AKA) Left 8/24/2017    Procedure: AMPUTATION ABOVE KNEE (AKA); Surgeon: Izaiah Hilliard MD;  [facility];  Service: Vascular    CARDIAC DEFIBRILLATOR PLACEMENT      CORONARY ARTERY BYPASS GRAFT      ESOPHAGOGASTRODUODENOSCOPY N/A 4/20/2017    Procedure: ESOPHAGOGASTRODUODENOSCOPY (EGD); Surgeon: Santo Dick MD;  Location:  MAIN OR;  Service:     ESOPHAGOGASTRODUODENOSCOPY N/A 5/26/2017    Procedure: ESOPHAGOGASTRODUODENOSCOPY (EGD);   Surgeon: Malu Eason MD;  [facility];  Service:     ESOPHAGOGASTRODUODENOSCOPY N/A 11/16/2017    Procedure: ESOPHAGOGASTRODUODENOSCOPY (EGD); Surgeon: Georgina Yu MD;  Location: [facility]; Service: Gastroenterology    MITRAL VALVE REPLACEMENT      Mechanical    TOE AMPUTATION Left 7/21/2017    Procedure: PARTIAL FIRST RAY AMPUTATION; EXCISION OF WOUND W/ TOE FLAP CLOSURE;  Surgeon: Missy Tran DPM;  [facility];  Service: Podiatry    TONSILLECTOMY      VAC DRESSING APPLICATION Left 8/73/7970    Procedure: VAC application;  Surgeon: Missy Tran DPM;  [facility];  Service: Podiatry    WOUND DEBRIDEMENT Left 8/19/2017    Procedure: wound debridement with washout; resection of left 1st metatarsal;  Surgeon: Missy Tran DPM;  [facility];  Service: Podiatry       Family History   Problem Relation Age of Onset    Hypertension Mother     Diabetes Father      I have reviewed and agree with the history as documented  Social History   Substance Use Topics    Smoking status: Current Every Day Smoker     Packs/day: 1 00     Types: Cigarettes    Smokeless tobacco: Never Used    Alcohol use No        Review of Systems   Constitutional: Positive for appetite change and fatigue  Negative for chills and fever  HENT: Negative for congestion and sore throat  Eyes: Negative for visual disturbance  Respiratory: Negative for shortness of breath and wheezing  Cardiovascular: Positive for chest pain (L sided chest wall pain)  Negative for palpitations  Gastrointestinal: Positive for diarrhea, nausea and vomiting  Negative for abdominal pain  Genitourinary: Negative for dysuria  Musculoskeletal: Negative for neck pain and neck stiffness  Skin: Negative for pallor and rash  Neurological: Negative for headaches  Psychiatric/Behavioral: Negative for confusion  All other systems reviewed and are negative        Physical Exam  ED Triage Vitals   Temperature Pulse Respirations Blood Pressure SpO2   03/02/18 0349 03/02/18 0349 03/02/18 0349 03/02/18 0349 03/02/18 0349   97 5 °F (36 4 °C) 77 16 118/85 97 %      Temp Source Heart Rate Source Patient Position - Orthostatic VS BP Location FiO2 (%)   03/02/18 0349 03/02/18 0349 03/02/18 0349 03/02/18 0349 --   Tympanic Monitor Sitting Right arm       Pain Score       03/02/18 0422       6           Orthostatic Vital Signs  Vitals:    03/02/18 0349   BP: 118/85   Pulse: 77   Patient Position - Orthostatic VS: Sitting       Physical Exam   Constitutional: He is oriented to person, place, and time  He appears well-developed and well-nourished  No distress  Appears much older than stated age   HENT:   Head: Normocephalic and atraumatic  MM tachy   Eyes: EOM are normal  Pupils are equal, round, and reactive to light  Neck: Normal range of motion  Neck supple  Cardiovascular: Normal rate and regular rhythm  No murmur heard  Pulmonary/Chest: Effort normal and breath sounds normal  He exhibits tenderness (L ACW tenderness over L anterior lateral ribs)  Abdominal: Soft  Bowel sounds are normal  He exhibits no distension  There is no tenderness  Musculoskeletal: Normal range of motion  He exhibits no edema  L BKA   Neurological: He is alert and oriented to person, place, and time  Skin: Skin is warm  Capillary refill takes less than 2 seconds  No rash noted  No pallor  Psychiatric: He has a normal mood and affect  His behavior is normal    Nursing note and vitals reviewed        ED Medications  Medications   sodium chloride 0 9 % bolus 1,000 mL (not administered)   morphine (PF) 4 mg/mL injection 6 mg (6 mg Intramuscular Given 3/2/18 0422)   ondansetron (ZOFRAN-ODT) dispersible tablet 4 mg (4 mg Oral Given 3/2/18 4317)   LORazepam (ATIVAN) tablet 1 mg (1 mg Oral Given 3/2/18 0516)       Diagnostic Studies  Results Reviewed     Procedure Component Value Units Date/Time    Troponin I [[id number]]  (Normal) Collected:  03/02/18 0421    Lab Status:  Final result Specimen:  Blood from Hand, Right Updated:  03/02/18 0523     Troponin I <0 02 ng/mL     Narrative:         KS12 Chemistry analyzer 99% cutoff is > 0 04 ng/mL in network labs    o cTnI 99% cutoff is useful only when applied to patients in the clinical setting of myocardial ischemia  o cTnI 99% cutoff should be interpreted in the context of clinical history, ECG findings and possibly cardiac imaging to establish correct diagnosis  o cTnI 99% cutoff may be suggestive but clearly not indicative of a coronary event without the clinical setting of myocardial ischemia  Comprehensive metabolic panel [[id number]]  (Abnormal) Collected:  03/02/18 0421    Lab Status:  Final result Specimen:  Blood from Hand, Right Updated:  03/02/18 0521     Sodium 139 mmol/L      Potassium 4 4 mmol/L      Chloride 102 mmol/L      CO2 23 mmol/L      Anion Gap 14 (H) mmol/L      BUN 19 mg/dL      Creatinine 1 18 mg/dL      Glucose 240 (H) mg/dL      Calcium 8 8 mg/dL      AST 18 U/L      ALT 18 U/L      Alkaline Phosphatase 144 (H) U/L      Total Protein 8 1 g/dL      Albumin 3 7 g/dL      Total Bilirubin 0 90 mg/dL      eGFR 68 ml/min/1 73sq m     Narrative:         National Kidney Disease Education Program recommendations are as follows:  GFR calculation is accurate only with a steady state creatinine  Chronic Kidney disease less than 60 ml/min/1 73 sq  meters  Kidney failure less than 15 ml/min/1 73 sq  meters      Lipase [[id number]]  (Normal) Collected:  03/02/18 0421    Lab Status:  Final result Specimen:  Blood from Hand, Right Updated:  03/02/18 0521     Lipase 93 u/L     CBC and differential [[id number]]  (Abnormal) Collected:  03/02/18 0421    Lab Status:  Final result Specimen:  Blood from Hand, Right Updated:  03/02/18 0504     WBC 13 60 (H) Thousand/uL      RBC 3 90 Million/uL      Hemoglobin 11 5 (L) g/dL      Hematocrit 35 0 (L) %      MCV 90 fL      MCH 29 5 pg      MCHC 32 9 g/dL      RDW 15 1 %      MPV 12 4 fL      Platelets 262 Thousands/uL      Neutrophils Relative 86 (H) %      Lymphocytes Relative 9 (L) %      Monocytes Relative 5 % Eosinophils Relative 0 %      Basophils Relative 0 %      Neutrophils Absolute 11 72 (H) Thousands/µL      Lymphocytes Absolute 1 16 Thousands/µL      Monocytes Absolute 0 70 Thousand/µL      Eosinophils Absolute 0 00 Thousand/µL      Basophils Absolute 0 02 Thousands/µL     Lactic acid, plasma [[id number]]     Lab Status:  No result Specimen:  Blood                  CT chest abdomen pelvis wo contrast   Final Result by Arlette Chicas DO (03/02 2033)   1  Limited examination for trauma due to lack of intravenous and oral contrast material    2   No traumatic solid or visceral organ injury, however intravenous contrast material not administered  No significant change from prior study  Workstation performed: [id number]         CT chest without contrast    (Results Pending)              Procedures  ECG 12 Lead Documentation  Date/Time: 3/2/2018 4:33 AM  Performed by: Jocelyn Tanner by: Ramandeep Raphael     Indications / Diagnosis:  Vomiting  ECG reviewed by me, the ED Provider: yes    Patient location:  ED  Previous ECG:     Previous ECG:  Compared to current    Comparison ECG info:  Nov 15 2017    Similarity:  No change  Interpretation:     Interpretation: abnormal    Rate:     ECG rate:  71    ECG rate assessment: normal    Rhythm:     Rhythm: sinus rhythm    Ectopy:     Ectopy: none    QRS:     QRS axis:  Normal    QRS intervals:  Normal  Conduction:     Conduction: normal    ST segments:     ST segments:  Abnormal    Elevation:  II, III, aVF, V3, V4, V5 and V6  T waves:     T waves: inverted      Inverted:  II, III, aVF, V6, V5, V4 and V3           Phone Contacts  ED Phone Contact    ED Course  ED Course as of Mar 02 0644   Fri Mar 02, 2018   0329 Pt seen and examined  60 yo male with hx of IDDM, L BKA in august 2017, CABG s/p MVR and pacer on coumadin who moved up here from [facility] in feb 2017 to help daughter with lyme disease    Pt fell and wheelchair rolled over him (specifics unknown) 5 days ago - seen at [facility] and had chest xray, seen at [facility] for L sided rib pain this am but states they did an xray and sent him home and "I don't want to have nothing to do with that [facility] place"  Mainly c/o 3 days of N/V/D  Will give IVF, fentanyl and zofran and check labs, CT c/a/p (without contrast)  Of note chest xray at [facility] this am showed limited eval, portions of the left rib obscured by pacemaker, no acute fx seen, CM, CHF, prominent costochondral junction of the 7/8th L ribs - chondroid lesion can not be excluded  0409 Pt very difficult stick - both Iv's blew - states he has needed PICC lines in past   Will give IM morphine (has had before and fine) and ODT zofran, straight stick for labs and CT c/a/p     0555 Pt sleeping comfortably, CT scan machine issue - likely will be over an hour for a read  0636 CT c/a/p - 1   Limited examination for trauma due to lack of intravenous and oral contrast material   2   No traumatic solid or visceral organ injury, however intravenous contrast material not administered  Pt tolerated ice chips and now tolerated cup of water  Pt very comfortable plan to f/u with PCP  Will d/c home on ultram, lidoderm patch and zofran                                   MDM  CritCare Time    Disposition  Final diagnoses:   Nausea, vomiting and diarrhea   Chest wall contusion, left, initial encounter     Time reflects when diagnosis was documented in both MDM as applicable and the Disposition within this note     Time User Action Codes Description Comment    3/2/2018  6:32 AM Marcy SHIPMAN Add [R11 2,  R19 7] Nausea, vomiting and diarrhea     3/2/2018  6:32 AM Li Nicole Add [S20 212A] Chest wall contusion, left, initial encounter       ED Disposition     None      Follow-up Information     Follow up With Specialties Details Why Contact Info    Turner Cordova DO Family Medicine Call  [address]  [phone] Patient's Medications   Discharge Prescriptions    LIDOCAINE (LIDODERM) 5 %    Place 1 patch on the skin every 24 hours for 30 days Remove & Discard patch within 12 hours or as directed by MD       Start Date: 3/2/2018  End Date: 4/1/2018       Order Dose: 1 patch       Quantity: 30 patch    Refills: 0    ONDANSETRON (ZOFRAN-ODT) 4 MG DISINTEGRATING TABLET    Take 1 tablet (4 mg total) by mouth every 8 (eight) hours as needed for nausea or vomiting for up to 7 days       Start Date: 3/2/2018  End Date: 3/9/2018       Order Dose: 4 mg       Quantity: 20 tablet    Refills: 0    TRAMADOL (ULTRAM) 50 MG TABLET    Take 1 tablet (50 mg total) by mouth every 6 (six) hours as needed for moderate pain for up to 5 days       Start Date: 3/2/2018  End Date: 3/7/2018       Order Dose: 50 mg       Quantity: 20 tablet    Refills: 0     No discharge procedures on file      ED Provider  Electronically Signed by           Katia Evangelista DO  03/02/18 530

## 2018-03-02 NOTE — DISCHARGE INSTRUCTIONS
Acute Diarrhea   WHAT YOU NEED TO KNOW:   Acute diarrhea starts quickly and lasts a short time, usually 1 to 3 days  It can last up to 2 weeks  You may not be able to control your diarrhea  Acute diarrhea usually stops on its own  DISCHARGE INSTRUCTIONS:   Return to the emergency department if:   · You feel confused  · Your heartbeat is faster than normal      · Your eyes look deeply sunken, or you have no tears when you cry  · You urinate less than usual, or your urine is dark yellow  · You have blood or mucus in your stools  · You have severe abdominal pain  · You are unable to drink any liquids  Contact your healthcare provider if:   · Your symptoms do not get better with treatment  · You have a fever higher than 101 3°F (38 5°C)  · You have trouble eating and drinking because you are vomiting  · You are thirsty or have a dry mouth  · Your diarrhea does not get better in 7 days  · You have questions or concerns about your condition or care  Follow up with your healthcare provider as directed:  Write down your questions so you remember to ask them during your visits  Medicines:  · Diarrhea medicine  is an over-the-counter medicine that helps slow or stop your diarrhea  If you take other medicines, talk to your healthcare provider before you take diarrhea medicine  · Antibiotics  may be given to help treat an infection caused by bacteria  · Antiparasitics  may be given to treat an infection caused by parasites  · Take your medicine as directed  Contact your healthcare provider if you think your medicine is not helping or if you have side effects  Tell him of her if you are allergic to any medicine  Keep a list of the medicines, vitamins, and herbs you take  Include the amounts, and when and why you take them  Bring the list or the pill bottles to follow-up visits  Carry your medicine list with you in case of an emergency    Self-care:   · Drink liquids as directed  Liquids will help prevent dehydration caused by diarrhea  Ask your healthcare provider how much liquid to drink each day and which liquids are best for you  You may need to drink an oral rehydration solution (ORS)  An ORS has the right amounts of water, salts, and sugar you need to replace body fluids  You can buy an ORS at most grocery stores and pharmacies  · Eat foods that are easy to digest   Examples include rice, lentils, cereal, bananas, potatoes, and bread  It also includes some fruits (bananas, melon), well-cooked vegetables, and lean meats  Avoid foods high in fiber, fat, and sugar  Also avoid caffeine, alcohol, dairy, and red meat until your diarrhea is gone  Prevent acute diarrhea:   · Wash your hands often  Use soap and water  Wash your hands before you eat or prepare food  Also wash your hands after you use the bathroom  Use an alcohol-based hand gel when soap and water are not available  · Keep bathroom surfaces clean  This helps prevent the spread of germs that cause acute diarrhea  · Wash fruits and vegetables well before you eat them  This can help remove germs that cause diarrhea  If possible, remove the skin from fruits and vegetables, or cook them well before you eat them  · Cook meat as directed  ¨ Cook ground meat  to 160°F      ¨ Cook ground poultry, whole poultry, or cuts of poultry  to at least 165°F  Remove the meat from heat  Let it stand for 3 minutes before you eat it  ¨ Cook whole cuts of meat other than poultry  to at least 145°F  Remove the meat from heat  Let it stand for 3 minutes before you eat it  · Wash dishes that have touched raw meat with hot water and soap  This includes cutting boards, utensils, dishes, and serving containers  · Place raw or cooked meat in the refrigerator as soon as possible  Bacteria can grow in meat that is left at room temperature too long  · Do not eat raw or undercooked oysters, clams, or mussels  These foods may be contaminated and cause infection  · Drink filtered or treated water only when you travel  Do not put ice in your drinks  Drink bottled water whenever possible  © 2017 [address]  Information is for End User's use only and may not be sold, redistributed or otherwise used for commercial purposes  All illustrations and images included in CareNotes® are the copyrighted property of [employer]  or [employer]   The above information is an  only  It is not intended as medical advice for individual conditions or treatments  Talk to your doctor, nurse or pharmacist before following any medical regimen to see if it is safe and effective for you  Acute Nausea and Vomiting   WHAT YOU NEED TO KNOW:   Acute nausea and vomiting start suddenly, worsen quickly, and last a short time  DISCHARGE INSTRUCTIONS:   Seek care immediately if:   · You see blood in your vomit or your bowel movements  · You have sudden, severe pain in your chest and upper abdomen after hard vomiting or retching  · You have swelling in your neck and chest      · You are dizzy, cold, and thirsty and your eyes and mouth are dry  · You are urinating very little or not at all  · You have muscle weakness, leg cramps, and trouble breathing  · Your heart is beating much faster than normal      · You continue to vomit for more than 48 hours  Contact your healthcare provider if:   · You have frequent dry heaves (vomiting but nothing comes out)  · Your nausea and vomiting does not get better or go away after you use medicine  · You have questions or concerns about your condition or treatment  Medicines: You may need any of the following:  · Medicines  may be given to calm your stomach and stop your vomiting  You may also need medicines to help you feel more relaxed or to stop nausea and vomiting caused by motion sickness      · Gastrointestinal stimulants  are used to help empty your stomach and bowels  This may help decrease nausea and vomiting  · Take your medicine as directed  Contact your healthcare provider if you think your medicine is not helping or if you have side effects  Tell him or her if you are allergic to any medicine  Keep a list of the medicines, vitamins, and herbs you take  Include the amounts, and when and why you take them  Bring the list or the pill bottles to follow-up visits  Carry your medicine list with you in case of an emergency  Prevent or manage acute nausea and vomiting:   · Do not drink alcohol  Alcohol may upset or irritate your stomach  Too much alcohol can also cause acute nausea and vomiting  · Control stress  Headaches due to stress may cause nausea and vomiting  Find ways to relax and manage your stress  Get more rest and sleep  · Drink more liquids as directed  Vomiting can lead to dehydration  It is important to drink more liquids to help replace lost body fluids  Ask your healthcare provider how much liquid to drink each day and which liquids are best for you  Your provider may recommend that you drink an oral rehydration solution (ORS)  ORS contains water, salts, and sugar that are needed to replace the lost body fluids  Ask what kind of ORS to use, how much to drink, and where to get it  · Eat smaller meals, more often  Eat small amounts of food every 2 to 3 hours, even if you are not hungry  Food in your stomach may decrease your nausea  · Talk to your healthcare provider before you take over-the-counter (OTC) medicines  These medicines can cause serious problems if you use certain other medicines, or you have a medical condition  You may have problems if you use too much or use them for longer than the label says  Follow directions on the label carefully  Follow up with your healthcare provider as directed:  Write down your questions so you remember to ask them during your visits    © 2017 [address] [employer] Information is for End User's use only and may not be sold, redistributed or otherwise used for commercial purposes  All illustrations and images included in CareNotes® are the copyrighted property of [employer]  or [employer]em[employer]ipe  The above information is an  only  It is not intended as medical advice for individual conditions or treatments  Talk to your doctor, nurse or pharmacist before following any medical regimen to see if it is safe and effective for you  Rib Contusion   WHAT YOU NEED TO KNOW:   A rib contusion is a bruise on one or more of your ribs  DISCHARGE INSTRUCTIONS:   Return to the emergency department if:   · You have increased chest pain  · You have shortness of breath  · You start to cough up blood  · Your pain does not improve with pain medicine  Contact your healthcare provider if:   · You have a cough  · You have a fever  · You have questions or concerns about your condition or care  Medicines: You may need any of the following:  · NSAIDs , such as ibuprofen, help decrease swelling, pain, and fever  This medicine is available with or without a doctor's order  NSAIDs can cause stomach bleeding or kidney problems in certain people  If you take blood thinner medicine, always ask if NSAIDs are safe for you  Always read the medicine label and follow directions  Do not give these medicines to children under 10months of age without direction from your child's healthcare provider  · Prescription pain medicine  may be given  Ask how to take this medicine safely  · Take your medicine as directed  Contact your healthcare provider if you think your medicine is not helping or if you have side effects  Tell him of her if you are allergic to any medicine  Keep a list of the medicines, vitamins, and herbs you take  Include the amounts, and when and why you take them  Bring the list or the pill bottles to follow-up visits   Carry your medicine list with you in case of an emergency  Deep breathing:   · To help prevent pneumonia, take 10 deep breaths every hour, even when you wake up during the night  Brace your ribs with your hands or a pillow while you take deep breaths or cough  This will help decrease your pain  · You may need to use an incentive spirometer to help you take deeper breaths  Put the plastic piece into your mouth and take a very deep breath  Hold your breath as long as you can  Then let out your breath  Do this 10 times in a row every hour while you are awake  Rest:  Rest your ribs to decrease swelling and allow the injury to heal faster  Avoid activities that may cause more pain or damage to your ribs  As your pain decreases, begin movements slowly  Ice:  Ice helps decrease swelling and pain  Ice may also help prevent tissue damage  Use an ice pack or put crushed ice in a plastic bag  Cover it with a towel and place it on your bruised area for 15 to 20 minutes every hour as directed  Follow up with your healthcare provider as directed:  Write down your questions so you remember to ask them during your visits  © 2017 [address] [facility] Information is for End User's use only and may not be sold, redistributed or otherwise used for commercial purposes  All illustrations and images included in CareNotes® are the copyrighted property of [employer] M [employer] Inc  or [employer]  The above information is an  only  It is not intended as medical advice for individual conditions or treatments  Talk to your doctor, nurse or pharmacist before following any medical regimen to see if it is safe and effective for you  Contusion in Adults   WHAT YOU NEED TO KNOW:   A contusion is a bruise that appears on your skin after an injury  A bruise happens when small blood vessels tear but skin does not  When blood vessels tear, blood leaks into nearby tissue, such as soft tissue or muscle    DISCHARGE INSTRUCTIONS:   Seek care immediately if:   · You have new trouble moving the injured area  · You have tingling or numbness in or near the injured area  · Your hand or foot below the bruise gets cold or turns pale  Contact your healthcare provider if:   · You find a new lump in the injured area  · Your symptoms do not improve with treatment after 4 to 5 days  · You have questions or concerns about your condition or care  Medicines: You may need any of the following:  · NSAIDs , such as ibuprofen, help decrease swelling, pain, and fever  This medicine is available with or without a doctor's order  NSAIDs can cause stomach bleeding or kidney problems in certain people  If you take blood thinner medicine, always ask your healthcare provider if NSAIDs are safe for you  Always read the medicine label and follow directions  · Prescription pain medicine  may be given  Do not wait until the pain is severe before you take your medicine  · Take your medicine as directed  Contact your healthcare provider if you think your medicine is not helping or if you have side effects  Tell him or her if you are allergic to any medicine  Keep a list of the medicines, vitamins, and herbs you take  Include the amounts, and when and why you take them  Bring the list or the pill bottles to follow-up visits  Carry your medicine list with you in case of an emergency  Follow up with your healthcare provider as directed: You may need to return within a week to check your injury again  Write down your questions so you remember to ask them during your visits  Help a contusion heal:   · Rest the injured area  or use it less than usual  If you bruised your leg or foot, you may need crutches or a cane to help you walk  This will help you keep weight off your injured body part  · Apply ice  to decrease swelling and pain  Ice may also help prevent tissue damage  Use an ice pack, or put crushed ice in a plastic bag   Cover it with a towel and place it on your bruise for 15 to 20 minutes every hour or as directed  · Use compression  to support the area and decrease swelling  Wrap an elastic bandage around the area over the bruised muscle  Make sure the bandage is not too tight  You should be able to fit 1 finger between the bandage and your skin  · Elevate (raise) your injured body part  above the level of your heart to help decrease pain and swelling  Use pillows, blankets, or rolled towels to elevate the area as often as you can  · Do not drink alcohol  as directed  Alcohol may slow healing  · Do not stretch injured muscles  right after your injury  Ask your healthcare provider when and how you may safely stretch after your injury  Gentle stretches can help increase your flexibility  · Do not massage the area or put heating pads  on the bruise right after your injury  Heat and massage may slow healing  Your healthcare provider may tell you to apply heat after several days  At that time, heat will start to help the injury heal   Prevent another contusion:   · Stretch and warm up before you play sports or exercise  · Wear protective gear when you play sports  Examples are shin guards and padding  · If you begin a new physical activity, start slowly to give your body a chance to adjust   © 2017 [address]  Information is for End User's use only and may not be sold, redistributed or otherwise used for commercial purposes  All illustrations and images included in CareNotes® are the copyrighted property of [employer]  or Jemal Felipe  The above information is an  only  It is not intended as medical advice for individual conditions or treatments  Talk to your doctor, nurse or pharmacist before following any medical regimen to see if it is safe and effective for you

## 2018-03-02 NOTE — ED NOTES
Patient given his phone and asked to call his sister to pick him up         Pat Melara RN  03/02/18 2534

## 2018-03-03 LAB
ATRIAL RATE: 71 BPM
P AXIS: 77 DEGREES
PR INTERVAL: 204 MS
QRS AXIS: 86 DEGREES
QRSD INTERVAL: 114 MS
QT INTERVAL: 416 MS
QTC INTERVAL: 452 MS
T WAVE AXIS: 268 DEGREES
VENTRICULAR RATE: 71 BPM

## 2018-03-05 DIAGNOSIS — J30.89 ACUTE NONSEASONAL ALLERGIC RHINITIS DUE TO OTHER ALLERGEN: Primary | ICD-10-CM

## 2018-03-05 NOTE — TELEPHONE ENCOUNTER
SALINAS AT [facility] V/N WILL BE MANAGING PAMELA FOR SEVERAL THINGS:    WHEN DO YOU WANT HIS NEXT PT/INR  INR LEVEL 1 5 ON 3/1    ALSO REQUESTING DATES OF HIS LAST FS/PNEUMO SHOT  I DID NOT SEE ANY IN HIS CHART  SHE WILL BE SEEING HIM BID X 2-3 WEEKS THEN ONCE A WEEK X 3 WEEKS  PT WAS PRESCRIBED 1000MG VALTREX TID X 7 DAYS ON DISCHARGE BUT NOT GIVEN AN Rx  SHE IS NOT SURE WHAT THIS WAS PRESCRIBED FOR DO YOU WANT TO PRESCRIBE? [facility]    ALSO PATIENT WAS PRESCRIBED FUROSEMIDE 40MG 1 QD ON DISCHARGE BUT YOU PRESCRIBE TORSEMIDE WHICH WORKS PERFECT FOR HIM WOULD YOU LIKE HIM TO CONTINUE WITH THE TORSEMIDE?

## 2018-03-06 ENCOUNTER — TELEPHONE (OUTPATIENT)
Dept: CARDIOLOGY CLINIC | Facility: CLINIC | Age: 58
End: 2018-03-06

## 2018-03-06 DIAGNOSIS — Z89.612 STATUS POST ABOVE KNEE AMPUTATION OF LEFT LOWER EXTREMITY: ICD-10-CM

## 2018-03-06 LAB — INR PPP: 2.3 (ref 0.86–1.16)

## 2018-03-06 RX ORDER — HYDROCODONE BITARTRATE AND ACETAMINOPHEN 7.5; 325 MG/1; MG/1
1 TABLET ORAL EVERY 6 HOURS PRN
Qty: 120 TABLET | Refills: 0 | Status: SHIPPED | OUTPATIENT
Start: 2018-03-06 | End: 2018-04-02 | Stop reason: SDUPTHER

## 2018-03-06 RX ORDER — MONTELUKAST SODIUM 10 MG/1
10 TABLET ORAL DAILY
Qty: 30 TABLET | Refills: 5 | Status: SHIPPED | OUTPATIENT
Start: 2018-03-06 | End: 2018-07-08 | Stop reason: SDUPTHER

## 2018-03-06 NOTE — TELEPHONE ENCOUNTER
Please call [facility],  If no rash on the left side, resembling shingles, then can stop valtrex   I am gradually increasing dose on gabapentin 300mg 1 tab am and 1 tab afternoon and 2 tabs at bedtime for 1 week  Then 2 tabs in am 1 tab in afternoon and 2 tabs at bedtime for 1 week   2 tabs in am , 2 tabs in afternoon and 2 tabs at bedtime  Then I will change to 600mg tabs  Instructions were given on last discharge summary if he has that

## 2018-03-06 NOTE — TELEPHONE ENCOUNTER
Received phone call from nurse Tamiko Ballesteros with [address], cell, [phone]  Reports vna was ordered while patient was at [facility] on 3/1 for fall and rib contusion  Nurse opened vna case yesterday  She reports patient's warfarin dose is 7 5 mg mon, 5 mg other days of the week, she was questioning when is next inr due  Explained to nurse that he has a home mobile lab that is scheduled for inr draw today and every tues as a standing order  Will have inr done today as planned and will fax further instructions to [place] [facility] when inr results are obtained  Nurse agreed  She will discuss with her manager if vna will do inr testing during their visits or if patient should continue with mobile lab  VNA main phone number, [phone]  Fax number, [phone]  Will notify dr Ronaldo Trevino of vna  service

## 2018-03-07 NOTE — PROGRESS NOTES
"  Discussion/Summary  Normal device function      Results/Data  Cardiac Device In Clinic [id number] 01:23PM Star Ranulfo     Test Name Result Flag Reference   MISCELLANEOUS COMMENT      DEVICE INTERROGATED IN THE [facility] OFFICE  [id number] VOLTAGE ADEQUATE   1%  ALL LEAD PARAMETERS WITHIN NORMAL LIMITS  NO SIGNIFICANT HIGH RATE EPISODES  NO PROGRAMMING CHANGES MADE TO DEVICE PARAMETERS  NORMAL DEVICE FUNCTION  ---GLEZ   Cardiac Electrophysiology Report      [id number]  PDF   DEVICE TYPE ICD       Cardiac Electrophysiology Report [id number] 01:23PM Star Ranulfo     Test Name Result Flag Reference   Cardiac Electrophysiology Report      [id number]  pdf     Signatures   Electronically signed by : Angus sEpitia, ; May  2 2017  9:43AM EST                       (Author)    Electronically signed by : Anali Pérez DO; May  7 2017  5:04PM EST                       (Author)    "

## 2018-03-07 NOTE — PROGRESS NOTES
"  Discussion/Summary  Normal device function      Results/Data  Cardiac Device In Clinic [id number] 05:20PM Pilar Astorgas     Test Name Result Flag Reference   MISCELLANEOUS COMMENT      BIOTRONIK TRANSMISSION: [facility]   4%  ALL AVAILABLE LEAD PARAMETERS WITHIN NORMAL LIMITS  NO SIGNIFICANT HIGH RATE EPISODES  NORMAL DEVICE FUNCTION  ---GLEZ   Cardiac Electrophysiology Report      [id number]  pdf   DEVICE TYPE ICD       Cardiac Electrophysiology Report [id number] 05:20PM Pilar Peters     Test Name Result Flag Reference   Cardiac Electrophysiology Report      [id number]  pdf     Signatures   Electronically signed by : Seth Myers, ; Aug  7 2017  1:23PM EST                       (Author)    Electronically signed by : Mabel Rene DO; Aug  9 2017  8:25PM EST                       (Author)    "

## 2018-03-07 NOTE — TELEPHONE ENCOUNTER
Please call Terrence Rivera at [facility] vna  Cardiology manages coumadin level- Curlee Salts at Dr Anup Barakat office in [address] in his old records from [place]- he did not have a pneumonia vaccine on record, so he needs one     He told me he thought he got one in [place]  His last influenza immunization was 11/29/2017  No valtrex  Would like him to take torsemide instead of furosemide

## 2018-03-08 ENCOUNTER — ANTICOAG VISIT (OUTPATIENT)
Dept: CARDIOLOGY CLINIC | Facility: CLINIC | Age: 58
End: 2018-03-08

## 2018-03-08 ENCOUNTER — TELEPHONE (OUTPATIENT)
Dept: FAMILY MEDICINE CLINIC | Facility: CLINIC | Age: 58
End: 2018-03-08

## 2018-03-08 LAB
INR PPP: 2.3 (ref 0.8–1.2)
PROTHROMBIN TIME: 22.3 SEC (ref 9.1–12)

## 2018-03-08 NOTE — TELEPHONE ENCOUNTER
PATIENT CALLED TO REQUEST INSULIN PENS  HE CURRENTLY USES [employer] Laws  HE IS REQUESTING A SMALLER NEEDLE FOR THEM  [facility] STATES TO ORDER THE BD ULTRAFINE CELESTINO 4MM X 32 GAUGE FOR HIS PEN  PLEASE ORDER FOR PATIENT  THANK YOU

## 2018-03-09 DIAGNOSIS — E11.9 INSULIN DEPENDENT TYPE 2 DIABETES MELLITUS, CONTROLLED (HCC): Primary | ICD-10-CM

## 2018-03-09 DIAGNOSIS — Z79.4 INSULIN DEPENDENT TYPE 2 DIABETES MELLITUS, CONTROLLED (HCC): Primary | ICD-10-CM

## 2018-03-13 ENCOUNTER — PATIENT OUTREACH (OUTPATIENT)
Dept: FAMILY MEDICINE CLINIC | Facility: CLINIC | Age: 58
End: 2018-03-13

## 2018-03-13 LAB — INR PPP: 4.2 (ref 0.86–1.16)

## 2018-03-13 NOTE — PROGRESS NOTES
Spoke to Prince Donohue for over 40 minutes about all his health concerns  He is frustrated that he isn't making more progress in regards to his rehabilitation for his BKA  He has now started home PT again and has his new prosthesis and is working hard to use it as opposed to being wheelchair-bound  He is upset that he had a fall out of his wheelchair on 3/2 which has now caused daily pain in his ribs  He is aware that it is a slow process, but feels that it has been going on too long and he wants a change  He currently has a home health nurse 2x a week and PT 2x a week  His daughter helps him with his care but she has her own medical issues to tend to  Overall he just wanted someone to speak with and "get things off my chest"  He felt better at the end of the call and appreciated having someone to talk to  I will reach out in about a month to check on his progress  He has my contact information if he has any needs in the interim

## 2018-03-13 NOTE — PROGRESS NOTES
Called Dameon Pulido to introduce myself, the care management program and to check on his current health status  Unable to reach him  Left voicemail on machine with my contact information and requested a call back  First attempt

## 2018-03-14 ENCOUNTER — TELEPHONE (OUTPATIENT)
Dept: NEUROLOGY | Facility: CLINIC | Age: 58
End: 2018-03-14

## 2018-03-14 ENCOUNTER — ANTICOAG VISIT (OUTPATIENT)
Dept: CARDIOLOGY CLINIC | Facility: CLINIC | Age: 58
End: 2018-03-14

## 2018-03-14 LAB
INR PPP: 4.2 (ref 0.8–1.2)
PROTHROMBIN TIME: 39.2 SEC (ref 9.1–12)

## 2018-03-19 ENCOUNTER — TELEPHONE (OUTPATIENT)
Dept: FAMILY MEDICINE CLINIC | Facility: CLINIC | Age: 58
End: 2018-03-19

## 2018-03-19 NOTE — TELEPHONE ENCOUNTER
guido rodrigues cancelled 2 home care visits over the weekend  If he cancels again he might be discharged per [facility] visiting nurse

## 2018-03-20 LAB — INR PPP: 2.6 (ref 0.86–1.16)

## 2018-03-21 ENCOUNTER — ANTICOAG VISIT (OUTPATIENT)
Dept: CARDIOLOGY CLINIC | Facility: CLINIC | Age: 58
End: 2018-03-21

## 2018-03-21 LAB
INR PPP: 2.6 (ref 0.8–1.2)
PROTHROMBIN TIME: 25.8 SEC (ref 9.1–12)

## 2018-03-26 ENCOUNTER — OFFICE VISIT (OUTPATIENT)
Dept: FAMILY MEDICINE CLINIC | Facility: CLINIC | Age: 58
End: 2018-03-26
Payer: COMMERCIAL

## 2018-03-26 VITALS
SYSTOLIC BLOOD PRESSURE: 115 MMHG | HEIGHT: 73 IN | HEART RATE: 72 BPM | DIASTOLIC BLOOD PRESSURE: 82 MMHG | OXYGEN SATURATION: 96 % | TEMPERATURE: 98.6 F

## 2018-03-26 DIAGNOSIS — L95.9 VASCULITIS OF SKIN: ICD-10-CM

## 2018-03-26 DIAGNOSIS — I10 BENIGN ESSENTIAL HYPERTENSION: ICD-10-CM

## 2018-03-26 DIAGNOSIS — I73.9 PERIPHERAL VASCULAR DISEASE (HCC): ICD-10-CM

## 2018-03-26 DIAGNOSIS — L95.9 VASCULITIS LIMITED TO SKIN: ICD-10-CM

## 2018-03-26 DIAGNOSIS — F31.9 BIPOLAR 1 DISORDER, DEPRESSED (HCC): ICD-10-CM

## 2018-03-26 DIAGNOSIS — I70.209 ATHEROSCLEROSIS OF ARTERIES OF EXTREMITIES (HCC): ICD-10-CM

## 2018-03-26 DIAGNOSIS — F51.04 CHRONIC INSOMNIA: ICD-10-CM

## 2018-03-26 DIAGNOSIS — L03.115 CELLULITIS OF RIGHT LOWER EXTREMITY: ICD-10-CM

## 2018-03-26 DIAGNOSIS — F41.9 ANXIETY: Primary | ICD-10-CM

## 2018-03-26 PROBLEM — M94.0 COSTOCHONDRITIS: Status: ACTIVE | Noted: 2018-03-26

## 2018-03-26 PROCEDURE — 99214 OFFICE O/P EST MOD 30 MIN: CPT | Performed by: FAMILY MEDICINE

## 2018-03-26 RX ORDER — QUETIAPINE FUMARATE 200 MG/1
TABLET, FILM COATED ORAL
COMMUNITY
Start: 2018-03-19 | End: 2018-06-21 | Stop reason: SDUPTHER

## 2018-03-26 RX ORDER — MIRTAZAPINE 15 MG/1
15 TABLET, FILM COATED ORAL DAILY
COMMUNITY
Start: 2018-03-19

## 2018-03-26 RX ORDER — CEPHALEXIN 500 MG/1
500 CAPSULE ORAL EVERY 8 HOURS SCHEDULED
Qty: 30 CAPSULE | Refills: 0 | Status: SHIPPED | OUTPATIENT
Start: 2018-03-26 | End: 2018-04-05

## 2018-03-26 NOTE — PATIENT INSTRUCTIONS
Arterial ultrasound left lower extremity    Dr Arlen Shelley, blood work   Start Cephalexin 1 tab 3 times a day and observe for areas to heal

## 2018-03-26 NOTE — PROGRESS NOTES
Assessment/Plan:    No problem-specific Assessment & Plan notes found for this encounter  Diagnoses and all orders for this visit:    Anxiety    Bipolar 1 disorder, depressed ([facility] [address] )    Cellulitis of right lower extremity  -     cephalexin (KEFLEX) 500 mg capsule; Take 1 capsule (500 mg total) by mouth every 8 (eight) hours for 10 days    Benign essential hypertension    Vasculitis limited to skin    Chronic insomnia    Atherosclerosis of arteries of extremities (HCC)    Vasculitis of skin    Peripheral vascular disease ([facility] [address] )    Other orders  -     QUEtiapine (SEROquel) 200 mg tablet;   -     mirtazapine (REMERON) 15 mg tablet;       vascular changes to of right lower extremity:  Schedule arterial Doppler for right lower extremity  Patient continued to smoke  Diabetes type 2:  Next hemoglobin A1c April 1st   Patient will continue to follow up with psychiatrist as scheduled  Subjective:      Patient ID: Theodore Keita is a 61 y o  male  Patient is here with his daughter today  He noticed small open areas on his right foot  Denies any injury  Areas without redness or drainage  He denies a fever  He is concerned about developing infection  He complains of increased anxiety symptoms  He sees Dr Zeny Osman and just had some medication changes, adding Remeron  He just started this medication recently  He uses clonazepam 1 tablet in the morning and 1 tablet in the evening  He states his moods are still not stable  He is depressed about his current health situation  He has been evaluated 2 times at the hospital for depressive symptoms by crisis and released  Dr Zeny Osman would like to order some blood  He complains of ongoing left-sided rib pain  He has had an x-ray and a CT scan of his lungs  That showed some costochondritis at ribs 7 and 8  Patient gets his PT INR drawn every Tuesday    Dr Zeny Osman, psychiatrist would like to order blood work however visiting nurse would not accept paper order from patient  The following portions of the patient's history were reviewed and updated as appropriate: allergies, current medications, past family history, past medical history, past social history, past surgical history and problem list     Review of Systems   Constitutional: Negative  Negative for fatigue and fever  HENT: Negative  Eyes: Negative  Respiratory: Negative  Negative for cough  Cardiovascular: Negative  Gastrointestinal: Negative  Genitourinary: Negative  Skin: Positive for wound  Small superficial wound right inner foot 2 mm   Neurological: Negative  Psychiatric/Behavioral: Positive for sleep disturbance  Negative for self-injury  The patient is nervous/anxious and is hyperactive  Objective:  Vitals:    03/26/18 1030   BP: 115/82   Pulse: 72   Temp: 98 6 °F (37 °C)   SpO2: 96%   Height: 6' 1" (1 854 m)      Physical Exam   Constitutional: He is oriented to person, place, and time  He appears well-developed and well-nourished  HENT:   Head: Normocephalic and atraumatic  Cardiovascular: Normal rate, regular rhythm and normal heart sounds  Pulmonary/Chest: Effort normal and breath sounds normal    Abdominal: Soft  Bowel sounds are normal    Musculoskeletal:   Left lower leg amputation   Neurological: He is alert and oriented to person, place, and time  Skin: Skin is warm and dry  Psychiatric: He has a normal mood and affect  Thought content normal    Nursing note and vitals reviewed

## 2018-03-27 LAB — INR PPP: 3.1 (ref 0.86–1.16)

## 2018-03-28 ENCOUNTER — TELEPHONE (OUTPATIENT)
Dept: FAMILY MEDICINE CLINIC | Facility: CLINIC | Age: 58
End: 2018-03-28

## 2018-03-28 ENCOUNTER — ANTICOAG VISIT (OUTPATIENT)
Dept: CARDIOLOGY CLINIC | Facility: CLINIC | Age: 58
End: 2018-03-28

## 2018-03-28 LAB
INR PPP: 3.1 (ref 0.8–1.2)
PROTHROMBIN TIME: 29.5 SEC (ref 9.1–12)

## 2018-03-28 NOTE — TELEPHONE ENCOUNTER
Pt is requesting a renewal on his insulin needles  He currently uses 8mm 31 kenroy, but he is asking if he can have them shorter    Send to [facility]

## 2018-03-29 ENCOUNTER — HOSPITAL ENCOUNTER (EMERGENCY)
Facility: HOSPITAL | Age: 58
Discharge: HOME | End: 2018-03-29
Attending: EMERGENCY MEDICINE | Admitting: EMERGENCY MEDICINE
Payer: COMMERCIAL

## 2018-03-29 VITALS
OXYGEN SATURATION: 100 % | BODY MASS INDEX: 24.92 KG/M2 | SYSTOLIC BLOOD PRESSURE: 119 MMHG | DIASTOLIC BLOOD PRESSURE: 64 MMHG | HEIGHT: 73 IN | RESPIRATION RATE: 20 BRPM | HEART RATE: 61 BPM | TEMPERATURE: 98.5 F | WEIGHT: 188 LBS

## 2018-03-29 DIAGNOSIS — R19.7 DIARRHEA, UNSPECIFIED TYPE: Primary | ICD-10-CM

## 2018-03-29 DIAGNOSIS — F41.9 ANXIETY: ICD-10-CM

## 2018-03-29 DIAGNOSIS — E11.8 TYPE 2 DIABETES MELLITUS WITH COMPLICATION, UNSPECIFIED LONG TERM INSULIN USE STATUS: ICD-10-CM

## 2018-03-29 LAB
ALBUMIN SERPL BCP-MCNC: 4 G/DL (ref 3.5–5)
ALP SERPL-CCNC: 149 U/L (ref 46–116)
ALT SERPL W P-5'-P-CCNC: 29 U/L (ref 12–78)
ANION GAP SERPL CALCULATED.3IONS-SCNC: 13 MMOL/L (ref 4–13)
APTT PPP: 40 SECONDS (ref 23–35)
AST SERPL W P-5'-P-CCNC: 28 U/L (ref 5–45)
BASOPHILS # BLD AUTO: 0.02 THOUSANDS/ΜL (ref 0–0.1)
BASOPHILS NFR BLD AUTO: 0 % (ref 0–1)
BILIRUB SERPL-MCNC: 0.4 MG/DL (ref 0.2–1)
BUN SERPL-MCNC: 17 MG/DL (ref 5–25)
CALCIUM SERPL-MCNC: 9.4 MG/DL (ref 8.3–10.1)
CHLORIDE SERPL-SCNC: 104 MMOL/L (ref 100–108)
CO2 SERPL-SCNC: 23 MMOL/L (ref 21–32)
CREAT SERPL-MCNC: 1.12 MG/DL (ref 0.6–1.3)
EOSINOPHIL # BLD AUTO: 0.27 THOUSAND/ΜL (ref 0–0.61)
EOSINOPHIL NFR BLD AUTO: 2 % (ref 0–6)
ERYTHROCYTE [DISTWIDTH] IN BLOOD BY AUTOMATED COUNT: 14.9 % (ref 11.6–15.1)
GFR SERPL CREATININE-BSD FRML MDRD: 72 ML/MIN/1.73SQ M
GLUCOSE SERPL-MCNC: 158 MG/DL (ref 65–140)
HCT VFR BLD AUTO: 36.3 % (ref 36.5–49.3)
HGB BLD-MCNC: 11.9 G/DL (ref 12–17)
INR PPP: 2.34 (ref 0.86–1.16)
LYMPHOCYTES # BLD AUTO: 1.45 THOUSANDS/ΜL (ref 0.6–4.47)
LYMPHOCYTES NFR BLD AUTO: 13 % (ref 14–44)
MCH RBC QN AUTO: 29.6 PG (ref 26.8–34.3)
MCHC RBC AUTO-ENTMCNC: 32.8 G/DL (ref 31.4–37.4)
MCV RBC AUTO: 90 FL (ref 82–98)
MONOCYTES # BLD AUTO: 0.63 THOUSAND/ΜL (ref 0.17–1.22)
MONOCYTES NFR BLD AUTO: 6 % (ref 4–12)
NEUTROPHILS # BLD AUTO: 8.93 THOUSANDS/ΜL (ref 1.85–7.62)
NEUTS SEG NFR BLD AUTO: 79 % (ref 43–75)
PLATELET # BLD AUTO: 205 THOUSANDS/UL (ref 149–390)
PMV BLD AUTO: 12.7 FL (ref 8.9–12.7)
POTASSIUM SERPL-SCNC: 4 MMOL/L (ref 3.5–5.3)
PROT SERPL-MCNC: 8.3 G/DL (ref 6.4–8.2)
PROTHROMBIN TIME: 25.5 SECONDS (ref 12.1–14.4)
RBC # BLD AUTO: 4.02 MILLION/UL (ref 3.88–5.62)
SODIUM SERPL-SCNC: 140 MMOL/L (ref 136–145)
WBC # BLD AUTO: 11.3 THOUSAND/UL (ref 4.31–10.16)

## 2018-03-29 PROCEDURE — 99284 EMERGENCY DEPT VISIT MOD MDM: CPT

## 2018-03-29 PROCEDURE — 85730 THROMBOPLASTIN TIME PARTIAL: CPT | Performed by: EMERGENCY MEDICINE

## 2018-03-29 PROCEDURE — 96360 HYDRATION IV INFUSION INIT: CPT

## 2018-03-29 PROCEDURE — 36415 COLL VENOUS BLD VENIPUNCTURE: CPT | Performed by: EMERGENCY MEDICINE

## 2018-03-29 PROCEDURE — 80053 COMPREHEN METABOLIC PANEL: CPT | Performed by: EMERGENCY MEDICINE

## 2018-03-29 PROCEDURE — 85610 PROTHROMBIN TIME: CPT | Performed by: EMERGENCY MEDICINE

## 2018-03-29 PROCEDURE — 85025 COMPLETE CBC W/AUTO DIFF WBC: CPT | Performed by: EMERGENCY MEDICINE

## 2018-03-29 RX ORDER — LORAZEPAM 0.5 MG/1
0.5 TABLET ORAL ONCE
Status: COMPLETED | OUTPATIENT
Start: 2018-03-29 | End: 2018-03-29

## 2018-03-29 RX ORDER — SODIUM CHLORIDE 9 MG/ML
250 INJECTION, SOLUTION INTRAVENOUS CONTINUOUS
Status: DISCONTINUED | OUTPATIENT
Start: 2018-03-29 | End: 2018-03-29 | Stop reason: HOSPADM

## 2018-03-29 RX ADMIN — SODIUM CHLORIDE 250 ML/HR: 0.9 INJECTION, SOLUTION INTRAVENOUS at 11:46

## 2018-03-29 RX ADMIN — LORAZEPAM 0.5 MG: 0.5 TABLET ORAL at 12:18

## 2018-03-30 ENCOUNTER — TELEPHONE (OUTPATIENT)
Dept: FAMILY MEDICINE CLINIC | Facility: CLINIC | Age: 58
End: 2018-03-30

## 2018-03-30 DIAGNOSIS — E11.43 DIABETIC GASTROPARESIS ASSOCIATED WITH TYPE 2 DIABETES MELLITUS (HCC): Primary | ICD-10-CM

## 2018-03-30 DIAGNOSIS — K31.84 DIABETIC GASTROPARESIS ASSOCIATED WITH TYPE 2 DIABETES MELLITUS (HCC): Primary | ICD-10-CM

## 2018-03-30 RX ORDER — PROMETHAZINE HYDROCHLORIDE 25 MG/1
25 TABLET ORAL EVERY 6 HOURS PRN
Qty: 30 TABLET | Refills: 0 | Status: SHIPPED | OUTPATIENT
Start: 2018-03-30 | End: 2018-10-08 | Stop reason: ALTCHOICE

## 2018-03-30 NOTE — TELEPHONE ENCOUNTER
SOY IS VERY CONCERNED ABOUT HER DAD, HE IS NOT CARING FOR HIMSELF PROPERLY, HE HAS NOT EATEN FOR THREE DAYS AND WENT ER YESTERDAY FOR NAUSEA  HE WAS SENT HOME, HE WAS VOMITING BILE LAST NIGHT AND ASKING FOR ANTI-NAUSEA MED, HE WAS NOT GIVEN ANY, COULD YOU PLEASE SEND SOME IN TO [facility]     HE IS ALSO SAYING THAT HE DOESN'T WANT TO LIVE LIKE THIS  SHE IS NOT SURE IF HE WOULD DO ANYTHING ABOUT THIS BUT IS WORRIED  HE DOES HAVE HOME CARE 2X A WEEK FROM [place]   SOY HAS NOT HAD CONTACT WITH THEM  WHAT CAN YOU SUGGEST? IF WE DO NOT GET BACK TO HER TODAY, I TOLD HER IF HE IS WORSE OVER THE WEEKEND TO TAKE HIM TO [facility] AND SEE IF HE CAN BE ADMITTED TO PSYC

## 2018-03-30 NOTE — TELEPHONE ENCOUNTER
Please verify that he wants insulin needles for the short acting insulin- in a vial  Does he need syringes also?    I have that he is using 3 units 3 times a day of short acting   (the other he is using is the long acting insulin- pen)

## 2018-03-30 NOTE — TELEPHONE ENCOUNTER
Will send nausea medication  Ask bro if we can call [facility] to get social work consult ( they have had social workers before she said that were not helpful but think we should try again)   If worse over the weekend, agree to take to [facility] for psych eval for potential harm to himself    Are they able to call emergency line for his psychiatrist?

## 2018-04-01 DIAGNOSIS — Z79.4 TYPE 2 DIABETES MELLITUS WITH DIABETIC NEUROPATHY, WITH LONG-TERM CURRENT USE OF INSULIN (HCC): ICD-10-CM

## 2018-04-01 DIAGNOSIS — E11.40 TYPE 2 DIABETES MELLITUS WITH DIABETIC NEUROPATHY, WITH LONG-TERM CURRENT USE OF INSULIN (HCC): ICD-10-CM

## 2018-04-02 ENCOUNTER — APPOINTMENT (OUTPATIENT)
Dept: PHYSICAL THERAPY | Facility: CLINIC | Age: 58
End: 2018-04-02
Payer: COMMERCIAL

## 2018-04-02 DIAGNOSIS — I77.6 VASCULITIS (HCC): Primary | ICD-10-CM

## 2018-04-02 DIAGNOSIS — K31.84 DIABETIC GASTROPARESIS ASSOCIATED WITH TYPE 2 DIABETES MELLITUS (HCC): ICD-10-CM

## 2018-04-02 DIAGNOSIS — E11.43 DIABETIC GASTROPARESIS ASSOCIATED WITH TYPE 2 DIABETES MELLITUS (HCC): ICD-10-CM

## 2018-04-02 DIAGNOSIS — Z89.612 STATUS POST ABOVE KNEE AMPUTATION OF LEFT LOWER EXTREMITY: ICD-10-CM

## 2018-04-02 RX ORDER — GABAPENTIN 600 MG/1
600 TABLET ORAL 3 TIMES DAILY
Qty: 90 TABLET | Refills: 5 | Status: SHIPPED | OUTPATIENT
Start: 2018-04-02 | End: 2018-04-18 | Stop reason: SDUPTHER

## 2018-04-02 NOTE — TELEPHONE ENCOUNTER
Please call [facility]  Pt was given appt, I think not til august  Are they able to move him up for worsening circulation in left limb, pt has above the knee amputation on right from complications of diabetes, pt is smoker  I can order arterial ultrasound on left leg    Please let me and patient know

## 2018-04-03 RX ORDER — HYDROCODONE BITARTRATE AND ACETAMINOPHEN 7.5; 325 MG/1; MG/1
1 TABLET ORAL EVERY 6 HOURS PRN
Qty: 120 TABLET | Refills: 0 | Status: SHIPPED | OUTPATIENT
Start: 2018-04-03 | End: 2018-04-10 | Stop reason: SDUPTHER

## 2018-04-03 NOTE — TELEPHONE ENCOUNTER
Pt gets so upset when pts daughter has to leave but would definitely be interested in [facility]  And wants to get him evaluated as well    Pts daughter would like to know if the mobil lab was still able to come out to him and do his blood work?

## 2018-04-04 ENCOUNTER — APPOINTMENT (OUTPATIENT)
Dept: PHYSICAL THERAPY | Facility: CLINIC | Age: 58
End: 2018-04-04
Payer: COMMERCIAL

## 2018-04-06 NOTE — TELEPHONE ENCOUNTER
Lmtcb, need to find out she is making out and if we still need to get [facility] set up and which one she wants

## 2018-04-09 ENCOUNTER — APPOINTMENT (OUTPATIENT)
Dept: PHYSICAL THERAPY | Facility: CLINIC | Age: 58
End: 2018-04-09
Payer: COMMERCIAL

## 2018-04-10 DIAGNOSIS — Z89.612 STATUS POST ABOVE KNEE AMPUTATION OF LEFT LOWER EXTREMITY: ICD-10-CM

## 2018-04-11 ENCOUNTER — APPOINTMENT (OUTPATIENT)
Dept: PHYSICAL THERAPY | Facility: CLINIC | Age: 58
End: 2018-04-11
Payer: COMMERCIAL

## 2018-04-11 ENCOUNTER — ANTICOAG VISIT (OUTPATIENT)
Dept: CARDIOLOGY CLINIC | Facility: CLINIC | Age: 58
End: 2018-04-11

## 2018-04-11 ENCOUNTER — TELEPHONE (OUTPATIENT)
Dept: FAMILY MEDICINE CLINIC | Facility: CLINIC | Age: 58
End: 2018-04-11

## 2018-04-11 LAB
INR PPP: 3.5 (ref 0.8–1.2)
PROTHROMBIN TIME: 33.2 SEC (ref 9.1–12)

## 2018-04-11 RX ORDER — HYDROCODONE BITARTRATE AND ACETAMINOPHEN 7.5; 325 MG/1; MG/1
1 TABLET ORAL EVERY 6 HOURS PRN
Qty: 120 TABLET | Refills: 0 | Status: SHIPPED | OUTPATIENT
Start: 2018-04-11 | End: 2018-05-11 | Stop reason: SDUPTHER

## 2018-04-12 ENCOUNTER — TELEPHONE (OUTPATIENT)
Dept: FAMILY MEDICINE CLINIC | Facility: CLINIC | Age: 58
End: 2018-04-12

## 2018-04-13 ENCOUNTER — TELEPHONE (OUTPATIENT)
Dept: FAMILY MEDICINE CLINIC | Facility: CLINIC | Age: 58
End: 2018-04-13

## 2018-04-13 DIAGNOSIS — L03.119 CELLULITIS OF LOWER EXTREMITY, UNSPECIFIED LATERALITY: ICD-10-CM

## 2018-04-13 DIAGNOSIS — K21.9 GASTROESOPHAGEAL REFLUX DISEASE, ESOPHAGITIS PRESENCE NOT SPECIFIED: Primary | ICD-10-CM

## 2018-04-13 DIAGNOSIS — K92.2 GASTROINTESTINAL HEMORRHAGE, UNSPECIFIED GASTROINTESTINAL HEMORRHAGE TYPE: Primary | ICD-10-CM

## 2018-04-13 RX ORDER — PANTOPRAZOLE SODIUM 40 MG/1
40 TABLET, DELAYED RELEASE ORAL
Qty: 60 TABLET | Refills: 5 | Status: SHIPPED | OUTPATIENT
Start: 2018-04-13 | End: 2018-10-01 | Stop reason: SDUPTHER

## 2018-04-13 RX ORDER — CEFUROXIME AXETIL 250 MG/1
250 TABLET ORAL EVERY 12 HOURS SCHEDULED
Qty: 20 TABLET | Refills: 0 | Status: SHIPPED | OUTPATIENT
Start: 2018-04-13 | End: 2018-04-23

## 2018-04-13 RX ORDER — PANTOPRAZOLE SODIUM 40 MG/1
40 TABLET, DELAYED RELEASE ORAL
Qty: 180 TABLET | Refills: 0 | Status: CANCELLED | OUTPATIENT
Start: 2018-04-13

## 2018-04-13 NOTE — TELEPHONE ENCOUNTER
Patient's daughter is calling because he has a sore on his right hip  She described it  as the size of a half dollar, it is red, swollen and oozing puss  She is asking if an antibiotic can be called in or if you need to see him first  Please advise  Also, he needs a refill of his Pantoprazole

## 2018-04-13 NOTE — TELEPHONE ENCOUNTER
Yes, I will call an antibiotic  I would like to see him next week for it unless visiting nurse can give me report of progress     Sent pantoprazole renewal  too

## 2018-04-16 ENCOUNTER — APPOINTMENT (OUTPATIENT)
Dept: PHYSICAL THERAPY | Facility: CLINIC | Age: 58
End: 2018-04-16
Payer: COMMERCIAL

## 2018-04-16 DIAGNOSIS — I50.22 CHRONIC SYSTOLIC CONGESTIVE HEART FAILURE (HCC): Primary | ICD-10-CM

## 2018-04-16 RX ORDER — TORSEMIDE 20 MG/1
TABLET ORAL
Qty: 60 TABLET | Refills: 4 | Status: SHIPPED | OUTPATIENT
Start: 2018-04-16 | End: 2018-10-01 | Stop reason: SDUPTHER

## 2018-04-16 NOTE — TELEPHONE ENCOUNTER
Spoke with Anette Ayala  Says she is just picking up antibiotics now  Dad still has wound  Visiting nurse will be out tomorrow and she will have them call us with an update

## 2018-04-17 ENCOUNTER — TELEPHONE (OUTPATIENT)
Dept: FAMILY MEDICINE CLINIC | Facility: CLINIC | Age: 58
End: 2018-04-17

## 2018-04-18 ENCOUNTER — APPOINTMENT (OUTPATIENT)
Dept: PHYSICAL THERAPY | Facility: CLINIC | Age: 58
End: 2018-04-18
Payer: COMMERCIAL

## 2018-04-18 ENCOUNTER — TELEPHONE (OUTPATIENT)
Dept: FAMILY MEDICINE CLINIC | Facility: CLINIC | Age: 58
End: 2018-04-18

## 2018-04-18 ENCOUNTER — OFFICE VISIT (OUTPATIENT)
Dept: FAMILY MEDICINE CLINIC | Facility: CLINIC | Age: 58
End: 2018-04-18
Payer: COMMERCIAL

## 2018-04-18 ENCOUNTER — ANTICOAG VISIT (OUTPATIENT)
Dept: CARDIOLOGY CLINIC | Facility: CLINIC | Age: 58
End: 2018-04-18

## 2018-04-18 VITALS
WEIGHT: 188 LBS | SYSTOLIC BLOOD PRESSURE: 130 MMHG | HEIGHT: 73 IN | HEART RATE: 87 BPM | BODY MASS INDEX: 24.92 KG/M2 | DIASTOLIC BLOOD PRESSURE: 76 MMHG | OXYGEN SATURATION: 98 %

## 2018-04-18 DIAGNOSIS — L72.3 INFECTED SEBACEOUS CYST: Primary | ICD-10-CM

## 2018-04-18 DIAGNOSIS — E11.40 TYPE 2 DIABETES MELLITUS WITH DIABETIC NEUROPATHY, WITH LONG-TERM CURRENT USE OF INSULIN (HCC): ICD-10-CM

## 2018-04-18 DIAGNOSIS — Z89.612 STATUS POST ABOVE KNEE AMPUTATION OF LEFT LOWER EXTREMITY: ICD-10-CM

## 2018-04-18 DIAGNOSIS — L08.9 INFECTED SEBACEOUS CYST: Primary | ICD-10-CM

## 2018-04-18 DIAGNOSIS — D50.8 IRON DEFICIENCY ANEMIA SECONDARY TO INADEQUATE DIETARY IRON INTAKE: ICD-10-CM

## 2018-04-18 DIAGNOSIS — Z79.4 TYPE 2 DIABETES MELLITUS WITH DIABETIC NEUROPATHY, WITH LONG-TERM CURRENT USE OF INSULIN (HCC): ICD-10-CM

## 2018-04-18 LAB
INR PPP: 4 (ref 0.86–1.16)
INR PPP: 4 (ref 0.8–1.2)
PROTHROMBIN TIME: 37.9 SEC (ref 9.1–12)

## 2018-04-18 PROCEDURE — 99214 OFFICE O/P EST MOD 30 MIN: CPT | Performed by: FAMILY MEDICINE

## 2018-04-18 PROCEDURE — 3725F SCREEN DEPRESSION PERFORMED: CPT | Performed by: FAMILY MEDICINE

## 2018-04-18 RX ORDER — FERROUS SULFATE TAB EC 324 MG (65 MG FE EQUIVALENT) 324 (65 FE) MG
324 TABLET DELAYED RESPONSE ORAL
Qty: 30 TABLET | Refills: 5 | Status: SHIPPED | OUTPATIENT
Start: 2018-04-18 | End: 2018-10-30 | Stop reason: SDUPTHER

## 2018-04-18 RX ORDER — GABAPENTIN 600 MG/1
600 TABLET ORAL 3 TIMES DAILY
Qty: 90 TABLET | Refills: 0 | Status: SHIPPED | OUTPATIENT
Start: 2018-04-18 | End: 2018-05-25 | Stop reason: SDUPTHER

## 2018-04-18 NOTE — TELEPHONE ENCOUNTER
Kennedy Marina from [facility] says that did not start antibiotics until yesterday, however there is only 7 days left in bottle  He says has only taken 2 pills  Sore is very red and yellow

## 2018-04-18 NOTE — PATIENT INSTRUCTIONS
Apply neosporin twice a day to area  Continue antibiotics  Ok for physical therapy for prosthesis  Gabapentin 600mg twice a day - helps with phantom pain left leg

## 2018-04-19 NOTE — PROGRESS NOTES
Assessment/Plan:      Diagnoses and all orders for this visit:    Infected sebaceous cyst    Status post above knee amputation of left lower extremity ([facility] [address] )  -     Ambulatory referral to Physical Therapy; Future    Iron deficiency anemia secondary to inadequate dietary iron intake  -     ferrous sulfate 324 (65 Fe) mg; Take 1 tablet (324 mg total) by mouth daily before breakfast    Type 2 diabetes mellitus with diabetic neuropathy, with long-term current use of insulin (HCC)  -     gabapentin (NEURONTIN) 600 MG tablet; Take 1 tablet (600 mg total) by mouth 3 (three) times a day      infected sebaceous cyst: no active drainage, continue antibiotic and observe for complete resolution, apply neosporin twice a day  Dm type 2: last hba1c on march 1 6 7, next due 6/2018  Above the knee amputation left leg with phantom pain: start physical therapy now that prosthesis is fitting properly  Gabapentin helping with phantom limb pain, continue 600mg twice a day - prefers 600mg tabs, pharmacy is giving him 300mg tabs  Iron deficiency anemia secondary to gi blood loss: iron tab daily and will need repeat cbc and iron level  Subjective:     Patient ID: Paco Cardona is a 61 y o  male  Pt has had a cyst over his right hip for many years but never has bothered him until last week when it started to get painful and noticed swelling  Area then opened and drained liquid  Had daughter come over to his house and help him to squeeze the area and he said more blood and pus was expressed  Has been applying neosporin  Started on the antibiotic 2 days ago and noticed improvement in tenderness and redness  No more drainage  No fever  Seems to be healing  Ready to start physical therapy with prosthesis  Finally fitting leg since last October  Has appt for next Friday  Anxious to start walking  Appetite decreased  Iron has helped in past to stimulate appetite  Pt is slightly anemic  No constipation issues           Review of Systems   Constitutional: Negative  Negative for fatigue and fever  HENT: Negative  Eyes: Negative  Respiratory: Negative  Negative for cough  Cardiovascular: Negative  Gastrointestinal: Negative  Endocrine: Negative  Genitourinary: Negative  Musculoskeletal: Negative  Skin: Positive for wound  Right hip open area   Allergic/Immunologic: Negative  Neurological: Negative  Psychiatric/Behavioral: Negative  The following portions of the patient's history were reviewed and updated as appropriate: allergies, current medications, past family history, past medical history, past social history, past surgical history and problem list     Objective:  Vitals:    04/18/18 1209   BP: 130/76   Pulse: 87   SpO2: 98%   Weight: 85 3 kg (188 lb)   Height: 6' 1" (1 854 m)      Physical Exam   Constitutional: He is oriented to person, place, and time  He appears well-developed and well-nourished  HENT:   Head: Normocephalic and atraumatic  Cardiovascular: Normal rate, regular rhythm and normal heart sounds  Pulmonary/Chest: Effort normal and breath sounds normal    Abdominal: Soft  Bowel sounds are normal    Neurological: He is alert and oriented to person, place, and time  Skin: Skin is warm and dry  Open area 1cm right hip with no active drainage  Mild surrounding redness  Psychiatric: He has a normal mood and affect  His behavior is normal  Judgment and thought content normal    Nursing note and vitals reviewed

## 2018-04-20 ENCOUNTER — PATIENT OUTREACH (OUTPATIENT)
Dept: FAMILY MEDICINE CLINIC | Facility: CLINIC | Age: 58
End: 2018-04-20

## 2018-04-20 NOTE — PROGRESS NOTES
Called Mark to check on his current health status  Unable to reach him  Left voicemail requesting a return call  First attempt

## 2018-04-23 LAB — INR PPP: 2.4 (ref 0.86–1.16)

## 2018-04-24 ENCOUNTER — ANTICOAG VISIT (OUTPATIENT)
Dept: CARDIOLOGY CLINIC | Facility: CLINIC | Age: 58
End: 2018-04-24

## 2018-04-26 DIAGNOSIS — I77.9 DISORDER OF ARTERY OR ARTERIOLE (HCC): ICD-10-CM

## 2018-04-26 DIAGNOSIS — I70.209 ATHEROSCLEROSIS OF NATIVE ARTERY OF EXTREMITY (HCC): ICD-10-CM

## 2018-04-27 ENCOUNTER — PATIENT OUTREACH (OUTPATIENT)
Dept: FAMILY MEDICINE CLINIC | Facility: CLINIC | Age: 58
End: 2018-04-27

## 2018-04-27 LAB
INR PPP: 2.4 (ref 0.8–1.2)
PROTHROMBIN TIME: 23.8 SEC (ref 9.1–12)

## 2018-04-27 NOTE — PROGRESS NOTES
Called Mark to check on his current health status  Unable to reach him  Left voicemail requesting a return call  Second attempt

## 2018-04-28 DIAGNOSIS — E78.5 DYSLIPIDEMIA: Primary | ICD-10-CM

## 2018-04-30 ENCOUNTER — EVALUATION (OUTPATIENT)
Dept: PHYSICAL THERAPY | Facility: CLINIC | Age: 58
End: 2018-04-30
Payer: COMMERCIAL

## 2018-04-30 DIAGNOSIS — Z89.612 STATUS POST ABOVE KNEE AMPUTATION OF LEFT LOWER EXTREMITY: ICD-10-CM

## 2018-04-30 DIAGNOSIS — Z89.612 STATUS POST ABOVE KNEE AMPUTATION, LEFT (HCC): Primary | ICD-10-CM

## 2018-04-30 PROCEDURE — G8978 MOBILITY CURRENT STATUS: HCPCS | Performed by: PHYSICAL THERAPIST

## 2018-04-30 PROCEDURE — 97162 PT EVAL MOD COMPLEX 30 MIN: CPT | Performed by: PHYSICAL THERAPIST

## 2018-04-30 PROCEDURE — G8979 MOBILITY GOAL STATUS: HCPCS | Performed by: PHYSICAL THERAPIST

## 2018-04-30 RX ORDER — ATORVASTATIN CALCIUM 10 MG/1
TABLET, FILM COATED ORAL
Qty: 90 TABLET | Refills: 0 | Status: SHIPPED | OUTPATIENT
Start: 2018-04-30 | End: 2018-07-27 | Stop reason: SDUPTHER

## 2018-04-30 NOTE — PROGRESS NOTES
PT Evaluation     Today's date: 2018  Patient name: Kaye Martinez  : 1960  MRN: [id number]  Referring provider: Jossy Herrera DO  Dx:   Encounter Diagnosis     ICD-10-CM    1  Status post above knee amputation, left ([facility] [address] ) Z89 612    2  Status post above knee amputation of left lower extremity ([facility] [address] ) Z60 [id number]        Start Time: 1500  Stop Time: 1556  Total time in clinic (min): 56 minutes    Assessment/Plan   This patient is 7 months s/p left transfemoral amputation  He has returned to outpatient PT for gait training with prosthesis  Current problems include B hip flexor contractures (L > R), pain in residual limb as well as phantom symptoms, impaired functional mobility  This patient is a good candidate for skilled physical therapy to reduce pain and improve function  Interventions to include manual therapy, ROM, stretching, strengthening, gait and balance training, therapeutic activities, neuromuscular re-ed and instruction in HEP  Frequency and duration: 2 x/week for  6-8 weeks    STGs: 4 weeks  1  Increase ROM L hip extension by 10 degrees  2  Decrease pain residual limb 3 levels  3   Reduce phantom symptoms  LTGs: 6-8 weeks  1  Independent HEP  2  Able to ambulate distances of 100-150 feet on level surfaces independently with walker and prosthesis  3  Increase FOTO score to predicted level  4  Able to ascend / descend steps with prosthesis and railing safely                   Subjective   This is a 61 y o  male who underwent L AKA in   Attended therapy at this office form December to February and then was DC'd due to a fall  Received home PT services until last week (18)  Now returns for gait training with prosthesis  Reports pain posterior L thigh, intermittent; aggravated by having pressure on be back of the thigh  Phantom pain in the L lower leg and around the ankle - steady    Occupation: not employed  Leisure: reading  Patients goal: to be able to walk    Objective  Pain scale: L thigh 0-7/10; phantom pain 0-9/10    Functional Mobility  Sit<->stand: close supervision w/ walker and prosthesis  Supine -> sit: Independent  Sit-> supine: Independent  Rolling: Independent  Floor to stand: N/A  Ambulation: with prosthesis and walker, CG and cueing for correct sequencing and step length  Stairs: N/A      Integumentary: L residual limb slightly dog- eared; incision well approximated, no wounds or erythema on residual limb  Length (from adductor tendon to distal femur): 17  cm  Circumference:   8 cm below adductor tendon =   51 cm  10 cm below adductor tendon=   50 cm  13 cm below adductor tendon=    47 3 cm    Palpation: tender / tight L HS, slight tenderness w/ pressure on distal end of L femur    Abdominal strength: 5/5        left     Hip   AROM  PROM  Strength  flexion   5/5   extension -25 -25 4/5*   Abduction   5/5   adduction   5/5   * pain     right extremity ROM / strength WFL except L hip flexion contracture 10 degrees                Precautions: none  PMH: DM , defibrillator, PVD, PAD    Daily Treatment Diary     Manual  4/30            Hip flexor stretch             STM/TrP release L HS                                                        Exercise Diary  4/30                         Prone lying             SLR x4             Hip flexor stretch             bridges                          Balance activities standing                                                                 Sit <-> stand             Gait training w/ walker: knee locked                                                                                                            Modalities              CP prn

## 2018-05-02 ENCOUNTER — EVALUATION (OUTPATIENT)
Dept: PHYSICAL THERAPY | Facility: CLINIC | Age: 58
End: 2018-05-02
Payer: COMMERCIAL

## 2018-05-02 DIAGNOSIS — Z89.612 STATUS POST ABOVE KNEE AMPUTATION, LEFT (HCC): Primary | ICD-10-CM

## 2018-05-02 DIAGNOSIS — Z89.612 STATUS POST ABOVE KNEE AMPUTATION OF LEFT LOWER EXTREMITY: ICD-10-CM

## 2018-05-02 LAB
INR PPP: 2.9 (ref 0.8–1.2)
PROTHROMBIN TIME: 28.3 SEC (ref 9.1–12)

## 2018-05-02 PROCEDURE — 97112 NEUROMUSCULAR REEDUCATION: CPT | Performed by: PHYSICAL THERAPIST

## 2018-05-02 PROCEDURE — 97140 MANUAL THERAPY 1/> REGIONS: CPT | Performed by: PHYSICAL THERAPIST

## 2018-05-02 NOTE — PROGRESS NOTES
Daily Note     Today's date: 2018  Patient name: Phillip He  : 1960  MRN: [id number]  Referring provider: Israel Kiser DO  Dx: No diagnosis found  Start Time: 1200          Subjective: states that he forgot to bring his leg to therapy today; c/o pain w/ pressure on back of L thigh; phantom pain around the L ankle most of the time - requires neurontin to be able to sleep      Objective: See treatment diary below      Assessment: As patient did not have leg today, focused on manual techniques and progression of GMI  Tender w/ deep pressure on L residual limb  Hip extension increased p manuals  C/o phantom pain LLE, especially around the ankle  Tolerated treatment well  Initiated mirror therapy today  Good response to GMI training w/ reduction in phantom pain p mirror activities  Patient would benefit from continued PT with emphasis on decreasing sensitivity L residual limb, decreasing phantom pain, increasing L hip extension and improving gait  Plan: Continue per plan of care       Precautions: none  PMH: DM , defibrillator, PVD, PAD    Daily Treatment Diary     Manual             Hip flexor stretch  JR           STM L quad / HS  JR            Desensitization L residual limb  JR                                         Exercise Diary                          Prone lying             SLR x4             Hip flexor stretch  2-3'           Bridges w/ bolster  10x10"                        Balance activities standing  SLS RLE  20"x3  CS                                                               Sit <-> stand             Gait training w/ walker: knee locked                                                                                           GMI  See below           18: initiated mirror therapy    Modalities              CP prn

## 2018-05-03 ENCOUNTER — ANTICOAG VISIT (OUTPATIENT)
Dept: CARDIOLOGY CLINIC | Facility: CLINIC | Age: 58
End: 2018-05-03

## 2018-05-03 DIAGNOSIS — Z95.2 HISTORY OF MITRAL VALVE REPLACEMENT WITH MECHANICAL VALVE: ICD-10-CM

## 2018-05-07 ENCOUNTER — PATIENT OUTREACH (OUTPATIENT)
Dept: FAMILY MEDICINE CLINIC | Facility: CLINIC | Age: 58
End: 2018-05-07

## 2018-05-07 ENCOUNTER — EVALUATION (OUTPATIENT)
Dept: PHYSICAL THERAPY | Facility: CLINIC | Age: 58
End: 2018-05-07
Payer: COMMERCIAL

## 2018-05-07 DIAGNOSIS — Z89.612 STATUS POST ABOVE KNEE AMPUTATION, LEFT (HCC): Primary | ICD-10-CM

## 2018-05-07 PROCEDURE — 97116 GAIT TRAINING THERAPY: CPT | Performed by: PHYSICAL THERAPIST

## 2018-05-07 PROCEDURE — 97530 THERAPEUTIC ACTIVITIES: CPT | Performed by: PHYSICAL THERAPIST

## 2018-05-07 PROCEDURE — 97112 NEUROMUSCULAR REEDUCATION: CPT | Performed by: PHYSICAL THERAPIST

## 2018-05-07 PROCEDURE — 97140 MANUAL THERAPY 1/> REGIONS: CPT | Performed by: PHYSICAL THERAPIST

## 2018-05-07 NOTE — PROGRESS NOTES
Daily Note     Today's date: 2018  Patient name: Ele Aguillon  : 1960  MRN: [id number]  Referring provider: Kevin Scales DO  Dx:   Encounter Diagnosis     ICD-10-CM    1  Status post above knee amputation, left ([facility] [address] ) [id number] [id number]        Start Time: 1520  Stop Time: 1611  Total time in clinic (min): 51 minutes    Subjective: phantom pain is improving; feels mirror therapy is helping - tried to do it at [facility] but doesn not have a big enough mirror; ha ing problems with the R foot - states it is getting like the L foot was; is seeing a vascular specialist    Objective: See treatment diary below      Assessment: less tenderness with pressure on distal residual limb today  Continued mirror therapy with good response - reports a decrease in phantom symptoms, especially during the day  Able to ambulate w/ walker and prosthesis with locked knee and CG, cueing to equalize step length  Exhibited good safety  Distance limited due to pain in the R foot  Added nerve glides to help with neuropathic pain  Patient would benefit from continued PT     Plan: Continue per plan of care       Precautions: none  PMH: DM , defibrillator, PVD, PAD    Daily Treatment Diary     Manual            Hip flexor stretch  JR JR          STM L quad / HS  JR  np          Desensitization L residual limb  Gifty Chime                                        Exercise Diary                         Prone lying             SLR x4             Hip flexor stretch  2-3' np          Bridges w/ bolster  10x10" np                       Balance activities standing  SLS RLE  20"x3  CS np                                                              Sit <-> stand   2x          Gait training w/ walker: knee locked   40'x2  CG& cues                                                                                        GMI  See below           18: initiated mirror therapy    Modalities              CP prn

## 2018-05-08 LAB — INR PPP: 2 (ref 0.86–1.16)

## 2018-05-09 ENCOUNTER — ANTICOAG VISIT (OUTPATIENT)
Dept: CARDIOLOGY CLINIC | Facility: CLINIC | Age: 58
End: 2018-05-09

## 2018-05-09 ENCOUNTER — EVALUATION (OUTPATIENT)
Dept: PHYSICAL THERAPY | Facility: CLINIC | Age: 58
End: 2018-05-09
Payer: COMMERCIAL

## 2018-05-09 DIAGNOSIS — Z89.612 STATUS POST ABOVE KNEE AMPUTATION OF LEFT LOWER EXTREMITY: ICD-10-CM

## 2018-05-09 DIAGNOSIS — Z89.612 STATUS POST ABOVE KNEE AMPUTATION, LEFT (HCC): Primary | ICD-10-CM

## 2018-05-09 DIAGNOSIS — Z95.2 HISTORY OF MITRAL VALVE REPLACEMENT WITH MECHANICAL VALVE: ICD-10-CM

## 2018-05-09 LAB
INR PPP: 2 (ref 0.8–1.2)
PROTHROMBIN TIME: 19.6 SEC (ref 9.1–12)

## 2018-05-09 PROCEDURE — 97530 THERAPEUTIC ACTIVITIES: CPT | Performed by: PHYSICAL THERAPIST

## 2018-05-09 PROCEDURE — 97116 GAIT TRAINING THERAPY: CPT | Performed by: PHYSICAL THERAPIST

## 2018-05-09 PROCEDURE — 97112 NEUROMUSCULAR REEDUCATION: CPT | Performed by: PHYSICAL THERAPIST

## 2018-05-09 NOTE — PROGRESS NOTES
Daily Note     Today's date: 2018  Patient name: Chang Abebe  : 1960  MRN: [id number]  Referring provider: Samaria Chin DO  Dx:   Encounter Diagnosis     ICD-10-CM    1  Status post above knee amputation, left ([address] ) Z89 [id number]    2  Status post above knee amputation of left lower extremity (HCC) [id number]                   Subjective: has been c/o pain in the R ankle; L residual limb is tolerating pressure of prosthesis better      Objective: See treatment diary below      Assessment: increased independence with ambulation; able to walk with close supervision, cueing to improve wt bearing on LLE, decrease speed  Patient's daughter attended today's session and was able to walk with her father  Patient to begin walking in his house with prosthesis and walker- daughter evangelina conchae sure that he is safe in home environment  Instructed to increase wearing time in prosthesis - to be up to 3 hours twice daily by Monday  Tolerated treatment well  Patient would benefit from continued PT      Plan: Continue per plan of care       Precautions: none  PMH: DM , defibrillator, PVD, PAD    Daily Treatment Diary     Manual           Hip flexor stretch  JR JR np         STM L quad / HS  JR  np np         Desensitization L residual limb  Nancy Every np                                       Exercise Diary                        Prone lying             SLR x4             Hip flexor stretch  2-3' np np         Bridges w/ bolster  10x10" np np                      Balance activities standing  SLS RLE  20"x3  CS np Wt shift unsupported  2-3'                                                             Sit <-> stand   2x 10x         Gait training w/ walker: knee locked   40'x2  CG& cues 40-75' x8                                                                          Prosthetic mgt    unlock knee to sit  10x         GMI  See below  Mirror therapy         18: initiated mirror therapy    Modalities CP prn

## 2018-05-11 ENCOUNTER — OFFICE VISIT (OUTPATIENT)
Dept: ENDOCRINOLOGY | Facility: HOSPITAL | Age: 58
End: 2018-05-11
Payer: COMMERCIAL

## 2018-05-11 VITALS — HEART RATE: 60 BPM | SYSTOLIC BLOOD PRESSURE: 132 MMHG | DIASTOLIC BLOOD PRESSURE: 70 MMHG

## 2018-05-11 DIAGNOSIS — E11.9 INSULIN DEPENDENT TYPE 2 DIABETES MELLITUS, CONTROLLED (HCC): Primary | ICD-10-CM

## 2018-05-11 DIAGNOSIS — E78.5 HYPERLIPIDEMIA, UNSPECIFIED HYPERLIPIDEMIA TYPE: ICD-10-CM

## 2018-05-11 DIAGNOSIS — Z79.4 INSULIN DEPENDENT TYPE 2 DIABETES MELLITUS, CONTROLLED (HCC): Primary | ICD-10-CM

## 2018-05-11 DIAGNOSIS — Z89.612 STATUS POST ABOVE KNEE AMPUTATION OF LEFT LOWER EXTREMITY: ICD-10-CM

## 2018-05-11 DIAGNOSIS — I10 BENIGN ESSENTIAL HYPERTENSION: ICD-10-CM

## 2018-05-11 PROCEDURE — 99204 OFFICE O/P NEW MOD 45 MIN: CPT | Performed by: INTERNAL MEDICINE

## 2018-05-11 RX ORDER — GABAPENTIN 300 MG/1
300 CAPSULE ORAL DAILY
COMMUNITY
Start: 2018-05-10 | End: 2018-05-25

## 2018-05-11 RX ORDER — HYDROCODONE BITARTRATE AND ACETAMINOPHEN 7.5; 325 MG/1; MG/1
1 TABLET ORAL EVERY 6 HOURS PRN
Qty: 120 TABLET | Refills: 0 | Status: SHIPPED | OUTPATIENT
Start: 2018-05-11 | End: 2018-06-11 | Stop reason: SDUPTHER

## 2018-05-11 NOTE — PROGRESS NOTES
5/11/2018    Assessment/Plan      Diagnoses and all orders for this visit:    Insulin dependent type 2 diabetes mellitus, controlled ([facility] [address] )  -     insulin glargine (BASAGLAR KWIKPEN) injection pen 100 units/mL; Inject 0 14 mL (14 Units total) under the skin daily at bedtime 10 units qhs   -     HEMOGLOBIN A1C W/ EAG ESTIMATION Lab Collect; Future  -     Comprehensive metabolic panel Lab Collect; Future  -     Microalbumin / creatinine urine ratio- Lab Collect; Future  -     HEMOGLOBIN A1C W/ EAG ESTIMATION Lab Collect  -     Comprehensive metabolic panel Lab Collect  -     Microalbumin / creatinine urine ratio- Lab Collect    Benign essential hypertension  -     Comprehensive metabolic panel Lab Collect; Future  -     Comprehensive metabolic panel Lab Collect    Hyperlipidemia, unspecified hyperlipidemia type  -     Lipid Panel with Direct LDL reflex Lab Collect; Future  -     Lipid Panel with Direct LDL reflex Lab Collect    Other orders  -     gabapentin (NEURONTIN) 300 mg capsule; Take 300 mg by mouth daily        Assessment/Plan:  1  Type 2 diabetes with peripheral vascular disease, gastroparesis, neuropathy status post left AKA:  There are no blood sugar logs to review, but it sounds like the patient's blood sugar dips overnight  Therefore all decreased Basaglar to 10 units q h s   Continue Humalog sliding scale starting at 3 units, but only for a blood sugar over 150 and 1 additional unit for each 50 over 200  Will update labs as ordered above now  Patient will provide blood sugar logs in 2 weeks for review  Follow-up in 3 months  He is due for an eye exam which she will schedule in the future  He has an appointment with vascular surgery for his right leg and foot coming up  2   Hypertension:  Stable on current regimen  3   Hyperlipidemia:  Will check lipid panel with upcoming labs        CC: Diabetes Consult    History of Present Illness     HPI: Jerrod Doctor is a 61y o  year old male with type 2 diabetes for 17 years  He is on insulin at home and takes Humalog (Humalog 1:50>150 but starts at 3 units- no Humalog under 150) Basaglar 14 daily  He denies any polyuria, polydipsia, nocturia and blurry vision  He denies nephropathy and retinopathy but does admit to neuropathy  Hypoglycemic episodes: Yes sometimes overnight  The patient's last eye exam was about a year ago where they saw small amount of bleeding  Blood Sugar/Glucometer/Pump/CGM review: No logs  Review of Systems   Constitutional: Negative for chills, fatigue and fever  HENT: Negative for trouble swallowing and voice change  Eyes: Negative for visual disturbance  Respiratory: Negative for shortness of breath  Cardiovascular: Negative for chest pain, palpitations and leg swelling  Gastrointestinal: Negative for abdominal pain, nausea and vomiting  Endocrine: Negative for polydipsia and polyuria  Musculoskeletal: Negative for arthralgias and myalgias  Skin: Negative for rash  Neurological: Negative for dizziness, tremors and weakness  Hematological: Negative for adenopathy  Psychiatric/Behavioral: Negative for agitation and confusion         Historical Information   Past Medical History:   Diagnosis Date    Anticoagulated on Coumadin     Mechanical MVR    Anxiety     Bipolar 1 disorder ([facility])     CAD (coronary artery disease)     Chronic kidney disease     left [facility] being monitored    Chronic systolic congestive heart failure ([facility] [address] ) 4/18/2017    Colitis 4/2/2017    COPD (chronic obstructive pulmonary disease) (HCC)     Diabetes mellitus ([facility])     Difficulty urinating     DM type 2 with diabetic peripheral neuropathy ([facility])     Hiatal hernia     Hyperlipidemia     Hypertension     Ischemic cardiomyopathy     Myocardial infarction [facility])     Pacemaker     PAD (peripheral artery disease) ([facility])     Rectal bleed     Vomiting      Past Surgical History:   Procedure Laterality Date    AMPUTATION ABOVE KNEE (AKA) Left 8/24/2017    Procedure: AMPUTATION ABOVE KNEE (AKA); Surgeon: Thaddeus Rodrigues MD;  [facility];  Service: Vascular    CARDIAC DEFIBRILLATOR PLACEMENT      CORONARY ARTERY BYPASS GRAFT      ESOPHAGOGASTRODUODENOSCOPY N/A 4/20/2017    Procedure: ESOPHAGOGASTRODUODENOSCOPY (EGD); Surgeon: Riaz Tierney MD;  [facility];  Service:     ESOPHAGOGASTRODUODENOSCOPY N/A 5/26/2017    Procedure: ESOPHAGOGASTRODUODENOSCOPY (EGD); Surgeon: Trixie Johnston MD;  [facility];  Service:     ESOPHAGOGASTRODUODENOSCOPY N/A 11/16/2017    Procedure: ESOPHAGOGASTRODUODENOSCOPY (EGD); Surgeon: Ruiz Alston MD;  [facility];   Service: Gastroenterology    MITRAL VALVE REPLACEMENT      Mechanical    TOE AMPUTATION Left 7/21/2017    Procedure: PARTIAL FIRST RAY AMPUTATION; EXCISION OF WOUND W/ TOE FLAP CLOSURE;  Surgeon: Rodrigo Ramirez DPM;  [facility];  Service: Podiatry    TONSILLECTOMY      VAC DRESSING APPLICATION Left 6/47/0707    Procedure: VAC application;  Surgeon: Rodrigo Ramirez DPM;  [facility];  Service: Podiatry    WOUND DEBRIDEMENT Left 8/19/2017    Procedure: wound debridement with washout; resection of left 1st metatarsal;  Surgeon: Rodrigo Ramirez DPM;  [facility];  Service: Podiatry     Social History   History   Alcohol Use No     History   Drug Use No     History   Smoking Status    Current Every Day Smoker    Packs/day: 1 00    Types: Cigarettes   Smokeless Tobacco    Never Used     Family History:   Family History   Problem Relation Age of Onset    Hypertension Mother     Diabetes Father        Meds/Allergies   Current Outpatient Prescriptions   Medication Sig Dispense Refill    ACCU-CHEK FASTCLIX LANCETS MISC 1 Units by Does not apply route 3 (three) times a day      atorvastatin (LIPITOR) 10 mg tablet TAKE 1 TABLET BY MOUTH EVERY DAY 90 tablet 0    B-D INS SYR ULTRAFINE 1CC/31G 31G X 5/16" 1 ML MISC Inject 1 each under the skin 4 (four) times a day  0    Blood Glucose Monitoring Suppl (ACCU-CHEK APPLE PLUS) w/Device KIT 1 Units by Does not apply route 3 (three) times a day      ferrous sulfate 324 (65 Fe) mg Take 1 tablet (324 mg total) by mouth daily before breakfast 30 tablet 5    gabapentin (NEURONTIN) 300 mg capsule Take 300 mg by mouth daily      gabapentin (NEURONTIN) 600 MG tablet Take 1 tablet (600 mg total) by mouth 3 (three) times a day (Patient taking differently: Take 600 mg by mouth 2 (two) times a day  ) 90 tablet 0    glucose blood (ACCU-CHEK APPLE PLUS) test strip 1 Units by In Vitro route 3 (three) times a day      HYDROcodone-acetaminophen (NORCO) 7 5-325 mg per tablet Take 1 tablet by mouth every 6 (six) hours as needed for pain Max Daily Amount: 4 tablets 120 tablet 0    insulin glargine (BASAGLAR KWIKPEN) injection pen 100 units/mL Inject 0 14 mL (14 Units total) under the skin daily at bedtime 10 units qhs  5 pen 0    insulin lispro (HUMALOG) 100 units/mL injection Inject 3 Units under the skin 3 (three) times a day      Insulin Pen Needle (B-D ULTRAFINE III SHORT PEN) 31G X 8 MM MISC 1 Units by Does not apply route 4 (four) times a day 200 each 0    Insulin Pen Needle 32G X 4 MM MISC by Does not apply route 4 (four) times a day 100 each 0    lisinopril (ZESTRIL) 10 mg tablet Take 10 mg by mouth daily  3    LORazepam (ATIVAN) 1 mg tablet Take 1 tablet by mouth 2 (two) times a day as needed      metoprolol succinate (TOPROL-XL) 25 mg 24 hr tablet Take 25 mg by mouth daily      metroNIDAZOLE (METROCREAM) 0 75 % cream Apply 1 application topically daily      mirtazapine (REMERON) 15 mg tablet       montelukast (SINGULAIR) 10 mg tablet Take 1 tablet (10 mg total) by mouth daily 30 tablet 5    nystatin-triamcinolone (MYCOLOG-II) cream Apply topically 2 (two) times a day 30 g 0    pantoprazole (PROTONIX) 40 mg tablet Take 1 tablet (40 mg total) by mouth 2 (two) times a day before meals 60 tablet 5    promethazine (PHENERGAN) 25 mg tablet Take 1 tablet (25 mg total) by mouth every 6 (six) hours as needed for nausea or vomiting 30 tablet 0    QUEtiapine (SEROquel) 400 MG tablet Take 400 mg by mouth daily at bedtime  1    Sulfacetamide Sodium, Acne, 10 % LOTN       torsemide (DEMADEX) 20 mg tablet TAKE 2 TABLET DAILY 60 tablet 4    warfarin (COUMADIN) 5 mg tablet Resume alternating 5mg and 7 5mg dosing upon discharge  Dr Petra Mccann office will adjust as needed  30 tablet 0    lidocaine (LIDODERM) 5 % Place 1 patch on the skin every 24 hours for 30 days Remove & Discard patch within 12 hours or as directed by MD 30 patch 0    ondansetron (ZOFRAN-ODT) 4 mg disintegrating tablet Take 1 tablet (4 mg total) by mouth every 8 (eight) hours as needed for nausea or vomiting for up to 7 days 20 tablet 0    QUEtiapine (SEROquel) 200 mg tablet       valACYclovir (VALTREX) 1,000 mg tablet Take 1 tablet (1,000 mg total) by mouth 3 (three) times a day for 7 days 21 tablet 0     No current facility-administered medications for this visit  Allergies   Allergen Reactions    Aspirin Other (See Comments)     Received ASA & bleed out    Heparin      Please see 8/2017 admit = 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    Morphine Other (See Comments)     Gets red streak up arm above IV site    Omeprazole Diarrhea       Objective   Vitals: Blood pressure 132/70, pulse 60  Invasive Devices          No matching active lines, drains, or airways          Physical Exam   Constitutional: He is oriented to person, place, and time  He appears well-developed and well-nourished  No distress  HENT:   Head: Normocephalic and atraumatic  Mouth/Throat: No oropharyngeal exudate  Eyes: Conjunctivae and EOM are normal  Pupils are equal, round, and reactive to light  No scleral icterus  Neck: Normal range of motion  Neck supple  No thyromegaly present     Cardiovascular: Normal rate and regular rhythm  No murmur heard  Pulmonary/Chest: Effort normal and breath sounds normal  He has no wheezes  Abdominal: Soft  Bowel sounds are normal  He exhibits no distension  There is no tenderness  Musculoskeletal: Normal range of motion  He exhibits no edema  S/P left AKA  Decrease monofilament and vibration sensation right foot  Pulse not palpable in right foot  Right foot is warm  Neurological: He is alert and oriented to person, place, and time  He exhibits normal muscle tone  Skin: Skin is warm and dry  No rash noted  He is not diaphoretic  Psychiatric: He has a normal mood and affect  His behavior is normal        The history was obtained from the review of the chart and from the patient      Lab Results:    Most recent Alc is  Lab Results   Component Value Date    HGBA1C 6 7 03/01/2018             Lab Results   Component Value Date    CREATININE 1 12 03/29/2018    CREATININE 1 18 03/02/2018    CREATININE 1 20 11/27/2017    BUN 17 03/29/2018     03/29/2018    K 4 0 03/29/2018     03/29/2018    CO2 23 03/29/2018     eGFR   Date Value Ref Range Status   03/29/2018 72 ml/min/1 73sq m Final   04/18/2017 >60 0 ml/min/1 73sq m Final       Lab Results   Component Value Date    CHOL 83 08/11/2017    HDL 36 (L) 08/11/2017    TRIG 86 08/11/2017       Lab Results   Component Value Date    ALT 29 03/29/2018    AST 28 03/29/2018    ALKPHOS 149 (H) 03/29/2018    BILITOT 0 40 03/29/2018       Future Appointments  Date Time Provider [facility]   5/14/2018 2:00 PM [facility]   5/16/2018 10:30 AM Dina Killian, PT [facility]   5/21/2018 12:00 PM Dina Killian, PT [facility]   5/23/2018 11:15 AM Dina Killian, PT [facility]   5/24/2018 12:45 PM [facility] VASCULAR 1 QU HV Car NI [facility]   5/25/2018 9:00 AM Carol Chery, DO NEURO [facility]   5/29/2018 10:45 AM Dina Killian, PT [facility] PT [facility]   5/30/2018 11:15 AM Dina Killian, PT [facility] PT [facility]

## 2018-05-11 NOTE — LETTER
May 11, 2018     Maxine Brochure, [address] [facility] [address]    Patient: Chang Abebe   YOB: 1960   Date of Visit: 5/11/2018       Dear Dr Radha Saavedra: Thank you for referring Catalina Lundborg to me for evaluation  Below are my notes for this consultation  If you have questions, please do not hesitate to call me  I look forward to following your patient along with you  Sincerely,        Laurent Medina DO        CC: No Recipients  Laurent Medina DO  5/11/2018  1:39 PM  Sign at close encounter  5/11/2018    Assessment/Plan      Diagnoses and all orders for this visit:    Insulin dependent type 2 diabetes mellitus, controlled ([facility] [address] )  -     insulin glargine (BASAGLAR KWIKPEN) injection pen 100 units/mL; Inject 0 14 mL (14 Units total) under the skin daily at bedtime 10 units qhs   -     HEMOGLOBIN A1C W/ EAG ESTIMATION Lab Collect; Future  -     Comprehensive metabolic panel Lab Collect; Future  -     Microalbumin / creatinine urine ratio- Lab Collect; Future  -     HEMOGLOBIN A1C W/ EAG ESTIMATION Lab Collect  -     Comprehensive metabolic panel Lab Collect  -     Microalbumin / creatinine urine ratio- Lab Collect    Benign essential hypertension  -     Comprehensive metabolic panel Lab Collect; Future  -     Comprehensive metabolic panel Lab Collect    Hyperlipidemia, unspecified hyperlipidemia type  -     Lipid Panel with Direct LDL reflex Lab Collect; Future  -     Lipid Panel with Direct LDL reflex Lab Collect    Other orders  -     gabapentin (NEURONTIN) 300 mg capsule; Take 300 mg by mouth daily        Assessment/Plan:  1  Type 2 diabetes with peripheral vascular disease, gastroparesis, neuropathy status post left AKA:  There are no blood sugar logs to review, but it sounds like the patient's blood sugar dips overnight    Therefore all decreased Basaglar to 10 units q h s   Continue Humalog sliding scale starting at 3 units, but only for a blood sugar over 150 and 1 additional unit for each 50 over 200  Will update labs as ordered above now  Patient will provide blood sugar logs in 2 weeks for review  Follow-up in 3 months  He is due for an eye exam which she will schedule in the future  He has an appointment with vascular surgery for his right leg and foot coming up  2   Hypertension:  Stable on current regimen  3   Hyperlipidemia:  Will check lipid panel with upcoming labs  CC: Diabetes Consult    History of Present Illness     HPI: Bear Ritter is a 61y o  year old male with type 2 diabetes for 17 years  He is on insulin at home and takes Humalog (Humalog 1:50>150 but starts at 3 units- no Humalog under 150) Basaglar 14 daily  He denies any polyuria, polydipsia, nocturia and blurry vision  He denies nephropathy and retinopathy but does admit to neuropathy  Hypoglycemic episodes: Yes sometimes overnight  The patient's last eye exam was about a year ago where they saw small amount of bleeding  Blood Sugar/Glucometer/Pump/CGM review: No logs  Review of Systems   Constitutional: Negative for chills, fatigue and fever  HENT: Negative for trouble swallowing and voice change  Eyes: Negative for visual disturbance  Respiratory: Negative for shortness of breath  Cardiovascular: Negative for chest pain, palpitations and leg swelling  Gastrointestinal: Negative for abdominal pain, nausea and vomiting  Endocrine: Negative for polydipsia and polyuria  Musculoskeletal: Negative for arthralgias and myalgias  Skin: Negative for rash  Neurological: Negative for dizziness, tremors and weakness  Hematological: Negative for adenopathy  Psychiatric/Behavioral: Negative for agitation and confusion         Historical Information   Past Medical History:   Diagnosis Date    Anticoagulated on Coumadin     Mechanical MVR    Anxiety     Bipolar 1 disorder (HCC)     CAD (coronary artery disease)     Chronic kidney disease left kideny being monitored    Chronic systolic congestive heart failure ([facility] [address] ) 4/18/2017    Colitis 4/2/2017    COPD (chronic obstructive pulmonary disease) (HCC)     Diabetes mellitus (HCC)     Difficulty urinating     DM type 2 with diabetic peripheral neuropathy (HCC)     Hiatal hernia     Hyperlipidemia     Hypertension     Ischemic cardiomyopathy     Myocardial infarction [facility])     Pacemaker     PAD (peripheral artery disease) (HCC)     Rectal bleed     Vomiting      Past Surgical History:   Procedure Laterality Date    AMPUTATION ABOVE KNEE (AKA) Left 8/24/2017    Procedure: AMPUTATION ABOVE KNEE (AKA); Surgeon: Dragan Goetz MD;  [facility];  Service: Vascular    CARDIAC DEFIBRILLATOR PLACEMENT      CORONARY ARTERY BYPASS GRAFT      ESOPHAGOGASTRODUODENOSCOPY N/A 4/20/2017    Procedure: ESOPHAGOGASTRODUODENOSCOPY (EGD); Surgeon: Nayana Ramirez MD;  [facility];  Service:     ESOPHAGOGASTRODUODENOSCOPY N/A 5/26/2017    Procedure: ESOPHAGOGASTRODUODENOSCOPY (EGD); Surgeon: Chace Colon MD;  [facility];  Service:     ESOPHAGOGASTRODUODENOSCOPY N/A 11/16/2017    Procedure: ESOPHAGOGASTRODUODENOSCOPY (EGD); Surgeon: Braulio Kelly MD;  [facility];   Service: Gastroenterology    MITRAL VALVE REPLACEMENT      Mechanical    TOE AMPUTATION Left 7/21/2017    Procedure: PARTIAL FIRST RAY AMPUTATION; EXCISION OF WOUND W/ TOE FLAP CLOSURE;  Surgeon: Harinder Hook DPM;  [facility];  Service: Podiatry    TONSILLECTOMY      VAC DRESSING APPLICATION Left 6/64/3852    Procedure: VAC application;  Surgeon: Harinder Hook DPM;  [facility];  Service: Podiatry    WOUND DEBRIDEMENT Left 8/19/2017    Procedure: wound debridement with washout; resection of left 1st metatarsal;  Surgeon: Harinder Hook DPM;  [facility];  Service: Podiatry     Social History   History   Alcohol Use No     History   Drug Use No     History   Smoking Status    Current Every Day Smoker    Packs/day: 1 00    Types: Cigarettes   Smokeless Tobacco    Never Used     Family History:   Family History   Problem Relation Age of Onset    Hypertension Mother     Diabetes Father        Meds/Allergies   Current Outpatient Prescriptions   Medication Sig Dispense Refill    ACCU-CHEK FASTCLIX LANCETS MISC 1 Units by Does not apply route 3 (three) times a day      atorvastatin (LIPITOR) 10 mg tablet TAKE 1 TABLET BY MOUTH EVERY DAY 90 tablet 0    B-D INS SYR ULTRAFINE 1CC/31G 31G X 5/16" 1 ML MISC Inject 1 each under the skin 4 (four) times a day  0    Blood Glucose Monitoring Suppl (ACCU-CHEK APPLE PLUS) w/Device KIT 1 Units by Does not apply route 3 (three) times a day      ferrous sulfate 324 (65 Fe) mg Take 1 tablet (324 mg total) by mouth daily before breakfast 30 tablet 5    gabapentin (NEURONTIN) 300 mg capsule Take 300 mg by mouth daily      gabapentin (NEURONTIN) 600 MG tablet Take 1 tablet (600 mg total) by mouth 3 (three) times a day (Patient taking differently: Take 600 mg by mouth 2 (two) times a day  ) 90 tablet 0    glucose blood (ACCU-CHEK APPLE PLUS) test strip 1 Units by In Vitro route 3 (three) times a day      HYDROcodone-acetaminophen (NORCO) 7 5-325 mg per tablet Take 1 tablet by mouth every 6 (six) hours as needed for pain Max Daily Amount: 4 tablets 120 tablet 0    insulin glargine (BASAGLAR KWIKPEN) injection pen 100 units/mL Inject 0 14 mL (14 Units total) under the skin daily at bedtime 10 units qhs  5 pen 0    insulin lispro (HUMALOG) 100 units/mL injection Inject 3 Units under the skin 3 (three) times a day      Insulin Pen Needle (B-D ULTRAFINE III SHORT PEN) 31G X 8 MM MISC 1 Units by Does not apply route 4 (four) times a day 200 each 0    Insulin Pen Needle 32G X 4 MM MISC by Does not apply route 4 (four) times a day 100 each 0    lisinopril (ZESTRIL) 10 mg tablet Take 10 mg by mouth daily  3    LORazepam (ATIVAN) 1 mg tablet Take 1 tablet by mouth 2 (two) times a day as needed      metoprolol succinate (TOPROL-XL) 25 mg 24 hr tablet Take 25 mg by mouth daily      metroNIDAZOLE (METROCREAM) 0 75 % cream Apply 1 application topically daily      mirtazapine (REMERON) 15 mg tablet       montelukast (SINGULAIR) 10 mg tablet Take 1 tablet (10 mg total) by mouth daily 30 tablet 5    nystatin-triamcinolone (MYCOLOG-II) cream Apply topically 2 (two) times a day 30 g 0    pantoprazole (PROTONIX) 40 mg tablet Take 1 tablet (40 mg total) by mouth 2 (two) times a day before meals 60 tablet 5    promethazine (PHENERGAN) 25 mg tablet Take 1 tablet (25 mg total) by mouth every 6 (six) hours as needed for nausea or vomiting 30 tablet 0    QUEtiapine (SEROquel) 400 MG tablet Take 400 mg by mouth daily at bedtime  1    Sulfacetamide Sodium, Acne, 10 % LOTN       torsemide (DEMADEX) 20 mg tablet TAKE 2 TABLET DAILY 60 tablet 4    warfarin (COUMADIN) 5 mg tablet Resume alternating 5mg and 7 5mg dosing upon discharge  Dr Tucker Mendosa office will adjust as needed  30 tablet 0    lidocaine (LIDODERM) 5 % Place 1 patch on the skin every 24 hours for 30 days Remove & Discard patch within 12 hours or as directed by MD 30 patch 0    ondansetron (ZOFRAN-ODT) 4 mg disintegrating tablet Take 1 tablet (4 mg total) by mouth every 8 (eight) hours as needed for nausea or vomiting for up to 7 days 20 tablet 0    QUEtiapine (SEROquel) 200 mg tablet       valACYclovir (VALTREX) 1,000 mg tablet Take 1 tablet (1,000 mg total) by mouth 3 (three) times a day for 7 days 21 tablet 0     No current facility-administered medications for this visit        Allergies   Allergen Reactions    Aspirin Other (See Comments)     Received ASA & bleed out    Heparin      Please see 8/2017 admit = 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    Morphine Other (See Comments)     Gets red streak up arm above IV site    Omeprazole Diarrhea       Objective   Vitals: Blood pressure 132/70, pulse 60  Invasive Devices          No matching active lines, drains, or airways          Physical Exam   Constitutional: He is oriented to person, place, and time  He appears well-developed and well-nourished  No distress  HENT:   Head: Normocephalic and atraumatic  Mouth/Throat: No oropharyngeal exudate  Eyes: Conjunctivae and EOM are normal  Pupils are equal, round, and reactive to light  No scleral icterus  Neck: Normal range of motion  Neck supple  No thyromegaly present  Cardiovascular: Normal rate and regular rhythm  No murmur heard  Pulmonary/Chest: Effort normal and breath sounds normal  He has no wheezes  Abdominal: Soft  Bowel sounds are normal  He exhibits no distension  There is no tenderness  Musculoskeletal: Normal range of motion  He exhibits no edema  S/P left AKA  Decrease monofilament and vibration sensation right foot  Pulse not palpable in right foot  Right foot is warm  Neurological: He is alert and oriented to person, place, and time  He exhibits normal muscle tone  Skin: Skin is warm and dry  No rash noted  He is not diaphoretic  Psychiatric: He has a normal mood and affect  His behavior is normal        The history was obtained from the review of the chart and from the patient      Lab Results:    Most recent Alc is  Lab Results   Component Value Date    HGBA1C 6 7 03/01/2018             Lab Results   Component Value Date    CREATININE 1 12 03/29/2018    CREATININE 1 18 03/02/2018    CREATININE 1 20 11/27/2017    BUN 17 03/29/2018     03/29/2018    K 4 0 03/29/2018     03/29/2018    CO2 23 03/29/2018     eGFR   Date Value Ref Range Status   03/29/2018 72 ml/min/1 73sq m Final   04/18/2017 >60 0 ml/min/1 73sq m Final       Lab Results   Component Value Date    CHOL 83 08/11/2017    HDL 36 (L) 08/11/2017    TRIG 86 08/11/2017       Lab Results   Component Value Date    ALT 29 03/29/2018    AST 28 03/29/2018    ALKPHOS 149 (H) 03/29/2018 BILITOT 0 40 03/29/2018       Future Appointments  Date Time Provider Melina Montesinos   5/14/2018 2:00 PM [facility], PT [facility] PT [facility]   5/16/2018 10:30 AM Dina Killian, PT [facility] PT [facility]   5/21/2018 12:00 PM Dina Killian, PT [facility] PT [facility]   5/23/2018 11:15 AM Dina Killian, PT [facility] PT [facility]   5/24/2018 12:45 PM QU HV VASCULAR 1 QU HV Car NI QU HV & B[facility]I   5/25/2018 9:00 AM Carol Chery, DO NEURO [facility]   5/29/2018 10:45 AM Dina Killian, PT [facility] PT [facility]   5/30/2018 11:15 AM Dina Killian, PT [facility] PT [facility]

## 2018-05-14 ENCOUNTER — EVALUATION (OUTPATIENT)
Dept: PHYSICAL THERAPY | Facility: CLINIC | Age: 58
End: 2018-05-14
Payer: COMMERCIAL

## 2018-05-14 DIAGNOSIS — Z89.612 STATUS POST ABOVE KNEE AMPUTATION, LEFT (HCC): Primary | ICD-10-CM

## 2018-05-14 PROCEDURE — 97530 THERAPEUTIC ACTIVITIES: CPT | Performed by: PHYSICAL THERAPIST

## 2018-05-14 PROCEDURE — 97116 GAIT TRAINING THERAPY: CPT | Performed by: PHYSICAL THERAPIST

## 2018-05-14 NOTE — PROGRESS NOTES
Daily Note     Today's date: 2018  Patient name: Paco Cardona  : 1960  MRN: [id number]  Referring provider: Cristina Quintero DO  Dx:   Encounter Diagnosis     ICD-10-CM    1  Status post above knee amputation, left ([address] ) [id number] [id number]                   Subjective: continues to c/o pain in the R ankle; reports good tolerance for prosthesis - was wearing it for up to 3 hours at a time over the weekend    Objective: See treatment diary below      Assessment: focused on outdoor ambulation on level surfaces and elevations  Attempted flight of stairs but stopped p 2 steps due to poor stability on prosthetic leg  Able to negotiate curb w/ walker and prosthesis w/ GC and small ramp vc Tolerated treatment well  Patient would benefit from continued PT      Plan: Continue per plan of care       Precautions: none  PMH: DM , defibrillator, PVD, PAD    Daily Treatment Diary     Manual          Hip flexor stretch  JR JR np np        STM L quad / HS  JR  np np np        Desensitization L residual limb  Wyvonnia Tierney np np                                      Exercise Diary                       Prone lying             SLR x4             Hip flexor stretch  2-3' np np np        Bridges w/ bolster  10x10" np np np                     Balance activities standing  SLS RLE  20"x3  CS np Wt shift unsupported  2-3' np                                                            Sit <-> stand   2x 10x 5x        Gait training w/ walker: knee locked   40'x2  CG& cues 40-75' x8 20-30'x4CG out door        stairs     2 steps CG        Up/down curb step     3x        Up/down ramp     1x                                  Prosthetic mgt    unlock knee to sit  10x 5x        GMI  See below  Mirror therapy np        18: initiated mirror therapy    Modalities              CP prn

## 2018-05-15 DIAGNOSIS — E10.8 TYPE 1 DIABETES MELLITUS WITH COMPLICATION (HCC): Primary | ICD-10-CM

## 2018-05-16 ENCOUNTER — ANTICOAG VISIT (OUTPATIENT)
Dept: CARDIOLOGY CLINIC | Facility: CLINIC | Age: 58
End: 2018-05-16

## 2018-05-16 ENCOUNTER — EVALUATION (OUTPATIENT)
Dept: PHYSICAL THERAPY | Facility: CLINIC | Age: 58
End: 2018-05-16
Payer: COMMERCIAL

## 2018-05-16 DIAGNOSIS — Z89.612 STATUS POST ABOVE KNEE AMPUTATION, LEFT (HCC): Primary | ICD-10-CM

## 2018-05-16 DIAGNOSIS — Z95.2 HISTORY OF MITRAL VALVE REPLACEMENT WITH MECHANICAL VALVE: ICD-10-CM

## 2018-05-16 LAB
INR PPP: 1.5 (ref 0.86–1.16)
INR PPP: 1.5 (ref 0.8–1.2)
PROTHROMBIN TIME: 15.6 SEC (ref 9.1–12)

## 2018-05-16 PROCEDURE — 97530 THERAPEUTIC ACTIVITIES: CPT | Performed by: PHYSICAL THERAPIST

## 2018-05-16 PROCEDURE — 97112 NEUROMUSCULAR REEDUCATION: CPT | Performed by: PHYSICAL THERAPIST

## 2018-05-16 PROCEDURE — 97116 GAIT TRAINING THERAPY: CPT | Performed by: PHYSICAL THERAPIST

## 2018-05-16 NOTE — PROGRESS NOTES
Daily Note     Today's date: 2018  Patient name: Arnold Cuellar  : 1960  MRN: [id number]  Referring provider: Aurea Cuadra DO  Dx:   Encounter Diagnosis     ICD-10-CM    1  Status post above knee amputation, left ([address] ) L56 [id number]                   Subjective: primary complaint is R ankle pain, scheduled for doppler next Thu ();phantom pain is "rough" at night    Objective: See treatment diary below    Assessment: Patient has been coming to therapy w/ walker rather than WC; ambulation limited primarily by R ankle pain  Difficulty unlocking prosthetic knee to sit was able to do this much more consistently at end of session today after practicing it repeatedly  Tolerated treatment well  Patient would benefit from continued PT      Plan: Continue per plan of care       Precautions: none  PMH: DM , defibrillator, PVD, PAD    Daily Treatment Diary     Manual         Hip flexor stretch  JR JR np np np       STM L quad / HS  JR  np np np np       Desensitization L residual limb  Pasty Mola np np np                                     Exercise Diary                      Prone lying             SLR x4             Hip flexor stretch  2-3' np np np np       Bridges w/ bolster  10x10" np np np np                    Balance activities standing  SLS RLE  20"x3  CS np Wt shift unsupported  2-3' np np                                 Side step ll bars      3 laps       Step ups in ll bars      6"  6x       Sit <-> stand   2x 10x 5x 5-10x       Gait training w/ walker: knee locked   40'x2  CG& cues 40-75' x8 20-30'x4CG out door 35-40'x2  Close S       stairs     2 steps CG --       Up/down curb step     3x --       Up/down ramp     1x --                                 Prosthetic mgt    unlock knee to sit  10x 5x 10-15x       GMI  See below  Mirror therapy np np       18: initiated mirror therapy    Modalities              CP prn

## 2018-05-21 ENCOUNTER — APPOINTMENT (OUTPATIENT)
Dept: PHYSICAL THERAPY | Facility: CLINIC | Age: 58
End: 2018-05-21
Payer: COMMERCIAL

## 2018-05-23 ENCOUNTER — EVALUATION (OUTPATIENT)
Dept: PHYSICAL THERAPY | Facility: CLINIC | Age: 58
End: 2018-05-23
Payer: COMMERCIAL

## 2018-05-23 DIAGNOSIS — Z89.612 STATUS POST ABOVE KNEE AMPUTATION, LEFT (HCC): Primary | ICD-10-CM

## 2018-05-23 PROCEDURE — 97116 GAIT TRAINING THERAPY: CPT | Performed by: PHYSICAL THERAPIST

## 2018-05-23 PROCEDURE — 97530 THERAPEUTIC ACTIVITIES: CPT | Performed by: PHYSICAL THERAPIST

## 2018-05-24 ENCOUNTER — EVALUATION (OUTPATIENT)
Dept: PHYSICAL THERAPY | Facility: CLINIC | Age: 58
End: 2018-05-24
Payer: COMMERCIAL

## 2018-05-24 ENCOUNTER — HOSPITAL ENCOUNTER (OUTPATIENT)
Dept: NON INVASIVE DIAGNOSTICS | Facility: CLINIC | Age: 58
Discharge: HOME | End: 2018-05-24
Payer: COMMERCIAL

## 2018-05-24 DIAGNOSIS — I70.209 ATHEROSCLEROSIS OF NATIVE ARTERY OF EXTREMITY (HCC): ICD-10-CM

## 2018-05-24 DIAGNOSIS — Z89.612 STATUS POST ABOVE KNEE AMPUTATION, LEFT (HCC): Primary | ICD-10-CM

## 2018-05-24 DIAGNOSIS — Z89.612 STATUS POST ABOVE KNEE AMPUTATION OF LEFT LOWER EXTREMITY: ICD-10-CM

## 2018-05-24 PROCEDURE — 97116 GAIT TRAINING THERAPY: CPT | Performed by: PHYSICAL THERAPIST

## 2018-05-24 PROCEDURE — 93926 LOWER EXTREMITY STUDY: CPT

## 2018-05-24 PROCEDURE — 97140 MANUAL THERAPY 1/> REGIONS: CPT | Performed by: PHYSICAL THERAPIST

## 2018-05-24 PROCEDURE — 97530 THERAPEUTIC ACTIVITIES: CPT | Performed by: PHYSICAL THERAPIST

## 2018-05-24 NOTE — PROGRESS NOTES
Daily Note     Today's date: 2018  Patient name: Angela Watts  : 1960  MRN: [id number]  Referring provider: Nava Philip DO  Dx:   Encounter Diagnosis     ICD-10-CM    1  Status post above knee amputation, left ([facility] [address] ) [id number]    2  Status post above knee amputation of left lower extremity ([facility] [address] ) [id number]                   Subjective: feels that his toes on the prosthetic side drag on the floor when he walks    Objective: See treatment diary below    Assessment: Continues to require some cueing for technique with curb step  Endurance increasing  ROM L hip extension is increased to -15 degrees (from -25 degrees at IE)  Tolerated treatment well  Patient would benefit from continued PT   Begin gait training w/ unlocked knee      Plan: Continue per plan of care       Precautions: none  PMH: DM , defibrillator, PVD, PAD    Daily Treatment Diary     Manual       Hip flexor stretch  JR JR np np np np JR     STM L quad / HS  JR  np np np np np np     Desensitization L residual limb  JR JR np np np np np                                   Exercise Diary                    Prone lying             SLR x4             Hip flexor stretch  2-3' np np np np np reviewed     Bridges w/ bolster  10x10" np np np np np hep                  Balance activities standing  SLS RLE  20"x3  CS np Wt shift unsupported  2-3' np np standing 2' np                               Side step ll bars      3 laps np np     Step ups/down in ll bars      6"  6x 6" 3x      Sit <-> stand   2x 10x 5x 5-10x 5x 6x     Gait training w/ walker: knee locked   40'x2  CG& cues 40-75' x8 20-30'x4CG out door 35-40'x2  Close S 50'/30' 50-60' x 2     stairs     2 steps CG -- -- --     Up/down curb step     3x -- 3x 4x     Up/down ramp     1x -- 1x np                               Prosthetic mgt    unlock knee to sit  10x 5x 10-15x 5x Don/ doff and unlock knee     GMI  See below Mirror therapy np np np np     5/2/18: initiated mirror therapy    Modalities              CP prn

## 2018-05-25 ENCOUNTER — OFFICE VISIT (OUTPATIENT)
Dept: NEUROLOGY | Facility: CLINIC | Age: 58
End: 2018-05-25
Payer: COMMERCIAL

## 2018-05-25 ENCOUNTER — ANTICOAG VISIT (OUTPATIENT)
Dept: CARDIOLOGY CLINIC | Facility: CLINIC | Age: 58
End: 2018-05-25

## 2018-05-25 ENCOUNTER — TELEPHONE (OUTPATIENT)
Dept: NEUROLOGY | Facility: CLINIC | Age: 58
End: 2018-05-25

## 2018-05-25 VITALS — SYSTOLIC BLOOD PRESSURE: 110 MMHG | HEART RATE: 97 BPM | DIASTOLIC BLOOD PRESSURE: 74 MMHG

## 2018-05-25 DIAGNOSIS — Z79.4 TYPE 2 DIABETES MELLITUS WITH DIABETIC NEUROPATHY, WITH LONG-TERM CURRENT USE OF INSULIN (HCC): ICD-10-CM

## 2018-05-25 DIAGNOSIS — E11.40 TYPE 2 DIABETES MELLITUS WITH DIABETIC NEUROPATHY, WITH LONG-TERM CURRENT USE OF INSULIN (HCC): ICD-10-CM

## 2018-05-25 DIAGNOSIS — Z95.2 HISTORY OF MITRAL VALVE REPLACEMENT WITH MECHANICAL VALVE: ICD-10-CM

## 2018-05-25 PROBLEM — E13.42 POLYNEUROPATHY DUE TO SECONDARY DIABETES (HCC): Status: ACTIVE | Noted: 2018-05-25

## 2018-05-25 LAB
INR PPP: 2.6 (ref 0.86–1.17)
INR PPP: 2.6 (ref 0.8–1.2)
PROTHROMBIN TIME: 25.6 SEC (ref 9.1–12)

## 2018-05-25 PROCEDURE — 99204 OFFICE O/P NEW MOD 45 MIN: CPT | Performed by: PSYCHIATRY & NEUROLOGY

## 2018-05-25 PROCEDURE — 93926 LOWER EXTREMITY STUDY: CPT | Performed by: SURGERY

## 2018-05-25 PROCEDURE — 93922 UPR/L XTREMITY ART 2 LEVELS: CPT | Performed by: SURGERY

## 2018-05-25 RX ORDER — GABAPENTIN 600 MG/1
600 TABLET ORAL 3 TIMES DAILY
Qty: 90 TABLET | Refills: 5 | Status: SHIPPED | OUTPATIENT
Start: 2018-05-25

## 2018-05-25 NOTE — PROGRESS NOTES
Patient ID: Ele Aguillon is a 61 y o  male  Assessment/Plan:    Polyneuropathy due to secondary diabetes ([facility] )  I a 58-year-old patient with a history of diabetes coronary artery disease status next status post mitral valve replacement and defibrillator placement who presents for evaluation of numbness and tingling and burning  He does have evidence    Six symptoms in his lower in his right lower extremity  Has a left AKA which limits the examination  He has had benefit with gabapentin 600 twice a day with extra dose in the afternoon  Plan 1  Will increase the gabapentin to 600 milligrams 3 times a day     2  Will obtain an EMG study of the right upper and lower extremity     3  Will require blood work and he does have it performed at home  It is performed by  [facility] [phone] fax number [phone]  They require the lab slip faxed     Will obtain a B1, 2 folate and B6 levels  4   gabapentin dose may be able to be adjusted according to his response  5  We discussed the common side effects of gabapentin      f/u in 6 months        Diagnoses and all orders for this visit:    Type 2 diabetes mellitus with diabetic neuropathy, with long-term current use of insulin ([facility] )  -     Ambulatory referral to Neurology  -     gabapentin (NEURONTIN) 600 MG tablet; Take 1 tablet (600 mg total) by mouth 3 (three) times a day  -     Vitamin B12; Future  -     Folate; Future  -     EMG 1 Upper/1 Lower Neuropathy; Future  -     Vitamin B6; Future  -     Vitamin B12  -     Folate  -     Vitamin B6         Subjective:     60 y/o lh this is a 58-year-old left-handed gentleman with a history of diabetes, defibrillator placement, , recent left AKA who presents for evaluation of burning dysesthesias numbness and tingling involving his right leg  The symptoms began years ago in both legs  He describes tingling numbness and dysesthesias    He underwent an AKA on the left after developing gangrene  He does utilize of gabapentin 600 milligrams twice a day but has required intermittent doses of 300 more on a regular basis  It does not cause drowsiness or any side effects  Yesterday he underwent Doppler study to rule to determine the presence of a DVT after developed pain in the left leg and redness  These results are still pending    He recently moved in February of 2017 to [place] from [place]  His daughter helps him with the appointment  Previously he performed electrical work and homework  He was started on gabapentin for psychiatric issues and the dose is increased to 600 milligrams twice a day  He is currently on Remeron and Seroquel with improvement of the depression and anxiety  since the AKA he is now receiving intensive physical therapy and does have a prosthetic  He reports mild symptoms on left hand  And denies any change in his strength     He does have blood work performed at home at the [facility]  lab phone number is [phone] and fax #[phone]     Today he is accompanied by Oma Roach, his daughter     He does smoke  1ppd                       The following portions of the patient's history were reviewed and updated as appropriate:   He  has a past medical history of Anticoagulated on Coumadin; Anxiety; Bipolar 1 disorder ([facility] ); CAD (coronary artery disease); Chronic kidney disease; Chronic systolic congestive heart failure ([address] ) (4/18/2017); Colitis (4/2/2017); COPD (chronic obstructive pulmonary disease) ([facility] ); Diabetes mellitus ([facility] ); Difficulty urinating; DM type 2 with diabetic peripheral neuropathy ([facility] ); Hiatal hernia; Hyperlipidemia; Hypertension; Ischemic cardiomyopathy; Myocardial infarction ([address] ); Pacemaker; PAD (peripheral artery disease) ([facility] ); Rectal bleed; and Vomiting  He  has a past surgical history that includes Esophagogastroduodenoscopy (N/A, 4/20/2017); Esophagogastroduodenoscopy (N/A, 5/26/2017);  Mitral valve replacement; Coronary artery bypass graft; Toe amputation (Left, 7/21/2017); Wound debridement (Left, 8/19/2017); VAC DRESSING APPLICATION (Left, 2/09/0810); AMPUTATION ABOVE KNEE (AKA) (Left, 8/24/2017); Cardiac defibrillator placement; Tonsillectomy; and Esophagogastroduodenoscopy (N/A, 11/16/2017)  His family history includes Diabetes in his father; Hypertension in his mother  He  reports that he has been smoking Cigarettes  He has been smoking about 1 00 pack per day  He has never used smokeless tobacco  He reports that he does not drink alcohol or use drugs    Current Outpatient Prescriptions   Medication Sig Dispense Refill    ACCU-CHEK FASTCLIX LANCETS MISC 1 Units by Does not apply route 3 (three) times a day      atorvastatin (LIPITOR) 10 mg tablet TAKE 1 TABLET BY MOUTH EVERY DAY 90 tablet 0    B-D INS SYR ULTRAFINE 1CC/31G 31G X 5/16" 1 ML MISC Inject 1 each under the skin 4 (four) times a day  0    Blood Glucose Monitoring Suppl (ACCU-CHEK APPLE PLUS) w/Device KIT 1 Units by Does not apply route 3 (three) times a day      ferrous sulfate 324 (65 Fe) mg Take 1 tablet (324 mg total) by mouth daily before breakfast 30 tablet 5    gabapentin (NEURONTIN) 600 MG tablet Take 1 tablet (600 mg total) by mouth 3 (three) times a day 90 tablet 5    glucose blood (ACCU-CHEK APPLE PLUS) test strip 1 Units by In Vitro route 3 (three) times a day      HYDROcodone-acetaminophen (NORCO) 7 5-325 mg per tablet Take 1 tablet by mouth every 6 (six) hours as needed for pain Max Daily Amount: 4 tablets 120 tablet 0    insulin glargine (BASAGLAR KWIKPEN) injection pen 100 units/mL Inject 0 14 mL (14 Units total) under the skin daily at bedtime 10 units qhs  5 pen 0    insulin lispro (HUMALOG) 100 units/mL injection Inject 3 Units under the skin 3 (three) times a day      Insulin Pen Needle (B-D ULTRAFINE III SHORT PEN) 31G X 8 MM MISC 1 Units by Does not apply route 4 (four) times a day 200 each 0    Insulin Pen Needle 32G X 4 MM MISC by Does not apply route 4 (four) times a day 100 each 0    lisinopril (ZESTRIL) 10 mg tablet Take 10 mg by mouth daily  3    LORazepam (ATIVAN) 1 mg tablet Take 1 tablet by mouth 2 (two) times a day as needed      metoprolol succinate (TOPROL-XL) 25 mg 24 hr tablet Take 25 mg by mouth daily      metroNIDAZOLE (METROCREAM) 0 75 % cream Apply 1 application topically daily      mirtazapine (REMERON) 15 mg tablet       montelukast (SINGULAIR) 10 mg tablet Take 1 tablet (10 mg total) by mouth daily 30 tablet 5    nystatin-triamcinolone (MYCOLOG-II) cream Apply topically 2 (two) times a day 30 g 0    pantoprazole (PROTONIX) 40 mg tablet Take 1 tablet (40 mg total) by mouth 2 (two) times a day before meals 60 tablet 5    promethazine (PHENERGAN) 25 mg tablet Take 1 tablet (25 mg total) by mouth every 6 (six) hours as needed for nausea or vomiting 30 tablet 0    QUEtiapine (SEROquel) 200 mg tablet       QUEtiapine (SEROquel) 400 MG tablet Take 400 mg by mouth daily at bedtime  1    Sulfacetamide Sodium, Acne, 10 % LOTN       torsemide (DEMADEX) 20 mg tablet TAKE 2 TABLET DAILY 60 tablet 4    warfarin (COUMADIN) 5 mg tablet Resume alternating 5mg and 7 5mg dosing upon discharge  Dr Koroma Peer office will adjust as needed  30 tablet 0    ondansetron (ZOFRAN-ODT) 4 mg disintegrating tablet Take 1 tablet (4 mg total) by mouth every 8 (eight) hours as needed for nausea or vomiting for up to 7 days 20 tablet 0     No current facility-administered medications for this visit  He is allergic to aspirin; heparin; morphine; and omeprazole            Objective:    Blood pressure 110/74, pulse 97  Physical Exam   Constitutional: He appears well-developed  HENT:   Head: Normocephalic  Eyes: EOM are normal  Pupils are equal, round, and reactive to light  Neck: Normal range of motion  Cardiovascular: Normal rate      Murmur    Psychiatric: His speech is normal    Vitals reviewed  Neurological Exam    Mental Status  The patient is alert and oriented to person, place, time, and situation  He has no visuospatial neglect  His speech is normal  He has normal attention span and concentration  He has a normal fund of knowledge  Cranial Nerves    CN II: The patient's visual acuity and visual fields are normal   CN III, IV, VI: The patient's pupils are equally round and reactive to light and ocular movements are normal   CN V: The patient has normal facial sensation  CN VII:  The patient has symmetric facial movement  CN VIII:  The patient's hearing is normal   CN IX, X: The patient has symmetric palate movement and normal gag reflex  CN XI: The patient's shoulder shrug strength is normal   CN XII: The patient's tongue is midline without atrophy or fasciculations  Motor   His strength is 5/5 in all four extremities except as noted  Left aka , ffm intact, no tremors      Sensory      vibraiton to ankle  On  Right , 20 sec in hands pp, temp to mid calf ,jps to left ankle, jps intact in ue , temp/ pp intact in ue           Reflexes  Absent in right kj, aj , left aka , ue , biceps 1 , BR trace      Gait and Coordination   He has normal right heel to shin coordination  He has normal right finger to nose and normal left finger to nose coordination  No evaluated , in wheelchair , protheseis not avialable today          ROS:    Review of Systems   Constitutional: Positive for appetite change  Negative for fever  HENT: Negative  Negative for hearing loss, tinnitus, trouble swallowing and voice change  Eyes: Negative  Negative for photophobia and pain  Respiratory: Positive for cough and shortness of breath  Cardiovascular: Negative  Negative for palpitations  Gastrointestinal: Positive for diarrhea, nausea and vomiting  Endocrine: Negative  Negative for cold intolerance and heat intolerance  Genitourinary: Negative  Negative for dysuria, frequency and urgency  Musculoskeletal: Positive for neck pain  Negative for myalgias  Skin: Negative  Negative for rash  Neurological: Positive for headaches  Negative for dizziness, tremors, seizures, syncope, facial asymmetry, speech difficulty, weakness, light-headedness and numbness  Hematological: Bruises/bleeds easily  Psychiatric/Behavioral: Positive for sleep disturbance  Negative for confusion and hallucinations          Memory problems

## 2018-05-25 NOTE — ASSESSMENT & PLAN NOTE
I a 68-year-old patient with a history of diabetes coronary artery disease status next status post mitral valve replacement and defibrillator placement who presents for evaluation of numbness and tingling and burning  He does have evidence    Six symptoms in his lower in his right lower extremity  Has a left AKA which limits the examination  He has had benefit with gabapentin 600 twice a day with extra dose in the afternoon  Plan 1  Will increase the gabapentin to 600 milligrams 3 times a day     2  Will obtain an EMG study of the right upper and lower extremity     3  Will require blood work and he does have it performed at home  It is performed by  [facility] [phone] fax number [phone]  They require the lab slip faxed     Will obtain a B1, 2 folate and B6 levels  4   gabapentin dose may be able to be adjusted according to his response        5  We discussed the common side effects of gabapentin      f/u in 6 months

## 2018-05-25 NOTE — TELEPHONE ENCOUNTER
clinical team     Please fax a copy of the labs I ordered and another provider ordered to [facility]     [phone] or fax [phone]     It is home lab and requires the lab slips faxed directly to them     thx

## 2018-05-29 ENCOUNTER — TELEPHONE (OUTPATIENT)
Dept: CARDIOLOGY CLINIC | Facility: CLINIC | Age: 58
End: 2018-05-29

## 2018-05-29 ENCOUNTER — EVALUATION (OUTPATIENT)
Dept: PHYSICAL THERAPY | Facility: CLINIC | Age: 58
End: 2018-05-29
Payer: COMMERCIAL

## 2018-05-29 DIAGNOSIS — Z89.612 STATUS POST ABOVE KNEE AMPUTATION, LEFT (HCC): Primary | ICD-10-CM

## 2018-05-29 PROCEDURE — G8979 MOBILITY GOAL STATUS: HCPCS | Performed by: PHYSICAL THERAPIST

## 2018-05-29 PROCEDURE — 97530 THERAPEUTIC ACTIVITIES: CPT | Performed by: PHYSICAL THERAPIST

## 2018-05-29 PROCEDURE — G8978 MOBILITY CURRENT STATUS: HCPCS | Performed by: PHYSICAL THERAPIST

## 2018-05-29 PROCEDURE — 97116 GAIT TRAINING THERAPY: CPT | Performed by: PHYSICAL THERAPIST

## 2018-05-29 NOTE — TELEPHONE ENCOUNTER
He has seen Vascular surgery in the past several times  This is for vascular surgery to address and I would suggest that he calls them    Thank you

## 2018-05-29 NOTE — TELEPHONE ENCOUNTER
Pt  Call looking for his vascular results  He had a doppler on 5/24 that was ordered by Karissa Goldman, DO  I advised pt  To call her office for results since she ordered it but he wants Dr Eddie Preston to look over test and make recommendations since Dr Eddie Preston is his Cardiologist   Please advise

## 2018-05-30 ENCOUNTER — APPOINTMENT (OUTPATIENT)
Dept: PHYSICAL THERAPY | Facility: CLINIC | Age: 58
End: 2018-05-30
Payer: COMMERCIAL

## 2018-06-04 ENCOUNTER — APPOINTMENT (OUTPATIENT)
Dept: PHYSICAL THERAPY | Facility: CLINIC | Age: 58
End: 2018-06-04
Payer: COMMERCIAL

## 2018-06-05 LAB — INR PPP: 2.6 (ref 0.86–1.17)

## 2018-06-06 ENCOUNTER — ANTICOAG VISIT (OUTPATIENT)
Dept: CARDIOLOGY CLINIC | Facility: CLINIC | Age: 58
End: 2018-06-06

## 2018-06-06 ENCOUNTER — APPOINTMENT (OUTPATIENT)
Dept: PHYSICAL THERAPY | Facility: CLINIC | Age: 58
End: 2018-06-06
Payer: COMMERCIAL

## 2018-06-06 DIAGNOSIS — Z95.2 HISTORY OF MITRAL VALVE REPLACEMENT WITH MECHANICAL VALVE: ICD-10-CM

## 2018-06-06 LAB
INR PPP: 2.6 (ref 0.8–1.2)
PROTHROMBIN TIME: 25.8 SEC (ref 9.1–12)

## 2018-06-11 ENCOUNTER — EVALUATION (OUTPATIENT)
Dept: PHYSICAL THERAPY | Facility: CLINIC | Age: 58
End: 2018-06-11
Payer: COMMERCIAL

## 2018-06-11 DIAGNOSIS — Z89.612 STATUS POST ABOVE KNEE AMPUTATION OF LEFT LOWER EXTREMITY: ICD-10-CM

## 2018-06-11 DIAGNOSIS — Z89.612 STATUS POST ABOVE KNEE AMPUTATION, LEFT (HCC): Primary | ICD-10-CM

## 2018-06-11 PROCEDURE — 97530 THERAPEUTIC ACTIVITIES: CPT | Performed by: PHYSICAL THERAPIST

## 2018-06-11 PROCEDURE — 97140 MANUAL THERAPY 1/> REGIONS: CPT | Performed by: PHYSICAL THERAPIST

## 2018-06-11 PROCEDURE — 97112 NEUROMUSCULAR REEDUCATION: CPT | Performed by: PHYSICAL THERAPIST

## 2018-06-11 RX ORDER — HYDROCODONE BITARTRATE AND ACETAMINOPHEN 7.5; 325 MG/1; MG/1
1 TABLET ORAL EVERY 6 HOURS PRN
Qty: 120 TABLET | Refills: 0 | Status: SHIPPED | OUTPATIENT
Start: 2018-06-11 | End: 2018-07-05 | Stop reason: SDUPTHER

## 2018-06-11 NOTE — PROGRESS NOTES
Daily Note     Today's date: 2018  Patient name: Arnold Cuellar  : 1960  MRN: [id number]  Referring provider: Aurea Cuadra DO  Dx:   Encounter Diagnosis     ICD-10-CM    1  Status post above knee amputation, left ([address] ) [id number]                   Subjective: follow up with vascular specialist is scheduled for 6-21  Reports that he is walking around his kitchen at home with the walker but otherwise uses WC for mobility    Objective: See treatment diary below  L hip extension -20 degrees    Assessment: continued gait training w/ unlocked knee  Able to ambulate w/ CG only but inconsistent in maintaining L knee control, relying at times on walker for support  Requires cueing for more equat step length, improved LLE swing and consistent stability in L stance  c/o anterior L hip pain (top of socket is biting into skin - visible red line anterior thigh upon removing prosthesis, resolved within 5-10 min  Will continue to work on ambulation w/ unlocked knee in therapy; will continue to lock knee when patient is out of therapy at this time  Tolerated treatment well  Patient would benefit from continued PT   Plan: Continue per plan of care       Precautions: none  PMH: DM , defibrillator, PVD, PAD    Daily Treatment Diary     Manual     Hip flexor stretch  JR JR np np np np JR np JR   STM L quad / HS  JR  np np np np np np np np   Desensitization L residual limb  JR JR np np np np np np [place]                                 Exercise Diary   6                Prone lying             SLR x4             Hip flexor stretch  2-3' np np np np np reviewed np hep   Bridges w/ bolster  10x10" np np np np np hep np np                Balance activities standing  SLS RLE  20"x3  CS np Wt shift unsupported  2-3' np np standing 2' np np np                             Side step ll bars      3 laps np np np np   Step ups/down in ll bars 6"  6x 6" 3x      Sit <-> stand   2x 10x 5x 5-10x 5x 6x np 2-3x   Gait training w/ walker: knee locked   40'x2  CG& cues 40-75' x8 20-30'x4CG out door 35-40'x2  Close S 50'/30' 50-60' x 2 Knee unlocked 40'-50'x2 50-60'x3   stairs     2 steps CG -- -- -- np np   Up/down curb step     3x -- 3x 4x np np   Up/down ramp     1x -- 1x np np np   amb knee unlocked ll bars         5' x 4 np                Prosthetic mgt    unlock knee to sit  10x 5x 10-15x 5x Don/ doff and unlock knee change lock on knee adjusted lock on knee   GMI  See below  Mirror therapy np np np np np np   5/2/18: initiated mirror therapy    Modalities              CP prn

## 2018-06-13 ENCOUNTER — APPOINTMENT (OUTPATIENT)
Dept: PHYSICAL THERAPY | Facility: CLINIC | Age: 58
End: 2018-06-13
Payer: COMMERCIAL

## 2018-06-18 ENCOUNTER — APPOINTMENT (OUTPATIENT)
Dept: PHYSICAL THERAPY | Facility: CLINIC | Age: 58
End: 2018-06-18
Payer: COMMERCIAL

## 2018-06-19 LAB — INR PPP: 2.4 (ref 0.86–1.17)

## 2018-06-20 ENCOUNTER — ANTICOAG VISIT (OUTPATIENT)
Dept: CARDIOLOGY CLINIC | Facility: CLINIC | Age: 58
End: 2018-06-20

## 2018-06-20 ENCOUNTER — EVALUATION (OUTPATIENT)
Dept: PHYSICAL THERAPY | Facility: CLINIC | Age: 58
End: 2018-06-20
Payer: COMMERCIAL

## 2018-06-20 DIAGNOSIS — Z89.612 STATUS POST ABOVE KNEE AMPUTATION, LEFT (HCC): Primary | ICD-10-CM

## 2018-06-20 DIAGNOSIS — Z95.2 HISTORY OF MITRAL VALVE REPLACEMENT WITH MECHANICAL VALVE: ICD-10-CM

## 2018-06-20 PROCEDURE — 97530 THERAPEUTIC ACTIVITIES: CPT | Performed by: PHYSICAL THERAPIST

## 2018-06-20 PROCEDURE — 97116 GAIT TRAINING THERAPY: CPT | Performed by: PHYSICAL THERAPIST

## 2018-06-20 NOTE — PROGRESS NOTES
Daily Note     Today's date: 2018  Patient name: Ned Dias  : 1960  MRN: [id number]  Referring provider: Mary Womack DO  Dx:   Encounter Diagnosis     ICD-10-CM    1  Status post above knee amputation, left ([address] ) [id number]        Start Time: 1115  Stop Time: 1156  Total time in clinic (min): 41 minutes    Subjective: follow up with vascular specialist tomorrow re: R foot   C/o biting in the anterior thigh from the top of the socket    Objective: See treatment diary below    Assessment: Able to ambulate safely in therapy with knee unlocked - occassionally does not lock knee well for L stance but maintains control with walker  Worked on unsupported balance / wt shift to increase control and confidence w/ prosthetic side  Left prosthetic knee unlocked today - patient to work on walking in home with unlocked knee and to work on balance/ wt shift over prosthetic side standing with walker at home  Tolerated treatment well  Patient would benefit from continued PT   Plan: Continue per plan of care       Precautions: none  PMH: DM , defibrillator, PVD, PAD    Daily Treatment Diary     Manual              Hip flexor stretch             STM L quad / HS             Desensitization L residual limb                                           Exercise Diary                           Prone lying hep            SLR x4             Hip flexor stretch hep            Clabe Dies w/ bolster                          Balance activities standing unsupported 4-5'  CloseS/ CG                                      Side step ll bars             Step ups/down in ll bars             Sit <-> stand 5x            Gait training w/ walker: knee unlocked 50'x3  CG/ close S                         Up/down curb step             Up/down ramp                                       Prosthetic mgt adjusted lock on knee            GMI             18: initiated mirror therapy    Modalities              CP prn

## 2018-06-21 ENCOUNTER — OFFICE VISIT (OUTPATIENT)
Dept: VASCULAR SURGERY | Facility: CLINIC | Age: 58
End: 2018-06-21
Payer: COMMERCIAL

## 2018-06-21 VITALS
RESPIRATION RATE: 16 BRPM | SYSTOLIC BLOOD PRESSURE: 158 MMHG | HEART RATE: 78 BPM | DIASTOLIC BLOOD PRESSURE: 80 MMHG | TEMPERATURE: 96.6 F

## 2018-06-21 DIAGNOSIS — R60.0 EDEMA OF RIGHT LOWER EXTREMITY: ICD-10-CM

## 2018-06-21 DIAGNOSIS — Z79.4 INSULIN DEPENDENT TYPE 2 DIABETES MELLITUS, CONTROLLED (HCC): ICD-10-CM

## 2018-06-21 DIAGNOSIS — E13.42 POLYNEUROPATHY DUE TO SECONDARY DIABETES (HCC): ICD-10-CM

## 2018-06-21 DIAGNOSIS — E11.9 INSULIN DEPENDENT TYPE 2 DIABETES MELLITUS, CONTROLLED (HCC): ICD-10-CM

## 2018-06-21 DIAGNOSIS — Z89.612 STATUS POST ABOVE KNEE AMPUTATION OF LEFT LOWER EXTREMITY: ICD-10-CM

## 2018-06-21 DIAGNOSIS — I70.211 ATHEROSCLEROSIS OF NATIVE ARTERIES OF EXTREMITIES WITH INTERMITTENT CLAUDICATION, RIGHT LEG (HCC): Primary | ICD-10-CM

## 2018-06-21 PROBLEM — E08.621 DIABETIC ULCER OF RIGHT FOOT ASSOCIATED WITH DIABETES MELLITUS DUE TO UNDERLYING CONDITION (HCC): Status: RESOLVED | Noted: 2017-06-05 | Resolved: 2018-06-21

## 2018-06-21 PROBLEM — L97.519 DIABETIC ULCER OF RIGHT FOOT ASSOCIATED WITH DIABETES MELLITUS DUE TO UNDERLYING CONDITION (HCC): Status: RESOLVED | Noted: 2017-06-05 | Resolved: 2018-06-21

## 2018-06-21 LAB
INR PPP: 2.4 (ref 0.8–1.2)
PROTHROMBIN TIME: 23.8 SEC (ref 9.1–12)

## 2018-06-21 PROCEDURE — 99214 OFFICE O/P EST MOD 30 MIN: CPT | Performed by: NURSE PRACTITIONER

## 2018-06-21 NOTE — PROGRESS NOTES
Assessment/Plan:    60 y/o M with HTN, HLD, DM, CAD s/p CABG, CHF, mitral valve replacement, hx of L AKA (8/2017) for non-salveageable left foot wounds s/p multiple failed endovascular interventions with thrombosis with suspicious for hypercoagulable state, maintained on Coumadin, seen today for review after recent NICOLETTE  1  PAD  -NICOLETTE results:  R VELIA 1 19 (?unreliable)/50/30, diffuse femoropopliteal disease, distal PT occlusion  -L AKA well healed  He has prosthesis and is participating in physical therapy  When ambulating distances at therapy he feels as though right ankle is going to give out, no specific calf pain or overall leg pain, more weakness confined to ankle  -Concerned about constant numbness to the toes of right foot  This is consistent with peripheral neuropathy  Continue Gabapentin  -Will plan to monitor for progression of PAD with LEAD every 6 months   -Patient and daughter are aware that GTP is below the healing level, if he were to get a sore on the foot he will notify the office immediately  Instructed in daily foot checks and proper fitting shoes  2  Right lower extremity edema  -Advised daily use of compression stockings (Rx)  -Encouraged frequent periods of elevation throughout the day  -Instructed in heel-toe exercises  3  Nicotine dependence  -Continues to smoker 1 ppd  -Advised smoking cessation in relation to progression of arterial disease    Followup in 1 year  Notify office sooner with any questions/concerns or development of tissue loss or worsening pain         Diagnoses and all orders for this visit:    Atherosclerosis of native arteries of extremities with intermittent claudication, right leg (HCC)  -     VAS lower limb arterial duplex, limited/unilateral; Future    Edema of right lower extremity  -     Compression Stocking    Status post above knee amputation of left lower extremity (HCC)    Insulin dependent type 2 diabetes mellitus, controlled ([facility] )    Polyneuropathy due to secondary diabetes ([facility] )          Subjective:      Patient ID: Ned Dias is a 61 y o  male  Patient had NICOLETTE 5/24  Patient is c/o RLE ankle pain and swelling and numbness in his toes  He has been in PT walking with prosthetic device  He is concerned because of the numbness and prior LLE BKA  He denies any open wounds or sores  He continues to smoke 1PPD  Patient is known to our practice  He is seen for review after recent NICOLETTE  He is seen in office with his daughter  Since his last visit he has obtained a left leg prosthesis and is participating in physical therapy  He is extremely concerned about the possibility of losing his right leg  He states that when walking with his prosthesis in physical therapy he feels like the right ankle is weak and going to give out    This sensation is confined to the right ankle  Denies any pain to the calf or throughout the right leg  He is mainly ambulating with prosthesis at PT, using wheelchair regularly when home  He is experiencing right leg edema  He elevates with good improvement in symptoms  No pain with elevation  No tissue loss  He has compression stockings, but has not been wearing them  The following portions of the patient's history were reviewed and updated as appropriate: allergies, current medications, past family history, past medical history, past social history, past surgical history and problem list     Review of Systems   Constitutional: Positive for activity change, appetite change, chills, diaphoresis, fatigue and fever  HENT: Positive for rhinorrhea, sinus pain, sinus pressure and sneezing  Eyes: Positive for photophobia, pain, itching and visual disturbance  Sudden loss of vision   Respiratory: Positive for cough, shortness of breath and wheezing  SOB with activity   Cardiovascular: Negative  Gastrointestinal: Positive for anal bleeding, diarrhea and rectal pain  Endocrine: Positive for polydipsia  Genitourinary: Positive for difficulty urinating and dysuria  Musculoskeletal: Positive for arthralgias, gait problem, joint swelling and myalgias  Leg pain and cramping with walking   Skin: Positive for color change and pallor  Allergic/Immunologic: Negative  Hematological: Bruises/bleeds easily  Psychiatric/Behavioral: Positive for dysphoric mood and sleep disturbance  The patient is nervous/anxious and is hyperactive  Objective:     Physical Exam   Constitutional: He is oriented to person, place, and time  He appears well-nourished  No distress  HENT:   Head: Normocephalic and atraumatic  Eyes: EOM are normal    Neck: Neck supple  No JVD present  Cardiovascular: Normal rate, regular rhythm and normal heart sounds  Pulses:       Dorsalis pedis pulses are 0 on the right side  Posterior tibial pulses are 0 on the right side  Pulmonary/Chest: Effort normal  No respiratory distress  Abdominal: Soft  He exhibits no distension  There is no tenderness  Musculoskeletal: Normal range of motion  He exhibits edema (+1 edema RLE)  Seen in wheelchair L AKA   Neurological: He is alert and oriented to person, place, and time  Skin: Skin is warm and dry  Mild hyperpigmentation RLE   Psychiatric: He has a normal mood and affect

## 2018-06-21 NOTE — PATIENT INSTRUCTIONS
Peripheral arterial disease, history of left AKA  -We discussed the results of your recent arterial study  You have diffuse arterial disease, no tight focal narrowings that would indicate the need for a vascular intervention  Your toe pressure is below the healing level  If you got a sore on your foot you must notify the office immediately  -Remain as active as possible  Continue with physical therapy   -The numbness in your toes is consistent with peripheral neuropathy, for which she takes gabapentin    Right leg edema/swelling  -You should wear knee-high compression stockings daily during waking hours to help manage swelling  -Elevate your legs periodically to help manage swelling  -Apply lotion to the legs daily to help maintain skin integrity    Follow-up in 1 year for review    Notify the office sooner if he develops sores or pain throughout the right leg with ambulation/walking

## 2018-06-25 ENCOUNTER — EVALUATION (OUTPATIENT)
Dept: PHYSICAL THERAPY | Facility: CLINIC | Age: 58
End: 2018-06-25
Payer: COMMERCIAL

## 2018-06-25 DIAGNOSIS — Z89.612 STATUS POST ABOVE KNEE AMPUTATION, LEFT (HCC): Primary | ICD-10-CM

## 2018-06-25 PROCEDURE — G8978 MOBILITY CURRENT STATUS: HCPCS | Performed by: PHYSICAL THERAPIST

## 2018-06-25 PROCEDURE — 97140 MANUAL THERAPY 1/> REGIONS: CPT | Performed by: PHYSICAL THERAPIST

## 2018-06-25 PROCEDURE — 97530 THERAPEUTIC ACTIVITIES: CPT | Performed by: PHYSICAL THERAPIST

## 2018-06-25 PROCEDURE — G8979 MOBILITY GOAL STATUS: HCPCS | Performed by: PHYSICAL THERAPIST

## 2018-06-25 PROCEDURE — 97116 GAIT TRAINING THERAPY: CPT | Performed by: PHYSICAL THERAPIST

## 2018-06-25 NOTE — PROGRESS NOTES
PT Re-Evaluation     Today's date: 2018  Patient name: Arnold Cuellar  : 1960  MRN: [id number]  Referring provider: Aurea Cuadra DO  Dx:   Encounter Diagnosis     ICD-10-CM    1  Status post above knee amputation, left ([facility] [address] ) D87 866                   Assessment/Plan     This patient demonstrates improvement since beginning therapy; Range of Motion B hip extension has increased and function is improving  Pain has decreased, though phantom pain remains severe at times  Patient is progressing toward LTG's and will benefit from continued therapy to reduce pain and improve function  Interventions to include manual therapy, ROM, stretching, strengthening, gait and balance training, therapeutic activities, neuromuscular re-ed and instruction in HEP  Frequency: 2 times weekly  Duration:  6 weeks    Subjective   No longer has pain in residual limb; still has severe phantom pain (around the L ankle) - usually occurs in the evening    Objective STGs: 4 weeks  1  Increase ROM L hip extension by 10 degrees - MET  2  Decrease pain residual limb 3 levels - MET  3   Reduce phantom symptoms - MET  LTGs: 6-8 weeks  1  Independent HEP  2  Able to ambulate distances of 100-150 feet on level surfaces independently with walker and prosthesis - partially met  3  Increase FOTO score to predicted level  4           Able to ascend / descend steps with prosthesis and railing safely         Objective  Pain scale: L thigh 0-1/10; phantom pain 0-8/10; R ankle 0-5/10    Functional Mobility  Sit<->stand: independent w/ walker and prosthesis  Supine -> sit: Independent  Sit-> supine: Independent  Rolling: Independent  Floor to stand: N/A  Ambulation: with prosthesis and walker, CG and cueing for correct sequencing and step length  Stairs: N/A  Step (6") in ll bars - CG / occasional cueing    Integumentary: L residual limb slightly dog- eared; incision well approximated, no wounds or erythema on residual limb  Length (from adductor tendon to distal femur): 17  cm  Circumference:   8 cm below adductor tendon =   51 5 cm  10 cm below adductor tendon=   50 3 cm  13 cm below adductor tendon=    45 7 cm    Palpation: decreased tenderness distal end of L femur    Abdominal strength: 5/5        left     Hip   AROM  PROM  Strength  flexion   5/5   extension -15 -15 5/5*   Abduction   5/5   adduction   5/5   * hip pain     right extremity ROM / strength WFL except R hip flexion contracture 5 degrees        Precautions: none  PMH: DM , defibrillator, PVD, PAD    Daily Treatment Diary     Manual  6/20 6/25           Hip flexor stretch  JR           STM L quad / HS             Desensitization L residual limb             Ant glide L femur  JR                            Exercise Diary  6/20 6/25                        Prone lying hep            SLR x4             Hip flexor stretch hep            Jerhina Harris w/ daisha                          Balance activities standing unsupported 4-5'  CloseS/ CG                                      Side step ll bars             Step ups/down in ll bars   4x ea 6' CG ll bars            Sit <-> stand 5x 3x           Gait training w/ walker: knee unlocked 50'x3  CG/ close S 60'x2  80'x1  Close S                        Up/down curb step             Up/down ramp                                       Prosthetic mgt adjusted lock on knee            GMI  mirror           5/2/18: initiated mirror therapy    Modalities              CP prn

## 2018-06-27 ENCOUNTER — ANTICOAG VISIT (OUTPATIENT)
Dept: CARDIOLOGY CLINIC | Facility: CLINIC | Age: 58
End: 2018-06-27

## 2018-06-27 ENCOUNTER — APPOINTMENT (OUTPATIENT)
Dept: PHYSICAL THERAPY | Facility: CLINIC | Age: 58
End: 2018-06-27
Payer: COMMERCIAL

## 2018-06-27 DIAGNOSIS — E11.9 INSULIN DEPENDENT TYPE 2 DIABETES MELLITUS, CONTROLLED (HCC): Primary | ICD-10-CM

## 2018-06-27 DIAGNOSIS — Z95.2 HISTORY OF MITRAL VALVE REPLACEMENT WITH MECHANICAL VALVE: ICD-10-CM

## 2018-06-27 DIAGNOSIS — E11.51 DM (DIABETES MELLITUS), TYPE 2 WITH PERIPHERAL VASCULAR COMPLICATIONS (HCC): Primary | ICD-10-CM

## 2018-06-27 DIAGNOSIS — Z79.4 INSULIN DEPENDENT TYPE 2 DIABETES MELLITUS, CONTROLLED (HCC): Primary | ICD-10-CM

## 2018-06-27 LAB
INR PPP: 2.8 (ref 0.8–1.2)
PROTHROMBIN TIME: 27.2 SEC (ref 9.1–12)

## 2018-06-27 NOTE — TELEPHONE ENCOUNTER
Juan Jose Iyer has his pt/inr drawn by a [facility], we can fax the HBA1C order to them @ [phone]      There phone # for ref is [phone]

## 2018-06-27 NOTE — TELEPHONE ENCOUNTER
Please call patient regarding labs  He is getting a pt/inr drawn regularly  He also needs hba1c that can be drawn at the same time at the pt/inr  Does he get labs done at [facility]? Because the hba1c is entered for [facility]   Please let me know so that I can correct and get the results with next pt/inr  Sent insulin rx

## 2018-07-02 ENCOUNTER — EVALUATION (OUTPATIENT)
Dept: PHYSICAL THERAPY | Facility: CLINIC | Age: 58
End: 2018-07-02
Payer: COMMERCIAL

## 2018-07-02 DIAGNOSIS — E11.9 INSULIN DEPENDENT TYPE 2 DIABETES MELLITUS, CONTROLLED (HCC): ICD-10-CM

## 2018-07-02 DIAGNOSIS — Z89.612 STATUS POST ABOVE KNEE AMPUTATION, LEFT (HCC): Primary | ICD-10-CM

## 2018-07-02 DIAGNOSIS — Z79.4 INSULIN DEPENDENT TYPE 2 DIABETES MELLITUS, CONTROLLED (HCC): ICD-10-CM

## 2018-07-02 PROCEDURE — 97116 GAIT TRAINING THERAPY: CPT | Performed by: PHYSICAL THERAPIST

## 2018-07-02 PROCEDURE — 97530 THERAPEUTIC ACTIVITIES: CPT | Performed by: PHYSICAL THERAPIST

## 2018-07-03 ENCOUNTER — APPOINTMENT (OUTPATIENT)
Dept: PHYSICAL THERAPY | Facility: CLINIC | Age: 58
End: 2018-07-03
Payer: COMMERCIAL

## 2018-07-05 DIAGNOSIS — Z89.612 STATUS POST ABOVE KNEE AMPUTATION OF LEFT LOWER EXTREMITY: ICD-10-CM

## 2018-07-05 RX ORDER — HYDROCODONE BITARTRATE AND ACETAMINOPHEN 7.5; 325 MG/1; MG/1
1 TABLET ORAL EVERY 6 HOURS PRN
Qty: 120 TABLET | Refills: 0 | Status: SHIPPED | OUTPATIENT
Start: 2018-07-05 | End: 2018-08-08 | Stop reason: SDUPTHER

## 2018-07-08 DIAGNOSIS — J45.909 UNCOMPLICATED ASTHMA, UNSPECIFIED ASTHMA SEVERITY, UNSPECIFIED WHETHER PERSISTENT: Primary | ICD-10-CM

## 2018-07-09 ENCOUNTER — EVALUATION (OUTPATIENT)
Dept: PHYSICAL THERAPY | Facility: CLINIC | Age: 58
End: 2018-07-09
Payer: COMMERCIAL

## 2018-07-09 DIAGNOSIS — Z89.612 STATUS POST ABOVE KNEE AMPUTATION, LEFT (HCC): Primary | ICD-10-CM

## 2018-07-09 PROCEDURE — 97116 GAIT TRAINING THERAPY: CPT | Performed by: PHYSICAL THERAPIST

## 2018-07-09 RX ORDER — MONTELUKAST SODIUM 10 MG/1
TABLET ORAL
Qty: 30 TABLET | Refills: 5 | Status: SHIPPED | OUTPATIENT
Start: 2018-07-09 | End: 2018-10-08 | Stop reason: ALTCHOICE

## 2018-07-09 NOTE — PROGRESS NOTES
Daily Note     Today's date: 2018  Patient name: Kaye Martinez  : 1960  MRN: [id number]  Referring provider: Jossy Herrera DO  Dx:   Encounter Diagnosis     ICD-10-CM    1  Status post above knee amputation, left ([address] ) F27 083                   Subjective: no new complaints; [place] and [place] he stayed in a house with steps    Objective: See treatment diary below    Assessment: Focused on ambulation on outdoor surfaces including ramps/ curbs this visit  Requires cueing for correct sequencing w/ curb step  Most limited by pain in R ankle  Tolerated treatment well  Patient would benefit from continued PT    Plan: Continue per plan of care     Precautions: none  PMH: DM , defibrillator, PVD, PAD    Daily Treatment Diary     Manual           Hip flexor stretch  JR           STM L quad / HS             Desensitization L residual limb             Ant glide L femur  JR                            Exercise Diary                        Prone lying hep            SLR x4             Hip flexor stretch hep            Zackery Caul w/ bolster                          Balance activities standing unsupported 4-5'  CloseS/ CG  unsupported 2-3' close  S                                    Side step ll bars             Step ups/down in ll bars   4x ea 6' CG ll bars  8" CG          Sit <-> stand 5x 3x           Gait training w/ walker: knee unlocked 50'x3  CG/ close S 60'x2  80'x1  Close S 60-75' x3 close S 75'x3 out doors                      Up/down curb step    Out doors CG / cues         Up/down ramp             Gait w/ B crutches   CG  20-25'x2                       Prosthetic mgt adjusted lock on knee            GMI  mirror           18: initiated mirror therapy    Modalities              CP prn

## 2018-07-11 ENCOUNTER — APPOINTMENT (OUTPATIENT)
Dept: PHYSICAL THERAPY | Facility: CLINIC | Age: 58
End: 2018-07-11
Payer: COMMERCIAL

## 2018-07-11 LAB
INR PPP: 2.2 (ref 0.8–1.2)
PROTHROMBIN TIME: 22.2 SEC (ref 9.1–12)

## 2018-07-16 ENCOUNTER — IN-CLINIC DEVICE VISIT (OUTPATIENT)
Dept: CARDIOLOGY CLINIC | Facility: CLINIC | Age: 58
End: 2018-07-16
Payer: COMMERCIAL

## 2018-07-16 ENCOUNTER — EVALUATION (OUTPATIENT)
Dept: PHYSICAL THERAPY | Facility: CLINIC | Age: 58
End: 2018-07-16
Payer: COMMERCIAL

## 2018-07-16 DIAGNOSIS — I47.2 VENTRICULAR TACHYARRHYTHMIA (HCC): Primary | ICD-10-CM

## 2018-07-16 DIAGNOSIS — Z95.810 PRESENCE OF AUTOMATIC CARDIOVERTER/DEFIBRILLATOR (AICD): ICD-10-CM

## 2018-07-16 DIAGNOSIS — Z89.612 STATUS POST ABOVE KNEE AMPUTATION, LEFT (HCC): Primary | ICD-10-CM

## 2018-07-16 PROCEDURE — 97116 GAIT TRAINING THERAPY: CPT | Performed by: PHYSICAL THERAPIST

## 2018-07-16 PROCEDURE — 93282 PRGRMG EVAL IMPLANTABLE DFB: CPT | Performed by: INTERNAL MEDICINE

## 2018-07-16 PROCEDURE — 97530 THERAPEUTIC ACTIVITIES: CPT | Performed by: PHYSICAL THERAPIST

## 2018-07-16 NOTE — PROGRESS NOTES
Daily Note     Today's date: 2018  Patient name: Jonathan Villegas  : 1960  MRN: [id number]  Referring provider: Gregory Abernathy DO  Dx:   Encounter Diagnosis     ICD-10-CM    1  Status post above knee amputation, left ([address] ) G31 239                   Subjective: reports having a problem with defibrillator - is going for appointment after this visit; feels gabapentin is working but needs stronger dosage - is going to discuss w/ neurologist; wearing prosthesis for 2 hours 2x daily    Objective: See treatment diary below    Assessment:  Fatigues easily w/ gait activities  Ambulation still limited by R foot pain  Advised to work up to wearing prosthesis all day and to walk around his apartment as much as possible instead of using WC  Tolerated treatment well  Patient would benefit from continued PT    Plan: Continue per plan of care          Precautions: none  PMH: DM , defibrillator, PVD, PAD    Daily Treatment Diary     Manual          Hip flexor stretch  JR           STM L quad / HS             Desensitization L residual limb             Ant glide L femur  JR                            Exercise Diary                       Prone lying hep            SLR x4             Hip flexor stretch hep            Shaunna Jacob w/ bolster                          Balance activities standing unsupported 4-5'  CloseS/ CG  unsupported 2-3' close  S                                    Side step ll bars             Step ups/down in ll bars   4x ea 6' CG ll bars  8" CG  8"cs 5x        Sit <-> stand 5x 3x           Gait training w/ walker: knee unlocked 50'x3  CG/ close S 60'x2  80'x1  Close S 60-75' x3 close S 75'x3 out doors hepjab85'-75' x3                     Up/down curb step    Out doors CG / cues         Up/down ramp             Gait w/ B crutches   CG  20-25'x2                       Prosthetic mgt adjusted lock on knee            GMI  mirror           18: initiated mirror therapy    Modalities              CP prn

## 2018-07-17 LAB — INR PPP: 2.9 (ref 0.86–1.17)

## 2018-07-17 NOTE — PROGRESS NOTES
Results for orders placed or performed in visit on 07/16/18   Cardiac EP device report    Narrative     [address] OFFICE  BATTERY VOLTAGE ADEQUATE   5%  ALL LEAD PARAMETERS WITHIN NORMAL LIMITS  6 ATP TREATED VT EPISODES  PATIENT WAS ASYMPTOMATIC  PATIENT STATES HE DOESN'T TAKE ANY BBLOCKERS CAUSE HE HAS THE ICD  APPT TO SEE DR London Hidalgo IN ONE MONTH  NO PROGRAMMING CHANGES MADE TO DEVICE PARAMETERS  NORMAL DEVICE FUNCTION  ---GLEZ

## 2018-07-19 ENCOUNTER — ANTICOAG VISIT (OUTPATIENT)
Dept: CARDIOLOGY CLINIC | Facility: CLINIC | Age: 58
End: 2018-07-19

## 2018-07-19 DIAGNOSIS — Z95.2 HISTORY OF MITRAL VALVE REPLACEMENT WITH MECHANICAL VALVE: ICD-10-CM

## 2018-07-19 LAB
INR PPP: 2.9 (ref 0.8–1.2)
PROTHROMBIN TIME: 27.7 SEC (ref 9.1–12)

## 2018-07-23 ENCOUNTER — TELEPHONE (OUTPATIENT)
Dept: ADMINISTRATIVE | Facility: HOSPITAL | Age: 58
End: 2018-07-23

## 2018-07-23 DIAGNOSIS — R09.89 BRUIT: Primary | ICD-10-CM

## 2018-07-24 ENCOUNTER — EVALUATION (OUTPATIENT)
Dept: PHYSICAL THERAPY | Facility: CLINIC | Age: 58
End: 2018-07-24
Payer: COMMERCIAL

## 2018-07-24 DIAGNOSIS — Z89.612 STATUS POST ABOVE KNEE AMPUTATION, LEFT (HCC): Primary | ICD-10-CM

## 2018-07-24 DIAGNOSIS — Z89.612 STATUS POST ABOVE KNEE AMPUTATION OF LEFT LOWER EXTREMITY: ICD-10-CM

## 2018-07-24 LAB — INR PPP: 2.8 (ref 0.86–1.17)

## 2018-07-24 PROCEDURE — G8978 MOBILITY CURRENT STATUS: HCPCS | Performed by: PHYSICAL THERAPIST

## 2018-07-24 PROCEDURE — 97530 THERAPEUTIC ACTIVITIES: CPT | Performed by: PHYSICAL THERAPIST

## 2018-07-24 PROCEDURE — 97116 GAIT TRAINING THERAPY: CPT | Performed by: PHYSICAL THERAPIST

## 2018-07-24 PROCEDURE — G8979 MOBILITY GOAL STATUS: HCPCS | Performed by: PHYSICAL THERAPIST

## 2018-07-24 NOTE — PROGRESS NOTES
Daily Note     Today's date: 2018  Patient name: Jitendra Casey  : 1960  MRN: [id number]  Referring provider: Jess Maki DO  Dx:   Encounter Diagnosis     ICD-10-CM    1  Status post above knee amputation, left ([address] ) K02 539        Start Time: 1053          Subjective: reports no changes; states he is unable tocget out and walk with his prosthesis due to does not have anyone to go with him      Objective: See treatment diary below      Assessment: Tolerated treatment well  Requires less cueing for sequencing on curbs  Patient would benefit from continued PT      Plan: Continue per plan of care       Precautions: none  PMH: DM , defibrillator, PVD, PAD    Daily Treatment Diary     Manual         Hip flexor stretch  JR           STM L quad / HS             Desensitization L residual limb             Ant glide L femur  JR                            Exercise Diary                      Prone lying hep            SLR x4             Hip flexor stretch hep            Farhan Lias w/ bolster                          Balance activities standing unsupported 4-5'  CloseS/ CG  unsupported 2-3' close  S                                    Side step ll bars             Step ups/down in ll bars   4x ea 6' CG ll bars  8" CG  8"cs 5x 6" 8x       Sit <-> stand 5x 3x           Gait training w/ walker: knee unlocked 50'x3  CG/ close S 60'x2  80'x1  Close S 60-75' x3 close S 75'x3 out doors [id number]'-75' x3 In-outdoor 75' x 4                    Up/down curb step    Out doors CG / cues  Out CG/ occas cues       Up/down ramp             Gait w/ B crutches   CG  20-25'x2                       Prosthetic mgt adjusted lock on knee            GMI  mirror           18: initiated mirror therapy    Modalities              CP prn

## 2018-07-25 LAB
HBA1C MFR BLD: 8 % (ref 4.8–5.6)
INR PPP: 2.8 (ref 0.8–1.2)
PROTHROMBIN TIME: 27.1 SEC (ref 9.1–12)

## 2018-07-26 ENCOUNTER — ANTICOAG VISIT (OUTPATIENT)
Dept: CARDIOLOGY CLINIC | Facility: CLINIC | Age: 58
End: 2018-07-26

## 2018-07-26 DIAGNOSIS — Z95.2 HISTORY OF MITRAL VALVE REPLACEMENT WITH MECHANICAL VALVE: ICD-10-CM

## 2018-07-27 ENCOUNTER — ANTICOAG VISIT (OUTPATIENT)
Dept: FAMILY MEDICINE CLINIC | Facility: CLINIC | Age: 58
End: 2018-07-27

## 2018-07-27 DIAGNOSIS — E11.51 DM (DIABETES MELLITUS), TYPE 2 WITH PERIPHERAL VASCULAR COMPLICATIONS (HCC): ICD-10-CM

## 2018-07-27 DIAGNOSIS — E78.5 DYSLIPIDEMIA: ICD-10-CM

## 2018-07-27 DIAGNOSIS — Z95.2 HISTORY OF MITRAL VALVE REPLACEMENT WITH MECHANICAL VALVE: ICD-10-CM

## 2018-07-27 RX ORDER — ATORVASTATIN CALCIUM 10 MG/1
TABLET, FILM COATED ORAL
Qty: 90 TABLET | Refills: 0 | Status: SHIPPED | OUTPATIENT
Start: 2018-07-27 | End: 2018-11-08 | Stop reason: SDUPTHER

## 2018-07-30 ENCOUNTER — EVALUATION (OUTPATIENT)
Dept: PHYSICAL THERAPY | Facility: CLINIC | Age: 58
End: 2018-07-30
Payer: COMMERCIAL

## 2018-07-30 DIAGNOSIS — Z89.612 STATUS POST ABOVE KNEE AMPUTATION OF LEFT LOWER EXTREMITY: ICD-10-CM

## 2018-07-30 DIAGNOSIS — Z89.612 STATUS POST ABOVE KNEE AMPUTATION, LEFT (HCC): Primary | ICD-10-CM

## 2018-07-30 PROCEDURE — 97116 GAIT TRAINING THERAPY: CPT | Performed by: PHYSICAL THERAPIST

## 2018-07-30 PROCEDURE — 97112 NEUROMUSCULAR REEDUCATION: CPT | Performed by: PHYSICAL THERAPIST

## 2018-07-30 PROCEDURE — G8980 MOBILITY D/C STATUS: HCPCS | Performed by: PHYSICAL THERAPIST

## 2018-07-30 PROCEDURE — G8979 MOBILITY GOAL STATUS: HCPCS | Performed by: PHYSICAL THERAPIST

## 2018-07-30 NOTE — PROGRESS NOTES
PT Re-Evaluation  and PT Discharge       18 ADDENDUM: This patient missed the appointments that were scheduled after this re-eval on 18  As he has no further scheduled appointments, will DC PT at this time  Today's date: 2018  Patient name: Dawna Helton  : 1960  MRN: [id number]  Referring provider: Duncan Flores DO  Dx:   Encounter Diagnosis     ICD-10-CM    1  Status post above knee amputation, left ([facility] [address] ) Z89 612    2  Status post above knee amputation of left lower extremity ([facility] [address] ) W27 378                   Assessment/Plan     This patient demonstrates improvement since beginning therapy; he is able to ambulate independently with prosthesis and walker on level surfaces and is able to negotiate curb w/ CG/S (Knee unlocked)  He continues to exhibit significant hip flexion contracture  Recommend one to two additional visits to prepare for DC to self directed program  Will contact prosthetist in regard to planning fabrication of definitive prosthesis  Interventions to include manual therapy, ROM, stretching, strengthening, gait and balance training, therapeutic activities, neuromuscular re-ed and instruction in HEP  Frequency: 1-2 more visits  Duration:  1 week      Subjective   States that he has pain at the end of the L residual limb - worse after walking; phantom pain (L ankle) worsens with rainy/ cold weather  Has follow up with vasual specialist  and has follow up with cardiologist  Has worked up to wearing the prosthesis 4 hours per day  Has been walking with walker independently in the home    Objective  STGs: 4 weeks  1  Increase ROM L hip extension by 10 degrees - MET  2  Decrease pain residual limb 3 levels - MET  3   Reduce phantom symptoms - MET  LTGs: 6-8 weeks  1  Independent HEP  2  Able to ambulate distances of 100-150 feet on level surfaces independently with walker and prosthesis - MET  3  Increase FOTO score to predicted level  4           Able to ascend / descend steps with prosthesis and railing safely         Objective  Pain scale: L thigh 0-9/10; phantom pain 0-9/10; R ankle 0-6/10    Functional Mobility  Sit<->stand: independent w/ walker and prosthesis  Supine -> sit: Independent  Sit-> supine: Independent  Rolling: Independent  Floor to stand: N/A  Ambulation: Independent with prosthesis and walker, distances of 100'  Stairs: N/A  Step: curb (6") with walker CG / occasional cueing    Integumentary: L residual limb slightly dog- eared; incision well approximated, no wounds or erythema on residual limb  Length (from adductor tendon to distal femur): 17  cm  Circumference:   8 cm below adductor tendon =   52 5 cm  10 cm below adductor tendon=   49 5 cm  13 cm below adductor tendon=    46 5 cm    Palpation: decreased tenderness distal end of L femur    Abdominal strength: 5/5        Precautions: none  PMH: DM , defibrillator, PVD, PAD    Daily Treatment Diary     Manual  6/20 6/25 7/2 7/9 7/16 7/23 7/30      Hip flexor stretch  JR           STM L quad / HS             Desensitization L residual limb             Ant glide L femur  JR                            Exercise Diary  6/20 6/25 7/2 7/9 7/16 7/23 7/30                   Prone lying hep      hep      SLR x4             Hip flex stretch Miguel  hep      6# L      Bridges w/ ball       10x 10"                   Balance activities standing unsupported 4-5'  CloseS/ CG  unsupported 2-3' close  S                                    Side step ll bars             Step ups/down in ll bars   4x ea 6' CG ll bars  8" CG  8"cs 5x 6" 8x       Sit <-> stand 5x 3x           Gait training w/ walker: knee unlocked 50'x3  CG/ close S 60'x2  80'x1  Close S 60-75' x3 close S 75'x3 out doors [id number]'-75' x3 In-outdoor 75' x 4 125''                   Up/down curb step    Out doors CG / cues  Out CG/ occas cues 4x CG      Up/down ramp             Gait w/ B crutches   CG  20-25'x2 -- -- -- -- -- -- --                Prosthetic mgt adjusted lock on knee            GMI  mirror           5/2/18: initiated mirror therapy    Modalities              CP prn

## 2018-08-01 LAB
INR PPP: 2 (ref 0.8–1.2)
PROTHROMBIN TIME: 20.3 SEC (ref 9.1–12)

## 2018-08-02 ENCOUNTER — ANTICOAG VISIT (OUTPATIENT)
Dept: CARDIOLOGY CLINIC | Facility: CLINIC | Age: 58
End: 2018-08-02

## 2018-08-02 DIAGNOSIS — Z95.2 HISTORY OF MITRAL VALVE REPLACEMENT WITH MECHANICAL VALVE: ICD-10-CM

## 2018-08-02 DIAGNOSIS — I48.0 PAF (PAROXYSMAL ATRIAL FIBRILLATION) (HCC): Primary | ICD-10-CM

## 2018-08-02 RX ORDER — WARFARIN SODIUM 5 MG/1
TABLET ORAL
Qty: 60 TABLET | Refills: 5 | Status: SHIPPED | OUTPATIENT
Start: 2018-08-02

## 2018-08-05 ENCOUNTER — APPOINTMENT (EMERGENCY)
Dept: RADIOLOGY | Facility: HOSPITAL | Age: 58
End: 2018-08-05
Payer: COMMERCIAL

## 2018-08-05 ENCOUNTER — HOSPITAL ENCOUNTER (EMERGENCY)
Facility: HOSPITAL | Age: 58
Discharge: HOME | End: 2018-08-05
Attending: EMERGENCY MEDICINE | Admitting: EMERGENCY MEDICINE
Payer: COMMERCIAL

## 2018-08-05 VITALS
WEIGHT: 180 LBS | HEART RATE: 84 BPM | RESPIRATION RATE: 22 BRPM | TEMPERATURE: 99 F | SYSTOLIC BLOOD PRESSURE: 147 MMHG | HEIGHT: 73 IN | OXYGEN SATURATION: 98 % | DIASTOLIC BLOOD PRESSURE: 67 MMHG | BODY MASS INDEX: 23.86 KG/M2

## 2018-08-05 DIAGNOSIS — R11.10 VOMITING: ICD-10-CM

## 2018-08-05 DIAGNOSIS — R10.9 ABDOMINAL PAIN: ICD-10-CM

## 2018-08-05 DIAGNOSIS — R11.0 NAUSEA: Primary | ICD-10-CM

## 2018-08-05 LAB
ALBUMIN SERPL BCP-MCNC: 4.1 G/DL (ref 3.5–5)
ALP SERPL-CCNC: 147 U/L (ref 46–116)
ALT SERPL W P-5'-P-CCNC: 23 U/L (ref 12–78)
ANION GAP SERPL CALCULATED.3IONS-SCNC: 9 MMOL/L (ref 4–13)
APTT PPP: 37 SECONDS (ref 24–36)
AST SERPL W P-5'-P-CCNC: 19 U/L (ref 5–45)
BACTERIA UR QL AUTO: ABNORMAL /HPF
BASOPHILS # BLD AUTO: 0.03 THOUSANDS/ΜL (ref 0–0.1)
BASOPHILS NFR BLD AUTO: 0 % (ref 0–1)
BILIRUB SERPL-MCNC: 0.93 MG/DL (ref 0.2–1)
BILIRUB UR QL STRIP: NEGATIVE
BUN SERPL-MCNC: 11 MG/DL (ref 5–25)
CALCIUM SERPL-MCNC: 9.6 MG/DL (ref 8.3–10.1)
CHLORIDE SERPL-SCNC: 107 MMOL/L (ref 100–108)
CLARITY UR: CLEAR
CO2 SERPL-SCNC: 23 MMOL/L (ref 21–32)
COLOR UR: ABNORMAL
CREAT SERPL-MCNC: 1.06 MG/DL (ref 0.6–1.3)
EOSINOPHIL # BLD AUTO: 0.03 THOUSAND/ΜL (ref 0–0.61)
EOSINOPHIL NFR BLD AUTO: 0 % (ref 0–6)
ERYTHROCYTE [DISTWIDTH] IN BLOOD BY AUTOMATED COUNT: 14.5 % (ref 11.6–15.1)
GFR SERPL CREATININE-BSD FRML MDRD: 76 ML/MIN/1.73SQ M
GLUCOSE SERPL-MCNC: 197 MG/DL (ref 65–140)
GLUCOSE SERPL-MCNC: 198 MG/DL (ref 65–140)
GLUCOSE UR STRIP-MCNC: ABNORMAL MG/DL
HCT VFR BLD AUTO: 46.1 % (ref 36.5–49.3)
HGB BLD-MCNC: 15 G/DL (ref 12–17)
HGB UR QL STRIP.AUTO: ABNORMAL
HYALINE CASTS #/AREA URNS LPF: ABNORMAL /LPF
IMM GRANULOCYTES # BLD AUTO: 0.05 THOUSAND/UL (ref 0–0.2)
IMM GRANULOCYTES NFR BLD AUTO: 0 % (ref 0–2)
INR PPP: 2.04 (ref 0.86–1.17)
KETONES UR STRIP-MCNC: ABNORMAL MG/DL
LACTATE SERPL-SCNC: 2.8 MMOL/L (ref 0.5–2)
LEUKOCYTE ESTERASE UR QL STRIP: NEGATIVE
LIPASE SERPL-CCNC: 102 U/L (ref 73–393)
LYMPHOCYTES # BLD AUTO: 0.9 THOUSANDS/ΜL (ref 0.6–4.47)
LYMPHOCYTES NFR BLD AUTO: 7 % (ref 14–44)
MCH RBC QN AUTO: 28.7 PG (ref 26.8–34.3)
MCHC RBC AUTO-ENTMCNC: 32.5 G/DL (ref 31.4–37.4)
MCV RBC AUTO: 88 FL (ref 82–98)
MONOCYTES # BLD AUTO: 0.4 THOUSAND/ΜL (ref 0.17–1.22)
MONOCYTES NFR BLD AUTO: 3 % (ref 4–12)
NEUTROPHILS # BLD AUTO: 10.85 THOUSANDS/ΜL (ref 1.85–7.62)
NEUTS SEG NFR BLD AUTO: 90 % (ref 43–75)
NITRITE UR QL STRIP: NEGATIVE
NON-SQ EPI CELLS URNS QL MICRO: ABNORMAL /HPF
NRBC BLD AUTO-RTO: 0 /100 WBCS
PH UR STRIP.AUTO: 5.5 [PH] (ref 4.5–8)
PLATELET # BLD AUTO: 208 THOUSANDS/UL (ref 149–390)
PMV BLD AUTO: 12.7 FL (ref 8.9–12.7)
POTASSIUM SERPL-SCNC: 3.7 MMOL/L (ref 3.5–5.3)
PROT SERPL-MCNC: 8.6 G/DL (ref 6.4–8.2)
PROT UR STRIP-MCNC: >=300 MG/DL
PROTHROMBIN TIME: 23.1 SECONDS (ref 11.8–14.2)
RBC # BLD AUTO: 5.22 MILLION/UL (ref 3.88–5.62)
RBC #/AREA URNS AUTO: ABNORMAL /HPF
SODIUM SERPL-SCNC: 139 MMOL/L (ref 136–145)
SP GR UR STRIP.AUTO: 1.02 (ref 1–1.03)
TROPONIN I SERPL-MCNC: <0.02 NG/ML
UROBILINOGEN UR QL STRIP.AUTO: 0.2 E.U./DL
WBC # BLD AUTO: 12.26 THOUSAND/UL (ref 4.31–10.16)
WBC #/AREA URNS AUTO: ABNORMAL /HPF

## 2018-08-05 PROCEDURE — 96374 THER/PROPH/DIAG INJ IV PUSH: CPT

## 2018-08-05 PROCEDURE — 85610 PROTHROMBIN TIME: CPT | Performed by: EMERGENCY MEDICINE

## 2018-08-05 PROCEDURE — 82948 REAGENT STRIP/BLOOD GLUCOSE: CPT

## 2018-08-05 PROCEDURE — 84484 ASSAY OF TROPONIN QUANT: CPT | Performed by: EMERGENCY MEDICINE

## 2018-08-05 PROCEDURE — 83690 ASSAY OF LIPASE: CPT | Performed by: EMERGENCY MEDICINE

## 2018-08-05 PROCEDURE — 96375 TX/PRO/DX INJ NEW DRUG ADDON: CPT

## 2018-08-05 PROCEDURE — 80053 COMPREHEN METABOLIC PANEL: CPT | Performed by: EMERGENCY MEDICINE

## 2018-08-05 PROCEDURE — 81001 URINALYSIS AUTO W/SCOPE: CPT

## 2018-08-05 PROCEDURE — 99284 EMERGENCY DEPT VISIT MOD MDM: CPT

## 2018-08-05 PROCEDURE — 83605 ASSAY OF LACTIC ACID: CPT | Performed by: EMERGENCY MEDICINE

## 2018-08-05 PROCEDURE — 85730 THROMBOPLASTIN TIME PARTIAL: CPT | Performed by: EMERGENCY MEDICINE

## 2018-08-05 PROCEDURE — 85025 COMPLETE CBC W/AUTO DIFF WBC: CPT | Performed by: EMERGENCY MEDICINE

## 2018-08-05 PROCEDURE — 93005 ELECTROCARDIOGRAM TRACING: CPT | Performed by: PHYSICAL THERAPIST

## 2018-08-05 PROCEDURE — 36415 COLL VENOUS BLD VENIPUNCTURE: CPT

## 2018-08-05 PROCEDURE — 74177 CT ABD & PELVIS W/CONTRAST: CPT

## 2018-08-05 PROCEDURE — 71260 CT THORAX DX C+: CPT

## 2018-08-05 PROCEDURE — 96361 HYDRATE IV INFUSION ADD-ON: CPT

## 2018-08-05 PROCEDURE — 71046 X-RAY EXAM CHEST 2 VIEWS: CPT

## 2018-08-05 RX ORDER — HALOPERIDOL 5 MG/ML
5 INJECTION INTRAMUSCULAR ONCE
Status: DISCONTINUED | OUTPATIENT
Start: 2018-08-05 | End: 2018-08-05

## 2018-08-05 RX ORDER — METOCLOPRAMIDE 10 MG/1
10 TABLET ORAL EVERY 6 HOURS
Qty: 30 TABLET | Refills: 0 | Status: SHIPPED | OUTPATIENT
Start: 2018-08-05 | End: 2018-10-15 | Stop reason: ALTCHOICE

## 2018-08-05 RX ORDER — ONDANSETRON 2 MG/ML
4 INJECTION INTRAMUSCULAR; INTRAVENOUS ONCE
Status: COMPLETED | OUTPATIENT
Start: 2018-08-05 | End: 2018-08-05

## 2018-08-05 RX ORDER — LORAZEPAM 2 MG/ML
1 INJECTION INTRAMUSCULAR ONCE
Status: COMPLETED | OUTPATIENT
Start: 2018-08-05 | End: 2018-08-05

## 2018-08-05 RX ORDER — HALOPERIDOL 5 MG/ML
5 INJECTION INTRAMUSCULAR ONCE
Status: COMPLETED | OUTPATIENT
Start: 2018-08-05 | End: 2018-08-05

## 2018-08-05 RX ORDER — MAGNESIUM HYDROXIDE/ALUMINUM HYDROXICE/SIMETHICONE 120; 1200; 1200 MG/30ML; MG/30ML; MG/30ML
30 SUSPENSION ORAL ONCE
Status: COMPLETED | OUTPATIENT
Start: 2018-08-05 | End: 2018-08-05

## 2018-08-05 RX ORDER — METOCLOPRAMIDE HYDROCHLORIDE 5 MG/ML
10 INJECTION INTRAMUSCULAR; INTRAVENOUS EVERY 6 HOURS PRN
Status: DISCONTINUED | OUTPATIENT
Start: 2018-08-05 | End: 2018-08-05 | Stop reason: HOSPADM

## 2018-08-05 RX ADMIN — IOHEXOL 100 ML: 350 INJECTION, SOLUTION INTRAVENOUS at 16:28

## 2018-08-05 RX ADMIN — HALOPERIDOL LACTATE 5 MG: 5 INJECTION INTRAMUSCULAR at 17:58

## 2018-08-05 RX ADMIN — ONDANSETRON 4 MG: 2 INJECTION, SOLUTION INTRAMUSCULAR; INTRAVENOUS at 13:46

## 2018-08-05 RX ADMIN — LIDOCAINE HYDROCHLORIDE 15 ML: 20 SOLUTION ORAL; TOPICAL at 15:19

## 2018-08-05 RX ADMIN — ALUMINUM HYDROXIDE, MAGNESIUM HYDROXIDE, AND SIMETHICONE 30 ML: 200; 200; 20 SUSPENSION ORAL at 15:19

## 2018-08-05 RX ADMIN — LORAZEPAM 1 MG: 2 INJECTION INTRAMUSCULAR; INTRAVENOUS at 14:36

## 2018-08-05 RX ADMIN — SODIUM CHLORIDE 500 ML: 0.9 INJECTION, SOLUTION INTRAVENOUS at 14:45

## 2018-08-05 RX ADMIN — METOCLOPRAMIDE 10 MG: 5 INJECTION, SOLUTION INTRAMUSCULAR; INTRAVENOUS at 14:36

## 2018-08-05 NOTE — DISCHARGE INSTRUCTIONS
Please follow up with your PCP on Monday  Please come back to the ED immediately if you have vomiting with blood, fevers, bloody bowel movements, chest pain, palpitations or any other concerning symptoms  Acute Nausea and Vomiting, Ambulatory Care   GENERAL INFORMATION:   Acute nausea and vomiting  starts suddenly, gets worse quickly, and lasts a short time  Nausea and vomiting may be caused by pregnancy, alcohol, infection, or medicines  Common related symptoms include the following:   · Fever    · Abdominal swelling    · Pain, tenderness, or a lump in the abdomen    · Splashing sounds heard in your stomach when you move  Seek immediate care for the following symptoms:   · Blood in your vomit or bowel movements    · Sudden, severe pain in your chest and upper abdomen after hard vomiting    · Dizziness, dry mouth, and thirst    · Urinating very little or not at all    · Muscle weakness, leg cramps, and trouble breathing    · A heart beat that is faster than normal    · Vomiting for more than 48 hours  Treatment for acute nausea and vomiting  may include medicines to calm your stomach and stop the vomiting  You may need IV fluids if you are dehydrated  Manage your nausea and vomiting:   · Drink liquids as directed to prevent dehydration  Ask how much liquid to drink each day and which liquids are best for you  You may need to drink an oral rehydration solution (ORS)  ORS contains water, salts, and sugar that are needed to replace the lost body fluids  Ask what kind of ORS to use, how much to drink, and where to get it  · Eat smaller meals, more often  Eat small amounts of food every 2 to 3 hours, even if you are not hungry  Food in your stomach may help decrease your nausea  · Avoid stress  Find ways to relax and manage your stress  Headaches due to stress may cause nausea and vomiting  Get more rest and sleep    Follow up with your healthcare provider as directed:  Write down your questions so you remember to ask them during your visits  CARE AGREEMENT:   You have the right to help plan your care  Learn about your health condition and how it may be treated  Discuss treatment options with your caregivers to decide what care you want to receive  You always have the right to refuse treatment  The above information is an  only  It is not intended as medical advice for individual conditions or treatments  Talk to your doctor, nurse or pharmacist before following any medical regimen to see if it is safe and effective for you  © 2014 [address] is for End User's use only and may not be sold, redistributed or otherwise used for commercial purposes  All illustrations and images included in CareNotes® are the copyrighted property of [employer]  or Jemal Felipe

## 2018-08-05 NOTE — ED ATTENDING ATTESTATION
Jonelle Driscoll MD, saw and evaluated the patient  All available labs and X-rays were ordered by me or the resident and have been reviewed by myself  I discussed the patient with the resident / non-physician and agree with the resident's / non-physician practitioner's findings and plan as documented in the resident's / non-physician practicitioner's note, except where noted  At this point, I agree with the current assessment done in the ED  Chief Complaint   Patient presents with    Vomiting     Patient started with nausea/vomiting last night  Patient reports reproducible right sided chest pain after vomiting along with SOB  60 M:  - he has been having n/v since last night  Describes vomiting as dry heaves, bringing up stomach contents, but has no food at home so hasn't eaten  Can't take his medications  - he has been checking his sugars and they've been okay (138 being the highest)  - no actual fevers  - has severe pain everywhere and again references couldn't tolerate his medications  - he does have left leg pain (though has no leg present) at his phantom pain site  - he feels extremely anxious (b/c he hasn't had his klonipin today)  - the symptoms started  - he does have shortness of breath but only when he is vomiting   - denies dizziness/LH  - gordo CP  - when I asked if he is hungry / willing to try PO challenge, he starts dry-heaving but says he has no food at home at all which is part of the problem     PMH:  - CAD s/p mechanical mitral valve on coumadin  - CHF EF 40% with AICD BiV  - DM2  - HTN  - CAD  - IC, csCHF  - PAD  - HLD  PSH:  - EGD  - MV replacement  - CABG  - AKA of LEFT leg  Smokes daily  No alcohol  No drugs  PE:  Vitals:    08/05/18 1345 08/05/18 1439 08/05/18 1549 08/05/18 1758   BP:  (!) 183/85 158/95 147/67   BP Location:  Left arm Left arm Left arm   Pulse: 82 78 78 84   Resp: (!) 24 20 19 22   Temp:       TempSrc:       SpO2: 99% 98% 100% 98%   Weight:       Height: General: VS reviewed  Appears very anxious (asked to hold my hand b/c he feels better with emotional support)  NAD, awake, alert  Well-nourished, well-developed  Appears stated age  Head: Normocephalic, atraumatic, nontender  Eyes: PERRL, EOM-I  No diplopia  No hyphema  No subconjunctival hemorrhages  Symmetrical lids  ENT: Atraumatic external nose and ears  Dry MM  No malocclusion  No stridor  Normal phonation  No drooling  Normal swallowing  Neck: Symmetric, trachea midline  No JVD  CV: RRR  +S1/S2  No murmurs or gallops  Peripheral pulses +2 throughout  No chest wall tenderness  Lungs:   Unlabored No retractions  CTAB, lungs sounds equal bilateral    No crepitus  No tachypnea  No paradoxical motion  Abd: +BS, soft, diffuse tenderness  nd  No guarding  No rigidity  No rebound  No hepatosplenomegaly noted  No peritoneal signs  Heel strike RLE normal  MSK:   FROM   Left leg missing  The amputation site looks normal, there is no cellulitis  He points below here as the area that is painful for him  No lower extremity edema  Back:   No CVAT  Skin: Dry, intact  Neuro: AAOx3, GCS 15, CN II-XII grossly intact  Motor grossly intact  Psychiatric/Behavioral: Appropriate mood and affect   Exam: deferred  A:  - n/v  - abdominal pain  P:  - labs  - fluids  - pain medications  - anxiety medications  - if he has continued pain --> CT for abdominal pain  - 13 point ROS was performed and all are normal unless stated in the history above  - Nursing note reviewed  Vitals reviewed  - Orders placed by myself and/or advanced practitioner / resident     - Previous chart was reviewed  - No language barrier    - History obtained from patient  - There are no limitations to the history obtained  - Critical care time: Not applicable for this patient  Final Diagnosis:  1  Nausea    2  Vomiting    3  Abdominal pain      ED Course as of Aug 10 0110   Wicho Rambo Aug 05, 2018   1447 He's on coumadin  INR: (!) 2 04   1447 WBC: (!) 12 26   1447 POC Glucose: (!) 197   1447 BUN: 11   1447 Creatinine: 1 06   1447 AST: 19   1447 ALT: 23   1448 Elevated to same level as in the past   Alkaline Phosphatase: (!) 147   1539 LACTIC ACID: (!!) 2 8   1725 Patient continued to have abdominal pain  CT was ordered due to continue nausea, dry heaving, abdominal pain  1731 CT chest + belly normal  No pneumonia noted on that  Lactic acidosis but getting IVF  Will recheck  Hyaline casts on urine consistent with dehydration  Will PO challenge --> admit if continued intolerance of food / intractable vomiting  1915 Patient wants to leave now  Will DC with medications, oral return to ER precautions  Medications   ondansetron (ZOFRAN) injection 4 mg (4 mg Intravenous Given 8/5/18 1346)   LORazepam (ATIVAN) 2 mg/mL injection 1 mg (1 mg Intravenous Given 8/5/18 1436)   sodium chloride 0 9 % bolus 500 mL (0 mL Intravenous Stopped 8/5/18 1519)   aluminum-magnesium hydroxide-simethicone (MYLANTA) 200-200-20 mg/5 mL oral suspension 30 mL (30 mL Oral Given 8/5/18 1519)   lidocaine viscous (XYLOCAINE) 2 % mucosal solution 15 mL (15 mL Swish & Swallow Given 8/5/18 1519)   iohexol (OMNIPAQUE) 350 MG/ML injection (SINGLE-DOSE) 100 mL (100 mL Intravenous Given 8/5/18 1628)   haloperidol lactate (HALDOL) injection 5 mg (5 mg Intravenous Given by Other 8/5/18 1758)     X-ray chest 2 views   ED Interpretation   Abnormal   The cxr was ordered by resident and interpreted by me independently  On my read, it appears:   - RML patchiness new since previous      Final Result      Prominent hilar and infrahilar vasculature likely accentuated by portable AP technique and mild rotation  Correlate with clinical parameters for mild central vascular congestion or chronic pulmonary arterial hypertension  Otherwise, no definitive radiographic evidence of acute intrathoracic process                    Workstation performed: [id number] CT chest abdomen pelvis w contrast   ED Interpretation   CT ordered by myself and the report from radiologist has been reviewed  Final Result      Again seen is severe emphysema, without acute pulmonary abnormalities  No acute pathology in the abdomen and pelvis  Workstation performed: [id number]           Orders Placed This Encounter   Procedures    ED ECG Documentation Only    X-ray chest 2 views    CT chest abdomen pelvis w contrast    Comprehensive metabolic panel    CBC and differential    Troponin I    APTT    Protime-INR    Lipase    Lactic acid, plasma    Urine Microscopic    Continuous cardiac monitoring    Continuous pulse oximetry    EKG RESULTS    ECG 12 lead     Labs Reviewed   COMPREHENSIVE METABOLIC PANEL - Abnormal        Result Value Ref Range Status    Sodium 139  136 - 145 mmol/L Final    Potassium 3 7  3 5 - 5 3 mmol/L Final    Chloride 107  100 - 108 mmol/L Final    CO2 23  21 - 32 mmol/L Final    Anion Gap 9  4 - 13 mmol/L Final    BUN 11  5 - 25 mg/dL Final    Creatinine 1 06  0 60 - 1 30 mg/dL Final    Comment: Standardized to IDMS reference method    Glucose 198 (*) 65 - 140 mg/dL Final    Comment:   If the patient is fasting, the ADA then defines impaired fasting glucose as > 100 mg/dL and diabetes as > or equal to 123 mg/dL  Specimen collection should occur prior to Sulfasalazine administration due to the potential for falsely depressed results  Specimen collection should occur prior to Sulfapyridine administration due to the potential for falsely elevated results  Calcium 9 6  8 3 - 10 1 mg/dL Final    AST 19  5 - 45 U/L Final    Comment:   Specimen collection should occur prior to Sulfasalazine administration due to the potential for falsely depressed results  ALT 23  12 - 78 U/L Final    Comment:   Specimen collection should occur prior to Sulfasalazine and/or Sulfapyridine administration due to the potential for falsely depressed results  Alkaline Phosphatase 147 (*) 46 - 116 U/L Final    Total Protein 8 6 (*) 6 4 - 8 2 g/dL Final    Albumin 4 1  3 5 - 5 0 g/dL Final    Total Bilirubin 0 93  0 20 - 1 00 mg/dL Final    eGFR 76  ml/min/1 73sq m Final    Narrative:     National Kidney Disease Education Program recommendations are as follows:  GFR calculation is accurate only with a steady state creatinine  Chronic Kidney disease less than 60 ml/min/1 73 sq  meters  Kidney failure less than 15 ml/min/1 73 sq  meters  CBC AND DIFFERENTIAL - Abnormal     WBC 12 26 (*) 4 31 - 10 16 Thousand/uL Final    RBC 5 22  3 88 - 5 62 Million/uL Final    Hemoglobin 15 0  12 0 - 17 0 g/dL Final    Hematocrit 46 1  36 5 - 49 3 % Final    MCV 88  82 - 98 fL Final    MCH 28 7  26 8 - 34 3 pg Final    MCHC 32 5  31 4 - 37 4 g/dL Final    RDW 14 5  11 6 - 15 1 % Final    MPV 12 7  8 9 - 12 7 fL Final    Platelets 565  397 - 390 Thousands/uL Final    nRBC 0  /100 WBCs Final    Comment: This is an appended report  These results have been appended to a previously preliminary verified report  Neutrophils Relative 90 (*) 43 - 75 % Final    Immat GRANS % 0  0 - 2 % Final    Lymphocytes Relative 7 (*) 14 - 44 % Final    Monocytes Relative 3 (*) 4 - 12 % Final    Eosinophils Relative 0  0 - 6 % Final    Basophils Relative 0  0 - 1 % Final    Neutrophils Absolute 10 85 (*) 1 85 - 7 62 Thousands/µL Final    Immature Grans Absolute 0 05  0 00 - 0 20 Thousand/uL Final    Lymphocytes Absolute 0 90  0 60 - 4 47 Thousands/µL Final    Monocytes Absolute 0 40  0 17 - 1 22 Thousand/µL Final    Eosinophils Absolute 0 03  0 00 - 0 61 Thousand/µL Final    Basophils Absolute 0 03  0 00 - 0 10 Thousands/µL Final    Narrative: This is an appended report  These results have been appended to a previously verified report     APTT - Abnormal     PTT 37 (*) 24 - 36 seconds Final    Comment: Therapeutic Heparin Range =  60-90 seconds   PROTIME-INR - Abnormal     Protime 23 1 (*) 11 8 - 14 2 seconds Final    INR 2 04 (*) 0 86 - 1 17 Final   LACTIC ACID, PLASMA - Abnormal     LACTIC ACID 2 8 (*) 0 5 - 2 0 mmol/L Final    Narrative:     Result may be elevated if tourniquet was used during collection  URINE MICROSCOPIC - Abnormal     RBC, UA 4-10 (*) None Seen, 0-5 /hpf Final    WBC, UA None Seen  None Seen, 0-5, 5-55, 5-65 /hpf Final    Epithelial Cells None Seen  None Seen, Occasional /hpf Final    Bacteria, UA None Seen  None Seen, Occasional /hpf Final    Hyaline Casts, UA 5-10 (*) None Seen /lpf Final   POCT GLUCOSE - Abnormal     POC Glucose 197 (*) 65 - 140 mg/dl Final   ED URINE MACROSCOPIC - Abnormal     Color, UA Dulce Maria   Final    Clarity, UA Clear   Final    pH, UA 5 5  4 5 - 8 0 Final    Leukocytes, UA Negative  Negative Final    Nitrite, UA Negative  Negative Final    Protein, UA >=300 (*) Negative mg/dl Final    Glucose,  (1/10%) (*) Negative mg/dl Final    Ketones, UA 15 (1+) (*) Negative mg/dl Final    Urobilinogen, UA 0 2  0 2, 1 0 E U /dl E U /dl Final    Bilirubin, UA Negative  Negative Final    Blood, UA Moderate (*) Negative Final    Specific Gravity, UA 1 025  1 003 - 1 030 Final    Narrative:     CLINITEK RESULT   TROPONIN I - Normal    Troponin I <0 02  <=0 04 ng/mL Final    Comment:   [employer] Chemistry analyzer 99% cutoff is > 0 04 ng/mL in network labs     o cTnI 99% cutoff is useful only when applied to patients in the clinical setting of myocardial ischemia   o cTnI 99% cutoff should be interpreted in the context of clinical history, ECG findings and possibly cardiac imaging to establish correct diagnosis  o cTnI 99% cutoff may be suggestive but clearly not indicative of a coronary event without the clinical setting of myocardial ischemia       LIPASE - Normal    Lipase 102  73 - 393 u/L Final     Time reflects when diagnosis was documented in both MDM as applicable and the Disposition within this note     Time User Action Codes Description Comment    8/5/2018  7:19 PM Dahlia Root Add [R11 0] Nausea     8/5/2018  7:19 PM Tanneret Ores [R11 10] Vomiting     8/5/2018  7:20 PM Sebastien Holman Bilberry Add [R10 9] Abdominal pain       ED Disposition     ED Disposition Condition Comment    Discharge  Mark Pereira discharge to home/self care      Condition at discharge: Stable        Follow-up Information     Follow up With Specialties Details Why [address], DO Family Medicine In 2 days  [address]  [phone]          Discharge Medication List as of 8/5/2018  7:25 PM      START taking these medications    Details   metoclopramide (REGLAN) 10 mg tablet Take 1 tablet (10 mg total) by mouth every 6 (six) hours, Starting Sun 8/5/2018, Print         CONTINUE these medications which have NOT CHANGED    Details   ACCU-CHEK FASTCLIX LANCETS MISC 1 Units by Does not apply route 3 (three) times a day, Starting Mon 4/24/2017, Historical Med      atorvastatin (LIPITOR) 10 mg tablet TAKE 1 TABLET DAILY, Normal      B-D INS SYR ULTRAFINE 1CC/31G 31G X 5/16" 1 ML MISC Inject 1 each under the skin 4 (four) times a day, Starting Tue 4/3/2018, Historical Med      Blood Glucose Monitoring Suppl (ACCU-CHEK APPLE PLUS) w/Device KIT 1 Units by Does not apply route 3 (three) times a day, Starting Mon 4/24/2017, Historical Med      ferrous sulfate 324 (65 Fe) mg Take 1 tablet (324 mg total) by mouth daily before breakfast, Starting Wed 4/18/2018, Normal      gabapentin (NEURONTIN) 600 MG tablet Take 1 tablet (600 mg total) by mouth 3 (three) times a day, Starting Fri 5/25/2018, Normal      glucose blood (ACCU-CHEK APPLE PLUS) test strip 1 Units by In Vitro route 3 (three) times a day, Starting Mon 4/24/2017, Historical Med      HUMALOG 100 UNIT/ML injection INJECT 1 TO 3 UNITS AS DIRECTED 3 TIMES A DAY *DISCARD 28 DAYS AFTER OPENING*, Normal      insulin glargine (BASAGLAR KWIKPEN) 100 units/mL injection pen Inject 14 Units under the skin daily at bedtime, Starting Mon 7/2/2018, Normal      !! Insulin Pen Needle (B-D ULTRAFINE III SHORT PEN) 31G X 8 MM MISC 1 Units by Does not apply route 4 (four) times a day, Starting Tue 4/3/2018, Normal      !!  Insulin Pen Needle 32G X 4 MM MISC by Does not apply route 4 (four) times a day, Starting Fri 3/9/2018, Print      lisinopril (ZESTRIL) 10 mg tablet Take 10 mg by mouth daily, Starting Wed 12/27/2017, Historical Med      LORazepam (ATIVAN) 1 mg tablet Take 1 tablet by mouth 2 (two) times a day as needed, Starting Sun 7/2/2017, Historical Med      metoprolol succinate (TOPROL-XL) 25 mg 24 hr tablet Take 25 mg by mouth daily, Historical Med      metroNIDAZOLE (METROCREAM) 0 75 % cream Apply 1 application topically daily, Starting Wed 11/8/2017, Historical Med      mirtazapine (REMERON) 15 mg tablet Starting Mon 3/19/2018, Historical Med      montelukast (SINGULAIR) 10 mg tablet TAKE 1 TABLET DAILY , Normal      ondansetron (ZOFRAN-ODT) 4 mg disintegrating tablet Take 1 tablet (4 mg total) by mouth every 8 (eight) hours as needed for nausea or vomiting for up to 7 days, Starting Fri 3/2/2018, Until Fri 3/9/2018, Print      pantoprazole (PROTONIX) 40 mg tablet Take 1 tablet (40 mg total) by mouth 2 (two) times a day before meals, Starting Fri 4/13/2018, Normal      promethazine (PHENERGAN) 25 mg tablet Take 1 tablet (25 mg total) by mouth every 6 (six) hours as needed for nausea or vomiting, Starting Fri 3/30/2018, Normal      QUEtiapine (SEROquel) 400 MG tablet Take 400 mg by mouth daily at bedtime, Starting Thu 1/11/2018, Historical Med      torsemide (DEMADEX) 20 mg tablet TAKE 2 TABLET DAILY, Normal      warfarin (COUMADIN) 5 mg tablet TAKE 1-2 TABLETS DAILY OR AS DIRECTED, Normal      HYDROcodone-acetaminophen (NORCO) 7 5-325 mg per tablet Take 1 tablet by mouth every 6 (six) hours as needed for pain Max Daily Amount: 4 tablets, Starting Thu 7/5/2018, Normal      Sulfacetamide Sodium, Acne, 10 % LOTN Starting Thu 2/15/2018, Historical Med       !! - Potential duplicate medications found  Please discuss with provider  No discharge procedures on file  Prior to Admission Medications   Prescriptions Last Dose Informant Patient Reported? Taking?    ACCU-CHEK FASTCLIX LANCETS MISC  Self Yes Yes   Si Units by Does not apply route 3 (three) times a day   B-D INS SYR ULTRAFINE 1CC/31G 31G X 5/16" 1 ML MISC  Self Yes Yes   Sig: Inject 1 each under the skin 4 (four) times a day   Blood Glucose Monitoring Suppl (ACCU-CHEK APPLE PLUS) w/Device KIT  Self Yes Yes   Si Units by Does not apply route 3 (three) times a day   HUMALOG 100 UNIT/ML injection   No No   Sig: INJECT 1 TO 3 UNITS AS DIRECTED 3 TIMES A DAY *DISCARD 28 DAYS AFTER OPENING*   Insulin Pen Needle (B-D ULTRAFINE III SHORT PEN) 31G X 8 MM MISC  Self No No   Si Units by Does not apply route 4 (four) times a day   Insulin Pen Needle 32G X 4 MM MISC  Self No No   Sig: by Does not apply route 4 (four) times a day   LORazepam (ATIVAN) 1 mg tablet  Self Yes No   Sig: Take 1 tablet by mouth 2 (two) times a day as needed   QUEtiapine (SEROquel) 400 MG tablet  Self Yes No   Sig: Take 400 mg by mouth daily at bedtime   atorvastatin (LIPITOR) 10 mg tablet Unknown at Unknown time  No No   Sig: TAKE 1 TABLET DAILY   ferrous sulfate 324 (65 Fe) mg Past Week at Unknown time Self No Yes   Sig: Take 1 tablet (324 mg total) by mouth daily before breakfast   gabapentin (NEURONTIN) 600 MG tablet  Self No No   Sig: Take 1 tablet (600 mg total) by mouth 3 (three) times a day   glucose blood (ACCU-CHEK APPLE PLUS) test strip  Self Yes No   Si Units by In Vitro route 3 (three) times a day   insulin glargine (BASAGLAR KWIKPEN) 100 units/mL injection pen   No No   Sig: Inject 14 Units under the skin daily at bedtime   lisinopril (ZESTRIL) 10 mg tablet  Self Yes No   Sig: Take 10 mg by mouth daily   metoprolol succinate (TOPROL-XL) 25 mg 24 hr tablet  Self Yes No Sig: Take 25 mg by mouth daily   metroNIDAZOLE (METROCREAM) 0 75 % cream  Self Yes No   Sig: Apply 1 application topically daily   mirtazapine (REMERON) 15 mg tablet  Self Yes No   montelukast (SINGULAIR) 10 mg tablet   No No   Sig: TAKE 1 TABLET DAILY  ondansetron (ZOFRAN-ODT) 4 mg disintegrating tablet   No No   Sig: Take 1 tablet (4 mg total) by mouth every 8 (eight) hours as needed for nausea or vomiting for up to 7 days   pantoprazole (PROTONIX) 40 mg tablet  Self No No   Sig: Take 1 tablet (40 mg total) by mouth 2 (two) times a day before meals   promethazine (PHENERGAN) 25 mg tablet  Self No No   Sig: Take 1 tablet (25 mg total) by mouth every 6 (six) hours as needed for nausea or vomiting   torsemide (DEMADEX) 20 mg tablet  Self No No   Sig: TAKE 2 TABLET DAILY   warfarin (COUMADIN) 5 mg tablet   No No   Sig: TAKE 1-2 TABLETS DAILY OR AS DIRECTED      Facility-Administered Medications: None       Portions of the record may have been created with voice recognition software  Occasional wrong word or "sound a like" substitutions may have occurred due to the inherent limitations of voice recognition software  Read the chart carefully and recognize, using context, where substitutions have occurred      Electronically signed by:  Reed De Oliveira

## 2018-08-05 NOTE — ED NOTES
Pt "I want to call the Police  She be starving me and I have had it " Pt stated that he is home bound and cant leave and wants to call the police to let them know that he is being starved "like a dog" by his daughter  Pt lives by himself  Will reach out to case management to have them follow up with the pt        Yanna Jimenez RN  08/05/18 2532

## 2018-08-05 NOTE — ED PROVIDER NOTES
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 year  Patient presents with 1 day of nausea and vomiting  Patient has been unable to keep down his anxiety medication  Patient notes that he has been extremely anxious  Patient is writhing in bed asking for help  Patient admits chills, sweats, abdominal pain, diarrhea  Patient is unable to complete a full ROS due to the anxiety and agitation  Patient denies hematemesis or coffee-ground emesis  Patient has tried Zofran at home without relief  History provided by:  Patient  Vomiting   Severity:  Moderate  Timing:  Constant  Quality:  Stomach contents  Progression:  Worsening  Chronicity:  New  Ineffective treatments:  Antiemetics  Associated symptoms: abdominal pain, chills, cough and diarrhea    Associated symptoms: no sore throat    Risk factors: diabetes        Prior to Admission Medications   Prescriptions Last Dose Informant Patient Reported? Taking?    ACCU-CHEK FASTCLIX LANCETS MISC  Self Yes Yes   Si Units by Does not apply route 3 (three) times a day   B-D INS SYR ULTRAFINE 1CC/31G 31G X 5/16" 1 ML MISC  Self Yes Yes   Sig: Inject 1 each under the skin 4 (four) times a day   Blood Glucose Monitoring Suppl (ACCU-CHEK APPLE PLUS) w/Device KIT  Self Yes Yes   Si Units by Does not apply route 3 (three) times a day   HUMALOG 100 UNIT/ML injection   No No   Sig: INJECT 1 TO 3 UNITS AS DIRECTED 3 TIMES A DAY *DISCARD 28 DAYS AFTER OPENING*   HYDROcodone-acetaminophen ([address]) 7 5-325 mg per tablet   No No   Sig: Take 1 tablet by mouth every 6 (six) hours as needed for pain Max Daily Amount: 4 tablets   Insulin Pen Needle (B-D ULTRAFINE III SHORT PEN) 31G X 8 MM MISC  Self No No   Si Units by Does not apply route 4 (four) times a day   Insulin Pen Needle 32G X 4 MM MISC  Self No No   Sig: by Does not apply route 4 (four) times a day   LORazepam (ATIVAN) 1 mg tablet  Self Yes No   Sig: Take 1 tablet by mouth 2 (two) times a day as needed   QUEtiapine (SEROquel) 400 MG tablet  Self Yes No   Sig: Take 400 mg by mouth daily at bedtime   atorvastatin (LIPITOR) 10 mg tablet Unknown at Unknown time  No No   Sig: TAKE 1 TABLET DAILY   ferrous sulfate 324 (65 Fe) mg Past Week at Unknown time Self No Yes   Sig: Take 1 tablet (324 mg total) by mouth daily before breakfast   gabapentin (NEURONTIN) 600 MG tablet  Self No No   Sig: Take 1 tablet (600 mg total) by mouth 3 (three) times a day   glucose blood (ACCU-CHEK APPLE PLUS) test strip  Self Yes No   Si Units by In Vitro route 3 (three) times a day   insulin glargine (BASAGLAR KWIKPEN) 100 units/mL injection pen   No No   Sig: Inject 14 Units under the skin daily at bedtime   lisinopril (ZESTRIL) 10 mg tablet  Self Yes No   Sig: Take 10 mg by mouth daily   metoprolol succinate (TOPROL-XL) 25 mg 24 hr tablet  Self Yes No   Sig: Take 25 mg by mouth daily   metroNIDAZOLE (METROCREAM) 0 75 % cream  Self Yes No   Sig: Apply 1 application topically daily   mirtazapine (REMERON) 15 mg tablet  Self Yes No   montelukast (SINGULAIR) 10 mg tablet   No No   Sig: TAKE 1 TABLET DAILY     ondansetron (ZOFRAN-ODT) 4 mg disintegrating tablet   No No   Sig: Take 1 tablet (4 mg total) by mouth every 8 (eight) hours as needed for nausea or vomiting for up to 7 days   pantoprazole (PROTONIX) 40 mg tablet  Self No No   Sig: Take 1 tablet (40 mg total) by mouth 2 (two) times a day before meals   promethazine (PHENERGAN) 25 mg tablet  Self No No   Sig: Take 1 tablet (25 mg total) by mouth every 6 (six) hours as needed for nausea or vomiting   torsemide (DEMADEX) 20 mg tablet  Self No No   Sig: TAKE 2 TABLET DAILY   warfarin (COUMADIN) 5 mg tablet   No No   Sig: TAKE 1-2 TABLETS DAILY OR AS DIRECTED      Facility-Administered Medications: None       Past Medical History:   Diagnosis Date    Acute gastritis     last assessed - [id number]    Amputated great toe of left foot ([facility] )     last assessed - [id number]    Anticoagulated on Coumadin     Mechanical MVR    Anxiety     Bipolar 1 disorder ([facility] )     CAD (coronary artery disease)     Chronic kidney disease     left kideny being monitored    Chronic systolic congestive heart failure ([place] [address] ) 4/18/2017    Colitis 04/02/2017    Acute; last assessed - [id number]    COPD (chronic obstructive pulmonary disease) ([place] [address] )     Diabetes mellitus (HCC)     Difficulty urinating     DM type 2 with diabetic peripheral neuropathy [facility])     Dyspepsia     last assessed - [id number]    Hiatal hernia     Hyperlipidemia     Hypertension     Ischemic cardiomyopathy     Myocardial infarction [facility])     Pacemaker     PAD (peripheral artery disease) ([facility])     Rectal bleed     Rotator cuff tear, right     last assessed - [id number]    Vomiting        Past Surgical History:   Procedure Laterality Date    AMPUTATION ABOVE KNEE (AKA) Left 8/24/2017    Procedure: AMPUTATION ABOVE KNEE (AKA); Surgeon: Kenisha Brownlee MD;  [facility];  Service: Vascular; last assessed - [id number]   [facility]      last assessed - [id number]    CARDIAC DEFIBRILLATOR PLACEMENT      CORONARY ARTERY BYPASS GRAFT      ESOPHAGOGASTRODUODENOSCOPY N/A 4/20/2017    Procedure: ESOPHAGOGASTRODUODENOSCOPY (EGD); Surgeon: Loretta Vargas MD;  Location:  MAIN OR;  Service:     ESOPHAGOGASTRODUODENOSCOPY N/A 5/26/2017    Procedure: ESOPHAGOGASTRODUODENOSCOPY (EGD);   Surgeon: Pedro Benjamin MD;  Location:  MAIN OR;  Service:    Daniela Herman ESOPHAGOGASTRODUODENOSCOPY N/A 11/16/2017    Procedure: ESOPHAGOGASTRODUODENOSCOPY (EGD); Surgeon: Kanchan Avilez MD;  Location: [facility]; Service: Gastroenterology    FOOT AMPUTATION THROUGH METATARSAL Left     MTP first toe; last assessed - 03Aug2017   Reyes Coyle MITRAL VALVE REPLACEMENT      Mechanical; last assessed - 27Feb2017    ROTATOR CUFF REPAIR      last assessed - [id number]    TOE AMPUTATION Left 7/21/2017    Procedure: PARTIAL FIRST RAY AMPUTATION; EXCISION OF WOUND W/ TOE FLAP CLOSURE;  Surgeon: Laz Subramanian DPM;  Location[facility];  Service: [address] [place] [address] Road,[address]      last assessed - [id number]    VAC DRESSING APPLICATION Left 4/09/0583    Procedure: VAC application;  Surgeon: Laz Subramanian DPM;  Location[facility];  Service: Podiatry    WOUND DEBRIDEMENT Left 8/19/2017    Procedure: wound debridement with washout; resection of left 1st metatarsal;  Surgeon: Laz Subramanian DPM;  [facility];  Service: Podiatry       Family History   Problem Relation Age of Onset    Hypertension Mother     Diabetes Mother         Diabetes mellitus    Migraines Mother         Migraine headaches    Diabetes Father     Bipolar disorder Father     Hypertension Father     Melanoma Father         Malignant melanoma    Migraines Sister     Heart disease Maternal Grandfather         cardiac disorder    No Known Problems Paternal Grandfather     Bipolar disorder Daughter     Migraines Daughter         Migraine headaches    Arthritis Family     Heart disease Family         cardiac disorder    Depression Family      I have reviewed and agree with the history as documented  Social History   Substance Use Topics    Smoking status: Current Every Day Smoker     Packs/day: 0 50     Types: Cigarettes    Smokeless tobacco: Never Used    Alcohol use No        Review of Systems   Constitutional: Positive for chills and fatigue  HENT: Negative for sore throat  Eyes: Negative for pain     Respiratory: Positive for cough  Negative for chest tightness and shortness of breath  Cardiovascular: Negative for chest pain  Gastrointestinal: Positive for abdominal pain, diarrhea and vomiting  Genitourinary: Negative for hematuria  Skin: Negative for rash and wound  Neurological: Positive for dizziness, weakness and numbness  Psychiatric/Behavioral: Positive for agitation  Physical Exam  ED Triage Vitals   Temperature Pulse Respirations Blood Pressure SpO2   08/05/18 1333 08/05/18 1330 08/05/18 1330 08/05/18 1330 08/05/18 1330   99 °F (37 2 °C) 78 (!) 24 156/88 98 %      Temp Source Heart Rate Source Patient Position - Orthostatic VS BP Location FiO2 (%)   08/05/18 1333 08/05/18 1333 08/05/18 1333 08/05/18 1333 --   Oral Monitor Sitting Right arm       Pain Score       08/05/18 1333       6           Orthostatic Vital Signs  Vitals:    08/05/18 1345 08/05/18 1439 08/05/18 1549 08/05/18 1758   BP:  (!) 183/85 158/95 147/67   Pulse: 82 78 78 84   Patient Position - Orthostatic VS:  Lying Lying Sitting       Physical Exam   Constitutional: He appears well-developed and well-nourished  No distress  HENT:   Head: Normocephalic and atraumatic  Right Ear: External ear normal    Left Ear: External ear normal    Eyes: Conjunctivae and EOM are normal    Neck: No JVD present  No tracheal deviation present  Cardiovascular: Normal rate, regular rhythm, normal heart sounds and intact distal pulses  No murmur heard  Pulmonary/Chest: Breath sounds normal  No stridor  No respiratory distress  He has no wheezes  He has no rales  Abdominal: Soft  He exhibits no distension and no mass  There is tenderness (diffuse)  There is no rebound and no guarding  Genitourinary:   Genitourinary Comments: Deferred   Musculoskeletal: He exhibits no edema, tenderness or deformity  Left sided AKA without erythema or wound   Neurological: He exhibits normal muscle tone   Coordination normal    Phantom limb pain     Skin: Skin is warm and dry  Capillary refill takes less than 2 seconds  No rash noted  He is not diaphoretic  No erythema  No pallor  Psychiatric: He has a normal mood and affect   His behavior is normal  Judgment and thought content normal        ED Medications  Medications   ondansetron (ZOFRAN) injection 4 mg (4 mg Intravenous Given 8/5/18 1346)   LORazepam (ATIVAN) 2 mg/mL injection 1 mg (1 mg Intravenous Given 8/5/18 1436)   sodium chloride 0 9 % bolus 500 mL (0 mL Intravenous Stopped 8/5/18 1519)   aluminum-magnesium hydroxide-simethicone (MYLANTA) 200-200-20 mg/5 mL oral suspension 30 mL (30 mL Oral Given 8/5/18 1519)   lidocaine viscous (XYLOCAINE) 2 % mucosal solution 15 mL (15 mL Swish & Swallow Given 8/5/18 1519)   iohexol (OMNIPAQUE) 350 MG/ML injection (SINGLE-DOSE) 100 mL (100 mL Intravenous Given 8/5/18 1628)   haloperidol lactate (HALDOL) injection 5 mg (5 mg Intravenous Given by Other 8/5/18 1758)       Diagnostic Studies  Results Reviewed     Procedure Component Value Units Date/Time    Urine Microscopic [[id number]]  (Abnormal) Collected:  08/05/18 1713    Lab Status:  Final result Specimen:  Urine from Urine, Clean Catch Updated:  08/05/18 1713     RBC, UA 4-10 (A) /hpf      WBC, UA None Seen /hpf      Epithelial Cells None Seen /hpf      Bacteria, UA None Seen /hpf      Hyaline Casts, UA 5-10 (A) /lpf     ED Urine Macroscopic [[id number]]  (Abnormal) Collected:  08/05/18 1713    Lab Status:  Final result Specimen:  Urine Updated:  08/05/18 1702     Color, UA Dulce Maria     Clarity, UA Clear     pH, UA 5 5     Leukocytes, UA Negative     Nitrite, UA Negative     Protein, UA >=300 (A) mg/dl      Glucose,  (1/10%) (A) mg/dl      Ketones, UA 15 (1+) (A) mg/dl      Urobilinogen, UA 0 2 E U /dl      Bilirubin, UA Negative     Blood, UA Moderate (A)     Specific Tell, UA 1 025    Narrative:       CLINITEK RESULT    Lactic acid, plasma [[id number]]  (Abnormal) Collected:  08/05/18 1449    Lab Status:  Final result Specimen:  Blood from Arm, Right Updated:  08/05/18 1539     LACTIC ACID 2 8 (HH) mmol/L     Narrative:         Result may be elevated if tourniquet was used during collection  CBC and differential [[id number]]  (Abnormal) Collected:  08/05/18 1337    Lab Status:  Final result Specimen:  Blood from Arm, Right Updated:  08/05/18 1429     WBC 12 26 (H) Thousand/uL      RBC 5 22 Million/uL      Hemoglobin 15 0 g/dL      Hematocrit 46 1 %      MCV 88 fL      MCH 28 7 pg      MCHC 32 5 g/dL      RDW 14 5 %      MPV 12 7 fL      Platelets 493 Thousands/uL      nRBC 0 /100 WBCs      Neutrophils Relative 90 (H) %      Immat GRANS % 0 %      Lymphocytes Relative 7 (L) %      Monocytes Relative 3 (L) %      Eosinophils Relative 0 %      Basophils Relative 0 %      Neutrophils Absolute 10 85 (H) Thousands/µL      Immature Grans Absolute 0 05 Thousand/uL      Lymphocytes Absolute 0 90 Thousands/µL      Monocytes Absolute 0 40 Thousand/µL      Eosinophils Absolute 0 03 Thousand/µL      Basophils Absolute 0 03 Thousands/µL     Narrative: This is an appended report  These results have been appended to a previously verified report      APTT [[id number]]  (Abnormal) Collected:  08/05/18 1337    Lab Status:  Final result Specimen:  Blood from Arm, Right Updated:  08/05/18 1412     PTT 37 (H) seconds     Protime-INR [[id number]]  (Abnormal) Collected:  08/05/18 1337    Lab Status:  Final result Specimen:  Blood from Arm, Right Updated:  08/05/18 1412     Protime 23 1 (H) seconds      INR 2 04 (H)    Troponin I [[id number]]  (Normal) Collected:  08/05/18 1337    Lab Status:  Final result Specimen:  Blood from Arm, Right Updated:  08/05/18 1403     Troponin I <0 02 ng/mL     Comprehensive metabolic panel [[id number]]  (Abnormal) Collected:  08/05/18 1337    Lab Status:  Final result Specimen:  Blood from Arm, Right Updated:  08/05/18 1402     Sodium 139 mmol/L      Potassium 3 7 mmol/L      Chloride 107 mmol/L      CO2 23 mmol/L Anion Gap 9 mmol/L      BUN 11 mg/dL      Creatinine 1 06 mg/dL      Glucose 198 (H) mg/dL      Calcium 9 6 mg/dL      AST 19 U/L      ALT 23 U/L      Alkaline Phosphatase 147 (H) U/L      Total Protein 8 6 (H) g/dL      Albumin 4 1 g/dL      Total Bilirubin 0 93 mg/dL      eGFR 76 ml/min/1 73sq m     Narrative:         National Kidney Disease Education Program recommendations are as follows:  GFR calculation is accurate only with a steady state creatinine  Chronic Kidney disease less than 60 ml/min/1 73 sq  meters  Kidney failure less than 15 ml/min/1 73 sq  meters  Lipase [[id number]]  (Normal) Collected:  08/05/18 1337    Lab Status:  Final result Specimen:  Blood from Arm, Right Updated:  08/05/18 1402     Lipase 102 u/L     Fingerstick Glucose (POCT) [[id number]]  (Abnormal) Collected:  08/05/18 1332    Lab Status:  Final result Updated:  08/05/18 1335     POC Glucose 197 (H) mg/dl                  X-ray chest 2 views   ED Interpretation by Mellissa Boyce MD (08/05 1423)   Abnormal   The cxr was ordered by resident and interpreted by me independently  On my read, it appears:   - RML patchiness new since previous      Final Result by Adelaida Miguel MD (08/06 0486)      Prominent hilar and infrahilar vasculature likely accentuated by portable AP technique and mild rotation  Correlate with clinical parameters for mild central vascular congestion or chronic pulmonary arterial hypertension  Otherwise, no definitive radiographic evidence of acute intrathoracic process  Workstation performed: [id number]         CT chest abdomen pelvis w contrast   ED Interpretation by Mellissa Boyce MD (08/05 1641)   CT ordered by myself and the report from radiologist has been reviewed  Final Result by Martin Shaw MD (08/05 8325)      Again seen is severe emphysema, without acute pulmonary abnormalities  No acute pathology in the abdomen and pelvis              Workstation performed: [id number]               Procedures  ECG 12 Lead Documentation  Date/Time: 8/5/2018 6:04 PM  Performed by: Mirza Merchant by: Ricky Hernandez     Indications / Diagnosis:  Abdominal pain/ cardiac history  ECG reviewed by me, the ED Provider: yes    Patient location:  ED  Previous ECG:     Previous ECG:  Compared to current    Comparison ECG info:  02-Mar-18    Similarity:  No change    Comparison to cardiac monitor: Yes    Interpretation:     Interpretation: non-specific    Rate:     ECG rate assessment: normal    Rhythm:     Rhythm: paced    Pacing:     Capture:  Complete    Type of pacing:  Ventricular  Conduction:     Conduction: abnormal      Abnormal conduction: non-specific intraventricular conduction delay    ST segments:     ST segments:  Non-specific  T waves:     T waves: non-specific            Phone Consults  ED Phone Contact    ED Course  ED Course as of Aug 06 1549   Sun Aug 05, 2018   1543 X-ray chest 2 views (!)                         Initial Sepsis Screening     Row Name 08/05/18 1651                Is the patient's history suggestive of a new or worsening infection? No  -MD        Suspected source of infection          Are two or more of the following signs & symptoms of infection both present and new to the patient?         Indicate SIRS criteria Leukocytosis (WBC > 35778 IJL)  -MD        If the answer is yes to both questions, suspicion of sepsis is present          If severe sepsis is present AND tissue hypoperfusion perists in the hour after fluid resuscitation or lactate > 4, the patient meets criteria for SEPTIC SHOCK          Are any of the following organ dysfunction criteria present within 6 hours of suspected infection and SIRS criteria that are NOT considered to be chronic conditions?  No  -MD        Organ dysfunction          Date of presentation of severe sepsis          Time of presentation of severe sepsis          Tissue hypoperfusion persists in the hour after crystalloid fluid administration, evidenced, by either:          Was hypotension present within one hour of the conclusion of crystalloid fluid administration?         Date of presentation of septic shock          Time of presentation of septic shock            User Key  (r) = Recorded By, (t) = Taken By, (c) = Cosigned By    [address] Name Provider Type    MD Bobby Valenzuela, DO Resident                  MDM  Number of Diagnoses or Management Options  Abdominal pain: new and requires workup  Nausea: new and requires workup  Vomiting: new and requires workup  Diagnosis management comments:  58-year-old male who has a complicated previous medical history presenting with nausea and vomiting x1 day  Patient also notes chills, sweating, abdominal pain, diarrhea  Will set this patient with abdominal pain labs including lipase, CBC, CMP, chest x-ray, ECG, troponin  Patient's nausea and vomiting will be treated with Zofran, Reglan, GI cocktail  Will attempt po challenge  If patient continues to have abdominal pain will proceed with a CT abdomen  Differential diagnosis could include pancreatitis, diabetic ketoacidosis, Joan-Perez tear unlikely in this circumstance as no blood in emesis, viral gastroenteritis  Patient admits that anxiety is a large component of today's presentation  Patient is minute unable to keep down his Ativan  Next item will treat patient with 1 mg Ativan IV     3:27 PM   On reassessment patient's nausea is decreased  Patient's anxiety also decreased  Patient still has abdominal pain  Will proceed with p o  challenge at this point  Slight leukocytosis with left shift,  Alk-phos minimally elevated, other labs grossly normal     3:41 PM  Lactate elevated to 2 8  Ordered CT chest, abdomen, pelvis with contrast     5:46 PM  CT did not reveal an acute pathology  Patient still unable to tolerate po intake after zofran and reglan   Will try     7:18 PM  After haldol patient is no longer nauseous though he admits to not wanting to eat food  Patient tolerates PO intake of fluids and food  Patient is ok with being discharged home  Will D/C home with script for Reglan  Patient to follow up with PCP on Monday  Patient to come back to the ED if vomiting blood, fevers, BRBPR, chest pain, or other worrisome symptoms  Amount and/or Complexity of Data Reviewed  Clinical lab tests: ordered and reviewed  Tests in the radiology section of CPT®: ordered and reviewed  Tests in the medicine section of CPT®: ordered and reviewed  Review and summarize past medical records: yes    Risk of Complications, Morbidity, and/or Mortality  Presenting problems: moderate  Diagnostic procedures: moderate  Management options: moderate    Patient Progress  Patient progress: improved    CritCare Time    Disposition  Final diagnoses:   Nausea   Vomiting   Abdominal pain     Time reflects when diagnosis was documented in both MDM as applicable and the Disposition within this note     Time User Action Codes Description Comment    8/5/2018  7:19 PM Yazan Age Add [R11 0] Nausea     8/5/2018  7:19 PM Harper Iglesia [R11 10] Vomiting     8/5/2018  7:20 PM Harper Iglesia [R10 9] Abdominal pain       ED Disposition     ED Disposition Condition Comment    Discharge  [facility] discharge to home/self care      Condition at discharge: Stable        Follow-up Information     Follow up With Specialties Details Why Contact Info Britt Rubinstein, DO Family Medicine In 2 days  [address] [facility]  [phone]            Discharge Medication List as of 8/5/2018  7:25 PM      START taking these medications    Details   metoclopramide (REGLAN) 10 mg tablet Take 1 tablet (10 mg total) by mouth every 6 (six) hours, Starting Sun 8/5/2018, Print         CONTINUE these medications which have NOT CHANGED    Details   ACCU-CHEK FASTCLIX LANCETS MISC 1 Units by Does not apply route 3 (three) times a day, Starting Mon 4/24/2017, Historical Med      atorvastatin (LIPITOR) 10 mg tablet TAKE 1 TABLET DAILY, Normal      B-D INS SYR ULTRAFINE 1CC/31G 31G X 5/16" 1 ML MISC Inject 1 each under the skin 4 (four) times a day, Starting Tue 4/3/2018, Historical Med      Blood Glucose Monitoring Suppl (ACCU-CHEK APPLE PLUS) w/Device KIT 1 Units by Does not apply route 3 (three) times a day, Starting Mon 4/24/2017, Historical Med      ferrous sulfate 324 (65 Fe) mg Take 1 tablet (324 mg total) by mouth daily before breakfast, Starting Wed 4/18/2018, Normal      gabapentin (NEURONTIN) 600 MG tablet Take 1 tablet (600 mg total) by mouth 3 (three) times a day, Starting Fri 5/25/2018, Normal      glucose blood (ACCU-CHEK APPLE PLUS) test strip 1 Units by In Vitro route 3 (three) times a day, Starting Mon 4/24/2017, Historical Med      HUMALOG 100 UNIT/ML injection INJECT 1 TO 3 UNITS AS DIRECTED 3 TIMES A DAY *DISCARD 28 DAYS AFTER OPENING*, Normal      HYDROcodone-acetaminophen (NORCO) 7 5-325 mg per tablet Take 1 tablet by mouth every 6 (six) hours as needed for pain Max Daily Amount: 4 tablets, Starting Thu 7/5/2018, Normal      insulin glargine (BASAGLAR KWIKPEN) 100 units/mL injection pen Inject 14 Units under the skin daily at bedtime, Starting Mon 7/2/2018, Normal      !! Insulin Pen Needle (B-D ULTRAFINE III SHORT PEN) 31G X 8 MM MISC 1 Units by Does not apply route 4 (four) times a day, Starting Tue 4/3/2018, Normal      !!  Insulin Pen Needle 32G X 4 MM MISC by Does not apply route 4 (four) times a day, Starting Fri 3/9/2018, Print      lisinopril (ZESTRIL) 10 mg tablet Take 10 mg by mouth daily, Starting Wed 12/27/2017, Historical Med      LORazepam (ATIVAN) 1 mg tablet Take 1 tablet by mouth 2 (two) times a day as needed, Starting Sun 7/2/2017, Historical Med      metoprolol succinate (TOPROL-XL) 25 mg 24 hr tablet Take 25 mg by mouth daily, Historical Med      metroNIDAZOLE (METROCREAM) 0 75 % cream Apply 1 application topically daily, Starting Wed 11/8/2017, Historical Med      mirtazapine (REMERON) 15 mg tablet Starting Mon 3/19/2018, Historical Med      montelukast (SINGULAIR) 10 mg tablet TAKE 1 TABLET DAILY , Normal      ondansetron (ZOFRAN-ODT) 4 mg disintegrating tablet Take 1 tablet (4 mg total) by mouth every 8 (eight) hours as needed for nausea or vomiting for up to 7 days, Starting Fri 3/2/2018, Until Fri 3/9/2018, Print      pantoprazole (PROTONIX) 40 mg tablet Take 1 tablet (40 mg total) by mouth 2 (two) times a day before meals, Starting Fri 4/13/2018, Normal      promethazine (PHENERGAN) 25 mg tablet Take 1 tablet (25 mg total) by mouth every 6 (six) hours as needed for nausea or vomiting, Starting Fri 3/30/2018, Normal      QUEtiapine (SEROquel) 400 MG tablet Take 400 mg by mouth daily at bedtime, Starting Thu 1/11/2018, Historical Med      torsemide (DEMADEX) 20 mg tablet TAKE 2 TABLET DAILY, Normal      warfarin (COUMADIN) 5 mg tablet TAKE 1-2 TABLETS DAILY OR AS DIRECTED, Normal      Sulfacetamide Sodium, Acne, 10 % LOTN Starting Thu 2/15/2018, Historical Med       !! - Potential duplicate medications found  Please discuss with provider  No discharge procedures on file  ED Provider  Attending physically available and evaluated Hernán Brunner I managed the patient along with the ED Attending      Electronically Signed by         Ruba Velasco,   08/05/18 [address] [place] [address], DO  08/06/18 [facility]

## 2018-08-05 NOTE — ED NOTES
Pt will keep crying intermittent   When asked what is bothering them, pt "I got nerve issues M'am"     Yun Hastings, YISSEL  08/05/18 [id number]

## 2018-08-05 NOTE — ED NOTES
Patient transported to [facility] [place], RN  08/05/18 6738

## 2018-08-05 NOTE — ED NOTES
Pt "I do this sometimes; I dont eat and I starve myself to death  There aint no food that I want to eat or food in the house sometimes  I dont want to get my daughter in trouble or nothing, but there are times she just dont care  I will just be by myself  And I dont know why someone dont want to eat  And no one can tell me anything  I dont know why I am so sick " Pt admits to being lonely and not wanting to be left alone        Derek Fermin RN  08/05/18 [id number]

## 2018-08-05 NOTE — ED NOTES
Pt moving all over the bed and attempting to move to the end of the bed  PT "That food smells horrible  All food smells horrible, that's why I dont want to eat   I just want some ice cubes "      Angie Su RN  08/05/18 [id number]

## 2018-08-06 ENCOUNTER — HOSPITAL ENCOUNTER (EMERGENCY)
Facility: HOSPITAL | Age: 58
Discharge: HOME | End: 2018-08-06
Attending: EMERGENCY MEDICINE | Admitting: EMERGENCY MEDICINE
Payer: COMMERCIAL

## 2018-08-06 ENCOUNTER — TELEPHONE (OUTPATIENT)
Dept: FAMILY MEDICINE CLINIC | Facility: CLINIC | Age: 58
End: 2018-08-06

## 2018-08-06 VITALS
TEMPERATURE: 97.6 F | RESPIRATION RATE: 22 BRPM | HEART RATE: 65 BPM | BODY MASS INDEX: 23.86 KG/M2 | SYSTOLIC BLOOD PRESSURE: 141 MMHG | DIASTOLIC BLOOD PRESSURE: 63 MMHG | OXYGEN SATURATION: 95 % | HEIGHT: 73 IN | WEIGHT: 180 LBS

## 2018-08-06 DIAGNOSIS — L03.90 CELLULITIS, UNSPECIFIED CELLULITIS SITE: Primary | ICD-10-CM

## 2018-08-06 DIAGNOSIS — R55 SYNCOPAL EPISODES: Primary | ICD-10-CM

## 2018-08-06 DIAGNOSIS — R79.89 ELEVATED LACTIC ACID LEVEL: ICD-10-CM

## 2018-08-06 LAB
ALBUMIN SERPL BCP-MCNC: 3.6 G/DL (ref 3.5–5)
ALP SERPL-CCNC: 123 U/L (ref 46–116)
ALT SERPL W P-5'-P-CCNC: 22 U/L (ref 12–78)
ANION GAP SERPL CALCULATED.3IONS-SCNC: 14 MMOL/L (ref 4–13)
AST SERPL W P-5'-P-CCNC: 23 U/L (ref 5–45)
ATRIAL RATE: 75 BPM
BASOPHILS # BLD AUTO: 0.04 THOUSANDS/ΜL (ref 0–0.1)
BASOPHILS NFR BLD AUTO: 0 % (ref 0–1)
BILIRUB SERPL-MCNC: 0.8 MG/DL (ref 0.2–1)
BUN SERPL-MCNC: 16 MG/DL (ref 5–25)
CALCIUM SERPL-MCNC: 9.2 MG/DL (ref 8.3–10.1)
CHLORIDE SERPL-SCNC: 105 MMOL/L (ref 100–108)
CO2 SERPL-SCNC: 22 MMOL/L (ref 21–32)
CREAT SERPL-MCNC: 1.24 MG/DL (ref 0.6–1.3)
DEPRECATED D DIMER PPP: <270 NG/ML (FEU) (ref 0–424)
EOSINOPHIL # BLD AUTO: 0.06 THOUSAND/ΜL (ref 0–0.61)
EOSINOPHIL NFR BLD AUTO: 0 % (ref 0–6)
ERYTHROCYTE [DISTWIDTH] IN BLOOD BY AUTOMATED COUNT: 14.9 % (ref 11.6–15.1)
GFR SERPL CREATININE-BSD FRML MDRD: 63 ML/MIN/1.73SQ M
GLUCOSE SERPL-MCNC: 196 MG/DL (ref 65–140)
GLUCOSE SERPL-MCNC: 196 MG/DL (ref 65–140)
HCT VFR BLD AUTO: 37.7 % (ref 36.5–49.3)
HGB BLD-MCNC: 12.2 G/DL (ref 12–17)
IMM GRANULOCYTES # BLD AUTO: 0.09 THOUSAND/UL (ref 0–0.2)
IMM GRANULOCYTES NFR BLD AUTO: 1 % (ref 0–2)
LACTATE SERPL-SCNC: 3.1 MMOL/L (ref 0.5–2)
LACTATE SERPL-SCNC: 3.8 MMOL/L (ref 0.5–2)
LYMPHOCYTES # BLD AUTO: 1.22 THOUSANDS/ΜL (ref 0.6–4.47)
LYMPHOCYTES NFR BLD AUTO: 8 % (ref 14–44)
MCH RBC QN AUTO: 28.5 PG (ref 26.8–34.3)
MCHC RBC AUTO-ENTMCNC: 32.4 G/DL (ref 31.4–37.4)
MCV RBC AUTO: 88 FL (ref 82–98)
MONOCYTES # BLD AUTO: 1 THOUSAND/ΜL (ref 0.17–1.22)
MONOCYTES NFR BLD AUTO: 6 % (ref 4–12)
NEUTROPHILS # BLD AUTO: 13.43 THOUSANDS/ΜL (ref 1.85–7.62)
NEUTS SEG NFR BLD AUTO: 85 % (ref 43–75)
NRBC BLD AUTO-RTO: 0 /100 WBCS
P AXIS: 76 DEGREES
PLATELET # BLD AUTO: 180 THOUSANDS/UL (ref 149–390)
PMV BLD AUTO: 12.7 FL (ref 8.9–12.7)
POTASSIUM SERPL-SCNC: 3.3 MMOL/L (ref 3.5–5.3)
PR INTERVAL: 210 MS
PROT SERPL-MCNC: 7.3 G/DL (ref 6.4–8.2)
QRS AXIS: 80 DEGREES
QRSD INTERVAL: 126 MS
QT INTERVAL: 368 MS
QTC INTERVAL: 410 MS
RBC # BLD AUTO: 4.28 MILLION/UL (ref 3.88–5.62)
SODIUM SERPL-SCNC: 141 MMOL/L (ref 136–145)
T WAVE AXIS: 236 DEGREES
TROPONIN I SERPL-MCNC: 0.02 NG/ML
VENTRICULAR RATE: 75 BPM
WBC # BLD AUTO: 15.84 THOUSAND/UL (ref 4.31–10.16)

## 2018-08-06 PROCEDURE — 83605 ASSAY OF LACTIC ACID: CPT | Performed by: PHYSICIAN ASSISTANT

## 2018-08-06 PROCEDURE — 87040 BLOOD CULTURE FOR BACTERIA: CPT | Performed by: PHYSICIAN ASSISTANT

## 2018-08-06 PROCEDURE — 85379 FIBRIN DEGRADATION QUANT: CPT | Performed by: PHYSICIAN ASSISTANT

## 2018-08-06 PROCEDURE — 96360 HYDRATION IV INFUSION INIT: CPT

## 2018-08-06 PROCEDURE — 84484 ASSAY OF TROPONIN QUANT: CPT | Performed by: PHYSICIAN ASSISTANT

## 2018-08-06 PROCEDURE — 93010 ELECTROCARDIOGRAM REPORT: CPT | Performed by: INTERNAL MEDICINE

## 2018-08-06 PROCEDURE — 36415 COLL VENOUS BLD VENIPUNCTURE: CPT | Performed by: PHYSICIAN ASSISTANT

## 2018-08-06 PROCEDURE — 93005 ELECTROCARDIOGRAM TRACING: CPT

## 2018-08-06 PROCEDURE — 96361 HYDRATE IV INFUSION ADD-ON: CPT

## 2018-08-06 PROCEDURE — 82948 REAGENT STRIP/BLOOD GLUCOSE: CPT

## 2018-08-06 PROCEDURE — 85025 COMPLETE CBC W/AUTO DIFF WBC: CPT | Performed by: PHYSICIAN ASSISTANT

## 2018-08-06 PROCEDURE — 80053 COMPREHEN METABOLIC PANEL: CPT | Performed by: PHYSICIAN ASSISTANT

## 2018-08-06 PROCEDURE — 99284 EMERGENCY DEPT VISIT MOD MDM: CPT

## 2018-08-06 RX ORDER — POTASSIUM CHLORIDE 20 MEQ/1
20 TABLET, EXTENDED RELEASE ORAL ONCE
Status: COMPLETED | OUTPATIENT
Start: 2018-08-06 | End: 2018-08-06

## 2018-08-06 RX ORDER — SULFACETAMIDE SODIUM 100 MG/ML
LOTION TOPICAL
Qty: 118 ML | Refills: 3 | Status: SHIPPED | OUTPATIENT
Start: 2018-08-06 | End: 2018-10-08 | Stop reason: ALTCHOICE

## 2018-08-06 RX ORDER — NICOTINE 21 MG/24HR
21 PATCH, TRANSDERMAL 24 HOURS TRANSDERMAL ONCE
Status: DISCONTINUED | OUTPATIENT
Start: 2018-08-06 | End: 2018-08-06 | Stop reason: HOSPADM

## 2018-08-06 RX ADMIN — SODIUM CHLORIDE 2400 ML: 0.9 INJECTION, SOLUTION INTRAVENOUS at 14:30

## 2018-08-06 RX ADMIN — POTASSIUM CHLORIDE 20 MEQ: 1500 TABLET, EXTENDED RELEASE ORAL at 14:29

## 2018-08-06 RX ADMIN — NICOTINE 21 MG: 21 PATCH, EXTENDED RELEASE TRANSDERMAL at 15:32

## 2018-08-06 NOTE — ED NOTES
Spoke with the pt's daughter on the phone; the earliest she can  her father is "around 2200"  Daughter does not have any other means of getting him home, she did not want to contact the [employer] service to provide her credit card number to them to transport  Pt upset and anxious about having to wait        Tre Mata RN  08/05/18 2006

## 2018-08-06 NOTE — DISCHARGE INSTRUCTIONS
Syncope   AMBULATORY CARE:   Syncope  is also called fainting or passing out  Syncope is a sudden, temporary loss of consciousness, followed by a fall from a standing or sitting position  Syncope ranges from not serious to a sign of a more serious condition that needs to be treated  You can control some health conditions that cause syncope  Your healthcare providers can help you create a plan to manage syncope and prevent episodes  Signs and symptoms that may occur before syncope include the following:   · Cold, clammy, and sweaty skin     · Fast breathing and a racing, pounding heartbeat     · Feeling more tired than usual     · Nausea, a warm feeling, and sweating    · A headache, or feeling lightheaded or dizzy    · Tingling sensation or numbness     · Spots in front of your eyes, blurred vision, or double vision  Seek care immediately if:   · You are bleeding because you hit your head when you fainted  · You suddenly have double vision, difficulty speaking, numbness, and cannot move your arms or legs  · You have chest pain and trouble breathing  · You vomit blood or material that looks like coffee grounds  · You see blood in your bowel movement  Contact your healthcare provider if:   · You have new or worsening symptoms  · You have another syncope episode  · You have a headache, fast heartbeat, or feel too dizzy to stand up  · You have questions or concerns about your condition or care  Treatment for syncope  depends on the cause of your syncope  To prevent syncope from happening again, you may  need any of the following:  · Medicines  may be needed to treat any medical conditions that are causing your syncope  These may include medicines to help your heart pump strongly and regularly  Your healthcare provider may also make changes to any medicines that are causing syncope  · Tilt training  involves training yourself to stand for 10 to 30 minutes each day against a wall   This helps your body decrease the effects of posture changes and reduces the number of fainting spells  Manage syncope:   · Keep a record of your syncope episodes  Include your symptoms and your activity before and after the episode  The record can help your healthcare provider find the cause of your syncope and help you manage episodes  · Sit or lie down when needed  This includes when you feel dizzy, your throat is getting tight, and your vision changes  Raise your legs above the level of your heart  · Take slow, deep breaths if you start to breathe faster with anxiety or fear  This can help decrease dizziness and the feeling that you might faint  · Check your blood pressure often  This is important if you take medicine to lower your blood pressure  Check your blood pressure when you are lying down and when you are standing  Ask how often to check during the day  Keep a record of your blood pressure numbers  Your healthcare provider may use the record to help plan your treatment  Prevent a syncope episode:   · Move slowly and let yourself get used to one position before you move to another position  This is very important when you change from a lying or sitting position to a standing position  Take some deep breaths before you stand up from a lying position  Stand up slowly  Sudden movements may cause a fainting spell  Sit on the side of the bed or couch for a few minutes before you stand up  If you are on bedrest, try to be upright for about 2 hours each day, or as directed  Do not lock your legs if you are standing for a long period of time  Move your legs and bend your knees to keep blood flowing  · Follow your healthcare provider's recommendations  Your provider may  recommend that you drink more liquids to prevent dehydration  You may also need to have more salt to keep your blood pressure from dropping too low and causing syncope   Your provider will tell you how much liquid and sodium to have each day  · Watch for signs of low blood sugar  These include hunger, nervousness, sweating, and fast or fluttery heartbeats  Talk with your healthcare provider about ways to keep your blood sugar level steady  · Do not strain if you are constipated  You may faint if you strain to have a bowel movement  Walking is the best way to get your bowels moving  Eat foods high in fiber to make it easier to have a bowel movement  Good examples are high-fiber cereals, beans, vegetables, and whole-grain breads  Prune juice may help make bowel movements softer  · Be careful in hot weather  Heat can cause a syncope episode  Limit activity done outside on hot days  Physical activity in hot weather can lead to dehydration  This can cause an episode  Follow up with your healthcare provider as directed:  Write down your questions so you remember to ask them during your visits  © 2017 [address]  Information is for End User's use only and may not be sold, redistributed or otherwise used for commercial purposes  All illustrations and images included in CareNotes® are the copyrighted property of [employer]  or Jemal Felipe  The above information is an  only  It is not intended as medical advice for individual conditions or treatments  Talk to your doctor, nurse or pharmacist before following any medical regimen to see if it is safe and effective for you

## 2018-08-06 NOTE — ED PROVIDER NOTES
History  Chief Complaint   Patient presents with    Syncope     Presents with report of syncopal episode less than 1 hour ago while sitting in car, leaned over against door, "out for a few seconds", denies pain, Seen here last night for few days N/V and diarrhea, no recent fevers  61 yo male w/ hx of CKD, CAD, COPD, HTN, IDDM, CHF w/ 40% EF, mitral valve replacement on anticoagulation and L AKA presents to the Emergency Department for evaluation after brief syncopal episode earlier  He was riding in the passenger seat of a vehicle, driven by his daughter, when he experienced a sudden syncopal episode lasting "one minute" (per daughter)  He did not appear to have any generalized or focal myoclonic activity  Of note, he was evaluated at [facility] yesterday for N/V  Labs remarkable for lactate of 2 8, underwent CT chest/abdomen/pelvis which was unremarkable  Pt states N/V has resolved as of this AM              Prior to Admission Medications   Prescriptions Last Dose Informant Patient Reported? Taking?    ACCU-CHEK FASTCLIX LANCETS MISC  Self Yes No   Si Units by Does not apply route 3 (three) times a day   B-D INS SYR ULTRAFINE 1CC/31G 31G X 5/16" 1 ML MISC  Self Yes No   Sig: Inject 1 each under the skin 4 (four) times a day   Blood Glucose Monitoring Suppl (ACCU-CHEK APPLE PLUS) w/Device KIT  Self Yes No   Si Units by Does not apply route 3 (three) times a day   HUMALOG 100 UNIT/ML injection   No No   Sig: INJECT 1 TO 3 UNITS AS DIRECTED 3 TIMES A DAY *DISCARD 28 DAYS AFTER OPENING*   HYDROcodone-acetaminophen (NORCO) 7 5-325 mg per tablet   No No   Sig: Take 1 tablet by mouth every 6 (six) hours as needed for pain Max Daily Amount: 4 tablets   Insulin Pen Needle (B-D ULTRAFINE III SHORT PEN) 31G X 8 MM MISC  Self No No   Si Units by Does not apply route 4 (four) times a day   Insulin Pen Needle 32G X 4 MM MISC  Self No No   Sig: by Does not apply route 4 (four) times a day   LORazepam (ATIVAN) 1 mg tablet  Self Yes No   Sig: Take 1 tablet by mouth 2 (two) times a day as needed   QUEtiapine (SEROquel) 400 MG tablet  Self Yes No   Sig: Take 400 mg by mouth daily at bedtime   Sulfacetamide Sodium, Acne, 10 % LOTN   No No   Sig: APPLY SPARINGLY TO AFFECTED AREA(S) TWICE DAILY   atorvastatin (LIPITOR) 10 mg tablet   No No   Sig: TAKE 1 TABLET DAILY   ferrous sulfate 324 (65 Fe) mg  Self No No   Sig: Take 1 tablet (324 mg total) by mouth daily before breakfast   gabapentin (NEURONTIN) 600 MG tablet  Self No No   Sig: Take 1 tablet (600 mg total) by mouth 3 (three) times a day   glucose blood (ACCU-CHEK APPLE PLUS) test strip  Self Yes No   Si Units by In Vitro route 3 (three) times a day   insulin glargine (BASAGLAR KWIKPEN) 100 units/mL injection pen   No No   Sig: Inject 14 Units under the skin daily at bedtime   lisinopril (ZESTRIL) 10 mg tablet  Self Yes No   Sig: Take 10 mg by mouth daily   metoclopramide (REGLAN) 10 mg tablet   No No   Sig: Take 1 tablet (10 mg total) by mouth every 6 (six) hours   metoprolol succinate (TOPROL-XL) 25 mg 24 hr tablet  Self Yes No   Sig: Take 25 mg by mouth daily   metroNIDAZOLE (METROCREAM) 0 75 % cream  Self Yes No   Sig: Apply 1 application topically daily   mirtazapine (REMERON) 15 mg tablet  Self Yes No   montelukast (SINGULAIR) 10 mg tablet   No No   Sig: TAKE 1 TABLET DAILY     ondansetron (ZOFRAN-ODT) 4 mg disintegrating tablet   No No   Sig: Take 1 tablet (4 mg total) by mouth every 8 (eight) hours as needed for nausea or vomiting for up to 7 days   pantoprazole (PROTONIX) 40 mg tablet  Self No No   Sig: Take 1 tablet (40 mg total) by mouth 2 (two) times a day before meals   promethazine (PHENERGAN) 25 mg tablet  Self No No   Sig: Take 1 tablet (25 mg total) by mouth every 6 (six) hours as needed for nausea or vomiting   torsemide (DEMADEX) 20 mg tablet  Self No No   Sig: TAKE 2 TABLET DAILY   warfarin (COUMADIN) 5 mg tablet   No No   Sig: TAKE 1-2 TABLETS DAILY OR AS DIRECTED      Facility-Administered Medications: None       Past Medical History:   Diagnosis Date    Acute gastritis     last assessed - [id number]    [facility] great [facility]     last assessed - [id number]    Anticoagulated on Coumadin     Mechanical MVR    Anxiety     Bipolar 1 disorder ([facility]     CAD (coronary artery disease)     Chronic kidney disease     left kideny being monitored    Chronic systolic congestive heart failure ([facility] ) 4/18/2017    Colitis 04/02/2017    Acute; last assessed - [id number]    COPD (chronic obstructive pulmonary disease) ([facility] )     Diabetes mellitus ([facility])     Difficulty urinating     DM type 2 with diabetic peripheral neuropathy ([facility])     Dyspepsia     last assessed - [id number]    Hiatal hernia     Hyperlipidemia     Hypertension     Ischemic cardiomyopathy     Myocardial infarction [facility])     Pacemaker     PAD (peripheral artery disease) ([facility])     Rectal bleed     Rotator cuff tear, right     last assessed - [id number]    Vomiting        Past Surgical History:   Procedure Laterality Date    AMPUTATION ABOVE KNEE (AKA) Left 8/24/2017    Procedure: AMPUTATION ABOVE KNEE (AKA); Surgeon: Raza Freeman MD;  Location[facility];  Service: Vascular; last assessed - [id number]   [address]      last assessed - [id number]    CARDIAC DEFIBRILLATOR PLACEMENT      CORONARY ARTERY BYPASS GRAFT      ESOPHAGOGASTRODUODENOSCOPY N/A 4/20/2017    Procedure: ESOPHAGOGASTRODUODENOSCOPY (EGD); Surgeon: Bryant Hansen MD;  Location:  MAIN OR;  Service:     ESOPHAGOGASTRODUODENOSCOPY N/A 5/26/2017    Procedure: ESOPHAGOGASTRODUODENOSCOPY (EGD); Surgeon: Wellington Chirinos MD;  Location:  MAIN OR;  Service:     ESOPHAGOGASTRODUODENOSCOPY N/A 11/16/2017    Procedure: ESOPHAGOGASTRODUODENOSCOPY (EGD); Surgeon: Víctor Bonilla MD;  Location: [facility];   Service: Gastroenterology    FOOT AMPUTATION THROUGH METATARSAL Left     MTP first toe; last assessed - [id number]    MITRAL VALVE REPLACEMENT      Mechanical; last assessed - [id number]    ROTATOR CUFF REPAIR      last assessed - [id number]    TOE AMPUTATION Left 7/21/2017    Procedure: PARTIAL FIRST RAY AMPUTATION; EXCISION OF WOUND W/ TOE FLAP CLOSURE;  Surgeon: Janette Ordaz DPM;  Location[facility];  Service: [address] [place] [address],[address]      last assessed - [id number]    VAC DRESSING APPLICATION Left 4/37/0440    Procedure: VAC application;  Surgeon: Janette Ordaz DPM;  Location[facility];  Service: Podiatry    WOUND DEBRIDEMENT Left 8/19/2017    Procedure: wound debridement with washout; resection of left 1st metatarsal;  Surgeon: Janette Ordaz DPM;  [facility];  Service: Podiatry       Family History   Problem Relation Age of Onset    Hypertension Mother     Diabetes Mother         Diabetes mellitus    Migraines Mother         Migraine headaches    Diabetes Father     Bipolar disorder Father     Hypertension Father     Melanoma Father         Malignant melanoma    Migraines Sister     Heart disease Maternal Grandfather         cardiac disorder    No Known Problems Paternal Grandfather     Bipolar disorder Daughter     Migraines Daughter         Migraine headaches    Arthritis Family     Heart disease Family         cardiac disorder    Depression Family      I have reviewed and agree with the history as documented  Social History   Substance Use Topics    Smoking status: Current Every Day Smoker     Packs/day: 0 50     Types: Cigarettes    Smokeless tobacco: Never Used    Alcohol use No        Review of Systems   Constitutional: Negative for chills, diaphoresis, fatigue and fever  Eyes: Negative for photophobia and visual disturbance  Respiratory: Negative for shortness of breath  Cardiovascular: Negative for chest pain  Gastrointestinal: Negative for nausea and vomiting  Musculoskeletal: Negative for back pain and neck pain  Skin: Negative for rash  Allergic/Immunologic: Negative for immunocompromised state  Neurological: Positive for syncope  Negative for dizziness, seizures, speech difficulty, weakness, light-headedness, numbness and headaches  Psychiatric/Behavioral: Negative for confusion and decreased concentration  The patient is not nervous/anxious  Physical Exam  Physical Exam   Constitutional: He is oriented to person, place, and time  He appears well-developed and well-nourished  No distress  HENT:   Head: Normocephalic and atraumatic  Eyes: Pupils are equal, round, and reactive to light  No scleral icterus  Neck: No JVD present  Cardiovascular: Normal rate and regular rhythm  Exam reveals no gallop and no friction rub  No murmur heard  Pulmonary/Chest: No respiratory distress  He has no wheezes  He has no rales  Abdominal: Soft  He exhibits no distension and no mass  There is no tenderness  There is no guarding  Musculoskeletal: He exhibits no edema  Neurological: He is alert and oriented to person, place, and time  CN II-XII grossly intact  BUE and BLE strength intact in all fields (has left AKA)  BUE and BLE sensation intact in all fields   Skin: Skin is warm and dry  Capillary refill takes less than 2 seconds  He is not diaphoretic  Psychiatric: He has a normal mood and affect  His behavior is normal    Vitals reviewed        Vital Signs  ED Triage Vitals [08/06/18 1233]   Temperature Pulse Respirations Blood Pressure SpO2   97 9 °F (36 6 °C) 75 20 130/56 96 %      Temp Source Heart Rate Source Patient Position - Orthostatic VS BP Location FiO2 (%)   Temporal Monitor Lying Left arm --      Pain Score       No Pain           Vitals:    08/06/18 1615 08/06/18 1630 08/06/18 1645 08/06/18 1700   BP: 131/64 135/73 141/63    Pulse: 68 67 67 65   Patient Position - Orthostatic VS:           Visual Acuity  Visual Acuity      Most Recent Value   L Pupil Size (mm)  2   R Pupil Size (mm)  2          ED Medications  Medications   nicotine (NICODERM CQ) 21 mg/24 hr TD 24 hr patch 21 mg (21 mg Transdermal Medication Applied 8/6/18 1532)   sodium chloride 0 9 % bolus 2,400 mL (0 mL Intravenous Stopped 8/6/18 1615)   potassium chloride (K-DUR,KLOR-CON) CR tablet 20 mEq (20 mEq Oral Given 8/6/18 1429)       Diagnostic Studies  Results Reviewed     Procedure Component Value Units Date/Time    Lactic acid, plasma [[id number]]  (Abnormal) Collected:  08/06/18 1624    Lab Status:  Final result Specimen:  Blood from Line, Venous Updated:  08/06/18 1658     LACTIC ACID 3 1 (HH) mmol/L     Narrative:         Result may be elevated if tourniquet was used during collection  Troponin I [[id number]]  (Normal) Collected:  08/06/18 1300    Lab Status:  Final result Specimen:  Blood from Line, Venous Updated:  08/06/18 1540     Troponin I 0 02 ng/mL     Blood culture #2 [[id number]] Collected:  08/06/18 1445    Lab Status: In process Specimen:  Blood from Arm, Right Updated:  08/06/18 1515    Blood culture #1 [[id number]] Collected:  08/06/18 1440    Lab Status: In process Specimen:  Blood from Line, Venous Updated:  08/06/18 1515    D-dimer, quantitative [[id number]]  (Normal) Collected:  08/06/18 1300    Lab Status:  Final result Specimen:  Blood from Line, Venous Updated:  08/06/18 1417     D-Dimer, Quant <270 ng/ml (FEU)     Lactic acid, plasma [[id number]]  (Abnormal) Collected:  08/06/18 1300    Lab Status:  Final result Specimen:  Blood from Line, Venous Updated:  08/06/18 1408     LACTIC ACID 3 8 (HH) mmol/L     Narrative:         Result may be elevated if tourniquet was used during collection      Comprehensive metabolic panel [[id number]]  (Abnormal) Collected:  08/06/18 1300    Lab Status:  Final result Specimen:  Blood from Line, Venous Updated:  08/06/18 1357     Sodium 141 mmol/L      Potassium 3 3 (L) mmol/L      Chloride 105 mmol/L      CO2 22 mmol/L      Anion Gap 14 (H) mmol/L      BUN 16 mg/dL      Creatinine 1 24 mg/dL Glucose 196 (H) mg/dL      Calcium 9 2 mg/dL      AST 23 U/L      ALT 22 U/L      Alkaline Phosphatase 123 (H) U/L      Total Protein 7 3 g/dL      Albumin 3 6 g/dL      Total Bilirubin 0 80 mg/dL      eGFR 63 ml/min/1 73sq m     Narrative:         National Kidney Disease Education Program recommendations are as follows:  GFR calculation is accurate only with a steady state creatinine  Chronic Kidney disease less than 60 ml/min/1 73 sq  meters  Kidney failure less than 15 ml/min/1 73 sq  meters      CBC and differential [[id number]]  (Abnormal) Collected:  08/06/18 1300    Lab Status:  Final result Specimen:  Blood from Line, Venous Updated:  08/06/18 1335     WBC 15 84 (H) Thousand/uL      RBC 4 28 Million/uL      Hemoglobin 12 2 g/dL      Hematocrit 37 7 %      MCV 88 fL      MCH 28 5 pg      MCHC 32 4 g/dL      RDW 14 9 %      MPV 12 7 fL      Platelets 460 Thousands/uL      nRBC 0 /100 WBCs      Neutrophils Relative 85 (H) %      Immat GRANS % 1 %      Lymphocytes Relative 8 (L) %      Monocytes Relative 6 %      Eosinophils Relative 0 %      Basophils Relative 0 %      Neutrophils Absolute 13 43 (H) Thousands/µL      Immature Grans Absolute 0 09 Thousand/uL      Lymphocytes Absolute 1 22 Thousands/µL      Monocytes Absolute 1 00 Thousand/µL      Eosinophils Absolute 0 06 Thousand/µL      Basophils Absolute 0 04 Thousands/µL     Rapid drug screen, urine [[id number]]     Lab Status:  No result Specimen:  Urine     UA w Reflex to Microscopic [[id number]]     Lab Status:  No result Specimen:  Urine     Fingerstick Glucose (POCT) [[id number]]  (Abnormal) Collected:  08/06/18 1243    Lab Status:  Final result Updated:  08/06/18 1244     POC Glucose 196 (H) mg/dl                  No orders to display              Procedures  ECG 12 Lead Documentation  Date/Time: 8/6/2018 3:05 PM  Performed by: Dina Petersen  Authorized by: Dina Petersen     Indications / Diagnosis:  Syncope  ECG reviewed by me, the ED Provider: yes    Patient location:  ED  Previous ECG:     Previous ECG:  Compared to current    Similarity:  No change    Comparison to cardiac monitor: No    Interpretation:     Interpretation: non-specific    Rate:     ECG rate:  67  Rhythm:     Rhythm: sinus rhythm    Ectopy:     Ectopy: none    QRS:     QRS axis:  Normal  Conduction:     Conduction: normal    ST segments:     ST segments:  Non-specific    Depression:  V3, V4, V5 and V6  T waves:     T waves: normal    Comments:      Non specific anterolateral ST inversions, seen on EKG yesterday  Infrequent pacer spikes           Phone Contacts  ED Phone Contact    ED Course  ED Course as of Aug 06 1730   [facility] Aug 06, 2018   1530 Discussed w/ Dr Anthony Pereira, who feels that if lactate trends down after fluid bolus, he may be discharged w/ PCP follow up  Will administer 2400mL (30mL/kg) and reassess lactate                                MDM  Number of Diagnoses or Management Options  Elevated lactic acid level:   Syncopal episodes: new and requires workup  Diagnosis management comments: 61 yo male presents to the ED after syncopal episode  No reported seizure activity  No complaints at arrival  Vitals unremarkable, exam unremarkable  Initial lab evaluation reveals a mild leukocytosis and lactic acidosis, increasing from yesterday  Pt hydrated w/ 2 5L NS, recheck of lactate improving  Case d/w Dr Anthony Pereira, who does not feel admission for monitoring and IV hydration is warranted given the down trending lactate  Pt remained stable throughout ED stay w/o complaints  ECG unremarkable, D dimer negative  Pt is strongly encouraged to f/u with PCP within 48 hours for reassessment   Educated patient regarding return precautions       Amount and/or Complexity of Data Reviewed  Clinical lab tests: ordered and reviewed  Tests in the medicine section of CPT®: ordered and reviewed  Decide to obtain previous medical records or to obtain history from someone other than the patient: yes  Obtain history from someone other than the patient: yes  Review and summarize past medical records: yes  Discuss the patient with other providers: yes      CritCare Time    Disposition  Final diagnoses:   Syncopal episodes   Elevated lactic acid level     Time reflects when diagnosis was documented in both MDM as applicable and the Disposition within this note     Time User Action Codes Description Comment    8/6/2018  4:58 PM Rita Bennett [R55] Syncopal episodes     8/6/2018  4:58 PM Rita Bennett [R79 [id number]] Elevated lactic acid level       ED Disposition     ED Disposition Condition Comment    Discharge  [address] [place] [address] discharge to home/self care  Condition at discharge: Good        Follow-up Information     Follow up With Specialties Details Why Contact Info    Cachorro Jean Baptiste DO Family Medicine In 2 days  [address] [facility]  [phone]            Patient's Medications   Discharge Prescriptions    No medications on file     No discharge procedures on file      ED Provider  Electronically Signed by           Fide Tuttle PA-C  08/06/18 9167

## 2018-08-06 NOTE — ED NOTES
Daughter now at bedside, states pt has episodes of depression and suicidal ideation, "he needs help", I think he needs to be 302'd "  Pt currently denies any SI or HI  Has appt with PCP at 3 pm today, outpatient treatment is with [facility]       Maria Eugenia Pineda RN  08/06/18 0183

## 2018-08-06 NOTE — ED NOTES
Pt willing to stay in hospital "overnight", aware of importance of further evaluation and treatment  Daughter at bedside agrees        Saeid Campos RN  08/06/18 1616

## 2018-08-06 NOTE — TELEPHONE ENCOUNTER
FYI:    DAUGHTER CALLED, THEY ARE ADMITTING PATIENT  HIS LACTIC ACID LEVEL WAS VERY, VERY HIGH, WBC LOW AND ANOTHER TEST WAS ALSO HGH

## 2018-08-06 NOTE — ED NOTES
Pt interested in leaving AMA, has to  dog at [facility], states "I feel good"  Discussed with SHAMIKA Griggs who came to pt's bedside immediately        Katherine Delgado RN  08/06/18 2984

## 2018-08-06 NOTE — SOCIAL WORK
KANA consulted to f/u with Pt who was stating that he is not being take care of and is being starved  CM attempted to contact Pt  No answer  Left VM  CM then contacted Pt's daughter, Coni Martini [phone]  Coni Martini reported that Pt moved up to [place] from the [place] [address] last February and has been living on his own  Coni Martini reported that Pt had spent many years in [place] and then lived with his mother, so he has never been alone before, and he does not like it  Coni Martini reported she does everything she can to help the Pt, but she has a full time job and children, so she cannot be with her father 24/7  Coni Martini reported the Pt is on food stamps and has a limited income, so she watches his budget and goes grocery shopping with him when he gets his food stamps  Conimonika Martini reported there are groceries in his home, but he doesn't make the food  Coni Martini reported that on the way home from the ED last night, Pt was making statements saying, "If I have to live this way I don't want to live " Coni Martini told him that if he continues to make these statements, she would drive him back to the hospital to 302 him  She reported that she spoke with her father this morning, and he sounded like he was happy and fine  Coni Martini reported she does not know how to help the Pt anymore  She reported she made Pt a PCP appointment today at 3:15 and she is going to tell the Dr  about the situation and ask for resources  CM gave Pt a [facility] contact info  KANA contacted [facility] for Referral and left VM with , Desmond (sp?)  Awaiting return call  CM received call back from [facility] who reported that she cannot take referral for Pt as he is not 60years old  [facility] reported that KANA would have to call [phone] to make referral  CM made referral  CM to call dgtr to let her know of the referral and the process

## 2018-08-08 DIAGNOSIS — Z89.612 STATUS POST ABOVE KNEE AMPUTATION OF LEFT LOWER EXTREMITY: ICD-10-CM

## 2018-08-08 LAB
ATRIAL RATE: 67 BPM
INR PPP: 3.6 (ref 0.8–1.2)
P AXIS: 107 DEGREES
PR INTERVAL: 210 MS
PROTHROMBIN TIME: 34 SEC (ref 9.1–12)
QRS AXIS: 76 DEGREES
QRSD INTERVAL: 116 MS
QT INTERVAL: 436 MS
QTC INTERVAL: 460 MS
T WAVE AXIS: -64 DEGREES
VENTRICULAR RATE: 67 BPM

## 2018-08-08 PROCEDURE — 93010 ELECTROCARDIOGRAM REPORT: CPT | Performed by: INTERNAL MEDICINE

## 2018-08-08 RX ORDER — HYDROCODONE BITARTRATE AND ACETAMINOPHEN 7.5; 325 MG/1; MG/1
1 TABLET ORAL EVERY 6 HOURS PRN
Qty: 120 TABLET | Refills: 0 | Status: SHIPPED | OUTPATIENT
Start: 2018-08-08 | End: 2018-09-07 | Stop reason: SDUPTHER

## 2018-08-11 LAB
BACTERIA BLD CULT: NORMAL
BACTERIA BLD CULT: NORMAL

## 2018-08-13 ENCOUNTER — OFFICE VISIT (OUTPATIENT)
Dept: CARDIOLOGY CLINIC | Facility: CLINIC | Age: 58
End: 2018-08-13
Payer: COMMERCIAL

## 2018-08-13 VITALS
WEIGHT: 181.8 LBS | HEIGHT: 73 IN | HEART RATE: 68 BPM | DIASTOLIC BLOOD PRESSURE: 84 MMHG | SYSTOLIC BLOOD PRESSURE: 146 MMHG | BODY MASS INDEX: 24.09 KG/M2

## 2018-08-13 DIAGNOSIS — I25.5 ISCHEMIC CARDIOMYOPATHY: ICD-10-CM

## 2018-08-13 DIAGNOSIS — I50.22 CHRONIC SYSTOLIC CONGESTIVE HEART FAILURE (HCC): Chronic | ICD-10-CM

## 2018-08-13 DIAGNOSIS — I49.3 PVC'S (PREMATURE VENTRICULAR CONTRACTIONS): ICD-10-CM

## 2018-08-13 DIAGNOSIS — E78.2 MIXED HYPERLIPIDEMIA: ICD-10-CM

## 2018-08-13 DIAGNOSIS — I25.10 CORONARY ARTERY DISEASE INVOLVING NATIVE CORONARY ARTERY OF NATIVE HEART WITHOUT ANGINA PECTORIS: Primary | ICD-10-CM

## 2018-08-13 DIAGNOSIS — I47.2 VT (VENTRICULAR TACHYCARDIA) (HCC): ICD-10-CM

## 2018-08-13 DIAGNOSIS — Z95.2 HISTORY OF MITRAL VALVE REPLACEMENT WITH MECHANICAL VALVE: ICD-10-CM

## 2018-08-13 PROCEDURE — 99214 OFFICE O/P EST MOD 30 MIN: CPT | Performed by: INTERNAL MEDICINE

## 2018-08-13 RX ORDER — METOPROLOL SUCCINATE 50 MG/1
50 TABLET, EXTENDED RELEASE ORAL DAILY
Qty: 30 TABLET | Refills: 6 | Status: SHIPPED | OUTPATIENT
Start: 2018-08-13 | End: 2018-10-15 | Stop reason: ALTCHOICE

## 2018-08-13 NOTE — PROGRESS NOTES
Cardiology Follow Up    Bear Ritter  1960  [id number]  HEART & VASCULAR  [facility] [address]  [address]  [address] [address] [address]    1  Coronary artery disease involving native coronary artery of native heart without angina pectoris     2  Ischemic cardiomyopathy     3  History of mitral valve replacement with mechanical valve     4  Chronic systolic congestive heart failure ([address] )     5  VT (ventricular tachycardia) ([address] )     6  PVC's (premature ventricular contractions)     7  Mixed hyperlipidemia         Interval History:     Mr Sally Suresh given his history of CAD and mitral valve disease  Ziggy Drew has a history of a mechanical mitral valve for what sounded like mitral valve prolapse  He also was found to have coronary artery disease at that time and had coronary bypass grafting as well  He told me in subsequent years he is received stenting to his coronary arteries  He also has a history of defibrillator, placed a little over a year ago  He had all of his care in [place], as he lived there all his life, but relocated to this area to live with his daughter  He states to me that his mitral valve replacement and CABG was in 2001  Unfortunately, we have no records of his prior history  His prior cardiologist, he tells me, closed his practice due to insurance fraud  He also carries a history of peripheral arterial disease  At our first visit I got an echocardiogram that showed his ejection fraction was 40% with regional wall motion abnormalities in the inferior and inferior lateral beach  Ziggy Drew has had several issues from a noncardiac standpoint  He has had on and off issues with nausea, vomiting and GI bleeding  He is been worked up for this without any significant findings  He will actually be going through more procedures in the near future, and we will be bridging his Coumadin  He has also had on and off atypical chest pains   Some of the pain sound GI related, and other sound musculoskeletal  We was in the hospital for GI bleeding I assessed this same chest pain  This has for the most part resolved as he has not had any issues with bleeding or chest/abdominal pains  Since I last saw him he was in the hospital for upper GI bleeding, which has now resolved  He is tolerating Coumadin at this time  He also battles peripheral arterial disease, uncontrolled diabetes, peripheral neuropathy and nonhealing diabetic foot ulcers  Since I last saw him had an extensive hospitalization for worsening lower extremity ulcers and gangrene  He eventually required a left-sided above-the-knee amputation  He went through extensive rehabilitation and is now home getting home health and physical therapy  Yomi Wolff had some episodes of VT detected on his most recent ICD interrogation  These were treated successfully with ATP  He did not feel any symptoms from this  No ICD shock was required  He was once on metoprolol, but came off of this during 1 of his hospitalizations  He went to the ER earlier this month with nausea, vomiting and dehydration  He went a 2nd time for what sounded like syncope associated with dehydration  This has not returned  Yomi Wolff feels about the same from a cardiac standpoint  His chest pains have resolved  He does have chronic exertional shortness of breath, this is unchanged  He has intermittent edema of his right lower extremity, but it is improved  He denies any orthopnea, PND or any other signs of CHF  He denies any palpitations, lightheadedness or syncope           Patient Active Problem List   Diagnosis    Bipolar disorder ([facility] )    Diabetes mellitus ([facility] )    Hiatal hernia    Erosive esophagitis    Coronary artery disease    Pacemaker    Chronic systolic congestive heart failure ([facility] )    Peripheral artery disease (HCC)    History of mitral valve replacement with mechanical valve    Bipolar 1 disorder, depressed (HCC)    Bullous dermatosis    GI bleed    Status post above knee amputation of left lower extremity (HCC)    Perirectal abscess    Anxiety    Atherosclerosis of arteries of extremities (HCC)    Benign essential hypertension    Carotid arterial disease (HCC)    Chronic congestive heart failure (HCC)    Chronic insomnia    Decreased pedal pulses    Diabetic gastroparesis associated with type 2 diabetes mellitus (HCC)    Edema of right lower extremity    Esophageal reflux    Hyperlipidemia    Insulin dependent type 2 diabetes mellitus, controlled (HCC)    Ischemic cardiomyopathy    Joan-Perez tear    Mitral valve prolapse    Onychomycosis    Peripheral edema    Peripheral vascular disease (HCC)    Phantom pain after amputation of lower extremity ([facility])    PVC's (premature ventricular contractions)    Rosacea    Tinea corporis    Vasculitis of skin    Costochondritis    Infected sebaceous cyst    Iron deficiency anemia secondary to inadequate dietary iron intake    Polyneuropathy due to secondary diabetes (HCC)    Atherosclerosis of native arteries of extremities with intermittent claudication, right leg ([facility])    VT (ventricular tachycardia) ([facility])     Past Medical History:   Diagnosis Date    Acute gastritis     last assessed - [id number]    Amputated great toe of left foot ([facility] )     last assessed - [id number]    Anticoagulated on Coumadin     Mechanical MVR    Anxiety     Bipolar 1 disorder ([facility])     CAD (coronary artery disease)     Chronic kidney disease     left kideny being monitored    Chronic systolic congestive heart failure ([facility] ) 4/18/2017    Colitis 04/02/2017    Acute; last assessed - [id number]    COPD (chronic obstructive pulmonary disease) ([facility] )     Diabetes mellitus ([facility])     Difficulty urinating     DM type 2 with diabetic peripheral neuropathy ([facility])     Dyspepsia     last assessed - [id number]    Hiatal hernia     Hyperlipidemia     Hypertension     Ischemic cardiomyopathy     Myocardial infarction [facility])     Pacemaker     PAD (peripheral artery disease) ([facility])     Rectal bleed     Rotator cuff tear, right     last assessed - [id number]    Vomiting      Social History     Social History    Marital status:      Spouse name: N/A    Number of children: 1    Years of education: N/A     Occupational History    Not on file  Social History Main Topics    Smoking status: Current Every Day Smoker     Packs/day: 0 50     Types: Cigarettes    Smokeless tobacco: Never Used    Alcohol use No    Drug use: No    Sexual activity: No     Other Topics Concern    Not on file     Social History Narrative    Daily caffeine consumption, 1 serving a day    Exercises daily      Family History   Problem Relation Age of Onset    Hypertension Mother     Diabetes Mother         Diabetes mellitus    Migraines Mother         Migraine headaches    Diabetes Father     Bipolar disorder Father     Hypertension Father     Melanoma Father         Malignant melanoma    Migraines Sister     Heart disease Maternal Grandfather         cardiac disorder    No Known Problems Paternal Grandfather     Bipolar disorder Daughter     Migraines Daughter         Migraine headaches    Arthritis Family     Heart disease Family         cardiac disorder    Depression Family      Past Surgical History:   Procedure Laterality Date    AMPUTATION ABOVE KNEE (AKA) Left 8/24/2017    Procedure: AMPUTATION ABOVE KNEE (AKA); Surgeon: Rajesh White MD;  [facility];  Service: Vascular; last assessed - [id number]   [facility]      last assessed - [id number]    CARDIAC DEFIBRILLATOR PLACEMENT      CORONARY ARTERY BYPASS GRAFT      ESOPHAGOGASTRODUODENOSCOPY N/A 4/20/2017    Procedure: ESOPHAGOGASTRODUODENOSCOPY (EGD);   Surgeon: Jemal Courtney MD;  Location:  MAIN OR;  Service:     ESOPHAGOGASTRODUODENOSCOPY N/A 5/26/2017    Procedure: ESOPHAGOGASTRODUODENOSCOPY (EGD); Surgeon: Puma Solis MD;  Location[facility];  Service:     ESOPHAGOGASTRODUODENOSCOPY N/A 11/16/2017    Procedure: ESOPHAGOGASTRODUODENOSCOPY (EGD); Surgeon: Earl Espitia MD;  Location: [facility];   Service: Gastroenterology    FOOT AMPUTATION THROUGH METATARSAL Left     MTP first toe; last assessed - [id number]   Levora Dang MITRAL VALVE REPLACEMENT      Mechanical; last assessed - [id number]    ROTATOR CUFF REPAIR      last assessed - [id number]    TOE AMPUTATION Left 7/21/2017    Procedure: PARTIAL FIRST RAY AMPUTATION; EXCISION OF WOUND W/ TOE FLAP CLOSURE;  Surgeon: Bruce Lemus DPM;  Location[facility];  Service: [address] [place] [address],[address]      last assessed - [id number]    VAC DRESSING APPLICATION Left 6/36/1583    Procedure: VAC application;  Surgeon: Bruce Lemus DPM;  [facility];  Service: Podiatry    WOUND DEBRIDEMENT Left 8/19/2017    Procedure: wound debridement with washout; resection of left 1st metatarsal;  Surgeon: Bruce Lemus DPM;  [facility];  Service: Podiatry       Current Outpatient Prescriptions:     ACCU-CHEK FASTCLIX LANCETS MISC, 1 Units by Does not apply route 3 (three) times a day, Disp: , Rfl:     atorvastatin (LIPITOR) 10 mg tablet, TAKE 1 TABLET DAILY, Disp: 90 tablet, Rfl: 0    B-D INS SYR ULTRAFINE 1CC/31G 31G X 5/16" 1 ML MISC, Inject 1 each under the skin 4 (four) times a day, Disp: , Rfl: 0    Blood Glucose Monitoring Suppl (ACCU-CHEK APPLE PLUS) w/Device KIT, 1 Units by Does not apply route 3 (three) times a day, Disp: , Rfl:     ferrous sulfate 324 (65 Fe) mg, Take 1 tablet (324 mg total) by mouth daily before breakfast, Disp: 30 tablet, Rfl: 5    gabapentin (NEURONTIN) 600 MG tablet, Take 1 tablet (600 mg total) by mouth 3 (three) times a day, Disp: 90 tablet, Rfl: 5    glucose blood (ACCU-CHEK APPLE PLUS) test strip, 1 Units by In Vitro route 3 (three) times a day, Disp: , Rfl:     HUMALOG 100 UNIT/ML injection, INJECT 1 TO 3 UNITS AS DIRECTED 3 TIMES A DAY *DISCARD 28 DAYS AFTER OPENING*, Disp: 10 mL, Rfl: 0    HYDROcodone-acetaminophen (NORCO) 7 5-325 mg per tablet, Take 1 tablet by mouth every 6 (six) hours as needed for pain Max Daily Amount: 4 tablets, Disp: 120 tablet, Rfl: 0    insulin glargine (BASAGLAR KWIKPEN) 100 units/mL injection pen, Inject 14 Units under the skin daily at bedtime (Patient taking differently: Inject 10 Units under the skin daily at bedtime  ), Disp: 5 pen, Rfl: 0    Insulin Pen Needle (B-D ULTRAFINE III SHORT PEN) 31G X 8 MM MISC, 1 Units by Does not apply route 4 (four) times a day, Disp: 200 each, Rfl: 0    Insulin Pen Needle 32G X 4 MM MISC, by Does not apply route 4 (four) times a day, Disp: 100 each, Rfl: 0    lisinopril (ZESTRIL) 10 mg tablet, Take 10 mg by mouth daily, Disp: , Rfl: 3    LORazepam (ATIVAN) 1 mg tablet, Take 1 tablet by mouth 2 (two) times a day as needed, Disp: , Rfl:     metoclopramide (REGLAN) 10 mg tablet, Take 1 tablet (10 mg total) by mouth every 6 (six) hours, Disp: 30 tablet, Rfl: 0    metoprolol succinate (TOPROL-XL) 25 mg 24 hr tablet, Take 25 mg by mouth daily, Disp: , Rfl:     mirtazapine (REMERON) 15 mg tablet, Take 15 mg by mouth daily  , Disp: , Rfl:     montelukast (SINGULAIR) 10 mg tablet, TAKE 1 TABLET DAILY  , Disp: 30 tablet, Rfl: 5    pantoprazole (PROTONIX) 40 mg tablet, Take 1 tablet (40 mg total) by mouth 2 (two) times a day before meals, Disp: 60 tablet, Rfl: 5    QUEtiapine (SEROquel) 400 MG tablet, Take 400 mg by mouth daily at bedtime, Disp: , Rfl: 1    torsemide (DEMADEX) 20 mg tablet, TAKE 2 TABLET DAILY, Disp: 60 tablet, Rfl: 4    warfarin (COUMADIN) 5 mg tablet, TAKE 1-2 TABLETS DAILY OR AS DIRECTED, Disp: 60 tablet, Rfl: 5    metroNIDAZOLE (METROCREAM) 0 75 % cream, Apply 1 application topically daily, Disp: , Rfl:     ondansetron (ZOFRAN-ODT) 4 mg disintegrating tablet, Take 1 tablet (4 mg total) by mouth every 8 (eight) hours as needed for nausea or vomiting for up to 7 days, Disp: 20 tablet, Rfl: 0    promethazine (PHENERGAN) 25 mg tablet, Take 1 tablet (25 mg total) by mouth every 6 (six) hours as needed for nausea or vomiting, Disp: 30 tablet, Rfl: 0    Sulfacetamide Sodium, Acne, 10 % LOTN, APPLY SPARINGLY TO AFFECTED AREA(S) TWICE DAILY, Disp: 118 mL, Rfl: 3  Allergies   Allergen Reactions    Aspirin Other (See Comments)     Received ASA & bleed out    Heparin      Please see 8/2017 admit = 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    Morphine Other (See Comments)     Gets red streak up arm above IV site    Omeprazole Diarrhea       Labs:  Lab Results   Component Value Date     08/06/2018     10/31/2017    K 3 3 (L) 08/06/2018    K 5 3 (H) 10/31/2017     08/06/2018     10/31/2017    CO2 22 08/06/2018    CO2 23 10/31/2017    BUN 16 08/06/2018    BUN 27 (H) 10/31/2017    CREATININE 1 24 08/06/2018    CREATININE 1 14 10/31/2017    GLUCOSE 196 (H) 08/06/2018    GLUCOSE 152 (H) 10/31/2017    CALCIUM 9 2 08/06/2018    CALCIUM 9 8 10/31/2017     Lab Results   Component Value Date    CHOL 83 08/11/2017    CHOL 135 05/30/2017    TRIG 86 08/11/2017    TRIG 156 (H) 05/30/2017    HDL 36 (L) 08/11/2017    HDL 37 (L) 05/30/2017     Imaging: No results found  Review of Systems:  Review of Systems   Constitutional: Negative  HENT: Negative  Eyes: Negative  Respiratory: Negative  Cardiovascular: Positive for leg swelling  Gastrointestinal: Negative  Musculoskeletal: Negative  Skin: Negative  Allergic/Immunologic: Negative  Neurological: Positive for numbness  Hematological: Negative  Psychiatric/Behavioral: Negative  All other systems reviewed and are negative  Physical Exam:  Physical Exam   Constitutional: He is oriented to person, place, and time  He appears well-developed and well-nourished  HENT:   Head: Normocephalic and atraumatic     Eyes: EOM are normal  Pupils are equal, round, and reactive to light  Right eye exhibits no discharge  Left eye exhibits no discharge  No scleral icterus  Neck: Normal range of motion  Neck supple  No JVD present  No thyromegaly present  Cardiovascular: Normal rate, regular rhythm, S1 normal, S2 normal and normal heart sounds  No extrasystoles are present  Exam reveals decreased pulses  Exam reveals no gallop and no friction rub  No murmur heard  Normal mechanical S1 sound   Pulmonary/Chest: Effort normal  No respiratory distress  He has decreased breath sounds  He has no wheezes  He has no rales  Abdominal: Soft  Bowel sounds are normal  He exhibits no distension  There is no tenderness  Musculoskeletal: Normal range of motion  He exhibits no edema, tenderness or deformity  Neurological: He is alert and oriented to person, place, and time  No cranial nerve deficit  Skin: Skin is warm and dry  No rash noted  Psychiatric: He has a normal mood and affect  Judgment and thought content normal        Discussion/Summary:    1  Ventricular tachycardia -  This was recently found on ICD interrogation  He required ATP therapy to terminate this  We will restart Toprol XL at 50 mg daily  We will continue to follow his ICD  2  Mechanical mitral valve replacement - Mr Pereira had this performed back in 2001  His recent echocardiogram shows a normally functioning mitral valve replacement  He does take antibiotic prophylaxis for any dental procedures  Any procedures, particularly the ones he will be going through from a GI perspective, will require IV heparin or Lovenox bridging if Coumadin needs to be held  We will see him back in 3 months  3  Chronic systolic congestive heart failure - His ejection fraction by our echo shows a value of 40%  He has an ischemic cardiomyopathy with inferior and inferior lateral wall motion abnormalities  We will continue the same dose of torsemide   I've asked him to adhere to a low sodium diet, follow his weight and we will follow blood work closely  We will see him back in 3 months  4  CAD - He has had prior CABG, the time of his mitral valve replacement, and prior stenting he tells me  He is still recovering from an above-the-knee amputation and we will continue to let him rehabilitate  We will follow-up in 6 months and at some point get updated ischemic testing  He is without symptoms of angina  5  HTN - His blood pressure is well-controlled  He will remain on the same doses of lisinopril and Toprol  We will see him back in 4 months  6  ICD - He will continue to be followed by our device clinic  7  PAD - He is now status post above-the-knee amputation  He will continue to rehabilitate with physical therapy  He will also continue to follow with vascular surgery  Counseling / Coordination of Care  Total floor / unit time spent today 25 minutes  Greater than 50% of total time was spent with the patient and / or family counseling and / or coordination of care

## 2018-08-13 NOTE — PATIENT INSTRUCTIONS
Low-Sodium Diet   AMBULATORY CARE:   A low-sodium diet  limits foods that are high in sodium (salt)  You will need to follow a low-sodium diet if you have high blood pressure, kidney disease, or heart failure  You may also need to follow this diet if you have a condition that is causing your body to retain (hold) extra fluid  You may need to limit the amount of sodium you eat to 1,500 mg  Ask your healthcare provider how much sodium you can have each day  How to use food labels to choose foods that are low in sodium:  Read food labels to find the amount of sodium they contain  The amount of sodium is listed in milligrams (mg)  The % Daily Value (DV) column tells you how much of your daily needs are met by 1 serving of the food for each nutrient listed  Choose foods that have less than 5% of the DV of sodium  These foods are considered low in sodium  Foods that have 20% or more of the DV of sodium are considered high in sodium  Some food labels may also list any of the following terms that tell you about the sodium content in the food:  · Sodium-free:  Less than 5 mg in each serving    · Very low sodium:  35 mg of sodium or less in each serving    · Low sodium:  140 mg of sodium or less in each serving    · Reduced sodium: At least 25% less sodium in each serving than the regular type    · Light in sodium:  50% less sodium in each serving    · Unsalted or no added salt:  No extra salt is added during processing (the food may still contain sodium)  Foods to avoid:  Salty foods are high in sodium   You should avoid the following:  · Processed foods:      ¨ Mixes for cornbread, biscuits, cake, and pudding     ¨ Instant foods, such as potatoes, cereals, noodles, and rice     ¨ Packaged foods, such as bread stuffing, rice and pasta mixes, snack dip mixes, and macaroni and cheese     ¨ Canned foods, such as canned vegetables, soups, broths, sauces, and vegetable or tomato juice    ¨ Snack foods, such as salted chips, popcorn, pretzels, pork rinds, salted crackers, and salted nuts    ¨ Frozen foods, such as dinners, entrees, vegetables with sauces, and breaded meats    ¨ Sauerkraut, pickled vegetables, and other foods prepared in brine    · Meats and cheeses:      ¨ Smoked or cured meat, such as corned beef, singh, ham, hot dogs, and sausage    ¨ Canned meats or spreads, such as potted meats, sardines, anchovies, and imitation seafood    ¨ Deli or lunch meats, such as bologna, ham, turkey, and roast beef    ¨ Processed cheese, such as American cheese and cheese spreads    · Condiments, sauces, and seasonings:      ¨ Salt (¼ teaspoon of salt contains 575 mg of sodium)    ¨ Seasonings made with salt, such as garlic salt, celery salt, onion salt, and seasoned salt    ¨ Regular soy sauce, barbecue sauce, teriyaki sauce, steak sauce, Worcestershire sauce, and most flavored vinegars    ¨ Canned gravy and mixes     ¨ Regular condiments, such as mustard, ketchup, and salad dressings    ¨ Pickles and olives    ¨ Meat tenderizers and monosodium glutamate (MSG)  Foods to include:  Read the food label to find the exact amount of sodium in each serving  · Bread and cereal:  Try to choose breads with less than 80 mg of sodium per serving  ¨ Bread, roll, romy, tortilla, or unsalted crackers  ¨ Ready-to-eat cereals with less than 5% DV of sodium (examples include shredded wheat and puffed rice)    ¨ Pasta    · Vegetables and fruits:      ¨ Unsalted fresh, frozen, or canned vegetables    ¨ Fresh, frozen, or canned fruits    ¨ Fruit juice    · Dairy:  One serving has about 150 mg of sodium  ¨ Milk, all types    ¨ Yogurt    ¨ Hard cheese, such as cheddar, Swiss, [employer], or mozzarella    · Meat and other protein foods:  Some raw meats may have added sodium       ¨ Plain meats, fish, and poultry     ¨ Eggs    · Other foods:      ¨ Homemade pudding    ¨ Unsalted nuts, popcorn, or pretzels    ¨ Unsalted butter or margarine  Ways to decrease sodium:   · Add spices and herbs to foods instead of salt during cooking  Use salt-free seasonings to add flavor to foods  Examples include onion powder, garlic powder, basil, jeter powder, paprika, and parsley  Try lemon or lime juice or vinegar to give foods a tart flavor  Use hot peppers, pepper, or cayenne pepper to add a spicy flavor to foods  · Do not keep a salt shaker at your kitchen table  This may help keep you from adding salt to food at the table  It may take time to get used to enjoying the natural flavor of food instead of adding salt  Talk to your healthcare provider before you use salt substitutes  Some salt substitutes have a high amount of potassium and need to be avoided if you have kidney disease  · Choose low-sodium foods at restaurants  Meals from restaurants are often high in sodium  Some restaurants have nutrition information on the menu that tells you the amount of sodium in their foods  If possible, ask for your food to be prepared with less, or no salt  · Shop for unsalted or low-sodium foods and snacks at the grocery store  Examples include unsalted or low-sodium broths, soups, and canned vegetables  Choose fresh or frozen vegetables instead  Choose unsalted nuts or seeds or fresh fruits or vegetables as snacks  Read food labels and choose salt-free, very low-sodium, or low-sodium foods  © 2017 [employer] Information is for End User's use only and may not be sold, redistributed or otherwise used for commercial purposes  All illustrations and images included in CareNotes® are the copyrighted property of A [employer] M [employer]  or Jemal Felipe  The above information is an  only  It is not intended as medical advice for individual conditions or treatments  Talk to your doctor, nurse or pharmacist before following any medical regimen to see if it is safe and effective for you

## 2018-08-14 ENCOUNTER — ANTICOAG VISIT (OUTPATIENT)
Dept: CARDIOLOGY CLINIC | Facility: CLINIC | Age: 58
End: 2018-08-14

## 2018-08-14 DIAGNOSIS — Z95.2 HISTORY OF MITRAL VALVE REPLACEMENT WITH MECHANICAL VALVE: ICD-10-CM

## 2018-08-14 LAB — INR PPP: 1.7 (ref 0.86–1.17)

## 2018-08-15 ENCOUNTER — ANTICOAG VISIT (OUTPATIENT)
Dept: CARDIOLOGY CLINIC | Facility: CLINIC | Age: 58
End: 2018-08-15

## 2018-08-15 ENCOUNTER — OFFICE VISIT (OUTPATIENT)
Dept: ENDOCRINOLOGY | Facility: HOSPITAL | Age: 58
End: 2018-08-15
Payer: COMMERCIAL

## 2018-08-15 VITALS
HEIGHT: 73 IN | DIASTOLIC BLOOD PRESSURE: 90 MMHG | SYSTOLIC BLOOD PRESSURE: 142 MMHG | HEART RATE: 60 BPM | BODY MASS INDEX: 24.92 KG/M2 | WEIGHT: 188 LBS

## 2018-08-15 DIAGNOSIS — Z79.4 INSULIN DEPENDENT TYPE 2 DIABETES MELLITUS, CONTROLLED (HCC): Primary | ICD-10-CM

## 2018-08-15 DIAGNOSIS — E13.42 POLYNEUROPATHY DUE TO SECONDARY DIABETES (HCC): ICD-10-CM

## 2018-08-15 DIAGNOSIS — E11.9 INSULIN DEPENDENT TYPE 2 DIABETES MELLITUS, CONTROLLED (HCC): Primary | ICD-10-CM

## 2018-08-15 DIAGNOSIS — E78.5 HYPERLIPIDEMIA, UNSPECIFIED HYPERLIPIDEMIA TYPE: ICD-10-CM

## 2018-08-15 DIAGNOSIS — I10 BENIGN ESSENTIAL HYPERTENSION: ICD-10-CM

## 2018-08-15 DIAGNOSIS — Z95.2 HISTORY OF MITRAL VALVE REPLACEMENT WITH MECHANICAL VALVE: ICD-10-CM

## 2018-08-15 LAB
INR PPP: 1.7 (ref 0.8–1.2)
PROTHROMBIN TIME: 17.2 SEC (ref 9.1–12)

## 2018-08-15 PROCEDURE — 99214 OFFICE O/P EST MOD 30 MIN: CPT | Performed by: NURSE PRACTITIONER

## 2018-08-15 NOTE — PATIENT INSTRUCTIONS
Be mindful of diet  Stay active and stay hydrated  Please check her blood sugars 4 times daily and for the record to the office in 1 week for review and adjustment, if necessary  Continue Humalog 3 units at meals with sliding scale as follows:    150-200 = 2 units  201-250 = 3 units  251-300 = 4 units  301-350 = 5 units  351-400 = 6 units  401-450 = 7 units  450 500 = 8 units    Continue Basiglar at 10 units at bedtime  Consider taking part in the DEX COM trial discussed at the visit  Continue lisinopril and metoprolol  Continue atorvastatin  Obtain a diabetic eye exam     Continue to follow up with vascular and neuropathy  Stop smoking

## 2018-08-15 NOTE — PROGRESS NOTES
Ele Crew 60 y o  male MRN: [id number]    Encounter: [id number]      Assessment/Plan     Assessment: This is a 60y o -year-old male with type 2 diabetes status post left-sided AKA with hypertension and hyperlipidemia  Plan:  1  Type 2 diabetes with peripheral vascular disease, gastroparesis, neuropathy status post left AKA:  There are no blood sugar logs to review at the time of the visit  He has been dosing his insulin with varying doses according to his blood sugar and his meal   Reviewed the Humalog sliding scale starting at 2 units, for a blood sugar over 150 and 1 additional unit for each 50 over 200 prescribed at last visit  Patient will provide blood sugar logs in 1-2 weeks for review  I have referred him to medical nutrition therapy for help with his diet and I have also referred him for a dex com diagnostic trial to gain more insight into his glycemic control throughout the day  I urged him to have a diabetic eye exam completed  He will continue to follow up with vascular and Neurology  I have also asked him to get his lab work completed  Check hemoglobin A1c      2  Hypertension: Blood pressure was elevated in the office today  Continue to monitor  Continue current regimen  Check comprehensive metabolic panel     3  Hyperlipidemia:  Continue atorvastatin  Check fasting lipid panel  CC:  Type 2 Diabetes follow-up    History of Present Illness     HPI:  70-year-old male presents in the office today for follow-up of type 2 diabetes with neuropathy status post left-sided AKA, hypertension and hyperlipidemia  His most recent hemoglobin A1c from July 24, 2018 is 8 0  His current diabetes medication regimen is as follows:  Basaglar 10 units at bedtime  He is dosing his Humalog based upon his current blood sugar when he eats and his meal with no particular scale  He states that he is due for a diabetic eye exam and has a history of retinopathy    He is seeing vascular and neurology specialists for treatment of issues to his right leg including neuropathy  For his hypertension, he is treated with lisinopril 10 mg daily and metoprolol 50 mg daily  His hyperlipidemia is treated with atorvastatin 10 mg daily  Review of Systems   Constitutional: Positive for appetite change  Negative for chills, fatigue and fever  HENT: Negative  Eyes: Positive for visual disturbance (blurry vision)  Negative for photophobia, pain, discharge (decreased appeteite with gastroparaesis at times), redness and itching  Respiratory: Negative for cough and shortness of breath  Cardiovascular: Negative for chest pain and palpitations  Gastrointestinal: Negative for abdominal pain, constipation, diarrhea, nausea and vomiting  Endocrine: Positive for polydipsia, polyphagia and polyuria  Negative for cold intolerance and heat intolerance  Genitourinary: Negative  Musculoskeletal: Positive for gait problem (wheelchair)  Skin: Negative  Allergic/Immunologic: Negative  Neurological: Positive for numbness (right foot)  Negative for dizziness, syncope, light-headedness and headaches  Hematological: Negative  Psychiatric/Behavioral: Negative  All other systems reviewed and are negative        Historical Information   Past Medical History:   Diagnosis Date    Acute gastritis     last assessed - [id number]    Amputated great toe of left foot ([facility] )     last assessed - [id number]    Anticoagulated on Coumadin     Mechanical MVR    Anxiety     B[facility] disorder [facility]     CAD (coronary artery disease)     Chronic kidney disease     left kideny being monitored    Chronic systolic congestive heart failure ([facility] ) 4/18/2017    Colitis 04/02/2017    Acute; last assessed - [id number]    COPD (chronic obstructive pulmonary disease) ([facility] )     Diabetes mellitus (HCC)     Difficulty urinating     DM type 2 with diabetic peripheral neuropathy (HCC)     Dyspepsia     last assessed - [id number]    Hiatal hernia     Hyperlipidemia     Hypertension     Ischemic cardiomyopathy     Myocardial infarction [facility])     Pacemaker     [facility]ipheral [facility]     Rectal bleed     Rotator cuff tear, right     last assessed - [id number]    Vomiting      Past Surgical History:   Procedure Laterality Date    AMPUTATION ABOVE KNEE (AKA) Left 8/24/2017    Procedure: AMPUTATION ABOVE KNEE (AKA); Surgeon: Irl Mortimer, MD;  [facility];  Service: Vascular; last assessed - [id number]   [address]      last assessed - [id number]    CARDIAC DEFIBRILLATOR PLACEMENT      CORONARY ARTERY BYPASS GRAFT      ESOPHAGOGASTRODUODENOSCOPY N/A 4/20/2017    Procedure: ESOPHAGOGASTRODUODENOSCOPY (EGD); Surgeon: Linda Molina MD;  [facility];  Service:     ESOPHAGOGASTRODUODENOSCOPY N/A 5/26/2017    Procedure: ESOPHAGOGASTRODUODENOSCOPY (EGD); Surgeon: Norma De Jesus MD;  [facility];  Service:     ESOPHAGOGASTRODUODENOSCOPY N/A 11/16/2017    Procedure: ESOPHAGOGASTRODUODENOSCOPY (EGD); Surgeon: Deepa Denton MD;  [facility];   Service: Gastroenterology    FOOT AMPUTATION THROUGH METATARSAL Left     MTP first toe; last assessed - [id number]   Ronny Dimas MITRAL VALVE REPLACEMENT      Mechanical; last assessed - [id number]    ROTATOR CUFF REPAIR      last assessed - [id number]    TOE AMPUTATION Left 7/21/2017    Procedure: PARTIAL FIRST RAY AMPUTATION; EXCISION OF WOUND W/ TOE FLAP CLOSURE;  Surgeon: Arvind Yoon DPM;  [facility];  Service: [address] [place] [address],[address]      last assessed - [id number]    VAC DRESSING APPLICATION Left 0/76/5232    Procedure: VAC application;  Surgeon: Arvind Yoon DPM;  [facility];  Service: Podiatry    WOUND DEBRIDEMENT Left 8/19/2017    Procedure: wound debridement with washout; resection of left 1st metatarsal;  Surgeon: Arvind Yoon DPM;  [facility];  Service: Podiatry     Social History   History Alcohol Use No     History   Drug Use No     History   Smoking Status    Current Every Day Smoker    Packs/day: 0 50    Types: Cigarettes   Smokeless Tobacco    Never Used     Family History:   Family History   Problem Relation Age of Onset    Hypertension Mother     Diabetes Mother         Diabetes mellitus    Migraines Mother         Migraine headaches    Diabetes Father     Bipolar disorder Father     Hypertension Father     Melanoma Father         Malignant melanoma    Migraines Sister     Heart disease Maternal Grandfather         cardiac disorder    No Known Problems Paternal Grandfather     Bipolar disorder Daughter     Migraines Daughter         Migraine headaches    Arthritis Family     Heart disease Family         cardiac disorder    Depression Family        Meds/Allergies   Current Outpatient Prescriptions   Medication Sig Dispense Refill    ACCU-CHEK FASTCLIX LANCETS MISC 1 Units by Does not apply route 3 (three) times a day      atorvastatin (LIPITOR) 10 mg tablet TAKE 1 TABLET DAILY 90 tablet 0    B-D INS SYR ULTRAFINE 1CC/31G 31G X 5/16" 1 ML MISC Inject 1 each under the skin 4 (four) times a day  0    Blood Glucose Monitoring Suppl (ACCU-CHEK APPLE PLUS) w/Device KIT 1 Units by Does not apply route 3 (three) times a day      ferrous sulfate 324 (65 Fe) mg Take 1 tablet (324 mg total) by mouth daily before breakfast 30 tablet 5    gabapentin (NEURONTIN) 600 MG tablet Take 1 tablet (600 mg total) by mouth 3 (three) times a day 90 tablet 5    glucose blood (ACCU-CHEK APPLE PLUS) test strip 1 Units by In Vitro route 3 (three) times a day      HUMALOG 100 UNIT/ML injection INJECT 1 TO 3 UNITS AS DIRECTED 3 TIMES A DAY *DISCARD 28 DAYS AFTER OPENING* 10 mL 0    HYDROcodone-acetaminophen (NORCO) 7 5-325 mg per tablet Take 1 tablet by mouth every 6 (six) hours as needed for pain Max Daily Amount: 4 tablets 120 tablet 0    insulin glargine (BASAGLAR KWIKPEN) 100 units/mL injection pen Inject 14 Units under the skin daily at bedtime (Patient taking differently: Inject 10 Units under the skin daily at bedtime  ) 5 pen 0    Insulin Pen Needle (B-D ULTRAFINE III SHORT PEN) 31G X 8 MM MISC 1 Units by Does not apply route 4 (four) times a day 200 each 0    Insulin Pen Needle 32G X 4 MM MISC by Does not apply route 4 (four) times a day 100 each 0    lisinopril (ZESTRIL) 10 mg tablet Take 10 mg by mouth daily  3    LORazepam (ATIVAN) 1 mg tablet Take 1 tablet by mouth 2 (two) times a day as needed      metoprolol succinate (TOPROL-XL) 25 mg 24 hr tablet Take 25 mg by mouth daily      metroNIDAZOLE (METROCREAM) 0 75 % cream Apply 1 application topically daily      mirtazapine (REMERON) 15 mg tablet Take 15 mg by mouth daily        pantoprazole (PROTONIX) 40 mg tablet Take 1 tablet (40 mg total) by mouth 2 (two) times a day before meals 60 tablet 5    QUEtiapine (SEROquel) 400 MG tablet Take 400 mg by mouth daily at bedtime  1    Sulfacetamide Sodium, Acne, 10 % LOTN APPLY SPARINGLY TO AFFECTED AREA(S) TWICE DAILY 118 mL 3    torsemide (DEMADEX) 20 mg tablet TAKE 2 TABLET DAILY 60 tablet 4    warfarin (COUMADIN) 5 mg tablet TAKE 1-2 TABLETS DAILY OR AS DIRECTED 60 tablet 5    metoclopramide (REGLAN) 10 mg tablet Take 1 tablet (10 mg total) by mouth every 6 (six) hours (Patient not taking: Reported on 8/15/2018 ) 30 tablet 0    metoprolol succinate (TOPROL-XL) 50 mg 24 hr tablet Take 1 tablet (50 mg total) by mouth daily (Patient not taking: Reported on 8/15/2018 ) 30 tablet 6    montelukast (SINGULAIR) 10 mg tablet TAKE 1 TABLET DAILY   (Patient not taking: Reported on 8/15/2018) 30 tablet 5    ondansetron (ZOFRAN-ODT) 4 mg disintegrating tablet Take 1 tablet (4 mg total) by mouth every 8 (eight) hours as needed for nausea or vomiting for up to 7 days 20 tablet 0    promethazine (PHENERGAN) 25 mg tablet Take 1 tablet (25 mg total) by mouth every 6 (six) hours as needed for nausea or vomiting (Patient not taking: Reported on 8/15/2018 ) 30 tablet 0     No current facility-administered medications for this visit  Allergies   Allergen Reactions    Aspirin Other (See Comments)     Received ASA & bleed out    Heparin      Please see 8/2017 admit = 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    Morphine Other (See Comments)     Gets red streak up arm above IV site    Omeprazole Diarrhea       Objective   Vitals: Blood pressure 142/90, pulse 60, height 6' 1" (1 854 m), weight 85 3 kg (188 lb)  Physical Exam   Constitutional: He is oriented to person, place, and time  He appears well-developed and well-nourished  No distress  HENT:   Head: Normocephalic and atraumatic  Nose: Nose normal    Mouth/Throat: Oropharynx is clear and moist    Poor dentition   Eyes: Conjunctivae and EOM are normal  Pupils are equal, round, and reactive to light  Right eye exhibits no discharge  Left eye exhibits no discharge  No scleral icterus  Neck: Normal range of motion  Neck supple  No JVD present  No tracheal deviation present  No thyromegaly present  Cardiovascular: Normal rate, regular rhythm, normal heart sounds and intact distal pulses  Pulmonary/Chest: Effort normal  No stridor  No respiratory distress  He has wheezes  He has no rales  He exhibits no tenderness  Abdominal: Soft  Bowel sounds are normal  He exhibits no distension  There is no tenderness  Musculoskeletal: Normal range of motion  He exhibits deformity (left AKA)  He exhibits no edema or tenderness  Left AKA   Lymphadenopathy:     He has no cervical adenopathy  Neurological: He is alert and oriented to person, place, and time  He displays normal reflexes  No cranial nerve deficit  Coordination (wheelchair) abnormal    Skin: Skin is warm and dry  No rash noted  No erythema  No pallor  Dry skin   Psychiatric: He has a normal mood and affect   His behavior is normal  Judgment and thought content normal    Vitals reviewed        Lab Results:   Lab Results   Component Value Date/Time    Hemoglobin A1C 8 0 (H) 07/24/2018 12:00 AM    Hemoglobin A1C 6 2 11/08/2017 03:23 PM     HGB A1C 6 7 03/01/2018 12:42 PM    WBC 15 84 (H) 08/06/2018 01:00 PM    WBC 12 26 (H) 08/05/2018 01:37 PM    WBC 11 30 (H) 03/29/2018 10:55 AM    WBC 9 1 12/06/2017 12:00 AM    Hemoglobin 12 2 08/06/2018 01:00 PM    Hemoglobin 15 0 08/05/2018 01:37 PM    Hemoglobin 11 9 (L) 03/29/2018 10:55 AM    Hemoglobin 12 2 (L) 12/06/2017 12:00 AM    Hematocrit 37 7 08/06/2018 01:00 PM    Hematocrit 46 1 08/05/2018 01:37 PM    Hematocrit 36 3 (L) 03/29/2018 10:55 AM    Hematocrit 37 2 (L) 12/06/2017 12:00 AM    MCV 88 08/06/2018 01:00 PM    MCV 88 08/05/2018 01:37 PM    MCV 90 03/29/2018 10:55 AM    MCV 86 12/06/2017 12:00 AM    Platelets 796 92/71/9959 01:00 PM    Platelets 052 31/30/6714 01:37 PM    Platelets 647 02/73/3374 10:55 AM    Platelets 526 47/32/1831 12:00 AM    BUN 16 08/06/2018 01:00 PM    BUN 11 08/05/2018 01:37 PM    BUN 17 03/29/2018 10:55 AM    BUN 27 (H) 10/31/2017 12:00 AM    Sodium 141 08/06/2018 01:00 PM    Sodium 139 08/05/2018 01:37 PM    Sodium 140 03/29/2018 10:55 AM    Sodium 139 10/31/2017 12:00 AM    Potassium 3 3 (L) 08/06/2018 01:00 PM    Potassium 3 7 08/05/2018 01:37 PM    Potassium 4 0 03/29/2018 10:55 AM    Potassium 5 3 (H) 10/31/2017 12:00 AM    Chloride 105 08/06/2018 01:00 PM    Chloride 107 08/05/2018 01:37 PM    Chloride 104 03/29/2018 10:55 AM    Chloride 100 10/31/2017 12:00 AM    CO2 22 08/06/2018 01:00 PM    CO2 23 08/05/2018 01:37 PM    CO2 23 03/29/2018 10:55 AM    CO2 23 10/31/2017 12:00 AM    Creatinine 1 24 08/06/2018 01:00 PM    Creatinine 1 06 08/05/2018 01:37 PM    Creatinine 1 12 03/29/2018 10:55 AM    Creatinine 1 14 10/31/2017 12:00 AM    AST 23 08/06/2018 01:00 PM    AST 19 08/05/2018 01:37 PM    AST 28 03/29/2018 10:55 AM    AST 24 10/31/2017 12:00 AM    ALT 22 08/06/2018 01:00 PM    ALT 23 08/05/2018 01:37 PM    ALT 29 03/29/2018 10:55 AM    ALT 30 10/31/2017 12:00 AM    Albumin 3 6 08/06/2018 01:00 PM    Albumin 4 1 08/05/2018 01:37 PM    Albumin 4 0 03/29/2018 10:55 AM    Albumin 4 4 10/31/2017 12:00 AM     Portions of the record may have been created with voice recognition software  Occasional wrong word or "sound a like" substitutions may have occurred due to the inherent limitations of voice recognition software  Read the chart carefully and recognize, using context, where substitutions have occurred

## 2018-08-17 ENCOUNTER — TELEPHONE (OUTPATIENT)
Dept: ENDOCRINOLOGY | Facility: HOSPITAL | Age: 58
End: 2018-08-17

## 2018-08-21 LAB — INR PPP: 2.6 (ref 0.86–1.17)

## 2018-08-22 ENCOUNTER — ANTICOAG VISIT (OUTPATIENT)
Dept: CARDIOLOGY CLINIC | Facility: CLINIC | Age: 58
End: 2018-08-22

## 2018-08-22 DIAGNOSIS — Z95.2 HISTORY OF MITRAL VALVE REPLACEMENT WITH MECHANICAL VALVE: ICD-10-CM

## 2018-08-22 LAB
INR PPP: 2.6 (ref 0.8–1.2)
PROTHROMBIN TIME: 25.8 SEC (ref 9.1–12)

## 2018-08-26 DIAGNOSIS — IMO0002 DM (DIABETES MELLITUS), TYPE 2, UNCONTROLLED, PERIPH VASCULAR COMPLIC: Primary | ICD-10-CM

## 2018-08-27 RX ORDER — BLOOD SUGAR DIAGNOSTIC
STRIP MISCELLANEOUS
Qty: 100 EACH | Refills: 1 | Status: SHIPPED | OUTPATIENT
Start: 2018-08-27

## 2018-08-28 ENCOUNTER — TELEPHONE (OUTPATIENT)
Dept: OTHER | Facility: HOSPITAL | Age: 58
End: 2018-08-28

## 2018-08-28 NOTE — TELEPHONE ENCOUNTER
This pt cxl appointment  for emg today with me   He has an appointment with me as a f/u in November on 11/09  and the emg is scheduled  afterward on 11/26/18  In [place]      Would recommend moving the f/u after the emg in nov or moving up the emg with me in [facility]    Let him know I do not do the emg in [place] and it would be done by another neurologist     Please call the pt and find out what how he would like to proceed

## 2018-08-29 ENCOUNTER — ANTICOAG VISIT (OUTPATIENT)
Dept: CARDIOLOGY CLINIC | Facility: CLINIC | Age: 58
End: 2018-08-29

## 2018-08-29 DIAGNOSIS — Z95.2 HISTORY OF MITRAL VALVE REPLACEMENT WITH MECHANICAL VALVE: ICD-10-CM

## 2018-08-29 LAB
INR PPP: 2.4 (ref 0.8–1.2)
PROTHROMBIN TIME: 24 SEC (ref 9.1–12)

## 2018-09-04 LAB — INR PPP: 3.6 (ref 0.86–1.17)

## 2018-09-05 ENCOUNTER — HOSPITAL ENCOUNTER (OUTPATIENT)
Dept: NON INVASIVE DIAGNOSTICS | Facility: CLINIC | Age: 58
Discharge: HOME | End: 2018-09-05
Payer: COMMERCIAL

## 2018-09-05 ENCOUNTER — ANTICOAG VISIT (OUTPATIENT)
Dept: CARDIOLOGY CLINIC | Facility: CLINIC | Age: 58
End: 2018-09-05

## 2018-09-05 DIAGNOSIS — I77.9 DISORDER OF ARTERY OR ARTERIOLE (HCC): ICD-10-CM

## 2018-09-05 DIAGNOSIS — Z95.2 HISTORY OF MITRAL VALVE REPLACEMENT WITH MECHANICAL VALVE: ICD-10-CM

## 2018-09-05 LAB
INR PPP: 3.6 (ref 0.8–1.2)
PROTHROMBIN TIME: 34.8 SEC (ref 9.1–12)

## 2018-09-05 PROCEDURE — 93880 EXTRACRANIAL BILAT STUDY: CPT

## 2018-09-06 PROCEDURE — 93880 EXTRACRANIAL BILAT STUDY: CPT | Performed by: SURGERY

## 2018-09-07 DIAGNOSIS — Z89.612 STATUS POST ABOVE KNEE AMPUTATION OF LEFT LOWER EXTREMITY: ICD-10-CM

## 2018-09-07 RX ORDER — HYDROCODONE BITARTRATE AND ACETAMINOPHEN 7.5; 325 MG/1; MG/1
1 TABLET ORAL EVERY 6 HOURS PRN
Qty: 120 TABLET | Refills: 0 | Status: SHIPPED | OUTPATIENT
Start: 2018-09-07 | End: 2018-10-05 | Stop reason: SDUPTHER

## 2018-09-11 LAB — INR PPP: 3.2 (ref 0.86–1.17)

## 2018-09-12 LAB
INR PPP: 3.2 (ref 0.8–1.2)
PROTHROMBIN TIME: 31.8 SEC (ref 9.1–12)

## 2018-09-13 ENCOUNTER — ANTICOAG VISIT (OUTPATIENT)
Dept: CARDIOLOGY CLINIC | Facility: CLINIC | Age: 58
End: 2018-09-13

## 2018-09-13 DIAGNOSIS — Z95.2 HISTORY OF MITRAL VALVE REPLACEMENT WITH MECHANICAL VALVE: ICD-10-CM

## 2018-09-18 LAB
HBA1C MFR BLD HPLC: 8 %
INR PPP: 3.3 (ref 0.86–1.17)

## 2018-09-19 ENCOUNTER — TELEPHONE (OUTPATIENT)
Dept: ENDOCRINOLOGY | Facility: HOSPITAL | Age: 58
End: 2018-09-19

## 2018-09-19 ENCOUNTER — ANTICOAG VISIT (OUTPATIENT)
Dept: CARDIOLOGY CLINIC | Facility: CLINIC | Age: 58
End: 2018-09-19

## 2018-09-19 DIAGNOSIS — Z95.2 HISTORY OF MITRAL VALVE REPLACEMENT WITH MECHANICAL VALVE: ICD-10-CM

## 2018-09-19 NOTE — TELEPHONE ENCOUNTER
Review of blood work obtained on September 18, 2018 shows an elevated fasting glucose at 1:49 a m  Hemoglobin A1c is stable at 8 0 but is above goal   Please send in blood sugars for review  Cholesterol is stable

## 2018-09-27 DIAGNOSIS — Z79.4 INSULIN DEPENDENT TYPE 2 DIABETES MELLITUS, CONTROLLED (HCC): ICD-10-CM

## 2018-09-27 DIAGNOSIS — E11.9 INSULIN DEPENDENT TYPE 2 DIABETES MELLITUS, CONTROLLED (HCC): ICD-10-CM

## 2018-09-28 ENCOUNTER — APPOINTMENT (EMERGENCY)
Dept: RADIOLOGY | Facility: HOSPITAL | Age: 58
End: 2018-09-28
Payer: COMMERCIAL

## 2018-09-28 ENCOUNTER — APPOINTMENT (EMERGENCY)
Dept: CT IMAGING | Facility: HOSPITAL | Age: 58
End: 2018-09-28
Payer: COMMERCIAL

## 2018-09-28 ENCOUNTER — HOSPITAL ENCOUNTER (EMERGENCY)
Facility: HOSPITAL | Age: 58
Discharge: HOME | End: 2018-09-28
Attending: EMERGENCY MEDICINE | Admitting: EMERGENCY MEDICINE
Payer: COMMERCIAL

## 2018-09-28 VITALS
HEART RATE: 69 BPM | RESPIRATION RATE: 20 BRPM | DIASTOLIC BLOOD PRESSURE: 90 MMHG | TEMPERATURE: 98.1 F | OXYGEN SATURATION: 99 % | HEIGHT: 73 IN | SYSTOLIC BLOOD PRESSURE: 160 MMHG | WEIGHT: 188 LBS | BODY MASS INDEX: 24.92 KG/M2

## 2018-09-28 DIAGNOSIS — R11.10 VOMITING: Primary | ICD-10-CM

## 2018-09-28 LAB
ALBUMIN SERPL BCP-MCNC: 4 G/DL (ref 3.5–5)
ALP SERPL-CCNC: 186 U/L (ref 46–116)
ALT SERPL W P-5'-P-CCNC: 24 U/L (ref 12–78)
ANION GAP SERPL CALCULATED.3IONS-SCNC: 13 MMOL/L (ref 4–13)
APTT PPP: 36 SECONDS (ref 24–36)
AST SERPL W P-5'-P-CCNC: 20 U/L (ref 5–45)
BACTERIA UR QL AUTO: ABNORMAL /HPF
BASOPHILS # BLD AUTO: 0.04 THOUSANDS/ΜL (ref 0–0.1)
BASOPHILS NFR BLD AUTO: 0 % (ref 0–1)
BILIRUB SERPL-MCNC: 1.5 MG/DL (ref 0.2–1)
BILIRUB UR QL STRIP: ABNORMAL
BUN SERPL-MCNC: 11 MG/DL (ref 5–25)
CALCIUM SERPL-MCNC: 10.2 MG/DL (ref 8.3–10.1)
CHLORIDE SERPL-SCNC: 103 MMOL/L (ref 100–108)
CLARITY UR: CLEAR
CO2 SERPL-SCNC: 24 MMOL/L (ref 21–32)
COLOR UR: ABNORMAL
CREAT SERPL-MCNC: 1.42 MG/DL (ref 0.6–1.3)
EOSINOPHIL # BLD AUTO: 0 THOUSAND/ΜL (ref 0–0.61)
EOSINOPHIL NFR BLD AUTO: 0 % (ref 0–6)
ERYTHROCYTE [DISTWIDTH] IN BLOOD BY AUTOMATED COUNT: 14.8 % (ref 11.6–15.1)
GFR SERPL CREATININE-BSD FRML MDRD: 54 ML/MIN/1.73SQ M
GLUCOSE SERPL-MCNC: 243 MG/DL (ref 65–140)
GLUCOSE SERPL-MCNC: 244 MG/DL (ref 65–140)
GLUCOSE UR STRIP-MCNC: ABNORMAL MG/DL
HCT VFR BLD AUTO: 44.2 % (ref 36.5–49.3)
HGB BLD-MCNC: 14.6 G/DL (ref 12–17)
HGB UR QL STRIP.AUTO: ABNORMAL
HYALINE CASTS #/AREA URNS LPF: ABNORMAL /LPF
IMM GRANULOCYTES # BLD AUTO: 0.1 THOUSAND/UL (ref 0–0.2)
IMM GRANULOCYTES NFR BLD AUTO: 1 % (ref 0–2)
INR PPP: 2.22 (ref 0.86–1.17)
KETONES UR STRIP-MCNC: ABNORMAL MG/DL
LEUKOCYTE ESTERASE UR QL STRIP: NEGATIVE
LIPASE SERPL-CCNC: 74 U/L (ref 73–393)
LYMPHOCYTES # BLD AUTO: 1.03 THOUSANDS/ΜL (ref 0.6–4.47)
LYMPHOCYTES NFR BLD AUTO: 6 % (ref 14–44)
MCH RBC QN AUTO: 29.4 PG (ref 26.8–34.3)
MCHC RBC AUTO-ENTMCNC: 33 G/DL (ref 31.4–37.4)
MCV RBC AUTO: 89 FL (ref 82–98)
MONOCYTES # BLD AUTO: 0.57 THOUSAND/ΜL (ref 0.17–1.22)
MONOCYTES NFR BLD AUTO: 4 % (ref 4–12)
MUCOUS THREADS UR QL AUTO: ABNORMAL
NEUTROPHILS # BLD AUTO: 14.41 THOUSANDS/ΜL (ref 1.85–7.62)
NEUTS SEG NFR BLD AUTO: 89 % (ref 43–75)
NITRITE UR QL STRIP: NEGATIVE
NON-SQ EPI CELLS URNS QL MICRO: ABNORMAL /HPF
NRBC BLD AUTO-RTO: 0 /100 WBCS
PH UR STRIP.AUTO: 6 [PH] (ref 4.5–8)
PLATELET # BLD AUTO: 187 THOUSANDS/UL (ref 149–390)
PMV BLD AUTO: 13.4 FL (ref 8.9–12.7)
POTASSIUM SERPL-SCNC: 3.8 MMOL/L (ref 3.5–5.3)
PROT SERPL-MCNC: 8.7 G/DL (ref 6.4–8.2)
PROT UR STRIP-MCNC: >=300 MG/DL
PROTHROMBIN TIME: 23.4 SECONDS (ref 11.8–14.2)
RBC # BLD AUTO: 4.96 MILLION/UL (ref 3.88–5.62)
RBC #/AREA URNS AUTO: ABNORMAL /HPF
SODIUM SERPL-SCNC: 140 MMOL/L (ref 136–145)
SP GR UR STRIP.AUTO: 1.02 (ref 1–1.03)
UROBILINOGEN UR QL STRIP.AUTO: 0.2 E.U./DL
WBC # BLD AUTO: 16.15 THOUSAND/UL (ref 4.31–10.16)
WBC #/AREA URNS AUTO: ABNORMAL /HPF

## 2018-09-28 PROCEDURE — 36415 COLL VENOUS BLD VENIPUNCTURE: CPT | Performed by: EMERGENCY MEDICINE

## 2018-09-28 PROCEDURE — 82948 REAGENT STRIP/BLOOD GLUCOSE: CPT

## 2018-09-28 PROCEDURE — 96374 THER/PROPH/DIAG INJ IV PUSH: CPT

## 2018-09-28 PROCEDURE — 83690 ASSAY OF LIPASE: CPT | Performed by: EMERGENCY MEDICINE

## 2018-09-28 PROCEDURE — 85610 PROTHROMBIN TIME: CPT | Performed by: EMERGENCY MEDICINE

## 2018-09-28 PROCEDURE — 96372 THER/PROPH/DIAG INJ SC/IM: CPT

## 2018-09-28 PROCEDURE — 80053 COMPREHEN METABOLIC PANEL: CPT | Performed by: EMERGENCY MEDICINE

## 2018-09-28 PROCEDURE — 81001 URINALYSIS AUTO W/SCOPE: CPT | Performed by: EMERGENCY MEDICINE

## 2018-09-28 PROCEDURE — 71045 X-RAY EXAM CHEST 1 VIEW: CPT

## 2018-09-28 PROCEDURE — 96375 TX/PRO/DX INJ NEW DRUG ADDON: CPT

## 2018-09-28 PROCEDURE — 85025 COMPLETE CBC W/AUTO DIFF WBC: CPT | Performed by: EMERGENCY MEDICINE

## 2018-09-28 PROCEDURE — 74177 CT ABD & PELVIS W/CONTRAST: CPT

## 2018-09-28 PROCEDURE — 99284 EMERGENCY DEPT VISIT MOD MDM: CPT

## 2018-09-28 PROCEDURE — 85730 THROMBOPLASTIN TIME PARTIAL: CPT | Performed by: EMERGENCY MEDICINE

## 2018-09-28 PROCEDURE — 96361 HYDRATE IV INFUSION ADD-ON: CPT

## 2018-09-28 RX ORDER — ONDANSETRON 2 MG/ML
4 INJECTION INTRAMUSCULAR; INTRAVENOUS ONCE
Status: COMPLETED | OUTPATIENT
Start: 2018-09-28 | End: 2018-09-28

## 2018-09-28 RX ORDER — LORAZEPAM 2 MG/ML
2 INJECTION INTRAMUSCULAR ONCE
Status: COMPLETED | OUTPATIENT
Start: 2018-09-28 | End: 2018-09-28

## 2018-09-28 RX ORDER — PROMETHAZINE HYDROCHLORIDE 25 MG/ML
25 INJECTION, SOLUTION INTRAMUSCULAR; INTRAVENOUS ONCE
Status: COMPLETED | OUTPATIENT
Start: 2018-09-28 | End: 2018-09-28

## 2018-09-28 RX ORDER — ONDANSETRON 4 MG/1
4 TABLET, ORALLY DISINTEGRATING ORAL ONCE
Status: COMPLETED | OUTPATIENT
Start: 2018-09-28 | End: 2018-09-28

## 2018-09-28 RX ORDER — HALOPERIDOL 5 MG/ML
5 INJECTION INTRAMUSCULAR ONCE
Status: COMPLETED | OUTPATIENT
Start: 2018-09-28 | End: 2018-09-28

## 2018-09-28 RX ORDER — LORAZEPAM 2 MG/ML
INJECTION INTRAMUSCULAR
Status: COMPLETED
Start: 2018-09-28 | End: 2018-09-28

## 2018-09-28 RX ORDER — HALOPERIDOL 5 MG/ML
5 INJECTION INTRAMUSCULAR ONCE
Status: DISCONTINUED | OUTPATIENT
Start: 2018-09-28 | End: 2018-09-28

## 2018-09-28 RX ADMIN — FAMOTIDINE 20 MG: 10 INJECTION, SOLUTION INTRAVENOUS at 16:47

## 2018-09-28 RX ADMIN — ONDANSETRON 4 MG: 4 TABLET, ORALLY DISINTEGRATING ORAL at 15:30

## 2018-09-28 RX ADMIN — LORAZEPAM 2 MG: 2 INJECTION INTRAMUSCULAR at 15:08

## 2018-09-28 RX ADMIN — PROMETHAZINE HYDROCHLORIDE 25 MG: 25 INJECTION INTRAMUSCULAR; INTRAVENOUS at 16:47

## 2018-09-28 RX ADMIN — HALOPERIDOL LACTATE 5 MG: 5 INJECTION INTRAMUSCULAR at 17:35

## 2018-09-28 RX ADMIN — LORAZEPAM 2 MG: 2 INJECTION INTRAMUSCULAR; INTRAVENOUS at 15:08

## 2018-09-28 RX ADMIN — ONDANSETRON 4 MG: 2 INJECTION, SOLUTION INTRAMUSCULAR; INTRAVENOUS at 15:45

## 2018-09-28 RX ADMIN — IOHEXOL 85 ML: 350 INJECTION, SOLUTION INTRAVENOUS at 17:59

## 2018-09-28 RX ADMIN — SODIUM CHLORIDE 1000 ML: 0.9 INJECTION, SOLUTION INTRAVENOUS at 15:41

## 2018-09-28 NOTE — ED NOTES
Patient observed by Marcela Centeno putting fingers down throat then vomiting        Poli Ramos RN  09/28/18 [id number]

## 2018-09-28 NOTE — ED NOTES
With patient permission update given to daughter  Daughter states she can  patient for discharge        Fei Chatman RN  09/28/18 [id number]

## 2018-09-28 NOTE — ED NOTES
Multiple attempts to insert IV, patient unable to remain still during IV insertion   Provider notified      Santos Lynn RN  09/28/18 [id number]

## 2018-09-28 NOTE — ED NOTES
Patient stating he will not use bedpan to have BM  Nurse and ED tech at bedside to assist patient onto bedside commode        Germain Moreno RN  09/28/18 [id number]

## 2018-09-28 NOTE — ED NOTES
Patient assisted onto bedside commode with no issues, patient states, " I don't have to go anymore "     Joby Lozano, YISSEL  09/28/18 4820

## 2018-09-28 NOTE — ED PROVIDER NOTES
History  Chief Complaint   Patient presents with    Vomiting     Pt presents to ED w/ constant vomitting that began sometime last night  Pt reports daughter and wife also having similar symptoms  Patient brought in by ambulance for persistent non-bloody vomiting and dry heaves since yesterday  Family members with similar symptoms  Patient attributes symptoms to his anxiety  Poor historian states that this happens all of the time  Admits to associated abd pain  Prior to Admission Medications   Prescriptions Last Dose Informant Patient Reported? Taking? ACCU-CHEK APPLE PLUS test strip   No Yes   Sig: TEST 3 TIMES A DAY   DX E11 65   ACCU-CHEK FASTCLIX LANCETS MISC  Self Yes Yes   Si Units by Does not apply route 3 (three) times a day   B-D INS SYR ULTRAFINE 1CC/31G 31G X 5/16" 1 ML MISC  Self Yes Yes   Sig: Inject 1 each under the skin 4 (four) times a day   Blood Glucose Monitoring Suppl (ACCU-CHEK APPLE PLUS) w/Device KIT  Self Yes Yes   Si Units by Does not apply route 3 (three) times a day   HUMALOG 100 UNIT/ML injection  Self No No   Sig: INJECT 1 TO 3 UNITS AS DIRECTED 3 TIMES A DAY *DISCARD 28 DAYS AFTER OPENING*   HYDROcodone-acetaminophen (NORCO) 7 5-325 mg per tablet   No No   Sig: Take 1 tablet by mouth every 6 (six) hours as needed for pain Max Daily Amount: 4 tablets   Insulin Pen Needle (B-D ULTRAFINE III SHORT PEN) 31G X 8 MM MISC  Self No No   Si Units by Does not apply route 4 (four) times a day   Insulin Pen Needle 32G X 4 MM MISC  Self No No   Sig: by Does not apply route 4 (four) times a day   LORazepam (ATIVAN) 1 mg tablet  Self Yes No   Sig: Take 1 tablet by mouth 2 (two) times a day as needed   QUEtiapine (SEROquel) 400 MG tablet  Self Yes No   Sig: Take 400 mg by mouth daily at bedtime   Sulfacetamide Sodium, Acne, 10 % LOTN  Self No No   Sig: APPLY SPARINGLY TO AFFECTED AREA(S) TWICE DAILY   atorvastatin (LIPITOR) 10 mg tablet  Self No Yes   Sig: TAKE 1 TABLET DAILY   ferrous sulfate 324 (65 Fe) mg  Self No Yes   Sig: Take 1 tablet (324 mg total) by mouth daily before breakfast   gabapentin (NEURONTIN) 600 MG tablet  Self No Yes   Sig: Take 1 tablet (600 mg total) by mouth 3 (three) times a day   insulin glargine (BASAGLAR KWIKPEN) 100 units/mL injection pen   No No   Sig: Inject 10 Units under the skin daily at bedtime   lisinopril (ZESTRIL) 10 mg tablet  Self Yes No   Sig: Take 10 mg by mouth daily   metoclopramide (REGLAN) 10 mg tablet  Self No No   Sig: Take 1 tablet (10 mg total) by mouth every 6 (six) hours   Patient not taking: Reported on 8/15/2018    metoprolol succinate (TOPROL-XL) 25 mg 24 hr tablet  Self Yes No   Sig: Take 25 mg by mouth daily   metoprolol succinate (TOPROL-XL) 50 mg 24 hr tablet  Self No No   Sig: Take 1 tablet (50 mg total) by mouth daily   Patient not taking: Reported on 8/15/2018    metroNIDAZOLE (METROCREAM) 0 75 % cream  Self Yes No   Sig: Apply 1 application topically daily   mirtazapine (REMERON) 15 mg tablet  Self Yes No   Sig: Take 15 mg by mouth daily     montelukast (SINGULAIR) 10 mg tablet  Self No No   Sig: TAKE 1 TABLET DAILY     Patient not taking: Reported on 8/15/2018   ondansetron (ZOFRAN-ODT) 4 mg disintegrating tablet   No No   Sig: Take 1 tablet (4 mg total) by mouth every 8 (eight) hours as needed for nausea or vomiting for up to 7 days   promethazine (PHENERGAN) 25 mg tablet  Self No No   Sig: Take 1 tablet (25 mg total) by mouth every 6 (six) hours as needed for nausea or vomiting   Patient not taking: Reported on 8/15/2018    torsemide (DEMADEX) 20 mg tablet  Self No No   Sig: TAKE 2 TABLET DAILY   warfarin (COUMADIN) 5 mg tablet  Self No No   Sig: TAKE 1-2 TABLETS DAILY OR AS DIRECTED      Facility-Administered Medications: None       Past Medical History:   Diagnosis Date    Acute gastritis     last assessed - [id number]    Amputated great toe of left foot ([address] )     last assessed - [id number]    Anticoagulated on Coumadin     Mechanical MVR    Anxiety     Bipolar 1 disorder (HCC)     CAD (coronary artery disease)     Chronic kidney disease     left kideny being monitored    Chronic systolic congestive heart failure ([facility] ) 4/18/2017    Colitis 04/02/2017    Acute; last assessed - [id number]    COPD (chronic obstructive pulmonary disease) ([facility] [facility] )     Diabetes mellitus (HCC)     Difficulty urinating     DM type 2 with diabetic peripheral neuropathy ([facility])     Dyspepsia     last assessed - [id number]    Hiatal hernia     Hyperlipidemia     Hypertension     Ischemic cardiomyopathy     Myocardial infarction [facility])     Pacemaker     PAD (peripheral artery disease) ([facility])     Rectal bleed     Rotator cuff tear, right     last assessed - [id number]    Vomiting        Past Surgical History:   Procedure Laterality Date    AMPUTATION ABOVE KNEE (AKA) Left 8/24/2017    Procedure: AMPUTATION ABOVE KNEE (AKA); Surgeon: Tripp Ellis MD;  [facility];  Service: Vascular; last assessed - [id number]   [address]      last assessed - [id number]    CARDIAC DEFIBRILLATOR PLACEMENT      CORONARY ARTERY BYPASS GRAFT      ESOPHAGOGASTRODUODENOSCOPY N/A 4/20/2017    Procedure: ESOPHAGOGASTRODUODENOSCOPY (EGD); Surgeon: Abhijeet Ruiz MD;  [facility];  Service:     ESOPHAGOGASTRODUODENOSCOPY N/A 5/26/2017    Procedure: ESOPHAGOGASTRODUODENOSCOPY (EGD); Surgeon: Jonah Junior MD;  [facility];  Service:     ESOPHAGOGASTRODUODENOSCOPY N/A 11/16/2017    Procedure: ESOPHAGOGASTRODUODENOSCOPY (EGD); Surgeon: Freddy Collins MD;  [facility];   Service: Gastroenterology    FOOT AMPUTATION THROUGH METATARSAL Left     MTP first toe; last assessed - [id number]   [place] MITRAL VALVE REPLACEMENT      Mechanical; last assessed - [id number]    ROTATOR CUFF REPAIR      last assessed - [id number]    TOE AMPUTATION Left 7/21/2017    Procedure: PARTIAL FIRST RAY AMPUTATION; EXCISION OF WOUND W/ TOE FLAP CLOSURE;  Surgeon: Arun Ballard DPM;  [facility];  Service: Podiatry    TONSILLECTOMY      last assessed - [id number]    VAC DRESSING APPLICATION Left 4/47/2366    Procedure: VAC application;  Surgeon: Arun Ballard DPM;  [facility];  Service: Podiatry    WOUND DEBRIDEMENT Left 8/19/2017    Procedure: wound debridement with washout; resection of left 1st metatarsal;  Surgeon: Arun Ballard DPM;  [facility];  Service: Podiatry       Family History   Problem Relation Age of Onset    Hypertension Mother     Diabetes Mother         Diabetes mellitus    Migraines Mother         Migraine headaches    Diabetes Father     Bipolar disorder Father     Hypertension Father     Melanoma Father         Malignant melanoma    Migraines Sister     Heart disease Maternal Grandfather         cardiac disorder    No Known Problems Paternal Grandfather     Bipolar disorder Daughter     Migraines Daughter         Migraine headaches    Arthritis Family     Heart disease Family         cardiac disorder    Depression Family      I have reviewed and agree with the history as documented  Social History   Substance Use Topics    Smoking status: Current Every Day Smoker     Packs/day: 0 50     Types: Cigarettes    Smokeless tobacco: Never Used    Alcohol use No        Review of Systems   Genitourinary: Positive for dysuria  All other systems reviewed and are negative  Physical Exam  Physical Exam   Constitutional: He is oriented to person, place, and time  He appears well-developed and well-nourished  HENT:   Mouth/Throat: Oropharynx is clear and moist  Mucous membranes are dry  Eyes: Pupils are equal, round, and reactive to light  Conjunctivae and EOM are normal    Neck: Normal range of motion  Neck supple  No spinous process tenderness present  Cardiovascular: Normal rate, regular rhythm, normal heart sounds and intact distal pulses      Pulmonary/Chest: Effort normal and breath sounds normal  No respiratory distress  He has no wheezes  Abdominal: Soft  He exhibits no distension  Bowel sounds are increased  There is generalized tenderness  There is no rebound and no guarding  Musculoskeletal: Normal range of motion  Neurological: He is alert and oriented to person, place, and time  He has normal strength  No sensory deficit  GCS eye subscore is 4  GCS verbal subscore is 5  GCS motor subscore is 6  Skin: Skin is warm and dry  No rash noted  Psychiatric: Thought content normal  His mood appears anxious  His speech is rapid and/or pressured  He is hyperactive  Cognition and memory are normal  He expresses impulsivity  Nursing note and vitals reviewed        Vital Signs  ED Triage Vitals   Temperature Pulse Respirations Blood Pressure SpO2   09/28/18 1455 09/28/18 1452 09/28/18 1452 09/28/18 1500 09/28/18 1452   98 1 °F (36 7 °C) 72 (!) 24 126/100 98 %      Temp src Heart Rate Source Patient Position - Orthostatic VS BP Location FiO2 (%)   -- 09/28/18 1500 -- -- --    Monitor         Pain Score       09/28/18 1452       8           Vitals:    09/28/18 1503 09/28/18 1515 09/28/18 1530 09/28/18 1545   BP: 126/100   160/90   Pulse:  72 68 69       Visual Acuity  Visual Acuity      Most Recent Value   L Pupil Size (mm)  3   R Pupil Size (mm)  3          ED Medications  Medications   ondansetron (ZOFRAN) injection 4 mg (4 mg Intravenous Given 9/28/18 1545)   sodium chloride 0 9 % bolus 1,000 mL (0 mL Intravenous Stopped 9/28/18 1920)   LORazepam (ATIVAN) 2 mg/mL injection 2 mg (2 mg Intramuscular Given 9/28/18 1508)   ondansetron (ZOFRAN-ODT) dispersible tablet 4 mg (4 mg Oral Given 9/28/18 1530)   famotidine (PEPCID) injection 20 mg (20 mg Intravenous Given 9/28/18 1647)   promethazine (PHENERGAN) injection 25 mg (25 mg Intravenous Given 9/28/18 1647)   haloperidol lactate (HALDOL) injection 5 mg (5 mg Intramuscular Given 9/28/18 1735)   iohexol (OMNIPAQUE) 350 MG/ML injection (MULTI-DOSE) 85 mL (85 mL Intravenous Given 9/28/18 8629)       Diagnostic Studies  Results Reviewed     Procedure Component Value Units Date/Time    Urine Microscopic [[id number]]  (Abnormal) Collected:  09/28/18 1927    Lab Status:  Final result Specimen:  Urine from Urine, Clean Catch Updated:  09/28/18 2000     RBC, UA 4-10 (A) /hpf      WBC, UA 1-2 (A) /hpf      Epithelial Cells None Seen /hpf      Bacteria, UA Occasional /hpf      Hyaline Casts, UA 30-50 (A) /lpf      MUCOUS THREADS Innumerable (A)    UA w Reflex to Microscopic w Reflex to Culture [[id number]]  (Abnormal) Collected:  09/28/18 1927    Lab Status:  Final result Specimen:  Urine from Urine, Clean Catch Updated:  09/28/18 1942     Color, UA Dulce Maria     Clarity, UA Clear     Specific Gravity, UA 1 025     pH, UA 6 0     Leukocytes, UA Negative     Nitrite, UA Negative     Protein, UA >=300 (A) mg/dl      Glucose,  (1/4%) (A) mg/dl      Ketones, UA 15 (1+) (A) mg/dl      Urobilinogen, UA 0 2 E U /dl      Bilirubin, UA Interference- unable to analyze (A)     Blood, UA Large (A)    UA/M w/rflx Culture, Routine [[id number]]     Lab Status:  No result Specimen:  Urine     APTT [[id number]]  (Normal) Collected:  09/28/18 1642    Lab Status:  Final result Specimen:  Blood from Arm, Right Updated:  09/28/18 1743     PTT 36 seconds     Protime-INR [[id number]]  (Abnormal) Collected:  09/28/18 1642    Lab Status:  Final result Specimen:  Blood from Arm, Right Updated:  09/28/18 1743     Protime 23 4 (H) seconds      INR 2 22 (H)    CMP [[id number]]  (Abnormal) Collected:  09/28/18 1536    Lab Status:  Final result Specimen:  Blood from Arm, Right Updated:  09/28/18 1628     Sodium 140 mmol/L      Potassium 3 8 mmol/L      Chloride 103 mmol/L      CO2 24 mmol/L      ANION GAP 13 mmol/L      BUN 11 mg/dL      Creatinine 1 42 (H) mg/dL      Glucose 243 (H) mg/dL      Calcium 10 2 (H) mg/dL      AST 20 U/L      ALT 24 U/L      Alkaline Phosphatase 186 (H) U/L Total Protein 8 7 (H) g/dL      Albumin 4 0 g/dL      Total Bilirubin 1 50 (H) mg/dL      eGFR 54 ml/min/1 73sq m     Narrative:         National Kidney Disease Education Program recommendations are as follows:  GFR calculation is accurate only with a steady state creatinine  Chronic Kidney disease less than 60 ml/min/1 73 sq  meters  Kidney failure less than 15 ml/min/1 73 sq  meters  Lipase [[id number]]  (Normal) Collected:  09/28/18 1536    Lab Status:  Final result Specimen:  Blood from Arm, Right Updated:  09/28/18 1628     Lipase 74 u/L     CBC and differential [[id number]]  (Abnormal) Collected:  09/28/18 1511    Lab Status:  Final result Specimen:  Blood from Arm, Right Updated:  09/28/18 1522     WBC 16 15 (H) Thousand/uL      RBC 4 96 Million/uL      Hemoglobin 14 6 g/dL      Hematocrit 44 2 %      MCV 89 fL      MCH 29 4 pg      MCHC 33 0 g/dL      RDW 14 8 %      MPV 13 4 (H) fL      Platelets 265 Thousands/uL      nRBC 0 /100 WBCs      Neutrophils Relative 89 (H) %      Immat GRANS % 1 %      Lymphocytes Relative 6 (L) %      Monocytes Relative 4 %      Eosinophils Relative 0 %      Basophils Relative 0 %      Neutrophils Absolute 14 41 (H) Thousands/µL      Immature Grans Absolute 0 10 Thousand/uL      Lymphocytes Absolute 1 03 Thousands/µL      Monocytes Absolute 0 57 Thousand/µL      Eosinophils Absolute 0 00 Thousand/µL      Basophils Absolute 0 04 Thousands/µL     Fingerstick Glucose (POCT) [[id number]]  (Abnormal) Collected:  09/28/18 1510    Lab Status:  Final result Updated:  09/28/18 1511     POC Glucose 244 (H) mg/dl                  CT abdomen pelvis with contrast   Final Result by Salvador Tellez MD (09/28 1812)         1  No evidence of bowel obstruction, colitis or diverticulitis  Normal appendix  2   No evidence of pyelonephritis or obstructive uropathy              Workstation performed: [id number]         X-ray chest 1 view portable   Final Result by Marcus Lanier MD (09/28 1541)      No acute cardiopulmonary disease  Workstation performed: [id number]                    Procedures  Procedures       Phone Contacts  ED Phone Contact    ED Course  ED Course as of Oct 02 0937   Fri Sep 28, 2018   1524 Delay in care as patient keeps jerking when trying to get an IV  Radiology tech witnessed patient putting his finger in his mouth to induce vomiting        pain and vomiting resolved after haldol  Tolerating ice chips  MDM  Number of Diagnoses or Management Options  Vomiting: new and requires workup     Amount and/or Complexity of Data Reviewed  Clinical lab tests: ordered and reviewed  Tests in the radiology section of CPT®: ordered and reviewed    Patient Progress  Patient progress: improved    CritCare Time    Disposition  Final diagnoses:   Vomiting     Time reflects when diagnosis was documented in both MDM as applicable and the Disposition within this note     Time User Action Codes Description Comment    9/28/2018  7:08 PM Celestia Lul Add [K20 9] Esophagitis     9/28/2018  7:08 PM Celestia Lul Remove [K20 9] Esophagitis     9/28/2018  7:08 PM Celestia Lul Add [R11 10] Vomiting       ED Disposition     ED Disposition Condition Comment    Discharge  [facility] discharge to home/self care  Condition at discharge: Good        Follow-up Information     Follow up With Specialties Details Why Contact Info    [facility] Gastroenterology In 3 days  [address]  [phone]            Discharge Medication List as of 9/28/2018  7:10 PM      CONTINUE these medications which have NOT CHANGED    Details   ACCU-CHEK APPLE PLUS test strip TEST 3 TIMES A DAY   DX E11 65, Normal      ACCU-CHEK FASTCLIX LANCETS MISC 1 Units by Does not apply route 3 (three) times a day, Starting Mon 4/24/2017, Historical Med      atorvastatin (LIPITOR) 10 mg tablet TAKE 1 TABLET DAILY, Normal      B-D INS SYR ULTRAFINE 1CC/31G 31G X 5/16" 1 ML MISC Inject 1 each under the skin 4 (four) times a day, Starting Tue 4/3/2018, Historical Med      Blood Glucose Monitoring Suppl (ACCU-CHEK APPLE PLUS) w/Device KIT 1 Units by Does not apply route 3 (three) times a day, Starting Mon 4/24/2017, Historical Med      ferrous sulfate 324 (65 Fe) mg Take 1 tablet (324 mg total) by mouth daily before breakfast, Starting Wed 4/18/2018, Normal      gabapentin (NEURONTIN) 600 MG tablet Take 1 tablet (600 mg total) by mouth 3 (three) times a day, Starting Fri 5/25/2018, Normal      HUMALOG 100 UNIT/ML injection INJECT 1 TO 3 UNITS AS DIRECTED 3 TIMES A DAY *DISCARD 28 DAYS AFTER OPENING*, Normal      HYDROcodone-acetaminophen (NORCO) 7 5-325 mg per tablet Take 1 tablet by mouth every 6 (six) hours as needed for pain Max Daily Amount: 4 tablets, Starting Fri 9/7/2018, Normal      insulin glargine (BASAGLAR KWIKPEN) 100 units/mL injection pen Inject 10 Units under the skin daily at bedtime, Starting Thu 9/27/2018, Normal      !! Insulin Pen Needle (B-D ULTRAFINE III SHORT PEN) 31G X 8 MM MISC 1 Units by Does not apply route 4 (four) times a day, Starting Tue 4/3/2018, Normal      !!  Insulin Pen Needle 32G X 4 MM MISC by Does not apply route 4 (four) times a day, Starting Fri 3/9/2018, Print      lisinopril (ZESTRIL) 10 mg tablet Take 10 mg by mouth daily, Starting Wed 12/27/2017, Historical Med      LORazepam (ATIVAN) 1 mg tablet Take 1 tablet by mouth 2 (two) times a day as needed, Starting Sun 7/2/2017, Historical Med      metoclopramide (REGLAN) 10 mg tablet Take 1 tablet (10 mg total) by mouth every 6 (six) hours, Starting Sun 8/5/2018, Print      !! metoprolol succinate (TOPROL-XL) 25 mg 24 hr tablet Take 25 mg by mouth daily, Historical Med      !! metoprolol succinate (TOPROL-XL) 50 mg 24 hr tablet Take 1 tablet (50 mg total) by mouth daily, Starting Mon 8/13/2018, Normal      metroNIDAZOLE (METROCREAM) 0 75 % cream Apply 1 application topically daily, Starting Wed 11/8/2017, Historical Med      mirtazapine (REMERON) 15 mg tablet Take 15 mg by mouth daily  , Starting Mon 3/19/2018, Historical Med      montelukast (SINGULAIR) 10 mg tablet TAKE 1 TABLET DAILY , Normal      ondansetron (ZOFRAN-ODT) 4 mg disintegrating tablet Take 1 tablet (4 mg total) by mouth every 8 (eight) hours as needed for nausea or vomiting for up to 7 days, Starting Fri 3/2/2018, Until Fri 3/9/2018, Print      promethazine (PHENERGAN) 25 mg tablet Take 1 tablet (25 mg total) by mouth every 6 (six) hours as needed for nausea or vomiting, Starting Fri 3/30/2018, Normal      QUEtiapine (SEROquel) 400 MG tablet Take 400 mg by mouth daily at bedtime, Starting Thu 1/11/2018, Historical Med      Sulfacetamide Sodium, Acne, 10 % LOTN APPLY SPARINGLY TO AFFECTED AREA(S) TWICE DAILY, Normal      torsemide (DEMADEX) 20 mg tablet TAKE 2 TABLET DAILY, Normal      warfarin (COUMADIN) 5 mg tablet TAKE 1-2 TABLETS DAILY OR AS DIRECTED, Normal      pantoprazole (PROTONIX) 40 mg tablet Take 1 tablet (40 mg total) by mouth 2 (two) times a day before meals, Starting Fri 4/13/2018, Normal       !! - Potential duplicate medications found  Please discuss with provider            Outpatient Discharge Orders  RAISA/RAMONITA w/guevarax Culture, Routine         ED Provider  Electronically Signed by           Shu Nagy DO  10/02/18 7076

## 2018-09-28 NOTE — ED NOTES
Patient again noted to be putting fingers down throat and vomiting in room        Kamlesh Mckeon RN  09/28/18 7125

## 2018-09-28 NOTE — ED NOTES
Patient stating, " I was calm until you guys came back in" Pt was asked to clarify, pt then states, :"You guys making me nervous that's all "     Quintin Cannon, RN  09/28/18 8164

## 2018-09-28 NOTE — DISCHARGE INSTRUCTIONS
Acute Nausea and Vomiting   WHAT YOU NEED TO KNOW:   Acute nausea and vomiting start suddenly, worsen quickly, and last a short time  DISCHARGE INSTRUCTIONS:   Seek care immediately if:   · You see blood in your vomit or your bowel movements  · You have sudden, severe pain in your chest and upper abdomen after hard vomiting or retching  · You have swelling in your neck and chest      · You are dizzy, cold, and thirsty and your eyes and mouth are dry  · You are urinating very little or not at all  · You have muscle weakness, leg cramps, and trouble breathing  · Your heart is beating much faster than normal      · You continue to vomit for more than 48 hours  Contact your healthcare provider if:   · You have frequent dry heaves (vomiting but nothing comes out)  · Your nausea and vomiting does not get better or go away after you use medicine  · You have questions or concerns about your condition or treatment  Medicines: You may need any of the following:  · Medicines  may be given to calm your stomach and stop your vomiting  You may also need medicines to help you feel more relaxed or to stop nausea and vomiting caused by motion sickness  · Gastrointestinal stimulants  are used to help empty your stomach and bowels  This may help decrease nausea and vomiting  · Take your medicine as directed  Contact your healthcare provider if you think your medicine is not helping or if you have side effects  Tell him or her if you are allergic to any medicine  Keep a list of the medicines, vitamins, and herbs you take  Include the amounts, and when and why you take them  Bring the list or the pill bottles to follow-up visits  Carry your medicine list with you in case of an emergency  Prevent or manage acute nausea and vomiting:   · Do not drink alcohol  Alcohol may upset or irritate your stomach  Too much alcohol can also cause acute nausea and vomiting  · Control stress    Headaches due to stress may cause nausea and vomiting  Find ways to relax and manage your stress  Get more rest and sleep  · Drink more liquids as directed  Vomiting can lead to dehydration  It is important to drink more liquids to help replace lost body fluids  Ask your healthcare provider how much liquid to drink each day and which liquids are best for you  Your provider may recommend that you drink an oral rehydration solution (ORS)  ORS contains water, salts, and sugar that are needed to replace the lost body fluids  Ask what kind of ORS to use, how much to drink, and where to get it  · Eat smaller meals, more often  Eat small amounts of food every 2 to 3 hours, even if you are not hungry  Food in your stomach may decrease your nausea  · Talk to your healthcare provider before you take over-the-counter (OTC) medicines  These medicines can cause serious problems if you use certain other medicines, or you have a medical condition  You may have problems if you use too much or use them for longer than the label says  Follow directions on the label carefully  Follow up with your healthcare provider as directed:  Write down your questions so you remember to ask them during your visits  © 2017 [facility] [facility] Information is for End User's use only and may not be sold, redistributed or otherwise used for commercial purposes  All illustrations and images included in CareNotes® are the copyrighted property of [employer] , [employer]  or Jemal Felipe  The above information is an  only  It is not intended as medical advice for individual conditions or treatments  Talk to your doctor, nurse or pharmacist before following any medical regimen to see if it is safe and effective for you

## 2018-09-28 NOTE — ED NOTES
Patient again observed inserting fingers into mouth, states "This is the only way I get better " Patient again education and instructed not to induce vomiting        Veronika Gonzalez RN  09/28/18 1600

## 2018-09-29 NOTE — ED NOTES
Patient is resting comfortably       Kamlesh Mckeon RN  09/28/18 Stefany Nguyễn, YISSEL  09/28/18 0586

## 2018-09-29 NOTE — ED NOTES
Patient assisted in wheelchair and taken to daughters car by ED [facility] Dmitry Lozano RN  09/28/18 2022

## 2018-10-01 DIAGNOSIS — I50.22 CHRONIC SYSTOLIC CONGESTIVE HEART FAILURE (HCC): ICD-10-CM

## 2018-10-01 DIAGNOSIS — K92.2 GASTROINTESTINAL HEMORRHAGE, UNSPECIFIED GASTROINTESTINAL HEMORRHAGE TYPE: ICD-10-CM

## 2018-10-01 RX ORDER — PANTOPRAZOLE SODIUM 40 MG/1
40 TABLET, DELAYED RELEASE ORAL
Qty: 60 TABLET | Refills: 5 | Status: SHIPPED | OUTPATIENT
Start: 2018-10-01 | End: 2018-10-08 | Stop reason: SDUPTHER

## 2018-10-02 LAB — INR PPP: 4.5 (ref 0.86–1.17)

## 2018-10-03 ENCOUNTER — ANTICOAG VISIT (OUTPATIENT)
Dept: CARDIOLOGY CLINIC | Facility: CLINIC | Age: 58
End: 2018-10-03

## 2018-10-03 ENCOUNTER — VBI (OUTPATIENT)
Dept: ADMINISTRATIVE | Facility: OTHER | Age: 58
End: 2018-10-03

## 2018-10-03 DIAGNOSIS — Z95.2 HISTORY OF MITRAL VALVE REPLACEMENT WITH MECHANICAL VALVE: ICD-10-CM

## 2018-10-03 NOTE — TELEPHONE ENCOUNTER
Devi Monie    ED Visit Information     Ed visit date: 09/28/2018  Diagnosis Description: Vomiting  In Network? Yes [address]  Discharge status: Home  Discharged with meds ? No  Number of ED visits to date: 5  ED Severity:3     Outreach Information    Outreach successful: Yes 2  Date letter mailed:N/a  Date Finalized:10/03/2018    Care Coordination    Follow up appointment with pcp: no Not able to schedule at this time  Transportation issues ? Yes    [employer] type:  7 Day Outreach  Care Coordination Status:  Referred to [facility]  Emergent necessity warranted by diagnosis:  No  [facility] PCP:  Yes  Transportation:  Ambulance Transport  Called PCP first?:  No  Feels able to call PCP for urgent problems ?:  Yes  Understands what emergencies can be handled by PCP ?:  No  Ever any problems getting appointment with PCP for minor emergency/urgency problems?:  No  Practice Contacted Patient ?:  No  Pt had ED follow up with pcp/staff ?:  No    Seen for follow-up out of network ?:  No  Reason Patient went to ED instead of Urgent Care or PCP?:  Proximity, Perceived Severity of Illness  10/02/2018 01:22 PM Phone (Heidi Burger) Osmin Leyva (Self) [phone] (M)   Left Message - Requesting a call back    10/03/2018 09:59 AM Phone (Heidi Burger) Osmin Leyva (Self) [phone] (M)   Call Complete - Personal communication with patient:    Patient stated that he is doing okay after his ED visit on  09/28 for vomiting  He was discharged without medication and was referred to [facility]  Patient was not able to f/u with pcp due to lack of transportation  He is dependent on his daughter but due to her work schedule it is difficult for him to keep appts  Patient states that vomiting is a reoccurring issue  He is open to f/u with pcp but is unable to schedule at this time  Patient has had 5 ED visit this year for various issues  Patient referred to [facility] due to med hx of LE amputation,  IDDM and CHF  [facility] has attempted to contact patient in the past and he had no responded but is open to receiving help at this time

## 2018-10-05 DIAGNOSIS — Z89.612 STATUS POST ABOVE KNEE AMPUTATION OF LEFT LOWER EXTREMITY: ICD-10-CM

## 2018-10-05 RX ORDER — TORSEMIDE 20 MG/1
TABLET ORAL
Qty: 60 TABLET | Refills: 4 | Status: SHIPPED | OUTPATIENT
Start: 2018-10-05

## 2018-10-06 RX ORDER — HYDROCODONE BITARTRATE AND ACETAMINOPHEN 7.5; 325 MG/1; MG/1
1 TABLET ORAL EVERY 6 HOURS PRN
Qty: 120 TABLET | Refills: 0 | Status: SHIPPED | OUTPATIENT
Start: 2018-10-06 | End: 2018-11-17 | Stop reason: SDUPTHER

## 2018-10-08 ENCOUNTER — OFFICE VISIT (OUTPATIENT)
Dept: FAMILY MEDICINE CLINIC | Facility: CLINIC | Age: 58
End: 2018-10-08
Payer: COMMERCIAL

## 2018-10-08 ENCOUNTER — TELEPHONE (OUTPATIENT)
Dept: FAMILY MEDICINE CLINIC | Facility: CLINIC | Age: 58
End: 2018-10-08

## 2018-10-08 ENCOUNTER — TELEPHONE (OUTPATIENT)
Dept: CARDIOLOGY CLINIC | Facility: CLINIC | Age: 58
End: 2018-10-08

## 2018-10-08 VITALS
HEART RATE: 53 BPM | HEIGHT: 73 IN | DIASTOLIC BLOOD PRESSURE: 82 MMHG | TEMPERATURE: 98.6 F | BODY MASS INDEX: 24.25 KG/M2 | OXYGEN SATURATION: 97 % | WEIGHT: 183 LBS | SYSTOLIC BLOOD PRESSURE: 140 MMHG

## 2018-10-08 DIAGNOSIS — K59.1 FUNCTIONAL DIARRHEA: ICD-10-CM

## 2018-10-08 DIAGNOSIS — IMO0002 INSULIN DEPENDENT TYPE 2 DIABETES MELLITUS, UNCONTROLLED: ICD-10-CM

## 2018-10-08 DIAGNOSIS — Z89.612 STATUS POST ABOVE KNEE AMPUTATION OF LEFT LOWER EXTREMITY: ICD-10-CM

## 2018-10-08 DIAGNOSIS — E11.43 DIABETIC GASTROPARESIS ASSOCIATED WITH TYPE 2 DIABETES MELLITUS (HCC): Primary | ICD-10-CM

## 2018-10-08 DIAGNOSIS — I10 BENIGN ESSENTIAL HYPERTENSION: ICD-10-CM

## 2018-10-08 DIAGNOSIS — IMO0002 INSULIN DEPENDENT TYPE 2 DIABETES MELLITUS, UNCONTROLLED: Primary | ICD-10-CM

## 2018-10-08 DIAGNOSIS — K31.84 DIABETIC GASTROPARESIS ASSOCIATED WITH TYPE 2 DIABETES MELLITUS (HCC): Primary | ICD-10-CM

## 2018-10-08 DIAGNOSIS — Z23 NEED FOR PNEUMOCOCCAL VACCINATION: ICD-10-CM

## 2018-10-08 DIAGNOSIS — Z23 NEED FOR INFLUENZA VACCINATION: ICD-10-CM

## 2018-10-08 DIAGNOSIS — K92.2 GASTROINTESTINAL HEMORRHAGE, UNSPECIFIED GASTROINTESTINAL HEMORRHAGE TYPE: ICD-10-CM

## 2018-10-08 DIAGNOSIS — R11.15 INTRACTABLE CYCLICAL VOMITING WITH NAUSEA: ICD-10-CM

## 2018-10-08 PROBLEM — E11.9 INSULIN DEPENDENT TYPE 2 DIABETES MELLITUS, CONTROLLED (HCC): Status: RESOLVED | Noted: 2017-11-09 | Resolved: 2018-10-08

## 2018-10-08 PROBLEM — Z79.4 INSULIN DEPENDENT TYPE 2 DIABETES MELLITUS, CONTROLLED (HCC): Status: RESOLVED | Noted: 2017-11-09 | Resolved: 2018-10-08

## 2018-10-08 PROCEDURE — 90472 IMMUNIZATION ADMIN EACH ADD: CPT

## 2018-10-08 PROCEDURE — 99214 OFFICE O/P EST MOD 30 MIN: CPT | Performed by: FAMILY MEDICINE

## 2018-10-08 PROCEDURE — 90670 PCV13 VACCINE IM: CPT

## 2018-10-08 PROCEDURE — 90682 RIV4 VACC RECOMBINANT DNA IM: CPT

## 2018-10-08 PROCEDURE — 90471 IMMUNIZATION ADMIN: CPT

## 2018-10-08 RX ORDER — PANTOPRAZOLE SODIUM 40 MG/1
40 TABLET, DELAYED RELEASE ORAL
Qty: 180 TABLET | Refills: 0 | Status: SHIPPED | OUTPATIENT
Start: 2018-10-08

## 2018-10-08 NOTE — TELEPHONE ENCOUNTER
Patient called about syringes, he states he needs the 4 mm syringes, not the needle? Please send in 4 mm syringes

## 2018-10-08 NOTE — PATIENT INSTRUCTIONS
Tylenol arthritis ok to use with coumadin  Avoid using when taking hydrocodone     Need for additional physical threrapy/ occupational therapy  Support group for amputees   Influenza and prevnar today   Keep follow up with gi

## 2018-10-08 NOTE — PROGRESS NOTES
Subjective:   Chief Complaint   Patient presents with    Follow-up     needs shorter insulin needles prescribed  Having a lot of GI issues, did schedule with [facility], also wants to discuss the amount of medications he is currently taking  Also, has questions about where he can get wheel chair parts        Patient ID: Ramona Cornelius is a 60 y o  male  Pt is here with his daughter today in follow up to his chronic conditions  He is struggling after his amputation with his prosthesis  He has been getting physical therapy and he is using a walker when using his prosthesis  He is in need of more physical therapy  He a recent hba1c on 9/18 that was 8 0  He denies any chest pain or shortness of breath  He continues to see his psychiatrist  And he follows with cardiology, Dr Elsie Goodell  He has been wearing a compression stocking on his right leg  He has been having some problems with diarrhea, nausea and vomiting  He has the symptoms sporadically, he thinks it may be associated with his nerves  He has not been taking reglan  He has been taking immodium which seems to help when he gets episodes  He only needs 2 tabs to help  He denies blood or dark stools  He has a follow up with the GI next month  He states none of the medications, promethazine or ondansetron, help with the nausea  He denies abdominal pain  No fever  The following portions of the patient's history were reviewed and updated as appropriate: allergies, current medications, past family history, past medical history, past social history, past surgical history and problem list     Review of Systems   Constitutional: Negative for fatigue and fever  HENT: Negative  Respiratory: Negative  Negative for cough  Cardiovascular: Negative  Gastrointestinal: Positive for diarrhea, nausea and vomiting  Genitourinary: Negative  Musculoskeletal: Negative  Skin: Negative  Neurological: Negative  Psychiatric/Behavioral: Negative  Objective:  Vitals:    10/08/18 1019   BP: 140/82   Pulse: (!) 53   Temp: 98 6 °F (37 °C)   SpO2: 97%   Weight: 83 kg (183 lb)   Height: 6' 1" (1 854 m)      Physical Exam   Constitutional: He is oriented to person, place, and time  He appears well-developed and well-nourished  HENT:   Head: Normocephalic and atraumatic  Cardiovascular: Normal rate, regular rhythm and normal heart sounds  Pulmonary/Chest: Effort normal and breath sounds normal    Abdominal: Soft  Bowel sounds are normal    Musculoskeletal: He exhibits edema and deformity  +1 edema right lower extremity   Amputation left lower extremity   Neurological: He is alert and oriented to person, place, and time  Skin: Skin is warm and dry  Psychiatric: He has a normal mood and affect  His behavior is normal  Judgment and thought content normal    Nursing note and vitals reviewed  Assessment/Plan:    No problem-specific Assessment & Plan notes found for this encounter  Diagnoses and all orders for this visit:    Diabetic gastroparesis associated with type 2 diabetes mellitus [facility] )    Intractable cyclical vomiting with nausea  -     Ambulatory referral to Gastroenterology; Future    Functional diarrhea  -     Ambulatory referral to Gastroenterology; Future    Insulin dependent type 2 diabetes mellitus, uncontrolled ([facility] [address] )  -     Discontinue: Insulin Pen Needle 32G X 4 MM MISC; by Does not apply route 4 (four) times a day  -     Insulin Pen Needle 32G X 4 MM MISC; by Does not apply route 4 (four) times a day    Need for influenza vaccination  -     influenza vaccine, [id number], quadrivalent, recombinant, PF, 0 5 mL, for patients 18 yr+ (FLUBLOK)    Need for pneumococcal vaccination  -     PNEUMOCOCCAL CONJUGATE VACCINE 13-VALENT GREATER THAN 6 MONTHS    Gastrointestinal hemorrhage, unspecified gastrointestinal hemorrhage type  -     pantoprazole (PROTONIX) 40 mg tablet;  Take 1 tablet (40 mg total) by mouth 2 (two) times a day before meals    Benign essential hypertension    Status post above knee amputation of left lower extremity ([address] )        Diabetic typet 2 insulin dependent with gastroparesis  Referral to GI for further evaluaiton  Nausea/vomiting: no medication has helped with nausea  Diarrhea  [id number] [address] for immodium as needed  Influenza and pneumococcal immunization given today  History of gi bleed continue pantoprazole  Hypertension: controlled  S/p above the knee amputation left lower extremity: need for more physical therapy, using walker, difficulty with prosthesis

## 2018-10-08 NOTE — TELEPHONE ENCOUNTER
Reviewing charts for upcoming office visit and saw pt was scheduled for ECHO prior to next visit  Pt was scheduled for ECHO on 9/10/18 at 10:00 am however, pt was marked as a No show  Left VM on machine for pt to call to see if we can get ECHO scheduled prior to appt

## 2018-10-09 ENCOUNTER — TELEPHONE (OUTPATIENT)
Dept: FAMILY MEDICINE CLINIC | Facility: CLINIC | Age: 58
End: 2018-10-09

## 2018-10-09 DIAGNOSIS — IMO0002 DM (DIABETES MELLITUS), TYPE 2, UNCONTROLLED, PERIPH VASCULAR COMPLIC: Primary | ICD-10-CM

## 2018-10-09 LAB — INR PPP: 1.6 (ref 0.86–1.17)

## 2018-10-10 ENCOUNTER — ANTICOAG VISIT (OUTPATIENT)
Dept: CARDIOLOGY CLINIC | Facility: CLINIC | Age: 58
End: 2018-10-10

## 2018-10-10 DIAGNOSIS — Z95.2 HISTORY OF MITRAL VALVE REPLACEMENT WITH MECHANICAL VALVE: ICD-10-CM

## 2018-10-11 ENCOUNTER — PATIENT OUTREACH (OUTPATIENT)
Dept: FAMILY MEDICINE CLINIC | Facility: CLINIC | Age: 58
End: 2018-10-11

## 2018-10-11 NOTE — PROGRESS NOTES
Outpatient Care Management Note:    Dr Sarah Monterroso requested assistance to find VNA physical therapy services for patient, since [facility] does not accept his [facility] first insurance  CM called [facility] [place]  They are not contracted to accept [facility]      CM called [facility]  The [facility] office does accept [facility], but are not taking any new patients at this time, because they are too full  CM called Josephine at [facility]  She was unable to find a provider for VNA physical therapy services in [place] area  She recommended I try [facility] [phone]  CM explained that [place] is likely 45 minutes away  That is the only contact she could give me  If they will not see patient, we can try to get a non participating authorization  I would need to go to [place]; go to providers; then manuals and forms; at the bottom the non participating authorization form is available  The fax number is on left of the form  CM called [facility]  They stated that [place] is outside of their area and they can not take the case  Update message sent to Dr Sarah Monterroso

## 2018-10-11 NOTE — PROGRESS NOTES
Outpatient Care Management Note:    No response to telephone outreach X 3  Unable to reach letter mailed to patient

## 2018-10-12 ENCOUNTER — PATIENT OUTREACH (OUTPATIENT)
Dept: FAMILY MEDICINE CLINIC | Facility: CLINIC | Age: 58
End: 2018-10-12

## 2018-10-12 ENCOUNTER — TELEPHONE (OUTPATIENT)
Dept: FAMILY MEDICINE CLINIC | Facility: CLINIC | Age: 58
End: 2018-10-12

## 2018-10-12 DIAGNOSIS — Z89.612 STATUS POST ABOVE KNEE AMPUTATION OF LEFT LOWER EXTREMITY: ICD-10-CM

## 2018-10-12 DIAGNOSIS — E11.40 TYPE 2 DIABETES MELLITUS WITH DIABETIC NEUROPATHY, UNSPECIFIED WHETHER LONG TERM INSULIN USE (HCC): Primary | ICD-10-CM

## 2018-10-12 NOTE — PROGRESS NOTES
Outpatient Care Management Note:    CM called [facility] and spoke with Anna Valerio  They accept [facility] first insurance and will see Cammie Watts  They need several items faxed to them at [phone]: Demographics, doctor's notes, med list, diagnosis list, and reason for PT  They would also like a referral for nursing services  Update message sent to Dr Sarah Monterroso

## 2018-10-15 ENCOUNTER — HOSPITAL ENCOUNTER (OUTPATIENT)
Dept: NON INVASIVE DIAGNOSTICS | Facility: HOSPITAL | Age: 58
Discharge: HOME | End: 2018-10-15
Attending: INTERNAL MEDICINE
Payer: COMMERCIAL

## 2018-10-15 ENCOUNTER — OFFICE VISIT (OUTPATIENT)
Dept: CARDIOLOGY CLINIC | Facility: CLINIC | Age: 58
End: 2018-10-15
Payer: COMMERCIAL

## 2018-10-15 VITALS
DIASTOLIC BLOOD PRESSURE: 80 MMHG | WEIGHT: 188.3 LBS | HEIGHT: 73 IN | SYSTOLIC BLOOD PRESSURE: 148 MMHG | BODY MASS INDEX: 24.96 KG/M2

## 2018-10-15 DIAGNOSIS — IMO0002 INSULIN DEPENDENT TYPE 2 DIABETES MELLITUS, UNCONTROLLED: ICD-10-CM

## 2018-10-15 DIAGNOSIS — I25.10 CORONARY ARTERY DISEASE INVOLVING NATIVE CORONARY ARTERY OF NATIVE HEART WITHOUT ANGINA PECTORIS: Primary | ICD-10-CM

## 2018-10-15 DIAGNOSIS — I25.5 ISCHEMIC CARDIOMYOPATHY: ICD-10-CM

## 2018-10-15 DIAGNOSIS — E11.43 DIABETIC GASTROPARESIS ASSOCIATED WITH TYPE 2 DIABETES MELLITUS (HCC): ICD-10-CM

## 2018-10-15 DIAGNOSIS — I49.3 PVC'S (PREMATURE VENTRICULAR CONTRACTIONS): ICD-10-CM

## 2018-10-15 DIAGNOSIS — Z95.810 ICD (IMPLANTABLE CARDIOVERTER-DEFIBRILLATOR) IN PLACE: ICD-10-CM

## 2018-10-15 DIAGNOSIS — I73.9 PERIPHERAL VASCULAR DISEASE (HCC): ICD-10-CM

## 2018-10-15 DIAGNOSIS — I50.22 CHRONIC SYSTOLIC CONGESTIVE HEART FAILURE (HCC): Chronic | ICD-10-CM

## 2018-10-15 DIAGNOSIS — Z95.2 HISTORY OF MITRAL VALVE REPLACEMENT WITH MECHANICAL VALVE: ICD-10-CM

## 2018-10-15 DIAGNOSIS — Z89.612 STATUS POST ABOVE KNEE AMPUTATION OF LEFT LOWER EXTREMITY: ICD-10-CM

## 2018-10-15 DIAGNOSIS — K31.84 DIABETIC GASTROPARESIS ASSOCIATED WITH TYPE 2 DIABETES MELLITUS (HCC): ICD-10-CM

## 2018-10-15 DIAGNOSIS — I47.2 VT (VENTRICULAR TACHYCARDIA) (HCC): ICD-10-CM

## 2018-10-15 DIAGNOSIS — Z95.1 HX OF CABG: ICD-10-CM

## 2018-10-15 PROCEDURE — 99214 OFFICE O/P EST MOD 30 MIN: CPT | Performed by: INTERNAL MEDICINE

## 2018-10-15 PROCEDURE — 93306 TTE W/DOPPLER COMPLETE: CPT | Performed by: INTERNAL MEDICINE

## 2018-10-15 PROCEDURE — 93306 TTE W/DOPPLER COMPLETE: CPT

## 2018-10-15 NOTE — PROGRESS NOTES
Cardiology Follow Up    Sarahi Izaguirre  1960  [id number]  HEART & VASCULAR  [facility] Ermias Murrieta  [address] [address]    1  Coronary artery disease involving native coronary artery of native heart without angina pectoris     2  Chronic systolic congestive heart failure ([address] )     3  Ischemic cardiomyopathy     4  Peripheral vascular disease ([address] )     5  VT (ventricular tachycardia) ([facility] )     6  Hx of CABG     7  History of mitral valve replacement with mechanical valve     8  ICD (implantable cardioverter-defibrillator) in place     9  PVC's (premature ventricular contractions)     10  Status post above knee amputation of left lower extremity (HCC)         Interval History:     Mr Ronne Alpers given his history of CAD and mitral valve disease  Raudel Valente has a history of a mechanical mitral valve for what sounded like mitral valve prolapse  He also was found to have coronary artery disease at that time and had coronary bypass grafting as well  He told me in subsequent years he is received stenting to his coronary arteries  He also has a history of defibrillator, placed a little over a year ago  He had all of his care in [place], as he lived there all his life, but relocated to this area to live with his daughter  He states to me that his mitral valve replacement and CABG was in 2001  Unfortunately, we have no records of his prior history  His prior cardiologist, he tells me, closed his practice due to insurance fraud  He also carries a history of peripheral arterial disease  At our first visit I got an echocardiogram that showed his ejection fraction was 40% with regional wall motion abnormalities in the inferior and inferior lateral beach  Raudel Valente has had several issues from a noncardiac standpoint  He has had on and off issues with nausea, vomiting and GI bleeding  He is been worked up for this without any significant findings   He will actually be going through more procedures in the near future, and we will be bridging his Coumadin  He has also had on and off atypical chest pains  Some of the pain sound GI related, and other sound musculoskeletal  We was in the hospital for GI bleeding I assessed this same chest pain  This has for the most part resolved as he has not had any issues with bleeding or chest/abdominal pains  Since I last saw him he was in the hospital for upper GI bleeding, which has now resolved  He is tolerating Coumadin at this time  He also battles peripheral arterial disease, uncontrolled diabetes, peripheral neuropathy and nonhealing diabetic foot ulcers  Last year he had an extensive hospitalization for worsening lower extremity ulcers and gangrene  He eventually required a left-sided above-the-knee amputation  He went through extensive rehabilitation and is now home getting home health and physical therapy  Cuate Jen had some episodes of VT detected on his most recent ICD interrogation  These were treated successfully with ATP  His most recent ICD interrogation he did receive 1 shock for VT  Near our last appointment  He had been in the hospital a few times, 1 for dehydration  And he had come off of metoprolol  We restarted this at our last visit, and he has not had any shocks since  He is due for an interrogation in the near future  Cuatesidney Li feels about the same from a cardiac standpoint  He does have chronic exertional shortness of breath, this is unchanged  He has intermittent edema of his right lower extremity, but it is improved  He denies any orthopnea, PND or any other signs of CHF  He denies any palpitations, lightheadedness or syncope  He does have some atypical mechanical type chest pains, but he does not describe any symptoms of angina          Patient Active Problem List   Diagnosis    Bipolar disorder ([address] )    Diabetes mellitus ([address] )    Hiatal hernia    Erosive esophagitis    Coronary artery disease    ICD (implantable cardioverter-defibrillator) in place    Chronic systolic congestive heart failure ([facility] )    Peripheral artery disease (HCC)    History of mitral valve replacement with mechanical valve    Bipolar 1 disorder, depressed (HCC)    Type 2 diabetes mellitus with neurologic complication (HCC)    Bullous dermatosis    GI bleed    Status post above knee amputation of left lower extremity ([facility])    Perirectal abscess    Anxiety    Atherosclerosis of arteries of extremities (HCC)    Benign essential hypertension    Carotid arterial disease (HCC)    Chronic congestive heart failure (HCC)    Chronic insomnia    Decreased pedal pulses    Diabetic gastroparesis associated with type 2 diabetes mellitus ([facility])    Edema of right lower extremity    Esophageal reflux    Hyperlipidemia    Ischemic cardiomyopathy    Joan-Perez tear    Mitral valve prolapse    Onychomycosis    Peripheral edema    Peripheral vascular disease (HCC)    Phantom pain after amputation of lower extremity ([facility])    PVC's (premature ventricular contractions)    Rosacea    Tinea corporis    Vasculitis of skin    Costochondritis    Infected sebaceous cyst    Iron deficiency anemia secondary to inadequate dietary iron intake    Polyneuropathy due to secondary diabetes (HCC)    Atherosclerosis of native arteries of extremities with intermittent claudication, right leg ([facility])    VT (ventricular tachycardia) ([facility])    Functional diarrhea    Need for influenza vaccination    Need for pneumococcal vaccination    Intractable cyclical vomiting with nausea    Insulin dependent type 2 diabetes mellitus, uncontrolled ([facility] )    Hx of CABG     Past Medical History:   Diagnosis Date    Acute gastritis     last assessed - [id number]    Amputated great toe of left foot ([facility] )     last assessed - [id number]    Anticoagulated on Coumadin     Mechanical MVR    Anxiety     Bipolar 1 disorder ([facility] )     CAD (coronary artery disease)     Chronic kidney disease     left kideny being monitored    Chronic systolic congestive heart failure ([facility] ) 4/18/2017    Colitis 04/02/2017    Acute; last assessed - [id number]    COPD (chronic obstructive pulmonary disease) ([facility] )     Diabetes mellitus (HCC)     Difficulty urinating     DM type 2 with diabetic peripheral neuropathy ([facility])     Dyspepsia     last assessed - [id number]    Hiatal hernia     Hyperlipidemia     Hypertension     Ischemic cardiomyopathy     Myocardial infarction [facility])     Pacemaker     PAD (peripheral artery disease) ([facility])     Rectal bleed     Rotator cuff tear, right     last assessed - [id number]    Vomiting      Social History     Social History    Marital status:      Spouse name: N/A    Number of children: 1    Years of education: N/A     Occupational History    Not on file       Social History Main Topics    Smoking status: Current Every Day Smoker     Packs/day: 0 50     Types: Cigarettes    Smokeless tobacco: Never Used    Alcohol use No    Drug use: No    Sexual activity: No     Other Topics Concern    Not on file     Social History Narrative    Daily caffeine consumption, 1 serving a day    Exercises daily      Family History   Problem Relation Age of Onset    Hypertension Mother     Diabetes Mother         Diabetes mellitus    Migraines Mother         Migraine headaches    Diabetes Father     Bipolar disorder Father     Hypertension Father     Melanoma Father         Malignant melanoma    Migraines Sister     Heart disease Maternal Grandfather         cardiac disorder    No Known Problems Paternal Grandfather     Bipolar disorder Daughter     Migraines Daughter         Migraine headaches    Arthritis Family     Heart disease Family         cardiac disorder    Depression Family      Past Surgical History:   Procedure Laterality Date    AMPUTATION ABOVE KNEE (AKA) Left 8/24/2017    Procedure: AMPUTATION ABOVE KNEE (AKA); Surgeon: Yisel Burgess MD;  [facility];  Service: Vascular; last assessed - [id number]   [address]      last assessed - [id number]    CARDIAC DEFIBRILLATOR PLACEMENT      CORONARY ARTERY BYPASS GRAFT      ESOPHAGOGASTRODUODENOSCOPY N/A 4/20/2017    Procedure: ESOPHAGOGASTRODUODENOSCOPY (EGD); Surgeon: John Lord MD;  [facility];  Service:     ESOPHAGOGASTRODUODENOSCOPY N/A 5/26/2017    Procedure: ESOPHAGOGASTRODUODENOSCOPY (EGD); Surgeon: Milagro Castillo MD;  [facility];  Service:     ESOPHAGOGASTRODUODENOSCOPY N/A 11/16/2017    Procedure: ESOPHAGOGASTRODUODENOSCOPY (EGD); Surgeon: Mario Madrigal MD;  Location: [facility]; Service: Gastroenterology    FOOT AMPUTATION THROUGH METATARSAL Left     MTP first toe; last assessed - [id number]   [place]  MITRAL VALVE REPLACEMENT      Mechanical; last assessed - [id number]    ROTATOR CUFF REPAIR      last assessed - [id number]    TOE AMPUTATION Left 7/21/2017    Procedure: PARTIAL FIRST RAY AMPUTATION; EXCISION OF WOUND W/ TOE FLAP CLOSURE;  Surgeon: Jeremy Parks DPM;  Location[facility];  Service: [address] [place] [address] Road,[address]      last assessed - [id number]    VAC DRESSING APPLICATION Left 6/34/0022    Procedure: VAC application;  Surgeon: Jeremy Parks DPM;  [facility];  Service: Podiatry    WOUND DEBRIDEMENT Left 8/19/2017    Procedure: wound debridement with washout; resection of left 1st metatarsal;  Surgeon: Jeremy Parks DPM;  [facility];  Service: Podiatry       Current Outpatient Prescriptions:     ACCU-CHEK APPLE PLUS test strip, TEST 3 TIMES A DAY   DX E11 65, Disp: 100 each, Rfl: 1    ACCU-CHEK FASTCLIX LANCETS MISC, 1 Units by Does not apply route 3 (three) times a day, Disp: , Rfl:     atorvastatin (LIPITOR) 10 mg tablet, TAKE 1 TABLET DAILY, Disp: 90 tablet, Rfl: 0    B-D INS SYR ULTRAFINE 1CC/31G 31G X 5/16" 1 ML MISC, Inject 1 each under the skin 4 (four) times a day, Disp: , Rfl: 0    Blood Glucose Monitoring Suppl (ACCU-CHEK APPLE PLUS) w/Device KIT, 1 Units by Does not apply route 3 (three) times a day, Disp: , Rfl:     ferrous sulfate 324 (65 Fe) mg, Take 1 tablet (324 mg total) by mouth daily before breakfast, Disp: 30 tablet, Rfl: 5    gabapentin (NEURONTIN) 600 MG tablet, Take 1 tablet (600 mg total) by mouth 3 (three) times a day, Disp: 90 tablet, Rfl: 5    HUMALOG 100 UNIT/ML injection, INJECT 1 TO 3 UNITS AS DIRECTED 3 TIMES A DAY *DISCARD 28 DAYS AFTER OPENING*, Disp: 10 mL, Rfl: 0    HYDROcodone-acetaminophen (NORCO) 7 5-325 mg per tablet, Take 1 tablet by mouth every 6 (six) hours as needed for pain Max Daily Amount: 4 tablets, Disp: 120 tablet, Rfl: 0    insulin glargine (BASAGLAR KWIKPEN) 100 units/mL injection pen, Inject 10 Units under the skin daily at bedtime, Disp: 5 pen, Rfl: 0    Insulin Pen Needle 32G X 4 MM MISC, by Does not apply route 4 (four) times a day, Disp: 100 each, Rfl: 0    lisinopril (ZESTRIL) 10 mg tablet, Take 10 mg by mouth daily, Disp: , Rfl: 3    LORazepam (ATIVAN) 1 mg tablet, Take 1 tablet by mouth 2 (two) times a day as needed, Disp: , Rfl:     metoprolol succinate (TOPROL-XL) 25 mg 24 hr tablet, Take 25 mg by mouth daily, Disp: , Rfl:     metroNIDAZOLE (METROCREAM) 0 75 % cream, Apply 1 application topically daily, Disp: , Rfl:     mirtazapine (REMERON) 15 mg tablet, Take 15 mg by mouth daily  , Disp: , Rfl:     pantoprazole (PROTONIX) 40 mg tablet, Take 1 tablet (40 mg total) by mouth 2 (two) times a day before meals, Disp: 180 tablet, Rfl: 0    QUEtiapine (SEROquel) 400 MG tablet, Take 400 mg by mouth daily at bedtime, Disp: , Rfl: 1    torsemide (DEMADEX) 20 mg tablet, TAKE 2 TABLET DAILY, Disp: 60 tablet, Rfl: 4    warfarin (COUMADIN) 5 mg tablet, TAKE 1-2 TABLETS DAILY OR AS DIRECTED, Disp: 60 tablet, Rfl: 5  Allergies   Allergen Reactions    Aspirin Other (See Comments)     Other reaction(s): Unknown  Received ASA & bleed out  Heparin      Please see 8/2017 admit = 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    Morphine Other (See Comments)     Other reaction(s): Unknown  Gets red streak up arm above IV site    Omeprazole Diarrhea     Other reaction(s): Unknown       Labs:  Lab Results   Component Value Date     09/28/2018     10/31/2017    K 3 8 09/28/2018    K 5 3 (H) 10/31/2017     09/28/2018     10/31/2017    CO2 24 09/28/2018    CO2 23 10/31/2017    BUN 11 09/28/2018    BUN 27 (H) 10/31/2017    CREATININE 1 42 (H) 09/28/2018    CREATININE 1 14 10/31/2017    GLUCOSE 152 (H) 10/31/2017    CALCIUM 10 2 (H) 09/28/2018    CALCIUM 9 8 10/31/2017     Lab Results   Component Value Date    CHOL 135 05/30/2017    TRIG 86 08/11/2017    TRIG 156 (H) 05/30/2017    HDL 36 (L) 08/11/2017    HDL 37 (L) 05/30/2017     Imaging:    ECHO:  LEFT VENTRICLE:  The ventricle was mildly dilated  Systolic function was moderately to markedly reduced  Ejection fraction was estimated to be 35 %  There was akinesis of the basal-mid inferoseptal, entire inferior, and basal-mid inferolateral wall(s)      VENTRICULAR SEPTUM:  There was moderate dyssynergic motion      LEFT ATRIUM:  The atrium was dilated      MITRAL VALVE:  A mechanical prosthesis was present  It exhibited normal function  There was trace regurgitation  There was no significant perivalvular regurgitation  Mean transmitral gradient was 3 mmHg        Review of Systems:  Review of Systems   Constitutional: Negative  HENT: Negative  Eyes: Negative  Respiratory: Negative  Cardiovascular: Positive for leg swelling  Gastrointestinal: Negative  Musculoskeletal: Negative  Skin: Negative  Allergic/Immunologic: Negative  Neurological: Positive for numbness  Hematological: Negative  Psychiatric/Behavioral: Negative  All other systems reviewed and are negative        Physical Exam:  Physical Exam   Constitutional: He is oriented to person, place, and time  He appears well-developed and well-nourished  HENT:   Head: Normocephalic and atraumatic  Eyes: Pupils are equal, round, and reactive to light  EOM are normal  Right eye exhibits no discharge  Left eye exhibits no discharge  No scleral icterus  Neck: Normal range of motion  Neck supple  No JVD present  No thyromegaly present  Cardiovascular: Normal rate, regular rhythm, S1 normal, S2 normal and normal heart sounds  No extrasystoles are present  Exam reveals decreased pulses  Exam reveals no gallop and no friction rub  No murmur heard  Normal mechanical S1 sound   Pulmonary/Chest: Effort normal  No respiratory distress  He has decreased breath sounds  He has no wheezes  He has no rales  Abdominal: Soft  Bowel sounds are normal  He exhibits no distension  There is no tenderness  Musculoskeletal: Normal range of motion  He exhibits no edema, tenderness or deformity  Neurological: He is alert and oriented to person, place, and time  No cranial nerve deficit  Skin: Skin is warm and dry  No rash noted  Psychiatric: He has a normal mood and affect  Judgment and thought content normal        Discussion/Summary:    1  Ventricular tachycardia -  This was recently found on ICD interrogation  He required ATP therapy and 1 shock to terminate this  He has not had any other events since restarting his metoprolol at our last visit  We will continue to follow his ICD interrogations  2  Mechanical mitral valve replacement - Mr Pereira had this performed back in 2001  He had an echocardiogram performed today, that shows no change as his mechanical mitral valve replacement is functioning normally  He does take antibiotic prophylaxis for any dental procedures  Any procedures, particularly the ones he will be going through from a GI perspective, will require IV heparin or Lovenox bridging if Coumadin needs to be held  We will see him back in 6 months      3  Chronic systolic congestive heart failure - His ejection fraction by our echo shows a value of 35-40%  He has an ischemic cardiomyopathy with inferior and inferior lateral wall motion abnormalities  We will continue the same dose of torsemide  His echocardiogram today shows no change  I've asked him to adhere to a low sodium diet, follow his weight and we will follow blood work closely  We will see him back in 6 months  4  CAD - He has had prior CABG, the time of his mitral valve replacement, and prior stenting he tells me  He is still recovering from an above-the-knee amputation and we will continue to let him rehabilitate  We will follow-up in 6 months and at some point get updated ischemic testing  He is without symptoms of angina  5  HTN - His blood pressure is well-controlled  He will remain on the same doses of lisinopril and Toprol  We will see him back in 4 months  6  ICD - He will continue to be followed by our device clinic  7  PAD - He is now status post above-the-knee amputation  He will continue to rehabilitate with physical therapy  He will also continue to follow with vascular surgery  Counseling / Coordination of Care  Total office / unit time spent today 25 minutes  Greater than 50% of total time was spent with the patient and / or family counseling and / or coordination of care

## 2018-10-15 NOTE — PATIENT INSTRUCTIONS
Low-Sodium Diet   AMBULATORY CARE:   A low-sodium diet  limits foods that are high in sodium (salt)  You will need to follow a low-sodium diet if you have high blood pressure, kidney disease, or heart failure  You may also need to follow this diet if you have a condition that is causing your body to retain (hold) extra fluid  You may need to limit the amount of sodium you eat to 1,500 mg  Ask your healthcare provider how much sodium you can have each day  How to use food labels to choose foods that are low in sodium:  Read food labels to find the amount of sodium they contain  The amount of sodium is listed in milligrams (mg)  The % Daily Value (DV) column tells you how much of your daily needs are met by 1 serving of the food for each nutrient listed  Choose foods that have less than 5% of the DV of sodium  These foods are considered low in sodium  Foods that have 20% or more of the DV of sodium are considered high in sodium  Some food labels may also list any of the following terms that tell you about the sodium content in the food:  · Sodium-free:  Less than 5 mg in each serving    · Very low sodium:  35 mg of sodium or less in each serving    · Low sodium:  140 mg of sodium or less in each serving    · Reduced sodium: At least 25% less sodium in each serving than the regular type    · Light in sodium:  50% less sodium in each serving    · Unsalted or no added salt:  No extra salt is added during processing (the food may still contain sodium)  Foods to avoid:  Salty foods are high in sodium   You should avoid the following:  · Processed foods:      ¨ Mixes for cornbread, biscuits, cake, and pudding     ¨ Instant foods, such as potatoes, cereals, noodles, and rice     ¨ Packaged foods, such as bread stuffing, rice and pasta mixes, snack dip mixes, and macaroni and cheese     ¨ Canned foods, such as canned vegetables, soups, broths, sauces, and vegetable or tomato juice    ¨ Snack foods, such as salted chips, popcorn, pretzels, pork rinds, salted crackers, and salted nuts    ¨ Frozen foods, such as dinners, entrees, vegetables with sauces, and breaded meats    ¨ Sauerkraut, pickled vegetables, and other foods prepared in brine    · Meats and cheeses:      ¨ Smoked or cured meat, such as corned beef, singh, ham, hot dogs, and sausage    ¨ Canned meats or spreads, such as potted meats, sardines, anchovies, and imitation seafood    ¨ Deli or lunch meats, such as bologna, ham, turkey, and roast beef    ¨ Processed cheese, such as American cheese and cheese spreads    · Condiments, sauces, and seasonings:      ¨ Salt (¼ teaspoon of salt contains 575 mg of sodium)    ¨ Seasonings made with salt, such as garlic salt, celery salt, onion salt, and seasoned salt    ¨ Regular soy sauce, barbecue sauce, teriyaki sauce, steak sauce, Worcestershire sauce, and most flavored vinegars    ¨ Canned gravy and mixes     ¨ Regular condiments, such as mustard, ketchup, and salad dressings    ¨ Pickles and olives    ¨ Meat tenderizers and monosodium glutamate (MSG)  Foods to include:  Read the food label to find the exact amount of sodium in each serving  · Bread and cereal:  Try to choose breads with less than 80 mg of sodium per serving  ¨ Bread, roll, romy, tortilla, or unsalted crackers  ¨ Ready-to-eat cereals with less than 5% DV of sodium (examples include shredded wheat and puffed rice)    ¨ Pasta    · Vegetables and fruits:      ¨ Unsalted fresh, frozen, or canned vegetables    ¨ Fresh, frozen, or canned fruits    ¨ Fruit juice    · Dairy:  One serving has about 150 mg of sodium  ¨ Milk, all types    ¨ Yogurt    ¨ Hard cheese, such as cheddar, Swiss, [employer], or mozzarella    · Meat and other protein foods:  Some raw meats may have added sodium       ¨ Plain meats, fish, and poultry     ¨ Eggs    · Other foods:      ¨ Homemade pudding    ¨ Unsalted nuts, popcorn, or pretzels    ¨ Unsalted butter or margarine  Ways to decrease sodium:   · Add spices and herbs to foods instead of salt during cooking  Use salt-free seasonings to add flavor to foods  Examples include onion powder, garlic powder, basil, jeter powder, paprika, and parsley  Try lemon or lime juice or vinegar to give foods a tart flavor  Use hot peppers, pepper, or cayenne pepper to add a spicy flavor to foods  · Do not keep a salt shaker at your kitchen table  This may help keep you from adding salt to food at the table  It may take time to get used to enjoying the natural flavor of food instead of adding salt  Talk to your healthcare provider before you use salt substitutes  Some salt substitutes have a high amount of potassium and need to be avoided if you have kidney disease  · Choose low-sodium foods at restaurants  Meals from restaurants are often high in sodium  Some restaurants have nutrition information on the menu that tells you the amount of sodium in their foods  If possible, ask for your food to be prepared with less, or no salt  · Shop for unsalted or low-sodium foods and snacks at the grocery store  Examples include unsalted or low-sodium broths, soups, and canned vegetables  Choose fresh or frozen vegetables instead  Choose unsalted nuts or seeds or fresh fruits or vegetables as snacks  Read food labels and choose salt-free, very low-sodium, or low-sodium foods  © 2017 [employer] Information is for End User's use only and may not be sold, redistributed or otherwise used for commercial purposes  All illustrations and images included in CareNotes® are the copyrighted property of [employer]  or Jemal Felipe  The above information is an  only  It is not intended as medical advice for individual conditions or treatments  Talk to your doctor, nurse or pharmacist before following any medical regimen to see if it is safe and effective for you

## 2018-10-16 ENCOUNTER — TELEPHONE (OUTPATIENT)
Dept: FAMILY MEDICINE CLINIC | Facility: CLINIC | Age: 58
End: 2018-10-16

## 2018-10-16 RX ORDER — PEN NEEDLE, DIABETIC 32GX 5/32"
NEEDLE, DISPOSABLE MISCELLANEOUS 4 TIMES DAILY
Qty: 100 EACH | Refills: 5 | Status: SHIPPED | OUTPATIENT
Start: 2018-10-16

## 2018-10-17 ENCOUNTER — PATIENT OUTREACH (OUTPATIENT)
Dept: FAMILY MEDICINE CLINIC | Facility: CLINIC | Age: 58
End: 2018-10-17

## 2018-10-17 NOTE — PROGRESS NOTES
Outpatient Care Management Note:    Care manager called Mark Pham's daughter  She was with her dad presently, and so I got to speak directly with him  He was pleasant and very talkative  He states that [facility] was out yesterday  They are verifying his insurance to see how many therapy visits he can have covered  Eliot Cueto uses a W/C for most activity  He is able to transfer independently using a walker and standing on his right leg  He states he can even walk using just the right leg and his walker  He does steps without much difficulty  He denies any falls  He wants to learn to use his prosthesis and be able to walk with 2 extremities  He wears a compression stocking on his right leg due to edema  He is able to manage the stocking independently  Eliot Cueto is able to shower independently with the use of a shower chair  He gets his own meals, but daughter feels he could use more assistance with meals  He tries to follow a diabetic diet, but states it is difficult  He checks his sugars 3 times per day and takes insulin as ordered  He occasional suffers from low blood sugars  He eats candy and it comes back up  These episodes occur during the night  I recommended he keep hard candy or glucose tablets at his bedside  He states he keeps the candy in the refrigerator  Edger Batch admits to phantom leg pain  He uses the hydrocodone and this manages his symptoms  He would love to attend an amputee support group  CM will do some research on this topic  His daughter, Pepe Self, assists her dad with transportation, groceries, prescriptions etc  I gave Eliot Cueto my contact information  He will call if he needs anything  He states he will accept my phone calls going forward

## 2018-10-18 LAB — INR PPP: 2.6 (ref 0.86–1.17)

## 2018-10-19 LAB
INR PPP: 2.6 (ref 0.8–1.2)
PROTHROMBIN TIME: 26.1 SEC (ref 9.1–12)

## 2018-10-22 ENCOUNTER — ANTICOAG VISIT (OUTPATIENT)
Dept: CARDIOLOGY CLINIC | Facility: CLINIC | Age: 58
End: 2018-10-22

## 2018-10-22 ENCOUNTER — TELEPHONE (OUTPATIENT)
Dept: CARDIOLOGY CLINIC | Facility: CLINIC | Age: 58
End: 2018-10-22

## 2018-10-22 DIAGNOSIS — Z95.2 HISTORY OF MITRAL VALVE REPLACEMENT WITH MECHANICAL VALVE: ICD-10-CM

## 2018-10-22 NOTE — TELEPHONE ENCOUNTER
Phone call from patient, [phone]  Reports he has professional technicians coming to the home to draw his pt/inr testing  On 10/10/18, patient had requested a technician by the name of Ingrid Adams not come to draw his blood because she needs to stick him 3 times before obtain blood sample  I had spoken to John Faria at [facility] regarding patient's request not to send [place] to his home  I was told they would try to honor his request but could not guarantee they would have another technician  available  Today patient called again, reported last week Ingrid Adams was sent to his home, she had to stick him 3 times to obtain sample  He also reports technician seemed upset  Patient again requested I call to professional [facility] and request another tech  I called to [facility], [phone], spoke to linda, relayed patient request, she again cannot guarantee a specific technician  Explained I will have patient call to report complaint   She agreed  Called to patient, advised to call professional [facility] mobile lab and speak with linda  He agreed

## 2018-10-23 LAB — INR PPP: 3.4 (ref 0.86–1.17)

## 2018-10-24 ENCOUNTER — ANTICOAG VISIT (OUTPATIENT)
Dept: CARDIOLOGY CLINIC | Facility: CLINIC | Age: 58
End: 2018-10-24

## 2018-10-24 DIAGNOSIS — Z95.2 HISTORY OF MITRAL VALVE REPLACEMENT WITH MECHANICAL VALVE: ICD-10-CM

## 2018-10-24 LAB
INR PPP: 3.4 (ref 0.8–1.2)
PROTHROMBIN TIME: 33.2 SEC (ref 9.1–12)

## 2018-10-25 ENCOUNTER — REMOTE DEVICE CLINIC VISIT (OUTPATIENT)
Dept: CARDIOLOGY CLINIC | Facility: CLINIC | Age: 58
End: 2018-10-25
Payer: COMMERCIAL

## 2018-10-25 DIAGNOSIS — I50.22 CHRONIC SYSTOLIC CONGESTIVE HEART FAILURE (HCC): Primary | ICD-10-CM

## 2018-10-25 DIAGNOSIS — I25.5 ISCHEMIC CARDIOMYOPATHY: ICD-10-CM

## 2018-10-25 DIAGNOSIS — I47.2 VENTRICULAR TACHYCARDIA (HCC): ICD-10-CM

## 2018-10-25 DIAGNOSIS — Z95.810 PRESENCE OF CARDIOVERTER DEFIBRILLATOR: ICD-10-CM

## 2018-10-25 PROCEDURE — 93295 DEV INTERROG REMOTE 1/2/MLT: CPT | Performed by: INTERNAL MEDICINE

## 2018-10-25 PROCEDURE — 93296 REM INTERROG EVL PM/IDS: CPT | Performed by: INTERNAL MEDICINE

## 2018-10-25 NOTE — PROGRESS NOTES
Results for orders placed or performed in visit on 10/25/18   Cardiac EP device report    Narrative    BIO SINGLE ICD  BIOTRONIK TRANSMISSION: BATTERY STATUS "OK" (ESTER)  - 26%  ALL AVAILABLE LEAD PARAMETERS WITHIN NORMAL LIMITS  NO NEW SIGNIFICANT HIGH RATE EPISODES  VF EPISODE NOTED - PREV  ADDRESSED   APPROPRIATELY FUNCTIONING ICD   eb

## 2018-10-30 ENCOUNTER — TELEPHONE (OUTPATIENT)
Dept: CARDIOLOGY CLINIC | Facility: CLINIC | Age: 58
End: 2018-10-30

## 2018-10-30 DIAGNOSIS — D50.8 IRON DEFICIENCY ANEMIA SECONDARY TO INADEQUATE DIETARY IRON INTAKE: ICD-10-CM

## 2018-10-30 LAB — INR PPP: 2.5 (ref 0.86–1.17)

## 2018-10-30 RX ORDER — FERROUS SULFATE TAB EC 324 MG (65 MG FE EQUIVALENT) 324 (65 FE) MG
324 TABLET DELAYED RESPONSE ORAL
Qty: 30 TABLET | Refills: 5 | Status: SHIPPED | OUTPATIENT
Start: 2018-10-30

## 2018-10-30 NOTE — TELEPHONE ENCOUNTER
Phone call from Darien on triage voice message system leaving a message that he refuses to let the home care techs/nurses draw his PT/INR, because they cannot get his blood and have to do multiple painful sticks  He left that "he rather die than have them stick him" and wants to know what to do about this problem, which has been addressed by [facility] JAME FROST multiple times already  I called patient back and only got his answering machine, but per [facility] FROST, again, he must get a ride to a lab to have it drawn or personally address this problem with the agency that have the technicians coming out  There is nothing we can personally do from our office

## 2018-10-31 ENCOUNTER — ANTICOAG VISIT (OUTPATIENT)
Dept: CARDIOLOGY CLINIC | Facility: CLINIC | Age: 58
End: 2018-10-31

## 2018-10-31 ENCOUNTER — TELEPHONE (OUTPATIENT)
Dept: CARDIOLOGY CLINIC | Facility: CLINIC | Age: 58
End: 2018-10-31

## 2018-10-31 DIAGNOSIS — Z95.2 HISTORY OF MITRAL VALVE REPLACEMENT WITH MECHANICAL VALVE: ICD-10-CM

## 2018-10-31 LAB
INR PPP: 2.5 (ref 0.8–1.2)
PROTHROMBIN TIME: 24.6 SEC (ref 9.1–12)

## 2018-10-31 NOTE — TELEPHONE ENCOUNTER
Phone call to patient regarding inr results  Both patient and daughter, Tomas Aydinmiguel on speaker phone  Patient was given anticoagulation instructions  Noted patient had called yesterday regarding dissatifaction with a technician from mobile lab, [facility], named Chiqui  Spoke to patient and daughter  Patient reports technician had to stick him twice to obtain blood sample  He reports other technicians can obtain sample with one stick  explained to patient and daughter, I would again call to [facility] and relay his concerns  Called to [facility]  Called and  spoke to , Maria C Albrecht  [phone]  Explained patient's concerns  She states, she cannot promise which technician will be sent to patient's home  But, patient receives a call the day before scheduled draw  He can ask which technician will be coming, if he does not want that technician, he can reschedule to the next day  She will make note for , but once again, cannot promise a particular technician will be coming  She will also reach out to her clinical manager to bring this to her attention and may have her ride along with Chiqui to be sure she is using proper technique  Message left on patient and daughter's cell phone, Ирина Kirkland, regarding mark's response

## 2018-11-01 ENCOUNTER — PATIENT OUTREACH (OUTPATIENT)
Dept: FAMILY MEDICINE CLINIC | Facility: CLINIC | Age: 58
End: 2018-11-01

## 2018-11-01 NOTE — PROGRESS NOTES
Outpatient Care Management Note:    Care manager called and informed Karis Prakash that I did find an amputee support group  Karis Randall does not have an email, and he is unsure if his daughter has one  CM gave him the contact number to register for the group  I will attempt to call his daughter again to follow up  Karisgina Randall was very excited about the group  He stated that it has been almost 2 years since his amputation, and he has not had the opportunity to talk with anyone who can relate to his issues and concerns  I reviewed the specifics about the group with Karis Randall  I will mail the support group information to him, if I can not reach his daughter  He states that he did start PT, and it is going well  He is hopeful that he will learn to walk safely with his artificial leg  Karis Randall will call with any questions or concerns   He has my contact information

## 2018-11-06 ENCOUNTER — TELEPHONE (OUTPATIENT)
Dept: CARDIOLOGY CLINIC | Facility: CLINIC | Age: 58
End: 2018-11-06

## 2018-11-06 DIAGNOSIS — K92.2 GASTROINTESTINAL HEMORRHAGE, UNSPECIFIED GASTROINTESTINAL HEMORRHAGE TYPE: Primary | ICD-10-CM

## 2018-11-06 DIAGNOSIS — K22.10 EROSIVE ESOPHAGITIS: ICD-10-CM

## 2018-11-06 NOTE — TELEPHONE ENCOUNTER
Received phone message from daughter, Sunday Romeo  Patient will be starting antibiotics x 14 days on 11/7 per GI doctor  Requested daughter call office to review antibiotic and need to have inr tested sooner  Will wait for return call

## 2018-11-07 ENCOUNTER — OFFICE VISIT (OUTPATIENT)
Dept: FAMILY MEDICINE CLINIC | Facility: CLINIC | Age: 58
End: 2018-11-07
Payer: COMMERCIAL

## 2018-11-07 VITALS
WEIGHT: 179.5 LBS | BODY MASS INDEX: 24.31 KG/M2 | DIASTOLIC BLOOD PRESSURE: 82 MMHG | OXYGEN SATURATION: 97 % | SYSTOLIC BLOOD PRESSURE: 152 MMHG | HEART RATE: 61 BPM | HEIGHT: 72 IN | TEMPERATURE: 97.5 F

## 2018-11-07 DIAGNOSIS — R39.9 SYMPTOMS INVOLVING URINARY SYSTEM: ICD-10-CM

## 2018-11-07 DIAGNOSIS — Z89.612 STATUS POST ABOVE KNEE AMPUTATION OF LEFT LOWER EXTREMITY: ICD-10-CM

## 2018-11-07 DIAGNOSIS — IMO0002 INSULIN DEPENDENT TYPE 2 DIABETES MELLITUS, UNCONTROLLED: ICD-10-CM

## 2018-11-07 DIAGNOSIS — I10 BENIGN ESSENTIAL HYPERTENSION: ICD-10-CM

## 2018-11-07 DIAGNOSIS — S31.819A BUTTOCK WOUND, RIGHT, INITIAL ENCOUNTER: Primary | ICD-10-CM

## 2018-11-07 DIAGNOSIS — E11.40 TYPE 2 DIABETES MELLITUS WITH DIABETIC NEUROPATHY, UNSPECIFIED WHETHER LONG TERM INSULIN USE (HCC): ICD-10-CM

## 2018-11-07 PROCEDURE — 3008F BODY MASS INDEX DOCD: CPT | Performed by: FAMILY MEDICINE

## 2018-11-07 PROCEDURE — 99214 OFFICE O/P EST MOD 30 MIN: CPT | Performed by: FAMILY MEDICINE

## 2018-11-07 RX ORDER — MONTELUKAST SODIUM 10 MG/1
10 TABLET ORAL DAILY
Refills: 5 | COMMUNITY
Start: 2018-10-28

## 2018-11-07 RX ORDER — DIPHENOXYLATE HYDROCHLORIDE AND ATROPINE SULFATE 2.5; .025 MG/1; MG/1
TABLET ORAL
COMMUNITY
Start: 2018-11-06 | End: 2019-01-28 | Stop reason: SDUPTHER

## 2018-11-07 NOTE — PATIENT INSTRUCTIONS
Gi NOTE FOR NEW MEDICATION FOR DIARRHEA  URINE   WOUND CARE EVALUATION AND HOME HEALTH FOR BANDAGE CHANGES

## 2018-11-07 NOTE — PROGRESS NOTES
Subjective:   Chief Complaint   Patient presents with    Abscess     PT IS HERE FOR ABSCESS ON RECTAL AREA  Patient ID: Otis Barba is a 60 y o  male  Home care and on Sunday  Patient complains of a WOUND ON right BUTTOCKS  He was seen by Gastroenterology however they referred him to wound care  Patient was prescribed an antibiotic but needed prior authorization for his diarrhea  BUXMONT EVALUATION  He continues with physical therapy at home  The area on his buttocks is sore  He gets diarrhea frequently  He is in the wheelchair currently, working on getting strength to use his prosthesis for his left leg  Patient when was seen by the nurse coordinator, Korina Jiang today in the office who provided the information for amputee support group  He complains of his urine having a rust tint  He denies any pain or burning with urination  He denies a fever  The following portions of the patient's history were reviewed and updated as appropriate: allergies, current medications, past family history, past medical history, past social history, past surgical history and problem list     Review of Systems   Constitutional: Negative  Negative for fatigue and fever  HENT: Negative  Eyes: Negative  Respiratory: Negative  Negative for cough  Cardiovascular: Negative  Gastrointestinal: Positive for diarrhea  Endocrine: Negative  Genitourinary: Negative  Musculoskeletal: Negative  Skin: Positive for wound  Allergic/Immunologic: Negative  Neurological: Negative  Psychiatric/Behavioral: Negative  Objective:  Vitals:    11/07/18 0939   BP: 152/82   Pulse: 61   Temp: 97 5 °F (36 4 °C)   SpO2: 97%   Weight: 81 4 kg (179 lb 8 oz)   Height: 6' (1 829 m)      Physical Exam   Constitutional: He is oriented to person, place, and time  He appears well-developed and well-nourished  HENT:   Head: Normocephalic and atraumatic     Cardiovascular: Normal rate, regular rhythm and normal heart sounds  Pulmonary/Chest: Effort normal and breath sounds normal    Abdominal: Soft  Bowel sounds are normal    Musculoskeletal: He exhibits deformity  Neurological: He is alert and oriented to person, place, and time  Skin: Skin is warm and dry  There is erythema  Erythema right buttock and gluteal fold 0 2 cm adjacent to rectal area, no active drainage, minimal redness   Psychiatric: He has a normal mood and affect  His behavior is normal  Judgment and thought content normal    Nursing note and vitals reviewed  Assessment/Plan:    No problem-specific Assessment & Plan notes found for this encounter  Diagnoses and all orders for this visit:    Buttock wound, right, initial encounter small open area right buttock, evaluation by wound care  -     Ambulatory referral to Wound Care; Future:  Encouraged increased activity as tolerated    Symptoms involving urinary system:  Urine sent for urinalysis with reflex to culture  -     Urine culture  -     Urinalysis with microscopic    Insulin dependent type 2 diabetes mellitus, uncontrolled ([facility] ):  Continue current dosage of medication    Type 2 diabetes mellitus with diabetic neuropathy, unspecified whether long term insulin use ([facility] ):  Hemoglobin A1c on September 18th was 8 0  Next hemoglobin A1c due December 18th  Benign essential hypertension:    Status post above knee amputation of left lower extremity (HCC)    Other orders  -     diphenoxylate-atropine (LOMOTIL) 2 5-0 025 mg per tablet;   -     montelukast (SINGULAIR) 10 mg tablet;  Take 10 mg by mouth daily

## 2018-11-08 DIAGNOSIS — E78.5 DYSLIPIDEMIA: ICD-10-CM

## 2018-11-08 LAB
APPEARANCE UR: ABNORMAL
BACTERIA URNS QL MICRO: ABNORMAL
BILIRUB UR QL STRIP: NEGATIVE
COLOR UR: YELLOW
EPI CELLS #/AREA URNS HPF: ABNORMAL /HPF
GLUCOSE UR QL: NEGATIVE
HGB UR QL STRIP: ABNORMAL
KETONES UR QL STRIP: NEGATIVE
LEUKOCYTE ESTERASE UR QL STRIP: NEGATIVE
MICRO URNS: ABNORMAL
MUCOUS THREADS URNS QL MICRO: PRESENT
NITRITE UR QL STRIP: NEGATIVE
PH UR STRIP: 5.5 [PH] (ref 5–7.5)
PROT UR QL STRIP: ABNORMAL
RBC #/AREA URNS HPF: ABNORMAL /HPF
SP GR UR: >=1.03 (ref 1–1.03)
UROBILINOGEN UR STRIP-ACNC: 0.2 EU/DL (ref 0.2–1)
WBC #/AREA URNS HPF: ABNORMAL /HPF

## 2018-11-08 RX ORDER — ATORVASTATIN CALCIUM 10 MG/1
TABLET, FILM COATED ORAL
Qty: 90 TABLET | Refills: 0 | Status: SHIPPED | OUTPATIENT
Start: 2018-11-08 | End: 2019-01-25 | Stop reason: SDUPTHER

## 2018-11-08 RX ORDER — ATORVASTATIN CALCIUM 10 MG/1
TABLET, FILM COATED ORAL
Qty: 90 TABLET | Refills: 0 | Status: SHIPPED | OUTPATIENT
Start: 2018-11-08

## 2018-11-09 DIAGNOSIS — E78.5 DYSLIPIDEMIA: ICD-10-CM

## 2018-11-09 RX ORDER — ATORVASTATIN CALCIUM 10 MG/1
TABLET, FILM COATED ORAL
Qty: 90 TABLET | Refills: 0 | Status: SHIPPED | OUTPATIENT
Start: 2018-11-09 | End: 2019-01-28 | Stop reason: SDUPTHER

## 2018-11-12 LAB
BACTERIA UR CULT: ABNORMAL
Lab: ABNORMAL
SL AMB ANTIMICROBIAL SUSCEPTIBILITY: ABNORMAL

## 2018-11-13 LAB — INR PPP: 2.1 (ref 0.86–1.17)

## 2018-11-14 ENCOUNTER — ANTICOAG VISIT (OUTPATIENT)
Dept: CARDIOLOGY CLINIC | Facility: CLINIC | Age: 58
End: 2018-11-14

## 2018-11-14 DIAGNOSIS — Z95.2 HISTORY OF MITRAL VALVE REPLACEMENT WITH MECHANICAL VALVE: ICD-10-CM

## 2018-11-14 LAB
INR PPP: 2.1 (ref 0.8–1.2)
PROTHROMBIN TIME: 21.4 SEC (ref 9.1–12)

## 2018-11-14 NOTE — PROGRESS NOTES
10/31/18, tc to pt, spoke with both patient and daughter Caio Solis on speaker phone  Pt to continue current dose, inr to be done in 2 weeks, will call to professional  technicians to change next draw date  Also patient reports technician from yesterday had to do 2 attempts to obtain sample  Again pt requested this tech not come to his home  Will also request again not to have this technician sent to home per pt  rani    11/14/18, above note copied today  rain      11/14/18, tc to pt, increase dose, 7 5 mg wed  5 mg other days of the week, inr due 1 week  Pt taking lomotil not antibiotic as noted earlier by daughter's call     rani

## 2018-11-17 DIAGNOSIS — N30.01 ACUTE CYSTITIS WITH HEMATURIA: Primary | ICD-10-CM

## 2018-11-17 DIAGNOSIS — Z89.612 STATUS POST ABOVE KNEE AMPUTATION OF LEFT LOWER EXTREMITY: ICD-10-CM

## 2018-11-17 RX ORDER — CIPROFLOXACIN 500 MG/1
500 TABLET, FILM COATED ORAL EVERY 12 HOURS SCHEDULED
Qty: 14 TABLET | Refills: 0 | Status: SHIPPED | OUTPATIENT
Start: 2018-11-17 | End: 2018-11-29 | Stop reason: SDUPTHER

## 2018-11-19 ENCOUNTER — TELEPHONE (OUTPATIENT)
Dept: FAMILY MEDICINE CLINIC | Facility: CLINIC | Age: 58
End: 2018-11-19

## 2018-11-19 RX ORDER — HYDROCODONE BITARTRATE AND ACETAMINOPHEN 7.5; 325 MG/1; MG/1
1 TABLET ORAL EVERY 6 HOURS PRN
Qty: 120 TABLET | Refills: 0 | Status: SHIPPED | OUTPATIENT
Start: 2018-11-19 | End: 2018-12-31 | Stop reason: SDUPTHER

## 2018-11-19 NOTE — TELEPHONE ENCOUNTER
YAHIR GIRON NEEDS AN UPDATED SCRIPT FOR LEFT AK DEFINITIVE PROSTHESIS WITH DIAG AKA    PLEASE [address] [facility] TO  [phone]

## 2018-11-20 LAB — INR PPP: 2.1 (ref 0.86–1.17)

## 2018-11-21 ENCOUNTER — ANTICOAG VISIT (OUTPATIENT)
Dept: CARDIOLOGY CLINIC | Facility: CLINIC | Age: 58
End: 2018-11-21

## 2018-11-21 DIAGNOSIS — Z95.2 HISTORY OF MITRAL VALVE REPLACEMENT WITH MECHANICAL VALVE: ICD-10-CM

## 2018-11-21 LAB
INR PPP: 2.1 (ref 0.8–1.2)
PROTHROMBIN TIME: 21.1 SEC (ref 9.1–12)

## 2018-11-28 ENCOUNTER — ANTICOAG VISIT (OUTPATIENT)
Dept: CARDIOLOGY CLINIC | Facility: CLINIC | Age: 58
End: 2018-11-28

## 2018-11-28 ENCOUNTER — PATIENT OUTREACH (OUTPATIENT)
Dept: FAMILY MEDICINE CLINIC | Facility: CLINIC | Age: 58
End: 2018-11-28

## 2018-11-28 ENCOUNTER — TELEPHONE (OUTPATIENT)
Dept: FAMILY MEDICINE CLINIC | Facility: CLINIC | Age: 58
End: 2018-11-28

## 2018-11-28 DIAGNOSIS — Z95.2 HISTORY OF MITRAL VALVE REPLACEMENT WITH MECHANICAL VALVE: ICD-10-CM

## 2018-11-28 DIAGNOSIS — N30.01 ACUTE CYSTITIS WITH HEMATURIA: Primary | ICD-10-CM

## 2018-11-28 LAB
INR PPP: 2.4 (ref 0.8–1.2)
PROTHROMBIN TIME: 24.4 SEC (ref 9.1–12)

## 2018-11-28 NOTE — TELEPHONE ENCOUNTER
Should only be taking twice a day, not 3 times a day  Also needs to check coumadin time to make sure it is not affected by the antibiotic  If still not looking good, he should not get a refill  Please have him submit another urine either here or the lab   We need to put orders in

## 2018-11-28 NOTE — PROGRESS NOTES
Phone call to [facility] with INR of 2 1  Patient increased to 7 5 mgs on Fri and Weds and 5 all the others  Next drawn in one or two weeks  Not sure if weekly or bi weekly is set up but explained to Abran Rosario if two weeks it is fine but keep the dose on going until next pt/INR  klw    11/28/18, above note copied today  rani    11/28/18, tc to pt, continue current dose, inr due 1 week   rani

## 2018-11-28 NOTE — PROGRESS NOTES
Outpatient Care Management Note:    Care manager called Maximino Momin following up on the support group (3rd attempt) and whether he was able to sign up for the next meeting  CM would also like to discuss MOW  VM message left, with my contact information, requesting a call back  CM called and spoke with Moises [place] following up to see how her dad is doing  Moises Kaur states that she was away for a little while and her dad has been a little down  Now that she is back, she is trying to get him back on course  Maximino Momin has had a kidney infection and was not feeling well  He is on antibiotics  He is scheduled to see wound care on Friday to assess his buttocks wound  PT is still coming weekly  Maximino Momin met with the "prostetic lady" and he is eligible for a new prosthesis  He has not gone to the support group, but the plan is still to get him there  Eveliakameron Florida feels her dad has been more withdrawn  She will visit him today and request that he call CM

## 2018-11-28 NOTE — TELEPHONE ENCOUNTER
Patient called concerning cipro, patient takes 3 tablets daily  Patient has one day left  Patient urine is still dark and still has pain  Patient has 2 refills left and is wondering if he should continue with the refill     CB:  [phone]

## 2018-11-29 DIAGNOSIS — N30.01 ACUTE CYSTITIS WITH HEMATURIA: ICD-10-CM

## 2018-11-29 NOTE — TELEPHONE ENCOUNTER
Patient has a mobile lab that comes every week, PT/INR was 2 4 on 11/28   5mg of coumadin every day Wednesday and Friday he takes 7 5  Patient notified that he needs urine done  Patient will try to go, has to work around daughters schedule  Asked patient to have daughter call here

## 2018-11-30 ENCOUNTER — APPOINTMENT (OUTPATIENT)
Dept: WOUND CARE | Facility: HOSPITAL | Age: 58
End: 2018-11-30
Payer: COMMERCIAL

## 2018-11-30 PROCEDURE — 99213 OFFICE O/P EST LOW 20 MIN: CPT

## 2018-11-30 RX ORDER — CIPROFLOXACIN 500 MG/1
500 TABLET, FILM COATED ORAL EVERY 12 HOURS SCHEDULED
Qty: 14 TABLET | Refills: 0 | Status: SHIPPED | OUTPATIENT
Start: 2018-11-30 | End: 2018-12-07

## 2018-12-03 ENCOUNTER — TELEPHONE (OUTPATIENT)
Dept: FAMILY MEDICINE CLINIC | Facility: CLINIC | Age: 58
End: 2018-12-03

## 2018-12-03 ENCOUNTER — PATIENT OUTREACH (OUTPATIENT)
Dept: FAMILY MEDICINE CLINIC | Facility: CLINIC | Age: 58
End: 2018-12-03

## 2018-12-05 ENCOUNTER — ANTICOAG VISIT (OUTPATIENT)
Dept: CARDIOLOGY CLINIC | Facility: CLINIC | Age: 58
End: 2018-12-05

## 2018-12-05 DIAGNOSIS — Z95.2 HISTORY OF MITRAL VALVE REPLACEMENT WITH MECHANICAL VALVE: ICD-10-CM

## 2018-12-05 LAB
INR PPP: 2.5 (ref 0.8–1.2)
PROTHROMBIN TIME: 24.5 SEC (ref 9.1–12)

## 2018-12-05 NOTE — PROGRESS NOTES
12/5/18, tc to pt, will continue current dose, he reports he has been taking 7 5 mg wed/fri, 5 mg other days of the week  inr due 1 week  Also reports he is completed cipro which is the second week for uti  He also reports he thinks he is scheduled for egd on 12/19  He spoke with GI office yesterday, they told patient he will need to hold warfarin and they would be notifying dr Sharyle Cords  Will wait to hear from dr Sharyle Cords if he needs to be bridged with lovenox or if he will need heparin    rani

## 2018-12-06 ENCOUNTER — PATIENT OUTREACH (OUTPATIENT)
Dept: FAMILY MEDICINE CLINIC | Facility: CLINIC | Age: 58
End: 2018-12-06

## 2018-12-06 NOTE — PROGRESS NOTES
Outpatient Care Management Note:    No response from patient to voice mail messages left  Unable to reach letter mailed to patient

## 2018-12-11 ENCOUNTER — TELEPHONE (OUTPATIENT)
Dept: CARDIOLOGY CLINIC | Facility: CLINIC | Age: 58
End: 2018-12-11

## 2018-12-11 DIAGNOSIS — I25.10 CORONARY ARTERY DISEASE INVOLVING NATIVE CORONARY ARTERY OF NATIVE HEART WITHOUT ANGINA PECTORIS: Primary | ICD-10-CM

## 2018-12-11 NOTE — TELEPHONE ENCOUNTER
Phone call from Manolo Rodriguez at [facility] office  She reported dr Shawna Nicolas received pre op clearance from [facility], patient is scheduled for egd on 12/19/18  Per isaac morris, patient will need to hold warfarin 4 days prior to egd  He will need lovenox bridging  reviewed chart  Noted on 9/18/18, creatinine was 0 8, on 9/28/18, creatinine was 1 42  Called to lab [facility], last creatinine level was 9/18/18  Do you want BMP drawn today? Please advise    Phone call from rashmi , bmp per dr Taurus Valentin  Order placed in chart  Called to [facility]  Spoke  with noemí  Explained pt needs to have bmp drawn with inr today  She reports their policy is to have notice by 1 pm the day before draw  Unable to add bmp to inr today

## 2018-12-11 NOTE — TELEPHONE ENCOUNTER
Placed call to patient, explained professional technician will not draw blood for bmp with inr today  Questioned if daughter can take pt to lab today, if not will have to have bmp drawn tomorrow by mobile lab, but must notify [facility] before 1 pm today  Pt understood same , will try reaching daughter  Placed call to daughterRodrigueEduardo Yang  Left message to call office asap to discuss if he can be taken to lab , or to use mobile lab   Will wait for return call  Phone call from pt's daughter, left message to call her back  Called to daughter, unable to reach bro by phone, only voicemail  Called to patient, he is in agreement to have mobile lab draw bmp 12/12  Called to [facility]  Spoke to sarath  Faxed script to her at [phone]

## 2018-12-11 NOTE — TELEPHONE ENCOUNTER
Phone call from daughter, Caio Solis, reports patient was rescheduled last week with jero real to have  egd done 1/8/19  Contacted lab  Will have inr and bmp done next week  Called to jero real, spoke with lisbet  Verified egd rescheduled to 1/8/19  She will fax new anticoagulation recommendation sheet to be singed by dr Rinku Park

## 2018-12-12 ENCOUNTER — ANTICOAG VISIT (OUTPATIENT)
Dept: CARDIOLOGY CLINIC | Facility: CLINIC | Age: 58
End: 2018-12-12

## 2018-12-12 DIAGNOSIS — Z95.2 HISTORY OF MITRAL VALVE REPLACEMENT WITH MECHANICAL VALVE: ICD-10-CM

## 2018-12-12 LAB
INR PPP: 1.9 (ref 0.8–1.2)
PROTHROMBIN TIME: 19.4 SEC (ref 9.1–12)

## 2018-12-12 NOTE — PROGRESS NOTES
12/12/18, tc to pt, left message on his cell phone, increase dose, 7 5 mg m/w/f, 5 mg other days of the week, inr due 1 week  Also called to daughter, Joel Patricio, cell , left same instructions  Pt to call if any questions   rani

## 2018-12-13 DIAGNOSIS — R39.89 SUSPECTED UTI: Primary | ICD-10-CM

## 2018-12-14 DIAGNOSIS — R39.9 SYMPTOMS OF URINARY TRACT INFECTION: Primary | ICD-10-CM

## 2018-12-16 LAB
APPEARANCE UR: CLEAR
BACTERIA UR CULT: NORMAL
BACTERIA URNS QL MICRO: ABNORMAL
BILIRUB UR QL STRIP: NEGATIVE
COLOR UR: YELLOW
CRYSTALS URNS MICRO: ABNORMAL
EPI CELLS #/AREA URNS HPF: ABNORMAL /HPF
GLUCOSE UR QL: NEGATIVE
HGB UR QL STRIP: NEGATIVE
KETONES UR QL STRIP: NEGATIVE
LABCORP COMMENT: NORMAL
LEUKOCYTE ESTERASE UR QL STRIP: NEGATIVE
Lab: NO GROWTH
MICRO URNS: ABNORMAL
MUCOUS THREADS URNS QL MICRO: PRESENT
NITRITE UR QL STRIP: NEGATIVE
PH UR STRIP: 6.5 [PH] (ref 5–7.5)
PROT UR QL STRIP: ABNORMAL
RBC #/AREA URNS HPF: ABNORMAL /HPF
SP GR UR: 1.03 (ref 1–1.03)
UNIDENT CRYS URNS QL MICRO: PRESENT
UROBILINOGEN UR STRIP-ACNC: 1 EU/DL (ref 0.2–1)
WBC #/AREA URNS HPF: ABNORMAL /HPF

## 2018-12-18 LAB — INR PPP: 2.4 (ref 0.86–1.17)

## 2018-12-19 LAB
BUN SERPL-MCNC: 11 MG/DL (ref 6–24)
BUN/CREAT SERPL: 12 (ref 9–20)
CALCIUM SERPL-MCNC: 8.9 MG/DL (ref 8.7–10.2)
CHLORIDE SERPL-SCNC: 105 MMOL/L (ref 96–106)
CO2 SERPL-SCNC: 17 MMOL/L (ref 20–29)
CREAT SERPL-MCNC: 0.92 MG/DL (ref 0.76–1.27)
GLUCOSE SERPL-MCNC: 338 MG/DL (ref 65–99)
POTASSIUM SERPL-SCNC: 5.1 MMOL/L (ref 3.5–5.2)
SL AMB EGFR AFRICAN AMERICAN: 105 ML/MIN/1.73
SL AMB EGFR NON AFRICAN AMERICAN: 91 ML/MIN/1.73
SODIUM SERPL-SCNC: 137 MMOL/L (ref 134–144)

## 2018-12-20 ENCOUNTER — ANTICOAG VISIT (OUTPATIENT)
Dept: CARDIOLOGY CLINIC | Facility: CLINIC | Age: 58
End: 2018-12-20

## 2018-12-20 DIAGNOSIS — Z95.2 HISTORY OF MITRAL VALVE REPLACEMENT WITH MECHANICAL VALVE: ICD-10-CM

## 2018-12-20 LAB
INR PPP: 2.4 (ref 0.8–1.2)
PROTHROMBIN TIME: 23.8 SEC (ref 9.1–12)

## 2018-12-20 NOTE — PROGRESS NOTES
12/20/18, tc to pt, continue current dose, inr due 1 weeks  Planning egd on 1/8/19 with jero real, will need to bridge with lovenox per dr Melissa Dhaliwal  Also called to daughter, Sunday Romeo, left instructions on her cell phone regarding warfarin dose   rani

## 2018-12-21 ENCOUNTER — PATIENT OUTREACH (OUTPATIENT)
Dept: FAMILY MEDICINE CLINIC | Facility: CLINIC | Age: 58
End: 2018-12-21

## 2018-12-21 ENCOUNTER — TELEPHONE (OUTPATIENT)
Dept: FAMILY MEDICINE CLINIC | Facility: CLINIC | Age: 58
End: 2018-12-21

## 2018-12-21 DIAGNOSIS — Z12.39 BREAST CANCER SCREENING: ICD-10-CM

## 2018-12-21 DIAGNOSIS — I10 ESSENTIAL HYPERTENSION: Primary | ICD-10-CM

## 2018-12-21 DIAGNOSIS — R19.7 DIARRHEA, UNSPECIFIED TYPE: Primary | ICD-10-CM

## 2018-12-21 NOTE — TELEPHONE ENCOUNTER
Marivel Mccullough from [facility] left a message with the following information  Zeeshan Loyola is seeing Dr Porter at [facility] on 12/26/2018  He is in need of a provider to provider referral    NPI # [id number]  His Dx code is R19 7  Is this something we can do or is this something the provider needs to do?

## 2018-12-22 DIAGNOSIS — R19.7 DIARRHEA, UNSPECIFIED TYPE: Primary | ICD-10-CM

## 2018-12-22 NOTE — TELEPHONE ENCOUNTER
I entered the order for epic but they are not on epic yet, so please fax the referral to their office

## 2018-12-26 PROCEDURE — 4010F ACE/ARB THERAPY RXD/TAKEN: CPT | Performed by: FAMILY MEDICINE

## 2018-12-26 RX ORDER — LISINOPRIL 10 MG/1
TABLET ORAL
Qty: 90 TABLET | Refills: 3 | Status: SHIPPED | OUTPATIENT
Start: 2018-12-26

## 2018-12-27 ENCOUNTER — ANTICOAG VISIT (OUTPATIENT)
Dept: CARDIOLOGY CLINIC | Facility: CLINIC | Age: 58
End: 2018-12-27

## 2018-12-27 DIAGNOSIS — Z95.2 HISTORY OF MITRAL VALVE REPLACEMENT WITH MECHANICAL VALVE: ICD-10-CM

## 2018-12-27 LAB
INR PPP: 2.4 (ref 0.8–1.2)
PROTHROMBIN TIME: 24.2 SEC (ref 9.1–12)

## 2018-12-31 DIAGNOSIS — Z89.612 STATUS POST ABOVE KNEE AMPUTATION OF LEFT LOWER EXTREMITY: ICD-10-CM

## 2018-12-31 DIAGNOSIS — Z95.2 S/P MVR (MITRAL VALVE REPLACEMENT): Primary | ICD-10-CM

## 2018-12-31 RX ORDER — HYDROCODONE BITARTRATE AND ACETAMINOPHEN 7.5; 325 MG/1; MG/1
1 TABLET ORAL EVERY 6 HOURS PRN
Qty: 120 TABLET | Refills: 0 | Status: SHIPPED | OUTPATIENT
Start: 2018-12-31 | End: 2019-01-28 | Stop reason: SDUPTHER

## 2018-12-31 NOTE — TELEPHONE ENCOUNTER
Per dr Elsie Goodell, patient will need to stop warfarin for egd scheduled 1/8/19  He should be bridged with lovenox as before  Pt will stop warfarin 5 days prior to procedure  Begin lovenox injections, 80 mg every 12 hours, 3 days prior to procedure for total of 5 injections  Last lovenox injection 1/7/19 in am    On 1/8/19, if ok with GI physcian, restart warfarin at previous dose, on 1/8, and restart lovenox injection, on 1/9/19  continue lovenox injections until inr 2 0  Patient will have inr drawn 1/10, and 1/14/19  [facility] by patient  Spoke with patient by phone, reviewed instructions, he understood same  Will also mail instructions to patient's daughter, Ad Leyva, at her home as he requested

## 2019-01-03 ENCOUNTER — HOSPITAL ENCOUNTER (OUTPATIENT)
Dept: NON INVASIVE DIAGNOSTICS | Facility: CLINIC | Age: 59
Discharge: HOME | End: 2019-01-03
Payer: COMMERCIAL

## 2019-01-03 DIAGNOSIS — I70.211 ATHEROSCLEROSIS OF NATIVE ARTERIES OF EXTREMITIES WITH INTERMITTENT CLAUDICATION, RIGHT LEG (HCC): ICD-10-CM

## 2019-01-03 PROCEDURE — 93926 LOWER EXTREMITY STUDY: CPT

## 2019-01-04 ENCOUNTER — TELEPHONE (OUTPATIENT)
Dept: CARDIOLOGY CLINIC | Facility: CLINIC | Age: 59
End: 2019-01-04

## 2019-01-04 PROCEDURE — 93922 UPR/L XTREMITY ART 2 LEVELS: CPT | Performed by: SURGERY

## 2019-01-04 PROCEDURE — 93926 LOWER EXTREMITY STUDY: CPT | Performed by: SURGERY

## 2019-01-04 NOTE — TELEPHONE ENCOUNTER
Phone call to [facility] ,[phone], spoke with [address], she reports technician spoke with patient's daughter, Shadi Mendieta  Technician was told no need for pt/inr to be done 1/3 because warfarin on hold for egd  Requested next pt/inr be drawn 1/10 and 1/14 by mobile l[facility]  Called to pt, left message on cell phone that mobile [facility];ab has been contacted and pt/inr draw dates have been arranged with mobile lab as above  Patient to call if any questions

## 2019-01-11 ENCOUNTER — ANTICOAG VISIT (OUTPATIENT)
Dept: CARDIOLOGY CLINIC | Facility: CLINIC | Age: 59
End: 2019-01-11

## 2019-01-11 DIAGNOSIS — Z95.2 HISTORY OF MITRAL VALVE REPLACEMENT WITH MECHANICAL VALVE: ICD-10-CM

## 2019-01-11 DIAGNOSIS — Z95.2 S/P MVR (MITRAL VALVE REPLACEMENT): ICD-10-CM

## 2019-01-11 LAB
INR PPP: 1.1 (ref 0.8–1.2)
PROTHROMBIN TIME: 11 SEC (ref 9.1–12)

## 2019-01-11 NOTE — PROGRESS NOTES
1/11/19, tc to pt, reports he has one syringe of lovenox 80 mg available  reviewed with dr Kelsi Mott  Pt will need to continue lovenox 80 mg q 12 hours until inr 2 0  Spoke with patient, [facility]  continue lovenox 80 mg q 12 hours  Pt will take 7 5 mg daily, inr due mon  Mobile lab was already notified to inr draw  '  Called to daughter, Diane Olea, relayed same instructions   She understood same  She reports pt is upset with frequent lab draws and is interested in home testing meter  Will enroll paper work with jaleesa saravia

## 2019-01-14 LAB — INR PPP: 2 (ref 0.86–1.17)

## 2019-01-15 ENCOUNTER — ANTICOAG VISIT (OUTPATIENT)
Dept: CARDIOLOGY CLINIC | Facility: CLINIC | Age: 59
End: 2019-01-15

## 2019-01-15 DIAGNOSIS — Z95.2 HISTORY OF MITRAL VALVE REPLACEMENT WITH MECHANICAL VALVE: ICD-10-CM

## 2019-01-15 LAB
INR PPP: 2 (ref 0.8–1.2)
PROTHROMBIN TIME: 19.9 SEC (ref 9.1–12)

## 2019-01-15 NOTE — PROGRESS NOTES
1/15/19, tc to pt  Resume previous dose of warfarin 5 mg sun/t/th, 7 5 mg other days of the week, inr due 1 week  Pt states he is interested in home monitoring for inr testing  paperwork faxed to jaleesa  Home testing today   rani

## 2019-01-17 ENCOUNTER — TELEPHONE (OUTPATIENT)
Dept: ENDOCRINOLOGY | Facility: HOSPITAL | Age: 59
End: 2019-01-17

## 2019-01-17 ENCOUNTER — TELEPHONE (OUTPATIENT)
Dept: FAMILY MEDICINE CLINIC | Facility: CLINIC | Age: 59
End: 2019-01-17

## 2019-01-17 NOTE — TELEPHONE ENCOUNTER
Looking at Mark's medication list, it appears that he has a prescription for BD ultra fine syringes that are 1 cc with a 31 gauge needle that is 5/16 long, which are the smallest   He also has a prescription for BD coral pen needles which are 32 gauge and the smallest of the pen needles

## 2019-01-17 NOTE — TELEPHONE ENCOUNTER
PATIENT IS FINISHED [facility] ON HIS NEW PROSTHESIS  HE NOW REQUIRES ORDERS FOR OUTPATIENT PT AT [facility]  PLEASE PUT ORDERS IN COMPUTER FOR HIM      Taran Allen

## 2019-01-17 NOTE — TELEPHONE ENCOUNTER
Received call from patient's daughter  She is concerned the patient has not been taking his medication  She states he needs smaller syringes, something like the pen needles  What do you recommend?

## 2019-01-18 DIAGNOSIS — Z89.612 STATUS POST ABOVE KNEE AMPUTATION OF LEFT LOWER EXTREMITY: Primary | ICD-10-CM

## 2019-01-23 ENCOUNTER — ANTICOAG VISIT (OUTPATIENT)
Dept: CARDIOLOGY CLINIC | Facility: CLINIC | Age: 59
End: 2019-01-23

## 2019-01-23 DIAGNOSIS — Z95.2 HISTORY OF MITRAL VALVE REPLACEMENT WITH MECHANICAL VALVE: ICD-10-CM

## 2019-01-23 NOTE — PROGRESS NOTES
1/23/19, received fax message from [facility]  inr draw scheduled for 1/22 was not drawn  They will reschedule pt  Tc to pt, states he did not feel well on 1/22  If technician came to his home , he may have been sleeping  Tc to professional technicians  They cannot reschedule patient before 1/29  Tc to pt, will have mobile lab come on 1/29 for inr draw  Pt will continue current dose of warfarin, pt understood same   rani

## 2019-01-25 ENCOUNTER — PATIENT OUTREACH (OUTPATIENT)
Dept: FAMILY MEDICINE CLINIC | Facility: CLINIC | Age: 59
End: 2019-01-25

## 2019-01-25 ENCOUNTER — TELEPHONE (OUTPATIENT)
Dept: FAMILY MEDICINE CLINIC | Facility: CLINIC | Age: 59
End: 2019-01-25

## 2019-01-25 DIAGNOSIS — Z89.612 STATUS POST ABOVE KNEE AMPUTATION OF LEFT LOWER EXTREMITY: Primary | ICD-10-CM

## 2019-01-25 NOTE — PROGRESS NOTES
Outpatient Care Management Note:    Zachary Villatoro called and left a voice mail message requesting care  him back  He stated that he ha been sick and is now ready to attend the support group  He wanted the information again  Care manager called Taty Thayer back and gave him the contact information for Renata Amaya and the amputee support group  Taty Thayer states that he has been having ongoing issues with diarrhea  He is being seen by GI and had a full workup  He tells CM that he got a new leg prosthesis which is much lighter and he is waiting to begin physical therapy  There are physical therapy dates scheduled in EPIC, but Taty Thayer was not aware of them  CM will contact his daughter to see if she scheduled these dates  CM reviewed his upcoming appointments  He is due for a remote device check on Monday at 8 am with cardiology  He did not know this was scheduled or how to do it  He has the box and it is plugged in  I will have cardiology call him  He also states "that his pacemaker wires are coming through his skin"  When I questioned him further, he stated that he feels the wires, but they are not out of his skin  He tells CM that he has lost weight and is wondering if that is the problem  Care manager called Moe's daughter, Rupinder Lieberman, and left a voice mail message with my concerns from above  I requested she call me back  I left my contact information on the voice mail  Care manager called Dr Harper's office regarding Moe's complaints related to his pacemaker wires  They transferred me to the device clinic  I spoke with Jose Cruz Mejias  She states that Moe's box is set up to automatically send his information to them  If there was a problem with the pacemaker, they would have been notified  Jose Cruz Mejias will call Taty Jeovany directly and discuss his concerns  She will review the device check process for Monday

## 2019-01-25 NOTE — TELEPHONE ENCOUNTER
He is starting physical therapy for left leg  He is going to [facility] physical therapy  He had appt today which was cancelled      He needs a note stating that he can go      [phone]

## 2019-01-25 NOTE — PROGRESS NOTES
Assessment/Plan:  62 y/o M with HTN, HLD, DM w/ neuropathy, CAD s/p CABG, CHF, mitral valve replacement on anticoagulation, hx of L AKA (8/2017) for non-salveageable left foot wounds s/p multiple failed endovascular interventions with thrombosis with suspicious for hypercoagulable state, maintained on Coumadin, seen today for review after recent LEAD 1/16/19  1  PAD   -Hx L AKA  He will restart therapy with to his final prosthesis next week  He is eager    -Arterial duplex showed R PFA 50-75% stenosis, >75% AT stenosis VELIA 1 4/37/44  -No ischemic wounds of the right foot  He did have a blister of the right foot and heel that healed    -He does have neuropathy on gabapentin  -Will continue with standard duplex imaging in 6 months    -Counseled on proper foot care   -Referred to Podiatry for orthotic and routine nail trimmings    -Continue antiplatelet and statin therapy  -Smoking cessation  Counseled at length  He will not quit    -Follow up in the office in 6 months  2  Carotid stenosis, mild  -CV duplex 9/5/18 - <50% stenosis   -Repeat duplex in 2 years  Problem List Items Addressed This Visit        Cardiovascular and Mediastinum    Atherosclerosis of native arteries of extremities with intermittent claudication, right leg ([facility] [address] ) - Primary    Relevant Orders    VAS lower limb arterial duplex, limited/unilateral    Ambulatory referral to Podiatry                 Patient ID: Indio Quach is a 60 y o  male  Chief complaint: pt is here to review NICOLETTE done 1/3/18  Pt is c/o burning toes " all the time"  Pt denies open wounds or sores  Pt is smoking a pack a day  Pt is on coumadin and statin     HPI  62 y/o M with HTN, HLD, DM w/ neuropathy, CAD s/p CABG, CHF, mitral valve replacement on anticoagulation, hx of L AKA (8/2017) for non-salveageable left foot wounds s/p multiple failed endovascular interventions with thrombosis with suspicious for hypercoagulable state, maintained on Coumadin, seen today for review after recent LEAD 1/16/19 which showed R PFA 50-75% stenosis, >75% AT stenosis VELIA 1 4/37/44  He has  Left AKA  He is going to [facility]  He is on his final prosthesis and maxed out on insurance reimbursement  He will start next week for physical therapy  He is wearing shoes from the [employer]  We discussed proper shoes and he would likely benefit from ankle support  He has chronic right lower extremity swelling which is relieved with elevation and an light Tubigrip compression  He denies ischemic rest pain and is not experiencing any tissue loss  He did have a blister of the right foot and calf which healed  He continues to smoke a pack a day of cigarettes  He is unable to quit  Stating its his only vice  The following portions of the patient's history were reviewed and updated as appropriate: allergies, current medications, past family history, past medical history, past social history, past surgical history and problem list     Review of Systems   Constitutional: Negative  HENT: Negative  Eyes: Negative  Respiratory: Positive for shortness of breath and wheezing  Cardiovascular: Negative  Gastrointestinal: Positive for vomiting  Endocrine: Negative  Genitourinary: Negative  Musculoskeletal: Negative  Cramping pain    Skin: Negative  Allergic/Immunologic: Negative  Neurological: Positive for dizziness and numbness  Hematological: Negative  Psychiatric/Behavioral: Positive for sleep disturbance  The patient is nervous/anxious            Objective:  Vitals:    01/28/19 1057   BP: 122/64   BP Location: Left arm   Patient Position: Sitting   Cuff Size: Adult   Temp: 97 8 °F (36 6 °C)   TempSrc: Tympanic       Patient Active Problem List   Diagnosis    Bipolar disorder ([address] )    DM (diabetes mellitus), secondary, uncontrolled, with peripheral vascular complications ([address] )    Hiatal hernia    Coronary artery disease    ICD (implantable cardioverter-defibrillator) in place    Chronic systolic congestive heart failure ([facility] )    Peripheral artery disease (HCC)    History of mitral valve replacement with mechanical valve    Bipolar 1 disorder, depressed (HCC)    Type 2 diabetes mellitus with neurologic complication (HCC)    Bullous dermatosis    Status post above knee amputation of left lower extremity (HCC)    Perirectal abscess    Anxiety    Atherosclerosis of arteries of extremities ([facility])    Benign essential hypertension    Carotid arterial disease (HCC)    Chronic congestive heart failure (HCC)    Chronic insomnia    Decreased pedal pulses    Diabetic gastroparesis associated with type 2 diabetes mellitus ([facility])    Esophageal reflux    Hyperlipidemia    Ischemic cardiomyopathy    Joan-Perez tear    Mitral valve prolapse    Onychomycosis    Peripheral edema    PAD (peripheral artery disease) ([facility])    Phantom pain after amputation of lower extremity ([facility])    PVC's (premature ventricular contractions)    Rosacea    Tinea corporis    Vasculitis of skin    Costochondritis    Iron deficiency anemia secondary to inadequate dietary iron intake    Diabetic polyneuropathy associated with type 2 diabetes mellitus ([facility])    Atherosclerosis of native arteries of extremities with intermittent claudication, right leg ([facility])    VT (ventricular tachycardia) ([facility])    Functional diarrhea    Intractable cyclical vomiting with nausea    Insulin dependent type 2 diabetes mellitus, uncontrolled ([facility] )    Hx of CABG    Suspected UTI    Uncontrolled type 2 diabetes mellitus with hyperglycemia ([facility])       Past Surgical History:   Procedure Laterality Date    AMPUTATION ABOVE KNEE (AKA) Left 8/24/2017    Procedure: AMPUTATION ABOVE KNEE (AKA);   Surgeon: Shanell Lema MD;  [facility];  Service: Vascular; last assessed - [id number]   [address]      last assessed - [id number]   [employer] CARDIAC DEFIBRILLATOR PLACEMENT      CORONARY ARTERY BYPASS GRAFT      ESOPHAGOGASTRODUODENOSCOPY N/A 4/20/2017    Procedure: ESOPHAGOGASTRODUODENOSCOPY (EGD); Surgeon: Wesley Srivastava MD;  [facility];  Service:     ESOPHAGOGASTRODUODENOSCOPY N/A 5/26/2017    Procedure: ESOPHAGOGASTRODUODENOSCOPY (EGD); Surgeon: Maddison Quiros MD;  [facility];  Service:     ESOPHAGOGASTRODUODENOSCOPY N/A 11/16/2017    Procedure: ESOPHAGOGASTRODUODENOSCOPY (EGD); Surgeon: Benny Jessica MD;  Location: [facility];   Service: Gastroenterology    FOOT AMPUTATION THROUGH METATARSAL Left     MTP first toe; last assessed - [id number]   [facility] MITRAL VALVE REPLACEMENT      Mechanical; last assessed - [id number]    ROTATOR CUFF REPAIR      last assessed - [id number]    TOE AMPUTATION Left 7/21/2017    Procedure: PARTIAL FIRST RAY AMPUTATION; EXCISION OF WOUND W/ TOE FLAP CLOSURE;  Surgeon: Pauly Lynch DPM;  Location[facility];  Service: [address] [place] [address],[address]      last assessed - [id number]    VAC DRESSING APPLICATION Left 7/97/0834    Procedure: VAC application;  Surgeon: Pauly Lynch DPM;  [facility];  Service: Podiatry    WOUND DEBRIDEMENT Left 8/19/2017    Procedure: wound debridement with washout; resection of left 1st metatarsal;  Surgeon: Pauly Lynch DPM;  [facility];  Service: Podiatry       Family History   Problem Relation Age of Onset    Hypertension Mother     Diabetes Mother         Diabetes mellitus    Migraines Mother         Migraine headaches    Diabetes Father     Bipolar disorder Father     Hypertension Father     Melanoma Father         Malignant melanoma    Migraines Sister     Heart disease Maternal Grandfather         cardiac disorder    No Known Problems Paternal Grandfather     Bipolar disorder Daughter     Migraines Daughter         Migraine headaches    Arthritis Family     Heart disease Family         cardiac disorder    Depression Family        Social History Social History    Marital status:      Spouse name: N/A    Number of children: 1    Years of education: N/A     Occupational History    Not on file  Social History Main Topics    Smoking status: Current Every Day Smoker     Packs/day: 0 50     Types: Cigarettes    Smokeless tobacco: Never Used    Alcohol use No    Drug use: No    Sexual activity: No     Other Topics Concern    Not on file     Social History Narrative    Daily caffeine consumption, 1 serving a day    Exercises daily       Allergies   Allergen Reactions    Aspirin Other (See Comments)     Other reaction(s): Unknown  Received ASA & bleed out    Heparin      Please see 8/2017 admit = 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    Morphine Other (See Comments)     Other reaction(s): Unknown  Gets red streak up arm above IV site    Omeprazole Diarrhea     Other reaction(s): Unknown         Current Outpatient Prescriptions:     ACCU-CHEK APPLE PLUS test strip, TEST 3 TIMES A DAY   DX E11 65, Disp: 100 each, Rfl: 1    ACCU-CHEK FASTCLIX LANCETS MISC, 1 Units by Does not apply route 3 (three) times a day, Disp: , Rfl:     atorvastatin (LIPITOR) 10 mg tablet, TAKE 1 TABLET BY MOUTH EVERY DAY, Disp: 90 tablet, Rfl: 0    BD PEN NEEDLE CELESTINO U/F 32G X 4 MM MISC, BY DOES NOT APPLY ROUTE 4 (FOUR) TIMES A DAY, Disp: 100 each, Rfl: 5    Blood Glucose Monitoring Suppl (ACCU-CHEK APPLE PLUS) w/Device KIT, 1 Units by Does not apply route 3 (three) times a day, Disp: , Rfl:     enoxaparin (LOVENOX) 80 mg/0 8 mL, Take 80 mg injection every 12 hours as directed by MD, Disp: 8 Syringe, Rfl: 0    ferrous sulfate 324 (65 Fe) mg, TAKE 1 TABLET (324 MG TOTAL) BY MOUTH DAILY BEFORE BREAKFAST, Disp: 30 tablet, Rfl: 5    gabapentin (NEURONTIN) 600 MG tablet, Take 1 tablet (600 mg total) by mouth 3 (three) times a day, Disp: 90 tablet, Rfl: 5    HUMALOG 100 UNIT/ML injection, INJECT 1 TO 3 UNITS AS DIRECTED 3 TIMES A DAY *DISCARD 28 DAYS AFTER OPENING*, Disp: 10 mL, Rfl: 0    insulin glargine (BASAGLAR KWIKPEN) 100 units/mL injection pen, Inject 10 Units under the skin daily at bedtime, Disp: 5 pen, Rfl: 0    lisinopril (ZESTRIL) 10 mg tablet, TAKE 1 TABLET DAILY  , Disp: 90 tablet, Rfl: 3    LORazepam (ATIVAN) 1 mg tablet, Take 1 tablet by mouth 2 (two) times a day as needed, Disp: , Rfl:     metoprolol succinate (TOPROL-XL) 25 mg 24 hr tablet, Take 25 mg by mouth daily, Disp: , Rfl:     metroNIDAZOLE (METROCREAM) 0 75 % cream, Apply 1 application topically daily, Disp: , Rfl:     mirtazapine (REMERON) 15 mg tablet, Take 15 mg by mouth daily  , Disp: , Rfl:     montelukast (SINGULAIR) 10 mg tablet, Take 10 mg by mouth daily, Disp: , Rfl: 5    pantoprazole (PROTONIX) 40 mg tablet, Take 1 tablet (40 mg total) by mouth 2 (two) times a day before meals, Disp: 180 tablet, Rfl: 0    QUEtiapine (SEROquel) 400 MG tablet, Take 400 mg by mouth daily at bedtime, Disp: , Rfl: 1    torsemide (DEMADEX) 20 mg tablet, TAKE 2 TABLET DAILY, Disp: 60 tablet, Rfl: 4    warfarin (COUMADIN) 5 mg tablet, TAKE 1-2 TABLETS DAILY OR AS DIRECTED, Disp: 60 tablet, Rfl: 5    diphenoxylate-atropine (LOMOTIL) 2 5-0 025 mg per tablet, Take 1 tablet by mouth 4 (four) times a day as needed for diarrhea, Disp: 30 tablet, Rfl: 0    HYDROcodone-acetaminophen (NORCO) 7 5-325 mg per tablet, Take 1 tablet by mouth every 6 (six) hours as needed for pain Max Daily Amount: 4 tablets, Disp: 120 tablet, Rfl: 0    sulfamethoxazole-trimethoprim (BACTRIM DS) 800-160 mg per tablet, Take 1 tablet by mouth every 12 (twelve) hours for 7 days, Disp: 14 tablet, Rfl: 0      /64 (BP Location: Left arm, Patient Position: Sitting, Cuff Size: Adult)   Temp 97 8 °F (36 6 °C) (Tympanic)          Physical Exam   Constitutional: He appears well-developed  HENT:   Head: Normocephalic and atraumatic  Eyes: EOM are normal    Cardiovascular: Normal rate      Pulses: Dorsalis pedis pulses are 0 on the right side  Posterior tibial pulses are 0 on the right side  + click    Pulmonary/Chest: Effort normal and breath sounds normal    Abdominal: Soft  Bowel sounds are normal    Musculoskeletal: He exhibits edema  Trace right ankle swelling    Skin: Skin is warm  Chronic skin hyperpigmentations of the right lower extremity    Psychiatric: He has a normal mood and affect  His behavior is normal    Nursing note and vitals reviewed

## 2019-01-28 ENCOUNTER — OFFICE VISIT (OUTPATIENT)
Dept: VASCULAR SURGERY | Facility: CLINIC | Age: 59
End: 2019-01-28
Payer: COMMERCIAL

## 2019-01-28 ENCOUNTER — REMOTE DEVICE CLINIC VISIT (OUTPATIENT)
Dept: CARDIOLOGY CLINIC | Facility: CLINIC | Age: 59
End: 2019-01-28
Payer: COMMERCIAL

## 2019-01-28 ENCOUNTER — OFFICE VISIT (OUTPATIENT)
Dept: FAMILY MEDICINE CLINIC | Facility: CLINIC | Age: 59
End: 2019-01-28
Payer: COMMERCIAL

## 2019-01-28 VITALS
SYSTOLIC BLOOD PRESSURE: 122 MMHG | DIASTOLIC BLOOD PRESSURE: 76 MMHG | WEIGHT: 171 LBS | OXYGEN SATURATION: 99 % | BODY MASS INDEX: 22.66 KG/M2 | HEART RATE: 62 BPM | HEIGHT: 73 IN | TEMPERATURE: 97.6 F

## 2019-01-28 VITALS — DIASTOLIC BLOOD PRESSURE: 64 MMHG | TEMPERATURE: 97.8 F | SYSTOLIC BLOOD PRESSURE: 122 MMHG

## 2019-01-28 DIAGNOSIS — E11.42 DIABETIC POLYNEUROPATHY ASSOCIATED WITH TYPE 2 DIABETES MELLITUS (HCC): ICD-10-CM

## 2019-01-28 DIAGNOSIS — I25.10 CORONARY ARTERY DISEASE INVOLVING NATIVE CORONARY ARTERY OF NATIVE HEART WITHOUT ANGINA PECTORIS: Primary | ICD-10-CM

## 2019-01-28 DIAGNOSIS — I47.2 VENTRICULAR TACHYCARDIA (HCC): ICD-10-CM

## 2019-01-28 DIAGNOSIS — Z89.612 STATUS POST ABOVE KNEE AMPUTATION OF LEFT LOWER EXTREMITY: ICD-10-CM

## 2019-01-28 DIAGNOSIS — IMO0002 DM (DIABETES MELLITUS), SECONDARY, UNCONTROLLED, WITH PERIPHERAL VASCULAR COMPLICATIONS: Primary | ICD-10-CM

## 2019-01-28 DIAGNOSIS — K59.1 FUNCTIONAL DIARRHEA: ICD-10-CM

## 2019-01-28 DIAGNOSIS — IMO0002 INSULIN DEPENDENT TYPE 2 DIABETES MELLITUS, UNCONTROLLED: ICD-10-CM

## 2019-01-28 DIAGNOSIS — I10 BENIGN ESSENTIAL HYPERTENSION: ICD-10-CM

## 2019-01-28 DIAGNOSIS — I50.9 CONGESTIVE HEART FAILURE, UNSPECIFIED HF CHRONICITY, UNSPECIFIED HEART FAILURE TYPE (HCC): ICD-10-CM

## 2019-01-28 DIAGNOSIS — R11.15 INTRACTABLE CYCLICAL VOMITING WITH NAUSEA: ICD-10-CM

## 2019-01-28 DIAGNOSIS — I70.211 ATHEROSCLEROSIS OF NATIVE ARTERIES OF EXTREMITIES WITH INTERMITTENT CLAUDICATION, RIGHT LEG (HCC): Primary | ICD-10-CM

## 2019-01-28 DIAGNOSIS — F41.9 ANXIETY: ICD-10-CM

## 2019-01-28 DIAGNOSIS — G54.6 PHANTOM PAIN AFTER AMPUTATION OF LOWER EXTREMITY (HCC): ICD-10-CM

## 2019-01-28 DIAGNOSIS — E11.65 UNCONTROLLED TYPE 2 DIABETES MELLITUS WITH HYPERGLYCEMIA (HCC): ICD-10-CM

## 2019-01-28 DIAGNOSIS — R39.89 SUSPECTED UTI: ICD-10-CM

## 2019-01-28 DIAGNOSIS — Z95.810 PRESENCE OF AUTOMATIC CARDIOVERTER/DEFIBRILLATOR (AICD): ICD-10-CM

## 2019-01-28 PROBLEM — Z23 NEED FOR PNEUMOCOCCAL VACCINATION: Status: RESOLVED | Noted: 2018-10-08 | Resolved: 2019-01-28

## 2019-01-28 PROBLEM — Z23 NEED FOR INFLUENZA VACCINATION: Status: RESOLVED | Noted: 2018-10-08 | Resolved: 2019-01-28

## 2019-01-28 PROBLEM — K92.2 GI BLEED: Status: RESOLVED | Noted: 2017-05-25 | Resolved: 2019-01-28

## 2019-01-28 PROBLEM — S31.819A BUTTOCK WOUND, RIGHT, INITIAL ENCOUNTER: Status: RESOLVED | Noted: 2018-11-07 | Resolved: 2019-01-28

## 2019-01-28 PROBLEM — R60.0 EDEMA OF RIGHT LOWER EXTREMITY: Status: RESOLVED | Noted: 2017-10-10 | Resolved: 2019-01-28

## 2019-01-28 PROBLEM — K22.10 EROSIVE ESOPHAGITIS: Status: RESOLVED | Noted: 2017-04-18 | Resolved: 2019-01-28

## 2019-01-28 PROBLEM — L72.3 INFECTED SEBACEOUS CYST: Status: RESOLVED | Noted: 2018-04-18 | Resolved: 2019-01-28

## 2019-01-28 PROBLEM — L08.9 INFECTED SEBACEOUS CYST: Status: RESOLVED | Noted: 2018-04-18 | Resolved: 2019-01-28

## 2019-01-28 LAB
SL AMB  POCT GLUCOSE, UA: 250
SL AMB LEUKOCYTE ESTERASE,UA: ABNORMAL
SL AMB POCT BILIRUBIN,UA: ABNORMAL
SL AMB POCT BLOOD,UA: ABNORMAL
SL AMB POCT CLARITY,UA: ABNORMAL
SL AMB POCT COLOR,UA: ABNORMAL
SL AMB POCT HEMOGLOBIN AIC: 8.4 (ref ?–6.5)
SL AMB POCT KETONES,UA: ABNORMAL
SL AMB POCT NITRITE,UA: ABNORMAL
SL AMB POCT PH,UA: 5
SL AMB POCT SPECIFIC GRAVITY,UA: 1.03
SL AMB POCT URINE PROTEIN: ABNORMAL
SL AMB POCT UROBILINOGEN: 1

## 2019-01-28 PROCEDURE — 99213 OFFICE O/P EST LOW 20 MIN: CPT | Performed by: NURSE PRACTITIONER

## 2019-01-28 PROCEDURE — 93296 REM INTERROG EVL PM/IDS: CPT | Performed by: INTERNAL MEDICINE

## 2019-01-28 PROCEDURE — 83036 HEMOGLOBIN GLYCOSYLATED A1C: CPT | Performed by: FAMILY MEDICINE

## 2019-01-28 PROCEDURE — 99214 OFFICE O/P EST MOD 30 MIN: CPT | Performed by: FAMILY MEDICINE

## 2019-01-28 PROCEDURE — 81002 URINALYSIS NONAUTO W/O SCOPE: CPT | Performed by: FAMILY MEDICINE

## 2019-01-28 PROCEDURE — 93295 DEV INTERROG REMOTE 1/2/MLT: CPT | Performed by: INTERNAL MEDICINE

## 2019-01-28 RX ORDER — DIPHENOXYLATE HYDROCHLORIDE AND ATROPINE SULFATE 2.5; .025 MG/1; MG/1
1 TABLET ORAL 4 TIMES DAILY PRN
Qty: 30 TABLET | Refills: 0 | Status: SHIPPED | OUTPATIENT
Start: 2019-01-28 | End: 2019-02-19 | Stop reason: SDUPTHER

## 2019-01-28 RX ORDER — HYDROCODONE BITARTRATE AND ACETAMINOPHEN 7.5; 325 MG/1; MG/1
1 TABLET ORAL EVERY 6 HOURS PRN
Qty: 120 TABLET | Refills: 0 | Status: SHIPPED | OUTPATIENT
Start: 2019-01-28

## 2019-01-28 RX ORDER — SULFAMETHOXAZOLE AND TRIMETHOPRIM 800; 160 MG/1; MG/1
1 TABLET ORAL EVERY 12 HOURS SCHEDULED
Qty: 14 TABLET | Refills: 0 | Status: SHIPPED | OUTPATIENT
Start: 2019-01-28 | End: 2019-02-04

## 2019-01-28 NOTE — PROGRESS NOTES
Results for orders placed or performed in visit on 01/28/19   Cardiac EP device report    Narrative    BIO SINGLE ICD  BIOTRONIK TRANSMISSION: BATTERY STATUS "OK" (ESTER 3 12 V)  : 27%  ALL AVAILABLE LEAD PARAMETERS WITHIN NORMAL LIMITS  NO  SIGNIFICANT HIGH RATE EPISODES  APPROPRIATELY FUNCTIONING ICD    [address] [place] [address] Street

## 2019-01-28 NOTE — PROGRESS NOTES
Subjective:   Chief Complaint   Patient presents with    Urinary Tract Infection     dipped urine, positive for multiple things   Follow-up     discuss vascular doc appt today     Diabetes     catching up on health maintenence today         Patient ID: Evelio Hawthorne is a 60 y o  male  Pt is here with his daughter  He complains of Burning with urination frequently  His urine is dark  He has been using lantus 10u with a sliding scale with meals  He complains of Nausea and diarrhea  Given some medication for nausea by Gastroenterologist  Had endoscopy that is now normal  He complains of upset stomach when his nerves are bothering him  He sees his psychiatrist every 3 months with an appointment coming up  He is taking ativan as needed but states it is not helpful  He was referred to a podiatrist to fit a diabetic shoe  He is going to start physical therapy for his new prosthesis         The following portions of the patient's history were reviewed and updated as appropriate: allergies, current medications, past family history, past medical history, past social history, past surgical history and problem list     Review of Systems   Constitutional: Positive for fatigue  Negative for fever  HENT: Negative  Eyes: Negative  Respiratory: Negative  Negative for cough  Cardiovascular: Negative  Gastrointestinal: Positive for diarrhea, nausea and vomiting  Negative for blood in stool  Endocrine: Negative  Genitourinary: Positive for frequency  Musculoskeletal: Negative  Skin: Negative  Allergic/Immunologic: Negative  Neurological: Negative  Psychiatric/Behavioral: Negative  Objective:  Vitals:    01/28/19 1419   BP: 122/76   Pulse: 62   Temp: 97 6 °F (36 4 °C)   SpO2: 99%   Weight: 77 6 kg (171 lb)   Height: 6' 1" (1 854 m)      Physical Exam   Constitutional: He is oriented to person, place, and time  He appears well-developed and well-nourished     HENT:   Head: Normocephalic and atraumatic  Cardiovascular: Normal rate, regular rhythm and normal heart sounds  Pulses are weak pulses  Pulses:       Dorsalis pedis pulses are 1+ on the right side  Posterior tibial pulses are 1+ on the right side  Pulmonary/Chest: Effort normal and breath sounds normal    Abdominal: Soft  Bowel sounds are normal    Musculoskeletal:        Feet:    Feet:   Right Foot:   Skin Integrity: Negative for ulcer, skin breakdown, erythema, warmth, callus or dry skin  Left Foot: amputated  Neurological: He is alert and oriented to person, place, and time  Skin: Skin is warm and dry  Psychiatric: He has a normal mood and affect  His behavior is normal  Judgment and thought content normal    Nursing note and vitals reviewed  Patient's shoes and socks removed  Right Foot/Ankle   Right Foot Inspection  Skin Exam: skin normal and skin intact no dry skin, no warmth, no callus, no erythema, no maceration, no abnormal color, no pre-ulcer, no ulcer and no callus                          Toe Exam: ROM and strength within normal limits  Sensory   Vibration: intact  Proprioception: intact   Monofilament testing: intact  Vascular  Capillary refills: elevated  The right DP pulse is 1+  The right PT pulse is 1+  Left Foot/Ankle  Left Foot Inspection                         Amputation: amputation left foot                       Assign Risk Category:  No deformity present; No loss of protective sensation; Weak pulses       Risk: 1          Assessment/Plan:    No problem-specific Assessment & Plan notes found for this encounter  Diagnoses and all orders for this visit:    DM (diabetes mellitus), secondary, uncontrolled, with peripheral vascular complications ([facility] )  Comments:  hba1c 8 4 today   increase basaglar to 14u daily, continue follow up with vascular    Suspected UTI  Comments:  start bactrim 1 tab twice a day, submit culture, monitor coumadin level  Orders:  -     POCT urine dip  - Urine culture  -     Urinalysis with reflex to microscopic  -     sulfamethoxazole-trimethoprim (BACTRIM DS) 800-160 mg per tablet; Take 1 tablet by mouth every 12 (twelve) hours for 7 days    Status post above knee amputation of left lower extremity ([facility] )  Comments:  renewal of medication,   Orders:  -     HYDROcodone-acetaminophen (NORCO) 7 5-325 mg per tablet; Take 1 tablet by mouth every 6 (six) hours as needed for pain Max Daily Amount: 4 tablets    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)    Functional diarrhea  Comments:  lomotil as needed, continue follow up with gi  Orders:  -     diphenoxylate-atropine (LOMOTIL) 2 5-0 025 mg per tablet; Take 1 tablet by mouth 4 (four) times a day as needed for diarrhea    Intractable cyclical vomiting with nausea  Comments:  further evaluation with GI, likely diabetic gastroparesis componenet    Benign essential hypertension  Comments:  controlled, continue current medicaiton    Anxiety  Comments:  consider buspar 10mg 1 tab 3 times a day, keep appt with psychiatrist    Diabetic polyneuropathy associated with type 2 diabetes mellitus ([place] [address] )  Comments:  needs diabetic shoes  Orders:  -     Ambulatory referral to Podiatry; Future    Uncontrolled type 2 diabetes mellitus with hyperglycemia ([place] [address] )  Comments:  hba1c is 8 4 in office today, increase basaglar to 14units    Orders:  -     Microalbumin / creatinine urine ratio  -     POCT hemoglobin A1c    Insulin dependent type 2 diabetes mellitus, uncontrolled ([place] [address] )

## 2019-01-28 NOTE — PATIENT INSTRUCTIONS
Physical therapy for prosthesis   Continue follow up with Vascular  Podiatry for diabetic shoes   Bactrim ds 1 tab twice a day- culture pending   Home meter for coumadin   buspar 10mg 1 tab up to 3 times a day for anxiety   Follow up with GI for nausea/diarrhea

## 2019-01-29 ENCOUNTER — TELEPHONE (OUTPATIENT)
Dept: FAMILY MEDICINE CLINIC | Facility: CLINIC | Age: 59
End: 2019-01-29

## 2019-01-29 LAB
APPEARANCE UR: CLEAR
BACTERIA URNS QL MICRO: ABNORMAL
BILIRUB UR QL STRIP: NEGATIVE
CASTS URNS MICRO: ABNORMAL
CASTS URNS QL MICRO: PRESENT /LPF
COLOR UR: YELLOW
EPI CELLS #/AREA URNS HPF: ABNORMAL /HPF
GLUCOSE UR QL: ABNORMAL
HGB UR QL STRIP: ABNORMAL
KETONES UR QL STRIP: NEGATIVE
LABCORP COMMENT: NORMAL
LEUKOCYTE ESTERASE UR QL STRIP: NEGATIVE
MICRO URNS: ABNORMAL
MUCOUS THREADS URNS QL MICRO: PRESENT
NITRITE UR QL STRIP: NEGATIVE
PH UR STRIP: 5 [PH] (ref 5–7.5)
PROT UR QL STRIP: ABNORMAL
RBC #/AREA URNS HPF: ABNORMAL /HPF
SP GR UR: >=1.03 (ref 1–1.03)
UROBILINOGEN UR STRIP-ACNC: 1 EU/DL (ref 0.2–1)
WBC #/AREA URNS HPF: ABNORMAL /HPF

## 2019-01-29 NOTE — TELEPHONE ENCOUNTER
Daughter went to pharmacy to  the [facility]  That needs prior authorization  Is there something different you can call in that will be covered      He is with [facility]      [id number]

## 2019-01-30 ENCOUNTER — PATIENT OUTREACH (OUTPATIENT)
Dept: FAMILY MEDICINE CLINIC | Facility: CLINIC | Age: 59
End: 2019-01-30

## 2019-01-30 ENCOUNTER — ANTICOAG VISIT (OUTPATIENT)
Dept: CARDIOLOGY CLINIC | Facility: CLINIC | Age: 59
End: 2019-01-30

## 2019-01-30 DIAGNOSIS — G54.6 PHANTOM PAIN AFTER AMPUTATION OF LOWER EXTREMITY (HCC): Primary | ICD-10-CM

## 2019-01-30 DIAGNOSIS — Z95.2 HISTORY OF MITRAL VALVE REPLACEMENT WITH MECHANICAL VALVE: ICD-10-CM

## 2019-01-30 LAB
INR PPP: 2.1 (ref 0.8–1.2)
PROTHROMBIN TIME: 20.8 SEC (ref 9.1–12)

## 2019-01-30 RX ORDER — TRAMADOL HYDROCHLORIDE 50 MG/1
50 TABLET ORAL EVERY 8 HOURS PRN
Qty: 30 TABLET | Refills: 0 | Status: SHIPPED | OUTPATIENT
Start: 2019-01-30

## 2019-01-30 NOTE — PROGRESS NOTES
Outpatient Care Management Note:    Care manager called and left a voice mail message for Leonel Schlatter, with my contact information, following up after her father's recent vascular and PCP appointments  CM requested a call back  Care manager attempted to call Abran Rosario  The message states that the number I am trying to call is not reachable  No voice mail available

## 2019-01-30 NOTE — PROGRESS NOTES
1/30/19, tc to pt, unable to leave message on cell,   Only received a code, pps [phone]  Called to daughter, Diane Aceves, left message on her cell, increase dose, 5 mg sun/tues, 7 5 mg other days of the week  Will schedule next pt/inr in 2 weeks  Called to professional technicians, spoke with [facility], will cancel inr draw for 2/6, rescheduled for 2/12  Have received request for additional information and letter of necessity from jaleesa   Will fax all paperwork when completed   arni

## 2019-01-31 LAB
BACTERIA UR CULT: NORMAL
LABCORP COMMENT: NORMAL
Lab: NO GROWTH

## 2019-02-04 ENCOUNTER — EVALUATION (OUTPATIENT)
Dept: PHYSICAL THERAPY | Facility: CLINIC | Age: 59
End: 2019-02-04
Payer: COMMERCIAL

## 2019-02-04 DIAGNOSIS — Z89.612 STATUS POST ABOVE KNEE AMPUTATION OF LEFT LOWER EXTREMITY: ICD-10-CM

## 2019-02-04 PROCEDURE — 97116 GAIT TRAINING THERAPY: CPT | Performed by: PHYSICAL THERAPIST

## 2019-02-04 PROCEDURE — 97162 PT EVAL MOD COMPLEX 30 MIN: CPT | Performed by: PHYSICAL THERAPIST

## 2019-02-04 NOTE — PROGRESS NOTES
PT Evaluation     Today's date: 2019  Patient name: Kelvin Heard  : 1960  MRN: [id number]  Referring provider: Francisco Basurto DO  Dx:   Encounter Diagnosis     ICD-10-CM    1  Status post above knee amputation of left lower extremity [facility]) [id number] Ambulatory referral to Physical Therapy       Start Time: 9424  Stop Time: 1051  Total time in clinic (min): 48 minutes    Assessment/Plan  This patient is s/p right/left AK amputation  He has just received definitive prosthesis and is now referred to PT for gait training  He also has c/o pain residual limb and phantom pain in the LLE  Patient is a good candidate for skilled physical therapy to improve functional mobility and decrease pain  Interventions to include manual therapy, ROM, stretching, strengthening, gait and balance training, therapeutic activities, neuromuscular re-ed, GMI to reduce phantom symptoms, and instruction in HEP  Frequency and duration: 2 x/week for 6-8 weeks    STGs: 4 weeks  1  Increase wearing tolerance R AK prosthesis to all day  2  Decrease pain 2-3 levels  3  Independent prosthetic management   LTGs: 6-8 weeks  1  Independent HEP  2  Able to ambulate independently w/ prosthesis and AD distances of 200'  3  Able to negotiate steps with prosthesis  4  Reduce / eliminate phantom pain              Subjective  This 60 y o  male is 1 1/2 years s/p L AKA and received his definitive prosthesis about three weeks ago  Has not yet begun gait training with new leg    Current complaint: pain distal femur - on the bone, feels better in the prosthesis than out of it  Occupation: not employed  Patients goal: to walk with prosthesis    Objective  Pain scale:  Residual limb 0-7/10; phantom 0-9/10  Gait: Ambulated in the ll bars 20 feet w/ prosthesis and CS   Lumbar screen: N/A  Palpation: tender distal L femur    Residual limb: LEFT  Length (from adductor tendon to distal femur):  12 cm  Circumference:   3 cm below adductor tendon =   48 3    cm  5 cm below adductor tendon=   47 3     cm  7 cm below adductor tendon=   45 2     cm    left   Hip   AROM  PROM  Strength  flexion   5/5   extension -5 degrees -5 degrees 5/5   Abduction   5/5   adduction   5/5   right extremity ROM / strength WFL                                                                              Precautions: none  PMH: PACEMAKER, CHF, PAD, DM  POC expires: 3/4/19  Daily Treatment Diary     Manual  2/4            Hip flexor stretch L                                                                     Exercise Diary  2/4            Hip extension into roll             Bridges             Prone lying             Prone hip ext             Hip flexor stretch                                       Standing balance             Gait training in ll bars  20' CS            Side stepping             biodex                                                                                                        Prosthetic mgt             GMI: laterality                 Modalities

## 2019-02-06 ENCOUNTER — OFFICE VISIT (OUTPATIENT)
Dept: PHYSICAL THERAPY | Facility: CLINIC | Age: 59
End: 2019-02-06
Payer: COMMERCIAL

## 2019-02-06 ENCOUNTER — TELEPHONE (OUTPATIENT)
Dept: FAMILY MEDICINE CLINIC | Facility: CLINIC | Age: 59
End: 2019-02-06

## 2019-02-06 DIAGNOSIS — Z89.612 STATUS POST ABOVE KNEE AMPUTATION OF LEFT LOWER EXTREMITY: Primary | ICD-10-CM

## 2019-02-06 PROCEDURE — 97530 THERAPEUTIC ACTIVITIES: CPT | Performed by: PHYSICAL THERAPIST

## 2019-02-06 PROCEDURE — 97112 NEUROMUSCULAR REEDUCATION: CPT | Performed by: PHYSICAL THERAPIST

## 2019-02-06 NOTE — PROGRESS NOTES
Daily Note     Today's date: 2019  Patient name: Tin Godinez  : 1960  MRN: [id number]  Referring provider: Rasheeda Alvarez DO  Dx:   Encounter Diagnosis     ICD-10-CM    1  Status post above knee amputation of left lower extremity ([facility] [address] ) [id number]        Start Time: 1620  Stop Time: 1705  Total time in clinic (min): 45 minutes    Subjective: states that when he got out of the car after therapy lv his leg came off; reports that it is not fitting right, will not stay on, is causing severe groin pain      Objective: See treatment diary below      Assessment: unable to work on gait training today as prosthesis is not staying on - leg pistons easily in socket; focused on standing balance and wt shift in prosthesis which patient tolerated fairly well despite c/o groin pain      Plan: Continue per plan of care   Contact prosthetist re correcting fit and suspension    Precautions: none  PMH: PACEMAKER, CHF, PAD, DM  POC expires: 3/4/19  Daily Treatment Diary     Manual  //6           Hip flexor stretch L                                                                     Exercise Diary  /6           Hip extension into roll             Bridges             Prone lying             Prone hip ext             Hip flexor stretch                          Wt shift ->L  Man facil           Standing balance w/ UE raises  CS @ walker            Gait training in ll bars  20' CS            Side stepping             biodex                                                                                                        Prosthetic mgt  performed           GMI: laterality recognize john feet  vanilla/context               Modalities   /

## 2019-02-07 ENCOUNTER — PATIENT OUTREACH (OUTPATIENT)
Dept: FAMILY MEDICINE CLINIC | Facility: CLINIC | Age: 59
End: 2019-02-07

## 2019-02-07 ENCOUNTER — TELEPHONE (OUTPATIENT)
Dept: FAMILY MEDICINE CLINIC | Facility: CLINIC | Age: 59
End: 2019-02-07

## 2019-02-07 NOTE — PROGRESS NOTES
Outpatient Care Management Note:    Care manager called (2nd attempt) and left a voice mail message, with my contact information, requesting a call back with an update

## 2019-02-07 NOTE — TELEPHONE ENCOUNTER
Patient's daughter stopped in to let us know that Elana Lopez is in a lot of pain since we were not able to get his hydrocodone authorized  I explained the whole process to patient's daughter and she understood She would like to know if we will be appealing the medication or if we re-submit what the next step is to help get this authorized  Please advise

## 2019-02-08 NOTE — TELEPHONE ENCOUNTER
May be pain management since they have changed requirements since the beginning of the year  [facility] [phone]  Can try resubmitting with new pain contract - which he already signed  And we will need a urine drug screen, can be done in our office with a nurse visit

## 2019-02-11 ENCOUNTER — APPOINTMENT (OUTPATIENT)
Dept: PHYSICAL THERAPY | Facility: CLINIC | Age: 59
End: 2019-02-11
Payer: COMMERCIAL

## 2019-02-11 ENCOUNTER — TELEPHONE (OUTPATIENT)
Dept: CARDIOLOGY CLINIC | Facility: CLINIC | Age: 59
End: 2019-02-11

## 2019-02-11 NOTE — TELEPHONE ENCOUNTER
Phone call from [address] at [facility] inr monitoring , [phone], ext [phone]  Calling to follow up to request we had received requesting additional documentation in order to confirm medical necessity for homer inr services for patient  Will fax enrollment script along with most recent progress notes from dr Sea Kaur on 10/1518  Along with inr and dosing since 1/19/2018  Awaiting for statement of medical necessity with specific reason for testing in home, vs testing at lab to be completed  Will fax available information to jaleesa  As requested, fax number, [phone]

## 2019-02-13 ENCOUNTER — PATIENT OUTREACH (OUTPATIENT)
Dept: FAMILY MEDICINE CLINIC | Facility: CLINIC | Age: 59
End: 2019-02-13

## 2019-02-13 ENCOUNTER — ANTICOAG VISIT (OUTPATIENT)
Dept: CARDIOLOGY CLINIC | Facility: CLINIC | Age: 59
End: 2019-02-13

## 2019-02-13 ENCOUNTER — OFFICE VISIT (OUTPATIENT)
Dept: PHYSICAL THERAPY | Facility: CLINIC | Age: 59
End: 2019-02-13
Payer: COMMERCIAL

## 2019-02-13 ENCOUNTER — TELEPHONE (OUTPATIENT)
Dept: FAMILY MEDICINE CLINIC | Facility: CLINIC | Age: 59
End: 2019-02-13

## 2019-02-13 DIAGNOSIS — N30.01 ACUTE CYSTITIS WITH HEMATURIA: ICD-10-CM

## 2019-02-13 DIAGNOSIS — Z95.2 HISTORY OF MITRAL VALVE REPLACEMENT WITH MECHANICAL VALVE: ICD-10-CM

## 2019-02-13 DIAGNOSIS — Z89.612 STATUS POST ABOVE KNEE AMPUTATION OF LEFT LOWER EXTREMITY: Primary | ICD-10-CM

## 2019-02-13 DIAGNOSIS — F41.9 ANXIETY: Primary | ICD-10-CM

## 2019-02-13 LAB
INR PPP: 4.7 (ref 0.8–1.2)
PROTHROMBIN TIME: 45.2 SEC (ref 9.1–12)

## 2019-02-13 PROCEDURE — 97530 THERAPEUTIC ACTIVITIES: CPT | Performed by: PHYSICAL THERAPIST

## 2019-02-13 PROCEDURE — 97116 GAIT TRAINING THERAPY: CPT | Performed by: PHYSICAL THERAPIST

## 2019-02-13 RX ORDER — BUSPIRONE HYDROCHLORIDE 10 MG/1
10 TABLET ORAL 3 TIMES DAILY
Qty: 90 TABLET | Refills: 0 | Status: SHIPPED | OUTPATIENT
Start: 2019-02-13

## 2019-02-13 NOTE — PROGRESS NOTES
Daily Note     Today's date: 2019  Patient name: Lita Coats  : 1960  MRN: [id number]  Referring provider: Yamila Carrington DO  Dx:   Encounter Diagnosis     ICD-10-CM    1  Status post above knee amputation of left lower extremity ([facility] ) A41 [id number]                   Subjective: reports difficulty donning prosthesis - leg not going in all the way; pain at medial/posterior rim      Objective: See treatment diary below      Assessment: met with prosthetist this visit to assess fit; prosthetist will take leg to make some minor modifications - will lengthen leg slightly and add padding for ischial tuberosity; one ply sock was added - fit and gait was improved significantly      Plan: Continue per plan of care   Contact prosthetist re correcting fit and suspension    Precautions: none  PMH: PACEMAKER, CHF, PAD, DM  POC expires: 3/4/19  Daily Treatment Diary     Manual            Hip flexor stretch L                                                                     Exercise Diary            Hip extension into roll             Bridges             Prone lying             Prone hip ext             Hip flexor stretch                          Wt shift ->L  Man facil 10-15x          Standing balance w/ UE raises  CS @ walker            Gait training in ll bars  20' CS  walker 45'x2          Side stepping             biodex                                                                                                        Prosthetic mgt  performed performed w/ prosthetist          GMI: laterality recognize john feet  vanilla/context               Modalities

## 2019-02-13 NOTE — PROGRESS NOTES
2/13/19, tc to pt, reports he completed antibiotics 4 days ago for uti  Has small nose bleed when he blows nose, denies any other bleeding  Will hold warfarin x 2 days, then resume previous dose, inr due 1 week  Also called to daughter, Shadi Mendieta, left instructions on her cell phone regarding hold x 2 days, then resume previous dose   rani

## 2019-02-13 NOTE — PROGRESS NOTES
Outpatient Care Management Note:    Care manager called and spoke with Medardo Bowen  He states that he is doing physical therapy, and they are trying to adjust his prosthetic leg to fit correctly  Medardo Bowen states he can now walk on the prosthesis  He saw vascular and they would like him to quit smoking  Medardo Bowen said this may be a possibility in the future as he is considering it  He has not seen podiatry  I encouraged him to call and schedule  His follow up with GI is 2/22/19  He is still complaining of burning with urination  He finished his Bactrim antibiotic and states he needs a repeat urine completed  He will call Dr Jasper Ivey to schedule  Medardo Bowen is complaining of anxiety especially on days when he needs his INR blood work completed  He sees Dr Jaspal Schumacher at [facility], and he takes ativan prn  CM questioned if he has the Buspar that was ordered at his last office visit with Dr Jasper Ivey  Medardo Bowen states he does not have Buspar  CM will follow up with Dr Jasper Ivey  He states his blood sugars are averaging around 160  The highest reading he has is 200 and the lowest is 130  He is taking lantus insulin 6 units qhs and humalog insulin on a sliding scale with meals  Medardo Bowen had to leave for physical therapy  CM will follow up with him later today  Care manager called Medardo Bowen back and Faith answered  I informed her that her dad can drop off a urine specimen at the office to be checked for UTI  If he gives a large enough sample the staff can complete the drug screen needed to get his narcotics approved  Dr Jasper Ivey will also check to see if the Buspar order was submitted  Bruce Salguero states that she gets notified of any new prescriptions sent to the pharmacy

## 2019-02-15 DIAGNOSIS — E78.5 DYSLIPIDEMIA: ICD-10-CM

## 2019-02-15 RX ORDER — ATORVASTATIN CALCIUM 10 MG/1
TABLET, FILM COATED ORAL
Qty: 90 TABLET | Refills: 0 | Status: SHIPPED | OUTPATIENT
Start: 2019-02-15 | End: 2019-02-19 | Stop reason: ALTCHOICE

## 2019-02-18 ENCOUNTER — OFFICE VISIT (OUTPATIENT)
Dept: PHYSICAL THERAPY | Facility: CLINIC | Age: 59
End: 2019-02-18
Payer: COMMERCIAL

## 2019-02-18 DIAGNOSIS — Z89.612 STATUS POST ABOVE KNEE AMPUTATION OF LEFT LOWER EXTREMITY: Primary | ICD-10-CM

## 2019-02-18 PROCEDURE — 97530 THERAPEUTIC ACTIVITIES: CPT | Performed by: PHYSICAL THERAPIST

## 2019-02-18 PROCEDURE — 97116 GAIT TRAINING THERAPY: CPT | Performed by: PHYSICAL THERAPIST

## 2019-02-18 NOTE — TELEPHONE ENCOUNTER
Tc radha pozo at [facility] home monitoring , left message requesting call back regarding letter of necessity   Will wait her return call   rani

## 2019-02-19 ENCOUNTER — OFFICE VISIT (OUTPATIENT)
Dept: FAMILY MEDICINE CLINIC | Facility: CLINIC | Age: 59
End: 2019-02-19
Payer: COMMERCIAL

## 2019-02-19 VITALS
TEMPERATURE: 96.8 F | BODY MASS INDEX: 22.83 KG/M2 | SYSTOLIC BLOOD PRESSURE: 126 MMHG | HEIGHT: 73 IN | DIASTOLIC BLOOD PRESSURE: 80 MMHG | HEART RATE: 50 BPM | OXYGEN SATURATION: 98 % | WEIGHT: 172.25 LBS

## 2019-02-19 DIAGNOSIS — R39.89 SUSPECTED UTI: ICD-10-CM

## 2019-02-19 DIAGNOSIS — G54.6 PHANTOM PAIN AFTER AMPUTATION OF LOWER EXTREMITY (HCC): ICD-10-CM

## 2019-02-19 DIAGNOSIS — L72.9 CYST OF SKIN: ICD-10-CM

## 2019-02-19 DIAGNOSIS — Z89.612 STATUS POST ABOVE KNEE AMPUTATION OF LEFT LOWER EXTREMITY: Primary | ICD-10-CM

## 2019-02-19 DIAGNOSIS — Z79.899 LONG-TERM USE OF HIGH-RISK MEDICATION: ICD-10-CM

## 2019-02-19 DIAGNOSIS — N30.01 ACUTE CYSTITIS WITH HEMATURIA: ICD-10-CM

## 2019-02-19 DIAGNOSIS — K59.1 FUNCTIONAL DIARRHEA: ICD-10-CM

## 2019-02-19 LAB
BARBITURATES SERPL-MCNC: NEGATIVE MG/DL
BENZODIAZ UR QL: POSITIVE
INR PPP: 4.4 (ref 0.86–1.17)
MDMA UR QL SCN: NEGATIVE
METHADONE UR QL: NEGATIVE
OTHER STN SPEC: POSITIVE
OXYCODONE UR QL CFM: NEGATIVE
SL AMB  POCT GLUCOSE, UA: NEGATIVE
SL AMB LEUKOCYTE ESTERASE,UA: NEGATIVE
SL AMB POCT AMPHETAMINES URINE: NEGATIVE
SL AMB POCT BILIRUBIN,UA: ABNORMAL
SL AMB POCT BLOOD,UA: ABNORMAL
SL AMB POCT CLARITY,UA: CLEAR
SL AMB POCT COCAINE URINE: NEGATIVE
SL AMB POCT COLOR,UA: ABNORMAL
SL AMB POCT KETONES,UA: NEGATIVE
SL AMB POCT METHAMPETAMINES URINE: NEGATIVE
SL AMB POCT NITRITE,UA: NEGATIVE
SL AMB POCT OPIATES URINE: POSITIVE
SL AMB POCT PH,UA: 6
SL AMB POCT PHENCYCLIDINE URINE: NEGATIVE
SL AMB POCT SPECIFIC GRAVITY,UA: 1.03
SL AMB POCT URINE PROTEIN: 100
SL AMB POCT UROBILINOGEN: 0.2
SL AMB THC URINE: POSITIVE

## 2019-02-19 PROCEDURE — 80305 DRUG TEST PRSMV DIR OPT OBS: CPT | Performed by: FAMILY MEDICINE

## 2019-02-19 PROCEDURE — 99214 OFFICE O/P EST MOD 30 MIN: CPT | Performed by: FAMILY MEDICINE

## 2019-02-19 PROCEDURE — 81002 URINALYSIS NONAUTO W/O SCOPE: CPT | Performed by: FAMILY MEDICINE

## 2019-02-19 RX ORDER — DIPHENOXYLATE HYDROCHLORIDE AND ATROPINE SULFATE 2.5; .025 MG/1; MG/1
1 TABLET ORAL 4 TIMES DAILY PRN
Qty: 30 TABLET | Refills: 0 | Status: SHIPPED | OUTPATIENT
Start: 2019-02-19

## 2019-02-20 ENCOUNTER — APPOINTMENT (OUTPATIENT)
Dept: PHYSICAL THERAPY | Facility: CLINIC | Age: 59
End: 2019-02-20
Payer: COMMERCIAL

## 2019-02-20 ENCOUNTER — TELEPHONE (OUTPATIENT)
Dept: CARDIOLOGY CLINIC | Facility: CLINIC | Age: 59
End: 2019-02-20

## 2019-02-20 ENCOUNTER — ANTICOAG VISIT (OUTPATIENT)
Dept: CARDIOLOGY CLINIC | Facility: CLINIC | Age: 59
End: 2019-02-20

## 2019-02-20 DIAGNOSIS — Z95.2 HISTORY OF MITRAL VALVE REPLACEMENT WITH MECHANICAL VALVE: ICD-10-CM

## 2019-02-20 LAB
INR PPP: 4.4 (ref 0.8–1.2)
PROTHROMBIN TIME: 42.7 SEC (ref 9.1–12)

## 2019-02-20 NOTE — PROGRESS NOTES
Subjective:   Chief Complaint   Patient presents with    Drug / Alcohol Assessment     pt is here today to check up on his kidney infection and to do a urine drug screen  He also wants to discuss his Diphenoxylate  He needs a refill on this medication as well  Patient ID: Rafal Milton is a 60 y o  male  Pt is here with his daughter today  Has been progressing with physical therapy, strengthening right leg, practicing balance, getting ready to use prosthesis  He denies any chest pain or shortness of breath  Complains of pain in the stump of his left leg  He did leave a urine sample today  States lomotil helps with loose bowels but took to many, 2 tabs at a time and had some constipation  Pt has cyst on left side of neck  Has had other cyst removed and a skin cancer on his scalp      The following portions of the patient's history were reviewed and updated as appropriate: allergies, current medications, past family history, past medical history, past social history, past surgical history and problem list     Review of Systems   Constitutional: Negative  Negative for fatigue and fever  HENT: Negative  Eyes: Negative  Respiratory: Negative  Negative for cough  Cardiovascular: Negative  Gastrointestinal: Negative  Endocrine: Negative  Genitourinary: Negative  Musculoskeletal: Negative  Pain left stump   Skin: Negative  Cyst left neck    Allergic/Immunologic: Negative  Neurological: Negative  Psychiatric/Behavioral: Negative  Objective:  Vitals:    02/19/19 1406   BP: 126/80   Pulse: (!) 50   Temp: (!) 96 8 °F (36 °C)   SpO2: 98%   Weight: 78 1 kg (172 lb 4 oz)   Height: 6' 1" (1 854 m)      Physical Exam   Constitutional: He is oriented to person, place, and time  He appears well-developed and well-nourished  HENT:   Head: Normocephalic and atraumatic  Cardiovascular: Normal rate, regular rhythm and normal heart sounds     Pulmonary/Chest: Effort normal and breath sounds normal    Abdominal: Soft  Bowel sounds are normal    Musculoskeletal: He exhibits deformity  Left aka  Neurological: He is alert and oriented to person, place, and time  Skin: Skin is warm and dry  Cyst left neck, no erythema  1 0 cm nonfluctuant   Psychiatric: He has a normal mood and affect  His behavior is normal  Judgment and thought content normal    Nursing note and vitals reviewed  Assessment/Plan:    No problem-specific Assessment & Plan notes found for this encounter  Diagnoses and all orders for this visit:    Status post above knee amputation of left lower extremity ([facility] [address] )  Comments:  continue with pt    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)  Comments:  hydrocodone as needed    Long-term use of high-risk medication  Comments:  urine drug screen and new pain contract  Orders:  -     POCT urine drug screen    Functional diarrhea  Comments:  lomotil as needed, continue follow up with gi  Orders:  -     diphenoxylate-atropine (LOMOTIL) 2 5-0 025 mg per tablet;  Take 1 tablet by mouth 4 (four) times a day as needed for diarrhea    Suspected UTI  Comments:  uti resolved, send urine for microscopic  Orders:  -     Cancel: POCT urine dip    Acute cystitis with hematuria  Comments:  resolved  Orders:  -     POCT urine dip  -     UA/M w/rflx Culture, Comp    Cyst of skin  Comments:  dermatology evaluation

## 2019-02-20 NOTE — TELEPHONE ENCOUNTER
Phone call to [facility] [address] [place] at [facility] monitoring co, [phone], ext [phone]  Questioning her if she had received any notification from pt insurance  If he has been approved for home inr monitor and testing  She states information from our office has been receieved any will is waiting for response  She requested to give insurance co until Monday, 11/25, to be able to review all information sent  Will wait for response as requested  Desmond Mccartney also reported that jaleesa had spoken with patient yesterday regarding their attempt to enroll him into home inr monitoring

## 2019-02-20 NOTE — PROGRESS NOTES
2/20/19, tc to pt  Will hold x 2 days, then decrease dose, 5 mg sun/tues/th, 7 5 mg other days of the week, pt refused weekly inr draw  Will schedule  next inr in 2 weeks  called to professional technicians, aware to cancel 2/26 inr , will schedule on 3/5  Called to [facility] home monitoring co yesterday, ,spoke with Jo Ann Guthrie, she had sent email to insurance co regarding eligibility for meter  Will try contacting sánchez at [facility] again today  rani

## 2019-02-21 ENCOUNTER — TELEPHONE (OUTPATIENT)
Dept: FAMILY MEDICINE CLINIC | Facility: CLINIC | Age: 59
End: 2019-02-21

## 2019-02-21 DIAGNOSIS — L72.9 CYST OF SKIN: Primary | ICD-10-CM

## 2019-02-21 NOTE — TELEPHONE ENCOUNTER
Pt's daughter stopped in today to  a Derm referrel for pt, Pt has [facility] please enter referral for daughter to

## 2019-02-22 ENCOUNTER — OFFICE VISIT (OUTPATIENT)
Dept: GASTROENTEROLOGY | Facility: CLINIC | Age: 59
End: 2019-02-22
Payer: COMMERCIAL

## 2019-02-22 ENCOUNTER — TELEPHONE (OUTPATIENT)
Dept: CARDIOLOGY CLINIC | Facility: CLINIC | Age: 59
End: 2019-02-22

## 2019-02-22 VITALS
DIASTOLIC BLOOD PRESSURE: 90 MMHG | HEART RATE: 64 BPM | HEIGHT: 73 IN | BODY MASS INDEX: 23.06 KG/M2 | WEIGHT: 174 LBS | SYSTOLIC BLOOD PRESSURE: 140 MMHG

## 2019-02-22 DIAGNOSIS — R19.7 DIARRHEA, UNSPECIFIED TYPE: Primary | ICD-10-CM

## 2019-02-22 DIAGNOSIS — K59.1 FUNCTIONAL DIARRHEA: ICD-10-CM

## 2019-02-22 DIAGNOSIS — Z12.11 SCREEN FOR COLON CANCER: ICD-10-CM

## 2019-02-22 PROCEDURE — 99213 OFFICE O/P EST LOW 20 MIN: CPT | Performed by: NURSE PRACTITIONER

## 2019-02-22 NOTE — TELEPHONE ENCOUNTER
Phone call from [address] [place] [address] at [facility] home monitoring inr testing  Reports she spoke with patient's insurance, they are again requesting enrollment form be refaxed  She reports form is either too dark , or too light  Due to faxing  faxing  Requested a  new form be completed for the 3 rd time  Will mail to dr Radha Garsia via interoffice mail a new form to be signed  and dated  Then refax to sánchez ibarra

## 2019-02-22 NOTE — PROGRESS NOTES
1. Have you been to the ER, urgent care clinic since your last visit? Hospitalized since your last visit? No 
 
2. Have you seen or consulted any other health care providers outside of the [address] [place] [address] since your last visit? Include any pap smears or colon screening. No  
 
Wants to discuss medication Daily Note     Today's date: 2018  Patient name: Kaye Martinez  : 1960  MRN: [id number]  Referring provider: Jossy Herrera DO  Dx:   Encounter Diagnosis     ICD-10-CM    1  Status post above knee amputation, left [facility]) R12 [id number]        Start Time: 1202  Stop Time: 1257  Total time in clinic (min): 55 minutes    Subjective: continues to report R ankle pain aggravated by prolong wt bearing    Objective: See treatment diary below      Assessment: Trial of ambulation w/ B axillary crutches; patient was able to ambulate distances of 20-25' w/ CG  Tolerated treatment well  Patient would benefit from continued PT      Plan: Continue per plan of care     Precautions: none  PMH: DM , defibrillator, PVD, PAD    Daily Treatment Diary     Manual   7/2          Hip flexor stretch  JR           STM L quad / HS             Desensitization L residual limb             Ant glide L femur  JR                            Exercise Diary   7/2                       Prone lying hep            SLR x4             Hip flexor stretch hep            Zackery Caul w/ bolster                          Balance activities standing unsupported 4-5'  CloseS/ CG  unsupported 2-3' close  S                                    Side step ll bars             Step ups/down in ll bars   4x ea 6' CG ll bars  8" CG          Sit <-> stand 5x 3x           Gait training w/ walker: knee unlocked 50'x3  CG/ close S 60'x2  80'x1  Close S 60-75' x3 close S                       Up/down curb step             Up/down ramp             Gait w/ B crutches   CG  20-25'x2                       Prosthetic mgt adjusted lock on knee            GMI  mirror           18: initiated mirror therapy    Modalities              CP prn

## 2019-02-22 NOTE — PROGRESS NOTES
[facility] - Outpatient Follow-up Note  Ramona Cornelius 60 y o  male MRN: [id number]  Encounter: [id number]      ASSESSMENT AND PLAN:      1  Diarrhea, unspecified type  Chronic diarrhea for the past few years  Celiac and thyroid disease excluded via serology  Comprehensive stool studies also negative for enteric pathogens  A colonoscopy performed in 2017 was negative for microscopic colitis or IBD  Suspect IBS coupled with diabetic diarrhea     -continue Lomotil  Advised that he can take up to 4 tablets a day  3  Screen for colon cancer  Screening colonoscopy advised in 2027      Followup Appointment[de-identified] 6 months  ______________________________________________________________________    Chief Complaint   Patient presents with    Diarrhea     follow up       HPI:    Returns to the office for follow-up to longstanding issues with diarrhea  This has been ongoing for a few years  He does admit to increased stress and this does seem to worsen the diarrhea  Denies any recent antibiotics, travel or sick contacts  Denies any abdominal pain, melena, nausea or vomiting  He has lost approximately 10 lb over the past several months  He underwent an extensive GI workup to include blood work, stool studies and colonoscopy without any abnormalities        Historical Information   Past Medical History:   Diagnosis Date    Acute gastritis     last assessed - [id number]    Amputated great toe of left foot ([facility] [address] )     last assessed - [id number]    Anticoagulated on Coumadin     Mechanical MVR    Anxiety     Bipolar 1 disorder ([facility] [address] )     CAD (coronary artery disease)     Chronic kidney disease     left kideny being monitored    [facility]ive [facility] 4/18/2017    Colitis 04/02/2017    Acute; last assessed - [id number]    COPD (chronic obstructive pulmonary disease) ([facility] [address] )     Diabetes mellitus ([facility] [address] )     Difficulty urinating     DM type 2 with diabetic peripheral neuropathy ([facility]     Dyspepsia     last assessed - [id number]    Hiatal hernia     Hyperlipidemia     Hypertension     Ischemic cardiomyopathy     Myocardial infarction [facility])     Pacemaker     PAD (peripheral artery disease) ([facility])     Rectal bleed     Rotator cuff tear, right     last assessed - [id number]    Vomiting      Past Surgical History:   Procedure Laterality Date    AMPUTATION ABOVE KNEE (AKA) Left 8/24/2017    Procedure: AMPUTATION ABOVE KNEE (AKA); Surgeon: Tripp Ellis MD;  Location: [facility];  Service: Vascular; last assessed - [id number]   [address]      last assessed - [id number]    CARDIAC DEFIBRILLATOR PLACEMENT      CORONARY ARTERY BYPASS GRAFT      ESOPHAGOGASTRODUODENOSCOPY N/A 4/20/2017    Procedure: ESOPHAGOGASTRODUODENOSCOPY (EGD); Surgeon: Abhijeet Ruiz MD;  Location: [facility];  Service:     ESOPHAGOGASTRODUODENOSCOPY N/A 5/26/2017    Procedure: ESOPHAGOGASTRODUODENOSCOPY (EGD); Surgeon: Jonah Junior MD;  [facility];  Service:     ESOPHAGOGASTRODUODENOSCOPY N/A 11/16/2017    Procedure: ESOPHAGOGASTRODUODENOSCOPY (EGD); Surgeon: Freddy Collins MD;  Location: [facility] GI [facility];   Service: Gastroenterology    FOOT AMPUTATION THROUGH METATARSAL Left     MTP first toe; last assessed - [id number]   [facility] MITRAL VALVE REPLACEMENT      Mechanical; last assessed - [id number]    ROTATOR CUFF REPAIR      last assessed - [id number]    TOE AMPUTATION Left 7/21/2017    Procedure: PARTIAL FIRST RAY AMPUTATION; EXCISION OF WOUND W/ TOE FLAP CLOSURE;  Surgeon: Jenise Reardon DPM;  Location: [facility];  Service: [facility] [place]      last assessed - [id number]    VAC DRESSING APPLICATION Left 8/18/4956    Procedure: VAC application;  Surgeon: Jenise Reardon DPM;  Location[facility];  Service: Podiatry    WOUND DEBRIDEMENT Left 8/19/2017    Procedure: wound debridement with washout; resection of left 1st metatarsal;  Surgeon: Jenise Reardon DPM; Location: [facility];  Service: Podiatry     Social History   Social History     Substance and Sexual Activity   Alcohol Use No     Social History     Substance and Sexual Activity   Drug Use No     Social History     Tobacco Use   Smoking Status Current Every Day Smoker    Packs/day: 0 50    Types: Cigarettes   Smokeless Tobacco Never Used     Family History   Problem Relation Age of Onset    Hypertension Mother     Diabetes Mother         Diabetes mellitus    Migraines Mother         Migraine headaches    Diabetes Father     Bipolar disorder Father     Hypertension Father     Melanoma Father         Malignant melanoma    Migraines Sister     Heart disease Maternal Grandfather         cardiac disorder    No Known Problems Paternal Grandfather     Bipolar disorder Daughter     Migraines Daughter         Migraine headaches    Arthritis Family     Heart disease Family         cardiac disorder    Depression Family        Meds/Allergies       Current Outpatient Medications:     ACCU-CHEK APPLE PLUS test strip    ACCU-CHEK FASTCLIX LANCETS MISC    atorvastatin (LIPITOR) 10 mg tablet    BD PEN NEEDLE CELESTINO U/F 32G X 4 MM MISC    Blood Glucose Monitoring Suppl (ACCU-CHEK APPLE PLUS) w/Device KIT    diphenoxylate-atropine (LOMOTIL) 2 5-0 025 mg per tablet    enoxaparin (LOVENOX) 80 mg/0 8 mL    ferrous sulfate 324 (65 Fe) mg    gabapentin (NEURONTIN) 600 MG tablet    HUMALOG 100 UNIT/ML injection    HYDROcodone-acetaminophen (NORCO) 7 5-325 mg per tablet    insulin glargine (BASAGLAR KWIKPEN) 100 units/mL injection pen    lisinopril (ZESTRIL) 10 mg tablet    LORazepam (ATIVAN) 1 mg tablet    metoprolol succinate (TOPROL-XL) 25 mg 24 hr tablet    mirtazapine (REMERON) 15 mg tablet    montelukast (SINGULAIR) 10 mg tablet    pantoprazole (PROTONIX) 40 mg tablet    QUEtiapine (SEROquel) 400 MG tablet    torsemide (DEMADEX) 20 mg tablet    warfarin (COUMADIN) 5 mg tablet    busPIRone (BUSPAR) 10 mg tablet    metroNIDAZOLE (METROCREAM) 0 75 % cream    traMADol (ULTRAM) 50 mg tablet    Allergies   Allergen Reactions    Aspirin Other (See Comments)     Other reaction(s): Unknown  Received ASA & bleed out    Heparin      Please see 8/2017 admit = 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    Morphine Other (See Comments)     Other reaction(s): Unknown  Gets red streak up arm above IV site    Omeprazole Diarrhea     Other reaction(s): Unknown       10 Point REVIEW OF SYSTEMS IS OTHERWISE NEGATIVE  Except diarrhea, weight loss, anorexia, muscle aches and pains, anxiety, insomnia, depression  Objective     Blood pressure 140/90, pulse 64, height 6' 1" (1 854 m), weight 78 9 kg (174 lb)  Body mass index is 22 96 kg/m²  PHYSICAL EXAM:    General Appearance:  Alert, cooperative, no distress  HEENT:  Normocephalic, atraumatic, anicteric  Lungs: Clear to auscultation bilaterally  Heart: Regular rate and rhythm  Abdomen: Soft, non-tender, non-distended; normal bowel sounds   Rectal:  Deferred   Extremities: L AKA   Skin: No jaundice, rashes, or lesions       Lab Results:   Lab Results   Component Value Date    WBC 16 15 (H) 09/28/2018    HGB 14 6 09/28/2018    HCT 44 2 09/28/2018    MCV 89 09/28/2018     09/28/2018     Lab Results   Component Value Date     10/31/2017    K 5 1 12/18/2018     12/18/2018    CO2 17 (L) 12/18/2018    BUN 11 12/18/2018    CREATININE 1 42 (H) 09/28/2018    GLUCOSE 152 (H) 10/31/2017    GLUF 154 (H) 11/27/2017    CALCIUM 10 2 (H) 09/28/2018    AST 20 09/28/2018    ALT 24 09/28/2018    ALKPHOS 186 (H) 09/28/2018    PROT 7 6 10/31/2017    BILITOT 0 3 10/31/2017    EGFR 54 09/28/2018     Lab Results   Component Value Date    IRON 14 (L) 08/16/2017    TIBC 225 (L) 08/16/2017    FERRITIN 62 08/16/2017     Lab Results   Component Value Date    LIPASE 74 09/28/2018         Radiology Results:   No results found

## 2019-02-25 ENCOUNTER — OFFICE VISIT (OUTPATIENT)
Dept: PHYSICAL THERAPY | Facility: CLINIC | Age: 59
End: 2019-02-25
Payer: COMMERCIAL

## 2019-02-25 DIAGNOSIS — Z89.612 STATUS POST ABOVE KNEE AMPUTATION OF LEFT LOWER EXTREMITY: Primary | ICD-10-CM

## 2019-02-25 PROCEDURE — 97116 GAIT TRAINING THERAPY: CPT | Performed by: PHYSICAL THERAPIST

## 2019-02-25 PROCEDURE — 97530 THERAPEUTIC ACTIVITIES: CPT | Performed by: PHYSICAL THERAPIST

## 2019-02-25 NOTE — PROGRESS NOTES
Daily Note     Today's date: 2019  Patient name: Vahid Arias  : 1960  MRN: [id number]  Referring provider: David Ayers DO  Dx:   Encounter Diagnosis     ICD-10-CM    1  Status post above knee amputation of left lower extremity ([facility] [address] ) Z89 612        Start Time:   Stop Time:   Total time in clinic (min): 40 minutes    Subjective: c/o pain in groin in prosthesis; states that it is not staying on well      Objective: See treatment diary below      Assessment: doffed and donned prosthesis - cueing required to don properly; no issues w/ leg coming off during therapy session; patient's daughter attended and participated in guarding with ambulation  Patient instructed to increase wearing time with goal of 4 hours 2x daily by next week (currently only wearing it one hour once a day); instructed to work on standing balance activities at home and walk in home with daughter if able  Plan: Continue per plan of care       Precautions: none  PMH: PACEMAKER, CHF, PAD, DM  POC expires: 3/4/19  Daily Treatment Diary     Manual          Hip flexor stretch L                                                                     Exercise Diary          Hip extension into roll             Bridges             Prone lying             Prone hip ext             Hip flexor stretch             Standing bal    3-4min 3-4min        Wt shift ->L  Man facil 10-15x  10x        Standing balance w/ UE raises  CS @ walker    CS w/ [id number]        Gait training in ll bars  20' CS  walker 45'x2 walker 60'x3 [id number]'x2cs/CG        Side stepping             biodex wt shift M-L     1-2min        Step up 8"     1x CG cues        Step down 8"     1x CG cues                                                                         Prosthetic mgt  performed performed w/ prosthetist performed w/ prosthetist Karlo Byers prosthesis        GMI: laterality recognize john feet  vanilla/context Modalities   2/6 2/13 2/18

## 2019-02-27 ENCOUNTER — OFFICE VISIT (OUTPATIENT)
Dept: PHYSICAL THERAPY | Facility: CLINIC | Age: 59
End: 2019-02-27
Payer: COMMERCIAL

## 2019-02-27 ENCOUNTER — APPOINTMENT (OUTPATIENT)
Dept: PHYSICAL THERAPY | Facility: CLINIC | Age: 59
End: 2019-02-27
Payer: COMMERCIAL

## 2019-02-27 DIAGNOSIS — Z89.612 STATUS POST ABOVE KNEE AMPUTATION OF LEFT LOWER EXTREMITY: Primary | ICD-10-CM

## 2019-02-27 PROCEDURE — 97112 NEUROMUSCULAR REEDUCATION: CPT | Performed by: PHYSICAL THERAPIST

## 2019-02-27 PROCEDURE — 97116 GAIT TRAINING THERAPY: CPT | Performed by: PHYSICAL THERAPIST

## 2019-02-27 NOTE — PROGRESS NOTES
Daily Note     Today's date: 2019  Patient name: Nakul Blair  : 1960  MRN: [id number]  Referring provider: Leonarda Pollock DO  Dx:   Encounter Diagnosis     ICD-10-CM    1  Status post above knee amputation of left lower extremity ([facility] [address] ) Z89 612        Start Time: 1350  Stop Time: 1440  Total time in clinic (min): 50 minutes    Subjective: contnues to c/o groin pain; states he wore prosthesis for 3 1/2 hours yesterday but it was painful; continues to report phantom pain, severe at night      Objective: See treatment diary below      Assessment: suspension on prosthesis working well; able to walk longer distances but c/o pain on bone where socket contacts pelvis; improved performance on laterality training; instructed to begin stage 2 of GMI    Plan: Continue per plan of care        Precautions: none  PMH: PACEMAKER, CHF, PAD, DM  POC expires: 3/4/19  Daily Treatment Diary     Manual         Hip flexor stretch L                                                                     Exercise Diary         Hip extension into roll             Bridges             Prone lying             Prone hip ext             Hip flexor stretch             Standing bal    3-4min 3-4min 3-4min       Wt shift ->L  Man facil 10-15x  10x 10x       Standing balance w/ UE raises  CS @ walker    CS w/ [id number] &headext/rot 10xea       Gait training in ll bars  20' CS  walker 45'x2 walker 60'x3 [id number]'x2cs/CG walkerCS/cg 125'x3       Side stepping             biodex wt shift M-L     1-2min        Step up 8"     1x CG cues        Step down 8"     1x CG cues        Standing bal EC      30"x3                                                           Prosthetic mgt  performed performed w/ prosthetist performed w/ prosthetist Simone Adams prosthesis        GMI: laterality recognize john feet  vanilla/context    vanilla/context x3           Modalities

## 2019-02-28 ENCOUNTER — OFFICE VISIT (OUTPATIENT)
Dept: ENDOCRINOLOGY | Facility: HOSPITAL | Age: 59
End: 2019-02-28
Payer: COMMERCIAL

## 2019-02-28 VITALS
WEIGHT: 178 LBS | SYSTOLIC BLOOD PRESSURE: 130 MMHG | BODY MASS INDEX: 23.48 KG/M2 | HEART RATE: 60 BPM | DIASTOLIC BLOOD PRESSURE: 70 MMHG

## 2019-02-28 DIAGNOSIS — E11.43 DIABETIC GASTROPARESIS ASSOCIATED WITH TYPE 2 DIABETES MELLITUS (HCC): ICD-10-CM

## 2019-02-28 DIAGNOSIS — E11.40 TYPE 2 DIABETES MELLITUS WITH DIABETIC NEUROPATHY, UNSPECIFIED WHETHER LONG TERM INSULIN USE (HCC): Primary | ICD-10-CM

## 2019-02-28 DIAGNOSIS — E78.5 HYPERLIPIDEMIA, UNSPECIFIED HYPERLIPIDEMIA TYPE: ICD-10-CM

## 2019-02-28 DIAGNOSIS — I10 BENIGN ESSENTIAL HYPERTENSION: ICD-10-CM

## 2019-02-28 DIAGNOSIS — K31.84 DIABETIC GASTROPARESIS ASSOCIATED WITH TYPE 2 DIABETES MELLITUS (HCC): ICD-10-CM

## 2019-02-28 DIAGNOSIS — E11.42 DIABETIC POLYNEUROPATHY ASSOCIATED WITH TYPE 2 DIABETES MELLITUS (HCC): ICD-10-CM

## 2019-02-28 PROCEDURE — 99214 OFFICE O/P EST MOD 30 MIN: CPT | Performed by: NURSE PRACTITIONER

## 2019-02-28 RX ORDER — BLOOD SUGAR DIAGNOSTIC
STRIP MISCELLANEOUS
Qty: 100 EACH | Refills: 4 | Status: SHIPPED | OUTPATIENT
Start: 2019-02-28

## 2019-02-28 NOTE — PROGRESS NOTES
Bhaskar Littlejohn 60 y o  male MRN: [id number]    Encounter: [id number]      Assessment/Plan     Assessment: This is a 60y o -year-old male with type 2 diabetes status post left-sided AKA with hypertension and hyperlipidemia  Plan:  1   Type 2 diabetes with peripheral vascular disease, gastroparesis, neuropathy status post left AKA:  There are no blood sugar logs to review at the time of the visit  Reviewed the importance of both basal insulin and mealtime insulin  Reviewed the Humalog sliding scale starting at 2 units, for a blood sugar over 150 and 1 additional unit for each 50 over 200 prescribed at last visit  He will continue Basaglar 10 units at bedtime  He will provide blood sugar logs in 1-2 weeks for review so that further changes can be made to help his blood sugars throughout the day    I have referred him to medical nutrition therapy for help with his diet and I have also referred him for a dex com diagnostic trial to gain more insight into his glycemic control throughout the day  I urged him to have a diabetic eye exam completed  I prescribed smallest insulin syringe needle available to provide him the utmost comfort while performing insulin injections  He will continue to follow up with vascular and Neurology  I have also asked him to get his lab work completed  Check hemoglobin A1c      2   Hypertension:  He is normotensive in the office today  Continue to monitor  Continue current regimen  Check comprehensive metabolic panel and urine microalbumin      3   Hyperlipidemia:  Continue atorvastatin  Check fasting lipid panel      CC:  Type 2 Diabetes follow-up    History of Present Illness     HPI:  78-year-old male presents in the office today for follow-up of type 2 diabetes with neuropathy status post left-sided AKA, hypertension and hyperlipidemia  His most recent hemoglobin A1c from July 24, 2018 is 8 0    His current diabetes medication regimen is as follows:  Basaglar 10 units at bedtime  He states that he has not been utilizing his Humalog at meals  He complains that the needles are too large cause him discomfort  He states that he is due for a diabetic eye exam and has a history of retinopathy  He is seeing vascular and neurology specialists for treatment of issues to his right leg including neuropathy      For his hypertension, he is treated with lisinopril 10 mg daily and metoprolol 50 mg daily      His hyperlipidemia is treated with atorvastatin 10 mg daily  Review of Systems   Constitutional: Negative  Negative for fatigue  HENT: Negative  Eyes: Positive for visual disturbance (Blurry vision at times)  Respiratory: Negative for cough and shortness of breath  Cardiovascular: Negative for chest pain and palpitations  Gastrointestinal: Negative for abdominal pain, constipation, diarrhea, nausea and vomiting  Endocrine: Positive for polydipsia, polyphagia and polyuria  Genitourinary: Negative  Musculoskeletal: Positive for gait problem ( left-sided AKA - utilizes wheelchair)  Skin: Negative  Allergic/Immunologic: Negative  Neurological: Positive for dizziness and numbness ( right foot at times)  Negative for syncope, light-headedness and headaches  Hematological: Negative  Psychiatric/Behavioral: Negative  All other systems reviewed and are negative        Historical Information   Past Medical History:   Diagnosis Date    Acute gastritis     last assessed - [id number]    Amputated great toe of left foot ([facility] [address] )     last assessed - [id number]    Anticoagulated on Coumadin     Mechanical MVR    Anxiety     Bipolar 1 disorder ([facility] [address] )     CAD (coronary artery disease)     Chronic kidney disease     left kideny being monitored    Chronic systolic congestive heart failure ([facility] [address] ) 4/18/2017    Colitis 04/02/2017    Acute; last assessed - [id number]    COPD (chronic obstructive pulmonary disease) ([facility] [address] )     Diabetes mellitus ([facility] [address] )     Difficulty urinating     DM type 2 with diabetic peripheral neuropathy (HCC)     Dyspepsia     last assessed - [id number]    Hiatal hernia     Hyperlipidemia     Hypertension     Ischemic cardiomyopathy     Myocardial infarction [facility])     Pacemaker     PAD [facility]peripheral artery disease) ([facility])     Rectal bleed     Rotator cuff tear, right     last assessed - [id number]    Vomiting      Past Surgical History:   Procedure Laterality Date    AMPUTATION ABOVE KNEE (AKA) Left 8/24/2017    Procedure: AMPUTATION ABOVE KNEE (AKA); Surgeon: Stone Gudino MD;  [facility];  Service: Vascular; last assessed - [id number]   [address]      last assessed - [id number]    CARDIAC DEFIBRILLATOR PLACEMENT      CORONARY ARTERY BYPASS GRAFT      ESOPHAGOGASTRODUODENOSCOPY N/A 4/20/2017    Procedure: ESOPHAGOGASTRODUODENOSCOPY (EGD); Surgeon: Yaz Salgado MD;  [facility];  Service:     ESOPHAGOGASTRODUODENOSCOPY N/A 5/26/2017    Procedure: ESOPHAGOGASTRODUODENOSCOPY (EGD); Surgeon: Chadwick Varela MD;  [facility];  Service:     ESOPHAGOGASTRODUODENOSCOPY N/A 11/16/2017    Procedure: ESOPHAGOGASTRODUODENOSCOPY (EGD); Surgeon: Radha Gavin MD;  Location[facility];   Service: Gastroenterology    FOOT AMPUTATION THROUGH METATARSAL Left     MTP first toe; last assessed - [id number]   [facility] MITRAL VALVE REPLACEMENT      Mechanical; last assessed - [id number]    ROTATOR CUFF REPAIR      last assessed - 51XSW3992    TOE AMPUTATION Left 7/21/2017    Procedure: PARTIAL FIRST RAY AMPUTATION; EXCISION OF WOUND W/ TOE FLAP CLOSURE;  Surgeon: Luis Purcell DPM;  Location[facility];  Service: [address] [place] [address],[address]      last assessed - [id number]    VAC DRESSING APPLICATION Left 2/56/7613    Procedure: VAC application;  Surgeon: Luis Purcell DPM;  [facility];  Service: Podiatry    WOUND DEBRIDEMENT Left 8/19/2017    Procedure: wound debridement with washout; resection of left 1st metatarsal;  Surgeon: Megan Luis DPM;  [facility];  Service: Podiatry     Social History   Social History     Substance and Sexual Activity   Alcohol Use No     Social History     Substance and Sexual Activity   Drug Use No     Social History     Tobacco Use   Smoking Status Current Every Day Smoker    Packs/day: 0 50    Types: Cigarettes   Smokeless Tobacco Never Used     Family History:   Family History   Problem Relation Age of Onset    Hypertension Mother     Diabetes Mother         Diabetes mellitus    Migraines Mother         Migraine headaches    Diabetes Father     Bipolar disorder Father     Hypertension Father     Melanoma Father         Malignant melanoma    Migraines Sister     Heart disease Maternal Grandfather         cardiac disorder    No Known Problems Paternal Grandfather     Bipolar disorder Daughter     Migraines Daughter         Migraine headaches    Arthritis Family     Heart disease Family         cardiac disorder    Depression Family        Meds/Allergies   Current Outpatient Medications   Medication Sig Dispense Refill    ACCU-CHEK APPLE PLUS test strip TEST 3 TIMES A DAY   DX E11 65 100 each 1    ACCU-CHEK FASTCLIX LANCETS MISC 1 Units by Does not apply route 3 (three) times a day      atorvastatin (LIPITOR) 10 mg tablet TAKE 1 TABLET BY MOUTH EVERY DAY 90 tablet 0    BD PEN NEEDLE CELESTINO U/F 32G X 4 MM MISC BY DOES NOT APPLY ROUTE 4 (FOUR) TIMES A  each 5    Blood Glucose Monitoring Suppl (ACCU-CHEK APPLE PLUS) w/Device KIT 1 Units by Does not apply route 3 (three) times a day      diphenoxylate-atropine (LOMOTIL) 2 5-0 025 mg per tablet Take 1 tablet by mouth 4 (four) times a day as needed for diarrhea 30 tablet 0    ferrous sulfate 324 (65 Fe) mg TAKE 1 TABLET (324 MG TOTAL) BY MOUTH DAILY BEFORE BREAKFAST 30 tablet 5    gabapentin (NEURONTIN) 600 MG tablet Take 1 tablet (600 mg total) by mouth 3 (three) times a day 90 tablet 5    HYDROcodone-acetaminophen (NORCO) 7 5-325 mg per tablet Take 1 tablet by mouth every 6 (six) hours as needed for pain Max Daily Amount: 4 tablets 120 tablet 0    insulin glargine (BASAGLAR KWIKPEN) 100 units/mL injection pen Inject 10 Units under the skin daily at bedtime 5 pen 0    lisinopril (ZESTRIL) 10 mg tablet TAKE 1 TABLET DAILY  90 tablet 3    LORazepam (ATIVAN) 1 mg tablet Take 1 tablet by mouth 2 (two) times a day as needed      metoprolol succinate (TOPROL-XL) 25 mg 24 hr tablet Take 25 mg by mouth daily      metroNIDAZOLE (METROCREAM) 0 75 % cream Apply 1 application topically daily      mirtazapine (REMERON) 15 mg tablet Take 15 mg by mouth daily        montelukast (SINGULAIR) 10 mg tablet Take 10 mg by mouth daily  5    pantoprazole (PROTONIX) 40 mg tablet Take 1 tablet (40 mg total) by mouth 2 (two) times a day before meals 180 tablet 0    QUEtiapine (SEROquel) 400 MG tablet Take 400 mg by mouth daily at bedtime  1    torsemide (DEMADEX) 20 mg tablet TAKE 2 TABLET DAILY 60 tablet 4    traMADol (ULTRAM) 50 mg tablet Take 1 tablet (50 mg total) by mouth every 8 (eight) hours as needed for moderate pain 30 tablet 0    warfarin (COUMADIN) 5 mg tablet TAKE 1-2 TABLETS DAILY OR AS DIRECTED 60 tablet 5    busPIRone (BUSPAR) 10 mg tablet Take 1 tablet (10 mg total) by mouth 3 (three) times a day (Patient not taking: Reported on 2/28/2019) 90 tablet 0    enoxaparin (LOVENOX) 80 mg/0 8 mL Take 80 mg injection every 12 hours as directed by MD (Patient not taking: Reported on 2/28/2019) 8 Syringe 0    HUMALOG 100 UNIT/ML injection INJECT 1 TO 3 UNITS AS DIRECTED 3 TIMES A DAY *DISCARD 28 DAYS AFTER OPENING* (Patient not taking: Reported on 2/28/2019) 10 mL 0     No current facility-administered medications for this visit        Allergies   Allergen Reactions    Aspirin Other (See Comments)     Other reaction(s): Unknown  Received ASA & bleed out    Heparin      Please see 8/2017 admit = 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    Morphine Other (See Comments)     Other reaction(s): Unknown  Gets red streak up arm above IV site    Omeprazole Diarrhea     Other reaction(s): Unknown       Objective   Vitals: Blood pressure 130/70, pulse 60, weight 80 7 kg (178 lb)  Physical Exam   Constitutional: He is oriented to person, place, and time  He appears well-developed and well-nourished  HENT:   Head: Normocephalic and atraumatic  Nose: Nose normal    Mouth/Throat: Oropharynx is clear and moist    Poor dentition   Eyes: Pupils are equal, round, and reactive to light  Conjunctivae and EOM are normal    Neck: Normal range of motion  Neck supple  Cardiovascular: Normal rate, regular rhythm, normal heart sounds and intact distal pulses  Pulmonary/Chest: Effort normal and breath sounds normal    Abdominal: Soft  Bowel sounds are normal    Musculoskeletal: He exhibits deformity (Left-sided AKA)  Neurological: He is alert and oriented to person, place, and time  Coordination ( utilizes wheelchair) abnormal    Skin: Skin is warm and dry  Dry skin   Psychiatric: He has a normal mood and affect  His behavior is normal  Judgment and thought content normal    Vitals reviewed      Lab Results:   Lab Results   Component Value Date/Time    Hemoglobin A1C 8 4 (A) 01/28/2019 02:33 PM    Hemoglobin A1C 8 0 09/18/2018    Hemoglobin A1C 8 0 (H) 07/24/2018 12:00 AM    Hemoglobin A1C 6 7 03/01/2018 12:42 PM    WBC 16 15 (H) 09/28/2018 03:11 PM    WBC 15 84 (H) 08/06/2018 01:00 PM    WBC 12 26 (H) 08/05/2018 01:37 PM    Hemoglobin 14 6 09/28/2018 03:11 PM    Hemoglobin 12 2 08/06/2018 01:00 PM    Hemoglobin 15 0 08/05/2018 01:37 PM    Hematocrit 44 2 09/28/2018 03:11 PM    Hematocrit 37 7 08/06/2018 01:00 PM    Hematocrit 46 1 08/05/2018 01:37 PM    MCV 89 09/28/2018 03:11 PM    MCV 88 08/06/2018 01:00 PM    MCV 88 08/05/2018 01:37 PM    Platelets 535 27/69/9553 03:11 PM    Platelets 464 08/06/2018 01:00 PM    Platelets 200 51/75/4696 01:37 PM    BUN 11 12/18/2018 12:00 AM    BUN 11 09/28/2018 03:36 PM    BUN 16 08/06/2018 01:00 PM    BUN 11 08/05/2018 01:37 PM    Potassium 5 1 12/18/2018 12:00 AM    Potassium 3 8 09/28/2018 03:36 PM    Potassium 3 3 (L) 08/06/2018 01:00 PM    Potassium 3 7 08/05/2018 01:37 PM    Chloride 105 12/18/2018 12:00 AM    Chloride 103 09/28/2018 03:36 PM    Chloride 105 08/06/2018 01:00 PM    Chloride 107 08/05/2018 01:37 PM    CO2 17 (L) 12/18/2018 12:00 AM    CO2 24 09/28/2018 03:36 PM    CO2 22 08/06/2018 01:00 PM    CO2 23 08/05/2018 01:37 PM    Creatinine 1 42 (H) 09/28/2018 03:36 PM    Creatinine 1 24 08/06/2018 01:00 PM    Creatinine 1 06 08/05/2018 01:37 PM    AST 20 09/28/2018 03:36 PM    AST 23 08/06/2018 01:00 PM    AST 19 08/05/2018 01:37 PM    ALT 24 09/28/2018 03:36 PM    ALT 22 08/06/2018 01:00 PM    ALT 23 08/05/2018 01:37 PM    Albumin 4 0 09/28/2018 03:36 PM    Albumin 3 6 08/06/2018 01:00 PM    Albumin 4 1 08/05/2018 01:37 PM       Portions of the record may have been created with voice recognition software  Occasional wrong word or "sound a like" substitutions may have occurred due to the inherent limitations of voice recognition software  Read the chart carefully and recognize, using context, where substitutions have occurred

## 2019-02-28 NOTE — PATIENT INSTRUCTIONS
Be mindful of diet      Stay active and stay hydrated  You must inject your meal time Humalog 10 minutes before meals      Please check your blood sugars 4 times daily and for the record to the office in 1 week for review and adjustment, if necessary      Continue Humalog 3 units at meals with sliding scale as follows:     150-200 = 2 units  201-250 = 3 units  251-300 = 4 units  301-350 = 5 units  351-400 = 6 units  401-450 = 7 units  450 500 = 8 units     Continue Basaglar at 10 units at bedtime      Consider taking part in the DEX COM trial discussed at the visit      Continue lisinopril and metoprolol      Continue atorvastatin      Obtain a diabetic eye exam      Continue to follow up with vascular and neuropathy      Stop smoking  Obtain lab work as prescribed

## 2019-03-01 ENCOUNTER — TELEPHONE (OUTPATIENT)
Dept: CARDIOLOGY CLINIC | Facility: CLINIC | Age: 59
End: 2019-03-01

## 2019-03-01 ENCOUNTER — TELEPHONE (OUTPATIENT)
Dept: ENDOCRINOLOGY | Facility: HOSPITAL | Age: 59
End: 2019-03-01

## 2019-03-01 NOTE — TELEPHONE ENCOUNTER
Coy Delgado, could you please verify this patient's benefits for the Dexcom trial soon & let me (or Eliot Pederson) know the outcome? Patient has a caregiver, has RHIANNON [address]herty 37 afford much  He scheduled with Eliot Pederson for 3-27-19 & if he can't afford it, he'll need to cancel the appointment  Thanks    I told patient I'd let him know as soon as I find out

## 2019-03-01 NOTE — TELEPHONE ENCOUNTER
Faxed request to [facility] for home draw to be done soon  Fax #[phone]    Blood to be processed by [employer] [facility]

## 2019-03-01 NOTE — TELEPHONE ENCOUNTER
Phone call to sánchez at [facility], previously known as [facility] inr  monitoring  notified enrollment forms now completed and being faxed to [phone]  Patient is requesting home inr testing  Waiting for insurance to approve

## 2019-03-04 ENCOUNTER — APPOINTMENT (OUTPATIENT)
Dept: PHYSICAL THERAPY | Facility: CLINIC | Age: 59
End: 2019-03-04
Payer: COMMERCIAL

## 2019-03-05 ENCOUNTER — TELEPHONE (OUTPATIENT)
Dept: ENDOCRINOLOGY | Facility: HOSPITAL | Age: 59
End: 2019-03-05

## 2019-03-05 LAB — INR PPP: 2.8 (ref 0.86–1.17)

## 2019-03-05 NOTE — TELEPHONE ENCOUNTER
If he has an opportunity to have it done at a later date that is fine    It is surveillance for protein in his urine that we do annually

## 2019-03-05 NOTE — TELEPHONE ENCOUNTER
Patient said the lab that came out to his house was not able to get a urine culture   Do you want him to try again at a later date or can he skip it? pls advise

## 2019-03-06 ENCOUNTER — ANTICOAG VISIT (OUTPATIENT)
Dept: CARDIOLOGY CLINIC | Facility: CLINIC | Age: 59
End: 2019-03-06

## 2019-03-06 ENCOUNTER — OFFICE VISIT (OUTPATIENT)
Dept: PHYSICAL THERAPY | Facility: CLINIC | Age: 59
End: 2019-03-06
Payer: COMMERCIAL

## 2019-03-06 DIAGNOSIS — Z89.612 STATUS POST ABOVE KNEE AMPUTATION OF LEFT LOWER EXTREMITY: Primary | ICD-10-CM

## 2019-03-06 DIAGNOSIS — Z95.2 HISTORY OF MITRAL VALVE REPLACEMENT WITH MECHANICAL VALVE: ICD-10-CM

## 2019-03-06 LAB
ALBUMIN SERPL-MCNC: 3.9 G/DL (ref 3.5–5.5)
ALBUMIN/GLOB SERPL: 1.3 {RATIO} (ref 1.2–2.2)
ALP SERPL-CCNC: 168 IU/L (ref 39–117)
ALT SERPL-CCNC: 24 IU/L (ref 0–44)
AST SERPL-CCNC: 27 IU/L (ref 0–40)
BILIRUB SERPL-MCNC: 0.4 MG/DL (ref 0–1.2)
BUN SERPL-MCNC: 8 MG/DL (ref 6–24)
BUN/CREAT SERPL: 9 (ref 9–20)
CALCIUM SERPL-MCNC: 8.9 MG/DL (ref 8.7–10.2)
CHLORIDE SERPL-SCNC: 106 MMOL/L (ref 96–106)
CHOLEST SERPL-MCNC: 105 MG/DL (ref 100–199)
CO2 SERPL-SCNC: 23 MMOL/L (ref 20–29)
CREAT SERPL-MCNC: 0.87 MG/DL (ref 0.76–1.27)
GLOBULIN SER-MCNC: 2.9 G/DL (ref 1.5–4.5)
GLUCOSE SERPL-MCNC: 205 MG/DL (ref 65–99)
HBA1C MFR BLD: 8.6 % (ref 4.8–5.6)
HDLC SERPL-MCNC: 35 MG/DL
INR PPP: 2.8 (ref 0.8–1.2)
LDLC SERPL CALC-MCNC: 43 MG/DL (ref 0–99)
POTASSIUM SERPL-SCNC: 3.9 MMOL/L (ref 3.5–5.2)
PROT SERPL-MCNC: 6.8 G/DL (ref 6–8.5)
PROTHROMBIN TIME: 27.4 SEC (ref 9.1–12)
SL AMB EGFR AFRICAN AMERICAN: 109 ML/MIN/1.73
SL AMB EGFR NON AFRICAN AMERICAN: 94 ML/MIN/1.73
SL AMB VLDL CHOLESTEROL CALC: 27 MG/DL (ref 5–40)
SODIUM SERPL-SCNC: 141 MMOL/L (ref 134–144)
TRIGL SERPL-MCNC: 133 MG/DL (ref 0–149)
TSH SERPL DL<=0.005 MIU/L-ACNC: 2.47 UIU/ML (ref 0.45–4.5)

## 2019-03-06 PROCEDURE — 97530 THERAPEUTIC ACTIVITIES: CPT | Performed by: PHYSICAL THERAPIST

## 2019-03-06 PROCEDURE — 97116 GAIT TRAINING THERAPY: CPT | Performed by: PHYSICAL THERAPIST

## 2019-03-06 NOTE — PROGRESS NOTES
3/6/19, tc to pt, will continue current dose, inr due 2 weeks  [facility]ed to [facility] to schedule inr due 3/19   rain

## 2019-03-06 NOTE — PROGRESS NOTES
Daily Note/Discharge     19 ADDENDUM: Discharge due to patient     Today's date: 3/6/2019  Patient name: Rafal Milton  : 1960  MRN: [id number]  Referring provider: Ratna Wood DO  Dx:   Encounter Diagnosis     ICD-10-CM    1  Status post above knee amputation of left lower extremity ([address] ) Z89 612        Start Time: 66  Stop Time: 8711  Total time in clinic (min): 46 minutes    Subjective: reports leg is much more comfortable in prosthesis since adjustments were made  Objective: See treatment diary below      Assessment: good prosthetic fit; gait improving, no longer having discomfort from rim of socket; able to ambulate w/ wheeled walker and prosthesis on level surfaces w/ CS only, good knee control; requires min assist for prosthetic mgt  '    Plan: Continue per plan of care    advance to ambulation on elevations    Precautions: none  PMH: PACEMAKER, CHF, PAD, DM  POC expires: 3/4/19  Daily Treatment Diary     Manual   36      Hip flexor stretch L                                                                     Exercise Diary   3/6      Hip extension into roll             Bridges             Prone lying             Prone hip ext             Hip flexor stretch             Standing bal    3-4min 3-4min 3-4min       Wt shift ->L  Man facil 10-15x  10x 10x       Standing balance w/ UE raises  CS @ walker    CS w/ [id number] &headext/rot 10xea       Gait training in ll bars  20' CS  walker 45'x2 walker 60'x3 [id number]'x2cs/CG walkerCS/cg 125'x3 walker CS 75'x5      Side stepping at rail       1 lap      biodex wt shift M-L     1-2min        Step up 8"     1x CG cues        Step down 8"     1x CG cues        Standing bal EC      30"x3                                                           Prosthetic mgt  performed performed w/ prosthetist performed w/ prosthetist Frida Moran prosthesis  Don prosthesis      GMI: laterality recognize john feet  vanilla/context    vanilla/context x3           Modalities   2/6 2/13 2/18

## 2019-03-07 ENCOUNTER — TELEPHONE (OUTPATIENT)
Dept: ENDOCRINOLOGY | Facility: HOSPITAL | Age: 59
End: 2019-03-07

## 2019-03-07 NOTE — TELEPHONE ENCOUNTER
Reviewed lab work from [facility] dated March 5, 2019  Blood glucose is elevated at 205  Hemoglobin A1c is elevated to 8 6  Please send in blood sugars for review so that further changes can be made to his regimen  Fasting lipid panel looks okay    TSH is normal

## 2019-03-08 ENCOUNTER — TELEPHONE (OUTPATIENT)
Dept: OTHER | Facility: OTHER | Age: 59
End: 2019-03-08

## 2019-03-08 NOTE — RESULT ENCOUNTER NOTE
Please call the patient regarding abnormal result  Random glucose is elevated at 205  Lipid panel looks okay  Hemoglobin A1c remains elevated 8 6  Continue sending in blood sugars for review    TSH is normal

## 2019-03-11 ENCOUNTER — APPOINTMENT (OUTPATIENT)
Dept: PHYSICAL THERAPY | Facility: CLINIC | Age: 59
End: 2019-03-11
Payer: COMMERCIAL

## 2019-03-11 ENCOUNTER — TELEPHONE (OUTPATIENT)
Dept: CARDIOLOGY CLINIC | Facility: CLINIC | Age: 59
End: 2019-03-11

## 2019-03-11 NOTE — TELEPHONE ENCOUNTER
Phone call from patient's daughter, Cesilia Theodora, she reports patient  at [facility]  er on 3/8/19  Requesting I notify dr Mukul Galloway of same  Called to [facility] to cancel pt/inr home draws  Also called to [employer] home monitoring company to cancel enrollment into home monitor inr testing

## 2019-03-13 ENCOUNTER — APPOINTMENT (OUTPATIENT)
Dept: PHYSICAL THERAPY | Facility: CLINIC | Age: 59
End: 2019-03-13
Payer: COMMERCIAL

## 2019-03-19 ENCOUNTER — APPOINTMENT (OUTPATIENT)
Dept: PHYSICAL THERAPY | Facility: CLINIC | Age: 59
End: 2019-03-19
Payer: COMMERCIAL

## 2019-03-22 ENCOUNTER — APPOINTMENT (OUTPATIENT)
Dept: PHYSICAL THERAPY | Facility: CLINIC | Age: 59
End: 2019-03-22
Payer: COMMERCIAL

## 2019-03-25 ENCOUNTER — APPOINTMENT (OUTPATIENT)
Dept: PHYSICAL THERAPY | Facility: CLINIC | Age: 59
End: 2019-03-25
Payer: COMMERCIAL

## 2019-03-27 ENCOUNTER — APPOINTMENT (OUTPATIENT)
Dept: PHYSICAL THERAPY | Facility: CLINIC | Age: 59
End: 2019-03-27
Payer: COMMERCIAL

## 2021-08-11 NOTE — ED NOTES
Called [facility] and per Noel Smith RN they have a telebox for pt when he arrives        Jenni Castillo RN  11/15/17 4675 Was A Bandage Applied: Yes

## 2022-05-01 NOTE — PLAN OF CARE

## 2022-06-08 NOTE — PROGRESS NOTES
Outpatient Care Management Note:    No response to telephone calls X 3 or unable to reach letter  Patient closed to care management services at this time  Patient Dania Tanner  : 1943     MRN:  [id number]   Date of Service: 2022  Referring provider: Laurie Jeronimo MD    Thank you for referring Sujatha Motley to the [facility]. Reason for referral:  Post hospital follow-up after COVID pneumonia    History :  Sujatha Motley is a 77year old man who is being seen in consultation in the [facility] pulmonary clinic. This is a 77year old man with multiple hospitalizations over the last few months and admitted to the ICU for sepsis and COVID pneumonia. Patient currently resides at [facility]. Patient was treated with antibiotics and discharged with oral ciprofloxacin post discharge and completed course followed up with Infectious Disease doctor. The patient states that he was discharged from the hospital requiring supplemental oxygen 2 L by nasal cannula at rest and with activity. He has not needed oxygen for 1 week now and oxygen saturation states upper on 97% at rest.  He denies any complaints of shortness of breath, cough, chest discomfort, wheezing, palpitations, fevers or chills. He reports feeling well today. He has a very remote smoking history 2 pack years, And does not use any inhalers.      Past Medical History:   Diagnosis Date   â¢ BPH (benign prostatic hyperplasia)    â¢ HTN (hypertension)    â¢ Parkinson's disease (CMS/HCC)    â¢ Psoriatic arthritis (CMS/[facility])         Past Surgical History:   Procedure Laterality Date   â¢ Colonoscopy  2022   â¢ Egd  2022       Social History     Socioeconomic History   â¢ Marital status: Single     Spouse name: Not on file   â¢ Number of children: Not on file   â¢ Years of education: Not on file   â¢ Highest education level: Not on file   Occupational History   â¢ Not on file   Tobacco Use   â¢ Smoking status: Former Smoker     Packs/day: 1.00     Years: 2.00     Pack years: 2.00   â¢ Smokeless tobacco: Never Used   Substance and Sexual Activity   â¢ Alcohol use: No   â¢ Drug use: No   â¢ Sexual activity: Not on file   Other Topics Concern   â¢  Service Not Asked   â¢ Blood Transfusions Not Asked   â¢ Caffeine Concern Not Asked   â¢ Occupational Exposure Not Asked   â¢ [place] Hazards Not Asked   â¢ Sleep Concern Not Asked   â¢ Stress Concern Not Asked   â¢ Weight Concern Not Asked   â¢ Special Diet Not Asked   â¢ Back Care Not Asked   â¢ Exercise Not Asked   â¢ Bike Helmet Not Asked   â¢ Seat Belt Yes   â¢ Self-Exams Not Asked   Social History Narrative   â¢ Not on file     Social Determinants of Health     Financial Resource Strain: Not on file   Food Insecurity: Not on file   Transportation Needs: Not on file   Physical Activity: Not on file   Stress: Not on file   Social Connections: Not on file   Intimate Partner Violence: Not At Risk   â¢ Social Determinants: Intimate Partner Violence Past Fear: No   â¢ Social Determinants: Intimate Partner Violence Current Fear: No       Family History   Problem Relation Age of Onset   â¢ Diabetes Mother         Current Outpatient Medications   Medication   â¢ carbidopa-levodopa (PARCOPA)  MG per oral disintegrating tablet   â¢ magnesium hydroxide (MILK OF MAGNESIA) 400 MG/5ML suspension   â¢ pantoprazole (PROTONIX) 40 MG tablet   â¢ Menthol, Topical Analgesic, 3.7 % Gel   â¢ acetaminophen (TYLENOL) 325 MG tablet   â¢ apixaBAN (ELIQUIS) 5 MG Tab   â¢ lidocaine (LIDOCARE) 4 % patch   â¢ Cholecalciferol 50 mcg (2,000 units) tablet   â¢ gabapentin (NEURONTIN) 100 MG capsule   â¢ nystatin (MYCOSTATIN) 562023 UNIT/GM powder   â¢ aspirin 81 MG EC tablet   â¢ ondansetron (Zofran) 4 MG tablet   â¢ lactobacillus acidophilus (BACID)   â¢ cholestyramine/aspartame (QUESTRAN LIGHT) 4 g packet   â¢ allopurinol (ZYLOPRIM) 100 MG tablet   â¢ furosemide (Lasix) 20 MG tablet   â¢ diclofenac (Voltaren) 1 % gel   â¢ Zinc Oxide 10 % Cream   â¢ tiZANidine (ZANAFLEX) 2 MG tablet   â¢ tamsulosin (FLOMAX) 0.4 MG Cap     No current facility-administered "medications for this visit. ALLERGIES:  No Known Allergies    I have reviewed the past medical history, family and social history, allergies and current medication    Review of Systems   Constitutional: Negative for chills, diaphoresis, fatigue and fever. HENT: Negative for congestion, postnasal drip, rhinorrhea, sinus pressure, sinus pain, sneezing and sore throat. Respiratory: Negative for cough, chest tightness, shortness of breath and wheezing. Cardiovascular: Negative for chest pain and palpitations. Skin: Negative. Allergic/Immunologic: Negative for environmental allergies and immunocompromised state. Neurological: Negative for dizziness, light-headedness and headaches. The remainder of systems were reviewed and were negative. Vitals:    06/08/22 1020   BP: 132/70   Pulse: 93   Resp: 20   SpO2: 97%   Weight: 99.8 kg (220 lb)   Height: 5' 11"" (1.803 m)     Physical Exam  Constitutional:       General: He is not in acute distress. Appearance: Normal appearance. Cardiovascular:      Rate and Rhythm: Normal rate and regular rhythm. Pulses: Normal pulses. Heart sounds: Normal heart sounds. Pulmonary:      Effort: No respiratory distress. Breath sounds: Rhonchi present. No wheezing or rales. Musculoskeletal:      Right lower leg: Edema present. Left lower leg: Edema present. Skin:     General: Skin is warm and dry. Capillary Refill: Capillary refill takes less than 2 seconds. Neurological:      Mental Status: He is alert and oriented to person, place, and time. Psychiatric:         Thought Content:  Thought content normal.         Judgment: Judgment normal.         Data     Laboratory:   Lab Results   Component Value Date    WBC 7.2 05/16/2022    WBC 7.1 01/09/2018    HGB 9.0 (L) 05/16/2022    HGB 15.9 01/09/2018    HCT 29.2 (L) 05/16/2022    HCT 46.4 01/09/2018    MCV 90.4 05/16/2022    MCV 88.5 01/09/2018     05/16/2022     " 01/09/2018    CO2 31 05/16/2022    CO2 29 02/19/2019    BUN 20 05/16/2022    BUN 21 (H) 02/19/2019    AST 29 05/13/2022    AST 18 02/19/2019       Report from most recent studies are as follows: No results found. .  I have personally reviewed the following studies and interpret them as indicated:      Assesment and Plan   Andie Thomas is a 77year old male with:    1. History of 2019 novel coronavirus disease (COVID-19)    2. History of recurrent pneumonia      Orders Placed This Encounter   â¢ Flutter therapy   â¢ carbidopa-levodopa (PARCOPA)  MG per oral disintegrating tablet   â¢ magnesium hydroxide (MILK OF MAGNESIA) 400 MG/5ML suspension          Plan:    1. History of 2019 novel coronavirus disease (COVID-19)  Patient has recovered well from COVID pneumonia and denies any respiratory symptoms at this time reports feeling well. Denies any shortness of breath or cough or residual flexion COVID. Patient does not need any inhalers at this time. Patient may benefit from supplemental oxygen as needed as provided by [facility]. 2. History of recurrent pneumonia  Patient was noted to have son mild coarse breath sound that may be sputum. He was not able to clear this with a cough. He was provided with flutter valve in the office and advised to use 3 times a day 5 times each session daily. This may help him clear secretions and uses pulmonary hygiene. Patient may also benefit from Mucinex as needed to help thin secretions if flutter therapy is not sufficient. We will plan to have him Return in about 6 months (around 12/8/2022). I spent a total of 60 minutes on this patient's care on the day of their visit excluding time spent related to any billed procedures.  This time includes face-to-face time with the patient as well as time spent documenting in the medical record, reviewing patient's records and tests, obtaining history, placing orders, communicating with other healthcare professionals, counseling the patient, family, or caregiver, and/or care co-ordination for the complex level decision making for the diagnoses above. I reviewed external records from 2 providers outside my secialty or healthcare organization as summarized in the note. I personally reviewed and interpreted a test as summarized in the note. On 6/8/2022, ITammi APNP scribed the services personally performed by Ely Stevens MD     The documentation recorded by the scribe accurately and completely reflects the service(s) I personally performed and the decisions made by me. Ely Stevens MD, [facility]  Interventional Pulmonologist and Critical Care Specialist      In the next few weeks you may receive a [employer] survey regarding your most recent clinic visit with us. Please take a few moments to accurately evaluate your visit. We strive to provide you with the best medical care. Your feedback will assist us in achieving this. Again, thank you for your time and we look forward to your next visit. If we need to contact you regarding any test results, we will make 2 attempts to reach you at the number you have listed during your office visit today. If we are unable to reach you, a letter with your results and any further instructions will be mailed to you home.

## 2022-06-12 NOTE — TELEPHONE ENCOUNTER
[id number] Jen Louis, does not need anything other than pt/inr to check on coumadin for now- ordered by cardiology  His next 3 month blood sugar test, hba1c is due the beginning of june
PATIENT CALLED REQUESTING [facility] FOR [facility]  THEY COME EVERY Tuesday  WE JUST NEED TO CALL THEM AND TELL THEM WHAT YOU WANT ORDERED  THEIR NUMBER .[phone] 
This has been all set up
no

## 2022-07-15 NOTE — ED NOTES
Pt wanting someone to "call my daughter to feed my dog   I dont want him to starve too"      Angie Shaw, RN  08/05/18 1600 No

## 2022-08-19 NOTE — PROGRESS NOTES
Daily Note     Today's date: 2018  Patient name: Nelia Worthy  : 1960  MRN: [id number]  Referring provider: Daniela Carter DO  Dx:   Encounter Diagnosis     ICD-10-CM    1  Status post above knee amputation, left ([address] ) Z89 612        Start Time: 1116  Stop Time: 1155  Total time in clinic (min): 39 minutes    Subjective: reports he is wearing his prosthesis 6 hrs per day and is walking around his kitchen    Objective: See treatment diary below    Assessment: continues to have some difficulty with unlocking knee to sit but is able to do so more consistently  Less cueing needed for technique w/ curb step  Exhibits good safety ambulating w/ walker and locked knee (supervision only)  Will progress to unlocked knee  Tolerated treatment well  Patient would benefit from continued PT      Plan: Continue per plan of care       Precautions: none  PMH: DM , defibrillator, PVD, PAD    Daily Treatment Diary     Manual        Hip flexor stretch  JR JR np np np np      STM L quad / HS  JR  np np np np np      Desensitization L residual limb  Verlon Eagles np np np np                                    Exercise Diary                     Prone lying             SLR x4             Hip flexor stretch  2-3' np np np np np      Bridges w/ bolster  10x10" np np np np np                   Balance activities standing  SLS RLE  20"x3  CS np Wt shift unsupported  2-3' np np standing 2'                                Side step ll bars      3 laps np      Step ups/down in ll bars      6"  6x 6" 3x      Sit <-> stand   2x 10x 5x 5-10x 5x      Gait training w/ walker: knee locked   40'x2  CG& cues 40-75' x8 20-30'x4CG out door 35-40'x2  Close S 50'/30'      stairs     2 steps CG -- --      Up/down curb step     3x -- 3x      Up/down ramp     1x -- 1x                                Prosthetic mgt    unlock knee to sit  10x 5x 10-15x 5x      GMI  See below  Mirror therapy np np Follow-up with urologist Dr. Dave Wilson  Take OTC Tylenol as needed for pain  Return emergency room for any worsening  Prescribed medication as prescribed only for nausea and vomiting. np      5/2/18: initiated mirror therapy    Modalities              CP prn

## 2022-12-22 NOTE — MISCELLANEOUS
If you point at something across the room, does you child look at it? (FOR EXAMPLE, if you point at a toy or an animal, does your child look at the toy or animal?) No  Have you ever wondered if your child might be deaf?No  Does your child play pretend or make-believe?(FOR EXAMPLE, Pretend to drink from an empty cup, pretend to talk on a phone, or pretend to feed a doll or stuffed animal?) No  Does your child like climbing on things? (FOR EXAMPLE, furniture, Playground equipment, or stairs?) No  Does your child make unusual finger movements near his or her eyes? (FOR EXAMPLE, does your child wiggle his or her fingers close to his or her eyes?) Yes   Does your child point with one finger to ask for something or to get help?(FOR EXAMPLE, pointing to a snack or toy that is out of reach?) Yes  Does your child point with on finger to show you something interesting? (FOR EXAMPLE, pointing to an airplane in the claus or a big truck in the road?) No  Is your chid interested in other children? (FOR EXAMPLE, does your child watch other children, smile at them, or go to them?) Yes  Does your child show you things by bringing them to you or holding them up for you to see - not to get help, but just to share? (FOR EXAMPLE, showing you a flower, a stuffed animal, or a toy truck?) No  Does your child respond when you call his or her name? (FOR EXAMPLE, does he or she look up, talk or babble, or stop what he or she is doing when you call his or her name?) Yes  When you smile at your child, does he or she smile back at you? Yes  Does your child get upset by everyday noises? (FOR EXAMPLE, does your child scream or cry to noise such as a vacuum  or loud music?) No  Does your child walk? No  Does you child look you in the eye when you are talking to him or her, playing with him or her, or dressing him or her? Yes  Does your child try to copy what you do? (FOR EXAMPLE, wave bye-bye, clap or make a funny noise when you do?)Yes  If  Message  Cindy Briggs from 1 [employer] called with INR 2 7      Active Problems    1  Acute colitis (558 9) (K52 9)   2  Acute gastritis (535 00) (K29 00)   3  Anxiety (300 00) (F41 9)   4  Atherosclerosis of arteries of extremities (440 20) (I70 209)   5  Benign essential hypertension (401 1) (I10)   6  Bilateral lower extremity edema (782 3) (R60 0)   7  Bipolar disorder (296 80) (F31 9)   8  Blister of left foot, initial encounter (917 2) (S90 822A)   9  Carotid arterial disease (447 9) (I77 9)   10  Cellulitis of foot (682 7) (L03 119)   11  Chronic congestive heart failure (428 0) (I50 9)   12  Coronary artery disease (414 00) (I25 10)   13  Decreased pedal pulses (785 9) (R09 89)   14  Diabetic gastroparesis associated with type 2 diabetes mellitus (250 60,536 3)    (U39 10,W74 32)   15  Diabetic ulcer of left foot associated with diabetes mellitus due to underlying condition    (249 80,707 15) (E08 621,L97 529)   16  Diabetic ulcer of right foot associated with diabetes mellitus due to underlying condition    (249 80,707 15) (E08 621,L97 519)   17  Diarrhea (787 91) (R19 7)   18  Dyspepsia (536 8) (R10 13)   19  Encounter for special screening examination for neoplasm of prostate (V76 44) (Z12 5)   20  Environmental allergies (V15 09) (Z91 09)   21  Erosive esophagitis (530 19) (K22 10)   22  Esophageal reflux (530 81) (K21 9)   23  Hiatal hernia (553 3) (K44 9)   24  History of mitral valve replacement with mechanical valve (V43 3) (Z95 2)   25  Hyperlipidemia (272 4) (E78 5)   26  Insulin dependent type 2 diabetes mellitus, uncontrolled (250 02,V58 67) (E11 65,Z79 4)   27  Mitral valve prolapse (424 0) (I34 1)   28  Nausea (787 02) (R11 0)   29  Onychomycosis (110 1) (B35 1)   30  Peripheral vascular disease (443 9) (I73 9)   31  PVC's (premature ventricular contractions) (427 69) (I49 3)   32  S/P CABG (coronary artery bypass graft) (V45 81) (Z95 1)   33  Screening for iron deficiency anemia (V78 0) (Z13 0)   34  "you turn your head to look at something, does your child look around to see what you are looking at? Yes  Does your child try to get you to watch him or her? (FOR EXAMPLE, does your child look at you for praise, or say \"look\" or \"watch me\" ?) No  Does your child understand when you tell him or her to do something? (FOR EXAMPLE, if you don't point, can your child understand \"put the book on the chair\" or \"bring me the blanket\"?) No  If something new happens, does your child look at your face to see how you feel about it? (FOR EXAMPLE, if he or she hears a strange or funny noise, or sees a new toy, will he or she look at your face?) Yes  Does your child like movement activities? (FOR EXAMPLE, being swung or bounced on your knee?) Yes    " Ulcer On The Feet Dorsal Cesar Scale 1  (0-5)   35  Ulceration (707 9) (L98 499)   36  Vesicles (709 8) (R23 8)    Current Meds   1  Accu-Chek Angelica Device; testing 3 times a day: dx: E11 65; Therapy: 40Rrf7183 to (Last Rx:24Apr2017)  Requested for: 94Wip9848 Ordered   2  Accu-Chek Angelica Plus In Vitro Strip; TEST 3 TIMES A DAY  dx E11 65; Therapy: 24Apr2017 to (Last Rx:31May2017)  Requested for: 44LER7163 Ordered   3  Accu-Chek FastClix Lancets Miscellaneous; test 3-4 times a day dx: e11 65; Therapy: 91Stl1102 to (Last Rx:24Apr2017)  Requested for: 24Apr2017 Ordered   4  Diphenoxylate-Atropine 2 5-0 025 MG Oral Tablet; TAKE 1 TABLET 4 TIMES DAILY AS   NEEDED FOR DIARRHEA; Therapy: 10Apr2017 to (Evaluate:12May2017); Last Rx:10Apr2017 Ordered   5  Hydrocodone-Acetaminophen 5-325 MG Oral Tablet; TAKE 1 TABLET EVERY 6 HOURS   AS NEEDED; Therapy: 43HRL5935 to (Evaluate:34Kti5687); Last Rx:06Jun2017 Ordered   6  Klor-Con M20 20 MEQ Oral Tablet Extended Release; Take 1 tablet daily; Therapy: 87PAB5309 to (96 639027)  Requested for: 34JRA5954; Last   Rx:27Mar2017 Ordered   7  Lantus SoloStar 100 UNIT/ML Subcutaneous Solution Pen-injector; INJECT 21 UNIT   Bedtime; Therapy: 30UME9240 to (Last Rx:07Jun2017)  Requested for: 07Jun2017 Ordered   8  Lisinopril 10 MG Oral Tablet; TAKE 1 TABLET DAILY; Last Rx:04Jun2017 Ordered   9  LORazepam 1 MG Oral Tablet; Take 1 tablet twice daily as needed; Last Rx:31May2017   Ordered   10  Metoprolol Succinate ER 25 MG Oral Tablet Extended Release 24 Hour; Take 1 tablet    daily; Therapy: 65PZL6926 to (96 087921)  Requested for: 40ISF2529; Last    Rx:27Mar2017 Ordered   11  Montelukast Sodium 10 MG Oral Tablet; TAKE 1 TABLET DAILY; Therapy: 92Yji6932 to (Evaluate:70Swz8135)  Requested for: 96ANA0647; Last    Rx:05Jun2017 Ordered   12   Ondansetron 8 MG Oral Tablet Disintegrating; put 1 tab under tongue to dissolve every 8    hours as needed for nausea and vomiting; Therapy: (Recorded:06Mar2017) to Recorded   13  QUEtiapine Fumarate 400 MG Oral Tablet; 1 TAB TWICE A DAY; Therapy: 62TJS5964 to (Last Rx:06Jun2017)  Requested for: 06Jun2017 Ordered   14  Torsemide 20 MG Oral Tablet; Take one tablet daily as needed; Therapy: 54HRC5740 to (Evaluate:23Hri6596)  Requested for: 78PWB9585; Last    Rx:07Jun2017 Ordered   15  Warfarin Sodium 5 MG Oral Tablet; TAKE 1 TABLET DAILY; Therapy: (Recorded:91Fne7697) to Recorded    Allergies    1  Aspirin TABS   2  [facility] Omeprazole TBEC   3   Morphine Derivatives    Signatures   Electronically signed by : Kianna Park, ; Jun 8 2017  1:30PM EST                       (Author)

## 2023-09-12 NOTE — PROGRESS NOTES
12/27/18, tc to pt,left message on his answering machine, increase dose, 7 5 mg m/w/f/sat, 5 mg other days of the week, inr due 1 week  Also called to daughter, Harper Leiva, left same instructions on her cell phone   rani Niacinamide Counseling: I recommended taking niacin or niacinamide, also know as vitamin B3, twice daily. Recent evidence suggests that taking vitamin B3 (500 mg twice daily) can reduce the risk of actinic keratoses and non-melanoma skin cancers. Side effects of vitamin B3 include flushing and headache.

## 2024-12-20 NOTE — SOCIAL WORK
CM met with patient to review weekly team meeting and discuss homelessness and his prothesis, as his barriers  His prothesis is being corrected and will be returned today and CM provided patient with resources for [employer], [employer], and the [employer]  Patient instructed to contact the resources to arrange housing and transportation for 11/29  Patient is in agreement to dc 11/29 to the facility that he arranges  PAIN SCALE 3 OF 10.

## (undated) DEVICE — SYRINGE 50ML LL

## (undated) DEVICE — THE SIMPULSE SOLO SYSTEM WITH ULTREX RETRACTABLE SPLASH SHIELD TIP: Brand: SIMPULSE SOLO

## (undated) DEVICE — 3M™ STERI-STRIP™ REINFORCED ADHESIVE SKIN CLOSURES, R1547, 1/2 IN X 4 IN (12 MM X 100 MM), 6 STRIPS/ENVELOPE: Brand: 3M™ STERI-STRIP™

## (undated) DEVICE — CYTOLOGY BRUSH STANDARD

## (undated) DEVICE — TUBING SUCTION 5MM X 12 FT

## (undated) DEVICE — GAUZE SPONGES,16 PLY: Brand: CURITY

## (undated) DEVICE — 1200CC GUARDIAN II: Brand: GUARDIAN

## (undated) DEVICE — JACKSON-PRATT 100CC BULB RESERVOIR: Brand: CARDINAL HEALTH

## (undated) DEVICE — JP CHANNEL DRAIN, 19FR HUBLESS: Brand: CARDINAL HEALTH

## (undated) DEVICE — INTENDED FOR TISSUE SEPARATION, AND OTHER PROCEDURES THAT REQUIRE A SHARP SURGICAL BLADE TO PUNCTURE OR CUT.: Brand: BARD-PARKER SAFETY BLADES SIZE 10, STERILE

## (undated) DEVICE — KERLIX BANDAGE ROLL: Brand: KERLIX

## (undated) DEVICE — JP PERF DRN SIL FLT 7MM FULL: Brand: CARDINAL HEALTH

## (undated) DEVICE — VAC CANISTER 500ML

## (undated) DEVICE — SUT PROLENE 3-0 SH 36 IN 8522H

## (undated) DEVICE — PROXIMATE PLUS MD MULTI-DIRECTIONAL RELEASE SKIN STAPLERS CONTAINS 35 STAINLESS STEEL STAPLES APPROXIMATE CLOSED DIMENSIONS: 6.9MM X 3.9MM WIDE: Brand: PROXIMATE

## (undated) DEVICE — SPONGE STICK WITH PVP-I: Brand: KENDALL

## (undated) DEVICE — GLOVE SRG BIOGEL 7.5

## (undated) DEVICE — SUT VICRYL 0 REEL 54 IN J287G

## (undated) DEVICE — OCCLUSIVE GAUZE STRIP,3% BISMUTH TRIBROMOPHENATE IN PETROLATUM BLEND: Brand: XEROFORM

## (undated) DEVICE — INTENDED FOR TISSUE SEPARATION, AND OTHER PROCEDURES THAT REQUIRE A SHARP SURGICAL BLADE TO PUNCTURE OR CUT.: Brand: BARD-PARKER SAFETY BLADES SIZE 15, STERILE

## (undated) DEVICE — UNIVERSAL MAJOR EXTREMITY,KIT: Brand: CARDINAL HEALTH

## (undated) DEVICE — ABDOMINAL PAD: Brand: DERMACEA

## (undated) DEVICE — SPONGE LAP 18 X 18 IN

## (undated) DEVICE — BLADE SAGITTAL 25.6 X 9.5MM

## (undated) DEVICE — SUT ETHILON 3-0 FSLX 30 IN 1673H

## (undated) DEVICE — SUT SILK 0 SH 30 IN K834H

## (undated) DEVICE — SPONGE PVP SCRUB WING STERILE

## (undated) DEVICE — BITE BLOCK ADULT 11FR OMNI BLOC

## (undated) DEVICE — GAUZE SPONGES,USP TYPE VII GAUZE, 12 PLY: Brand: CURITY

## (undated) DEVICE — CURITY NON-ADHERENT STRIPS: Brand: CURITY

## (undated) DEVICE — SINGLE-USE BIOPSY FORCEPS: Brand: RADIAL JAW 4

## (undated) DEVICE — GLOVE INDICATOR PI UNDERGLOVE SZ 7.5 BLUE

## (undated) DEVICE — PADDING CAST 4 IN  COTTON STRL

## (undated) DEVICE — STOCKINETTE REGULAR

## (undated) DEVICE — PLUMEPEN PRO 10FT

## (undated) DEVICE — MEDI-VAC YANKAUER SUCTION HANDLE W/STRAIGHT TIP & CONTROL VENT: Brand: CARDINAL HEALTH

## (undated) DEVICE — VAC DRESSING SENSATRAC SMALL

## (undated) DEVICE — SYRINGE 30ML LL

## (undated) DEVICE — SUT VICRYL 0 CT-1 27 IN J260H

## (undated) DEVICE — BETADINE SURGICAL SCRUB 32OZ

## (undated) DEVICE — SUT SILK 3-0 SH 30 IN K832H

## (undated) DEVICE — ACE WRAP 6 IN UNSTERILE

## (undated) DEVICE — BRUSH ENDO CLEANING DBL-HEADER

## (undated) DEVICE — BONE WAX WHITE: Brand: BONE WAX WHITE

## (undated) DEVICE — 2108 SERIES SAGITTAL BLADE (18.6 X 0.64 X 61.1MM)

## (undated) DEVICE — SUT SILK 2-0 18 IN A185H

## (undated) DEVICE — LUBRICANT SURGILUBE TUBE 4 OZ  FLIP TOP

## (undated) DEVICE — 3000CC GUARDIAN II: Brand: GUARDIAN

## (undated) DEVICE — GLOVE SRG BIOGEL 7

## (undated) DEVICE — BULB SYRINGE,IRRIGATION WITH PROTECTIVE CAP: Brand: DOVER